# Patient Record
Sex: MALE | Race: WHITE | NOT HISPANIC OR LATINO | URBAN - METROPOLITAN AREA
[De-identification: names, ages, dates, MRNs, and addresses within clinical notes are randomized per-mention and may not be internally consistent; named-entity substitution may affect disease eponyms.]

---

## 2017-08-10 ENCOUNTER — INPATIENT (INPATIENT)
Facility: HOSPITAL | Age: 52
LOS: 5 days | Discharge: HOME | End: 2017-08-16
Attending: HOSPITALIST | Admitting: HOSPITALIST
Payer: MEDICARE

## 2017-08-10 VITALS
DIASTOLIC BLOOD PRESSURE: 84 MMHG | RESPIRATION RATE: 15 BRPM | SYSTOLIC BLOOD PRESSURE: 182 MMHG | HEART RATE: 85 BPM | OXYGEN SATURATION: 100 % | TEMPERATURE: 98 F

## 2017-08-10 DIAGNOSIS — N18.9 CHRONIC KIDNEY DISEASE, UNSPECIFIED: ICD-10-CM

## 2017-08-10 DIAGNOSIS — N18.4 CHRONIC KIDNEY DISEASE, STAGE 4 (SEVERE): ICD-10-CM

## 2017-08-10 DIAGNOSIS — Z94.0 KIDNEY TRANSPLANT STATUS: ICD-10-CM

## 2017-08-10 DIAGNOSIS — Z90.89 ACQUIRED ABSENCE OF OTHER ORGANS: Chronic | ICD-10-CM

## 2017-08-10 DIAGNOSIS — Z94.0 KIDNEY TRANSPLANT STATUS: Chronic | ICD-10-CM

## 2017-08-10 DIAGNOSIS — G93.40 ENCEPHALOPATHY, UNSPECIFIED: ICD-10-CM

## 2017-08-10 DIAGNOSIS — I10 ESSENTIAL (PRIMARY) HYPERTENSION: ICD-10-CM

## 2017-08-10 DIAGNOSIS — A41.9 SEPSIS, UNSPECIFIED ORGANISM: ICD-10-CM

## 2017-08-10 DIAGNOSIS — E10.9 TYPE 1 DIABETES MELLITUS WITHOUT COMPLICATIONS: ICD-10-CM

## 2017-08-10 DIAGNOSIS — R50.9 FEVER, UNSPECIFIED: ICD-10-CM

## 2017-08-10 DIAGNOSIS — N17.9 ACUTE KIDNEY FAILURE, UNSPECIFIED: ICD-10-CM

## 2017-08-10 DIAGNOSIS — E10.649 TYPE 1 DIABETES MELLITUS WITH HYPOGLYCEMIA WITHOUT COMA: ICD-10-CM

## 2017-08-10 DIAGNOSIS — Z29.9 ENCOUNTER FOR PROPHYLACTIC MEASURES, UNSPECIFIED: ICD-10-CM

## 2017-08-10 DIAGNOSIS — I16.0 HYPERTENSIVE URGENCY: ICD-10-CM

## 2017-08-10 LAB
APPEARANCE UR: CLEAR — SIGNIFICANT CHANGE UP
BACTERIA # UR AUTO: SIGNIFICANT CHANGE UP
BASE EXCESS BLDV CALC-SCNC: -5.4 MMOL/L — SIGNIFICANT CHANGE UP
BILIRUB UR-MCNC: NEGATIVE — SIGNIFICANT CHANGE UP
BLOOD GAS VENOUS - CREATININE: 3.52 MG/DL — HIGH (ref 0.5–1.3)
BLOOD UR QL VISUAL: HIGH
BUN SERPL-MCNC: 66 MG/DL — HIGH (ref 7–23)
CALCIUM SERPL-MCNC: 9.2 MG/DL — SIGNIFICANT CHANGE UP (ref 8.4–10.5)
CHLORIDE BLDV-SCNC: 110 MMOL/L — HIGH (ref 96–108)
CHLORIDE SERPL-SCNC: 102 MMOL/L — SIGNIFICANT CHANGE UP (ref 98–107)
CO2 SERPL-SCNC: 17 MMOL/L — LOW (ref 22–31)
COLOR SPEC: SIGNIFICANT CHANGE UP
CREAT ?TM UR-MCNC: 36.63 MG/DL — SIGNIFICANT CHANGE UP
CREAT SERPL-MCNC: 3.51 MG/DL — HIGH (ref 0.5–1.3)
GAS PNL BLDV: 131 MMOL/L — LOW (ref 136–146)
GLUCOSE BLDV-MCNC: 106 — HIGH (ref 70–99)
GLUCOSE SERPL-MCNC: 104 MG/DL — HIGH (ref 70–99)
GLUCOSE UR-MCNC: 50 — SIGNIFICANT CHANGE UP
HCO3 BLDV-SCNC: 20 MMOL/L — SIGNIFICANT CHANGE UP (ref 20–27)
HCT VFR BLD CALC: 31.6 % — LOW (ref 39–50)
HCT VFR BLDV CALC: 33.2 % — LOW (ref 39–51)
HGB BLD-MCNC: 10.6 G/DL — LOW (ref 13–17)
HGB BLDV-MCNC: 10.8 G/DL — LOW (ref 13–17)
KETONES UR-MCNC: NEGATIVE — SIGNIFICANT CHANGE UP
LACTATE BLDV-MCNC: 1.2 MMOL/L — SIGNIFICANT CHANGE UP (ref 0.5–2)
LEUKOCYTE ESTERASE UR-ACNC: NEGATIVE — SIGNIFICANT CHANGE UP
MAGNESIUM SERPL-MCNC: 1.7 MG/DL — SIGNIFICANT CHANGE UP (ref 1.6–2.6)
MCHC RBC-ENTMCNC: 29.3 PG — SIGNIFICANT CHANGE UP (ref 27–34)
MCHC RBC-ENTMCNC: 33.5 % — SIGNIFICANT CHANGE UP (ref 32–36)
MCV RBC AUTO: 87.3 FL — SIGNIFICANT CHANGE UP (ref 80–100)
MUCOUS THREADS # UR AUTO: SIGNIFICANT CHANGE UP
NITRITE UR-MCNC: NEGATIVE — SIGNIFICANT CHANGE UP
NRBC # FLD: 0 — SIGNIFICANT CHANGE UP
PCO2 BLDV: 33 MMHG — LOW (ref 41–51)
PH BLDV: 7.38 PH — SIGNIFICANT CHANGE UP (ref 7.32–7.43)
PH UR: 6 — SIGNIFICANT CHANGE UP (ref 4.6–8)
PHOSPHATE SERPL-MCNC: 3.3 MG/DL — SIGNIFICANT CHANGE UP (ref 2.5–4.5)
PLATELET # BLD AUTO: 230 K/UL — SIGNIFICANT CHANGE UP (ref 150–400)
PMV BLD: 10.1 FL — SIGNIFICANT CHANGE UP (ref 7–13)
PO2 BLDV: 37 MMHG — SIGNIFICANT CHANGE UP (ref 35–40)
POTASSIUM BLDV-SCNC: 3.1 MMOL/L — LOW (ref 3.4–4.5)
POTASSIUM SERPL-MCNC: 3.6 MMOL/L — SIGNIFICANT CHANGE UP (ref 3.5–5.3)
POTASSIUM SERPL-SCNC: 3.6 MMOL/L — SIGNIFICANT CHANGE UP (ref 3.5–5.3)
PROT UR-MCNC: 186.7 MG/DL — SIGNIFICANT CHANGE UP
PROT UR-MCNC: 300 — SIGNIFICANT CHANGE UP
RBC # BLD: 3.62 M/UL — LOW (ref 4.2–5.8)
RBC # FLD: 12.3 % — SIGNIFICANT CHANGE UP (ref 10.3–14.5)
RBC CASTS # UR COMP ASSIST: SIGNIFICANT CHANGE UP (ref 0–?)
SAO2 % BLDV: 69.2 % — SIGNIFICANT CHANGE UP (ref 60–85)
SODIUM SERPL-SCNC: 138 MMOL/L — SIGNIFICANT CHANGE UP (ref 135–145)
SP GR SPEC: 1.01 — SIGNIFICANT CHANGE UP (ref 1–1.03)
SQUAMOUS # UR AUTO: SIGNIFICANT CHANGE UP
UROBILINOGEN FLD QL: NORMAL E.U. — SIGNIFICANT CHANGE UP (ref 0.1–0.2)
WBC # BLD: 15.56 K/UL — HIGH (ref 3.8–10.5)
WBC # FLD AUTO: 15.56 K/UL — HIGH (ref 3.8–10.5)
WBC UR QL: SIGNIFICANT CHANGE UP (ref 0–?)

## 2017-08-10 PROCEDURE — 71010: CPT | Mod: 26

## 2017-08-10 PROCEDURE — 99223 1ST HOSP IP/OBS HIGH 75: CPT | Mod: GC

## 2017-08-10 PROCEDURE — 76770 US EXAM ABDO BACK WALL COMP: CPT | Mod: 26

## 2017-08-10 PROCEDURE — 70450 CT HEAD/BRAIN W/O DYE: CPT | Mod: 26

## 2017-08-10 PROCEDURE — 99223 1ST HOSP IP/OBS HIGH 75: CPT

## 2017-08-10 RX ORDER — ACETAMINOPHEN 500 MG
650 TABLET ORAL ONCE
Qty: 0 | Refills: 0 | Status: COMPLETED | OUTPATIENT
Start: 2017-08-10 | End: 2017-08-10

## 2017-08-10 RX ORDER — VANCOMYCIN HCL 1 G
700 VIAL (EA) INTRAVENOUS ONCE
Qty: 0 | Refills: 0 | Status: DISCONTINUED | OUTPATIENT
Start: 2017-08-10 | End: 2017-08-10

## 2017-08-10 RX ORDER — ATENOLOL 25 MG/1
100 TABLET ORAL ONCE
Qty: 0 | Refills: 0 | Status: COMPLETED | OUTPATIENT
Start: 2017-08-10 | End: 2017-08-10

## 2017-08-10 RX ORDER — SODIUM CHLORIDE 9 MG/ML
1000 INJECTION, SOLUTION INTRAVENOUS
Qty: 0 | Refills: 0 | Status: DISCONTINUED | OUTPATIENT
Start: 2017-08-10 | End: 2017-08-16

## 2017-08-10 RX ORDER — CYCLOSPORINE 100 MG/1
100 CAPSULE ORAL
Qty: 0 | Refills: 0 | Status: DISCONTINUED | OUTPATIENT
Start: 2017-08-10 | End: 2017-08-13

## 2017-08-10 RX ORDER — INSULIN GLARGINE 100 [IU]/ML
22 INJECTION, SOLUTION SUBCUTANEOUS
Qty: 0 | Refills: 0 | Status: DISCONTINUED | OUTPATIENT
Start: 2017-08-11 | End: 2017-08-14

## 2017-08-10 RX ORDER — MYCOPHENOLATE MOFETIL 250 MG/1
1250 CAPSULE ORAL
Qty: 0 | Refills: 0 | Status: DISCONTINUED | OUTPATIENT
Start: 2017-08-10 | End: 2017-08-16

## 2017-08-10 RX ORDER — DEXTROSE 50 % IN WATER 50 %
12.5 SYRINGE (ML) INTRAVENOUS ONCE
Qty: 0 | Refills: 0 | Status: DISCONTINUED | OUTPATIENT
Start: 2017-08-10 | End: 2017-08-16

## 2017-08-10 RX ORDER — SODIUM CHLORIDE 9 MG/ML
1000 INJECTION INTRAMUSCULAR; INTRAVENOUS; SUBCUTANEOUS ONCE
Qty: 0 | Refills: 0 | Status: COMPLETED | OUTPATIENT
Start: 2017-08-10 | End: 2017-08-10

## 2017-08-10 RX ORDER — MYCOPHENOLATE MOFETIL 250 MG/1
1250 CAPSULE ORAL
Qty: 0 | Refills: 0 | Status: DISCONTINUED | OUTPATIENT
Start: 2017-08-10 | End: 2017-08-10

## 2017-08-10 RX ORDER — DEXTROSE 50 % IN WATER 50 %
25 SYRINGE (ML) INTRAVENOUS ONCE
Qty: 0 | Refills: 0 | Status: DISCONTINUED | OUTPATIENT
Start: 2017-08-10 | End: 2017-08-16

## 2017-08-10 RX ORDER — INSULIN LISPRO 100/ML
VIAL (ML) SUBCUTANEOUS
Qty: 0 | Refills: 0 | Status: DISCONTINUED | OUTPATIENT
Start: 2017-08-10 | End: 2017-08-11

## 2017-08-10 RX ORDER — INSULIN GLARGINE 100 [IU]/ML
20 INJECTION, SOLUTION SUBCUTANEOUS ONCE
Qty: 0 | Refills: 0 | Status: DISCONTINUED | OUTPATIENT
Start: 2017-08-10 | End: 2017-08-10

## 2017-08-10 RX ORDER — DEXTROSE 50 % IN WATER 50 %
1 SYRINGE (ML) INTRAVENOUS ONCE
Qty: 0 | Refills: 0 | Status: DISCONTINUED | OUTPATIENT
Start: 2017-08-10 | End: 2017-08-16

## 2017-08-10 RX ORDER — GLUCAGON INJECTION, SOLUTION 0.5 MG/.1ML
1 INJECTION, SOLUTION SUBCUTANEOUS ONCE
Qty: 0 | Refills: 0 | Status: DISCONTINUED | OUTPATIENT
Start: 2017-08-10 | End: 2017-08-16

## 2017-08-10 RX ORDER — ATENOLOL 25 MG/1
100 TABLET ORAL DAILY
Qty: 0 | Refills: 0 | Status: DISCONTINUED | OUTPATIENT
Start: 2017-08-11 | End: 2017-08-14

## 2017-08-10 RX ORDER — PIPERACILLIN AND TAZOBACTAM 4; .5 G/20ML; G/20ML
3.38 INJECTION, POWDER, LYOPHILIZED, FOR SOLUTION INTRAVENOUS EVERY 12 HOURS
Qty: 0 | Refills: 0 | Status: DISCONTINUED | OUTPATIENT
Start: 2017-08-10 | End: 2017-08-13

## 2017-08-10 RX ORDER — NIFEDIPINE 30 MG
30 TABLET, EXTENDED RELEASE 24 HR ORAL ONCE
Qty: 0 | Refills: 0 | Status: COMPLETED | OUTPATIENT
Start: 2017-08-10 | End: 2017-08-10

## 2017-08-10 RX ORDER — INSULIN LISPRO 100/ML
VIAL (ML) SUBCUTANEOUS AT BEDTIME
Qty: 0 | Refills: 0 | Status: DISCONTINUED | OUTPATIENT
Start: 2017-08-10 | End: 2017-08-16

## 2017-08-10 RX ORDER — HEPARIN SODIUM 5000 [USP'U]/ML
5000 INJECTION INTRAVENOUS; SUBCUTANEOUS EVERY 8 HOURS
Qty: 0 | Refills: 0 | Status: DISCONTINUED | OUTPATIENT
Start: 2017-08-10 | End: 2017-08-16

## 2017-08-10 RX ORDER — VANCOMYCIN HCL 1 G
1000 VIAL (EA) INTRAVENOUS ONCE
Qty: 0 | Refills: 0 | Status: COMPLETED | OUTPATIENT
Start: 2017-08-10 | End: 2017-08-10

## 2017-08-10 RX ORDER — CYCLOSPORINE 100 MG/1
100 CAPSULE ORAL ONCE
Qty: 0 | Refills: 0 | Status: COMPLETED | OUTPATIENT
Start: 2017-08-10 | End: 2017-08-10

## 2017-08-10 RX ORDER — INSULIN GLARGINE 100 [IU]/ML
22 INJECTION, SOLUTION SUBCUTANEOUS ONCE
Qty: 0 | Refills: 0 | Status: COMPLETED | OUTPATIENT
Start: 2017-08-10 | End: 2017-08-10

## 2017-08-10 RX ORDER — PIPERACILLIN AND TAZOBACTAM 4; .5 G/20ML; G/20ML
3.38 INJECTION, POWDER, LYOPHILIZED, FOR SOLUTION INTRAVENOUS ONCE
Qty: 0 | Refills: 0 | Status: COMPLETED | OUTPATIENT
Start: 2017-08-10 | End: 2017-08-10

## 2017-08-10 RX ORDER — NIFEDIPINE 30 MG
30 TABLET, EXTENDED RELEASE 24 HR ORAL DAILY
Qty: 0 | Refills: 0 | Status: DISCONTINUED | OUTPATIENT
Start: 2017-08-10 | End: 2017-08-11

## 2017-08-10 RX ADMIN — PIPERACILLIN AND TAZOBACTAM 25 GRAM(S): 4; .5 INJECTION, POWDER, LYOPHILIZED, FOR SOLUTION INTRAVENOUS at 18:18

## 2017-08-10 RX ADMIN — HEPARIN SODIUM 5000 UNIT(S): 5000 INJECTION INTRAVENOUS; SUBCUTANEOUS at 15:35

## 2017-08-10 RX ADMIN — Medication 30 MILLIGRAM(S): at 04:22

## 2017-08-10 RX ADMIN — Medication 650 MILLIGRAM(S): at 04:22

## 2017-08-10 RX ADMIN — Medication 5 MILLIGRAM(S): at 11:09

## 2017-08-10 RX ADMIN — INSULIN GLARGINE 22 UNIT(S): 100 INJECTION, SOLUTION SUBCUTANEOUS at 15:57

## 2017-08-10 RX ADMIN — Medication 250 MILLIGRAM(S): at 05:42

## 2017-08-10 RX ADMIN — SODIUM CHLORIDE 4000 MILLILITER(S): 9 INJECTION INTRAMUSCULAR; INTRAVENOUS; SUBCUTANEOUS at 04:22

## 2017-08-10 RX ADMIN — MYCOPHENOLATE MOFETIL 1250 MILLIGRAM(S): 250 CAPSULE ORAL at 11:10

## 2017-08-10 RX ADMIN — ATENOLOL 100 MILLIGRAM(S): 25 TABLET ORAL at 05:28

## 2017-08-10 RX ADMIN — CYCLOSPORINE 100 MILLIGRAM(S): 100 CAPSULE ORAL at 20:20

## 2017-08-10 RX ADMIN — CYCLOSPORINE 100 MILLIGRAM(S): 100 CAPSULE ORAL at 11:09

## 2017-08-10 RX ADMIN — MYCOPHENOLATE MOFETIL 1250 MILLIGRAM(S): 250 CAPSULE ORAL at 20:20

## 2017-08-10 RX ADMIN — PIPERACILLIN AND TAZOBACTAM 200 GRAM(S): 4; .5 INJECTION, POWDER, LYOPHILIZED, FOR SOLUTION INTRAVENOUS at 06:51

## 2017-08-10 RX ADMIN — Medication 2: at 18:18

## 2017-08-10 RX ADMIN — HEPARIN SODIUM 5000 UNIT(S): 5000 INJECTION INTRAVENOUS; SUBCUTANEOUS at 22:15

## 2017-08-10 NOTE — H&P ADULT - PROBLEM SELECTOR PROBLEM 6
RODRIGO (acute kidney injury) Need for prophylactic measure CKD (chronic kidney disease) Diabetes mellitus type I

## 2017-08-10 NOTE — ED PROVIDER NOTE - PROGRESS NOTE DETAILS
CT head for confusion, fingersticks now controlled, BP elevated will resume home medications, likely admit for endocrine establishment and adjustment febrile, white count. start broad spectrum abx CT head for confusion, fingersticks now controlled, BP elevated will resume home medications, likely admit for endocrine establishment and adjustment. Febrile, will get infectious workup and labwork.. sister by bedside has labwork appears to have creatine of 1.7-2.70 at baseline Klepfish: RODRIGO, leukocytosis. Possible PNA. On immunosuppressant. Vitals stable. Hemodynamically stable. pmd not at [facility].

## 2017-08-10 NOTE — ED PROVIDER NOTE - ATTENDING CONTRIBUTION TO CARE
52M PMH DM, diabetic retinopathy, legally blind, HTN, renal transplant (1998, on prednisone, cellcept, cyclosporine), CKD p/w AMS and hypoglycemia tonigh that mostly resolved w/ sugar. Also fluctuating FSGs over last 2w. Febrile, other vitals wnl. Exam as above.  Hypoglycemia: Possibly 2/2 infection vs. renal dysfunction  Fever: Unclear etiology. Infectious w/u.  CBC, cmp, vbg comp, blood cx. UA, urine cx. tylenol, IVF.  Pt not confused but slightly slower to respond. Will cth.   Pt on immunosuppressant. Likely admission for further care.

## 2017-08-10 NOTE — H&P ADULT - PROBLEM SELECTOR PLAN 5
- BUN 66 / 3.51 Cr - BUN 66 / 3.51 Cr  - Hold nephrotoxins, f/u Renal recs - BUN 66 / 3.51 Cr  - Hold nephrotoxins, f/u Urine protein:cr - Pt on ISS. Will continue to monitor FSG levels.   - Pt started on Diabetic diet. Diabetes educator to see pt.  - F/u HbA1c level - Pt currently taking Cyclosporine 100mg po BID, Prednisone 5mg po QD, and Cellcept 125o mg po BID  - Per Renal: Pt baseline Cr (from outpt records) shows baseline 2.5-3. We recommend a Renal ultrasound to evaluate for hydronephrosis and Urine Protein:Cr. - - Pt will start his home regimen and take the Cyclosporine and Cellcept at 8am and 8pm daily, prednisone once daily.

## 2017-08-10 NOTE — CONSULT NOTE ADULT - ASSESSMENT
53 y/o M PMH DM1, HTN, renal transplant in 1998 (currently on Cellcept, prednisone 5 mg qd, and Cyclosporine), legally blind, CKD who presents with hypoglycemia in setting of DM1

## 2017-08-10 NOTE — H&P ADULT - PROBLEM SELECTOR PLAN 3
- Will continue home Atenolol and Nifedipine. - BP currently   - Pt given one dose of Atenolol 100 mg in the Ed  - Will continue home Atenolol and Nifedipine during this admission for further management of HTN. - BP currently 170/90  - Pt given one dose of Atenolol 100 mg in the ED  - Will continue home Atenolol and Nifedipine during this admission for further management of HTN. - BP currently 144/74  - Pt given one dose of Atenolol 100 mg and Nifedipine 30 mg in the ED  - Will continue home Atenolol and Nifedipine during this admission for further management of HTN.

## 2017-08-10 NOTE — H&P ADULT - NSHPREVIEWOFSYSTEMS_GEN_ALL_CORE
REVIEW OF SYSTEMS:    CONSTITUTIONAL: Denies any fatigue, weakness, fevers or chills  EYES/ENT: No visual changes;  No vertigo or throat pain   NECK: No pain or stiffness  RESPIRATORY: No cough, wheezing, hemoptysis; No shortness of breath  CARDIOVASCULAR: No chest pain or palpitations  GASTROINTESTINAL: No abdominal or epigastric pain. No nausea, vomiting, or hematemesis; No diarrhea or constipation. No melena or hematochezia.  GENITOURINARY: No dysuria, frequency or hematuria  NEUROLOGICAL: No numbness or weakness  SKIN: No itching, burning, rashes, or lesions   All other review of systems is negative unless indicated above.

## 2017-08-10 NOTE — H&P ADULT - ATTENDING COMMENTS
Patient seen and examined with team this AM. Agree with resident note as per above, personally edited by me.

## 2017-08-10 NOTE — H&P ADULT - PROBLEM SELECTOR PLAN 2
- Pt currently taking Cyclosporine 100mg po BID, Prednisone 5mg po QD, and Cellcept 125o mg po BID  - Renal was consulted to evaluate and for further follow up as an outpatient   - - Pt currently taking Cyclosporine 100mg po BID, Prednisone 5mg po QD, and Cellcept 125o mg po BID  - Renal was consulted to evaluate pt and provide recommendations on immunosuppressants as well as further follow up as an outpatient - Pt currently taking Cyclosporine 100mg po BID, Prednisone 5mg po QD, and Cellcept 125o mg po BID  - Per Renal: Pt baseline Cr (from outpt records) shows baseline 2.5-3. We recommend a Renal ultrasound to evaluate for hydronephrosis and blood flow to transplant and Urine Protein:Cr. Pt will start his home regimen and take the Cyclosporine and Cellcept at 8am and 8pm daily, prednisone once daily. AMS improved with glucose however, given pt leukocytosis in the setting of an immunocompromised state, we will do an infectious work up to rule out any underlying infection that could be causing this change in mental status  - Pt given one dose of Vanc and Zosyn upon admission. Will f/u Vanc levels. Blood cultures are pending  - Pt placed on Zosyn for broad coverage. S/p 1L NS bolus in ED.  - CT head- negative for any intracranial process  - CXR- negative for any solid consolidation  - UA- Negative for leukocyte esterase and Nitrites  - Will cont to monitor clinical status throughout the day Sepsis of unknown origin; no localizing symptoms but patient is very immunocompromised therefore will treat for now  - In ED pt T-101.3, WBC-15.56, CT head neg, CXR neg, UA neg for nitrites and leukocyte esterase.   - Given unknown source and immunocompromised state, pt started on broad spectrum Vanc and Zosyn coverage. Blood cultures pending.  - Will continue to monitor clinical status, Temperature, WBC. If bcx are negative and pt continues to improve will consider d/cing abx.

## 2017-08-10 NOTE — CONSULT NOTE ADULT - PROBLEM SELECTOR RECOMMENDATION 3
Cr slightly above baseline ? hemodynamically mediated.  Would check urine FL:Cr ratio, renal US to assess transplant size/echogenicity and assess for hydronephrosis.  Would obtain outpatient transplant notes from Dr. Agarwal (tel [phone]) in [place].

## 2017-08-10 NOTE — H&P ADULT - PROBLEM SELECTOR PLAN 6
HSQ - BUN 66 / 3.51 Cr  - Hold nephrotoxins, f/u Urine protein:cr - Pt on ISS. Will continue to monitor FSG levels.   - Pt started on Diabetic diet. Diabetes educator to see pt.  - F/u HbA1c level

## 2017-08-10 NOTE — ED ADULT NURSE NOTE - OBJECTIVE STATEMENT
Pt received in #27, aaox3 with c/o fluctuating blood glucose. Pt attributes to having dinner late this evening to his blood glucose being low. Pt was given honey prior to EMS arrival with an increase to his blood glucose level. In addition, the pt states that his glucose has been inconsistent since 7/30. Baseline ~140s.

## 2017-08-10 NOTE — CONSULT NOTE ADULT - ASSESSMENT
52M with DM1, HTN and LRRT in 1998 (b/l Cr now 2.5-3), admitted with weakness and hypoglycemia to r/o infectious process

## 2017-08-10 NOTE — ED ADULT TRIAGE NOTE - CHIEF COMPLAINT QUOTE
alert no distress c/o low blood sugar then high after giving honey then high and low after giving insulin   last  by EMS   hx DM htn legally blind right kidney transplant   has been uncontrolled x 2 weeks    in triage

## 2017-08-10 NOTE — CONSULT NOTE ADULT - PROBLEM SELECTOR RECOMMENDATION 9
- patient with hypoglycemia, likely in setting of decreased po intake  - recommend one dose of lantus 22 units now to prevent DKA, as last dose of lantus was yesterday morning  - start lantus 22 units, low correction sliding scale  - based on FS, will start bolus insulin if needed  - consistent carbohydrate diet  - can follow up in office as outpatient - [phone]  - check fasting lipid panel in AM  - will follow

## 2017-08-10 NOTE — H&P ADULT - NSHPSOCIALHISTORY_GEN_ALL_CORE
Worked at [employer] in [place]. Recently moved to live with his sister nearby. Denies any tobacco, recreational drug use, or etoh abuse

## 2017-08-10 NOTE — CONSULT NOTE ADULT - PROBLEM SELECTOR RECOMMENDATION 9
s/p renal transplant in 1998. continue cyclosporine, cellcept and prednisone at home doses. will order cyclosporine level for tomorrow AM.

## 2017-08-10 NOTE — CONSULT NOTE ADULT - ATTENDING COMMENTS
52M DM1 complicated by renal transplant with CKD, legally blind, s/p hypoglycemia and hyperglycemia on admission.  Proceed with Lantus and Humalog low scale as outlined. Will need outpatient endocrine follow up [phone].

## 2017-08-10 NOTE — CONSULT NOTE ADULT - SUBJECTIVE AND OBJECTIVE BOX
[facility] DIVISION OF KIDNEY DISEASES AND HYPERTENSION -- INITIAL CONSULT NOTE  --------------------------------------------------------------------------------  HPI: Pt is a 53 y/o M w/ a pmh significant for DM1, HTN, living related renal transplant in 1998 (from brother), legally, blind, and CKD (b/l Cr approx 2.5-3) who presented to the ED with a chief complaint of weakness and AMS. Pt notes he recently moved from [place] to live with his sister nearby. Called EMS for low blood glucose 40-70s and brought to ED.     Had renal transplant from brother in 1998, had ESRD related to DM1 and was transiently on PD prior to renal transplant.  Transplant was done at [facility], and patient denies any episodes of rejection.  Has been follow in [place] by nephrologist Dr. Agarwal. States in last 2 years, Cr has been rising, and baseline in last year has been 2.5 to 3.  Missed his immunosuppression last night and this morning, otherwise has not missed his transplant meds. Denies any nausea/vomiting/diarrhea.  Denies any fever, dysuria, hematuria.  Has + leg swelling. No pain over graft in RLQ. Does not have a nephrologist in [facility] yet, but just moved to [place], and has seen PCP who is organizing renal followup as outpatient. Has not been to [place] for renal followup in > 2 years.               PAST HISTORY  --------------------------------------------------------------------------------  PAST MEDICAL & SURGICAL HISTORY:  Hypertension  Legally blind  Diabetes mellitus type I  Renal transplant, status post  CKD (chronic kidney disease)  Renal transplant, status post  History of tonsillectomy    FAMILY HISTORY:  No pertinent family history in first degree relatives    PAST SOCIAL HISTORY:    Worked at [employer] in [place]. Recently moved to live with his sister nearby. Denies any tobacco, recreational drug use, or etoh abuse	      ALLERGIES & MEDICATIONS  --------------------------------------------------------------------------------  Allergies    No Known Allergies    Intolerances      Standing Inpatient Medications  piperacillin/tazobactam IVPB. 3.375 Gram(s) IV Intermittent every 12 hours  insulin lispro (HumaLOG) corrective regimen sliding scale   SubCutaneous three times a day before meals  insulin lispro (HumaLOG) corrective regimen sliding scale   SubCutaneous at bedtime  dextrose 5%. 1000 milliLiter(s) IV Continuous <Continuous>  dextrose 50% Injectable 12.5 Gram(s) IV Push once  dextrose 50% Injectable 25 Gram(s) IV Push once  dextrose 50% Injectable 25 Gram(s) IV Push once  NIFEdipine XL 30 milliGRAM(s) Oral daily  heparin  Injectable 5000 Unit(s) SubCutaneous every 8 hours  predniSONE   Tablet 5 milliGRAM(s) Oral daily  mycophenolate mofetil 1250 milliGRAM(s) Oral <User Schedule>  mycophenolate mofetil 1250 milliGRAM(s) Oral two times a day  cycloSPORINE  (SandIMMUNE) 100 milliGRAM(s) Oral <User Schedule>  cycloSPORINE  (SandIMMUNE) 100 milliGRAM(s) Oral once    PRN Inpatient Medications  dextrose Gel 1 Dose(s) Oral once PRN  glucagon  Injectable 1 milliGRAM(s) IntraMuscular once PRN      REVIEW OF SYSTEMS  --------------------------------------------------------------------------------  Gen: No weight changes, fatigue, fevers/chills, weakness  Skin: No rashes  Head/Eyes/Ears/Mouth: No headache; Normal hearing; legally blind; No sinus pain/discomfort, sore throat  Respiratory: No dyspnea, cough, wheezing, hemoptysis  CV: No chest pain, PND, orthopnea  GI: No abdominal pain, diarrhea, constipation, nausea, vomiting, melena, hematochezia  : No increased frequency, dysuria, hematuria, nocturia  MSK: No joint pain/swelling; no back pain; no edema  Neuro: No dizziness/lightheadedness, weakness, seizures, numbness, tingling  Heme: No easy bruising or bleeding  Endo: No heat/cold intolerance  Psych:+ stress recently with move.     All other systems were reviewed and are negative, except as noted.    VITALS/PHYSICAL EXAM  --------------------------------------------------------------------------------  T(C): 36.8 (08-10-17 @ 09:14), Max: 38.5 (08-10-17 @ 04:23)  HR: 74 (08-10-17 @ 11:00) (73 - 88)  BP: 144/74 (08-10-17 @ 11:00) (144/74 - 242/91)  RR: 18 (08-10-17 @ 11:00) (15 - 20)  SpO2: 98% (08-10-17 @ 11:00) (98% - 100%)  Wt(kg): --  Height (cm): 182.88 (08-10-17 @ 07:31)  Weight (kg): 79.9 (08-10-17 @ 07:31)  BMI (kg/m2): 23.9 (08-10-17 @ 07:31)  BSA (m2): 2.02 (08-10-17 @ 07:31)      Physical Exam:  	Gen: NAD, well-appearing  	HEENT: PERRL, supple neck, clear oropharynx  	Pulm: CTA B/L  	CV: RRR, S1S2; no rub  	Back: No spinal or CVA tenderness; no sacral edema  	Abd: +BS, soft, nontender/nondistended, RLQ graft, no pain on palpation, no thrill, no bruit  	  	  	LE: Warm, FROM, no clubbing, intact strength; + edema  	  	Psych: Normal affect and mood  	Skin: Warm, without rashes      LABS/STUDIES  --------------------------------------------------------------------------------              10.6   15.56 >-----------<  230      [08-10-17 @ 04:30]              31.6     138  |  102  |  66  ----------------------------<  104      [08-10-17 @ 04:30]  3.6   |  17  |  3.51        Ca     9.2     [08-10-17 @ 04:30]      Mg     1.7     [08-10-17 @ 04:30]      Phos  3.3     [08-10-17 @ 04:30]            Creatinine Trend:  SCr 3.51 [08-10 @ 04:30]    Urinalysis - [08-10-17 @ 04:50]      Color PLYEL / Appearance CLEAR / SG 1.009 / pH 6.0      Gluc 50 / Ketone NEGATIVE  / Bili NEGATIVE / Urobili NORMAL       Blood TRACE / Protein 300 / Leuk Est NEGATIVE / Nitrite NEGATIVE      RBC 0-2 / WBC 0-2 / Hyaline  / Gran  / Sq Epi OCC / Non Sq Epi  / Bacteria FEW

## 2017-08-10 NOTE — ED PROVIDER NOTE - PMH
CKD (chronic kidney disease)    Diabetes mellitus type I    Hypertension    Legally blind    Renal transplant, status post

## 2017-08-10 NOTE — ED PROVIDER NOTE - PHYSICAL EXAMINATION
GENERAL: Slow memory recall, patient tends to speak slower and less coherant  HEAD:  Atraumatic, Normocephalic  EYES: EOMI, PERRLA, conjunctiva and sclera clear  NECK: Supple, No JVD  CHEST/LUNG: Clear to auscultation bilaterally; No wheeze  HEART: Regular rate and rhythm; No murmurs, rubs, or gallops  ABDOMEN: Soft, Nontender, Nondistended; Bowel sounds present  EXTREMITIES:  2+ Peripheral Pulses, cyanosis, or 2+ pitting edema  PSYCH: AAOx3  NEUROLOGY: non-focal  SKIN: No rashes or lesions b/l Le pitting edema 2+ )per pt, chronic and unchanged)  rectal temp w/ RN chaperone 101.3 ~0400. +brown stool.

## 2017-08-10 NOTE — ED PROVIDER NOTE - CARE PLAN
Principal Discharge DX:	Fever Principal Discharge DX:	Fever  Secondary Diagnosis:	RODRIGO (acute kidney injury)

## 2017-08-10 NOTE — CONSULT NOTE ADULT - SUBJECTIVE AND OBJECTIVE BOX
HPI:  Patient is a 53 y/o M PMH DM1, HTN, renal transplant in 1998 (currently on Cellcept, prednisone 5 mg qd, and Cyclosporine), legally blind, CKD who presented to the ED with a chief complaint of weakness and AMS. Patient states he recently moved from [place] to live with his sister nearby. He says that over the last 2 weeks his am FS have been ranging between 60-70. Has only been eating two meals a day. Reports he has been more physically active. He says his normal AM FSG are usually 130-140. Last night, his sister came home and found patient minimally responsive, diaphoretic and breathing loudly. His sister gave patient honey, and his proceeding FS was recorded as "high" so she gave him 6 units of novolog and called EMS. En route to the ED, pt FS in ambulance was 40, he was given glucose and his FS in the ED were 117-144.     Difficult to obtain full history from patient. Patient was diagnosed with DM1 age age 8, states he went into a coma around that time. Since then, has been on insulin. Reports taking lantus 29 units every morning. Patient carb counts for his meals - ICR 1:15. Also corrects for elevated FS - ISF 1:30; correct FS to blood glucose goal of 120. Reports pre-lunch FS sometimes as high as 200's. For breakfast will have toast, milk. For lunch will have meat, fish with vegetables. For dinner will have meat with rice. Drinks diet soda only, no juice. Patient denies history of neuropathy, has a history of nephropathy s/p transplant. Currently on prednisone 5 mg qd. Patient is legally blind.     PAST MEDICAL & SURGICAL HISTORY:  Hypertension  Legally blind  Diabetes mellitus type I  Renal transplant, status post  CKD (chronic kidney disease)  Renal transplant, status post  History of tonsillectomy      FAMILY HISTORY:  No pertinent family history in first degree relatives      Social History:  No cigarette or alcohol use    Outpatient Medications:  Lantus 29 units qd  Novolog sliding scale  Prednisone 5 mg qd  Atenolol  Cyclosporine    MEDICATIONS  (STANDING):  piperacillin/tazobactam IVPB. 3.375 Gram(s) IV Intermittent every 12 hours  insulin lispro (HumaLOG) corrective regimen sliding scale   SubCutaneous three times a day before meals  insulin lispro (HumaLOG) corrective regimen sliding scale   SubCutaneous at bedtime  dextrose 5%. 1000 milliLiter(s) (50 mL/Hr) IV Continuous <Continuous>  dextrose 50% Injectable 12.5 Gram(s) IV Push once  dextrose 50% Injectable 25 Gram(s) IV Push once  dextrose 50% Injectable 25 Gram(s) IV Push once  NIFEdipine XL 30 milliGRAM(s) Oral daily  heparin  Injectable 5000 Unit(s) SubCutaneous every 8 hours  predniSONE   Tablet 5 milliGRAM(s) Oral daily  mycophenolate mofetil 1250 milliGRAM(s) Oral <User Schedule>  mycophenolate mofetil 1250 milliGRAM(s) Oral two times a day  cycloSPORINE  (SandIMMUNE) 100 milliGRAM(s) Oral <User Schedule>    MEDICATIONS  (PRN):  dextrose Gel 1 Dose(s) Oral once PRN Blood Glucose LESS THAN 70 milliGRAM(s)/deciliter  glucagon  Injectable 1 milliGRAM(s) IntraMuscular once PRN Glucose LESS THAN 70 milligrams/deciliter      Allergies    No Known Allergies    Intolerances      Review of Systems:  Constitutional: No fever  Eyes: + blind  Neuro: No tremors  HEENT: No pain  Cardiovascular: No chest pain, palpitations  Respiratory: No SOB, no cough  GI: No nausea, vomiting, abdominal pain  : No dysuria  Skin: no rash  Psych: no depression  Endocrine: no polyuria, polydipsia  Hem/lymph: no swelling    ALL OTHER SYSTEMS REVIEWED AND NEGATIVE      PHYSICAL EXAM:  VITALS: T(C): 36.8 (08-10-17 @ 14:24)  T(F): 98.2 (08-10-17 @ 14:24), Max: 101.3 (08-10-17 @ 04:23)  HR: 75 (08-10-17 @ 14:24) (73 - 88)  BP: 163/79 (08-10-17 @ 14:24) (144/74 - 242/91)  RR:  (15 - 20)  SpO2:  (98% - 100%)  Wt(kg): --  GENERAL: NAD,  well-developed  EYES: No proptosis, anicteric  HEENT:  Atraumatic, moist mucous membranes  THYROID: Normal size, no palpable nodules  RESPIRATORY: Clear to auscultation bilaterally; No rales, rhonchi, wheezing  CARDIOVASCULAR: Regular rate and rhythm; No murmurs; no peripheral edema  GI: Soft, nontender, non distended, normal bowel sounds  SKIN: Dry, intact, No rashes or lesions  MUSCULOSKELETAL: Full range of motion, normal strength  NEURO: no tremor  PSYCH: Alert and oriented x 3, normal affect, normal mood    CAPILLARY BLOOD GLUCOSE  147 (08-10 @ 12:26)  110 (08-10 @ 10:06)  65 (08-10 @ 09:24)  60 (08-10 @ 08:41)  101 (08-10 @ 06:08)  117 (08-10 @ 02:38)                            10.6   15.56 )-----------( 230      ( 10 Aug 2017 04:30 )             31.6       08-10    138  |  102  |  66<H>  ----------------------------<  104<H>  3.6   |  17<L>  |  3.51<H>    EGFR if : 22  EGFR if non African American: 19    Ca    9.2      08-10  Mg     1.7     08-10  Phos  3.3     08-10

## 2017-08-10 NOTE — H&P ADULT - NSHPLABSRESULTS_GEN_ALL_CORE
10.6   15.56 )-----------( 230      ( 10 Aug 2017 04:30 )             31.6       08-10    138  |  102  |  66<H>  ----------------------------<  104<H>  3.6   |  17<L>  |  3.51<H>    Ca    9.2      10 Aug 2017 04:30  Phos  3.3     08-10  Mg     1.7     08-10          Urinalysis Basic - ( 10 Aug 2017 04:50 )    Color: PLYEL / Appearance: CLEAR / S.009 / pH: 6.0  Gluc: 50 / Ketone: NEGATIVE  / Bili: NEGATIVE / Urobili: NORMAL E.U.   Blood: TRACE / Protein: 300 / Nitrite: NEGATIVE   Leuk Esterase: NEGATIVE / RBC: 0-2 / WBC 0-2   Sq Epi: OCC / Non Sq Epi: x / Bacteria: FEW      Lactate Trend      CAPILLARY BLOOD GLUCOSE  60 (10 Aug 2017 08:41) 10.6   15.56 )-----------( 230      ( 10 Aug 2017 04:30 )             31.6       08-10    138  |  102  |  66<H>  ----------------------------<  104<H>  3.6   |  17<L>  |  3.51<H>    Ca    9.2      10 Aug 2017 04:30  Phos  3.3     08-10  Mg     1.7     08-10          Urinalysis Basic - ( 10 Aug 2017 04:50 )    Color: PLYEL / Appearance: CLEAR / S.009 / pH: 6.0  Gluc: 50 / Ketone: NEGATIVE  / Bili: NEGATIVE / Urobili: NORMAL E.U.   Blood: TRACE / Protein: 300 / Nitrite: NEGATIVE   Leuk Esterase: NEGATIVE / RBC: 0-2 / WBC 0-2   Sq Epi: OCC / Non Sq Epi: x / Bacteria: FEW      Lactate Trend      CAPILLARY BLOOD GLUCOSE  60 (10 Aug 2017 08:41)    IMAGING:  PERSONALLY REVIEWED    CXR: Limited study secondary to apical lordotic projection   showing no definite consolidations.    DISCUSSED CASE WITH: Nephrology- Discussed continuing immunosuppresants

## 2017-08-10 NOTE — ED PROVIDER NOTE - OBJECTIVE STATEMENT
52M with DM1, diabetic retinopathy, legally blind, HTN, renal xplant in 1998, CKD presents with fluctuating fingersticks. Patient recently moved from [place] to move in with sister. Patient for the past 2 weeks after moving has been having low fingersticks in the AM premeal. Usually in the 140s however today has been in the 70s. He saw the number and ate breakfast, then took his AM lantus (29U). Patient then went to see his PMD for the first time (Dr. Shravan García). When he got home, his sister found him gurgling and minimally responsive. She checked his fingerstick which was 64. She gave him honey but he spit a lot of it out. She then went to see him an hour later and was able to sit up but had difficulty speaking. FSG checked was recorded as "HI" so she gave him 6 novolog and called EMS. Fingerstick in ambulance was 40 and was given glucose. In ED FSGs now 117-144.     Patient takes AM 29 units lantus and novolog premeals as a sliding scale. On average takes about 15 Units.       Took his BP meds this AM, did not take his bedtime renal xplant meds. 52M with DM1, diabetic retinopathy, legally blind, HTN, renal xplant in 1998, CKD presents with fluctuating fingersticks. Patient recently moved from [place] to move in with sister. Patient for the past 2 weeks after moving has been having low fingersticks in the AM premeal. Usually in the 140s however today has been in the 70s. He saw the number and ate breakfast, then took his AM lantus (29U). Patient then went to see his PMD for the first time (Dr. Shravan García). When he got home, his sister found him gurgling and minimally responsive. She checked his fingerstick which was 64. She gave him honey but he spit a lot of it out. She then went to see him an hour later and was able to sit up but had difficulty speaking. FSG checked was recorded as "HI" so she gave him 6 novolog and called EMS. Fingerstick in ambulance was 40 and was given glucose. In ED FSGs now 117-144.     Patient takes AM 29 units lantus and novolog premeals as a sliding scale. On average takes about 15 Units.     [place]: 52M PMH DM, diabetic retinopathy, legally blind, HTN, renal transplant (1998, on prednisone, cellcept, cyclosporine), CKD p/w AMS. Per pt, usual FSG ~140s, but over last 2w, since moving from [place], am FSGs were 60-70. Last night, sister came home, checked on pt and found him not responding appropraitely, diaphoretic and breathing loudly, attempted to put honey in mouth, took FSG a few minutes later and was 60. Pt slowly improving and then an hr later took FSG and it read "hi" so given 6 novolog and called EMS. On EMS arrival FSG ~40, given glucose paste and coke and brought to ED. No recent med changes. Denies f/c, SOB/CP, cough/rhinorrhea, NVD, abd pain, urinary complaints, black/bloody stool. Now only c/o minimal L temporal HA, intermittent, gradual, similar to many prior. No neck/back pain. Per sister pt appears like usual self but slightly slower to respond to questions.       Took his BP meds this AM, did not take his bedtime renal xplant meds.

## 2017-08-10 NOTE — CONSULT NOTE ADULT - PROBLEM SELECTOR RECOMMENDATION 2
- BP improved from admission, but still elevated  - recommend titration of BP medications to goal BP of <150/90 - BP improved from admission, but still elevated  - recommend titration of BP medications to goal BP of <140/90

## 2017-08-10 NOTE — CONSULT NOTE ADULT - PROBLEM SELECTOR RECOMMENDATION 2
missed BP meds last night, to be restarted now in hospital. Continue to monitor BP and can titirate up nifedipine if needed.

## 2017-08-10 NOTE — H&P ADULT - NSHPPHYSICALEXAM_GEN_ALL_CORE
PHYSICAL EXAM:  General: Well appearing male, NAD, AAOx3, lying comfortably bed. Slow to answer questions  HEENT: Normocephalic, atraumatic.  PERRL.  EOMI.  No scleral icterus.  Moist MM.  No oropharyngeal exudates.    Neck: Supple.  Full range of motion.  No JVD.  No carotid bruits.  No thyromegaly.  Trachea midline.  No lymphadenopathy.   Heart: RRR.  Normal S1 and S2.  No murmurs, rubs, or gallops.   Lungs: CTAB. No wheezes, crackles, or rhonchi.    Abdomen: BS+, soft, NT/ND.  No organomegaly.  Skin: Warm and dry.  No rashes. No evidence of pressure ulcers, rashes, or lesions  Extremities: 1+ pitting edema, no evidence of cyanosis or clubbing.  2+ peripheral pulses b/l.  Musculoskeletal: No deformities.  No spinal or paraspinal tenderness.  Neuro: A&Ox3.  CN II-XII intact.  5/5 strength in UE and LE b/l.  Tactile sensation intact in UE and LE b/l.

## 2017-08-10 NOTE — H&P ADULT - ASSESSMENT
51 y/o M w/ a pmh significant for DM1, diabetic retinopathy, legally blind, HTN, renal transplant (1998, on prednisone, cellcept, cyclosporine), CKD p/w AMS and hypoglycemia in the setting of leukocytosis with no obvious sign of infection 51 y/o M w/ a pmh significant for DM1, diabetic retinopathy, legally blind, HTN, renal transplant (1998, on prednisone, cellcept, cyclosporine), CKD p/w Sepsis of unknown origin in the setting of AMS, hypoglycemia, and leukocytosis 53 y/o M w/ a pmh significant for DM1, diabetic retinopathy, legally blind, HTN, renal [id number] (on prednisone, cellcept, cyclosporine), CKD p/w Sepsis of unknown origin in the setting of AMS, hypoglycemia, and leukocytosis 53 y/o M w/ a pmh significant for DM1, diabetic retinopathy, legally blind, HTN, renal transplant in 1998 (on prednisone, cellcept, cyclosporine), CKD p/w Sepsis of unknown origin in the setting of AMS, hypoglycemia, and leukocytosis

## 2017-08-10 NOTE — H&P ADULT - PROBLEM SELECTOR PLAN 4
- Pt on ISS. Will continue to monitor FSG levels.   - Pt started on Diabetic diet. Diabetes educator to see pt.  - F/u HbA1c level - Pt currently taking Cyclosporine 100mg po BID, Prednisone 5mg po QD, and Cellcept 125o mg po BID  - Per Renal: Pt baseline Cr (from outpt records) shows baseline 2.5-3. We recommend a Renal ultrasound to evaluate for hydronephrosis and Urine Protein:Cr. - - Pt will start his home regimen and take the Cyclosporine and Cellcept at 8am and 8pm daily, prednisone once daily. - BUN 66 / 3.51 Cr  - Hold nephrotoxins, f/u Urine protein:cr  - Plan per below

## 2017-08-10 NOTE — H&P ADULT - HISTORY OF PRESENT ILLNESS
Pt is a 51 y/o M w/ a pmh significant for DM1, HTN, renal transplant in 1998 (currently on Cellcept, prednisone, and Cyclosporine), legally, blind, and CKD who presented to the ED with a chief complaint of weakness and AMS. Pt notes he recently moved from [place] to live with his sister nearby. He says that over the last 2-3 weeks his am FSG have been ranging between 60-70. He says his normal AM FSG are usually 130-140. Last night, his sister came home and found pt minimally responsive, diaphoretic and breathing loudly. His sister attempted to put honey in his mouth but he spit out most of it. His proceeding FSG was recorded as "high" so she gave him 6 units of novolog and called EMS. En route to the ED, pt FSG in ambulance was 40, he was given glucose and his FSG in the ED were 117-144. Pt denies any sick contacts, recent travel, new medications, night sweats, weight loss, fevers, chills, rashes, n.v, cp, sob, cough, rhinorrhea, abd pain, hematuria, dysuria, hematochezia, or melena. Upon admission, pt underwent routine blood work found to have Hb 10.6, leukocytosis (15.6), UA was negative for Leukocyte esterase and nitrites, CT head was negative for any acute intracranial process, CXR shows no definite consolidation. Pt currently feels better and per sister he is more alert and oriented than he was earlier.

## 2017-08-10 NOTE — H&P ADULT - PROBLEM SELECTOR PLAN 1
AMS improved with glucose tablets however, given pt leukocytosis in the setting of immunocompromised state, we will do an infectious work up to rule out any underlying infection that could be causing this change in mental status  - Pt given one dose of Vanc and Zosyn upon admission. Will f/u Vanc levels. Blood cultures are pending  - Pt placed on Zosyn for broad coverage. S/p 1L NS bolus in ED.  - CT head- negative for any intracranial process  - CXR- negative for any solid consolidation  - UA- Negative for leukocyte esterase and Nitrites  - Will cont to monitor clinical status throughout the day AMS improved with glucose however, given pt leukocytosis in the setting of an immunocompromised state, we will do an infectious work up to rule out any underlying infection that could be causing this change in mental status  - Pt given one dose of Vanc and Zosyn upon admission. Will f/u Vanc levels. Blood cultures are pending  - Pt placed on Zosyn for broad coverage. S/p 1L NS bolus in ED.  - CT head- negative for any intracranial process  - CXR- negative for any solid consolidation  - UA- Negative for leukocyte esterase and Nitrites  - Will cont to monitor clinical status throughout the day Sepsis of unknown origin.  - In ED pt T-101.3, WBC-15.56, CT head neg, CXR neg, UA neg for nitrites and leukocyte esterase.   - Given unknown source and immunocompromised state, pt started on broad spectrum Vanc and Zosyn coverage. Blood cultures pending.  - Will continue to monitor clinical status, Temperature, WBC. If bcx are negative and pt continues to improve will consider d/cing abx. Acute metabolic encephalopathy 2/2 to hypoglycemia   AMS improved with glucose however, given pt leukocytosis in the setting of an immunocompromised state, we will do an infectious work up to rule out any underlying infection that could be causing this change in mental status  - Pt given one dose of Vanc and Zosyn upon admission. Will f/u Vanc levels. Blood cultures are pending  - Pt placed on Zosyn for broad coverage. S/p 1L NS bolus in ED.  - CT head- negative for any intracranial process  - CXR- negative for any solid consolidation  - UA- Negative for leukocyte esterase and Nitrites  - Will cont to monitor clinical status throughout the day

## 2017-08-11 DIAGNOSIS — E87.2 ACIDOSIS: ICD-10-CM

## 2017-08-11 DIAGNOSIS — R80.8 OTHER PROTEINURIA: ICD-10-CM

## 2017-08-11 LAB
BACTERIA UR CULT: SIGNIFICANT CHANGE UP
BUN SERPL-MCNC: 61 MG/DL — HIGH (ref 7–23)
CALCIUM SERPL-MCNC: 9 MG/DL — SIGNIFICANT CHANGE UP (ref 8.4–10.5)
CHLORIDE SERPL-SCNC: 105 MMOL/L — SIGNIFICANT CHANGE UP (ref 98–107)
CHOLEST SERPL-MCNC: 137 MG/DL — SIGNIFICANT CHANGE UP (ref 120–199)
CO2 SERPL-SCNC: 16 MMOL/L — LOW (ref 22–31)
CREAT SERPL-MCNC: 3.49 MG/DL — HIGH (ref 0.5–1.3)
CYCLOSPORINE SER-MCNC: 78 NG/ML — LOW (ref 150–400)
GLUCOSE SERPL-MCNC: 147 MG/DL — HIGH (ref 70–99)
HCT VFR BLD CALC: 32 % — LOW (ref 39–50)
HDLC SERPL-MCNC: 52 MG/DL — SIGNIFICANT CHANGE UP (ref 35–55)
HGB BLD-MCNC: 10.6 G/DL — LOW (ref 13–17)
LIPID PNL WITH DIRECT LDL SERPL: 68 MG/DL — SIGNIFICANT CHANGE UP
MAGNESIUM SERPL-MCNC: 1.9 MG/DL — SIGNIFICANT CHANGE UP (ref 1.6–2.6)
MCHC RBC-ENTMCNC: 29.7 PG — SIGNIFICANT CHANGE UP (ref 27–34)
MCHC RBC-ENTMCNC: 33.1 % — SIGNIFICANT CHANGE UP (ref 32–36)
MCV RBC AUTO: 89.6 FL — SIGNIFICANT CHANGE UP (ref 80–100)
NRBC # FLD: 0 — SIGNIFICANT CHANGE UP
PHOSPHATE SERPL-MCNC: 3.6 MG/DL — SIGNIFICANT CHANGE UP (ref 2.5–4.5)
PLATELET # BLD AUTO: 245 K/UL — SIGNIFICANT CHANGE UP (ref 150–400)
PMV BLD: 10.6 FL — SIGNIFICANT CHANGE UP (ref 7–13)
POTASSIUM SERPL-MCNC: 3.8 MMOL/L — SIGNIFICANT CHANGE UP (ref 3.5–5.3)
POTASSIUM SERPL-SCNC: 3.8 MMOL/L — SIGNIFICANT CHANGE UP (ref 3.5–5.3)
RBC # BLD: 3.57 M/UL — LOW (ref 4.2–5.8)
RBC # FLD: 12.6 % — SIGNIFICANT CHANGE UP (ref 10.3–14.5)
SODIUM SERPL-SCNC: 136 MMOL/L — SIGNIFICANT CHANGE UP (ref 135–145)
SPECIMEN SOURCE: SIGNIFICANT CHANGE UP
TRIGL SERPL-MCNC: 97 MG/DL — SIGNIFICANT CHANGE UP (ref 10–149)
VANCOMYCIN FLD-MCNC: 9.8 UG/ML — SIGNIFICANT CHANGE UP
WBC # BLD: 10.35 K/UL — SIGNIFICANT CHANGE UP (ref 3.8–10.5)
WBC # FLD AUTO: 10.35 K/UL — SIGNIFICANT CHANGE UP (ref 3.8–10.5)

## 2017-08-11 PROCEDURE — 99232 SBSQ HOSP IP/OBS MODERATE 35: CPT | Mod: GC

## 2017-08-11 PROCEDURE — 99233 SBSQ HOSP IP/OBS HIGH 50: CPT | Mod: GC

## 2017-08-11 PROCEDURE — 99232 SBSQ HOSP IP/OBS MODERATE 35: CPT

## 2017-08-11 RX ORDER — INSULIN LISPRO 100/ML
2 VIAL (ML) SUBCUTANEOUS
Qty: 0 | Refills: 0 | Status: DISCONTINUED | OUTPATIENT
Start: 2017-08-11 | End: 2017-08-13

## 2017-08-11 RX ORDER — INSULIN LISPRO 100/ML
VIAL (ML) SUBCUTANEOUS
Qty: 0 | Refills: 0 | Status: DISCONTINUED | OUTPATIENT
Start: 2017-08-11 | End: 2017-08-15

## 2017-08-11 RX ORDER — NIFEDIPINE 30 MG
60 TABLET, EXTENDED RELEASE 24 HR ORAL DAILY
Qty: 0 | Refills: 0 | Status: DISCONTINUED | OUTPATIENT
Start: 2017-08-12 | End: 2017-08-12

## 2017-08-11 RX ORDER — NIFEDIPINE 30 MG
30 TABLET, EXTENDED RELEASE 24 HR ORAL ONCE
Qty: 0 | Refills: 0 | Status: COMPLETED | OUTPATIENT
Start: 2017-08-11 | End: 2017-08-11

## 2017-08-11 RX ADMIN — HEPARIN SODIUM 5000 UNIT(S): 5000 INJECTION INTRAVENOUS; SUBCUTANEOUS at 22:36

## 2017-08-11 RX ADMIN — HEPARIN SODIUM 5000 UNIT(S): 5000 INJECTION INTRAVENOUS; SUBCUTANEOUS at 14:02

## 2017-08-11 RX ADMIN — Medication 8: at 12:22

## 2017-08-11 RX ADMIN — MYCOPHENOLATE MOFETIL 1250 MILLIGRAM(S): 250 CAPSULE ORAL at 06:23

## 2017-08-11 RX ADMIN — CYCLOSPORINE 100 MILLIGRAM(S): 100 CAPSULE ORAL at 20:27

## 2017-08-11 RX ADMIN — Medication 30 MILLIGRAM(S): at 05:55

## 2017-08-11 RX ADMIN — Medication 5 MILLIGRAM(S): at 18:30

## 2017-08-11 RX ADMIN — MYCOPHENOLATE MOFETIL 1250 MILLIGRAM(S): 250 CAPSULE ORAL at 18:26

## 2017-08-11 RX ADMIN — Medication 30 MILLIGRAM(S): at 12:22

## 2017-08-11 RX ADMIN — Medication 5 MILLIGRAM(S): at 05:56

## 2017-08-11 RX ADMIN — INSULIN GLARGINE 22 UNIT(S): 100 INJECTION, SOLUTION SUBCUTANEOUS at 16:39

## 2017-08-11 RX ADMIN — Medication 4: at 18:23

## 2017-08-11 RX ADMIN — PIPERACILLIN AND TAZOBACTAM 25 GRAM(S): 4; .5 INJECTION, POWDER, LYOPHILIZED, FOR SOLUTION INTRAVENOUS at 05:56

## 2017-08-11 RX ADMIN — Medication 2 UNIT(S): at 18:24

## 2017-08-11 RX ADMIN — PIPERACILLIN AND TAZOBACTAM 25 GRAM(S): 4; .5 INJECTION, POWDER, LYOPHILIZED, FOR SOLUTION INTRAVENOUS at 18:25

## 2017-08-11 RX ADMIN — ATENOLOL 100 MILLIGRAM(S): 25 TABLET ORAL at 05:56

## 2017-08-11 RX ADMIN — HEPARIN SODIUM 5000 UNIT(S): 5000 INJECTION INTRAVENOUS; SUBCUTANEOUS at 05:56

## 2017-08-11 RX ADMIN — CYCLOSPORINE 100 MILLIGRAM(S): 100 CAPSULE ORAL at 07:59

## 2017-08-11 NOTE — PROGRESS NOTE ADULT - PROBLEM SELECTOR PLAN 2
Sepsis of unknown origin; no localizing symptoms but patient is very immunocompromised therefore will treat for now  - In ED pt T-101.3, WBC-15.56, CT head neg, CXR neg, UA neg for nitrites and leukocyte esterase.   - Given unknown source and immunocompromised state, pt started on broad spectrum Vanc and Zosyn coverage. Blood cultures negative after 24hours.   - Will continue to monitor clinical status, Temperature, WBC. If bcx are negative and pt continues to improve will consider d/cing abx.

## 2017-08-11 NOTE — PROGRESS NOTE ADULT - PROBLEM SELECTOR PLAN 3
- Will continue home Atenolol and Nifedipine during this admission for further management of HTN. - Will continue home Atenolol and 60 mg PO Nifedipine during this admission for further management of HTN.

## 2017-08-11 NOTE — PROGRESS NOTE ADULT - ATTENDING COMMENTS
Patient seen and examined. Currently, feeling much better this AM. Mentating well. BP still elevated and need DM control. Plan to increase Nifedipine-XL to 60mg qdaily. F/u endo recs regarding insulin titration. If cultures all negative for > 48 hours, will DC antibiotics since he appears clinically well.     Further details of plan per resident note above

## 2017-08-11 NOTE — PROGRESS NOTE ADULT - PROBLEM SELECTOR PLAN 5
- Pt currently taking Cyclosporine 100mg po BID, Prednisone 5mg po QD, and Cellcept 125o mg po BID  - Per Renal: Pt baseline Cr (from outpt records) shows baseline 2.5-3.   - Renal U/S shows mild fullness of renal transplant. Urine Protein:Cr- 5.09.  - Pt will start his home regimen and take the Cyclosporine and Cellcept at 8am and 8pm daily, prednisone once daily.

## 2017-08-11 NOTE — PROGRESS NOTE ADULT - PROBLEM SELECTOR PLAN 4
Has nephrotic range proteinura (approx 5g) is setting of progressive CKD and LRRT 19 years ago.  Will followup with [facility], and more workup pending their results.

## 2017-08-11 NOTE — PROGRESS NOTE ADULT - SUBJECTIVE AND OBJECTIVE BOX
Chief Complaint: Patient is a 52y old  Male who was seen for follow up of encephalopathy (10 Aug 2017 08:08)      SUBJECTIVE / OVERNIGHT EVENTS: Pt seen and examined. No acute events over night. Pt reports feeling well and feels much more alert and oriented than he did on admission. He has no current complaints. He denies any recent fevers, chills, n/v, cp, sob, abd pain, dysuria, hematuria, or melena.    MEDICATIONS  (STANDING):  piperacillin/tazobactam IVPB. 3.375 Gram(s) IV Intermittent every 12 hours  insulin lispro (HumaLOG) corrective regimen sliding scale   SubCutaneous three times a day before meals  insulin lispro (HumaLOG) corrective regimen sliding scale   SubCutaneous at bedtime  dextrose 5%. 1000 milliLiter(s) (50 mL/Hr) IV Continuous <Continuous>  dextrose 50% Injectable 12.5 Gram(s) IV Push once  dextrose 50% Injectable 25 Gram(s) IV Push once  dextrose 50% Injectable 25 Gram(s) IV Push once  ATENolol  Tablet 100 milliGRAM(s) Oral daily  NIFEdipine XL 30 milliGRAM(s) Oral daily  heparin  Injectable 5000 Unit(s) SubCutaneous every 8 hours  predniSONE   Tablet 5 milliGRAM(s) Oral daily  mycophenolate mofetil 1250 milliGRAM(s) Oral two times a day  cycloSPORINE  (SandIMMUNE) 100 milliGRAM(s) Oral <User Schedule>  insulin glargine Injectable (LANTUS) 22 Unit(s) SubCutaneous <User Schedule>    MEDICATIONS  (PRN):  dextrose Gel 1 Dose(s) Oral once PRN Blood Glucose LESS THAN 70 milliGRAM(s)/deciliter  glucagon  Injectable 1 milliGRAM(s) IntraMuscular once PRN Glucose LESS THAN 70 milligrams/deciliter        CAPILLARY BLOOD GLUCOSE  231 (10 Aug 2017 22:07)  180 (10 Aug 2017 17:24)  147 (10 Aug 2017 12:26)  110 (10 Aug 2017 10:06)  65 (10 Aug 2017 09:24)  60 (10 Aug 2017 08:41)    Vital Signs Last 24 Hrs  T(C): 36.8 (11 Aug 2017 05:48), Max: 37.2 (10 Aug 2017 21:22)  T(F): 98.2 (11 Aug 2017 05:48), Max: 99 (10 Aug 2017 21:22)  HR: 74 (11 Aug 2017 05:48) (73 - 78)  BP: 177/100 (11 Aug 2017 05:48) (144/74 - 189/90)  BP(mean): --  RR: 18 (11 Aug 2017 05:48) (18 - 20)  SpO2: 100% (11 Aug 2017 05:48) (98% - 100%)        PHYSICAL EXAM:  GENERAL: NAD, well-developed  HEAD:  Atraumatic, Normocephalic  EYES: EOMI, PERRLA, conjunctiva and sclera clear  NECK: Supple, No JVD  CHEST/LUNG: Clear to auscultation bilaterally; No wheeze  HEART: Regular rate and rhythm; No murmurs, rubs, or gallops  ABDOMEN: Soft, Nontender, Nondistended; Bowel sounds present  EXTREMITIES:  2+ Peripheral Pulses, No clubbing, cyanosis, or edema  PSYCH: AAOx3  NEUROLOGY: non-focal  SKIN: No rashes or lesions    LABS:                        10.6   15.56 )-----------( 230      ( 10 Aug 2017 04:30 )             31.6     08-10    138  |  102  |  66<H>  ----------------------------<  104<H>  3.6   |  17<L>  |  3.51<H>    Ca    9.2      10 Aug 2017 04:30  Phos  3.3     08-10  Mg     1.7     08-10            Urinalysis Basic - ( 10 Aug 2017 04:50 )    Color: PLYEL / Appearance: CLEAR / S.009 / pH: 6.0  Gluc: 50 / Ketone: NEGATIVE  / Bili: NEGATIVE / Urobili: NORMAL E.U.   Blood: TRACE / Protein: 300 / Nitrite: NEGATIVE   Leuk Esterase: NEGATIVE / RBC: 0-2 / WBC 0-2   Sq Epi: OCC / Non Sq Epi: x / Bacteria: FEW        RADIOLOGY & ADDITIONAL TESTS:    Imaging Personally Reviewed:    Consultant(s) Notes Reviewed:      Care Discussed with Consultants/Other Providers:

## 2017-08-11 NOTE — PROGRESS NOTE ADULT - PROBLEM SELECTOR PLAN 3
Progressive CKD in setting of LRRT done 19 years ago.  Was seen at [facility] approx 1.5 years ago for workup.  Contacted [facility] transplant center ([phone]) and left message to receive more information.  Renal US shows mild dilation of transplant collection system, otherwise benign.    NOTE: patient has no nephrologist in [facility] (just moved from [place]) and wishes to follow up in [place] as arranged by PCP.  I discussed with patient that he may follow up with our renal clinic if he wishes,  call  to book appointment.

## 2017-08-11 NOTE — PROGRESS NOTE ADULT - PROBLEM SELECTOR PLAN 4
- Hold nephrotoxins, urine protein:cr - 5.09  - Plan per below Due to diabetic nephropathy with evidence of nephrotic range proteinturia  - Hold nephrotoxins, urine protein:cr - 5.09  - Plan per below

## 2017-08-11 NOTE — PROGRESS NOTE ADULT - PROBLEM SELECTOR PLAN 1
Cr remains slightly above prior baseline of 2.5-3.0. RODRIGO may be related to uncontrolled hypertension.  continue immunosuppression, follow up CyA levels draw today.

## 2017-08-11 NOTE — PROGRESS NOTE ADULT - ASSESSMENT
53 y/o M w/ a pmh significant for DM1, diabetic retinopathy, legally blind, HTN, renal transplant in 1998 (on prednisone, cellcept, cyclosporine), CKD p/w Sepsis of unknown origin in the setting of AMS, hypoglycemia, and leukocytosis

## 2017-08-11 NOTE — PROGRESS NOTE ADULT - PROBLEM SELECTOR PLAN 7
HSQ DVT-HSQ - Pt on ISS. Will continue to monitor FSG levels.   - Pt started on Diabetic diet. Diabetes educator to see pt.  - F/u HbA1c level

## 2017-08-11 NOTE — PROGRESS NOTE ADULT - PROBLEM SELECTOR PLAN 2
Is uncontrolled, patient is asymptomatic.   would change atenolol to labetalol (for alpha + beta blockage_\  would titrate up nifedipine XL

## 2017-08-11 NOTE — PROGRESS NOTE ADULT - SUBJECTIVE AND OBJECTIVE BOX
Chief Complaint: DM 1    History: Pt was a/w hypoglycemia. Now hyperglycemia but no further hypoglycemia. Tolerating PO intake and denies other complaints at this time.     MEDICATIONS  (STANDING):  piperacillin/tazobactam IVPB. 3.375 Gram(s) IV Intermittent every 12 hours  insulin lispro (HumaLOG) corrective regimen sliding scale   SubCutaneous three times a day before meals  insulin lispro (HumaLOG) corrective regimen sliding scale   SubCutaneous at bedtime  dextrose 5%. 1000 milliLiter(s) (50 mL/Hr) IV Continuous <Continuous>  dextrose 50% Injectable 12.5 Gram(s) IV Push once  dextrose 50% Injectable 25 Gram(s) IV Push once  dextrose 50% Injectable 25 Gram(s) IV Push once  ATENolol  Tablet 100 milliGRAM(s) Oral daily  heparin  Injectable 5000 Unit(s) SubCutaneous every 8 hours  predniSONE   Tablet 5 milliGRAM(s) Oral daily  mycophenolate mofetil 1250 milliGRAM(s) Oral two times a day  cycloSPORINE  (SandIMMUNE) 100 milliGRAM(s) Oral <User Schedule>  insulin glargine Injectable (LANTUS) 22 Unit(s) SubCutaneous <User Schedule>    MEDICATIONS  (PRN):  dextrose Gel 1 Dose(s) Oral once PRN Blood Glucose LESS THAN 70 milliGRAM(s)/deciliter  glucagon  Injectable 1 milliGRAM(s) IntraMuscular once PRN Glucose LESS THAN 70 milligrams/deciliter      No Known Allergies    Review of Systems:  Constitutional: No fever  HEENT: No pain  Cardiovascular: No chest pain, palpitations  Respiratory: No SOB, no cough  GI: No nausea, vomiting, abdominal pain  Psych: no depression  Endocrine: no polyuria, polydipsia        PHYSICAL EXAM:  VITALS: T(C): 36.8 (08-11-17 @ 05:48)  T(F): 98.2 (08-11-17 @ 05:48), Max: 99 (08-10-17 @ 21:22)  HR: 80 (08-11-17 @ 13:18) (66 - 80)  BP: 190/90 (08-11-17 @ 13:18) (163/79 - 190/90)  RR:  (18 - 18)  SpO2:  (98% - 100%)  Wt(kg): --  GENERAL: NAD, well-groomed, well-developed  EYES: No proptosis, no lid lag, anicteric  HEENT:  Atraumatic, Normocephalic, moist mucous membranes  THYROID: Normal size, no palpable nodules  RESPIRATORY: Clear to auscultation bilaterally; No rales, rhonchi, wheezing, or rubs  CARDIOVASCULAR: Regular rate and rhythm; No murmurs; no peripheral edema  GI: Soft, nontender, non distended, normal bowel sounds  SKIN: Dry, intact, No rashes or lesions  MUSCULOSKELETAL: Full range of motion, normal strength  NEURO: sensation intact, extraocular movements intact, no tremor, normal reflexes  PSYCH: Alert and oriented x 3, normal affect, normal mood  CUSHING'S SIGNS: no striae    CAPILLARY BLOOD GLUCOSE  321 (08-11 @ 12:12)  134 (08-11 @ 08:53)  231 (08-10 @ 22:07)  180 (08-10 @ 17:24)  147 (08-10 @ 12:26)  110 (08-10 @ 10:06)  65 (08-10 @ 09:24)  60 (08-10 @ 08:41)  101 (08-10 @ 06:08)  117 (08-10 @ 02:38)      08-11    136  |  105  |  61<H>  ----------------------------<  147<H>  3.8   |  16<L>  |  3.49<H>    EGFR if : 22  EGFR if non African American: 19    Ca    9.0      08-11  Mg     1.9     08-11  Phos  3.6     08-11 Chief Complaint: DM 1    History: Pt was a/w hypoglycemia. Now hyperglycemia but no further hypoglycemia. Tolerating PO intake and denies other complaints at this time.     MEDICATIONS  (STANDING):  piperacillin/tazobactam IVPB. 3.375 Gram(s) IV Intermittent every 12 hours  insulin lispro (HumaLOG) corrective regimen sliding scale   SubCutaneous three times a day before meals  insulin lispro (HumaLOG) corrective regimen sliding scale   SubCutaneous at bedtime  dextrose 5%. 1000 milliLiter(s) (50 mL/Hr) IV Continuous <Continuous>  dextrose 50% Injectable 12.5 Gram(s) IV Push once  dextrose 50% Injectable 25 Gram(s) IV Push once  dextrose 50% Injectable 25 Gram(s) IV Push once  ATENolol  Tablet 100 milliGRAM(s) Oral daily  heparin  Injectable 5000 Unit(s) SubCutaneous every 8 hours  predniSONE   Tablet 5 milliGRAM(s) Oral daily  mycophenolate mofetil 1250 milliGRAM(s) Oral two times a day  cycloSPORINE  (SandIMMUNE) 100 milliGRAM(s) Oral <User Schedule>  insulin glargine Injectable (LANTUS) 22 Unit(s) SubCutaneous <User Schedule>    MEDICATIONS  (PRN):  dextrose Gel 1 Dose(s) Oral once PRN Blood Glucose LESS THAN 70 milliGRAM(s)/deciliter  glucagon  Injectable 1 milliGRAM(s) IntraMuscular once PRN Glucose LESS THAN 70 milligrams/deciliter      No Known Allergies    Review of Systems:  Constitutional: No fever  HEENT: No pain  Cardiovascular: No chest pain, palpitations  Respiratory: No SOB, no cough  GI: No nausea, vomiting, abdominal pain  Psych: no depression  Endocrine: no polyuria, polydipsia        PHYSICAL EXAM:  VITALS: T(C): 36.8 (08-11-17 @ 05:48)  T(F): 98.2 (08-11-17 @ 05:48), Max: 99 (08-10-17 @ 21:22)  HR: 80 (08-11-17 @ 13:18) (66 - 80)  BP: 190/90 (08-11-17 @ 13:18) (163/79 - 190/90)  RR:  (18 - 18)  SpO2:  (98% - 100%)  Wt(kg): --  GENERAL: NAD, well-groomed  RESPIRATORY: Non labored  GI: Soft, nontender, non distended  SKIN: Dry, intact, No rashes or lesions  PSYCH: Alert and oriented x 3, normal affect, normal mood      CAPILLARY BLOOD GLUCOSE  321 (08-11 @ 12:12)  134 (08-11 @ 08:53)  231 (08-10 @ 22:07)  180 (08-10 @ 17:24)  147 (08-10 @ 12:26)  110 (08-10 @ 10:06)  65 (08-10 @ 09:24)  60 (08-10 @ 08:41)  101 (08-10 @ 06:08)  117 (08-10 @ 02:38)      08-11    136  |  105  |  61<H>  ----------------------------<  147<H>  3.8   |  16<L>  |  3.49<H>    EGFR if : 22  EGFR if non African American: 19    Ca    9.0      08-11  Mg     1.9     08-11  Phos  3.6     08-11

## 2017-08-11 NOTE — PROGRESS NOTE ADULT - PROBLEM SELECTOR PLAN 6
- Pt on ISS. Will continue to monitor FSG levels.   - Pt started on Diabetic diet. Diabetes educator to see pt.  - F/u HbA1c level Due to uremic acidosis with normal pH  - Hold off on Bicarb therapy per renal recs

## 2017-08-11 NOTE — PROGRESS NOTE ADULT - SUBJECTIVE AND OBJECTIVE BOX
[facility] DIVISION OF KIDNEY DISEASES AND HYPERTENSION -- FOLLOW UP NOTE  --------------------------------------------------------------------------------  Chief Complaint:    24 hour events/subjective: feels well, no complaints BP has been high and BP meds are being titrated up.         PAST HISTORY  --------------------------------------------------------------------------------  No significant changes to PMH, PSH, FHx, SHx, unless otherwise noted    ALLERGIES & MEDICATIONS  --------------------------------------------------------------------------------  Allergies    No Known Allergies    Intolerances      Standing Inpatient Medications  piperacillin/tazobactam IVPB. 3.375 Gram(s) IV Intermittent every 12 hours  insulin lispro (HumaLOG) corrective regimen sliding scale   SubCutaneous at bedtime  dextrose 5%. 1000 milliLiter(s) IV Continuous <Continuous>  dextrose 50% Injectable 12.5 Gram(s) IV Push once  dextrose 50% Injectable 25 Gram(s) IV Push once  dextrose 50% Injectable 25 Gram(s) IV Push once  ATENolol  Tablet 100 milliGRAM(s) Oral daily  heparin  Injectable 5000 Unit(s) SubCutaneous every 8 hours  predniSONE   Tablet 5 milliGRAM(s) Oral daily  mycophenolate mofetil 1250 milliGRAM(s) Oral two times a day  cycloSPORINE  (SandIMMUNE) 100 milliGRAM(s) Oral <User Schedule>  insulin glargine Injectable (LANTUS) 22 Unit(s) SubCutaneous <User Schedule>  insulin lispro Injectable (HumaLOG) 2 Unit(s) SubCutaneous three times a day before meals  insulin lispro (HumaLOG) corrective regimen sliding scale   SubCutaneous three times a day before meals    PRN Inpatient Medications  dextrose Gel 1 Dose(s) Oral once PRN  glucagon  Injectable 1 milliGRAM(s) IntraMuscular once PRN      REVIEW OF SYSTEMS  --------------------------------------------------------------------------------  -no rashes  -no chest pain  -no SOB  +edema  -no abdo pain, no nausea/vomiting/diarrhea    All other systems were reviewed and are negative, except as noted.    VITALS/PHYSICAL EXAM  --------------------------------------------------------------------------------  T(C): 36.4 (08-11-17 @ 14:00), Max: 37.2 (08-10-17 @ 21:22)  HR: 74 (08-11-17 @ 14:00) (66 - 80)  BP: 183/98 (08-11-17 @ 14:00) (169/99 - 190/90)  RR: 18 (08-11-17 @ 14:00) (18 - 18)  SpO2: 100% (08-11-17 @ 14:00) (98% - 100%)  Wt(kg): --  Height (cm): 182.88 (08-10-17 @ 07:31)  Weight (kg): 79.9 (08-10-17 @ 07:31)  BMI (kg/m2): 23.9 (08-10-17 @ 07:31)  BSA (m2): 2.02 (08-10-17 @ 07:31)      08-10-17 @ 07:01  -  08-11-17 @ 07:00  --------------------------------------------------------  IN: 100 mL / OUT: 0 mL / NET: 100 mL      Physical Exam:  	Gen: NAD, well-appearing  	Pulm: CTA B/L  	CV: RRR, S1S2; no rub  	Abd: +BS, soft, nontender/nondistended               LE: warm; + edema  	Skin: Warm, without rashes    LABS/STUDIES  --------------------------------------------------------------------------------              10.6   10.35 >-----------<  245      [08-11-17 @ 07:20]              32.0     136  |  105  |  61  ----------------------------<  147      [08-11-17 @ 07:20]  3.8   |  16  |  3.49        Ca     9.0     [08-11-17 @ 07:20]      Mg     1.9     [08-11-17 @ 07:20]      Phos  3.6     [08-11-17 @ 07:20]            Creatinine Trend:  SCr 3.49 [08-11 @ 07:20]  SCr 3.51 [08-10 @ 04:30]    Urinalysis - [08-10-17 @ 04:50]      Color PLYEL / Appearance CLEAR / SG 1.009 / pH 6.0      Gluc 50 / Ketone NEGATIVE  / Bili NEGATIVE / Urobili NORMAL       Blood TRACE / Protein 300 / Leuk Est NEGATIVE / Nitrite NEGATIVE      RBC 0-2 / WBC 0-2 / Hyaline  / Gran  / Sq Epi OCC / Non Sq Epi  / Bacteria FEW    Urine Creatinine 36.63      [08-10-17 @ 10:11]  Urine Protein 186.7      [08-10-17 @ 10:11]    Lipid: chol 137, TG 97, HDL 52, LDL 68      [08-11-17 @ 07:20]

## 2017-08-11 NOTE — PROGRESS NOTE ADULT - PROBLEM SELECTOR PLAN 1
Acute metabolic encephalopathy 2/2 to hypoglycemia   AMS improved with glucose however, given pt leukocytosis in the setting of an immunocompromised state, we will do an infectious work up to rule out any underlying infection that could be causing this change in mental status. If pt clinical status, vital signs, WBC count continue to improve without any true source of infection, will consider d/cing abx. Will f/u after rounds  - Pt given one dose of Vanc and Zosyn upon admission. Will f/u Vanc levels. Blood cultures are negative after 24 hours.  - Pt placed on Zosyn for broad coverage.   - CT head- negative for any intracranial process  - CXR- negative for any solid consolidation  - UA- Negative for leukocyte esterase and Nitrites  - Will cont to monitor clinical status throughout the day Acute metabolic encephalopathy 2/2 to hypoglycemia   AMS improved with glucose however, given pt leukocytosis in the setting of an immunocompromised state, we will do an infectious work up to rule out any underlying infection that could be causing this change in mental status. If pt clinical status, vital signs, WBC count continue to improve without any true source of infection, will consider d/cing abx.   - Pt given one dose of Vanc and Zosyn upon admission. Blood cultures are negative after 24 hours.  - Pt placed on Zosyn for broad coverage.   - CT head- negative for any intracranial process  - CXR- negative for any solid consolidation  - UA- Negative for leukocyte esterase and Nitrites  - Will cont to monitor clinical status throughout the day

## 2017-08-12 LAB
BASOPHILS # BLD AUTO: 0.07 K/UL — SIGNIFICANT CHANGE UP (ref 0–0.2)
BASOPHILS NFR BLD AUTO: 0.8 % — SIGNIFICANT CHANGE UP (ref 0–2)
BUN SERPL-MCNC: 64 MG/DL — HIGH (ref 7–23)
CALCIUM SERPL-MCNC: 8.8 MG/DL — SIGNIFICANT CHANGE UP (ref 8.4–10.5)
CHLORIDE SERPL-SCNC: 106 MMOL/L — SIGNIFICANT CHANGE UP (ref 98–107)
CO2 SERPL-SCNC: 18 MMOL/L — LOW (ref 22–31)
CREAT SERPL-MCNC: 3.88 MG/DL — HIGH (ref 0.5–1.3)
EOSINOPHIL # BLD AUTO: 0.21 K/UL — SIGNIFICANT CHANGE UP (ref 0–0.5)
EOSINOPHIL NFR BLD AUTO: 2.3 % — SIGNIFICANT CHANGE UP (ref 0–6)
GLUCOSE SERPL-MCNC: 121 MG/DL — HIGH (ref 70–99)
HCT VFR BLD CALC: 30.6 % — LOW (ref 39–50)
HGB BLD-MCNC: 10 G/DL — LOW (ref 13–17)
IMM GRANULOCYTES # BLD AUTO: 0.03 # — SIGNIFICANT CHANGE UP
IMM GRANULOCYTES NFR BLD AUTO: 0.3 % — SIGNIFICANT CHANGE UP (ref 0–1.5)
LYMPHOCYTES # BLD AUTO: 1.12 K/UL — SIGNIFICANT CHANGE UP (ref 1–3.3)
LYMPHOCYTES # BLD AUTO: 12 % — LOW (ref 13–44)
MAGNESIUM SERPL-MCNC: 2 MG/DL — SIGNIFICANT CHANGE UP (ref 1.6–2.6)
MCHC RBC-ENTMCNC: 28.9 PG — SIGNIFICANT CHANGE UP (ref 27–34)
MCHC RBC-ENTMCNC: 32.7 % — SIGNIFICANT CHANGE UP (ref 32–36)
MCV RBC AUTO: 88.4 FL — SIGNIFICANT CHANGE UP (ref 80–100)
MONOCYTES # BLD AUTO: 1 K/UL — HIGH (ref 0–0.9)
MONOCYTES NFR BLD AUTO: 10.7 % — SIGNIFICANT CHANGE UP (ref 2–14)
NEUTROPHILS # BLD AUTO: 6.89 K/UL — SIGNIFICANT CHANGE UP (ref 1.8–7.4)
NEUTROPHILS NFR BLD AUTO: 73.9 % — SIGNIFICANT CHANGE UP (ref 43–77)
NRBC # FLD: 0 — SIGNIFICANT CHANGE UP
PHOSPHATE SERPL-MCNC: 3.8 MG/DL — SIGNIFICANT CHANGE UP (ref 2.5–4.5)
PLATELET # BLD AUTO: 256 K/UL — SIGNIFICANT CHANGE UP (ref 150–400)
PMV BLD: 10.8 FL — SIGNIFICANT CHANGE UP (ref 7–13)
POTASSIUM SERPL-MCNC: 3.6 MMOL/L — SIGNIFICANT CHANGE UP (ref 3.5–5.3)
POTASSIUM SERPL-SCNC: 3.6 MMOL/L — SIGNIFICANT CHANGE UP (ref 3.5–5.3)
RBC # BLD: 3.46 M/UL — LOW (ref 4.2–5.8)
RBC # FLD: 12.6 % — SIGNIFICANT CHANGE UP (ref 10.3–14.5)
SODIUM SERPL-SCNC: 138 MMOL/L — SIGNIFICANT CHANGE UP (ref 135–145)
WBC # BLD: 9.32 K/UL — SIGNIFICANT CHANGE UP (ref 3.8–10.5)
WBC # FLD AUTO: 9.32 K/UL — SIGNIFICANT CHANGE UP (ref 3.8–10.5)

## 2017-08-12 PROCEDURE — 99233 SBSQ HOSP IP/OBS HIGH 50: CPT | Mod: GC

## 2017-08-12 PROCEDURE — 99232 SBSQ HOSP IP/OBS MODERATE 35: CPT | Mod: GC

## 2017-08-12 RX ORDER — NIFEDIPINE 30 MG
30 TABLET, EXTENDED RELEASE 24 HR ORAL ONCE
Qty: 0 | Refills: 0 | Status: COMPLETED | OUTPATIENT
Start: 2017-08-12 | End: 2017-08-12

## 2017-08-12 RX ORDER — NIFEDIPINE 30 MG
90 TABLET, EXTENDED RELEASE 24 HR ORAL DAILY
Qty: 0 | Refills: 0 | Status: DISCONTINUED | OUTPATIENT
Start: 2017-08-13 | End: 2017-08-16

## 2017-08-12 RX ADMIN — INSULIN GLARGINE 22 UNIT(S): 100 INJECTION, SOLUTION SUBCUTANEOUS at 16:48

## 2017-08-12 RX ADMIN — PIPERACILLIN AND TAZOBACTAM 25 GRAM(S): 4; .5 INJECTION, POWDER, LYOPHILIZED, FOR SOLUTION INTRAVENOUS at 05:47

## 2017-08-12 RX ADMIN — Medication 2 UNIT(S): at 09:33

## 2017-08-12 RX ADMIN — CYCLOSPORINE 100 MILLIGRAM(S): 100 CAPSULE ORAL at 19:48

## 2017-08-12 RX ADMIN — Medication 5: at 18:32

## 2017-08-12 RX ADMIN — Medication: at 22:56

## 2017-08-12 RX ADMIN — Medication 2 UNIT(S): at 18:33

## 2017-08-12 RX ADMIN — Medication 60 MILLIGRAM(S): at 05:50

## 2017-08-12 RX ADMIN — HEPARIN SODIUM 5000 UNIT(S): 5000 INJECTION INTRAVENOUS; SUBCUTANEOUS at 22:56

## 2017-08-12 RX ADMIN — HEPARIN SODIUM 5000 UNIT(S): 5000 INJECTION INTRAVENOUS; SUBCUTANEOUS at 13:50

## 2017-08-12 RX ADMIN — PIPERACILLIN AND TAZOBACTAM 25 GRAM(S): 4; .5 INJECTION, POWDER, LYOPHILIZED, FOR SOLUTION INTRAVENOUS at 18:33

## 2017-08-12 RX ADMIN — Medication 2 UNIT(S): at 13:50

## 2017-08-12 RX ADMIN — Medication 5 MILLIGRAM(S): at 05:48

## 2017-08-12 RX ADMIN — HEPARIN SODIUM 5000 UNIT(S): 5000 INJECTION INTRAVENOUS; SUBCUTANEOUS at 06:56

## 2017-08-12 RX ADMIN — ATENOLOL 100 MILLIGRAM(S): 25 TABLET ORAL at 05:47

## 2017-08-12 RX ADMIN — Medication 5 MILLIGRAM(S): at 06:55

## 2017-08-12 RX ADMIN — CYCLOSPORINE 100 MILLIGRAM(S): 100 CAPSULE ORAL at 09:35

## 2017-08-12 RX ADMIN — Medication 1: at 13:49

## 2017-08-12 RX ADMIN — MYCOPHENOLATE MOFETIL 1250 MILLIGRAM(S): 250 CAPSULE ORAL at 05:48

## 2017-08-12 RX ADMIN — MYCOPHENOLATE MOFETIL 1250 MILLIGRAM(S): 250 CAPSULE ORAL at 18:34

## 2017-08-12 RX ADMIN — Medication 30 MILLIGRAM(S): at 16:48

## 2017-08-12 NOTE — PROGRESS NOTE ADULT - PROBLEM SELECTOR PLAN 2
Sepsis of unknown origin; no localizing symptoms but patient is very immunocompromised therefore will treat for now  - In ED pt T-101.3, WBC-15.56, CT head neg, CXR neg, UA neg for nitrites and leukocyte esterase.   - Given unknown source and immunocompromised state, pt started on broad spectrum Vanc and Zosyn coverage. Blood cultures negative after 24hours.   - Will continue to monitor clinical status, Temperature, WBC. If bcx are negative and pt continues to improve will consider d/cing abx. Infectious workup negative. Blood cultures negative for 48h. Continue zosyn until negative after 72h.

## 2017-08-12 NOTE — PROGRESS NOTE ADULT - PROBLEM SELECTOR PLAN 5
- Pt currently taking Cyclosporine 100mg po BID, Prednisone 5mg po QD, and Cellcept 125o mg po BID  - Per Renal: Pt baseline Cr (from outpt records) shows baseline 2.5-3.   - Renal U/S shows mild fullness of renal transplant. Urine Protein:Cr- 5.09.  - Pt will start his home regimen and take the Cyclosporine and Cellcept at 8am and 8pm daily, prednisone once daily. Cr at baseline, continue cyclosporine, cellcept, prednsione

## 2017-08-12 NOTE — PROGRESS NOTE ADULT - PROBLEM SELECTOR PLAN 3
- Will continue home Atenolol and 60 mg PO Nifedipine during this admission for further management of HTN. Atenolol, Nifedipine

## 2017-08-12 NOTE — PROGRESS NOTE ADULT - PROBLEM SELECTOR PLAN 7
- Pt on ISS. Will continue to monitor FSG levels.   - Pt started on Diabetic diet. Diabetes educator to see pt.  - F/u HbA1c level Lantus, humalog premeals, ISS per endocrine recs

## 2017-08-12 NOTE — PROGRESS NOTE ADULT - SUBJECTIVE AND OBJECTIVE BOX
[facility] DIVISION OF KIDNEY DISEASES AND HYPERTENSION -- [phone]   FOLLOW UP NOTE  --------------------------------------------------------------------------------  HPI:  52M with DM1, HTN and LRRT in 1998 at [place], admitted with weakness and hypoglycemia. Pt seen and examined at bedside.      PAST HISTORY  --------------------------------------------------------------------------------  No significant changes to PMH, PSH, FHx, SHx, unless otherwise noted    ALLERGIES & MEDICATIONS  --------------------------------------------------------------------------------  Allergies    No Known Allergies    Intolerances      Standing Inpatient Medications  piperacillin/tazobactam IVPB. 3.375 Gram(s) IV Intermittent every 12 hours  insulin lispro (HumaLOG) corrective regimen sliding scale   SubCutaneous at bedtime  dextrose 5%. 1000 milliLiter(s) IV Continuous <Continuous>  dextrose 50% Injectable 12.5 Gram(s) IV Push once  dextrose 50% Injectable 25 Gram(s) IV Push once  dextrose 50% Injectable 25 Gram(s) IV Push once  ATENolol  Tablet 100 milliGRAM(s) Oral daily  heparin  Injectable 5000 Unit(s) SubCutaneous every 8 hours  predniSONE   Tablet 5 milliGRAM(s) Oral daily  mycophenolate mofetil 1250 milliGRAM(s) Oral two times a day  cycloSPORINE  (SandIMMUNE) 100 milliGRAM(s) Oral <User Schedule>  insulin glargine Injectable (LANTUS) 22 Unit(s) SubCutaneous <User Schedule>  NIFEdipine XL 60 milliGRAM(s) Oral daily  insulin lispro Injectable (HumaLOG) 2 Unit(s) SubCutaneous three times a day before meals  insulin lispro (HumaLOG) corrective regimen sliding scale   SubCutaneous three times a day before meals  torsemide 5 milliGRAM(s) Oral daily    PRN Inpatient Medications  dextrose Gel 1 Dose(s) Oral once PRN  glucagon  Injectable 1 milliGRAM(s) IntraMuscular once PRN      REVIEW OF SYSTEMS  --------------------------------------------------------------------------------  General: no fever  CVS: no chest pain  RESP: no sob, no cough  ABD: no abdominal pain, no diarrhea  : no dysuria,  SkIn, no rash     VITALS/PHYSICAL EXAM  --------------------------------------------------------------------------------  T(C): 36.8 (08-12-17 @ 05:39), Max: 36.8 (08-12-17 @ 05:39)  HR: 72 (08-12-17 @ 05:39) (71 - 80)  BP: 159/83 (08-12-17 @ 05:39) (159/83 - 190/90)  RR: 18 (08-12-17 @ 05:39) (18 - 18)  SpO2: 99% (08-12-17 @ 05:39) (98% - 100%)  Wt(kg): --        08-11-17 @ 07:01  -  08-12-17 @ 07:00  --------------------------------------------------------  IN: 100 mL / OUT: 0 mL / NET: 100 mL      Physical Exam:  	Gen: NAD  	HEENT: MMM, Legally Blind   	Pulm: CTA B/L  	CV: S1S2  	Abd: Soft, +BS  	Ext: No LE edema B/L                      Neuro: Awake   	Skin: Warm and Dry   	    LABS/STUDIES  --------------------------------------------------------------------------------              10.0   9.32  >-----------<  256      [08-12-17 @ 05:25]              30.6     138  |  106  |  64  ----------------------------<  121      [08-12-17 @ 05:25]  3.6   |  18  |  3.88        Ca     8.8     [08-12-17 @ 05:25]      Mg     2.0     [08-12-17 @ 05:25]      Phos  3.8     [08-12-17 @ 05:25]            Creatinine Trend:  SCr 3.88 [08-12 @ 05:25]  SCr 3.49 [08-11 @ 07:20]  SCr 3.51 [08-10 @ 04:30]    Urinalysis - [08-10-17 @ 04:50]      Color PLYEL / Appearance CLEAR / SG 1.009 / pH 6.0      Gluc 50 / Ketone NEGATIVE  / Bili NEGATIVE / Urobili NORMAL       Blood TRACE / Protein 300 / Leuk Est NEGATIVE / Nitrite NEGATIVE      RBC 0-2 / WBC 0-2 / Hyaline  / Gran  / Sq Epi OCC / Non Sq Epi  / Bacteria FEW    Urine Creatinine 36.63      [08-10-17 @ 10:11]  Urine Protein 186.7      [08-10-17 @ 10:11]    Lipid: chol 137, TG 97, HDL 52, LDL 68      [08-11-17 @ 07:20] [facility] DIVISION OF KIDNEY DISEASES AND HYPERTENSION -- [phone]   FOLLOW UP NOTE  --------------------------------------------------------------------------------  HPI: 52-year-old male with history of DM1, HTN, kidney transplantation (LRRT in 1998 at [place]), CKD, admitted with weakness and hypoglycemia. Pt seen and examined at bedside. No complaints offered.       PAST HISTORY  --------------------------------------------------------------------------------  No significant changes to PMH, PSH, FHx, SHx, unless otherwise noted    ALLERGIES & MEDICATIONS  --------------------------------------------------------------------------------  Allergies    No Known Allergies    Intolerances      Standing Inpatient Medications  piperacillin/tazobactam IVPB. 3.375 Gram(s) IV Intermittent every 12 hours  insulin lispro (HumaLOG) corrective regimen sliding scale   SubCutaneous at bedtime  dextrose 5%. 1000 milliLiter(s) IV Continuous <Continuous>  dextrose 50% Injectable 12.5 Gram(s) IV Push once  dextrose 50% Injectable 25 Gram(s) IV Push once  dextrose 50% Injectable 25 Gram(s) IV Push once  ATENolol  Tablet 100 milliGRAM(s) Oral daily  heparin  Injectable 5000 Unit(s) SubCutaneous every 8 hours  predniSONE   Tablet 5 milliGRAM(s) Oral daily  mycophenolate mofetil 1250 milliGRAM(s) Oral two times a day  cycloSPORINE  (SandIMMUNE) 100 milliGRAM(s) Oral <User Schedule>  insulin glargine Injectable (LANTUS) 22 Unit(s) SubCutaneous <User Schedule>  NIFEdipine XL 60 milliGRAM(s) Oral daily  insulin lispro Injectable (HumaLOG) 2 Unit(s) SubCutaneous three times a day before meals  insulin lispro (HumaLOG) corrective regimen sliding scale   SubCutaneous three times a day before meals  torsemide 5 milliGRAM(s) Oral daily    PRN Inpatient Medications  dextrose Gel 1 Dose(s) Oral once PRN  glucagon  Injectable 1 milliGRAM(s) IntraMuscular once PRN      REVIEW OF SYSTEMS  --------------------------------------------------------------------------------  General: no fever  CVS: no chest pain  RESP: no SOB, no cough  ABD: no abdominal pain, no diarrhea  : no dysuria  SkIn, no rash     VITALS/PHYSICAL EXAM  --------------------------------------------------------------------------------  T(C): 36.8 (08-12-17 @ 05:39), Max: 36.8 (08-12-17 @ 05:39)  HR: 72 (08-12-17 @ 05:39) (71 - 80)  BP: 159/83 (08-12-17 @ 05:39) (159/83 - 190/90)  RR: 18 (08-12-17 @ 05:39) (18 - 18)  SpO2: 99% (08-12-17 @ 05:39) (98% - 100%)  Wt(kg): --        08-11-17 @ 07:01  -  08-12-17 @ 07:00  --------------------------------------------------------  IN: 100 mL / OUT: 0 mL / NET: 100 mL      Physical Exam:  	Gen: NAD  	HEENT: MMM, Legally Blind   	Pulm: CTA B/L  	CV: S1S2  	Abd: Soft, +BS  	Ext: No LE edema B/L              Neuro: Awake   	Skin: Warm and Dry   	    LABS/STUDIES  --------------------------------------------------------------------------------              10.0   9.32  >-----------<  256      [08-12-17 @ 05:25]              30.6     138  |  106  |  64  ----------------------------<  121      [08-12-17 @ 05:25]  3.6   |  18  |  3.88        Ca     8.8     [08-12-17 @ 05:25]      Mg     2.0     [08-12-17 @ 05:25]      Phos  3.8     [08-12-17 @ 05:25]      Creatinine Trend:  SCr 3.88 [08-12 @ 05:25]  SCr 3.49 [08-11 @ 07:20]  SCr 3.51 [08-10 @ 04:30]    Urinalysis - [08-10-17 @ 04:50]      Color PLYEL / Appearance CLEAR / SG 1.009 / pH 6.0      Gluc 50 / Ketone NEGATIVE  / Bili NEGATIVE / Urobili NORMAL       Blood TRACE / Protein 300 / Leuk Est NEGATIVE / Nitrite NEGATIVE      RBC 0-2 / WBC 0-2 / Hyaline  / Gran  / Sq Epi OCC / Non Sq Epi  / Bacteria FEW    Urine Creatinine 36.63      [08-10-17 @ 10:11]  Urine Protein 186.7      [08-10-17 @ 10:11]    Lipid: chol 137, TG 97, HDL 52, LDL 68      [08-11-17 @ 07:20]

## 2017-08-12 NOTE — PROGRESS NOTE ADULT - PROBLEM SELECTOR PLAN 1
Pt s/p LRRT (1998). Pt with baseline Scr 2.5-3. Pt now with Scr slightly above baseline. Pt with Scr 3.88 today from 3.4 yesterday. Monitor BMP, strict I/O, avoid nephrotoxics, NSAIDS, RCA. Continue current immunosuppression. Check cyclosporin level. Pt. with RODRIGO on CKD. Scr increased to 3.88 today. Previous Scr ranged from 2.5 to 3 as per pt. Monitor labs and urine output. Avoid nephrotoxins, NSAIDs and RCA.

## 2017-08-12 NOTE — PROGRESS NOTE ADULT - PROBLEM SELECTOR PLAN 2
BP better controlled BP better controlled. Monitor BP closely. Continue current anti-hypertensives. Low salt diet Pt. with kidney transplantation (LRRT in 1998). Last cyclosporine level below target. Recheck cyclosporine (12 hour tough) level. Will increase cyclosporine dose if level remains low. Continue with current immunosuppression for kidney transplantation

## 2017-08-12 NOTE — PROGRESS NOTE ADULT - ASSESSMENT
53 y/o M w/ a pmh significant for DM1, diabetic retinopathy, legally blind, HTN, renal transplant in 1998 (on prednisone, cellcept, cyclosporine), CKD p/w Sepsis of unknown origin in the setting of AMS, hypoglycemia, and leukocytosis 52M with DM1, diabetic retinopathy, legally blind, HTN, CKD s/p renal transplant in 1998 (on prednisone, cellcept, cyclosporine) presents with encephalopathy in setting of hypoglycemia, sepsis with uncertain source.

## 2017-08-12 NOTE — PROGRESS NOTE ADULT - PROBLEM SELECTOR PLAN 4
Due to diabetic nephropathy with evidence of nephrotic range proteinturia  - Hold nephrotoxins, urine protein:cr - 5.09  - Plan per below Cr at baseline, follow up renal, no obstruction on renal US

## 2017-08-12 NOTE — PROGRESS NOTE ADULT - PROBLEM SELECTOR PLAN 1
Acute metabolic encephalopathy 2/2 to hypoglycemia   AMS improved with glucose however, given pt leukocytosis in the setting of an immunocompromised state, we will do an infectious work up to rule out any underlying infection that could be causing this change in mental status. If pt clinical status, vital signs, WBC count continue to improve without any true source of infection, will consider d/cing abx.   - Pt given one dose of Vanc and Zosyn upon admission. Blood cultures are negative after 24 hours.  - Pt placed on Zosyn for broad coverage.   - CT head- negative for any intracranial process  - CXR- negative for any solid consolidation  - UA- Negative for leukocyte esterase and Nitrites  - Will cont to monitor clinical status throughout the day Acute metabolic encephalopathy likely due to hypoglycemia, sepsis  - CT head negative  - Insulin regimen adjusted by endocrine  - Infectious workup negative   - Isolated fever on admission without recurrence, leukocytosis resolved

## 2017-08-12 NOTE — PROGRESS NOTE ADULT - ASSESSMENT
52M with DM1, HTN and LRRT in 1998 at [place], admitted with weakness and hypoglycemia. 52-year-old male with history of DM1, HTN, kidney transplantation (LRRT in 1998 at [place]), CKD, admitted with weakness and hypoglycemia. Pt. now with RODRIGO on CKD.

## 2017-08-12 NOTE — PROGRESS NOTE ADULT - SUBJECTIVE AND OBJECTIVE BOX
Chief Complaint: Patient is a 52y old  Male who was seen for follow up of encephalopathy (10 Aug 2017 08:08)      SUBJECTIVE / OVERNIGHT EVENTS: Pt seen and examined. No acute events over night. Pt reports feeling well and feels much more alert and oriented than he did on admission. He has no current complaints. He denies any recent fevers, chills, n/v, cp, sob, abd pain, dysuria, hematuria, or melena.    MEDICATIONS  (STANDING):  piperacillin/tazobactam IVPB. 3.375 Gram(s) IV Intermittent every 12 hours  insulin lispro (HumaLOG) corrective regimen sliding scale   SubCutaneous three times a day before meals  insulin lispro (HumaLOG) corrective regimen sliding scale   SubCutaneous at bedtime  dextrose 5%. 1000 milliLiter(s) (50 mL/Hr) IV Continuous <Continuous>  dextrose 50% Injectable 12.5 Gram(s) IV Push once  dextrose 50% Injectable 25 Gram(s) IV Push once  dextrose 50% Injectable 25 Gram(s) IV Push once  ATENolol  Tablet 100 milliGRAM(s) Oral daily  NIFEdipine XL 30 milliGRAM(s) Oral daily  heparin  Injectable 5000 Unit(s) SubCutaneous every 8 hours  predniSONE   Tablet 5 milliGRAM(s) Oral daily  mycophenolate mofetil 1250 milliGRAM(s) Oral two times a day  cycloSPORINE  (SandIMMUNE) 100 milliGRAM(s) Oral <User Schedule>  insulin glargine Injectable (LANTUS) 22 Unit(s) SubCutaneous <User Schedule>    MEDICATIONS  (PRN):  dextrose Gel 1 Dose(s) Oral once PRN Blood Glucose LESS THAN 70 milliGRAM(s)/deciliter  glucagon  Injectable 1 milliGRAM(s) IntraMuscular once PRN Glucose LESS THAN 70 milligrams/deciliter        CAPILLARY BLOOD GLUCOSE  231 (10 Aug 2017 22:07)  180 (10 Aug 2017 17:24)  147 (10 Aug 2017 12:26)  110 (10 Aug 2017 10:06)  65 (10 Aug 2017 09:24)  60 (10 Aug 2017 08:41)    Vital Signs Last 24 Hrs  T(C): 36.8 (11 Aug 2017 05:48), Max: 37.2 (10 Aug 2017 21:22)  T(F): 98.2 (11 Aug 2017 05:48), Max: 99 (10 Aug 2017 21:22)  HR: 74 (11 Aug 2017 05:48) (73 - 78)  BP: 177/100 (11 Aug 2017 05:48) (144/74 - 189/90)  BP(mean): --  RR: 18 (11 Aug 2017 05:48) (18 - 20)  SpO2: 100% (11 Aug 2017 05:48) (98% - 100%)        PHYSICAL EXAM:  GENERAL: NAD, well-developed  HEAD:  Atraumatic, Normocephalic  EYES: EOMI, PERRLA, conjunctiva and sclera clear  NECK: Supple, No JVD  CHEST/LUNG: Clear to auscultation bilaterally; No wheeze  HEART: Regular rate and rhythm; No murmurs, rubs, or gallops  ABDOMEN: Soft, Nontender, Nondistended; Bowel sounds present  EXTREMITIES:  2+ Peripheral Pulses, No clubbing, cyanosis, or edema  PSYCH: AAOx3  NEUROLOGY: non-focal  SKIN: No rashes or lesions    LABS:                        10.6   15.56 )-----------( 230      ( 10 Aug 2017 04:30 )             31.6     08-10    138  |  102  |  66<H>  ----------------------------<  104<H>  3.6   |  17<L>  |  3.51<H>    Ca    9.2      10 Aug 2017 04:30  Phos  3.3     08-10  Mg     1.7     08-10            Urinalysis Basic - ( 10 Aug 2017 04:50 )    Color: PLYEL / Appearance: CLEAR / S.009 / pH: 6.0  Gluc: 50 / Ketone: NEGATIVE  / Bili: NEGATIVE / Urobili: NORMAL E.U.   Blood: TRACE / Protein: 300 / Nitrite: NEGATIVE   Leuk Esterase: NEGATIVE / RBC: 0-2 / WBC 0-2   Sq Epi: OCC / Non Sq Epi: x / Bacteria: FEW        RADIOLOGY & ADDITIONAL TESTS:    Imaging Personally Reviewed:    Consultant(s) Notes Reviewed:      Care Discussed with Consultants/Other Providers: Chief Complaint: Encephalopathy      SUBJECTIVE / OVERNIGHT EVENTS: Pt seen and examined. No acute events over night. Pt reports feeling well and feels much more alert and oriented than he did on admission. He has no current complaints. He denies any recent fevers, chills, n/v, cp, sob, abd pain, dysuria, hematuria, or melena.    MEDICATIONS  (STANDING):  piperacillin/tazobactam IVPB. 3.375 Gram(s) IV Intermittent every 12 hours  insulin lispro (HumaLOG) corrective regimen sliding scale   SubCutaneous three times a day before meals  insulin lispro (HumaLOG) corrective regimen sliding scale   SubCutaneous at bedtime  dextrose 5%. 1000 milliLiter(s) (50 mL/Hr) IV Continuous <Continuous>  dextrose 50% Injectable 12.5 Gram(s) IV Push once  dextrose 50% Injectable 25 Gram(s) IV Push once  dextrose 50% Injectable 25 Gram(s) IV Push once  ATENolol  Tablet 100 milliGRAM(s) Oral daily  NIFEdipine XL 30 milliGRAM(s) Oral daily  heparin  Injectable 5000 Unit(s) SubCutaneous every 8 hours  predniSONE   Tablet 5 milliGRAM(s) Oral daily  mycophenolate mofetil 1250 milliGRAM(s) Oral two times a day  cycloSPORINE  (SandIMMUNE) 100 milliGRAM(s) Oral <User Schedule>  insulin glargine Injectable (LANTUS) 22 Unit(s) SubCutaneous <User Schedule>    MEDICATIONS  (PRN):  dextrose Gel 1 Dose(s) Oral once PRN Blood Glucose LESS THAN 70 milliGRAM(s)/deciliter  glucagon  Injectable 1 milliGRAM(s) IntraMuscular once PRN Glucose LESS THAN 70 milligrams/deciliter        CAPILLARY BLOOD GLUCOSE  231 (10 Aug 2017 22:07)  180 (10 Aug 2017 17:24)  147 (10 Aug 2017 12:26)  110 (10 Aug 2017 10:06)  65 (10 Aug 2017 09:24)  60 (10 Aug 2017 08:41)    Vital Signs Last 24 Hrs  T(C): 36.8 (11 Aug 2017 05:48), Max: 37.2 (10 Aug 2017 21:22)  T(F): 98.2 (11 Aug 2017 05:48), Max: 99 (10 Aug 2017 21:22)  HR: 74 (11 Aug 2017 05:48) (73 - 78)  BP: 177/100 (11 Aug 2017 05:48) (144/74 - 189/90)  BP(mean): --  RR: 18 (11 Aug 2017 05:48) (18 - 20)  SpO2: 100% (11 Aug 2017 05:48) (98% - 100%)        PHYSICAL EXAM:  GENERAL: NAD, well-developed  HEAD:  Atraumatic, Normocephalic  EYES: EOMI, PERRLA, conjunctiva and sclera clear  NECK: Supple, No JVD  CHEST/LUNG: Clear to auscultation bilaterally; No wheeze  HEART: Regular rate and rhythm; No murmurs, rubs, or gallops  ABDOMEN: Soft, Nontender, Nondistended; Bowel sounds present  EXTREMITIES:  2+ Peripheral Pulses, No clubbing, cyanosis, or edema  PSYCH: AAOx3  NEUROLOGY: non-focal  SKIN: No rashes or lesions    LABS:                        10.   15.56 )-----------( 230      ( 10 Aug 2017 04:30 )             31.6     08-10    138  |  102  |  66<H>  ----------------------------<  104<H>  3.6   |  17<L>  |  3.51<H>    Ca    9.2      10 Aug 2017 04:30  Phos  3.3     08-10  Mg     1.7     08-10            Urinalysis Basic - ( 10 Aug 2017 04:50 )    Color: PLYEL / Appearance: CLEAR / S.009 / pH: 6.0  Gluc: 50 / Ketone: NEGATIVE  / Bili: NEGATIVE / Urobili: NORMAL E.U.   Blood: TRACE / Protein: 300 / Nitrite: NEGATIVE   Leuk Esterase: NEGATIVE / RBC: 0-2 / WBC 0-2   Sq Epi: OCC / Non Sq Epi: x / Bacteria: FEW        RADIOLOGY & ADDITIONAL TESTS:    Imaging Personally Reviewed:    Consultant(s) Notes Reviewed:      Care Discussed with Consultants/Other Providers: Chief Complaint: Encephalopathy      SUBJECTIVE / OVERNIGHT EVENTS:  No acute events overnight. Much more alert. No fever, cough, dyspnea, chest pain, abdominal pain, dysuria, diarrhea.     MEDICATIONS  (STANDING):  piperacillin/tazobactam IVPB. 3.375 Gram(s) IV Intermittent every 12 hours  insulin lispro (HumaLOG) corrective regimen sliding scale   SubCutaneous three times a day before meals  insulin lispro (HumaLOG) corrective regimen sliding scale   SubCutaneous at bedtime  dextrose 5%. 1000 milliLiter(s) (50 mL/Hr) IV Continuous <Continuous>  dextrose 50% Injectable 12.5 Gram(s) IV Push once  dextrose 50% Injectable 25 Gram(s) IV Push once  dextrose 50% Injectable 25 Gram(s) IV Push once  ATENolol  Tablet 100 milliGRAM(s) Oral daily  NIFEdipine XL 30 milliGRAM(s) Oral daily  heparin  Injectable 5000 Unit(s) SubCutaneous every 8 hours  predniSONE   Tablet 5 milliGRAM(s) Oral daily  mycophenolate mofetil 1250 milliGRAM(s) Oral two times a day  cycloSPORINE  (SandIMMUNE) 100 milliGRAM(s) Oral <User Schedule>  insulin glargine Injectable (LANTUS) 22 Unit(s) SubCutaneous <User Schedule>    MEDICATIONS  (PRN):  dextrose Gel 1 Dose(s) Oral once PRN Blood Glucose LESS THAN 70 milliGRAM(s)/deciliter  glucagon  Injectable 1 milliGRAM(s) IntraMuscular once PRN Glucose LESS THAN 70 milligrams/deciliter        CAPILLARY BLOOD GLUCOSE  231 (10 Aug 2017 22:07)  180 (10 Aug 2017 17:24)  147 (10 Aug 2017 12:26)  110 (10 Aug 2017 10:06)  65 (10 Aug 2017 09:24)  60 (10 Aug 2017 08:41)    Vital Signs Last 24 Hrs  T(C): 36.8 (11 Aug 2017 05:48), Max: 37.2 (10 Aug 2017 21:22)  T(F): 98.2 (11 Aug 2017 05:48), Max: 99 (10 Aug 2017 21:22)  HR: 74 (11 Aug 2017 05:48) (73 - 78)  BP: 177/100 (11 Aug 2017 05:48) (144/74 - 189/90)  BP(mean): --  RR: 18 (11 Aug 2017 05:48) (18 - 20)  SpO2: 100% (11 Aug 2017 05:48) (98% - 100%)        PHYSICAL EXAM:  GENERAL: NAD, well-developed  HEAD:  Atraumatic, Normocephalic  EYES: EOMI, PERRLA, conjunctiva and sclera clear  NECK: Supple, No JVD  CHEST/LUNG: Clear to auscultation bilaterally; No wheeze  HEART: Regular rate and rhythm; No murmurs, rubs, or gallops  ABDOMEN: Soft, Nontender, Nondistended; Bowel sounds present  EXTREMITIES:  2+ Peripheral Pulses, No clubbing, cyanosis, or edema  PSYCH: AAOx3  NEUROLOGY: non-focal  SKIN: No rashes or lesions    LABS:                        10.   15.56 )-----------( 230      ( 10 Aug 2017 04:30 )             31.6     08-10    138  |  102  |  66<H>  ----------------------------<  104<H>  3.6   |  17<L>  |  3.51<H>    Ca    9.2      10 Aug 2017 04:30  Phos  3.3     08-10  Mg     1.7     08-10            Urinalysis Basic - ( 10 Aug 2017 04:50 )    Color: PLYEL / Appearance: CLEAR / S.009 / pH: 6.0  Gluc: 50 / Ketone: NEGATIVE  / Bili: NEGATIVE / Urobili: NORMAL E.U.   Blood: TRACE / Protein: 300 / Nitrite: NEGATIVE   Leuk Esterase: NEGATIVE / RBC: 0-2 / WBC 0-2   Sq Epi: OCC / Non Sq Epi: x / Bacteria: FEW        RADIOLOGY & ADDITIONAL TESTS:    Imaging Personally Reviewed:    Consultant(s) Notes Reviewed:      Care Discussed with Consultants/Other Providers:

## 2017-08-13 LAB
ALBUMIN SERPL ELPH-MCNC: 3 G/DL — LOW (ref 3.3–5)
ALP SERPL-CCNC: 68 U/L — SIGNIFICANT CHANGE UP (ref 40–120)
ALT FLD-CCNC: 8 U/L — SIGNIFICANT CHANGE UP (ref 4–41)
AST SERPL-CCNC: 12 U/L — SIGNIFICANT CHANGE UP (ref 4–40)
BILIRUB SERPL-MCNC: 0.2 MG/DL — SIGNIFICANT CHANGE UP (ref 0.2–1.2)
BUN SERPL-MCNC: 65 MG/DL — HIGH (ref 7–23)
CALCIUM SERPL-MCNC: 8.7 MG/DL — SIGNIFICANT CHANGE UP (ref 8.4–10.5)
CHLORIDE SERPL-SCNC: 106 MMOL/L — SIGNIFICANT CHANGE UP (ref 98–107)
CO2 SERPL-SCNC: 17 MMOL/L — LOW (ref 22–31)
CREAT SERPL-MCNC: 4.32 MG/DL — HIGH (ref 0.5–1.3)
CYCLOSPORINE SER-MCNC: 85 NG/ML — LOW (ref 150–400)
GLUCOSE SERPL-MCNC: 134 MG/DL — HIGH (ref 70–99)
HCT VFR BLD CALC: 30.2 % — LOW (ref 39–50)
HGB BLD-MCNC: 10 G/DL — LOW (ref 13–17)
MAGNESIUM SERPL-MCNC: 2 MG/DL — SIGNIFICANT CHANGE UP (ref 1.6–2.6)
MCHC RBC-ENTMCNC: 29.3 PG — SIGNIFICANT CHANGE UP (ref 27–34)
MCHC RBC-ENTMCNC: 33.1 % — SIGNIFICANT CHANGE UP (ref 32–36)
MCV RBC AUTO: 88.6 FL — SIGNIFICANT CHANGE UP (ref 80–100)
NRBC # FLD: 0 — SIGNIFICANT CHANGE UP
PHOSPHATE SERPL-MCNC: 4.6 MG/DL — HIGH (ref 2.5–4.5)
PLATELET # BLD AUTO: 251 K/UL — SIGNIFICANT CHANGE UP (ref 150–400)
PMV BLD: 10.6 FL — SIGNIFICANT CHANGE UP (ref 7–13)
POTASSIUM SERPL-MCNC: 3.7 MMOL/L — SIGNIFICANT CHANGE UP (ref 3.5–5.3)
POTASSIUM SERPL-SCNC: 3.7 MMOL/L — SIGNIFICANT CHANGE UP (ref 3.5–5.3)
PROCALCITONIN SERPL-MCNC: 0.7 NG/ML — HIGH (ref 0–0.04)
PROT SERPL-MCNC: 5.8 G/DL — LOW (ref 6–8.3)
RBC # BLD: 3.41 M/UL — LOW (ref 4.2–5.8)
RBC # FLD: 12.5 % — SIGNIFICANT CHANGE UP (ref 10.3–14.5)
SODIUM SERPL-SCNC: 138 MMOL/L — SIGNIFICANT CHANGE UP (ref 135–145)
WBC # BLD: 8.68 K/UL — SIGNIFICANT CHANGE UP (ref 3.8–10.5)
WBC # FLD AUTO: 8.68 K/UL — SIGNIFICANT CHANGE UP (ref 3.8–10.5)

## 2017-08-13 PROCEDURE — 99233 SBSQ HOSP IP/OBS HIGH 50: CPT | Mod: GC

## 2017-08-13 RX ORDER — INSULIN LISPRO 100/ML
2 VIAL (ML) SUBCUTANEOUS
Qty: 0 | Refills: 0 | Status: DISCONTINUED | OUTPATIENT
Start: 2017-08-13 | End: 2017-08-14

## 2017-08-13 RX ORDER — CYCLOSPORINE 100 MG/1
125 CAPSULE ORAL
Qty: 0 | Refills: 0 | Status: DISCONTINUED | OUTPATIENT
Start: 2017-08-13 | End: 2017-08-16

## 2017-08-13 RX ORDER — INSULIN LISPRO 100/ML
3 VIAL (ML) SUBCUTANEOUS
Qty: 0 | Refills: 0 | Status: DISCONTINUED | OUTPATIENT
Start: 2017-08-13 | End: 2017-08-14

## 2017-08-13 RX ADMIN — MYCOPHENOLATE MOFETIL 1250 MILLIGRAM(S): 250 CAPSULE ORAL at 08:30

## 2017-08-13 RX ADMIN — HEPARIN SODIUM 5000 UNIT(S): 5000 INJECTION INTRAVENOUS; SUBCUTANEOUS at 22:04

## 2017-08-13 RX ADMIN — Medication 5 MILLIGRAM(S): at 08:31

## 2017-08-13 RX ADMIN — Medication 3 UNIT(S): at 13:05

## 2017-08-13 RX ADMIN — Medication 0.1 MILLIGRAM(S): at 13:04

## 2017-08-13 RX ADMIN — Medication 5: at 18:15

## 2017-08-13 RX ADMIN — INSULIN GLARGINE 22 UNIT(S): 100 INJECTION, SOLUTION SUBCUTANEOUS at 16:45

## 2017-08-13 RX ADMIN — HEPARIN SODIUM 5000 UNIT(S): 5000 INJECTION INTRAVENOUS; SUBCUTANEOUS at 06:30

## 2017-08-13 RX ADMIN — HEPARIN SODIUM 5000 UNIT(S): 5000 INJECTION INTRAVENOUS; SUBCUTANEOUS at 13:05

## 2017-08-13 RX ADMIN — PIPERACILLIN AND TAZOBACTAM 25 GRAM(S): 4; .5 INJECTION, POWDER, LYOPHILIZED, FOR SOLUTION INTRAVENOUS at 06:50

## 2017-08-13 RX ADMIN — CYCLOSPORINE 125 MILLIGRAM(S): 100 CAPSULE ORAL at 23:59

## 2017-08-13 RX ADMIN — Medication 1: at 13:06

## 2017-08-13 RX ADMIN — MYCOPHENOLATE MOFETIL 1250 MILLIGRAM(S): 250 CAPSULE ORAL at 18:12

## 2017-08-13 RX ADMIN — Medication 4: at 22:05

## 2017-08-13 RX ADMIN — ATENOLOL 100 MILLIGRAM(S): 25 TABLET ORAL at 08:30

## 2017-08-13 RX ADMIN — Medication 2 UNIT(S): at 09:10

## 2017-08-13 RX ADMIN — Medication 0.1 MILLIGRAM(S): at 23:11

## 2017-08-13 RX ADMIN — Medication 90 MILLIGRAM(S): at 08:30

## 2017-08-13 RX ADMIN — Medication 3 UNIT(S): at 18:15

## 2017-08-13 RX ADMIN — CYCLOSPORINE 100 MILLIGRAM(S): 100 CAPSULE ORAL at 09:09

## 2017-08-13 NOTE — PROGRESS NOTE ADULT - PROBLEM SELECTOR PLAN 2
Pt. with kidney transplantation (LRRT in 1998). Last cyclosporine level below target. Increase cyclosporine to 125mg BID. Recheck cyclosporine (12 hour tough) level.. Continue with current immunosuppression for kidney transplantation Pt. with kidney transplantation (LRRT in 1998). Last cyclosporine level below target. Increase cyclosporine to 125mg PO BID. Recheck cyclosporine (12 hour tough) level. Continue with current immunosuppression for kidney transplantation

## 2017-08-13 NOTE — PROGRESS NOTE ADULT - PROBLEM SELECTOR PLAN 1
Pt. with RACHID on CKD. Rachid likely hemodynamically mediated in setting of diuretic use vs possible infection.  Scr increased to 4.32  today from 3.88 yesterday. Previous Scr ranged from 2.5 to 3 as per pt. Advise to discontinue torsemide. Advise to dose zosyn as per GFR (2.25mg Q 8hrs). Monitor labs and urine output. Avoid nephrotoxins, NSAIDs and RCA Pt. with RODRIGO on CKD. RODRIGO in setting of diuretic use, poorly controlled HTN and ? infection. Scr increased to 4.32  today from 3.88 yesterday. Previous Scr ranged from 2.5 to 3 as per pt. Advise to discontinue torsemide. Advise to dose Zosyn as per GFR (2.25mg Q 8hrs). Monitor labs and urine output. Avoid nephrotoxins, NSAIDs and RCA

## 2017-08-13 NOTE — PROGRESS NOTE ADULT - PROBLEM SELECTOR PLAN 1
Acute metabolic encephalopathy likely due to hypoglycemia, sepsis  - CT head negative  - Insulin regimen adjusted by endocrine  - Infectious workup negative   - Isolated fever on admission without recurrence, leukocytosis resolved

## 2017-08-13 NOTE — PROGRESS NOTE ADULT - ASSESSMENT
52-year-old male with history of DM1, HTN, kidney transplantation (LRRT in 1998 at [place]), CKD, admitted with weakness and hypoglycemia. Pt. now with RODRIGO on CKD.

## 2017-08-13 NOTE — PROGRESS NOTE ADULT - PROBLEM SELECTOR PLAN 3
BP elevated.  Agree with increasing nifedipine to 90mg QD. Continue other  anti-hypertensives. Low salt diet. Monitor BP BP poorly controlled at present. Dose of nifedipine increased to 90 mg QD (as per primary team). Low salt diet. Monitor BP. BP meds may need to be adjusted further, if BP remains elevated

## 2017-08-13 NOTE — PROGRESS NOTE ADULT - PROBLEM SELECTOR PLAN 2
Infectious workup negative. Blood cultures negative for 48h. Continue zosyn until negative after 72h. Infectious workup negative. Blood cultures negative for 48h. Given that pt is much improved clinically, afebrile, no wbc count, with negative bcx in the setting of CKD Zosyn was d/c'd today

## 2017-08-13 NOTE — PROGRESS NOTE ADULT - SUBJECTIVE AND OBJECTIVE BOX
[facility] DIVISION OF KIDNEY DISEASES AND HYPERTENSION -- [phone]   FOLLOW UP NOTE  --------------------------------------------------------------------------------  HPI: 52-year-old male with history of DM1, HTN, kidney transplantation (LRRT in 1998 at [place]), CKD, admitted with weakness and hypoglycemia. Pt seen and examined at bedside. No complaints offered.       PAST HISTORY  --------------------------------------------------------------------------------  No significant changes to PMH, PSH, FHx, SHx, unless otherwise noted    ALLERGIES & MEDICATIONS  --------------------------------------------------------------------------------  Allergies    No Known Allergies    Intolerances      Standing Inpatient Medications  piperacillin/tazobactam IVPB. 3.375 Gram(s) IV Intermittent every 12 hours  insulin lispro (HumaLOG) corrective regimen sliding scale   SubCutaneous at bedtime  dextrose 5%. 1000 milliLiter(s) IV Continuous <Continuous>  dextrose 50% Injectable 12.5 Gram(s) IV Push once  dextrose 50% Injectable 25 Gram(s) IV Push once  dextrose 50% Injectable 25 Gram(s) IV Push once  ATENolol  Tablet 100 milliGRAM(s) Oral daily  heparin  Injectable 5000 Unit(s) SubCutaneous every 8 hours  predniSONE   Tablet 5 milliGRAM(s) Oral daily  mycophenolate mofetil 1250 milliGRAM(s) Oral two times a day  cycloSPORINE  (SandIMMUNE) 100 milliGRAM(s) Oral <User Schedule>  insulin glargine Injectable (LANTUS) 22 Unit(s) SubCutaneous <User Schedule>  insulin lispro Injectable (HumaLOG) 2 Unit(s) SubCutaneous three times a day before meals  insulin lispro (HumaLOG) corrective regimen sliding scale   SubCutaneous three times a day before meals  torsemide 5 milliGRAM(s) Oral daily  NIFEdipine XL 90 milliGRAM(s) Oral daily  cloNIDine 0.1 milliGRAM(s) Oral every 8 hours    PRN Inpatient Medications  dextrose Gel 1 Dose(s) Oral once PRN  glucagon  Injectable 1 milliGRAM(s) IntraMuscular once PRN      REVIEW OF SYSTEMS  --------------------------------------------------------------------------------  General: no fever  CVS: no chest pain  RESP: no SOB, no cough  ABD: no abdominal pain, no diarrhea  : no dysuria  SkIn, no rash    VITALS/PHYSICAL EXAM  --------------------------------------------------------------------------------  T(C): 36.9 (08-13-17 @ 06:51), Max: 36.9 (08-12-17 @ 15:18)  HR: 68 (08-13-17 @ 06:51) (68 - 76)  BP: 187/92 (08-13-17 @ 08:34) (163/84 - 187/92)  RR: 18 (08-13-17 @ 06:51) (17 - 18)  SpO2: 99% (08-13-17 @ 06:51) (98% - 99%)  Wt(kg): --        Physical Exam:  	Gen: NAD  	HEENT: MMM, Legally Blind   	Pulm: CTA B/L  	CV: S1S2  	Abd: Soft, +BS  	Ext: No LE edema B/L              Neuro: Awake   	Skin: Warm and Dry    LABS/STUDIES  --------------------------------------------------------------------------------              10.0   8.68  >-----------<  251      [08-13-17 @ 04:37]              30.2     138  |  106  |  65  ----------------------------<  134      [08-13-17 @ 04:37]  3.7   |  17  |  4.32        Ca     8.7     [08-13-17 @ 04:37]      Mg     2.0     [08-13-17 @ 04:37]      Phos  4.6     [08-13-17 @ 04:37]    TPro  5.8  /  Alb  3.0  /  TBili  0.2  /  DBili  x   /  AST  12  /  ALT  8   /  AlkPhos  68  [08-13-17 @ 04:37]          Creatinine Trend:  SCr 4.32 [08-13 @ 04:37]  SCr 3.88 [08-12 @ 05:25]  SCr 3.49 [08-11 @ 07:20]  SCr 3.51 [08-10 @ 04:30]    Urinalysis - [08-10-17 @ 04:50]      Color PLYEL / Appearance CLEAR / SG 1.009 / pH 6.0      Gluc 50 / Ketone NEGATIVE  / Bili NEGATIVE / Urobili NORMAL       Blood TRACE / Protein 300 / Leuk Est NEGATIVE / Nitrite NEGATIVE      RBC 0-2 / WBC 0-2 / Hyaline  / Gran  / Sq Epi OCC / Non Sq Epi  / Bacteria FEW    Urine Creatinine 36.63      [08-10-17 @ 10:11]  Urine Protein 186.7      [08-10-17 @ 10:11]    Lipid: chol 137, TG 97, HDL 52, LDL 68      [08-11-17 @ 07:20] [facility] DIVISION OF KIDNEY DISEASES AND HYPERTENSION -- [phone]   FOLLOW UP NOTE  --------------------------------------------------------------------------------  HPI: 52-year-old male with history of DM1, HTN, kidney transplantation (LRRT in 1998 at [place]), CKD, admitted with weakness and hypoglycemia. Pt. now with RODRIGO on CKD. Pt. seen and examined at bedside. No complaints offered. Pt. says that he feels better and denies CP, SOB or fever.      PAST HISTORY  --------------------------------------------------------------------------------  No significant changes to PMH, PSH, FHx, SHx, unless otherwise noted    ALLERGIES & MEDICATIONS  --------------------------------------------------------------------------------  Allergies    No Known Allergies    Intolerances      Standing Inpatient Medications  piperacillin/tazobactam IVPB. 3.375 Gram(s) IV Intermittent every 12 hours  insulin lispro (HumaLOG) corrective regimen sliding scale   SubCutaneous at bedtime  dextrose 5%. 1000 milliLiter(s) IV Continuous <Continuous>  dextrose 50% Injectable 12.5 Gram(s) IV Push once  dextrose 50% Injectable 25 Gram(s) IV Push once  dextrose 50% Injectable 25 Gram(s) IV Push once  ATENolol  Tablet 100 milliGRAM(s) Oral daily  heparin  Injectable 5000 Unit(s) SubCutaneous every 8 hours  predniSONE   Tablet 5 milliGRAM(s) Oral daily  mycophenolate mofetil 1250 milliGRAM(s) Oral two times a day  cycloSPORINE  (SandIMMUNE) 100 milliGRAM(s) Oral <User Schedule>  insulin glargine Injectable (LANTUS) 22 Unit(s) SubCutaneous <User Schedule>  insulin lispro Injectable (HumaLOG) 2 Unit(s) SubCutaneous three times a day before meals  insulin lispro (HumaLOG) corrective regimen sliding scale   SubCutaneous three times a day before meals  torsemide 5 milliGRAM(s) Oral daily  NIFEdipine XL 90 milliGRAM(s) Oral daily  cloNIDine 0.1 milliGRAM(s) Oral every 8 hours    PRN Inpatient Medications  dextrose Gel 1 Dose(s) Oral once PRN  glucagon  Injectable 1 milliGRAM(s) IntraMuscular once PRN      REVIEW OF SYSTEMS  --------------------------------------------------------------------------------  General: no fever  CVS: no chest pain  RESP: no SOB, no cough  ABD: no abdominal pain, no diarrhea  : no dysuria  SkIn, no rash    VITALS/PHYSICAL EXAM  --------------------------------------------------------------------------------  T(C): 36.9 (08-13-17 @ 06:51), Max: 36.9 (08-12-17 @ 15:18)  HR: 68 (08-13-17 @ 06:51) (68 - 76)  BP: 187/92 (08-13-17 @ 08:34) (163/84 - 187/92)  RR: 18 (08-13-17 @ 06:51) (17 - 18)  SpO2: 99% (08-13-17 @ 06:51) (98% - 99%)  Wt(kg): --        Physical Exam:  	Gen: NAD  	HEENT: Legally blind   	Pulm: CTA B/L  	CV: S1S2  	Abd: Soft, +BS  	Ext: No LE edema B/L              Neuro: Awake   	Skin: Warm and Dry    LABS/STUDIES  --------------------------------------------------------------------------------              10.0   8.68  >-----------<  251      [08-13-17 @ 04:37]              30.2     138  |  106  |  65  ----------------------------<  134      [08-13-17 @ 04:37]  3.7   |  17  |  4.32        Ca     8.7     [08-13-17 @ 04:37]      Mg     2.0     [08-13-17 @ 04:37]      Phos  4.6     [08-13-17 @ 04:37]    TPro  5.8  /  Alb  3.0  /  TBili  0.2  /  DBili  x   /  AST  12  /  ALT  8   /  AlkPhos  68  [08-13-17 @ 04:37]    Creatinine Trend:  SCr 4.32 [08-13 @ 04:37]  SCr 3.88 [08-12 @ 05:25]  SCr 3.49 [08-11 @ 07:20]  SCr 3.51 [08-10 @ 04:30]    Urinalysis - [08-10-17 @ 04:50]      Color PLYEL / Appearance CLEAR / SG 1.009 / pH 6.0      Gluc 50 / Ketone NEGATIVE  / Bili NEGATIVE / Urobili NORMAL       Blood TRACE / Protein 300 / Leuk Est NEGATIVE / Nitrite NEGATIVE      RBC 0-2 / WBC 0-2 / Hyaline  / Gran  / Sq Epi OCC / Non Sq Epi  / Bacteria FEW    Urine Creatinine 36.63      [08-10-17 @ 10:11]  Urine Protein 186.7      [08-10-17 @ 10:11]    Lipid: chol 137, TG 97, HDL 52, LDL 68      [08-11-17 @ 07:20]

## 2017-08-13 NOTE — PROGRESS NOTE ADULT - ASSESSMENT
52M with DM1, diabetic retinopathy, legally blind, HTN, CKD s/p renal transplant in 1998 (on prednisone, cellcept, cyclosporine) presents with encephalopathy in setting of hypoglycemia, sepsis with uncertain source.

## 2017-08-13 NOTE — PROGRESS NOTE ADULT - SUBJECTIVE AND OBJECTIVE BOX
Chief Complaint: F/u T1DM    History: 51 y/o male with T1DM. Appetite is good. C/o diarrhea that is persistent today. Dinner is his biggest meal. Noted to have daytime hyperglycemia with pre-dinner and HS readings. States normally dinner is his biggest meal.    MEDICATIONS  (STANDING):  insulin lispro (HumaLOG) corrective regimen sliding scale   SubCutaneous at bedtime  dextrose 5%. 1000 milliLiter(s) (50 mL/Hr) IV Continuous <Continuous>  dextrose 50% Injectable 12.5 Gram(s) IV Push once  dextrose 50% Injectable 25 Gram(s) IV Push once  dextrose 50% Injectable 25 Gram(s) IV Push once  ATENolol  Tablet 100 milliGRAM(s) Oral daily  heparin  Injectable 5000 Unit(s) SubCutaneous every 8 hours  predniSONE   Tablet 5 milliGRAM(s) Oral daily  mycophenolate mofetil 1250 milliGRAM(s) Oral two times a day  insulin glargine Injectable (LANTUS) 22 Unit(s) SubCutaneous <User Schedule>  insulin lispro Injectable (HumaLOG) 2 Unit(s) SubCutaneous three times a day before meals  insulin lispro (HumaLOG) corrective regimen sliding scale   SubCutaneous three times a day before meals  torsemide 5 milliGRAM(s) Oral daily  NIFEdipine XL 90 milliGRAM(s) Oral daily  cloNIDine 0.1 milliGRAM(s) Oral every 8 hours  cycloSPORINE  (SandIMMUNE) 125 milliGRAM(s) Oral <User Schedule>    MEDICATIONS  (PRN):  dextrose Gel 1 Dose(s) Oral once PRN Blood Glucose LESS THAN 70 milliGRAM(s)/deciliter  glucagon  Injectable 1 milliGRAM(s) IntraMuscular once PRN Glucose LESS THAN 70 milligrams/deciliter      Allergies    No Known Allergies    Intolerances      Review of Systems:  Constitutional: No fever  Eyes: No blurry vision  Neuro: No tremors  HEENT: No pain  Cardiovascular: No chest pain, palpitations  Respiratory: No SOB, no cough  GI: No nausea, vomiting, abdominal pain  : No dysuria  Skin: no rash  Psych: no depression  Endocrine: no polyuria, polydipsia  Hem/lymph: no swelling  Osteoporosis: no fractures    ALL OTHER SYSTEMS REVIEWED AND NEGATIVE      PHYSICAL EXAM:  VITALS: T(C): 36.9 (08-13-17 @ 06:51)  T(F): 98.4 (08-13-17 @ 06:51), Max: 98.5 (08-12-17 @ 21:59)  HR: 68 (08-13-17 @ 06:51) (68 - 76)  BP: 187/92 (08-13-17 @ 08:34) (163/84 - 187/92)  RR:  (17 - 18)  SpO2:  (98% - 99%)  GENERAL: NAD, well-groomed, well-developed  EYES: No proptosis, no lid lag, anicteric  HEENT:  Atraumatic, Normocephalic, moist mucous membranes  THYROID: Normal size, no palpable nodules  RESPIRATORY: Clear to auscultation bilaterally; No rales, rhonchi, wheezing, or rubs  CARDIOVASCULAR: Regular rate and rhythm; No murmurs; no peripheral edema  GI: Soft, nontender, non distended, normal bowel sounds  SKIN: Dry, intact, No rashes or lesions  MUSCULOSKELETAL: Full range of motion, normal strength  NEURO: sensation intact, extraocular movements intact, no tremor, normal reflexes  PSYCH: Alert and oriented x 3, normal affect, normal mood  CUSHING'S SIGNS: no striae    CAPILLARY BLOOD GLUCOSE  95 (08-13 @ 08:22)  291 (08-12 @ 21:59)  366 (08-12 @ 16:59)  166 (08-12 @ 12:13)  120 (08-12 @ 08:53)  214 (08-11 @ 22:18)  313 (08-11 @ 17:23)  321 (08-11 @ 12:12)  134 (08-11 @ 08:53)  231 (08-10 @ 22:07)  180 (08-10 @ 17:24)  147 (08-10 @ 12:26)      08-13    138  |  106  |  65<H>  ----------------------------<  134<H>  3.7   |  17<L>  |  4.32<H>    EGFR if : 17  EGFR if non : 15    Ca    8.7      08-13  Mg     2.0     08-13  Phos  4.6     08-13    TPro  5.8<L>  /  Alb  3.0<L>  /  TBili  0.2  /  DBili  x   /  AST  12  /  ALT  8   /  AlkPhos  68  08-13

## 2017-08-13 NOTE — PROGRESS NOTE ADULT - SUBJECTIVE AND OBJECTIVE BOX
Chief Complaint: Patient is a 51 y/o Male seen for follow up for Encephalopathy      SUBJECTIVE / OVERNIGHT EVENTS: Pt says he had 2 bouts of diarrhea last night. Denies any blood or mucuous in stool. Pt is much more alert and oriented. Reports feeling well. No fever, cough, dyspnea, chest pain, abdominal pain, or dysuria.     MEDICATIONS  (STANDING):  piperacillin/tazobactam IVPB. 3.375 Gram(s) IV Intermittent every 12 hours  insulin lispro (HumaLOG) corrective regimen sliding scale   SubCutaneous three times a day before meals  insulin lispro (HumaLOG) corrective regimen sliding scale   SubCutaneous at bedtime  dextrose 5%. 1000 milliLiter(s) (50 mL/Hr) IV Continuous <Continuous>  dextrose 50% Injectable 12.5 Gram(s) IV Push once  dextrose 50% Injectable 25 Gram(s) IV Push once  dextrose 50% Injectable 25 Gram(s) IV Push once  ATENolol  Tablet 100 milliGRAM(s) Oral daily  NIFEdipine XL 30 milliGRAM(s) Oral daily  heparin  Injectable 5000 Unit(s) SubCutaneous every 8 hours  predniSONE   Tablet 5 milliGRAM(s) Oral daily  mycophenolate mofetil 1250 milliGRAM(s) Oral two times a day  cycloSPORINE  (SandIMMUNE) 100 milliGRAM(s) Oral <User Schedule>  insulin glargine Injectable (LANTUS) 22 Unit(s) SubCutaneous <User Schedule>    MEDICATIONS  (PRN):  dextrose Gel 1 Dose(s) Oral once PRN Blood Glucose LESS THAN 70 milliGRAM(s)/deciliter  glucagon  Injectable 1 milliGRAM(s) IntraMuscular once PRN Glucose LESS THAN 70 milligrams/deciliter        CAPILLARY BLOOD GLUCOSE  231 (10 Aug 2017 22:07)  180 (10 Aug 2017 17:24)  147 (10 Aug 2017 12:26)  110 (10 Aug 2017 10:06)  65 (10 Aug 2017 09:24)  60 (10 Aug 2017 08:41)    Vital Signs Last 24 Hrs  T(C): 36.8 (11 Aug 2017 05:48), Max: 37.2 (10 Aug 2017 21:22)  T(F): 98.2 (11 Aug 2017 05:48), Max: 99 (10 Aug 2017 21:22)  HR: 74 (11 Aug 2017 05:48) (73 - 78)  BP: 177/100 (11 Aug 2017 05:48) (144/74 - 189/90)  BP(mean): --  RR: 18 (11 Aug 2017 05:48) (18 - 20)  SpO2: 100% (11 Aug 2017 05:48) (98% - 100%)        PHYSICAL EXAM:  GENERAL: NAD, well-developed  HEAD:  Atraumatic, Normocephalic  EYES: EOMI, PERRLA, conjunctiva and sclera clear  NECK: Supple, No JVD  CHEST/LUNG: Clear to auscultation bilaterally; No wheeze  HEART: Regular rate and rhythm; No murmurs, rubs, or gallops  ABDOMEN: Soft, Nontender, Nondistended; Bowel sounds present  EXTREMITIES:  2+ Peripheral Pulses, No clubbing, cyanosis, or edema  PSYCH: AAOx3  NEUROLOGY: non-focal  SKIN: No rashes or lesions    LABS:                        10.   15.56 )-----------( 230      ( 10 Aug 2017 04:30 )             31.6     08-10    138  |  102  |  66<H>  ----------------------------<  104<H>  3.6   |  17<L>  |  3.51<H>    Ca    9.2      10 Aug 2017 04:30  Phos  3.3     08-10  Mg     1.7     08-10            Urinalysis Basic - ( 10 Aug 2017 04:50 )    Color: PLYEL / Appearance: CLEAR / S.009 / pH: 6.0  Gluc: 50 / Ketone: NEGATIVE  / Bili: NEGATIVE / Urobili: NORMAL E.U.   Blood: TRACE / Protein: 300 / Nitrite: NEGATIVE   Leuk Esterase: NEGATIVE / RBC: 0-2 / WBC 0-2   Sq Epi: OCC / Non Sq Epi: x / Bacteria: FEW        RADIOLOGY & ADDITIONAL TESTS:    Imaging Personally Reviewed:    Consultant(s) Notes Reviewed:      Care Discussed with Consultants/Other Providers:

## 2017-08-13 NOTE — PROGRESS NOTE ADULT - PROBLEM SELECTOR PLAN 3
Atenolol, Nifedipine Atenolol 100mg po qd  Nifedipine 90 mg po qd  Torsemide 5mg po  Clonidine 0.1mg TID

## 2017-08-13 NOTE — PROGRESS NOTE ADULT - ASSESSMENT
53 y/o male with T1DM (well controlled) with CKD s/p renal transplant and blindness. Noted to have daytime hyperglycemia with high insulin requirements and acute diarrhea today

## 2017-08-13 NOTE — PROGRESS NOTE ADULT - ATTENDING COMMENTS
Patient seen and examined. Currently, no new complaints. BP remains poorly controlled and FS's elevated. BCx negative for > 72 hours. Can DC all antibiotics as patient clinically stable. Start Clonidine 0.1mg TID. Titrate insulin per endocrine recs. Likely DC tomorrow if BP and FS's better controlled. DC torsemide. Monitor SCr (slightly bump today) and f/u nephro recs.     Further details of plan per resident note above

## 2017-08-13 NOTE — PROGRESS NOTE ADULT - PROBLEM SELECTOR PLAN 1
- Continue Lantus 22u QD at 4pm  - Continue Humalog 2u with breakfast  - Increase Humalog to 3u with lunch and dinner based on 2day trend  - Will likely need more humalog with lunch and dinner but minimal increase today due to diarrhea  - check HbA1c in the morning. Will not  but will allow assessing BG control.      Silas Rojo,   Endocrine Fellow   Pager: [phone].

## 2017-08-14 LAB
ALBUMIN SERPL ELPH-MCNC: 3.1 G/DL — LOW (ref 3.3–5)
ALP SERPL-CCNC: 71 U/L — SIGNIFICANT CHANGE UP (ref 40–120)
ALT FLD-CCNC: 11 U/L — SIGNIFICANT CHANGE UP (ref 4–41)
AST SERPL-CCNC: 15 U/L — SIGNIFICANT CHANGE UP (ref 4–40)
BASOPHILS # BLD AUTO: 0.05 K/UL — SIGNIFICANT CHANGE UP (ref 0–0.2)
BASOPHILS NFR BLD AUTO: 0.7 % — SIGNIFICANT CHANGE UP (ref 0–2)
BILIRUB SERPL-MCNC: 0.3 MG/DL — SIGNIFICANT CHANGE UP (ref 0.2–1.2)
BUN SERPL-MCNC: 63 MG/DL — HIGH (ref 7–23)
CALCIUM SERPL-MCNC: 8.7 MG/DL — SIGNIFICANT CHANGE UP (ref 8.4–10.5)
CHLORIDE SERPL-SCNC: 106 MMOL/L — SIGNIFICANT CHANGE UP (ref 98–107)
CO2 SERPL-SCNC: 19 MMOL/L — LOW (ref 22–31)
CREAT SERPL-MCNC: 4.01 MG/DL — HIGH (ref 0.5–1.3)
CYCLOSPORINE SER-MCNC: 62 NG/ML — LOW (ref 150–400)
EOSINOPHIL # BLD AUTO: 0.11 K/UL — SIGNIFICANT CHANGE UP (ref 0–0.5)
EOSINOPHIL NFR BLD AUTO: 1.6 % — SIGNIFICANT CHANGE UP (ref 0–6)
GLUCOSE SERPL-MCNC: 221 MG/DL — HIGH (ref 70–99)
HCT VFR BLD CALC: 30 % — LOW (ref 39–50)
HGB BLD-MCNC: 9.9 G/DL — LOW (ref 13–17)
IMM GRANULOCYTES # BLD AUTO: 0.03 # — SIGNIFICANT CHANGE UP
IMM GRANULOCYTES NFR BLD AUTO: 0.4 % — SIGNIFICANT CHANGE UP (ref 0–1.5)
LYMPHOCYTES # BLD AUTO: 1.07 K/UL — SIGNIFICANT CHANGE UP (ref 1–3.3)
LYMPHOCYTES # BLD AUTO: 15.3 % — SIGNIFICANT CHANGE UP (ref 13–44)
MAGNESIUM SERPL-MCNC: 2 MG/DL — SIGNIFICANT CHANGE UP (ref 1.6–2.6)
MCHC RBC-ENTMCNC: 29.2 PG — SIGNIFICANT CHANGE UP (ref 27–34)
MCHC RBC-ENTMCNC: 33 % — SIGNIFICANT CHANGE UP (ref 32–36)
MCV RBC AUTO: 88.5 FL — SIGNIFICANT CHANGE UP (ref 80–100)
MONOCYTES # BLD AUTO: 0.64 K/UL — SIGNIFICANT CHANGE UP (ref 0–0.9)
MONOCYTES NFR BLD AUTO: 9.2 % — SIGNIFICANT CHANGE UP (ref 2–14)
NEUTROPHILS # BLD AUTO: 5.09 K/UL — SIGNIFICANT CHANGE UP (ref 1.8–7.4)
NEUTROPHILS NFR BLD AUTO: 72.8 % — SIGNIFICANT CHANGE UP (ref 43–77)
NRBC # FLD: 0 — SIGNIFICANT CHANGE UP
PHOSPHATE SERPL-MCNC: 4.6 MG/DL — HIGH (ref 2.5–4.5)
PLATELET # BLD AUTO: 232 K/UL — SIGNIFICANT CHANGE UP (ref 150–400)
PMV BLD: 9.8 FL — SIGNIFICANT CHANGE UP (ref 7–13)
POTASSIUM SERPL-MCNC: 4.3 MMOL/L — SIGNIFICANT CHANGE UP (ref 3.5–5.3)
POTASSIUM SERPL-SCNC: 4.3 MMOL/L — SIGNIFICANT CHANGE UP (ref 3.5–5.3)
PROT SERPL-MCNC: 5.5 G/DL — LOW (ref 6–8.3)
RBC # BLD: 3.39 M/UL — LOW (ref 4.2–5.8)
RBC # FLD: 12.2 % — SIGNIFICANT CHANGE UP (ref 10.3–14.5)
SODIUM SERPL-SCNC: 137 MMOL/L — SIGNIFICANT CHANGE UP (ref 135–145)
WBC # BLD: 6.99 K/UL — SIGNIFICANT CHANGE UP (ref 3.8–10.5)
WBC # FLD AUTO: 6.99 K/UL — SIGNIFICANT CHANGE UP (ref 3.8–10.5)

## 2017-08-14 PROCEDURE — 99233 SBSQ HOSP IP/OBS HIGH 50: CPT

## 2017-08-14 PROCEDURE — 99232 SBSQ HOSP IP/OBS MODERATE 35: CPT | Mod: GC

## 2017-08-14 RX ORDER — INSULIN GLARGINE 100 [IU]/ML
22 INJECTION, SOLUTION SUBCUTANEOUS
Qty: 0 | Refills: 0 | Status: DISCONTINUED | OUTPATIENT
Start: 2017-08-15 | End: 2017-08-15

## 2017-08-14 RX ORDER — CARVEDILOL PHOSPHATE 80 MG/1
6.25 CAPSULE, EXTENDED RELEASE ORAL EVERY 12 HOURS
Qty: 0 | Refills: 0 | Status: DISCONTINUED | OUTPATIENT
Start: 2017-08-14 | End: 2017-08-14

## 2017-08-14 RX ORDER — HUMAN INSULIN 100 [IU]/ML
9 INJECTION, SUSPENSION SUBCUTANEOUS ONCE
Qty: 0 | Refills: 0 | Status: COMPLETED | OUTPATIENT
Start: 2017-08-14 | End: 2017-08-14

## 2017-08-14 RX ORDER — INSULIN LISPRO 100/ML
4 VIAL (ML) SUBCUTANEOUS
Qty: 0 | Refills: 0 | Status: DISCONTINUED | OUTPATIENT
Start: 2017-08-15 | End: 2017-08-16

## 2017-08-14 RX ORDER — INSULIN LISPRO 100/ML
4 VIAL (ML) SUBCUTANEOUS
Qty: 0 | Refills: 0 | Status: DISCONTINUED | OUTPATIENT
Start: 2017-08-14 | End: 2017-08-16

## 2017-08-14 RX ORDER — LABETALOL HCL 100 MG
10 TABLET ORAL ONCE
Qty: 0 | Refills: 0 | Status: COMPLETED | OUTPATIENT
Start: 2017-08-14 | End: 2017-08-14

## 2017-08-14 RX ORDER — CARVEDILOL PHOSPHATE 80 MG/1
12.5 CAPSULE, EXTENDED RELEASE ORAL EVERY 12 HOURS
Qty: 0 | Refills: 0 | Status: DISCONTINUED | OUTPATIENT
Start: 2017-08-14 | End: 2017-08-14

## 2017-08-14 RX ORDER — INSULIN GLARGINE 100 [IU]/ML
8 INJECTION, SOLUTION SUBCUTANEOUS ONCE
Qty: 0 | Refills: 0 | Status: DISCONTINUED | OUTPATIENT
Start: 2017-08-14 | End: 2017-08-14

## 2017-08-14 RX ORDER — INSULIN LISPRO 100/ML
3 VIAL (ML) SUBCUTANEOUS
Qty: 0 | Refills: 0 | Status: DISCONTINUED | OUTPATIENT
Start: 2017-08-15 | End: 2017-08-15

## 2017-08-14 RX ADMIN — MYCOPHENOLATE MOFETIL 1250 MILLIGRAM(S): 250 CAPSULE ORAL at 07:26

## 2017-08-14 RX ADMIN — HUMAN INSULIN 9 UNIT(S): 100 INJECTION, SUSPENSION SUBCUTANEOUS at 18:20

## 2017-08-14 RX ADMIN — Medication 0.1 MILLIGRAM(S): at 22:07

## 2017-08-14 RX ADMIN — Medication 4: at 13:32

## 2017-08-14 RX ADMIN — Medication 4: at 18:20

## 2017-08-14 RX ADMIN — Medication 10 MILLIGRAM(S): at 10:29

## 2017-08-14 RX ADMIN — Medication 5 MILLIGRAM(S): at 06:45

## 2017-08-14 RX ADMIN — CYCLOSPORINE 125 MILLIGRAM(S): 100 CAPSULE ORAL at 08:16

## 2017-08-14 RX ADMIN — HEPARIN SODIUM 5000 UNIT(S): 5000 INJECTION INTRAVENOUS; SUBCUTANEOUS at 22:08

## 2017-08-14 RX ADMIN — HEPARIN SODIUM 5000 UNIT(S): 5000 INJECTION INTRAVENOUS; SUBCUTANEOUS at 06:45

## 2017-08-14 RX ADMIN — MYCOPHENOLATE MOFETIL 1250 MILLIGRAM(S): 250 CAPSULE ORAL at 18:26

## 2017-08-14 RX ADMIN — Medication 2: at 09:31

## 2017-08-14 RX ADMIN — Medication 0.1 MILLIGRAM(S): at 06:45

## 2017-08-14 RX ADMIN — HEPARIN SODIUM 5000 UNIT(S): 5000 INJECTION INTRAVENOUS; SUBCUTANEOUS at 14:04

## 2017-08-14 RX ADMIN — Medication 0.1 MILLIGRAM(S): at 14:04

## 2017-08-14 RX ADMIN — Medication 2 UNIT(S): at 09:31

## 2017-08-14 RX ADMIN — CYCLOSPORINE 125 MILLIGRAM(S): 100 CAPSULE ORAL at 19:34

## 2017-08-14 RX ADMIN — Medication 3 UNIT(S): at 13:32

## 2017-08-14 RX ADMIN — Medication: at 22:13

## 2017-08-14 RX ADMIN — ATENOLOL 100 MILLIGRAM(S): 25 TABLET ORAL at 06:45

## 2017-08-14 RX ADMIN — Medication 4 UNIT(S): at 18:19

## 2017-08-14 RX ADMIN — Medication 10 MILLIGRAM(S): at 08:17

## 2017-08-14 RX ADMIN — Medication 90 MILLIGRAM(S): at 06:45

## 2017-08-14 NOTE — PROVIDER CONTACT NOTE (OTHER) - BACKGROUND
Dx Fever
PMH: CKD, HTN, DM1
Pt with hx renal transplant. Admitted with sepsis.
Pt with hx renal transplant. Pt with admit dx of fever.

## 2017-08-14 NOTE — PROGRESS NOTE ADULT - PROBLEM SELECTOR PLAN 3
Atenolol 100mg po qd  Nifedipine 90 mg po qd  Clonidine 0.1mg TID  Will consider switching to labetalol today as BP continues to be elevated. Lantus, Humalog premeals, ISS per endocrine recs  22u AM lantus. 3/4/4 humalog. ISS.

## 2017-08-14 NOTE — PROVIDER CONTACT NOTE (OTHER) - ASSESSMENT
Asymptomatic. No s/s of acute distress.
Pt asymptomatic. No s/s of acute distress.
No acute distress, pt denies c/o headache, nausea, vomiting
Pt asymptomatic.
Pt asymptomatic.

## 2017-08-14 NOTE — PROGRESS NOTE ADULT - PROBLEM SELECTOR PLAN 7
Lantus, Humalog premeals, ISS per endocrine recs  Pt FSG- 300-400 over night. Pt given morning lantus at 4 am, repeat FSG at 7am was 200. Will adjust insulin regimen today/ appreciate endo recs. Resolved

## 2017-08-14 NOTE — PROGRESS NOTE ADULT - ATTENDING COMMENTS
52-year-old male with history of DM1, HTN, kidney transplantation (LRRT in 1998 at [place]), CKD, admitted with weakness and hypoglycemia. Pt. now with RODRIGO on CKD.     re: RODRIGO on CKD - likely related to poorly controlled HTN. Torsemide was d/c over weekend, Cr. slightly improved.  Continue to monitor.    re: BP - titrate meds as above, restarted clonidine, switch to carvedilol from atenolol and continue nifedipine.    re: renal transplant - will f/u cyclosporine level draw yesterday.  continue current doses immunosuppression. 52-year-old male with history of DM1, HTN, kidney transplantation (LRRT in 1998 at [place]), CKD, admitted with weakness and hypoglycemia. Pt. now with RODRIGO on CKD.     re: RODRIGO on CKD - likely related to poorly controlled HTN. Torsemide was d/c over weekend, Cr. slightly improved.  Continue to monitor.    re: BP - titrate meds as above, restarted clonidine, switch to carvedilol from atenolol and continue nifedipine.    re: renal transplant - CyA level drawn incorrectly yesterday. Suggest to recheck.  Continue current doses immunosuppression.

## 2017-08-14 NOTE — PROGRESS NOTE ADULT - PROBLEM SELECTOR PLAN 1
Pt. with RODRIGO on CKD. RODRIGO in setting of diuretic use, poorly controlled HTN and ? infection. Scr improved to 4.01 from 4.32 yesterday. Previous Scr ranged from 2.5 to 3 as per pt.  Advise to dose Zosyn as per GFR (2.25mg Q 8hrs). Monitor labs and urine output. Avoid nephrotoxins, NSAIDs and RCA

## 2017-08-14 NOTE — PROGRESS NOTE ADULT - PROBLEM SELECTOR PLAN 2
Pt. with kidney transplantation (LRRT in 1998).  F/u repeat cyclosporine (12 hour tough) level. Continue with current immunosuppression for kidney transplantation Pt. with kidney transplantation (LRRT in 1998).  F/u repeat cyclosporine level (12 hour tough) level. Continue with current immunosuppression for kidney transplantation Pt. with kidney transplantation (LRRT in 1998).  CyA level was drawn incorrectly (was 14 hours after last dose). Continue with current immunosuppression for kidney transplantation

## 2017-08-14 NOTE — PROGRESS NOTE ADULT - PROBLEM SELECTOR PLAN 5
Cr at baseline, continue cyclosporine, cellcept, prednisone Cr at baseline, follow up renal, no obstruction on renal US follow up renal, no obstruction on renal US

## 2017-08-14 NOTE — PROVIDER CONTACT NOTE (OTHER) - NAME OF MD/NP/PA/DO NOTIFIED:
Dr. Hancock
Dr. Ochoa
ROB[place] Preciado [id number]
ROBEL Preciado [id number]
ROBEL Preciado [id number]
ROB[place] Preciado [id number]

## 2017-08-14 NOTE — PROGRESS NOTE ADULT - SUBJECTIVE AND OBJECTIVE BOX
Chief Complaint: DM1    History: S/p hyperglycemia since yesterday evening.  Patient is eating well. No further hypoglycemia.    MEDICATIONS  (STANDING):  insulin lispro (HumaLOG) corrective regimen sliding scale   SubCutaneous at bedtime  dextrose 5%. 1000 milliLiter(s) (50 mL/Hr) IV Continuous <Continuous>  dextrose 50% Injectable 12.5 Gram(s) IV Push once  dextrose 50% Injectable 25 Gram(s) IV Push once  dextrose 50% Injectable 25 Gram(s) IV Push once  heparin  Injectable 5000 Unit(s) SubCutaneous every 8 hours  predniSONE   Tablet 5 milliGRAM(s) Oral daily  mycophenolate mofetil 1250 milliGRAM(s) Oral two times a day  insulin glargine Injectable (LANTUS) 22 Unit(s) SubCutaneous <User Schedule>  insulin lispro (HumaLOG) corrective regimen sliding scale   SubCutaneous three times a day before meals  NIFEdipine XL 90 milliGRAM(s) Oral daily  cloNIDine 0.1 milliGRAM(s) Oral every 8 hours  cycloSPORINE  (SandIMMUNE) 125 milliGRAM(s) Oral <User Schedule>  insulin lispro Injectable (HumaLOG) 2 Unit(s) SubCutaneous before breakfast  insulin lispro Injectable (HumaLOG) 3 Unit(s) SubCutaneous before lunch  insulin lispro Injectable (HumaLOG) 3 Unit(s) SubCutaneous before dinner    MEDICATIONS  (PRN):  dextrose Gel 1 Dose(s) Oral once PRN Blood Glucose LESS THAN 70 milliGRAM(s)/deciliter  glucagon  Injectable 1 milliGRAM(s) IntraMuscular once PRN Glucose LESS THAN 70 milligrams/deciliter      Allergies    No Known Allergies    Intolerances      Review of Systems:    ALL OTHER SYSTEMS REVIEWED AND NEGATIVE        PHYSICAL EXAM:  VITALS: T(C): 37.1 (08-14-17 @ 14:01)  T(F): 98.7 (08-14-17 @ 14:01), Max: 98.7 (08-14-17 @ 14:01)  HR: 75 (08-14-17 @ 14:01) (61 - 75)  BP: 138/80 (08-14-17 @ 14:01) (138/80 - 207/84)  RR:  (16 - 18)  SpO2:  (99% - 100%)  Wt(kg): --  GENERAL: NAD, well-groomed, well-developed  EYES: blind  HEENT:  Atraumatic, Normocephalic, moist mucous membranes  RESPIRATORY: nonlabored  PSYCH: Alert and oriented x 3, normal affect, normal mood    CAPILLARY BLOOD GLUCOSE  332 (08-14 @ 12:38)  207 (08-14 @ 08:56)  200 (08-14 @ 07:00)  383 (08-13 @ 23:00)  423 (08-13 @ 21:52)  373 (08-13 @ 16:41)  167 (08-13 @ 11:57)  95 (08-13 @ 08:22)  291 (08-12 @ 21:59)  366 (08-12 @ 16:59)  166 (08-12 @ 12:13)  120 (08-12 @ 08:53)  214 (08-11 @ 22:18)  313 (08-11 @ 17:23)      08-14    137  |  106  |  63<H>  ----------------------------<  221<H>  4.3   |  19<L>  |  4.01<H>    EGFR if : 19  EGFR if non : 16    Ca    8.7      08-14  Mg     2.0     08-14  Phos  4.6     08-14    TPro  5.5<L>  /  Alb  3.1<L>  /  TBili  0.3  /  DBili  x   /  AST  15  /  ALT  11  /  AlkPhos  71  08-14          Thyroid Function Tests:

## 2017-08-14 NOTE — PROGRESS NOTE ADULT - PROBLEM SELECTOR PLAN 2
Infectious workup negative. Blood cultures negative for 48h. Given that pt is much improved clinically, afebrile, no wbc count, with negative bcx in the setting of CKD Zosyn was d/c'd yesterday. Carvedilol 6.25mg BID  Nifedipine 90 mg po qd  Clonidine 0.1mg TID  Will consider switching to labetalol today as BP continues to be elevated. Spoke with nephrology team Dr. Brooke Min with recommendations to dc atenolol to carvedilol.  Will start Carvedilol 6.25mg BID  c/w Nifedipine 90 mg po qdm  c/w Clonidine 0.1mg TID  monitor BP

## 2017-08-14 NOTE — PROGRESS NOTE ADULT - PROBLEM SELECTOR PLAN 3
BP poorly controlled at present. Recommended to restart clonidine; confirm with his pharmacy as to what dosage he was taking. Discontinue atenolol and switch to carvedilol 6.25 mg BID.   Low salt diet. Monitor BP. BP meds may need to be adjusted further, if BP remains elevated BP poorly controlled at present. Recommended to restart clonidine; confirm with his pharmacy as to what dosage he was taking. Uncontrolled BP likely related to being off clonidine with rebound HTN. Discontinue atenolol and switch to carvedilol 6.25 mg BID (for alpha and beta blocker effect).   Low salt diet. Monitor BP. BP meds may need to be adjusted further, if BP remains elevated

## 2017-08-14 NOTE — PROGRESS NOTE ADULT - PROBLEM SELECTOR PLAN 4
Cr at baseline, follow up renal, no obstruction on renal US Infectious workup negative. Blood cultures negative for 48h. Given that pt is much improved clinically, afebrile, no wbc count, with negative bcx in the setting of CKD Zosyn was d/c'd yesterday. Infectious workup negative. Blood cultures negative for 48h. Given that pt is much improved clinically, afebrile, no wbc count, with negative bcx in the setting of CKD Zosyn was d/c'd yesterday.  -monitor off abx for now    Of note pt with episode of possible diarrhea over night. See HPI. Continue to monitor for now.

## 2017-08-14 NOTE — PROGRESS NOTE ADULT - SUBJECTIVE AND OBJECTIVE BOX
[facility] Division of Kidney Diseases & Hypertension  FOLLOW UP NOTE  [phone]--------------------------------------------------------------------------------  Chief Complaint:Fever      24 hour events/subjective:    Patient seen and evaluated today. Denies complains of SOB, CP, headache, dizziness, nausea, vomiting, abdominal pain.     PAST HISTORY  --------------------------------------------------------------------------------  No significant changes to PMH, PSH, FHx, SHx, unless otherwise noted    ALLERGIES & MEDICATIONS  --------------------------------------------------------------------------------  Allergies    No Known Allergies    Intolerances      Standing Inpatient Medications  insulin lispro (HumaLOG) corrective regimen sliding scale   SubCutaneous at bedtime  dextrose 5%. 1000 milliLiter(s) IV Continuous <Continuous>  dextrose 50% Injectable 12.5 Gram(s) IV Push once  dextrose 50% Injectable 25 Gram(s) IV Push once  dextrose 50% Injectable 25 Gram(s) IV Push once  heparin  Injectable 5000 Unit(s) SubCutaneous every 8 hours  predniSONE   Tablet 5 milliGRAM(s) Oral daily  mycophenolate mofetil 1250 milliGRAM(s) Oral two times a day  insulin glargine Injectable (LANTUS) 22 Unit(s) SubCutaneous <User Schedule>  insulin lispro (HumaLOG) corrective regimen sliding scale   SubCutaneous three times a day before meals  NIFEdipine XL 90 milliGRAM(s) Oral daily  cloNIDine 0.1 milliGRAM(s) Oral every 8 hours  cycloSPORINE  (SandIMMUNE) 125 milliGRAM(s) Oral <User Schedule>  insulin lispro Injectable (HumaLOG) 2 Unit(s) SubCutaneous before breakfast  insulin lispro Injectable (HumaLOG) 3 Unit(s) SubCutaneous before lunch  insulin lispro Injectable (HumaLOG) 3 Unit(s) SubCutaneous before dinner    PRN Inpatient Medications  dextrose Gel 1 Dose(s) Oral once PRN  glucagon  Injectable 1 milliGRAM(s) IntraMuscular once PRN      REVIEW OF SYSTEMS  --------------------------------------------------------------------------------  Gen: No  fevers/chills  Skin: No rashes  Head/Eyes/Ears/Mouth: No headache; Normal hearing; Normal vision w/o blurriness  Respiratory: No dyspnea, cough, wheezing, hemoptysis  CV: No chest pain, PND, orthopnea  GI: No abdominal pain, diarrhea, constipation, nausea, vomiting  : No increased frequency, dysuria, hematuria, nocturia  MSK: No joint pain/swelling; no back pain; no edema  Neuro: No dizziness/lightheadedness, weakness, seizures, numbness, tingling      All other systems were reviewed and are negative, except as noted.    VITALS/PHYSICAL EXAM  --------------------------------------------------------------------------------  T(C): 37 (08-14-17 @ 06:42), Max: 37 (08-14-17 @ 06:42)  HR: 70 (08-14-17 @ 10:27) (61 - 73)  BP: 180/90 (08-14-17 @ 10:27) (149/78 - 207/84)  RR: 17 (08-14-17 @ 10:27) (16 - 18)  SpO2: 99% (08-14-17 @ 06:42) (98% - 100%)  Wt(kg): --        Physical Exam:  	Gen: NAD  	Pulm: CTA B/L  	CV: RRR, S1S2;  	Abd: +BS, soft, nontender/nondistended               Extremities:  bilateral LE edema noted.         LABS/STUDIES  --------------------------------------------------------------------------------              9.9    6.99  >-----------<  232      [08-14-17 @ 05:20]              30.0     137  |  106  |  63  ----------------------------<  221      [08-14-17 @ 05:20]  4.3   |  19  |  4.01        Ca     8.7     [08-14-17 @ 05:20]      Mg     2.0     [08-14-17 @ 05:20]      Phos  4.6     [08-14-17 @ 05:20]    TPro  5.5  /  Alb  3.1  /  TBili  0.3  /  DBili  x   /  AST  15  /  ALT  11  /  AlkPhos  71  [08-14-17 @ 05:20]          Creatinine Trend:  SCr 4.01 [08-14 @ 05:20]  SCr 4.32 [08-13 @ 04:37]  SCr 3.88 [08-12 @ 05:25]  SCr 3.49 [08-11 @ 07:20]  SCr 3.51 [08-10 @ 04:30]    Urinalysis - [08-10-17 @ 04:50]      Color PLYEL / Appearance CLEAR / SG 1.009 / pH 6.0      Gluc 50 / Ketone NEGATIVE  / Bili NEGATIVE / Urobili NORMAL       Blood TRACE / Protein 300 / Leuk Est NEGATIVE / Nitrite NEGATIVE      RBC 0-2 / WBC 0-2 / Hyaline  / Gran  / Sq Epi OCC / Non Sq Epi  / Bacteria FEW    Urine Creatinine 36.63      [08-10-17 @ 10:11]  Urine Protein 186.7      [08-10-17 @ 10:11]    Lipid: chol 137, TG 97, HDL 52, LDL 68      [08-11-17 @ 07:20] [facility] Division of Kidney Diseases & Hypertension  FOLLOW UP NOTE  [phone]--------------------------------------------------------------------------------  Chief Complaint:Fever      24 hour events/subjective:    Patient seen and evaluated today. Denies complains of SOB, CP, headache, dizziness, nausea, vomiting, abdominal pain. BP still uncontrolled.    PAST HISTORY  --------------------------------------------------------------------------------  No significant changes to PMH, PSH, FHx, SHx, unless otherwise noted    ALLERGIES & MEDICATIONS  --------------------------------------------------------------------------------  Allergies    No Known Allergies    Intolerances      Standing Inpatient Medications  insulin lispro (HumaLOG) corrective regimen sliding scale   SubCutaneous at bedtime  dextrose 5%. 1000 milliLiter(s) IV Continuous <Continuous>  dextrose 50% Injectable 12.5 Gram(s) IV Push once  dextrose 50% Injectable 25 Gram(s) IV Push once  dextrose 50% Injectable 25 Gram(s) IV Push once  heparin  Injectable 5000 Unit(s) SubCutaneous every 8 hours  predniSONE   Tablet 5 milliGRAM(s) Oral daily  mycophenolate mofetil 1250 milliGRAM(s) Oral two times a day  insulin glargine Injectable (LANTUS) 22 Unit(s) SubCutaneous <User Schedule>  insulin lispro (HumaLOG) corrective regimen sliding scale   SubCutaneous three times a day before meals  NIFEdipine XL 90 milliGRAM(s) Oral daily  cloNIDine 0.1 milliGRAM(s) Oral every 8 hours  cycloSPORINE  (SandIMMUNE) 125 milliGRAM(s) Oral <User Schedule>  insulin lispro Injectable (HumaLOG) 2 Unit(s) SubCutaneous before breakfast  insulin lispro Injectable (HumaLOG) 3 Unit(s) SubCutaneous before lunch  insulin lispro Injectable (HumaLOG) 3 Unit(s) SubCutaneous before dinner    PRN Inpatient Medications  dextrose Gel 1 Dose(s) Oral once PRN  glucagon  Injectable 1 milliGRAM(s) IntraMuscular once PRN      REVIEW OF SYSTEMS  --------------------------------------------------------------------------------  Gen: No  fevers/chills  Skin: No rashes  Head/Eyes/Ears/Mouth: No headache; Normal hearing; Normal vision w/o blurriness  Respiratory: No dyspnea, cough, wheezing, hemoptysis  CV: No chest pain, PND, orthopnea  GI: No abdominal pain, diarrhea, constipation, nausea, vomiting  : No increased frequency, dysuria, hematuria, nocturia  MSK: No joint pain/swelling; no back pain; + edema  Neuro: No dizziness/lightheadedness, weakness, seizures, numbness, tingling      All other systems were reviewed and are negative, except as noted.    VITALS/PHYSICAL EXAM  --------------------------------------------------------------------------------  T(C): 37 (08-14-17 @ 06:42), Max: 37 (08-14-17 @ 06:42)  HR: 70 (08-14-17 @ 10:27) (61 - 73)  BP: 180/90 (08-14-17 @ 10:27) (149/78 - 207/84)  RR: 17 (08-14-17 @ 10:27) (16 - 18)  SpO2: 99% (08-14-17 @ 06:42) (98% - 100%)  Wt(kg): --        Physical Exam:  	Gen: NAD  	Pulm: CTA B/L  	CV: RRR, S1S2;  	Abd: +BS, soft, nontender/nondistended               Extremities:  bilateral LE edema noted.         LABS/STUDIES  --------------------------------------------------------------------------------              9.9    6.99  >-----------<  232      [08-14-17 @ 05:20]              30.0     137  |  106  |  63  ----------------------------<  221      [08-14-17 @ 05:20]  4.3   |  19  |  4.01        Ca     8.7     [08-14-17 @ 05:20]      Mg     2.0     [08-14-17 @ 05:20]      Phos  4.6     [08-14-17 @ 05:20]    TPro  5.5  /  Alb  3.1  /  TBili  0.3  /  DBili  x   /  AST  15  /  ALT  11  /  AlkPhos  71  [08-14-17 @ 05:20]          Creatinine Trend:  SCr 4.01 [08-14 @ 05:20]  SCr 4.32 [08-13 @ 04:37]  SCr 3.88 [08-12 @ 05:25]  SCr 3.49 [08-11 @ 07:20]  SCr 3.51 [08-10 @ 04:30]    Urinalysis - [08-10-17 @ 04:50]      Color PLYEL / Appearance CLEAR / SG 1.009 / pH 6.0      Gluc 50 / Ketone NEGATIVE  / Bili NEGATIVE / Urobili NORMAL       Blood TRACE / Protein 300 / Leuk Est NEGATIVE / Nitrite NEGATIVE      RBC 0-2 / WBC 0-2 / Hyaline  / Gran  / Sq Epi OCC / Non Sq Epi  / Bacteria FEW    Urine Creatinine 36.63      [08-10-17 @ 10:11]  Urine Protein 186.7      [08-10-17 @ 10:11]    Lipid: chol 137, TG 97, HDL 52, LDL 68      [08-11-17 @ 07:20]

## 2017-08-14 NOTE — PROGRESS NOTE ADULT - PROBLEM SELECTOR PLAN 1
BG running high since yesterday.  Patient is requesting Lantus to be moved to the morning.  Will give NPH 9 units today at 5pm  Tomorrow at 6AM will resume Lantus 22u daily.  Increase Humalog to 3/4/4  Encourage carb consistent diet, patient needs help with menu selection due to blindness.  On chronic prednisone 5mg.

## 2017-08-14 NOTE — PROVIDER CONTACT NOTE (OTHER) - RECOMMENDATIONS
Notify MD
Give antihypertensive medications as per order. Reassess.
Give insulin as per order. Reassess.
Nifedipine XL 30 mg pox1, continue to monitor
continue to monitor

## 2017-08-14 NOTE — PROVIDER CONTACT NOTE (OTHER) - ACTION/TREATMENT ORDERED:
MD made aware. Recheck vitals and proceed from there.
MD made aware. Give BP meds. Recheck in about an hour.
Give antihypertensive medications as per order. Reassess.
Give insulin as per order. Reassess.
Nifedipine XL 30 mg pox1, continue to monitor
continue to monitor

## 2017-08-14 NOTE — PROGRESS NOTE ADULT - ASSESSMENT
51 y/o male with T1DM (well controlled) with CKD s/p renal transplant and blindness. A/w hypoglycemia and hyperglycemia.

## 2017-08-14 NOTE — PROGRESS NOTE ADULT - ASSESSMENT
52M with DM1, diabetic retinopathy, legally blind, HTN, CKD s/p renal transplant in 1998 (on prednisone, cellcept, cyclosporine) presents with encephalopathy in setting of hypoglycemia, sepsis with uncertain source. 52M with DM1, diabetic retinopathy, legally blind, HTN, CKD s/p renal transplant in 1998 (on prednisone, cellcept, cyclosporine) presents with encephalopathy in setting of hypoglycemia, hypertension, and episode of fever of uncertain source.

## 2017-08-14 NOTE — PROVIDER CONTACT NOTE (OTHER) - DATE AND TIME:
13-Aug-2017 06:51
13-Aug-2017 08:34
10-Aug-2017 09:20
11-Aug-2017 12:18
11-Aug-2017 13:18
11-Aug-2017 16:15
14-Aug-2017 06:43
13-Aug-2017 21:59

## 2017-08-14 NOTE — PROGRESS NOTE ADULT - ASSESSMENT
52-year-old male with history of DM1, HTN, kidney transplantation (LRRT in 1998 at [place]), CKD, admitted with weakness and hypoglycemia. Pt. now with RODRIGO on CKD.

## 2017-08-14 NOTE — PROGRESS NOTE ADULT - SUBJECTIVE AND OBJECTIVE BOX
Chief Complaint: Patient is a 53 y/o Male seen for follow up for Encephalopathy      SUBJECTIVE / OVERNIGHT EVENTS: Pt seen and examined. Reports one bout of loose stool last night. Reports feeling well. No fever, cough, dyspnea, chest pain, abdominal pain, or dysuria.     MEDICATIONS  (STANDING):  piperacillin/tazobactam IVPB. 3.375 Gram(s) IV Intermittent every 12 hours  insulin lispro (HumaLOG) corrective regimen sliding scale   SubCutaneous three times a day before meals  insulin lispro (HumaLOG) corrective regimen sliding scale   SubCutaneous at bedtime  dextrose 5%. 1000 milliLiter(s) (50 mL/Hr) IV Continuous <Continuous>  dextrose 50% Injectable 12.5 Gram(s) IV Push once  dextrose 50% Injectable 25 Gram(s) IV Push once  dextrose 50% Injectable 25 Gram(s) IV Push once  ATENolol  Tablet 100 milliGRAM(s) Oral daily  NIFEdipine XL 30 milliGRAM(s) Oral daily  heparin  Injectable 5000 Unit(s) SubCutaneous every 8 hours  predniSONE   Tablet 5 milliGRAM(s) Oral daily  mycophenolate mofetil 1250 milliGRAM(s) Oral two times a day  cycloSPORINE  (SandIMMUNE) 100 milliGRAM(s) Oral <User Schedule>  insulin glargine Injectable (LANTUS) 22 Unit(s) SubCutaneous <User Schedule>    MEDICATIONS  (PRN):  dextrose Gel 1 Dose(s) Oral once PRN Blood Glucose LESS THAN 70 milliGRAM(s)/deciliter  glucagon  Injectable 1 milliGRAM(s) IntraMuscular once PRN Glucose LESS THAN 70 milligrams/deciliter        CAPILLARY BLOOD GLUCOSE  231 (10 Aug 2017 22:07)  180 (10 Aug 2017 17:24)  147 (10 Aug 2017 12:26)  110 (10 Aug 2017 10:06)  65 (10 Aug 2017 09:24)  60 (10 Aug 2017 08:41)    Vital Signs Last 24 Hrs  T(C): 36.8 (11 Aug 2017 05:48), Max: 37.2 (10 Aug 2017 21:22)  T(F): 98.2 (11 Aug 2017 05:48), Max: 99 (10 Aug 2017 21:22)  HR: 74 (11 Aug 2017 05:48) (73 - 78)  BP: 177/100 (11 Aug 2017 05:48) (144/74 - 189/90)  BP(mean): --  RR: 18 (11 Aug 2017 05:48) (18 - 20)  SpO2: 100% (11 Aug 2017 05:48) (98% - 100%)        PHYSICAL EXAM:  GENERAL: NAD, well-developed  HEAD:  Atraumatic, Normocephalic  EYES: EOMI, PERRLA, conjunctiva and sclera clear  NECK: Supple, No JVD  CHEST/LUNG: Clear to auscultation bilaterally; No wheeze  HEART: Regular rate and rhythm; No murmurs, rubs, or gallops  ABDOMEN: Soft, Nontender, Nondistended; Bowel sounds present  EXTREMITIES:  2+ Peripheral Pulses, No clubbing, cyanosis, or edema  PSYCH: AAOx3  NEUROLOGY: non-focal  SKIN: No rashes or lesions    LABS:                        10.   15.56 )-----------( 230      ( 10 Aug 2017 04:30 )             31.6     08-10    138  |  102  |  66<H>  ----------------------------<  104<H>  3.6   |  17<L>  |  3.51<H>    Ca    9.2      10 Aug 2017 04:30  Phos  3.3     08-10  Mg     1.7     08-10            Urinalysis Basic - ( 10 Aug 2017 04:50 )    Color: PLYEL / Appearance: CLEAR / S.009 / pH: 6.0  Gluc: 50 / Ketone: NEGATIVE  / Bili: NEGATIVE / Urobili: NORMAL E.U.   Blood: TRACE / Protein: 300 / Nitrite: NEGATIVE   Leuk Esterase: NEGATIVE / RBC: 0-2 / WBC 0-2   Sq Epi: OCC / Non Sq Epi: x / Bacteria: FEW        RADIOLOGY & ADDITIONAL TESTS:    Imaging Personally Reviewed:    Consultant(s) Notes Reviewed:      Care Discussed with Consultants/Other Providers: Chief Complaint: Patient is a 51 y/o Male seen for follow up for Elevated blood pressure and hyperglycemia      SUBJECTIVE / OVERNIGHT EVENTS: Pt seen and examined. Reports one bout of loose Diarrhea stool last night. He denies associated abdominal pain, fevers, chills, dysuria, hematuria, n/v.  Den Reports feeling well. No fever, cough, dyspnea, chest pain, abdominal pain, or dysuria.     MEDICATIONS  (STANDING):  piperacillin/tazobactam IVPB. 3.375 Gram(s) IV Intermittent every 12 hours  insulin lispro (HumaLOG) corrective regimen sliding scale   SubCutaneous three times a day before meals  insulin lispro (HumaLOG) corrective regimen sliding scale   SubCutaneous at bedtime  dextrose 5%. 1000 milliLiter(s) (50 mL/Hr) IV Continuous <Continuous>  dextrose 50% Injectable 12.5 Gram(s) IV Push once  dextrose 50% Injectable 25 Gram(s) IV Push once  dextrose 50% Injectable 25 Gram(s) IV Push once  ATENolol  Tablet 100 milliGRAM(s) Oral daily  NIFEdipine XL 30 milliGRAM(s) Oral daily  heparin  Injectable 5000 Unit(s) SubCutaneous every 8 hours  predniSONE   Tablet 5 milliGRAM(s) Oral daily  mycophenolate mofetil 1250 milliGRAM(s) Oral two times a day  cycloSPORINE  (SandIMMUNE) 100 milliGRAM(s) Oral <User Schedule>  insulin glargine Injectable (LANTUS) 22 Unit(s) SubCutaneous <User Schedule>    MEDICATIONS  (PRN):  dextrose Gel 1 Dose(s) Oral once PRN Blood Glucose LESS THAN 70 milliGRAM(s)/deciliter  glucagon  Injectable 1 milliGRAM(s) IntraMuscular once PRN Glucose LESS THAN 70 milligrams/deciliter        CAPILLARY BLOOD GLUCOSE  231 (10 Aug 2017 22:07)  180 (10 Aug 2017 17:24)  147 (10 Aug 2017 12:26)  110 (10 Aug 2017 10:06)  65 (10 Aug 2017 09:24)  60 (10 Aug 2017 08:41)    Vital Signs Last 24 Hrs  T(C): 36.8 (11 Aug 2017 05:48), Max: 37.2 (10 Aug 2017 21:22)  T(F): 98.2 (11 Aug 2017 05:48), Max: 99 (10 Aug 2017 21:22)  HR: 74 (11 Aug 2017 05:48) (73 - 78)  BP: 177/100 (11 Aug 2017 05:48) (144/74 - 189/90)  BP(mean): --  RR: 18 (11 Aug 2017 05:48) (18 - 20)  SpO2: 100% (11 Aug 2017 05:48) (98% - 100%)        PHYSICAL EXAM:  GENERAL: NAD, well-developed  HEAD:  Atraumatic, Normocephalic  EYES: EOMI, PERRLA, conjunctiva and sclera clear  NECK: Supple, No JVD  CHEST/LUNG: Clear to auscultation bilaterally; No wheeze  HEART: Regular rate and rhythm; No murmurs, rubs, or gallops  ABDOMEN: Soft, Nontender, Nondistended; Bowel sounds present  EXTREMITIES:  2+ Peripheral Pulses, No clubbing, cyanosis, or edema  PSYCH: AAOx3  NEUROLOGY: non-focal  SKIN: No rashes or lesions    LABS:                        10.   15.56 )-----------( 230      ( 10 Aug 2017 04:30 )             31.6     08-10    138  |  102  |  66<H>  ----------------------------<  104<H>  3.6   |  17<L>  |  3.51<H>    Ca    9.2      10 Aug 2017 04:30  Phos  3.3     08-10  Mg     1.7     08-10            Urinalysis Basic - ( 10 Aug 2017 04:50 )    Color: PLYEL / Appearance: CLEAR / S.009 / pH: 6.0  Gluc: 50 / Ketone: NEGATIVE  / Bili: NEGATIVE / Urobili: NORMAL E.U.   Blood: TRACE / Protein: 300 / Nitrite: NEGATIVE   Leuk Esterase: NEGATIVE / RBC: 0-2 / WBC 0-2   Sq Epi: OCC / Non Sq Epi: x / Bacteria: FEW        RADIOLOGY & ADDITIONAL TESTS:    Imaging Personally Reviewed:    Consultant(s) Notes Reviewed:      Care Discussed with Consultants/Other Providers: Nephrology : Dr. Brooke Min

## 2017-08-15 ENCOUNTER — TRANSCRIPTION ENCOUNTER (OUTPATIENT)
Age: 52
End: 2017-08-15

## 2017-08-15 LAB
ALBUMIN SERPL ELPH-MCNC: 3.1 G/DL — LOW (ref 3.3–5)
ALP SERPL-CCNC: 72 U/L — SIGNIFICANT CHANGE UP (ref 40–120)
ALT FLD-CCNC: 17 U/L — SIGNIFICANT CHANGE UP (ref 4–41)
AST SERPL-CCNC: 21 U/L — SIGNIFICANT CHANGE UP (ref 4–40)
BACTERIA BLD CULT: SIGNIFICANT CHANGE UP
BACTERIA BLD CULT: SIGNIFICANT CHANGE UP
BILIRUB SERPL-MCNC: 0.2 MG/DL — SIGNIFICANT CHANGE UP (ref 0.2–1.2)
BUN SERPL-MCNC: 72 MG/DL — HIGH (ref 7–23)
CALCIUM SERPL-MCNC: 8.6 MG/DL — SIGNIFICANT CHANGE UP (ref 8.4–10.5)
CHLORIDE SERPL-SCNC: 102 MMOL/L — SIGNIFICANT CHANGE UP (ref 98–107)
CO2 SERPL-SCNC: 15 MMOL/L — LOW (ref 22–31)
CREAT SERPL-MCNC: 4.02 MG/DL — HIGH (ref 0.5–1.3)
CYCLOSPORINE SER-MCNC: 87 NG/ML — LOW (ref 150–400)
GLUCOSE SERPL-MCNC: 309 MG/DL — HIGH (ref 70–99)
HCT VFR BLD CALC: 29.4 % — LOW (ref 39–50)
HGB BLD-MCNC: 9.7 G/DL — LOW (ref 13–17)
MAGNESIUM SERPL-MCNC: 2 MG/DL — SIGNIFICANT CHANGE UP (ref 1.6–2.6)
MCHC RBC-ENTMCNC: 28.6 PG — SIGNIFICANT CHANGE UP (ref 27–34)
MCHC RBC-ENTMCNC: 33 % — SIGNIFICANT CHANGE UP (ref 32–36)
MCV RBC AUTO: 86.7 FL — SIGNIFICANT CHANGE UP (ref 80–100)
NRBC # FLD: 0 — SIGNIFICANT CHANGE UP
PHOSPHATE SERPL-MCNC: 5.3 MG/DL — HIGH (ref 2.5–4.5)
PLATELET # BLD AUTO: 248 K/UL — SIGNIFICANT CHANGE UP (ref 150–400)
PMV BLD: 9.9 FL — SIGNIFICANT CHANGE UP (ref 7–13)
POTASSIUM SERPL-MCNC: 4.3 MMOL/L — SIGNIFICANT CHANGE UP (ref 3.5–5.3)
POTASSIUM SERPL-SCNC: 4.3 MMOL/L — SIGNIFICANT CHANGE UP (ref 3.5–5.3)
PROT SERPL-MCNC: 5.6 G/DL — LOW (ref 6–8.3)
RBC # BLD: 3.39 M/UL — LOW (ref 4.2–5.8)
RBC # FLD: 12.2 % — SIGNIFICANT CHANGE UP (ref 10.3–14.5)
SODIUM SERPL-SCNC: 135 MMOL/L — SIGNIFICANT CHANGE UP (ref 135–145)
WBC # BLD: 6.48 K/UL — SIGNIFICANT CHANGE UP (ref 3.8–10.5)
WBC # FLD AUTO: 6.48 K/UL — SIGNIFICANT CHANGE UP (ref 3.8–10.5)

## 2017-08-15 PROCEDURE — 99233 SBSQ HOSP IP/OBS HIGH 50: CPT

## 2017-08-15 PROCEDURE — 99232 SBSQ HOSP IP/OBS MODERATE 35: CPT | Mod: GC

## 2017-08-15 RX ORDER — CARVEDILOL PHOSPHATE 80 MG/1
6.25 CAPSULE, EXTENDED RELEASE ORAL EVERY 12 HOURS
Qty: 0 | Refills: 0 | Status: DISCONTINUED | OUTPATIENT
Start: 2017-08-15 | End: 2017-08-16

## 2017-08-15 RX ORDER — ATENOLOL 25 MG/1
1 TABLET ORAL
Qty: 0 | Refills: 0 | COMMUNITY

## 2017-08-15 RX ORDER — INSULIN LISPRO 100/ML
VIAL (ML) SUBCUTANEOUS
Qty: 0 | Refills: 0 | Status: DISCONTINUED | OUTPATIENT
Start: 2017-08-15 | End: 2017-08-16

## 2017-08-15 RX ORDER — INSULIN GLARGINE 100 [IU]/ML
22 INJECTION, SOLUTION SUBCUTANEOUS
Qty: 0 | Refills: 0 | Status: DISCONTINUED | OUTPATIENT
Start: 2017-08-16 | End: 2017-08-16

## 2017-08-15 RX ORDER — INSULIN LISPRO 100/ML
2 VIAL (ML) SUBCUTANEOUS
Qty: 0 | Refills: 0 | Status: DISCONTINUED | OUTPATIENT
Start: 2017-08-16 | End: 2017-08-16

## 2017-08-15 RX ADMIN — Medication 4 UNIT(S): at 17:50

## 2017-08-15 RX ADMIN — MYCOPHENOLATE MOFETIL 1250 MILLIGRAM(S): 250 CAPSULE ORAL at 17:50

## 2017-08-15 RX ADMIN — CYCLOSPORINE 125 MILLIGRAM(S): 100 CAPSULE ORAL at 08:04

## 2017-08-15 RX ADMIN — Medication 90 MILLIGRAM(S): at 05:44

## 2017-08-15 RX ADMIN — Medication 0.1 MILLIGRAM(S): at 22:25

## 2017-08-15 RX ADMIN — CYCLOSPORINE 125 MILLIGRAM(S): 100 CAPSULE ORAL at 19:52

## 2017-08-15 RX ADMIN — INSULIN GLARGINE 22 UNIT(S): 100 INJECTION, SOLUTION SUBCUTANEOUS at 06:13

## 2017-08-15 RX ADMIN — Medication 1: at 17:51

## 2017-08-15 RX ADMIN — HEPARIN SODIUM 5000 UNIT(S): 5000 INJECTION INTRAVENOUS; SUBCUTANEOUS at 05:44

## 2017-08-15 RX ADMIN — Medication 0.1 MILLIGRAM(S): at 14:19

## 2017-08-15 RX ADMIN — Medication 4 UNIT(S): at 14:18

## 2017-08-15 RX ADMIN — MYCOPHENOLATE MOFETIL 1250 MILLIGRAM(S): 250 CAPSULE ORAL at 05:44

## 2017-08-15 RX ADMIN — Medication 5 MILLIGRAM(S): at 05:44

## 2017-08-15 RX ADMIN — Medication 3 UNIT(S): at 09:19

## 2017-08-15 RX ADMIN — HEPARIN SODIUM 5000 UNIT(S): 5000 INJECTION INTRAVENOUS; SUBCUTANEOUS at 22:25

## 2017-08-15 RX ADMIN — CARVEDILOL PHOSPHATE 6.25 MILLIGRAM(S): 80 CAPSULE, EXTENDED RELEASE ORAL at 12:25

## 2017-08-15 RX ADMIN — CARVEDILOL PHOSPHATE 6.25 MILLIGRAM(S): 80 CAPSULE, EXTENDED RELEASE ORAL at 17:59

## 2017-08-15 RX ADMIN — Medication 3: at 09:20

## 2017-08-15 RX ADMIN — Medication 0.1 MILLIGRAM(S): at 05:44

## 2017-08-15 NOTE — PROGRESS NOTE ADULT - SUBJECTIVE AND OBJECTIVE BOX
Chief Complaint: Patient is a 51 y/o Male seen for follow up for Elevated blood pressure and hyperglycemia      SUBJECTIVE / OVERNIGHT EVENTS: Pt seen and examined. Reports feeling well. Requesting No fever, cough, dyspnea, chest pain, abdominal pain, or dysuria.     MEDICATIONS  (STANDING):  piperacillin/tazobactam IVPB. 3.375 Gram(s) IV Intermittent every 12 hours  insulin lispro (HumaLOG) corrective regimen sliding scale   SubCutaneous three times a day before meals  insulin lispro (HumaLOG) corrective regimen sliding scale   SubCutaneous at bedtime  dextrose 5%. 1000 milliLiter(s) (50 mL/Hr) IV Continuous <Continuous>  dextrose 50% Injectable 12.5 Gram(s) IV Push once  dextrose 50% Injectable 25 Gram(s) IV Push once  dextrose 50% Injectable 25 Gram(s) IV Push once  ATENolol  Tablet 100 milliGRAM(s) Oral daily  NIFEdipine XL 30 milliGRAM(s) Oral daily  heparin  Injectable 5000 Unit(s) SubCutaneous every 8 hours  predniSONE   Tablet 5 milliGRAM(s) Oral daily  mycophenolate mofetil 1250 milliGRAM(s) Oral two times a day  cycloSPORINE  (SandIMMUNE) 100 milliGRAM(s) Oral <User Schedule>  insulin glargine Injectable (LANTUS) 22 Unit(s) SubCutaneous <User Schedule>    MEDICATIONS  (PRN):  dextrose Gel 1 Dose(s) Oral once PRN Blood Glucose LESS THAN 70 milliGRAM(s)/deciliter  glucagon  Injectable 1 milliGRAM(s) IntraMuscular once PRN Glucose LESS THAN 70 milligrams/deciliter        CAPILLARY BLOOD GLUCOSE  231 (10 Aug 2017 22:07)  180 (10 Aug 2017 17:24)  147 (10 Aug 2017 12:26)  110 (10 Aug 2017 10:06)  65 (10 Aug 2017 09:24)  60 (10 Aug 2017 08:41)    Vital Signs Last 24 Hrs  T(C): 36.8 (11 Aug 2017 05:48), Max: 37.2 (10 Aug 2017 21:22)  T(F): 98.2 (11 Aug 2017 05:48), Max: 99 (10 Aug 2017 21:22)  HR: 74 (11 Aug 2017 05:48) (73 - 78)  BP: 177/100 (11 Aug 2017 05:48) (144/74 - 189/90)  BP(mean): --  RR: 18 (11 Aug 2017 05:48) (18 - 20)  SpO2: 100% (11 Aug 2017 05:48) (98% - 100%)        PHYSICAL EXAM:  GENERAL: NAD, well-developed  HEAD:  Atraumatic, Normocephalic  EYES: EOMI, PERRLA, conjunctiva and sclera clear  NECK: Supple, No JVD  CHEST/LUNG: Clear to auscultation bilaterally; No wheeze  HEART: Regular rate and rhythm; No murmurs, rubs, or gallops  ABDOMEN: Soft, Nontender, Nondistended; Bowel sounds present  EXTREMITIES:  2+ Peripheral Pulses, No clubbing, cyanosis, or edema  PSYCH: AAOx3  NEUROLOGY: non-focal  SKIN: No rashes or lesions    LABS:                        10.   15.56 )-----------( 230      ( 10 Aug 2017 04:30 )             31.6     08-10    138  |  102  |  66<H>  ----------------------------<  104<H>  3.6   |  17<L>  |  3.51<H>    Ca    9.2      10 Aug 2017 04:30  Phos  3.3     08-10  Mg     1.7     08-10            Urinalysis Basic - ( 10 Aug 2017 04:50 )    Color: PLYEL / Appearance: CLEAR / S.009 / pH: 6.0  Gluc: 50 / Ketone: NEGATIVE  / Bili: NEGATIVE / Urobili: NORMAL E.U.   Blood: TRACE / Protein: 300 / Nitrite: NEGATIVE   Leuk Esterase: NEGATIVE / RBC: 0-2 / WBC 0-2   Sq Epi: OCC / Non Sq Epi: x / Bacteria: FEW        RADIOLOGY & ADDITIONAL TESTS:    Imaging Personally Reviewed:    Consultant(s) Notes Reviewed:      Care Discussed with Consultants/Other Providers: Nephrology : Dr. Brooke Min Chief Complaint: Patient is a 53 y/o Male seen for follow up for Elevated blood pressure and hyperglycemia      SUBJECTIVE / OVERNIGHT EVENTS: Pt seen and examined. Reports feeling well. Requesting discharge today. No fever, cough, dyspnea, chest pain, abdominal pain, or dysuria.     MEDICATIONS  (STANDING):  piperacillin/tazobactam IVPB. 3.375 Gram(s) IV Intermittent every 12 hours  insulin lispro (HumaLOG) corrective regimen sliding scale   SubCutaneous three times a day before meals  insulin lispro (HumaLOG) corrective regimen sliding scale   SubCutaneous at bedtime  dextrose 5%. 1000 milliLiter(s) (50 mL/Hr) IV Continuous <Continuous>  dextrose 50% Injectable 12.5 Gram(s) IV Push once  dextrose 50% Injectable 25 Gram(s) IV Push once  dextrose 50% Injectable 25 Gram(s) IV Push once  ATENolol  Tablet 100 milliGRAM(s) Oral daily  NIFEdipine XL 30 milliGRAM(s) Oral daily  heparin  Injectable 5000 Unit(s) SubCutaneous every 8 hours  predniSONE   Tablet 5 milliGRAM(s) Oral daily  mycophenolate mofetil 1250 milliGRAM(s) Oral two times a day  cycloSPORINE  (SandIMMUNE) 100 milliGRAM(s) Oral <User Schedule>  insulin glargine Injectable (LANTUS) 22 Unit(s) SubCutaneous <User Schedule>    MEDICATIONS  (PRN):  dextrose Gel 1 Dose(s) Oral once PRN Blood Glucose LESS THAN 70 milliGRAM(s)/deciliter  glucagon  Injectable 1 milliGRAM(s) IntraMuscular once PRN Glucose LESS THAN 70 milligrams/deciliter        CAPILLARY BLOOD GLUCOSE  231 (10 Aug 2017 22:07)  180 (10 Aug 2017 17:24)  147 (10 Aug 2017 12:26)  110 (10 Aug 2017 10:06)  65 (10 Aug 2017 09:24)  60 (10 Aug 2017 08:41)    Vital Signs Last 24 Hrs  T(C): 36.8 (11 Aug 2017 05:48), Max: 37.2 (10 Aug 2017 21:22)  T(F): 98.2 (11 Aug 2017 05:48), Max: 99 (10 Aug 2017 21:22)  HR: 74 (11 Aug 2017 05:48) (73 - 78)  BP: 177/100 (11 Aug 2017 05:48) (144/74 - 189/90)  BP(mean): --  RR: 18 (11 Aug 2017 05:48) (18 - 20)  SpO2: 100% (11 Aug 2017 05:48) (98% - 100%)        PHYSICAL EXAM:  GENERAL: NAD, well-developed  HEAD:  Atraumatic, Normocephalic  EYES: EOMI, PERRLA, conjunctiva and sclera clear  NECK: Supple, No JVD  CHEST/LUNG: Clear to auscultation bilaterally; No wheeze  HEART: Regular rate and rhythm; No murmurs, rubs, or gallops  ABDOMEN: Soft, Nontender, Nondistended; Bowel sounds present  EXTREMITIES:  2+ Peripheral Pulses, No clubbing, cyanosis, or edema  PSYCH: AAOx3  NEUROLOGY: non-focal  SKIN: No rashes or lesions    LABS:                        10.   15.56 )-----------( 230      ( 10 Aug 2017 04:30 )             31.6     08-10    138  |  102  |  66<H>  ----------------------------<  104<H>  3.6   |  17<L>  |  3.51<H>    Ca    9.2      10 Aug 2017 04:30  Phos  3.3     08-10  Mg     1.7     08-10            Urinalysis Basic - ( 10 Aug 2017 04:50 )    Color: PLYEL / Appearance: CLEAR / S.009 / pH: 6.0  Gluc: 50 / Ketone: NEGATIVE  / Bili: NEGATIVE / Urobili: NORMAL E.U.   Blood: TRACE / Protein: 300 / Nitrite: NEGATIVE   Leuk Esterase: NEGATIVE / RBC: 0-2 / WBC 0-2   Sq Epi: OCC / Non Sq Epi: x / Bacteria: FEW        RADIOLOGY & ADDITIONAL TESTS:    Imaging Personally Reviewed:    Consultant(s) Notes Reviewed:      Care Discussed with Consultants/Other Providers: Nephrology : Dr. Brooke Min Chief Complaint: Patient is a 51 y/o Male seen for follow up for Elevated blood pressure and hyperglycemia      SUBJECTIVE / OVERNIGHT EVENTS: Pt seen and examined. Reports feeling well. Requesting discharge today. No fever, cough, dyspnea, chest pain, abdominal pain, or dysuria.     MEDICATIONS  (STANDING):  piperacillin/tazobactam IVPB. 3.375 Gram(s) IV Intermittent every 12 hours  insulin lispro (HumaLOG) corrective regimen sliding scale   SubCutaneous three times a day before meals  insulin lispro (HumaLOG) corrective regimen sliding scale   SubCutaneous at bedtime  dextrose 5%. 1000 milliLiter(s) (50 mL/Hr) IV Continuous <Continuous>  dextrose 50% Injectable 12.5 Gram(s) IV Push once  dextrose 50% Injectable 25 Gram(s) IV Push once  dextrose 50% Injectable 25 Gram(s) IV Push once  ATENolol  Tablet 100 milliGRAM(s) Oral daily  NIFEdipine XL 30 milliGRAM(s) Oral daily  heparin  Injectable 5000 Unit(s) SubCutaneous every 8 hours  predniSONE   Tablet 5 milliGRAM(s) Oral daily  mycophenolate mofetil 1250 milliGRAM(s) Oral two times a day  cycloSPORINE  (SandIMMUNE) 100 milliGRAM(s) Oral <User Schedule>  insulin glargine Injectable (LANTUS) 22 Unit(s) SubCutaneous <User Schedule>    MEDICATIONS  (PRN):  dextrose Gel 1 Dose(s) Oral once PRN Blood Glucose LESS THAN 70 milliGRAM(s)/deciliter  glucagon  Injectable 1 milliGRAM(s) IntraMuscular once PRN Glucose LESS THAN 70 milligrams/deciliter        CAPILLARY BLOOD GLUCOSE  231 (10 Aug 2017 22:07)  180 (10 Aug 2017 17:24)  147 (10 Aug 2017 12:26)  110 (10 Aug 2017 10:06)  65 (10 Aug 2017 09:24)  60 (10 Aug 2017 08:41)    Vital Signs Last 24 Hrs  Vital Signs Last 24 Hrs  T(C): 36.7 (15 Aug 2017 05:33), Max: 36.9 (14 Aug 2017 21:27)  T(F): 98 (15 Aug 2017 05:33), Max: 98.5 (14 Aug 2017 21:27)  HR: 67 (15 Aug 2017 12:26) (67 - 71)  BP: 139/71 (15 Aug 2017 12:26) (139/71 - 165/91)  BP(mean): --  RR: 18 (15 Aug 2017 05:33) (18 - 18)  SpO2: 99% (15 Aug 2017 05:33) (97% - 99%)      PHYSICAL EXAM:  GENERAL: NAD, well-developed  HEAD:  Atraumatic, Normocephalic  EYES: EOMI, PERRLA, conjunctiva and sclera clear  NECK: Supple, No JVD  CHEST/LUNG: Clear to auscultation bilaterally; No wheeze  HEART: Regular rate and rhythm; No murmurs, rubs, or gallops  ABDOMEN: Soft, Nontender, Nondistended; Bowel sounds present  EXTREMITIES:  2+ Peripheral Pulses, No clubbing, cyanosis, or edema  PSYCH: AAOx3  NEUROLOGY: non-focal  SKIN: No rashes or lesions    LABS:     CBC Full  -  ( 15 Aug 2017 06:00 )  WBC Count : 6.48 K/uL  Hemoglobin : 9.7 g/dL  Hematocrit : 29.4 %  Platelet Count - Automated : 248 K/uL  Mean Cell Volume : 86.7 fL  Mean Cell Hemoglobin : 28.6 pg  Mean Cell Hemoglobin Concentration : 33.0 %  Auto Neutrophil # : x  Auto Lymphocyte # : x  Auto Monocyte # : x  Auto Eosinophil # : x  Auto Basophil # : x  Auto Neutrophil % : x  Auto Lymphocyte % : x  Auto Monocyte % : x  Auto Eosinophil % : x  Auto Basophil % : x    08-15    135  |  102  |  72<H>  ----------------------------<  309<H>  4.3   |  15<L>  |  4.02<H>    Ca    8.6      15 Aug 2017 06:00  Phos  5.3     08-15  Mg     2.0     08-15    TPro  5.6<L>  /  Alb  3.1<L>  /  TBili  0.2  /  DBili  x   /  AST  21  /  ALT  17  /  AlkPhos  72  08-15      Urinalysis Basic - ( 10 Aug 2017 04:50 )    Color: PLYEL / Appearance: CLEAR / S.009 / pH: 6.0  Gluc: 50 / Ketone: NEGATIVE  / Bili: NEGATIVE / Urobili: NORMAL E.U.   Blood: TRACE / Protein: 300 / Nitrite: NEGATIVE   Leuk Esterase: NEGATIVE / RBC: 0-2 / WBC 0-2   Sq Epi: OCC / Non Sq Epi: x / Bacteria: FEW      RADIOLOGY & ADDITIONAL TESTS:    Imaging Personally Reviewed:    Consultant(s) Notes Reviewed:

## 2017-08-15 NOTE — DISCHARGE NOTE ADULT - PATIENT PORTAL LINK FT
“You can access the Follow[employer] Patient Portal, offered by [facility], by registering with the following website: [email or web address]”

## 2017-08-15 NOTE — DISCHARGE NOTE ADULT - MEDICATION SUMMARY - MEDICATIONS TO TAKE
I will START or STAY ON the medications listed below when I get home from the hospital:    Novolog sliding scale  --     -- Indication: For Diabetes mellitus type I    predniSONE 5 mg oral tablet  -- 1 tab(s) by mouth once a day  -- Indication: For Renal transplant, status post    acetaminophen-oxyCODONE 325 mg-5 mg oral tablet  -- 1 tab(s) by mouth every 6 hours, As Needed MDD:3  -- Caution federal law prohibits the transfer of this drug to any person other  than the person for whom it was prescribed.  May cause drowsiness.  Alcohol may intensify this effect.  Use care when operating dangerous machinery.  This prescription cannot be refilled.  This product contains acetaminophen.  Do not use  with any other product containing acetaminophen to prevent possible liver damage.  Using more of this medication than prescribed may cause serious breathing problems.    -- Indication: For Fever    cloNIDine 0.1 mg oral tablet  -- 1 tab(s) by mouth every 8 hours  -- Indication: For Hypertension    tamsulosin 0.4 mg oral capsule  -- 1 cap(s) by mouth once a day (at bedtime)  -- Indication: For Other proteinuria    Lantus Solostar Pen 100 units/mL subcutaneous solution  -- 22 unit(s) subcutaneous once a day. Please take at 9 am.  -- Do not drink alcoholic beverages when taking this medication.  It is very important that you take or use this exactly as directed.  Do not skip doses or discontinue unless directed by your doctor.  Keep in refrigerator.  Do not freeze.    -- Indication: For Diabetes mellitus type I    insulin lispro 100 units/mL subcutaneous solution  -- 4 unit(s) subcutaneous once a day (before a meal)  -- Indication: For Diabetes mellitus type I    insulin lispro 100 units/mL subcutaneous solution  -- 4 unit(s) subcutaneous once a day (before a meal)  -- Indication: For Diabetes mellitus type I    insulin lispro 100 units/mL subcutaneous solution  -- 2 unit(s) subcutaneous once a day (before a meal)  -- Indication: For Diabetes mellitus type I    pravastatin 10 mg oral tablet  -- 1 tab(s) by mouth once a day (at bedtime)  -- Indication: For Essential hypertension    carvedilol 6.25 mg oral tablet  -- 1 tab(s) by mouth every 12 hours  -- Indication: For Hypertension    NIFEdipine 90 mg oral tablet, extended release  -- 1 tab(s) by mouth once a day  -- Avoid grapefruit and grapefruit juice while taking this medication.  It is very important that you take or use this exactly as directed.  Do not skip doses or discontinue unless directed by your doctor.  Some non-prescription drugs may aggravate your condition.  Read all labels carefully.  If a warning appears, check with your doctor before taking.  Swallow whole.  Do not crush.    -- Indication: For Hypertension    CellCept  -- 1250 milligram(s) by mouth 2 times a day  -- Indication: For Renal transplant recipient    cycloSPORINE 100 mg oral capsule  -- 2 cap(s) by mouth 2 times a day  -- Avoid grapefruit and grapefruit juice while taking this medication.  It is very important that you take or use this exactly as directed.  Do not skip doses or discontinue unless directed by your doctor.    -- Indication: For Renal transplant recipient    cycloSPORINE 25 mg oral capsule  -- 2 cap(s) by mouth 2 times a day  -- Avoid grapefruit and grapefruit juice while taking this medication.  It is very important that you take or use this exactly as directed.  Do not skip doses or discontinue unless directed by your doctor.    -- Indication: For Renal transplant recipient    Nasacort 55 mcg/inh nasal aerosol  --  into nose prn  -- Indication: For Allergies

## 2017-08-15 NOTE — PROGRESS NOTE ADULT - PROBLEM SELECTOR PLAN 3
Lantus, Humalog premeals, ISS per endocrine recs  22u AM lantus. 3/4/4 humalog. ISS. Lantus, Humalog premeals, ISS per endocrine recs  Per endo recs- 22u AM lantus at 9am. 2/4/4 humalog. ISS. Now resolved. Acute metabolic encephalopathy likely due to hypoglycemia, sepsis  - CT head negative  - Insulin regimen adjusted by endocrine  - Infectious workup negative   - Isolated fever on admission without recurrence, leukocytosis resolved

## 2017-08-15 NOTE — PROGRESS NOTE ADULT - PROBLEM SELECTOR PLAN 4
Infectious workup negative. Blood cultures negative for 48h. Given that pt is much improved clinically, afebrile, no wbc count, with negative bcx in the setting of CKD Zosyn was d/c'd yesterday.  -monitor off abx for now    Of note pt with episode of possible diarrhea over night. See HPI. Continue to monitor for now. Infectious workup negative. Blood cultures negative for 48h. Given that pt is much improved clinically, afebrile, no wbc count, with negative bcx in the setting of CKD Zosyn was d/c'd     -monitor off abx for now    Of note pt with episode of possible diarrhea over night. See HPI. Continue to monitor for now. follow up renal, no obstruction on renal US

## 2017-08-15 NOTE — PROGRESS NOTE ADULT - PROBLEM SELECTOR PLAN 1
Pt. with RODRIGO on CKD. RODRIGO in setting of diuretic use, poorly controlled HTN. Scr remains stable today, and baseline Cr ranged from 2.5 to 3 as per pt.  Monitor labs and urine output. Would keep off diuretics for now, can be restarted as outpatient.  Avoid nephrotoxins, NSAIDs and RCA. Patient does not want to make appointment with nephrology at present, but call  to book once dc home. He prefers to find a nephrologist through his PCP, closer to his home in [place].

## 2017-08-15 NOTE — DISCHARGE NOTE ADULT - CARE PROVIDER_API CALL
Shravan García), Medicine  [address] [place] [address]  Phone: [phone]  Fax: [phone] Shravan García), Medicine  [address] [place] [address]  Phone: [phone]  Fax: [phone]    ., .  The number for the nephrologist office at [facility] is [phone]  The number for the endocrinologist office at [facility] is [phone]  Phone: (   )    -  Fax: (   )    -

## 2017-08-15 NOTE — PROGRESS NOTE ADULT - ASSESSMENT
52M with DM1, diabetic retinopathy, legally blind, HTN, CKD s/p renal transplant in 1998 (on prednisone, cellcept, cyclosporine) presents with encephalopathy in setting of hypoglycemia, hypertension, and episode of fever of uncertain source.

## 2017-08-15 NOTE — PROGRESS NOTE ADULT - SUBJECTIVE AND OBJECTIVE BOX
Chief Complaint: DM1    History: S/p hypoglycemia today before lunch.  Patient is eating full meal tray.    MEDICATIONS  (STANDING):  insulin lispro (HumaLOG) corrective regimen sliding scale   SubCutaneous at bedtime  dextrose 5%. 1000 milliLiter(s) (50 mL/Hr) IV Continuous <Continuous>  dextrose 50% Injectable 12.5 Gram(s) IV Push once  dextrose 50% Injectable 25 Gram(s) IV Push once  dextrose 50% Injectable 25 Gram(s) IV Push once  heparin  Injectable 5000 Unit(s) SubCutaneous every 8 hours  predniSONE   Tablet 5 milliGRAM(s) Oral daily  mycophenolate mofetil 1250 milliGRAM(s) Oral two times a day  insulin lispro (HumaLOG) corrective regimen sliding scale   SubCutaneous three times a day before meals  NIFEdipine XL 90 milliGRAM(s) Oral daily  cloNIDine 0.1 milliGRAM(s) Oral every 8 hours  cycloSPORINE  (SandIMMUNE) 125 milliGRAM(s) Oral <User Schedule>  insulin lispro Injectable (HumaLOG) 3 Unit(s) SubCutaneous before breakfast  insulin lispro Injectable (HumaLOG) 4 Unit(s) SubCutaneous before lunch  insulin lispro Injectable (HumaLOG) 4 Unit(s) SubCutaneous before dinner  insulin glargine Injectable (LANTUS) 22 Unit(s) SubCutaneous <User Schedule>  carvedilol 6.25 milliGRAM(s) Oral every 12 hours    MEDICATIONS  (PRN):  dextrose Gel 1 Dose(s) Oral once PRN Blood Glucose LESS THAN 70 milliGRAM(s)/deciliter  glucagon  Injectable 1 milliGRAM(s) IntraMuscular once PRN Glucose LESS THAN 70 milligrams/deciliter      Allergies    No Known Allergies    Intolerances      Review of Systems:  Constitutional: No fever  Eyes: blind  Neuro: No tremors  HEENT: No pain  Cardiovascular: No chest pain, palpitations  Respiratory: No SOB, no cough  GI: No nausea, vomiting, abdominal pain  : No dysuria  Skin: no rash  Psych: no depression  Endocrine: no polyuria, polydipsia  Hem/lymph: no swelling  Osteoporosis: no fractures    ALL OTHER SYSTEMS REVIEWED AND NEGATIVE    PHYSICAL EXAM:  VITALS: T(C): 36.7 (08-15-17 @ 05:33)  T(F): 98 (08-15-17 @ 05:33), Max: 98.5 (08-14-17 @ 21:27)  HR: 67 (08-15-17 @ 12:26) (67 - 71)  BP: 139/71 (08-15-17 @ 12:26) (139/71 - 165/91)  RR:  (18 - 18)  SpO2:  (97% - 99%)  Wt(kg): --  GENERAL: NAD, well-groomed, well-developed  EYES: blind  HEENT:  Atraumatic, Normocephalic, moist mucous membranes  RESPIRATORY: nonlabored  PSYCH: Alert and oriented x 3, normal affect, normal mood    CAPILLARY BLOOD GLUCOSE  133 (08-15 @ 13:50)  71 (08-15 @ 13:07)  82 (08-15 @ 12:45)  49 (08-15 @ 12:26)  51 (08-15 @ 12:13)  253 (08-15 @ 08:26)  283 (08-15 @ 05:33)  349 (08-14 @ 22:11)  334 (08-14 @ 17:52)  332 (08-14 @ 12:38)  207 (08-14 @ 08:56)  200 (08-14 @ 07:00)  383 (08-13 @ 23:00)  423 (08-13 @ 21:52)  373 (08-13 @ 16:41)  167 (08-13 @ 11:57)  95 (08-13 @ 08:22)  291 (08-12 @ 21:59)  366 (08-12 @ 16:59)      08-15    135  |  102  |  72<H>  ----------------------------<  309<H>  4.3   |  15<L>  |  4.02<H>    EGFR if : 19  EGFR if non : 16    Ca    8.6      08-15  Mg     2.0     08-15  Phos  5.3     08-15    TPro  5.6<L>  /  Alb  3.1<L>  /  TBili  0.2  /  DBili  x   /  AST  21  /  ALT  17  /  AlkPhos  72  08-15          Thyroid Function Tests:

## 2017-08-15 NOTE — PROGRESS NOTE ADULT - NSHPATTENDINGPLANDISCUSS_GEN_ALL_CORE
primary team attending, Dr. Palomino
Dr. Palomino, primary team
Dr. Palomino, primary team
Dr. Ochoa
Dr. Santos
Dr. Ochoa
patient and medical team
patient and medical team
Don

## 2017-08-15 NOTE — DISCHARGE NOTE ADULT - PLAN OF CARE
Treatment of underlying condition When you came to the hospital your blood sugars were extremely low which is why you were obtunded in the emergency department. When your blood sugars were corrected you became more alert and oriented. Please follow up with your primary care physician for further management of your blood sugars. Management and treatment of hypertension Your blood pressure during this hospitalization was not well controlled. We discontinued your atenolol and started a new medication called carvedilol to better manage your high blood pressure. Please follow up with your primary care physician for further management. Management of hypo and hyperglycemia Your insulin regimen was changed multiple times during this hospitalization to better control your blood sugars. At a certain point your sugars were extremely high which can be extremely dangerous for a type 1 diabetic.

## 2017-08-15 NOTE — DISCHARGE NOTE ADULT - MEDICATION SUMMARY - MEDICATIONS TO STOP TAKING
I will STOP taking the medications listed below when I get home from the hospital:    atenolol 100 mg oral tablet  -- 1 tab(s) by mouth once a day    torsemide 5 mg oral tablet  -- 5 milligram(s) by mouth every other day

## 2017-08-15 NOTE — PROGRESS NOTE ADULT - ASSESSMENT
53 y/o male with T1DM (well controlled) with CKD s/p renal transplant and blindness. A/w hypoglycemia and hyperglycemia.

## 2017-08-15 NOTE — DISCHARGE NOTE ADULT - CARE PLAN
Principal Discharge DX:	Encephalopathy  Goal:	Treatment of underlying condition  Instructions for follow-up, activity and diet:	When you came to the hospital your blood sugars were extremely low which is why you were obtunded in the emergency department. When your blood sugars were corrected you became more alert and oriented. Please follow up with your primary care physician for further management of your blood sugars.  Secondary Diagnosis:	Essential hypertension  Goal:	Management and treatment of hypertension  Instructions for follow-up, activity and diet:	Your blood pressure during this hospitalization was not well controlled. We discontinued your atenolol and started a new medication called carvedilol to better manage your high blood pressure. Please follow up with your primary care physician for further management.  Secondary Diagnosis:	Diabetes mellitus type I  Goal:	Management of hypo and hyperglycemia  Instructions for follow-up, activity and diet:	Your insulin regimen was changed multiple times during this hospitalization to better control your blood sugars. At a certain point your sugars were extremely high which can be extremely dangerous for a type 1 diabetic.

## 2017-08-15 NOTE — DISCHARGE NOTE ADULT - HOSPITAL COURSE
Pt is a 51 y/o M w/ a pmh significant for DM1, HTN, renal transplant in 1998 (currently on Cellcept, prednisone, and Cyclosporine), legally, blind, and CKD who presented to the ED with a chief complaint of weakness and AMS. Pt notes he recently moved from [place] to live with his sister nearby. He says that over the last 2-3 weeks his am FSG have been ranging between 60-70. He says his normal AM FSG are usually 130-140. Last night, his sister came home and found pt minimally responsive, diaphoretic and breathing loudly. His sister attempted to put honey in his mouth but he spit out most of it. His proceeding FSG was recorded as "high" so she gave him 6 units of novolog and called EMS. En route to the ED, pt FSG in ambulance was 40, he was given glucose and his FSG in the ED were 117-144. Pt denies any sick contacts, recent travel, new medications, night sweats, weight loss, fevers, chills, rashes, n.v, cp, sob, cough, rhinorrhea, abd pain, hematuria, dysuria, hematochezia, or melena. Upon admission, pt underwent routine blood work found to have Hb 10.6, leukocytosis (15.6), UA was negative for Leukocyte esterase and nitrites, CT head was negative for any acute intracranial process, CXR shows no definite consolidation. Pt currently feels better and per sister he is more alert and oriented than he was earlier. Bcx were negative after 48h and pt clinical status progressively improved with treatment of hypoglycemia. WBC downtrended and pt remained afebrile for 2 days so vanc and zosyn were d'c'd. Pt mental status improved drastically since admission and clinical status progressively improved each day. Pt blood pressure was poorly controlled reaching to SBP of 200's occasionally during his hospitalization. Pt was asymptomatic during these episodes. Nephrology was consulted to help manage bp in setting of renal transplant. Pt atenolol was d/c'd and Nifedipine was increased from 30 to 90mg po qd.  Clonidine 0.1 mg po qd and Carvedilol 6.25mg po bid were added to help control bp. Pt was hyperglycemic to 400's and hypoglycemic to 50's throughout admission at times. Endocrinology was consulted and recommended 9am Lantus 22units, 2/4/4 humalog premeal, and ISS. Pt was advised to follow up with PMD within one week of discharge as well as appointments with nephrology and endocrinology clinics at [facility] if his insurance would allow it. Pt is a 51 y/o M w/ a pmh significant for DM1, HTN, renal transplant in 1998 (currently on Cellcept, prednisone, and Cyclosporine), legally, blind, and CKD who presented to the ED with a chief complaint of weakness and AMS. Pt notes he recently moved from [place] to live with his sister nearby. He says that over the last 2-3 weeks his am FSG have been ranging between 60-70. He says his normal AM FSG are usually 130-140. Last night, his sister came home and found pt minimally responsive, diaphoretic and breathing loudly. His sister attempted to put honey in his mouth but he spit out most of it. His proceeding FSG was recorded as "high" so she gave him 6 units of novolog and called EMS. En route to the ED, pt FSG in ambulance was 40, he was given glucose and his FSG in the ED were 117-144. Pt denies any sick contacts, recent travel, new medications, night sweats, weight loss, fevers, chills, rashes, n.v, cp, sob, cough, rhinorrhea, abd pain, hematuria, dysuria, hematochezia, or melena. Upon admission, pt underwent routine blood work found to have Hb 10.6, leukocytosis (15.6), UA was negative for Leukocyte esterase and nitrites, CT head was negative for any acute intracranial process, CXR shows no definite consolidation. Pt currently feels better and per sister he is more alert and oriented than he was earlier. Bcx were negative after 48h and pt clinical status progressively improved with treatment of hypoglycemia. WBC downtrended and pt remained afebrile for 2 days so vanc and zosyn were d'c'd. Pt mental status improved drastically since admission and clinical status progressively improved each day. Pt blood pressure was poorly controlled reaching to SBP of 200's occasionally during his hospitalization. Pt was asymptomatic during these episodes. Nephrology was consulted to help manage bp in setting of renal transplant. Pt atenolol was d/c'd and Nifedipine was increased from 30 to 90mg po qd.  Clonidine 0.1 mg po qd and Carvedilol 6.25mg po bid were added to help control bp. Pt was hyperglycemic to 400's and hypoglycemic to 50's throughout admission at times. Endocrinology was consulted and recommended 9am Lantus 22units, 2/4/4 humalog premeal, and ISS. Cyclosporine level was increased to 125mg po BID. Pt was advised to follow up with PMD within one week of discharge as well as appointments with nephrology and endocrinology clinics at [facility] if his insurance would allow it.

## 2017-08-15 NOTE — DISCHARGE NOTE ADULT - ADDITIONAL INSTRUCTIONS
The wound was explored to base in bloodless field. Please follow up with your primary care physician within one week following discharge for further management of your high blood pressure and diabetes.   Please make an appointment with Dr. García [phone]. You will need to get a referral to see a nephrologist and endocrinologist for further management of your health conditions.    The number for the nephrologist office at [facility] is [phone]  The number for the endocrinologist office at [facility] is [phone]

## 2017-08-15 NOTE — PROGRESS NOTE ADULT - PROBLEM SELECTOR PLAN 1
Acute metabolic encephalopathy likely due to hypoglycemia, sepsis  - CT head negative  - Insulin regimen adjusted by endocrine  - Infectious workup negative   - Isolated fever on admission without recurrence, leukocytosis resolved -c/w Carvedilol 6.25mg BID  -c/w Nifedipine 90 mg po qdm  -c/w Clonidine 0.1mg TID  -monitor BP

## 2017-08-15 NOTE — PROGRESS NOTE ADULT - PROBLEM SELECTOR PLAN 3
BP still suboptimal, but better controlled today.   continue current BP meds (carvedilol, nifedipine, clonidine).   Low salt diet. Monitor BP.

## 2017-08-15 NOTE — PROGRESS NOTE ADULT - ATTENDING COMMENTS
Pt admitted with AMS 2/2 hypoglycemia and hypertensive urgency/emergency w/ course c/b hyperglycemia. Today patient with episode of hypoglycemia.     DM1  -Endo recs appreciated Lantus 22 U will be at 9am starting tomorrow. Breakfast Humalog decreased to 2 units and will continue w/ 4 units before lunch and dinner for now.  -On discharge patient can proceed with Lantus 22u q 9AM and resume his prior Novolog home scale which is based on carb intake and correction for hypoglycemia.     Hypertension - BP starting to improve today with medication adjustments.   -pt was on atenolol but was dcd yesterday to start  Carvedilol 6.25mg BID  -c/w Nifedipine 90 mg po qdm  -c/w Clonidine 0.1mg TID    D/c planning likely for tomorrow .

## 2017-08-15 NOTE — DISCHARGE NOTE ADULT - PROVIDER TOKENS
ARNAUD:'[id number]' TOKEN:'[id number]',FREE:[LAST:[.],FIRST:[.],PHONE:[(   )    -],FAX:[(   )    -],ADDRESS:[The number for the nephrologist office at [facility] is [phone]  The number for the endocrinologist office at [facility] is [phone]]]

## 2017-08-15 NOTE — PROGRESS NOTE ADULT - SUBJECTIVE AND OBJECTIVE BOX
[facility] DIVISION OF KIDNEY DISEASES AND HYPERTENSION -- FOLLOW UP NOTE  --------------------------------------------------------------------------------  Chief Complaint:    24 hour events/subjective: Feels well today.  Denies headache, chest pain, SOB. BP better controlled last 24 hours.        PAST HISTORY  --------------------------------------------------------------------------------  No significant changes to PMH, PSH, FHx, SHx, unless otherwise noted    ALLERGIES & MEDICATIONS  --------------------------------------------------------------------------------  Allergies    No Known Allergies    Intolerances      Standing Inpatient Medications  insulin lispro (HumaLOG) corrective regimen sliding scale   SubCutaneous at bedtime  dextrose 5%. 1000 milliLiter(s) IV Continuous <Continuous>  dextrose 50% Injectable 12.5 Gram(s) IV Push once  dextrose 50% Injectable 25 Gram(s) IV Push once  dextrose 50% Injectable 25 Gram(s) IV Push once  heparin  Injectable 5000 Unit(s) SubCutaneous every 8 hours  predniSONE   Tablet 5 milliGRAM(s) Oral daily  mycophenolate mofetil 1250 milliGRAM(s) Oral two times a day  insulin lispro (HumaLOG) corrective regimen sliding scale   SubCutaneous three times a day before meals  NIFEdipine XL 90 milliGRAM(s) Oral daily  cloNIDine 0.1 milliGRAM(s) Oral every 8 hours  cycloSPORINE  (SandIMMUNE) 125 milliGRAM(s) Oral <User Schedule>  insulin lispro Injectable (HumaLOG) 4 Unit(s) SubCutaneous before lunch  insulin lispro Injectable (HumaLOG) 4 Unit(s) SubCutaneous before dinner  carvedilol 6.25 milliGRAM(s) Oral every 12 hours    PRN Inpatient Medications  dextrose Gel 1 Dose(s) Oral once PRN  glucagon  Injectable 1 milliGRAM(s) IntraMuscular once PRN      REVIEW OF SYSTEMS  --------------------------------------------------------------------------------  -no rashes  -no chest pain  -no SOB  +edema  -no abdo pain, no nausea/vomiting/diarrhea    All other systems were reviewed and are negative, except as noted.    VITALS/PHYSICAL EXAM  --------------------------------------------------------------------------------  T(C): 36.7 (08-15-17 @ 05:33), Max: 36.9 (08-14-17 @ 21:27)  HR: 67 (08-15-17 @ 12:26) (67 - 71)  BP: 139/71 (08-15-17 @ 12:26) (139/71 - 165/91)  RR: 18 (08-15-17 @ 05:33) (18 - 18)  SpO2: 99% (08-15-17 @ 05:33) (97% - 99%)  Wt(kg): --        Physical Exam:  	Gen: NAD, well-appearing  	Pulm: CTA B/L  	CV: RRR, S1S2; no rub  	Abd: +BS, soft, nontender/nondistended, RLQ graft, no pain on palpation                      LE: warm; + edema  	Skin: Warm, without rashes  	    LABS/STUDIES  --------------------------------------------------------------------------------              9.7    6.48  >-----------<  248      [08-15-17 @ 06:00]              29.4     135  |  102  |  72  ----------------------------<  309      [08-15-17 @ 06:00]  4.3   |  15  |  4.02        Ca     8.6     [08-15-17 @ 06:00]      Mg     2.0     [08-15-17 @ 06:00]      Phos  5.3     [08-15-17 @ 06:00]    TPro  5.6  /  Alb  3.1  /  TBili  0.2  /  DBili  x   /  AST  21  /  ALT  17  /  AlkPhos  72  [08-15-17 @ 06:00]          Creatinine Trend:  SCr 4.02 [08-15 @ 06:00]  SCr 4.01 [08-14 @ 05:20]  SCr 4.32 [08-13 @ 04:37]  SCr 3.88 [08-12 @ 05:25]  SCr 3.49 [08-11 @ 07:20]    Urinalysis - [08-10-17 @ 04:50]      Color PLYEL / Appearance CLEAR / SG 1.009 / pH 6.0      Gluc 50 / Ketone NEGATIVE  / Bili NEGATIVE / Urobili NORMAL       Blood TRACE / Protein 300 / Leuk Est NEGATIVE / Nitrite NEGATIVE      RBC 0-2 / WBC 0-2 / Hyaline  / Gran  / Sq Epi OCC / Non Sq Epi  / Bacteria FEW    Urine Creatinine 36.63      [08-10-17 @ 10:11]  Urine Protein 186.7      [08-10-17 @ 10:11]    Lipid: chol 137, TG 97, HDL 52, LDL 68      [08-11-17 @ 07:20]

## 2017-08-15 NOTE — DISCHARGE NOTE ADULT - MEDICATION SUMMARY - MEDICATIONS TO CHANGE
I will SWITCH the dose or number of times a day I take the medications listed below when I get home from the hospital:    Lantus  -- 29 unit(s) subcutaneous once a day    cycloSPORINE 100 mg oral capsule  -- 1 cap(s) by mouth every 12 hours    NIFEdipine  -- 30 milligram(s) by mouth once a day

## 2017-08-15 NOTE — PROGRESS NOTE ADULT - PROBLEM SELECTOR PLAN 1
BG running high yesterday and then hypoglycemia noted today prelunch.  Would move Lantus 22u daily to be given at 9AM daily starting tomorrow.  Patient feels the hypoglycemia is due to receiving Lantus earlier in the morning.  Would lower breakfast Humalog to 2 units and continue 4 units before lunch and dinner.  Encourage carb consistent diet, patient needs help with menu selection due to blindness.  On chronic prednisone 5mg.    If discharged patient can proceed with Lantus 22u q 9AM starting tomorrow and resume his prior Novolog home scale which is based on carb intake and correction for hypoglycemia. He plans to establish care with an outside endocrinologist.

## 2017-08-15 NOTE — PROGRESS NOTE ADULT - PROBLEM SELECTOR PLAN 2
Spoke with nephrology team Dr. Brooke Min with recommendations to dc atenolol to carvedilol.  Will start Carvedilol 6.25mg BID  c/w Nifedipine 90 mg po qdm  c/w Clonidine 0.1mg TID  monitor BP Lantus, Humalog premeals, ISS per endocrine recs  Per endo recs- 22u AM lantus at 9am. 2/4/4 humalog. ISS.

## 2017-08-15 NOTE — PROGRESS NOTE ADULT - ASSESSMENT
52-year-old male with history of DM1, HTN, kidney transplantation (LRRT in 1998 at [facility]), CKD, admitted with weakness and hypoglycemia. Pt. now with RODRIGO on CKD.

## 2017-08-15 NOTE — PROGRESS NOTE ADULT - PROBLEM SELECTOR PLAN 5
follow up renal, no obstruction on renal US Cr at baseline, continue cyclosporine, cellcept, prednisone

## 2017-08-15 NOTE — PROGRESS NOTE ADULT - PROBLEM SELECTOR PLAN 2
Pt. with kidney transplantation (LRRT in 1998).  CyA level is 87, slightly below target but would keep on same dose and monitor for now.   Continue with current immunosuppression regimen for kidney transplantation

## 2017-08-16 VITALS
RESPIRATION RATE: 18 BRPM | TEMPERATURE: 98 F | DIASTOLIC BLOOD PRESSURE: 69 MMHG | HEART RATE: 70 BPM | OXYGEN SATURATION: 99 % | SYSTOLIC BLOOD PRESSURE: 157 MMHG

## 2017-08-16 LAB
ALBUMIN SERPL ELPH-MCNC: 3.1 G/DL — LOW (ref 3.3–5)
ALP SERPL-CCNC: 72 U/L — SIGNIFICANT CHANGE UP (ref 40–120)
ALT FLD-CCNC: 29 U/L — SIGNIFICANT CHANGE UP (ref 4–41)
AST SERPL-CCNC: 34 U/L — SIGNIFICANT CHANGE UP (ref 4–40)
BILIRUB SERPL-MCNC: 0.2 MG/DL — SIGNIFICANT CHANGE UP (ref 0.2–1.2)
BUN SERPL-MCNC: 82 MG/DL — HIGH (ref 7–23)
CALCIUM SERPL-MCNC: 8.7 MG/DL — SIGNIFICANT CHANGE UP (ref 8.4–10.5)
CHLORIDE SERPL-SCNC: 104 MMOL/L — SIGNIFICANT CHANGE UP (ref 98–107)
CO2 SERPL-SCNC: 15 MMOL/L — LOW (ref 22–31)
CREAT SERPL-MCNC: 4.17 MG/DL — HIGH (ref 0.5–1.3)
GLUCOSE SERPL-MCNC: 127 MG/DL — HIGH (ref 70–99)
HBA1C BLD-MCNC: 8 % — HIGH (ref 4–5.6)
HCT VFR BLD CALC: 30.1 % — LOW (ref 39–50)
HGB BLD-MCNC: 9.9 G/DL — LOW (ref 13–17)
MAGNESIUM SERPL-MCNC: 2.2 MG/DL — SIGNIFICANT CHANGE UP (ref 1.6–2.6)
MCHC RBC-ENTMCNC: 28.8 PG — SIGNIFICANT CHANGE UP (ref 27–34)
MCHC RBC-ENTMCNC: 32.9 % — SIGNIFICANT CHANGE UP (ref 32–36)
MCV RBC AUTO: 87.5 FL — SIGNIFICANT CHANGE UP (ref 80–100)
NRBC # FLD: 0 — SIGNIFICANT CHANGE UP
PHOSPHATE SERPL-MCNC: 5.4 MG/DL — HIGH (ref 2.5–4.5)
PLATELET # BLD AUTO: 245 K/UL — SIGNIFICANT CHANGE UP (ref 150–400)
PMV BLD: 10.1 FL — SIGNIFICANT CHANGE UP (ref 7–13)
POTASSIUM SERPL-MCNC: 4.1 MMOL/L — SIGNIFICANT CHANGE UP (ref 3.5–5.3)
POTASSIUM SERPL-SCNC: 4.1 MMOL/L — SIGNIFICANT CHANGE UP (ref 3.5–5.3)
PROT SERPL-MCNC: 5.8 G/DL — LOW (ref 6–8.3)
RBC # BLD: 3.44 M/UL — LOW (ref 4.2–5.8)
RBC # FLD: 12.3 % — SIGNIFICANT CHANGE UP (ref 10.3–14.5)
SODIUM SERPL-SCNC: 136 MMOL/L — SIGNIFICANT CHANGE UP (ref 135–145)
WBC # BLD: 6.74 K/UL — SIGNIFICANT CHANGE UP (ref 3.8–10.5)
WBC # FLD AUTO: 6.74 K/UL — SIGNIFICANT CHANGE UP (ref 3.8–10.5)

## 2017-08-16 RX ORDER — NIFEDIPINE 30 MG
30 TABLET, EXTENDED RELEASE 24 HR ORAL
Qty: 0 | Refills: 0 | COMMUNITY

## 2017-08-16 RX ORDER — INSULIN LISPRO 100/ML
2 VIAL (ML) SUBCUTANEOUS
Qty: 0 | Refills: 0 | COMMUNITY
Start: 2017-08-16

## 2017-08-16 RX ORDER — INSULIN GLARGINE 100 [IU]/ML
29 INJECTION, SOLUTION SUBCUTANEOUS
Qty: 0 | Refills: 0 | COMMUNITY

## 2017-08-16 RX ORDER — TAMSULOSIN HYDROCHLORIDE 0.4 MG/1
1 CAPSULE ORAL
Qty: 30 | Refills: 0 | OUTPATIENT
Start: 2017-08-16 | End: 2017-09-15

## 2017-08-16 RX ORDER — INSULIN GLARGINE 100 [IU]/ML
22 INJECTION, SOLUTION SUBCUTANEOUS
Qty: 0 | Refills: 0 | COMMUNITY

## 2017-08-16 RX ORDER — CARVEDILOL PHOSPHATE 80 MG/1
1 CAPSULE, EXTENDED RELEASE ORAL
Qty: 60 | Refills: 0 | OUTPATIENT
Start: 2017-08-16 | End: 2017-09-15

## 2017-08-16 RX ORDER — CYCLOSPORINE 100 MG/1
2 CAPSULE ORAL
Qty: 120 | Refills: 0 | OUTPATIENT
Start: 2017-08-16 | End: 2017-09-15

## 2017-08-16 RX ORDER — NIFEDIPINE 30 MG
1 TABLET, EXTENDED RELEASE 24 HR ORAL
Qty: 30 | Refills: 0 | OUTPATIENT
Start: 2017-08-16 | End: 2017-09-15

## 2017-08-16 RX ORDER — CYCLOSPORINE 100 MG/1
1 CAPSULE ORAL
Qty: 0 | Refills: 0 | COMMUNITY

## 2017-08-16 RX ORDER — INSULIN LISPRO 100/ML
4 VIAL (ML) SUBCUTANEOUS
Qty: 0 | Refills: 0 | COMMUNITY
Start: 2017-08-16

## 2017-08-16 RX ORDER — ENOXAPARIN SODIUM 100 MG/ML
22 INJECTION SUBCUTANEOUS
Qty: 5 | Refills: 0 | OUTPATIENT
Start: 2017-08-16 | End: 2017-09-15

## 2017-08-16 RX ORDER — TAMSULOSIN HYDROCHLORIDE 0.4 MG/1
1 CAPSULE ORAL
Qty: 0 | Refills: 0 | COMMUNITY

## 2017-08-16 RX ORDER — CYCLOSPORINE 100 MG/1
1 CAPSULE ORAL
Qty: 60 | Refills: 0 | OUTPATIENT
Start: 2017-08-16 | End: 2017-09-15

## 2017-08-16 RX ADMIN — Medication 90 MILLIGRAM(S): at 06:19

## 2017-08-16 RX ADMIN — Medication 0.1 MILLIGRAM(S): at 06:19

## 2017-08-16 RX ADMIN — Medication 5 MILLIGRAM(S): at 06:19

## 2017-08-16 RX ADMIN — CARVEDILOL PHOSPHATE 6.25 MILLIGRAM(S): 80 CAPSULE, EXTENDED RELEASE ORAL at 06:22

## 2017-08-16 RX ADMIN — INSULIN GLARGINE 22 UNIT(S): 100 INJECTION, SOLUTION SUBCUTANEOUS at 09:51

## 2017-08-16 RX ADMIN — MYCOPHENOLATE MOFETIL 1250 MILLIGRAM(S): 250 CAPSULE ORAL at 06:19

## 2017-08-16 RX ADMIN — Medication 2 UNIT(S): at 09:51

## 2017-08-16 RX ADMIN — CYCLOSPORINE 125 MILLIGRAM(S): 100 CAPSULE ORAL at 09:53

## 2017-08-16 RX ADMIN — HEPARIN SODIUM 5000 UNIT(S): 5000 INJECTION INTRAVENOUS; SUBCUTANEOUS at 06:20

## 2017-08-21 PROBLEM — E10.9 TYPE 1 DIABETES MELLITUS WITHOUT COMPLICATIONS: Chronic | Status: ACTIVE | Noted: 2017-08-10

## 2017-08-21 PROBLEM — H54.8 LEGAL BLINDNESS, AS DEFINED IN USA: Chronic | Status: ACTIVE | Noted: 2017-08-10

## 2017-08-21 PROBLEM — Z94.0 KIDNEY TRANSPLANT STATUS: Chronic | Status: ACTIVE | Noted: 2017-08-10

## 2017-08-21 PROBLEM — Z00.00 ENCOUNTER FOR PREVENTIVE HEALTH EXAMINATION: Status: ACTIVE | Noted: 2017-08-21

## 2017-08-21 PROBLEM — I10 ESSENTIAL (PRIMARY) HYPERTENSION: Chronic | Status: ACTIVE | Noted: 2017-08-10

## 2017-08-21 PROBLEM — N18.9 CHRONIC KIDNEY DISEASE, UNSPECIFIED: Chronic | Status: ACTIVE | Noted: 2017-08-10

## 2017-08-23 RX ORDER — CYCLOSPORINE 100 MG/1
8 CAPSULE ORAL
Qty: 240 | Refills: 0 | OUTPATIENT
Start: 2017-08-23 | End: 2017-09-22

## 2017-08-31 ENCOUNTER — APPOINTMENT (OUTPATIENT)
Dept: NEPHROLOGY | Facility: CLINIC | Age: 52
End: 2017-08-31
Payer: MEDICARE

## 2017-08-31 VITALS
WEIGHT: 196.21 LBS | DIASTOLIC BLOOD PRESSURE: 73 MMHG | OXYGEN SATURATION: 92 % | HEIGHT: 72 IN | HEART RATE: 80 BPM | BODY MASS INDEX: 26.58 KG/M2 | SYSTOLIC BLOOD PRESSURE: 140 MMHG

## 2017-08-31 DIAGNOSIS — F17.210 NICOTINE DEPENDENCE, CIGARETTES, UNCOMPLICATED: ICD-10-CM

## 2017-08-31 DIAGNOSIS — Z83.3 FAMILY HISTORY OF DIABETES MELLITUS: ICD-10-CM

## 2017-08-31 DIAGNOSIS — Z86.39 PERSONAL HISTORY OF OTHER ENDOCRINE, NUTRITIONAL AND METABOLIC DISEASE: ICD-10-CM

## 2017-08-31 DIAGNOSIS — I10 ESSENTIAL (PRIMARY) HYPERTENSION: ICD-10-CM

## 2017-08-31 DIAGNOSIS — E11.9 TYPE 2 DIABETES MELLITUS W/OUT COMPLICATIONS: ICD-10-CM

## 2017-08-31 DIAGNOSIS — Z78.9 OTHER SPECIFIED HEALTH STATUS: ICD-10-CM

## 2017-08-31 DIAGNOSIS — T86.11 KIDNEY TRANSPLANT REJECTION: ICD-10-CM

## 2017-08-31 DIAGNOSIS — Z94.0 KIDNEY TRANSPLANT STATUS: ICD-10-CM

## 2017-08-31 DIAGNOSIS — E11.319 TYPE 2 DIABETES MELLITUS WITH UNSPECIFIED DIABETIC RETINOPATHY W/OUT MACULAR EDEMA: ICD-10-CM

## 2017-08-31 DIAGNOSIS — N18.4 CHRONIC KIDNEY DISEASE, STAGE 4 (SEVERE): ICD-10-CM

## 2017-08-31 DIAGNOSIS — R60.0 LOCALIZED EDEMA: ICD-10-CM

## 2017-08-31 PROCEDURE — 99214 OFFICE O/P EST MOD 30 MIN: CPT

## 2017-08-31 RX ORDER — INSULIN GLARGINE 100 [IU]/ML
100 INJECTION, SOLUTION SUBCUTANEOUS
Refills: 0 | Status: ACTIVE | COMMUNITY

## 2017-09-01 ENCOUNTER — LABORATORY RESULT (OUTPATIENT)
Age: 52
End: 2017-09-01

## 2017-09-01 ENCOUNTER — TRANSCRIPTION ENCOUNTER (OUTPATIENT)
Age: 52
End: 2017-09-01

## 2017-09-01 RX ORDER — CALCIUM ACETATE 667 MG/1
667 CAPSULE ORAL 3 TIMES DAILY
Qty: 180 | Refills: 5 | Status: ACTIVE | COMMUNITY
Start: 2017-09-01 | End: 1900-01-01

## 2017-09-03 ENCOUNTER — INPATIENT (INPATIENT)
Facility: HOSPITAL | Age: 52
LOS: 36 days | Discharge: HOME | End: 2017-10-10
Attending: HOSPITALIST | Admitting: HOSPITALIST
Payer: MEDICARE

## 2017-09-03 VITALS
OXYGEN SATURATION: 98 % | SYSTOLIC BLOOD PRESSURE: 128 MMHG | HEART RATE: 48 BPM | DIASTOLIC BLOOD PRESSURE: 74 MMHG | RESPIRATION RATE: 16 BRPM

## 2017-09-03 DIAGNOSIS — Z94.0 KIDNEY TRANSPLANT STATUS: Chronic | ICD-10-CM

## 2017-09-03 DIAGNOSIS — I10 ESSENTIAL (PRIMARY) HYPERTENSION: ICD-10-CM

## 2017-09-03 DIAGNOSIS — N18.6 END STAGE RENAL DISEASE: ICD-10-CM

## 2017-09-03 DIAGNOSIS — Z90.89 ACQUIRED ABSENCE OF OTHER ORGANS: Chronic | ICD-10-CM

## 2017-09-03 DIAGNOSIS — E83.39 OTHER DISORDERS OF PHOSPHORUS METABOLISM: ICD-10-CM

## 2017-09-03 DIAGNOSIS — N18.9 CHRONIC KIDNEY DISEASE, UNSPECIFIED: ICD-10-CM

## 2017-09-03 DIAGNOSIS — R41.82 ALTERED MENTAL STATUS, UNSPECIFIED: ICD-10-CM

## 2017-09-03 LAB
ALBUMIN SERPL ELPH-MCNC: 3 G/DL — LOW (ref 3.3–5)
ALP SERPL-CCNC: 86 U/L — SIGNIFICANT CHANGE UP (ref 40–120)
ALT FLD-CCNC: 16 U/L — SIGNIFICANT CHANGE UP (ref 4–41)
AMMONIA BLD-MCNC: 11 UMOL/L — SIGNIFICANT CHANGE UP (ref 11–55)
APAP SERPL-MCNC: < 15 UG/ML — LOW (ref 15–25)
AST SERPL-CCNC: 21 U/L — SIGNIFICANT CHANGE UP (ref 4–40)
BARBITURATES MEASUREMENT: NEGATIVE — SIGNIFICANT CHANGE UP
BASE EXCESS BLDV CALC-SCNC: -18.2 MMOL/L — SIGNIFICANT CHANGE UP
BASOPHILS # BLD AUTO: 0.01 K/UL — SIGNIFICANT CHANGE UP (ref 0–0.2)
BASOPHILS NFR BLD AUTO: 0.1 % — SIGNIFICANT CHANGE UP (ref 0–2)
BENZODIAZ SERPL-MCNC: NEGATIVE — SIGNIFICANT CHANGE UP
BILIRUB SERPL-MCNC: 0.8 MG/DL — SIGNIFICANT CHANGE UP (ref 0.2–1.2)
BLOOD GAS VENOUS - CREATININE: 12.1 MG/DL — HIGH (ref 0.5–1.3)
BUN SERPL-MCNC: 154 MG/DL — HIGH (ref 7–23)
CALCIUM SERPL-MCNC: 9.1 MG/DL — SIGNIFICANT CHANGE UP (ref 8.4–10.5)
CHLORIDE BLDV-SCNC: 99 MMOL/L — SIGNIFICANT CHANGE UP (ref 96–108)
CHLORIDE SERPL-SCNC: 88 MMOL/L — LOW (ref 98–107)
CO2 SERPL-SCNC: 7 MMOL/L — CRITICAL LOW (ref 22–31)
CORTIS SERPL-MCNC: 6.6 UG/DL — SIGNIFICANT CHANGE UP (ref 2.7–18.4)
CREAT SERPL-MCNC: 10.9 MG/DL — HIGH (ref 0.5–1.3)
EOSINOPHIL # BLD AUTO: 0 K/UL — SIGNIFICANT CHANGE UP (ref 0–0.5)
EOSINOPHIL NFR BLD AUTO: 0 % — SIGNIFICANT CHANGE UP (ref 0–6)
ETHANOL BLD-MCNC: < 10 MG/DL — SIGNIFICANT CHANGE UP
GAS PNL BLDV: 125 MMOL/L — LOW (ref 136–146)
GLUCOSE BLDV-MCNC: 173 — HIGH (ref 70–99)
GLUCOSE SERPL-MCNC: 159 MG/DL — HIGH (ref 70–99)
HBV CORE AB SER-ACNC: NONREACTIVE — SIGNIFICANT CHANGE UP
HBV SURFACE AB SER-ACNC: <3 MLU/ML — LOW
HBV SURFACE AG SER-ACNC: NEGATIVE — SIGNIFICANT CHANGE UP
HCO3 BLDV-SCNC: 10 MMOL/L — LOW (ref 20–27)
HCT VFR BLD CALC: 21.2 % — LOW (ref 39–50)
HCT VFR BLDV CALC: 22.5 % — LOW (ref 39–51)
HCV AB S/CO SERPL IA: 14.48 S/CO — SIGNIFICANT CHANGE UP
HCV AB SERPL-IMP: REACTIVE — SIGNIFICANT CHANGE UP
HGB BLD-MCNC: 7 G/DL — CRITICAL LOW (ref 13–17)
HGB BLDV-MCNC: 7.2 G/DL — LOW (ref 13–17)
IMM GRANULOCYTES # BLD AUTO: 0.07 # — SIGNIFICANT CHANGE UP
IMM GRANULOCYTES NFR BLD AUTO: 1 % — SIGNIFICANT CHANGE UP (ref 0–1.5)
LACTATE BLDV-MCNC: 0.7 MMOL/L — SIGNIFICANT CHANGE UP (ref 0.5–2)
LYMPHOCYTES # BLD AUTO: 0.21 K/UL — LOW (ref 1–3.3)
LYMPHOCYTES # BLD AUTO: 3.1 % — LOW (ref 13–44)
MAGNESIUM SERPL-MCNC: 2.9 MG/DL — HIGH (ref 1.6–2.6)
MCHC RBC-ENTMCNC: 28.2 PG — SIGNIFICANT CHANGE UP (ref 27–34)
MCHC RBC-ENTMCNC: 33 % — SIGNIFICANT CHANGE UP (ref 32–36)
MCV RBC AUTO: 85.5 FL — SIGNIFICANT CHANGE UP (ref 80–100)
MONOCYTES # BLD AUTO: 0.37 K/UL — SIGNIFICANT CHANGE UP (ref 0–0.9)
MONOCYTES NFR BLD AUTO: 5.5 % — SIGNIFICANT CHANGE UP (ref 2–14)
NEUTROPHILS # BLD AUTO: 6.08 K/UL — SIGNIFICANT CHANGE UP (ref 1.8–7.4)
NEUTROPHILS NFR BLD AUTO: 90.3 % — HIGH (ref 43–77)
NRBC # FLD: 0 — SIGNIFICANT CHANGE UP
PCO2 BLDV: 25 MMHG — LOW (ref 41–51)
PH BLDV: 7.17 PH — CRITICAL LOW (ref 7.32–7.43)
PHOSPHATE SERPL-MCNC: 11.2 MG/DL — HIGH (ref 2.5–4.5)
PLATELET # BLD AUTO: 196 K/UL — SIGNIFICANT CHANGE UP (ref 150–400)
PMV BLD: 10.6 FL — SIGNIFICANT CHANGE UP (ref 7–13)
PO2 BLDV: 61 MMHG — HIGH (ref 35–40)
POTASSIUM BLDV-SCNC: 5.6 MMOL/L — HIGH (ref 3.4–4.5)
POTASSIUM SERPL-MCNC: 6.1 MMOL/L — HIGH (ref 3.5–5.3)
POTASSIUM SERPL-SCNC: 6.1 MMOL/L — HIGH (ref 3.5–5.3)
PROT SERPL-MCNC: 6 G/DL — SIGNIFICANT CHANGE UP (ref 6–8.3)
RBC # BLD: 2.48 M/UL — LOW (ref 4.2–5.8)
RBC # FLD: 13.7 % — SIGNIFICANT CHANGE UP (ref 10.3–14.5)
SALICYLATES SERPL-MCNC: < 5 MG/DL — LOW (ref 15–30)
SAO2 % BLDV: 84.9 % — SIGNIFICANT CHANGE UP (ref 60–85)
SODIUM SERPL-SCNC: 130 MMOL/L — LOW (ref 135–145)
T3 SERPL-MCNC: 71.7 NG/DL — LOW (ref 80–200)
T4 FREE SERPL-MCNC: 0.7 NG/DL — LOW (ref 0.9–1.8)
TSH SERPL-MCNC: 2.88 UIU/ML — SIGNIFICANT CHANGE UP (ref 0.27–4.2)
WBC # BLD: 6.74 K/UL — SIGNIFICANT CHANGE UP (ref 3.8–10.5)
WBC # FLD AUTO: 6.74 K/UL — SIGNIFICANT CHANGE UP (ref 3.8–10.5)

## 2017-09-03 PROCEDURE — 36800 INSERTION OF CANNULA: CPT

## 2017-09-03 PROCEDURE — 71010: CPT | Mod: 26,77

## 2017-09-03 PROCEDURE — 99291 CRITICAL CARE FIRST HOUR: CPT | Mod: 25

## 2017-09-03 PROCEDURE — 71010: CPT | Mod: 26

## 2017-09-03 PROCEDURE — 99233 SBSQ HOSP IP/OBS HIGH 50: CPT | Mod: GC

## 2017-09-03 PROCEDURE — 31500 INSERT EMERGENCY AIRWAY: CPT

## 2017-09-03 RX ORDER — HEPARIN SODIUM 5000 [USP'U]/ML
5000 INJECTION INTRAVENOUS; SUBCUTANEOUS EVERY 8 HOURS
Qty: 0 | Refills: 0 | Status: DISCONTINUED | OUTPATIENT
Start: 2017-09-03 | End: 2017-09-07

## 2017-09-03 RX ORDER — DEXTROSE 50 % IN WATER 50 %
25 SYRINGE (ML) INTRAVENOUS ONCE
Qty: 0 | Refills: 0 | Status: DISCONTINUED | OUTPATIENT
Start: 2017-09-03 | End: 2017-09-04

## 2017-09-03 RX ORDER — ROCURONIUM BROMIDE 10 MG/ML
100 VIAL (ML) INTRAVENOUS ONCE
Qty: 0 | Refills: 0 | Status: COMPLETED | OUTPATIENT
Start: 2017-09-03 | End: 2017-09-03

## 2017-09-03 RX ORDER — INSULIN LISPRO 100/ML
VIAL (ML) SUBCUTANEOUS EVERY 6 HOURS
Qty: 0 | Refills: 0 | Status: DISCONTINUED | OUTPATIENT
Start: 2017-09-03 | End: 2017-09-04

## 2017-09-03 RX ORDER — PROPOFOL 10 MG/ML
50 INJECTION, EMULSION INTRAVENOUS
Qty: 1000 | Refills: 0 | Status: DISCONTINUED | OUTPATIENT
Start: 2017-09-03 | End: 2017-09-04

## 2017-09-03 RX ORDER — SODIUM BICARBONATE 1 MEQ/ML
50 SYRINGE (ML) INTRAVENOUS ONCE
Qty: 0 | Refills: 0 | Status: COMPLETED | OUTPATIENT
Start: 2017-09-03 | End: 2017-09-03

## 2017-09-03 RX ORDER — DEXTROSE 50 % IN WATER 50 %
1 SYRINGE (ML) INTRAVENOUS ONCE
Qty: 0 | Refills: 0 | Status: DISCONTINUED | OUTPATIENT
Start: 2017-09-03 | End: 2017-09-04

## 2017-09-03 RX ORDER — DEXTROSE 50 % IN WATER 50 %
12.5 SYRINGE (ML) INTRAVENOUS ONCE
Qty: 0 | Refills: 0 | Status: DISCONTINUED | OUTPATIENT
Start: 2017-09-03 | End: 2017-09-04

## 2017-09-03 RX ORDER — SODIUM BICARBONATE 1 MEQ/ML
0.38 SYRINGE (ML) INTRAVENOUS
Qty: 150 | Refills: 0 | Status: DISCONTINUED | OUTPATIENT
Start: 2017-09-03 | End: 2017-09-04

## 2017-09-03 RX ORDER — EPINEPHRINE 0.3 MG/.3ML
1 INJECTION INTRAMUSCULAR; SUBCUTANEOUS ONCE
Qty: 0 | Refills: 0 | Status: COMPLETED | OUTPATIENT
Start: 2017-09-03 | End: 2017-09-03

## 2017-09-03 RX ORDER — ALBUTEROL 90 UG/1
10 AEROSOL, METERED ORAL ONCE
Qty: 0 | Refills: 0 | Status: COMPLETED | OUTPATIENT
Start: 2017-09-03 | End: 2017-09-03

## 2017-09-03 RX ORDER — FENTANYL CITRATE 50 UG/ML
100 INJECTION INTRAVENOUS ONCE
Qty: 0 | Refills: 0 | Status: DISCONTINUED | OUTPATIENT
Start: 2017-09-03 | End: 2017-09-03

## 2017-09-03 RX ORDER — PIPERACILLIN AND TAZOBACTAM 4; .5 G/20ML; G/20ML
3.38 INJECTION, POWDER, LYOPHILIZED, FOR SOLUTION INTRAVENOUS EVERY 12 HOURS
Qty: 0 | Refills: 0 | Status: DISCONTINUED | OUTPATIENT
Start: 2017-09-04 | End: 2017-09-05

## 2017-09-03 RX ORDER — ETOMIDATE 2 MG/ML
30 INJECTION INTRAVENOUS ONCE
Qty: 0 | Refills: 0 | Status: COMPLETED | OUTPATIENT
Start: 2017-09-03 | End: 2017-09-03

## 2017-09-03 RX ORDER — INSULIN HUMAN 100 [IU]/ML
6 INJECTION, SOLUTION SUBCUTANEOUS ONCE
Qty: 0 | Refills: 0 | Status: COMPLETED | OUTPATIENT
Start: 2017-09-03 | End: 2017-09-03

## 2017-09-03 RX ORDER — NOREPINEPHRINE BITARTRATE/D5W 8 MG/250ML
0.01 PLASTIC BAG, INJECTION (ML) INTRAVENOUS
Qty: 8 | Refills: 0 | Status: DISCONTINUED | OUTPATIENT
Start: 2017-09-03 | End: 2017-09-04

## 2017-09-03 RX ORDER — CHLORHEXIDINE GLUCONATE 213 G/1000ML
1 SOLUTION TOPICAL DAILY
Qty: 0 | Refills: 0 | Status: DISCONTINUED | OUTPATIENT
Start: 2017-09-03 | End: 2017-09-05

## 2017-09-03 RX ORDER — GLUCAGON INJECTION, SOLUTION 0.5 MG/.1ML
1 INJECTION, SOLUTION SUBCUTANEOUS ONCE
Qty: 0 | Refills: 0 | Status: DISCONTINUED | OUTPATIENT
Start: 2017-09-03 | End: 2017-09-04

## 2017-09-03 RX ORDER — PIPERACILLIN AND TAZOBACTAM 4; .5 G/20ML; G/20ML
3.38 INJECTION, POWDER, LYOPHILIZED, FOR SOLUTION INTRAVENOUS ONCE
Qty: 0 | Refills: 0 | Status: COMPLETED | OUTPATIENT
Start: 2017-09-03 | End: 2017-09-03

## 2017-09-03 RX ORDER — FENTANYL CITRATE 50 UG/ML
1 INJECTION INTRAVENOUS
Qty: 2500 | Refills: 0 | Status: DISCONTINUED | OUTPATIENT
Start: 2017-09-03 | End: 2017-09-04

## 2017-09-03 RX ORDER — VANCOMYCIN HCL 1 G
1000 VIAL (EA) INTRAVENOUS ONCE
Qty: 0 | Refills: 0 | Status: COMPLETED | OUTPATIENT
Start: 2017-09-03 | End: 2017-09-03

## 2017-09-03 RX ORDER — NIFEDIPINE 30 MG
1 TABLET, EXTENDED RELEASE 24 HR ORAL
Qty: 0 | Refills: 0 | COMMUNITY

## 2017-09-03 RX ORDER — DEXTROSE 50 % IN WATER 50 %
50 SYRINGE (ML) INTRAVENOUS ONCE
Qty: 0 | Refills: 0 | Status: COMPLETED | OUTPATIENT
Start: 2017-09-03 | End: 2017-09-03

## 2017-09-03 RX ORDER — CALCIUM CHLORIDE
50 POWDER (GRAM) MISCELLANEOUS ONCE
Qty: 0 | Refills: 0 | Status: DISCONTINUED | OUTPATIENT
Start: 2017-09-03 | End: 2017-09-04

## 2017-09-03 RX ADMIN — Medication 250 MILLIGRAM(S): at 20:22

## 2017-09-03 RX ADMIN — FENTANYL CITRATE 8.8 MICROGRAM(S)/KG/HR: 50 INJECTION INTRAVENOUS at 20:22

## 2017-09-03 RX ADMIN — FENTANYL CITRATE 100 MICROGRAM(S): 50 INJECTION INTRAVENOUS at 20:22

## 2017-09-03 RX ADMIN — Medication 200 MEQ/KG/HR: at 16:40

## 2017-09-03 RX ADMIN — Medication 50 MILLILITER(S): at 15:05

## 2017-09-03 RX ADMIN — Medication 100 MILLIGRAM(S): at 16:00

## 2017-09-03 RX ADMIN — PIPERACILLIN AND TAZOBACTAM 25 GRAM(S): 4; .5 INJECTION, POWDER, LYOPHILIZED, FOR SOLUTION INTRAVENOUS at 23:09

## 2017-09-03 RX ADMIN — HEPARIN SODIUM 5000 UNIT(S): 5000 INJECTION INTRAVENOUS; SUBCUTANEOUS at 22:57

## 2017-09-03 RX ADMIN — PROPOFOL 26.4 MICROGRAM(S)/KG/MIN: 10 INJECTION, EMULSION INTRAVENOUS at 20:22

## 2017-09-03 RX ADMIN — Medication 50 MILLIEQUIVALENT(S): at 16:00

## 2017-09-03 RX ADMIN — PIPERACILLIN AND TAZOBACTAM 200 GRAM(S): 4; .5 INJECTION, POWDER, LYOPHILIZED, FOR SOLUTION INTRAVENOUS at 15:24

## 2017-09-03 RX ADMIN — Medication 200 MEQ/KG/HR: at 20:22

## 2017-09-03 RX ADMIN — ALBUTEROL 10 MILLIGRAM(S): 90 AEROSOL, METERED ORAL at 15:03

## 2017-09-03 RX ADMIN — ETOMIDATE 30 MILLIGRAM(S): 2 INJECTION INTRAVENOUS at 16:00

## 2017-09-03 RX ADMIN — INSULIN HUMAN 6 UNIT(S): 100 INJECTION, SOLUTION SUBCUTANEOUS at 15:05

## 2017-09-03 NOTE — ED PROVIDER NOTE - ATTENDING CONTRIBUTION TO CARE
Pertinent PMH/PSH/FHx/SHx and Review of Systems contained within:  53yo Male pmh inclusive of htn, IDDM, legally blind, recently diagnosed ESRD s/p renal transplant in 1998, scheduled for dialysis to begin next week, bibems for AMS. Pt's sister reports that pt has been intermittently confused X 1month. Unsure if former etoh/drug abuse but states she doesn't think he's used either after transplant in 1998. Pt has been living with his sister for past couple of months. Sister states she saw pt at home at approx 11am this morning before going to [employer] and then rec'd voicemail msgs from pt sounding confused and stating that he was outside trying to find his doctor. Sister had a friend check on pt and pt was found confused at home approx 2hrs after sister last saw pt at home. Unable to obtain reliable ROS given pt's AMS.  Gen: Alert, NAD  Head: NC, AT, PERRL, EOMI, normal lids/conjunctiva  ENT:  normal hearing, patent oropharynx without erythema/exudate, uvula midline  Neck: +supple, no tenderness/meningismus/JVD, +Trachea midline  Chest: no chest wall tenderness, equal chest rise  Pulm: Bilateral BS, normal resp effort, no wheeze/stridor/retractions  CV: RRR, no M/R/G, +dist pulses  Abd: soft, NT/ND, +BS, no hepatosplenomegaly  Rectal: deferred  Mskel: no edema/erythema/cyanosis  Skin: cold, dry. no rash  Neuro: AAOx person, place, and year but confused and occasionally mumbling, answers some questions appropriately, intermittently following commands but is somnolent. CN2-12 intact.  MDM:  53yo M pmh as above, bibems for AMS, found to be hypothermic here w/ temp of 91 degree F. Diff dx includes metabolic acidosis 2/2 to renal failure vs infectious etiology/sepsis vs hypothyroidism vs intoxication. Labs, tox, renal consult for consideration for emergent dialysis, admission.

## 2017-09-03 NOTE — PROCEDURE NOTE - NSTRACHPOSTINTU_RESP_A_CORE
Breath sounds bilateral/Chest excursion noted/Chest X-Ray/Breath sounds equal/Positive end tidal Co2 noted

## 2017-09-03 NOTE — CONSULT NOTE ADULT - ASSESSMENT
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

## 2017-09-03 NOTE — ED ADULT NURSE NOTE - CHIEF COMPLAINT QUOTE
Brought in by sister for AMS. Pt called sister staying that he was lost. However, he is at home. Sister reports multiple admission for AMS in this month. PMH ESRD. Plan is HD. Sister reports intermittent nose bleeding x 4 days. Pt is lethargic. EKG changes noted by EMS. Unable to get temp.

## 2017-09-03 NOTE — H&P ADULT - HISTORY OF PRESENT ILLNESS
51 y/o M with h/o CKD s/p renal transplant in 1998, HTN, DM, legally blind BIBEMS for AMS. According to patient's siter, patient called because did not know where he was and thought he was on the street when he was at home. Patient has been experiencing worsening kidney function for the last 1.5 years and that her confusion has been ongoing for the last few months. He moved to [facility] 1 month ago, and sister has noticed intermittent confusion. Confusion is worse today. Is followed by Dr. King (nephro) - on route to start dialysis soon. Sister denies any recent infection, cough, vomiting, diarrhea.    In the ED, patient became nba to 30s, arrested, coded. was given 1x epi and achieved ROSC. Creatinine was 10, K of 6.1 and was hypotehrmic inthe ER. Was started on broad spectrum abx - cultures are pending. held immunosuppressants, BP meds. 53 y/o M with h/o CKD s/p renal transplant in 1998, HTN, DM, legally blind BIBEMS for AMS. According to patient's sister, patient called because did not know where he was and thought he was on the street when he was at home. Patient has been experiencing worsening kidney function for the last 1.5 years and that her confusion has been ongoing for the last few months. He moved to [facility] 1 month ago, and sister has noticed intermittent confusion. Confusion is worse today. Is followed by Dr. King (nephro) - on route to start dialysis soon. Sister denies any recent infection, cough, vomiting, diarrhea.    In the ED, patient became nba to 30s, arrested, coded. was given 1x epi and achieved ROSC. intubated. Creatinine was 10, K of 6.1 and was hypotehrmic inthe ER. Was started on broad spectrum abx - cultures are pending. held immunosuppressants, BP meds.

## 2017-09-03 NOTE — ED ADULT NURSE NOTE - OBJECTIVE STATEMENT
Pt presents to main ED room 5 BIBA for AMS. Pt accompanied by sister Nhi who states patient called her cell phone and left her a message that he was lost confused. Per Nhi, pt recently admitted for AMS r/t hypoglycemia. . Pt received lethargic, arouseable to verbal stimuli, oriented to self, date of birth, location of hospital, month, year, and sister at bedside. Pt very por historian, unable to provide subjective information or events prior to arriving to hospital. Pt states 'I woke up and my blood sugar was low so I ate a brownie and drank some milk until I could wake up for lunch and eat something healthier'. Pt also states 'my consciousness is far from reality'. Pt falling asleep between conversations. Respirations appear intermittently labored, 2L oxygen via nasal cannula applied, end tidal CO2 applied with reading ranging around 15, MD Sanabria made aware. Abdomen distended. Pt presents to main ED room 5 BIBA for AMS. Pt accompanied by sister Nhi who states patient called her cell phone and left her a message that he was lost and confused. Per Nhi, pt recently admitted for AMS r/t hypoglycemia. . Pt received lethargic, arouseable to verbal stimuli, oriented to self, date of birth, location of hospital, month, year, and sister at bedside. Pt very poor historian, unable to provide subjective information or events prior to arriving to hospital. Pt states 'I woke up and my blood sugar was low so I ate a brownie and drank some milk until I could wake up for lunch and eat something healthier'. Pt also states 'my consciousness is far from reality'. Pt falling asleep mid conversation. Respirations appear intermittently labored, 2L oxygen via nasal cannula applied, end tidal CO2 applied with reading ranging around 15, MD Sanabria made aware. MD Sanabria made aware of critical value of pH 7.17 from VBG. Abdomen distended, pt denies abdominal pain. Skin cool, dry, pale for race. Multiple bruises found on back with healing abrasion observed on lower back. BLLE with +3 pitting edema observed. VS documented per flow, MD Sanabria made aware of documented VS and rectal temperature, warming blanket placed on patient per orders. Pt arrived with 18g PIV in place in L hand from EMS, additional 18g PIV placed in R AC, and also 20g PIV placed in R forearm, labs drawn and sent per orders. Sister Nhi remains at bedside, safety maintained, will continue to monitor.

## 2017-09-03 NOTE — ED PROVIDER NOTE - CRANIAL NERVE AND PUPILLARY EXAM
unable to assess CNs fully secondary to patient be legally blind and not following commands appropriately. Moving all extermities

## 2017-09-03 NOTE — PROCEDURE NOTE - NSPROCDETAILS_GEN_ALL_CORE
connected to ventilator/patient pre-oxygenated, tube inserted, placement confirmed
sterile technique, catheter placed/ultrasound guidance/guidewire recovered/dual lumens flushed with heparin/lumen(s) aspirated and flushed/sterile dressing applied

## 2017-09-03 NOTE — H&P ADULT - ASSESSMENT
72yo F with colon ca, bladder ca, ureteral stents, CKD presenting with RUE pain, weakness to the ED, lost consciousness in the ED with foaming in the mouth. now intubated,   #neuro: no hx of seizures, one episode of LOC with foaming in the mouth. now on propofol  #CV: levophed drip to cover for hypotension  #Resp: Now intubated. No SOB. will plan to wean off sedation and optimize her resp status off vent  #GI: NPO since recent intubation / sedation  #Endo: hx DM. will adjust accordingly after stabilization  #ID: hypotensive episode, LOC, hypothermia - covering with broad spectrum abx with meropenem, polymyxin B since hx of carbapenem resistant enterococci (CRE)  #DVTppx: will put on HSQ    Briseida Mustafa PGY1 53yo M with hx of CKD s/p renal transplant presented to the ER with confusion worsening, bradycardia in the ER to 30s, coded - now intubated - concern for septic shock, tx with abx.  #neuro: on propofol, sedation.  #CV: levophed drip to cover for hypotension  #Resp: Now intubated. No SOB. will plan to wean off sedation and optimize her resp status off vent  #GI: NPO since recent intubation / sedation  #Endo: hx DM. will adjust accordingly after stabilization  #Renal: Shiley placed - HD given high Cr, high K.  #ID: hypotensive episode, LOC, hypothermia - covering with broad spectrum abx with vanc and zosyn  #DVTppx: will put on HSQ    Briseida Mustafa PGY1 51yo M with hx of CKD s/p renal transplant presented to the ER with confusion worsening, bradycardia in the ER to 30s, coded - now intubated - concern for septic shock, tx with abx. HD needed given high Cr/K  #neuro: on propofol, sedation.  #CV: levophed drip to cover for hypotension  #Resp: Now intubated. No SOB. will plan to wean off sedation and optimize her resp status off vent  #GI: NPO since recent intubation / sedation  #Endo: hx DM. will adjust accordingly after stabilization  #Renal: Shiley placed - HD given high Cr, high K.  #ID: hypotensive episode, LOC, hypothermia - covering with broad spectrum abx with vanc and zosyn  #DVTppx: will put on HSQ    Briseida Mustafa PGY1

## 2017-09-03 NOTE — CONSULT NOTE ADULT - PROBLEM SELECTOR RECOMMENDATION 9
ESRD with h/o renal transplant: Latest Scr noted to be 10.90. Potassium noted to be 6.1 and ABG shows severe metabolic acidosis. In view of hyperkalemia and change in mental status, will do urgent HD today with no fluid removal and 2 K+ bath. Discussed with ER resident, will arrange for vascular access. Can discontinue tacrolimus and cyclosporine. Monitor renal function.

## 2017-09-03 NOTE — ED ADULT TRIAGE NOTE - CHIEF COMPLAINT QUOTE
Brought in by sister for AMS. Pt called sister staying that he was lost. However, he is at home. Sister reports multiple admission for AMS in this month. PMH ESRD. Plan is HD. Sister reports intermittent nose bleeding x 4 days. Pt is lethargic. EKG changes noted by EMS. Brought in by sister for AMS. Pt called sister staying that he was lost. However, he is at home. Sister reports multiple admission for AMS in this month. PMH ESRD. Plan is HD. Sister reports intermittent nose bleeding x 4 days. Pt is lethargic. EKG changes noted by EMS. Unable to get temp.

## 2017-09-03 NOTE — ED PROVIDER NOTE - PROGRESS NOTE DETAILS
called into room by nurse as pt's HR down to high 30s. Entered room w/ resident to reassess pt, HR in 30s and pt reporting dyspnea. Ordered atropine and as nurse went to draw same, pt became began having labored breathing and lost pulse immediately after. Compressions started, code called, placed on zole, supplemental O2 given via nasal cannula, ICU staff arrived at this time for consult called earlier and assisted with code, NRB placed, 1mg epi given and pt had ROSC from prior PEA, decision made for intubation and emergent dialysis. Resident attempted intubation (1 attempt at direct was unsuccessful, 2nd attempt with glidescope yielded good visualization of cords, but rigid stylette not available in glidscope basket as it should be therefore also unsuccessful attempt by resident). At this time, ICU doc (Rene) attempted and successfully intubated pt. I auscultated good b/l breath sounds and witness yellow change in CO2 detector. Pt immediately placed on vent, and transferred to MICU for emergent dialysis.

## 2017-09-03 NOTE — PATIENT PROFILE ADULT. - VISION (WITH CORRECTIVE LENSES IF THE PATIENT USUALLY WEARS THEM):
legally blind according to sister Severely impaired: cannot locate objects without hearing or touching them or patient nonresponsive./legally blind according to sister

## 2017-09-03 NOTE — H&P ADULT - NSHPPHYSICALEXAM_GEN_ALL_CORE
Gen: intubated, lying in bed with dialysis  Head: NCAT  HEENT: anicteric, central line place  Lung: CTAB, no adventitious sounds  CV: RRR, no murmurs, rubs or gallops  Abd: soft, NTND, no rebound or guarding, no CVAT  MSK: No edema, no visible deformities  Neuro: unable to assess due to mental status.

## 2017-09-03 NOTE — ED PROVIDER NOTE - MEDICAL DECISION MAKING DETAILS
53 y/o M with CKD s/p renal transplant, HTN, DM, legally blind BIBEMS for AMS. hypothermic and bradycardic. Concern for sepsis vs hypothyroid vs metabolic. Will initiate on IV antibiotics and start patient on randa hugger

## 2017-09-03 NOTE — ED PROVIDER NOTE - OBJECTIVE STATEMENT
[age] y/o M with h/o CKD s/p renal transplant in 1998, HTN, DM, legally blind BIBEMS for AMS. Patient's sister at baseline providing history. Reports that today patient called her because he did not know where he was and thought he was in the street. Patient was in his home. Sister reports that he moved to [facility] one month ago and has been having intermittent episodes of confusion since his arrival. AMS worse today. Was notified by Dr. King nephrologist that patient will need to be started on dialysis soon. Sister denies knowledge of any cough, vomiting, diarrhea.

## 2017-09-03 NOTE — H&P ADULT - NSHPLABSRESULTS_GEN_ALL_CORE
7.0    6.74  )-----------( 196      ( 03 Sep 2017 13:45 )             21.2     Hgb Trend: 7.0<--  09-03    130<L>  |  88<L>  |  154<H>  ----------------------------<  159<H>  6.1<H>   |  7<LL>  |  10.90<H>    Ca    9.1      03 Sep 2017 13:45  Phos  11.2     09-03  Mg     2.9     09-03    TPro  6.0  /  Alb  3.0<L>  /  TBili  0.8  /  DBili  x   /  AST  21  /  ALT  16  /  AlkPhos  86  09-03    Creatinine Trend: 10.90<--, 4.17<--, 4.02<--, 4.01<--, 4.32<--, 3.88<--        Venous Blood Gas:  09-03 @ 13:45  7.17/25/61/10/84.9  VBG Lactate: 0.7      MICROBIOLOGY: BCx pending    RADIOLOGY:  [ ] Reviewed and interpreted by me    EKG: 7.0    6.74  )-----------( 196      ( 03 Sep 2017 13:45 )             21.2     Hgb Trend: 7.0<--  09-03    130<L>  |  88<L>  |  154<H>  ----------------------------<  159<H>  6.1<H>   |  7<LL>  |  10.90<H>    Ca    9.1      03 Sep 2017 13:45  Phos  11.2     09-03  Mg     2.9     09-03    TPro  6.0  /  Alb  3.0<L>  /  TBili  0.8  /  DBili  x   /  AST  21  /  ALT  16  /  AlkPhos  86  09-03    Creatinine Trend: 10.90<--, 4.17<--, 4.02<--, 4.01<--, 4.32<--, 3.88<--        Venous Blood Gas:  09-03 @ 13:45  7.17/25/61/10/84.9  VBG Lactate: 0.7      MICROBIOLOGY: BCx pending    RADIOLOGY:  Small right pleural effusion obscuring right lung base, concomitant   underlying airspace consolidation including pneumonia in the proper   clinical context cannot be excluded. No left pleural effusion.  Prominent and indistinct remaining visualized lung markings and hazy   hilar shadows suggesting component of congestion with edema. No   pneumothorax.  Prominent appearing cardiac and mediastinal silhouettes however   inaccurately assessed on this projection.  Unremarkable osseous structures.

## 2017-09-03 NOTE — CONSULT NOTE ADULT - SUBJECTIVE AND OBJECTIVE BOX
[facility] Division of Kidney Diseases & Hypertension  INITIAL CONSULT NOTE  [phone]--------------------------------------------------------------------------------  HPI:  53 y/o M with h/o CKD s/p renal transplant in 1998, HTN, DM, legally blind came with altered mental status. History obtained mainly from patient's sister.Today patient called her because he did not know where he was and thought he was in the street; however patient was in his home. Sister reports that he moved to [facility] one month ago and has been having intermittent episodes of confusion since his arrival. He was first diagnosed with kidney disease in 1994 secondary to DM and was started on PD. He then had renal transplant from living related donor(brother ) at [facility] in 1998. He Has been following up in [place] by nephrologist Dr. Agarwal. Over last 2 years, Cr has been rising, and baseline in last year has been 2.5 to 3. He was recently admitted to [facility] on 8/2017 for hypoglycemia and creatinine on discharge was 4.17. Since discharge, he had episode of pharyngitis and was on antibiotics as well. His mental status continued to decline and seen in Dr. Baez's office on 8/31/17 and plan was to start him on dialysis within this week.      PAST HISTORY  --------------------------------------------------------------------------------  PAST MEDICAL & SURGICAL HISTORY:  Hypertension  Legally blind  Diabetes mellitus type I  Renal transplant, status post  CKD (chronic kidney disease)  Renal transplant, status post  History of tonsillectomy    FAMILY HISTORY:  No pertinent family history in first degree relatives    PAST SOCIAL HISTORY:    ALLERGIES & MEDICATIONS  --------------------------------------------------------------------------------  Allergies    No Known Allergies    Intolerances      Standing Inpatient Medications  vancomycin  IVPB 1000 milliGRAM(s) IV Intermittent once  piperacillin/tazobactam IVPB. 3.375 Gram(s) IV Intermittent once    PRN Inpatient Medications      REVIEW OF SYSTEMS  --------------------------------------------------------------------------------  Unable to assess due to patient's condition     All other systems were reviewed and are negative, except as noted.    VITALS/PHYSICAL EXAM  --------------------------------------------------------------------------------  T(C): 32.9 (09-03-17 @ 13:15), Max: 32.9 (09-03-17 @ 13:15)  HR: 55 (09-03-17 @ 13:15) (48 - 55)  BP: 118/58 (09-03-17 @ 13:15) (118/58 - 128/74)  RR: 24 (09-03-17 @ 13:15) (16 - 24)  SpO2: 96% (09-03-17 @ 13:15) (96% - 98%)  Wt(kg): --        Physical Exam:  	Gen: NAD  	HEENT: , supple neck  	Pulm: CTA B/L  	CV:  S1S2  	Abd: +BS, soft   	Ext: B/L Lower ext edema present; asterixis present.   	Skin: Warm and dry        LABS/STUDIES  --------------------------------------------------------------------------------              7.0    6.74  >-----------<  196      [09-03-17 @ 13:45]              21.2     130  |  88  |  154  ----------------------------<  159      [09-03-17 @ 13:45]  6.1   |  x   |  10.90        Ca     9.1     [09-03-17 @ 13:45]      Mg     2.9     [09-03-17 @ 13:45]      Phos  11.2     [09-03-17 @ 13:45]    TPro  6.0  /  Alb  3.0  /  TBili  0.8  /  DBili  x   /  AST  21  /  ALT  16  /  AlkPhos  86  [09-03-17 @ 13:45]          Creatinine Trend:  SCr 10.90 [09-03 @ 13:45]  SCr 4.17 [08-16 @ 06:30]  SCr 4.02 [08-15 @ 06:00]  SCr 4.01 [08-14 @ 05:20]  SCr 4.32 [08-13 @ 04:37]        TSH 2.88      [09-03-17 @ 13:45]

## 2017-09-03 NOTE — ED PROVIDER NOTE - CARE PLAN
Principal Discharge DX:	Altered mental status  Secondary Diagnosis:	Hypothermia  Secondary Diagnosis:	Sepsis

## 2017-09-03 NOTE — CONSULT NOTE ADULT - PROBLEM SELECTOR RECOMMENDATION 4
Latest serum phosphorus noted to be 11. Will start on renvala 1600 mg TID with meals. Check PTH level. Monitor serum calcium and phosphorus levels.

## 2017-09-03 NOTE — CONSULT NOTE ADULT - PROBLEM SELECTOR RECOMMENDATION 3
Latest Hb noted to be 7.0. Monitor Hb; may need to start LINDSAY therapy. Check ferritin and Tsat levels.

## 2017-09-04 LAB
ALBUMIN SERPL ELPH-MCNC: 2.4 G/DL — LOW (ref 3.3–5)
ALP SERPL-CCNC: 67 U/L — SIGNIFICANT CHANGE UP (ref 40–120)
ALT FLD-CCNC: 13 U/L — SIGNIFICANT CHANGE UP (ref 4–41)
AST SERPL-CCNC: 15 U/L — SIGNIFICANT CHANGE UP (ref 4–40)
BASE EXCESS BLDA CALC-SCNC: -7.9 MMOL/L — SIGNIFICANT CHANGE UP
BILIRUB SERPL-MCNC: 0.5 MG/DL — SIGNIFICANT CHANGE UP (ref 0.2–1.2)
BLD GP AB SCN SERPL QL: NEGATIVE — SIGNIFICANT CHANGE UP
BUN SERPL-MCNC: 105 MG/DL — HIGH (ref 7–23)
BUN SERPL-MCNC: 109 MG/DL — HIGH (ref 7–23)
CALCIUM SERPL-MCNC: 8.2 MG/DL — LOW (ref 8.4–10.5)
CALCIUM SERPL-MCNC: 8.4 MG/DL — SIGNIFICANT CHANGE UP (ref 8.4–10.5)
CHLORIDE SERPL-SCNC: 86 MMOL/L — LOW (ref 98–107)
CHLORIDE SERPL-SCNC: 87 MMOL/L — LOW (ref 98–107)
CO2 SERPL-SCNC: 14 MMOL/L — LOW (ref 22–31)
CO2 SERPL-SCNC: 19 MMOL/L — LOW (ref 22–31)
CREAT SERPL-MCNC: 8.03 MG/DL — HIGH (ref 0.5–1.3)
CREAT SERPL-MCNC: 8.26 MG/DL — HIGH (ref 0.5–1.3)
GLUCOSE BLDA-MCNC: 299 MG/DL — HIGH (ref 70–99)
GLUCOSE SERPL-MCNC: 299 MG/DL — HIGH (ref 70–99)
GLUCOSE SERPL-MCNC: 303 MG/DL — HIGH (ref 70–99)
HCO3 BLDA-SCNC: 18 MMOL/L — LOW (ref 22–26)
HCT VFR BLD CALC: 15.7 % — CRITICAL LOW (ref 39–50)
HCT VFR BLD CALC: 16.3 % — CRITICAL LOW (ref 39–50)
HCT VFR BLD CALC: 20.4 % — CRITICAL LOW (ref 39–50)
HCT VFR BLDA CALC: 18.4 % — CRITICAL LOW (ref 39–51)
HGB BLD-MCNC: 5.5 G/DL — CRITICAL LOW (ref 13–17)
HGB BLD-MCNC: 5.7 G/DL — CRITICAL LOW (ref 13–17)
HGB BLD-MCNC: 7.2 G/DL — LOW (ref 13–17)
HGB BLDA-MCNC: 5.8 G/DL — CRITICAL LOW (ref 13–17)
MAGNESIUM SERPL-MCNC: 2.3 MG/DL — SIGNIFICANT CHANGE UP (ref 1.6–2.6)
MAGNESIUM SERPL-MCNC: 3.2 MG/DL — HIGH (ref 1.6–2.6)
MCHC RBC-ENTMCNC: 28.6 PG — SIGNIFICANT CHANGE UP (ref 27–34)
MCHC RBC-ENTMCNC: 29.1 PG — SIGNIFICANT CHANGE UP (ref 27–34)
MCHC RBC-ENTMCNC: 29.1 PG — SIGNIFICANT CHANGE UP (ref 27–34)
MCHC RBC-ENTMCNC: 35 % — SIGNIFICANT CHANGE UP (ref 32–36)
MCHC RBC-ENTMCNC: 35 % — SIGNIFICANT CHANGE UP (ref 32–36)
MCHC RBC-ENTMCNC: 35.3 % — SIGNIFICANT CHANGE UP (ref 32–36)
MCV RBC AUTO: 81.8 FL — SIGNIFICANT CHANGE UP (ref 80–100)
MCV RBC AUTO: 82.6 FL — SIGNIFICANT CHANGE UP (ref 80–100)
MCV RBC AUTO: 83.2 FL — SIGNIFICANT CHANGE UP (ref 80–100)
NRBC # FLD: 0.02 — SIGNIFICANT CHANGE UP
PCO2 BLDA: 27 MMHG — LOW (ref 35–48)
PH BLDA: 7.39 PH — SIGNIFICANT CHANGE UP (ref 7.35–7.45)
PHOSPHATE SERPL-MCNC: 7.6 MG/DL — HIGH (ref 2.5–4.5)
PHOSPHATE SERPL-MCNC: 8.2 MG/DL — HIGH (ref 2.5–4.5)
PLATELET # BLD AUTO: 161 K/UL — SIGNIFICANT CHANGE UP (ref 150–400)
PLATELET # BLD AUTO: 163 K/UL — SIGNIFICANT CHANGE UP (ref 150–400)
PLATELET # BLD AUTO: 183 K/UL — SIGNIFICANT CHANGE UP (ref 150–400)
PMV BLD: 10.3 FL — SIGNIFICANT CHANGE UP (ref 7–13)
PMV BLD: 10.3 FL — SIGNIFICANT CHANGE UP (ref 7–13)
PMV BLD: 9.9 FL — SIGNIFICANT CHANGE UP (ref 7–13)
PO2 BLDA: 189 MMHG — HIGH (ref 83–108)
POTASSIUM BLDA-SCNC: 3.9 MMOL/L — SIGNIFICANT CHANGE UP (ref 3.4–4.5)
POTASSIUM SERPL-MCNC: 4 MMOL/L — SIGNIFICANT CHANGE UP (ref 3.5–5.3)
POTASSIUM SERPL-MCNC: 4.1 MMOL/L — SIGNIFICANT CHANGE UP (ref 3.5–5.3)
POTASSIUM SERPL-SCNC: 4 MMOL/L — SIGNIFICANT CHANGE UP (ref 3.5–5.3)
POTASSIUM SERPL-SCNC: 4.1 MMOL/L — SIGNIFICANT CHANGE UP (ref 3.5–5.3)
PREALB SERPL-MCNC: 12 MG/DL — LOW (ref 20–40)
PROT SERPL-MCNC: 4.7 G/DL — LOW (ref 6–8.3)
RBC # BLD: 1.92 M/UL — LOW (ref 4.2–5.8)
RBC # BLD: 1.96 M/UL — LOW (ref 4.2–5.8)
RBC # BLD: 2.47 M/UL — LOW (ref 4.2–5.8)
RBC # FLD: 13.1 % — SIGNIFICANT CHANGE UP (ref 10.3–14.5)
RBC # FLD: 13.2 % — SIGNIFICANT CHANGE UP (ref 10.3–14.5)
RBC # FLD: 13.3 % — SIGNIFICANT CHANGE UP (ref 10.3–14.5)
RETICS #: 0 10X6/UL — LOW (ref 0.02–0.07)
RETICS/RBC NFR: 2.5 % — SIGNIFICANT CHANGE UP (ref 0.5–2.5)
RH IG SCN BLD-IMP: POSITIVE — SIGNIFICANT CHANGE UP
RH IG SCN BLD-IMP: POSITIVE — SIGNIFICANT CHANGE UP
SAO2 % BLDA: 99.6 % — HIGH (ref 95–99)
SODIUM BLDA-SCNC: 127 MMOL/L — LOW (ref 136–146)
SODIUM SERPL-SCNC: 130 MMOL/L — LOW (ref 135–145)
SODIUM SERPL-SCNC: 132 MMOL/L — LOW (ref 135–145)
SPECIMEN SOURCE: SIGNIFICANT CHANGE UP
SPECIMEN SOURCE: SIGNIFICANT CHANGE UP
VANCOMYCIN FLD-MCNC: 16.1 UG/ML — SIGNIFICANT CHANGE UP
WBC # BLD: 4.29 K/UL — SIGNIFICANT CHANGE UP (ref 3.8–10.5)
WBC # BLD: 4.5 K/UL — SIGNIFICANT CHANGE UP (ref 3.8–10.5)
WBC # BLD: 6.18 K/UL — SIGNIFICANT CHANGE UP (ref 3.8–10.5)
WBC # FLD AUTO: 4.29 K/UL — SIGNIFICANT CHANGE UP (ref 3.8–10.5)
WBC # FLD AUTO: 4.5 K/UL — SIGNIFICANT CHANGE UP (ref 3.8–10.5)
WBC # FLD AUTO: 6.18 K/UL — SIGNIFICANT CHANGE UP (ref 3.8–10.5)

## 2017-09-04 PROCEDURE — 99232 SBSQ HOSP IP/OBS MODERATE 35: CPT | Mod: GC

## 2017-09-04 PROCEDURE — 76604 US EXAM CHEST: CPT | Mod: 26

## 2017-09-04 PROCEDURE — 99291 CRITICAL CARE FIRST HOUR: CPT | Mod: 25

## 2017-09-04 PROCEDURE — 93308 TTE F-UP OR LMTD: CPT | Mod: 26

## 2017-09-04 RX ORDER — ERYTHROPOIETIN 10000 [IU]/ML
4000 INJECTION, SOLUTION INTRAVENOUS; SUBCUTANEOUS
Qty: 0 | Refills: 0 | Status: DISCONTINUED | OUTPATIENT
Start: 2017-09-04 | End: 2017-09-08

## 2017-09-04 RX ORDER — ERYTHROPOIETIN 10000 [IU]/ML
4000 INJECTION, SOLUTION INTRAVENOUS; SUBCUTANEOUS
Qty: 0 | Refills: 0 | Status: DISCONTINUED | OUTPATIENT
Start: 2017-09-04 | End: 2017-09-04

## 2017-09-04 RX ORDER — INSULIN LISPRO 100/ML
VIAL (ML) SUBCUTANEOUS AT BEDTIME
Qty: 0 | Refills: 0 | Status: DISCONTINUED | OUTPATIENT
Start: 2017-09-04 | End: 2017-09-08

## 2017-09-04 RX ORDER — INSULIN LISPRO 100/ML
VIAL (ML) SUBCUTANEOUS
Qty: 0 | Refills: 0 | Status: DISCONTINUED | OUTPATIENT
Start: 2017-09-04 | End: 2017-09-08

## 2017-09-04 RX ORDER — INSULIN GLARGINE 100 [IU]/ML
10 INJECTION, SOLUTION SUBCUTANEOUS AT BEDTIME
Qty: 0 | Refills: 0 | Status: DISCONTINUED | OUTPATIENT
Start: 2017-09-04 | End: 2017-09-05

## 2017-09-04 RX ORDER — CALCIUM ACETATE 667 MG
667 TABLET ORAL
Qty: 0 | Refills: 0 | Status: DISCONTINUED | OUTPATIENT
Start: 2017-09-04 | End: 2017-09-24

## 2017-09-04 RX ORDER — VANCOMYCIN HCL 1 G
500 VIAL (EA) INTRAVENOUS ONCE
Qty: 0 | Refills: 0 | Status: COMPLETED | OUTPATIENT
Start: 2017-09-04 | End: 2017-09-04

## 2017-09-04 RX ORDER — INSULIN GLARGINE 100 [IU]/ML
22 INJECTION, SOLUTION SUBCUTANEOUS AT BEDTIME
Qty: 0 | Refills: 0 | Status: DISCONTINUED | OUTPATIENT
Start: 2017-09-04 | End: 2017-09-04

## 2017-09-04 RX ADMIN — Medication 1 APPLICATION(S): at 05:32

## 2017-09-04 RX ADMIN — Medication 100 MILLIGRAM(S): at 07:45

## 2017-09-04 RX ADMIN — PIPERACILLIN AND TAZOBACTAM 25 GRAM(S): 4; .5 INJECTION, POWDER, LYOPHILIZED, FOR SOLUTION INTRAVENOUS at 12:25

## 2017-09-04 RX ADMIN — Medication 667 MILLIGRAM(S): at 19:02

## 2017-09-04 RX ADMIN — HEPARIN SODIUM 5000 UNIT(S): 5000 INJECTION INTRAVENOUS; SUBCUTANEOUS at 15:11

## 2017-09-04 RX ADMIN — HEPARIN SODIUM 5000 UNIT(S): 5000 INJECTION INTRAVENOUS; SUBCUTANEOUS at 05:31

## 2017-09-04 RX ADMIN — Medication 3: at 05:31

## 2017-09-04 RX ADMIN — CHLORHEXIDINE GLUCONATE 1 APPLICATION(S): 213 SOLUTION TOPICAL at 12:25

## 2017-09-04 RX ADMIN — Medication 667 MILLIGRAM(S): at 12:25

## 2017-09-04 RX ADMIN — HEPARIN SODIUM 5000 UNIT(S): 5000 INJECTION INTRAVENOUS; SUBCUTANEOUS at 22:01

## 2017-09-04 RX ADMIN — PROPOFOL 26.4 MICROGRAM(S)/KG/MIN: 10 INJECTION, EMULSION INTRAVENOUS at 07:15

## 2017-09-04 RX ADMIN — Medication: at 00:47

## 2017-09-04 NOTE — PROGRESS NOTE ADULT - SUBJECTIVE AND OBJECTIVE BOX
[facility] Division of Kidney Diseases & Hypertension  FOLLOW UP NOTE  [phone]--------------------------------------------------------------------------------  Chief Complaint:Altered mental status      24 hour events/subjective:    51 y/o M with h/o CKD s/p renal transplant in 1998, HTN, DM, legally blind was seen today for follow up of ESRD and hyperkalemia. Had first session of HD yesterday with no complications. Currently has no complaints. Mental status appears to be improving.      PAST HISTORY  --------------------------------------------------------------------------------  No significant changes to PMH, PSH, FHx, SHx, unless otherwise noted    ALLERGIES & MEDICATIONS  --------------------------------------------------------------------------------  Allergies    No Known Allergies    Intolerances      Standing Inpatient Medications  heparin  Injectable 5000 Unit(s) SubCutaneous every 8 hours  piperacillin/tazobactam IVPB. 3.375 Gram(s) IV Intermittent every 12 hours  propofol Infusion 50 MICROgram(s)/kG/Min IV Continuous <Continuous>  fentaNYL   Infusion 1 MICROgram(s)/kG/Hr IV Continuous <Continuous>  norepinephrine Infusion 0.01 MICROgram(s)/kG/Min IV Continuous <Continuous>  chlorhexidine 4% Liquid 1 Application(s) Topical daily  petrolatum Ophthalmic Ointment 1 Application(s) Both EYES two times a day  calcium acetate 667 milliGRAM(s) Oral three times a day with meals  insulin glargine Injectable (LANTUS) 22 Unit(s) SubCutaneous at bedtime  insulin lispro (HumaLOG) corrective regimen sliding scale   SubCutaneous three times a day before meals  insulin lispro (HumaLOG) corrective regimen sliding scale   SubCutaneous at bedtime    PRN Inpatient Medications      REVIEW OF SYSTEMS  --------------------------------------------------------------------------------    All other systems were reviewed and are negative, except as noted.    VITALS/PHYSICAL EXAM  --------------------------------------------------------------------------------  T(C): 36.2 (09-04-17 @ 15:40), Max: 37.3 (09-04-17 @ 04:00)  HR: 68 (09-04-17 @ 15:40) (50 - 94)  BP: 143/70 (09-04-17 @ 15:40) (100/62 - 143/70)  RR: 15 (09-04-17 @ 15:40) (15 - 30)  SpO2: 93% (09-04-17 @ 13:00) (91% - 100%)  Wt(kg): --  Height (cm): 182.88 (09-03-17 @ 16:30)  Weight (kg): 88 (09-03-17 @ 16:30)  BMI (kg/m2): 26.3 (09-03-17 @ 16:30)  BSA (m2): 2.1 (09-03-17 @ 16:30)      09-03-17 @ 07:01  -  09-04-17 @ 07:00  --------------------------------------------------------  IN: 400 mL / OUT: 415 mL / NET: -15 mL    09-04-17 @ 07:01  -  09-04-17 @ 16:17  --------------------------------------------------------  IN: 521.2 mL / OUT: 945 mL / NET: -423.8 mL      Physical Exam:  	Gen: NAD  	HEENT: , supple neck  	Pulm: CTA B/L  	CV:  S1S2  	Abd: +BS, soft   	Ext: B/L Lower ext edema present  	Skin: Warm and dry               Vascular access:  IJ HD catheter present.        LABS/STUDIES  --------------------------------------------------------------------------------              7.2    6.18  >-----------<  183      [09-04-17 @ 13:00]              20.4     132  |  87  |  109  ----------------------------<  303      [09-04-17 @ 04:22]  4.0   |  19  |  8.26        Ca     8.2     [09-04-17 @ 04:22]      Mg     3.2     [09-04-17 @ 04:22]      Phos  7.6     [09-04-17 @ 04:22]    TPro  4.7  /  Alb  2.4  /  TBili  0.5  /  DBili  x   /  AST  15  /  ALT  13  /  AlkPhos  67  [09-04-17 @ 04:22]          Creatinine Trend:  SCr 8.26 [09-04 @ 04:22]  SCr 8.03 [09-04 @ 00:51]  SCr 10.90 [09-03 @ 13:45]  SCr 4.17 [08-16 @ 06:30]  SCr 4.02 [08-15 @ 06:00]        TSH 2.88      [09-03-17 @ 13:45]    HBsAb <3.0      [09-03-17 @ 18:00]  HBsAg NEGATIVE      [09-03-17 @ 18:00]  HCV 14.48, Reactive      [09-03-17 @ 18:00]

## 2017-09-04 NOTE — PROGRESS NOTE ADULT - PROBLEM SELECTOR PLAN 1
Latest BUN and Scr improved compared to previous labs. Mental status has improved as well. Will do another session of HD today with goal of 1 kg fluid removal. Monitor renal function.

## 2017-09-04 NOTE — PROGRESS NOTE ADULT - ATTENDING COMMENTS
A case of advanced CKD with hyperkalemia. Patient was given short dialysis yesterday. Patient has transient cardiac arrest yesterday and resuscitated. Advised HD today.

## 2017-09-04 NOTE — PROGRESS NOTE ADULT - ASSESSMENT
53 y/o M with h/o CKD s/p renal transplant in 1998, HTN, DM, legally blind admitted for ESRD and altered mental status secondary to uremic encephalopathy

## 2017-09-04 NOTE — PROGRESS NOTE ADULT - SUBJECTIVE AND OBJECTIVE BOX
CHIEF COMPLAINT:    Interval Events:    REVIEW OF SYSTEMS:  Constitutional: [ ] negative [ ] fevers [ ] chills [ ] weight loss [ ] weight gain  HEENT: [ ] negative [ ] dry eyes [ ] eye irritation [ ] postnasal drip [ ] nasal congestion  CV: [ ] negative  [ ] chest pain [ ] orthopnea [ ] palpitations [ ] murmur  Resp: [ ] negative [ ] cough [ ] shortness of breath [ ] dyspnea [ ] wheezing [ ] sputum [ ] hemoptysis  GI: [ ] negative [ ] nausea [ ] vomiting [ ] diarrhea [ ] constipation [ ] abd pain [ ] dysphagia   : [ ] negative [ ] dysuria [ ] nocturia [ ] hematuria [ ] increased urinary frequency  Musculoskeletal: [ ] negative [ ] back pain [ ] myalgias [ ] arthralgias [ ] fracture  Skin: [ ] negative [ ] rash [ ] itch  Neurological: [ ] negative [ ] headache [ ] dizziness [ ] syncope [ ] weakness [ ] numbness  Psychiatric: [ ] negative [ ] anxiety [ ] depression  Endocrine: [ ] negative [ ] diabetes [ ] thyroid problem  Hematologic/Lymphatic: [ ] negative [ ] anemia [ ] bleeding problem  Allergic/Immunologic: [ ] negative [ ] itchy eyes [ ] nasal discharge [ ] hives [ ] angioedema  [ ] All other systems negative  [ ] Unable to assess ROS because ________    OBJECTIVE:  ICU Vital Signs Last 24 Hrs  T(C): 37.3 (04 Sep 2017 08:00), Max: 37.3 (04 Sep 2017 04:00)  T(F): 99.1 (04 Sep 2017 08:00), Max: 99.1 (04 Sep 2017 04:00)  HR: 71 (04 Sep 2017 09:11) (42 - 94)  BP: 116/57 (04 Sep 2017 09:00) (100/62 - 132/56)  BP(mean): 68 (04 Sep 2017 09:00) (62 - 74)  ABP: --  ABP(mean): --  RR: 21 (04 Sep 2017 09:00) (16 - 26)  SpO2: 93% (04 Sep 2017 09:11) (92% - 100%)    Mode: CPAP with PS, FiO2: 50, PEEP: 5, PS: 5, MAP: 7    09-03 @ 07:01  -  09-04 @ 07:00  --------------------------------------------------------  IN: 400 mL / OUT: 415 mL / NET: -15 mL    09-04 @ 07:01  - 09-04 @ 09:27  --------------------------------------------------------  IN: 21.2 mL / OUT: 15 mL / NET: 6.2 mL      CAPILLARY BLOOD GLUCOSE  281 (04 Sep 2017 06:00)          PHYSICAL EXAM:  General:   HEENT:   Lymph Nodes:  Neck:   Respiratory:   Cardiovascular:   Abdomen:   Extremities:   Skin:   Neurological:  Psychiatry:    LINES:    HOSPITAL MEDICATIONS:  Standing Meds:  heparin  Injectable 5000 Unit(s) SubCutaneous every 8 hours  piperacillin/tazobactam IVPB. 3.375 Gram(s) IV Intermittent every 12 hours  propofol Infusion 50 MICROgram(s)/kG/Min IV Continuous <Continuous>  fentaNYL   Infusion 1 MICROgram(s)/kG/Hr IV Continuous <Continuous>  norepinephrine Infusion 0.01 MICROgram(s)/kG/Min IV Continuous <Continuous>  chlorhexidine 4% Liquid 1 Application(s) Topical daily  petrolatum Ophthalmic Ointment 1 Application(s) Both EYES two times a day  insulin lispro (HumaLOG) corrective regimen sliding scale   SubCutaneous every 6 hours  dextrose 50% Injectable 12.5 Gram(s) IV Push once  dextrose 50% Injectable 25 Gram(s) IV Push once  dextrose 50% Injectable 25 Gram(s) IV Push once      PRN Meds:  dextrose Gel 1 Dose(s) Oral once PRN  glucagon  Injectable 1 milliGRAM(s) IntraMuscular once PRN      LABS:                        5.5    4.29  )-----------( 163      ( 04 Sep 2017 03:00 )             15.7     Hgb Trend: 5.5<--, 5.7<--, 7.0<--  09-04    132<L>  |  87<L>  |  109<H>  ----------------------------<  303<H>  4.0   |  19<L>  |  8.26<H>    Ca    8.2<L>      04 Sep 2017 04:22  Phos  7.6     09-04  Mg     3.2     09-04    TPro  4.7<L>  /  Alb  2.4<L>  /  TBili  0.5  /  DBili  x   /  AST  15  /  ALT  13  /  AlkPhos  67  09-04    Creatinine Trend: 8.26<--, 8.03<--, 10.90<--, 4.17<--, 4.02<--, 4.01<--      Arterial Blood Gas:  09-04 @ 00:51  7.39/27/189/18/99.6/-7.9  ABG lactate: --    Venous Blood Gas:  09-03 @ 13:45  7.17/25/61/10/84.9  VBG Lactate: 0.7      MICROBIOLOGY:     RADIOLOGY:  [ ] Reviewed and interpreted by me    EKG: CHIEF COMPLAINT: AMS, confusion    Interval Events: No events overnight.  Patient receiving one unit prbcs this morning.  Patient is appropriately responding to commands.  Will attempt extubation today.      REVIEW OF SYSTEMS:  Constitutional: [ ] negative [ ] fevers [ ] chills [ ] weight loss [ ] weight gain  HEENT: [ ] negative [ ] dry eyes [ ] eye irritation [ ] postnasal drip [ ] nasal congestion  CV: [ ] negative  [ ] chest pain [ ] orthopnea [ ] palpitations [ ] murmur  Resp: [ ] negative [ ] cough [ ] shortness of breath [ ] dyspnea [ ] wheezing [ ] sputum [ ] hemoptysis  GI: [ ] negative [ ] nausea [ ] vomiting [ ] diarrhea [ ] constipation [ ] abd pain [ ] dysphagia   : [ ] negative [ ] dysuria [ ] nocturia [ ] hematuria [ ] increased urinary frequency  Musculoskeletal: [ ] negative [ ] back pain [ ] myalgias [ ] arthralgias [ ] fracture  Skin: [ ] negative [ ] rash [ ] itch  Neurological: [ ] negative [ ] headache [ ] dizziness [ ] syncope [ ] weakness [ ] numbness  Psychiatric: [ ] negative [ ] anxiety [ ] depression  Endocrine: [ ] negative [ ] diabetes [ ] thyroid problem  Hematologic/Lymphatic: [ ] negative [ ] anemia [ ] bleeding problem  Allergic/Immunologic: [ ] negative [ ] itchy eyes [ ] nasal discharge [ ] hives [ ] angioedema  [ ] All other systems negative  [x ] Unable to assess ROS because intubation    OBJECTIVE:  ICU Vital Signs Last 24 Hrs  T(C): 37.3 (04 Sep 2017 08:00), Max: 37.3 (04 Sep 2017 04:00)  T(F): 99.1 (04 Sep 2017 08:00), Max: 99.1 (04 Sep 2017 04:00)  HR: 71 (04 Sep 2017 09:11) (42 - 94)  BP: 116/57 (04 Sep 2017 09:00) (100/62 - 132/56)  BP(mean): 68 (04 Sep 2017 09:00) (62 - 74)  ABP: --  ABP(mean): --  RR: 21 (04 Sep 2017 09:00) (16 - 26)  SpO2: 93% (04 Sep 2017 09:11) (92% - 100%)    Mode: CPAP with PS, FiO2: 50, PEEP: 5, PS: 5, MAP: 7    09-03 @ 07:01 - 09-04 @ 07:00  --------------------------------------------------------  IN: 400 mL / OUT: 415 mL / NET: -15 mL    09-04 @ 07:01  - 09-04 @ 09:27  --------------------------------------------------------  IN: 21.2 mL / OUT: 15 mL / NET: 6.2 mL      CAPILLARY BLOOD GLUCOSE  281 (04 Sep 2017 06:00)          PHYSICAL EXAM:  General: intubated, lying in bed NAD  HEENT: anicteric, central line in place  Lymph Nodes: non palpable  Respiratory: CTAB  Cardiovascular: RRR no mrg  Abdomen: soft, NT,ND, BS+  Extremities: trace edema  Neurological: follows commands, GARCIA spontaneously  Psychiatry: unable to assess      HOSPITAL MEDICATIONS:  Standing Meds:  heparin  Injectable 5000 Unit(s) SubCutaneous every 8 hours  piperacillin/tazobactam IVPB. 3.375 Gram(s) IV Intermittent every 12 hours  propofol Infusion 50 MICROgram(s)/kG/Min IV Continuous <Continuous>  fentaNYL   Infusion 1 MICROgram(s)/kG/Hr IV Continuous <Continuous>  norepinephrine Infusion 0.01 MICROgram(s)/kG/Min IV Continuous <Continuous>  chlorhexidine 4% Liquid 1 Application(s) Topical daily  petrolatum Ophthalmic Ointment 1 Application(s) Both EYES two times a day  insulin lispro (HumaLOG) corrective regimen sliding scale   SubCutaneous every 6 hours  dextrose 50% Injectable 12.5 Gram(s) IV Push once  dextrose 50% Injectable 25 Gram(s) IV Push once  dextrose 50% Injectable 25 Gram(s) IV Push once      PRN Meds:  dextrose Gel 1 Dose(s) Oral once PRN  glucagon  Injectable 1 milliGRAM(s) IntraMuscular once PRN      LABS:                        5.5    4.29  )-----------( 163      ( 04 Sep 2017 03:00 )             15.7     Hgb Trend: 5.5<--, 5.7<--, 7.0<--  09-04    132<L>  |  87<L>  |  109<H>  ----------------------------<  303<H>  4.0   |  19<L>  |  8.26<H>    Ca    8.2<L>      04 Sep 2017 04:22  Phos  7.6     09-04  Mg     3.2     09-04    TPro  4.7<L>  /  Alb  2.4<L>  /  TBili  0.5  /  DBili  x   /  AST  15  /  ALT  13  /  AlkPhos  67  09-04    Creatinine Trend: 8.26<--, 8.03<--, 10.90<--, 4.17<--, 4.02<--, 4.01<--      Arterial Blood Gas:  09-04 @ 00:51  7.39/27/189/18/99.6/-7.9  ABG lactate: --    Venous Blood Gas:  09-03 @ 13:45  7.17/25/61/10/84.9  VBG Lactate: 0.7      MICROBIOLOGY: blood and urine cx NGD    RADIOLOGY:  [x ] Reviewed and interpreted by me    EKG:

## 2017-09-04 NOTE — CHART NOTE - NSCHARTNOTEFT_GEN_A_CORE
: Alida Silvia    INDICATION: respiratory failure, cardiac arrest    PROCEDURE:  [x] LIMITED ECHO  [x] LIMITED CHEST  [ ] LIMITED RETROPERITONEAL  [ ] LIMITED ABDOMINAL  [ ] LIMITED DVT  [ ] NEEDLE GUIDANCE VASCULAR  [ ] NEEDLE GUIDANCE THORACENTESIS  [ ] NEEDLE GUIDANCE PARACENTESIS  [ ] NEEDLE GUIDANCE PERICARDIOCENTESIS  [ ] OTHER    FINDINGS:  Predominantly A lines b/l; b/l pleural effusions R>L. alveolar consolidation in right. small amount of ascites on right.  Normal LV/RV; limited mitral valve motion    INTERPRETATION:  Normal cardiac function with mitral valve dysfunction  b/l pleural effusions with possible atelectasis vs. pna

## 2017-09-04 NOTE — PROGRESS NOTE ADULT - PROBLEM SELECTOR PLAN 3
Hb noted to be 5.5; Had PRBC transfusion which improved to 7.7. Will start on epogen 4,000 TIW during dialysis. monitor Hb.

## 2017-09-04 NOTE — PROGRESS NOTE ADULT - ASSESSMENT
51yo M with hx of CKD s/p renal transplant presented to the ER with confusion worsening, bradycardia in the ER to 30s, coded and was intubated - concern for septic shock, tx with abx. HD needed given high Cr/K    #neuro: on propofol, sedation.  #CV: levophed drip to cover for hypotension  #Resp: will extubate today  #GI: NPO since recent intubation / sedation  #Endo: lantus 22U bedtime and ISS  #Heme: s/p 1 unit prbcs, will monitor CBC  #Renal: Bhanu placed - HD given high Cr, high K.  Patient undergoing dialysis today, d/w renal will consider epogen at some point.  Timothy dced, condom cath ordered  #ID: hypotensive episode, LOC, hypothermia - covering with broad spectrum abx with vanc and zosyn, lowered vanc from 1g to 500mg 9/4/17 given trough  #DVTppx: will put on HSQ    Tong Nelson PGY1

## 2017-09-05 DIAGNOSIS — T86.91 UNSPECIFIED TRANSPLANTED ORGAN AND TISSUE REJECTION: ICD-10-CM

## 2017-09-05 DIAGNOSIS — I10 ESSENTIAL (PRIMARY) HYPERTENSION: ICD-10-CM

## 2017-09-05 DIAGNOSIS — E11.319 TYPE 2 DIABETES MELLITUS WITH UNSPECIFIED DIABETIC RETINOPATHY WITHOUT MACULAR EDEMA: ICD-10-CM

## 2017-09-05 DIAGNOSIS — Z29.9 ENCOUNTER FOR PROPHYLACTIC MEASURES, UNSPECIFIED: ICD-10-CM

## 2017-09-05 DIAGNOSIS — N18.9 CHRONIC KIDNEY DISEASE, UNSPECIFIED: ICD-10-CM

## 2017-09-05 DIAGNOSIS — R41.82 ALTERED MENTAL STATUS, UNSPECIFIED: ICD-10-CM

## 2017-09-05 DIAGNOSIS — I50.9 HEART FAILURE, UNSPECIFIED: ICD-10-CM

## 2017-09-05 DIAGNOSIS — R06.2 WHEEZING: ICD-10-CM

## 2017-09-05 DIAGNOSIS — G93.41 METABOLIC ENCEPHALOPATHY: ICD-10-CM

## 2017-09-05 DIAGNOSIS — E10.9 TYPE 1 DIABETES MELLITUS WITHOUT COMPLICATIONS: ICD-10-CM

## 2017-09-05 DIAGNOSIS — B19.20 UNSPECIFIED VIRAL HEPATITIS C WITHOUT HEPATIC COMA: ICD-10-CM

## 2017-09-05 LAB
ALBUMIN SERPL ELPH-MCNC: 2.6 G/DL — LOW (ref 3.3–5)
ALP SERPL-CCNC: 72 U/L — SIGNIFICANT CHANGE UP (ref 40–120)
ALT FLD-CCNC: 15 U/L — SIGNIFICANT CHANGE UP (ref 4–41)
AST SERPL-CCNC: 18 U/L — SIGNIFICANT CHANGE UP (ref 4–40)
BASOPHILS # BLD AUTO: 0.02 K/UL — SIGNIFICANT CHANGE UP (ref 0–0.2)
BASOPHILS NFR BLD AUTO: 0.3 % — SIGNIFICANT CHANGE UP (ref 0–2)
BILIRUB SERPL-MCNC: 0.6 MG/DL — SIGNIFICANT CHANGE UP (ref 0.2–1.2)
BUN SERPL-MCNC: 81 MG/DL — HIGH (ref 7–23)
CALCIUM SERPL-MCNC: 8.3 MG/DL — LOW (ref 8.4–10.5)
CHLORIDE SERPL-SCNC: 93 MMOL/L — LOW (ref 98–107)
CO2 SERPL-SCNC: 24 MMOL/L — SIGNIFICANT CHANGE UP (ref 22–31)
CREAT SERPL-MCNC: 6.57 MG/DL — HIGH (ref 0.5–1.3)
EOSINOPHIL # BLD AUTO: 0.23 K/UL — SIGNIFICANT CHANGE UP (ref 0–0.5)
EOSINOPHIL NFR BLD AUTO: 3.6 % — SIGNIFICANT CHANGE UP (ref 0–6)
GLUCOSE SERPL-MCNC: 33 MG/DL — CRITICAL LOW (ref 70–99)
HCT VFR BLD CALC: 22.5 % — LOW (ref 39–50)
HGB BLD-MCNC: 7.8 G/DL — LOW (ref 13–17)
IMM GRANULOCYTES # BLD AUTO: 0.04 # — SIGNIFICANT CHANGE UP
IMM GRANULOCYTES NFR BLD AUTO: 0.6 % — SIGNIFICANT CHANGE UP (ref 0–1.5)
LYMPHOCYTES # BLD AUTO: 0.53 K/UL — LOW (ref 1–3.3)
LYMPHOCYTES # BLD AUTO: 8.4 % — LOW (ref 13–44)
MAGNESIUM SERPL-MCNC: 2.2 MG/DL — SIGNIFICANT CHANGE UP (ref 1.6–2.6)
MCHC RBC-ENTMCNC: 28.6 PG — SIGNIFICANT CHANGE UP (ref 27–34)
MCHC RBC-ENTMCNC: 34.7 % — SIGNIFICANT CHANGE UP (ref 32–36)
MCV RBC AUTO: 82.4 FL — SIGNIFICANT CHANGE UP (ref 80–100)
MONOCYTES # BLD AUTO: 0.92 K/UL — HIGH (ref 0–0.9)
MONOCYTES NFR BLD AUTO: 14.5 % — HIGH (ref 2–14)
NEUTROPHILS # BLD AUTO: 4.59 K/UL — SIGNIFICANT CHANGE UP (ref 1.8–7.4)
NEUTROPHILS NFR BLD AUTO: 72.6 % — SIGNIFICANT CHANGE UP (ref 43–77)
NRBC # FLD: 0 — SIGNIFICANT CHANGE UP
PHOSPHATE SERPL-MCNC: 6.5 MG/DL — HIGH (ref 2.5–4.5)
PLATELET # BLD AUTO: 210 K/UL — SIGNIFICANT CHANGE UP (ref 150–400)
PMV BLD: 9.8 FL — SIGNIFICANT CHANGE UP (ref 7–13)
POTASSIUM SERPL-MCNC: 3.8 MMOL/L — SIGNIFICANT CHANGE UP (ref 3.5–5.3)
POTASSIUM SERPL-SCNC: 3.8 MMOL/L — SIGNIFICANT CHANGE UP (ref 3.5–5.3)
PROT SERPL-MCNC: 5.3 G/DL — LOW (ref 6–8.3)
RBC # BLD: 2.73 M/UL — LOW (ref 4.2–5.8)
RBC # FLD: 13.7 % — SIGNIFICANT CHANGE UP (ref 10.3–14.5)
SODIUM SERPL-SCNC: 137 MMOL/L — SIGNIFICANT CHANGE UP (ref 135–145)
VANCOMYCIN TROUGH SERPL-MCNC: 14.4 UG/ML — SIGNIFICANT CHANGE UP (ref 10–20)
WBC # BLD: 6.33 K/UL — SIGNIFICANT CHANGE UP (ref 3.8–10.5)
WBC # FLD AUTO: 6.33 K/UL — SIGNIFICANT CHANGE UP (ref 3.8–10.5)

## 2017-09-05 PROCEDURE — 99233 SBSQ HOSP IP/OBS HIGH 50: CPT

## 2017-09-05 PROCEDURE — 99233 SBSQ HOSP IP/OBS HIGH 50: CPT | Mod: GC

## 2017-09-05 RX ORDER — HYDRALAZINE HCL 50 MG
50 TABLET ORAL EVERY 8 HOURS
Qty: 0 | Refills: 0 | Status: DISCONTINUED | OUTPATIENT
Start: 2017-09-05 | End: 2017-09-07

## 2017-09-05 RX ORDER — TAMSULOSIN HYDROCHLORIDE 0.4 MG/1
0.4 CAPSULE ORAL AT BEDTIME
Qty: 0 | Refills: 0 | Status: DISCONTINUED | OUTPATIENT
Start: 2017-09-05 | End: 2017-10-02

## 2017-09-05 RX ORDER — IPRATROPIUM/ALBUTEROL SULFATE 18-103MCG
3 AEROSOL WITH ADAPTER (GRAM) INHALATION EVERY 6 HOURS
Qty: 0 | Refills: 0 | Status: DISCONTINUED | OUTPATIENT
Start: 2017-09-05 | End: 2017-09-05

## 2017-09-05 RX ORDER — CYCLOSPORINE 100 MG/1
100 CAPSULE ORAL
Qty: 0 | Refills: 0 | Status: DISCONTINUED | OUTPATIENT
Start: 2017-09-05 | End: 2017-09-07

## 2017-09-05 RX ORDER — VANCOMYCIN HCL 1 G
750 VIAL (EA) INTRAVENOUS ONCE
Qty: 0 | Refills: 0 | Status: COMPLETED | OUTPATIENT
Start: 2017-09-05 | End: 2017-09-05

## 2017-09-05 RX ORDER — DEXTROSE 50 % IN WATER 50 %
50 SYRINGE (ML) INTRAVENOUS ONCE
Qty: 0 | Refills: 0 | Status: COMPLETED | OUTPATIENT
Start: 2017-09-05 | End: 2017-09-05

## 2017-09-05 RX ORDER — INSULIN GLARGINE 100 [IU]/ML
2.5 INJECTION, SOLUTION SUBCUTANEOUS AT BEDTIME
Qty: 0 | Refills: 0 | Status: DISCONTINUED | OUTPATIENT
Start: 2017-09-05 | End: 2017-09-05

## 2017-09-05 RX ORDER — ATORVASTATIN CALCIUM 80 MG/1
10 TABLET, FILM COATED ORAL AT BEDTIME
Qty: 0 | Refills: 0 | Status: DISCONTINUED | OUTPATIENT
Start: 2017-09-05 | End: 2017-09-08

## 2017-09-05 RX ORDER — IPRATROPIUM/ALBUTEROL SULFATE 18-103MCG
3 AEROSOL WITH ADAPTER (GRAM) INHALATION EVERY 6 HOURS
Qty: 0 | Refills: 0 | Status: DISCONTINUED | OUTPATIENT
Start: 2017-09-05 | End: 2017-09-07

## 2017-09-05 RX ORDER — NIFEDIPINE 30 MG
90 TABLET, EXTENDED RELEASE 24 HR ORAL DAILY
Qty: 0 | Refills: 0 | Status: DISCONTINUED | OUTPATIENT
Start: 2017-09-05 | End: 2017-09-07

## 2017-09-05 RX ADMIN — Medication 667 MILLIGRAM(S): at 10:19

## 2017-09-05 RX ADMIN — Medication 667 MILLIGRAM(S): at 13:07

## 2017-09-05 RX ADMIN — HEPARIN SODIUM 5000 UNIT(S): 5000 INJECTION INTRAVENOUS; SUBCUTANEOUS at 05:02

## 2017-09-05 RX ADMIN — HEPARIN SODIUM 5000 UNIT(S): 5000 INJECTION INTRAVENOUS; SUBCUTANEOUS at 21:13

## 2017-09-05 RX ADMIN — TAMSULOSIN HYDROCHLORIDE 0.4 MILLIGRAM(S): 0.4 CAPSULE ORAL at 22:10

## 2017-09-05 RX ADMIN — Medication 5 MILLIGRAM(S): at 13:08

## 2017-09-05 RX ADMIN — Medication 90 MILLIGRAM(S): at 18:13

## 2017-09-05 RX ADMIN — Medication 250 MILLIGRAM(S): at 05:02

## 2017-09-05 RX ADMIN — Medication 3 MILLILITER(S): at 05:45

## 2017-09-05 RX ADMIN — HEPARIN SODIUM 5000 UNIT(S): 5000 INJECTION INTRAVENOUS; SUBCUTANEOUS at 13:07

## 2017-09-05 RX ADMIN — ERYTHROPOIETIN 4000 UNIT(S): 10000 INJECTION, SOLUTION INTRAVENOUS; SUBCUTANEOUS at 20:00

## 2017-09-05 RX ADMIN — INSULIN GLARGINE 10 UNIT(S): 100 INJECTION, SOLUTION SUBCUTANEOUS at 00:15

## 2017-09-05 RX ADMIN — PIPERACILLIN AND TAZOBACTAM 25 GRAM(S): 4; .5 INJECTION, POWDER, LYOPHILIZED, FOR SOLUTION INTRAVENOUS at 00:15

## 2017-09-05 RX ADMIN — Medication 3 MILLILITER(S): at 09:49

## 2017-09-05 RX ADMIN — Medication 50 MILLILITER(S): at 04:29

## 2017-09-05 RX ADMIN — CYCLOSPORINE 100 MILLIGRAM(S): 100 CAPSULE ORAL at 22:10

## 2017-09-05 RX ADMIN — ATORVASTATIN CALCIUM 10 MILLIGRAM(S): 80 TABLET, FILM COATED ORAL at 21:13

## 2017-09-05 RX ADMIN — Medication 50 MILLILITER(S): at 04:34

## 2017-09-05 RX ADMIN — Medication 3 MILLILITER(S): at 21:53

## 2017-09-05 RX ADMIN — Medication 50 MILLIGRAM(S): at 21:13

## 2017-09-05 NOTE — PROGRESS NOTE ADULT - PROBLEM SELECTOR PLAN 2
Home regimen: clonidine 0.1mg po q8h Hx of CKD, requiring dialysis for the first time this admission. Home regimen: torsemide 10mg daily  -HD through [facility]  -HD today Hx of CKD, requiring dialysis for the first time this admission. Home regimen: torsemide 10mg daily  -HD today through [facility] Hx of CKD, requiring dialysis for the first time this admission. Pt started HD, hold home torsemide 10mg daily  -HD today through [facility]

## 2017-09-05 NOTE — PROGRESS NOTE ADULT - SUBJECTIVE AND OBJECTIVE BOX
Patient is a 52y old  Male who presents with a chief complaint of Altered mental status (03 Sep 2017 17:46)      SUBJECTIVE / OVERNIGHT EVENTS:  -transferred from MICU to [facility]  -HD today with ultra filtrate    MEDICATIONS  (STANDING):  heparin  Injectable 5000 Unit(s) SubCutaneous every 8 hours  calcium acetate 667 milliGRAM(s) Oral three times a day with meals  insulin lispro (HumaLOG) corrective regimen sliding scale   SubCutaneous three times a day before meals  insulin lispro (HumaLOG) corrective regimen sliding scale   SubCutaneous at bedtime  epoetin valente Injectable 4000 Unit(s) IV Push <User Schedule>  ALBUTerol/ipratropium for Nebulization 3 milliLiter(s) Nebulizer every 6 hours  cycloSPORINE  (SandIMMUNE) 100 milliGRAM(s) Oral two times a day  predniSONE   Tablet 5 milliGRAM(s) Oral daily    MEDICATIONS  (PRN):    Vital Signs Last 24 Hrs  T(C): 36.7 (05 Sep 2017 11:41), Max: 36.7 (05 Sep 2017 11:41)  T(F): 98 (05 Sep 2017 11:41), Max: 98 (05 Sep 2017 11:41)  HR: 80 (05 Sep 2017 11:41) (68 - 82)  BP: 176/91 (05 Sep 2017 11:41) (140/91 - 179/94)  BP(mean): 93 (05 Sep 2017 10:00) (84 - 109)  RR: 20 (05 Sep 2017 11:41) (13 - 24)  SpO2: 98% (05 Sep 2017 11:41) (95% - 100%)    CAPILLARY BLOOD GLUCOSE  91 (05 Sep 2017 13:05)  61 (05 Sep 2017 09:44)  176 (05 Sep 2017 05:00)  86 (04 Sep 2017 22:00)  101 (04 Sep 2017 20:00)  55 (04 Sep 2017 18:46)  51 (04 Sep 2017 18:45)        I&O's Summary    04 Sep 2017 07:01  -  05 Sep 2017 07:00  --------------------------------------------------------  IN: 521.2 mL / OUT: 945 mL / NET: -423.8 mL        PHYSICAL EXAM  GENERAL: NAD, well-developed  HEAD:  Atraumatic, Normocephalic  EYES: EOMI, PERRLA, conjunctiva and sclera clear  NECK: Supple, No JVD  CHEST/LUNG: Clear to auscultation bilaterally; No wheeze  HEART: Regular rate and rhythm; No murmurs, rubs, or gallops  ABDOMEN: Soft, Nontender, Nondistended; Bowel sounds present  EXTREMITIES:  2+ Peripheral Pulses, No clubbing, cyanosis, or edema  PSYCH: AAOx3  SKIN: No rashes or lesions      LABS:                        7.8    6.33  )-----------( 210      ( 05 Sep 2017 02:30 )             22.5     09-05    137  |  93<L>  |  81<H>  ----------------------------<  33<LL>  3.8   |  24  |  6.57<H>    Ca    8.3<L>      05 Sep 2017 02:30  Phos  6.5     09-05  Mg     2.2     09-05    TPro  5.3<L>  /  Alb  2.6<L>  /  TBili  0.6  /  DBili  x   /  AST  18  /  ALT  15  /  AlkPhos  72  09-05    Vancomycin 14.4    RADIOLOGY & ADDITIONAL TESTS:     MICROBIOLOGY:   BCx 9/3 x 2 ng    Hep C ab positive  Hep B core ab, surface ab neg    CONSULTS: nephrology    Latosha Maya, PGY1  Internal Medicine, Team 3  Pager [phone] Patient is a 52y old  Male who presents with a chief complaint of Altered mental status (03 Sep 2017 17:46)      SUBJECTIVE / OVERNIGHT EVENTS:  -transferred from MICU to F  -No complaints. Denies abd pain, cp, sob, n/v/d  -HD today with ultra filtrate through [facility]. Hypertensive  during dialysis, asymptomatic    MEDICATIONS  (STANDING):  heparin  Injectable 5000 Unit(s) SubCutaneous every 8 hours  calcium acetate 667 milliGRAM(s) Oral three times a day with meals  insulin lispro (HumaLOG) corrective regimen sliding scale   SubCutaneous three times a day before meals  insulin lispro (HumaLOG) corrective regimen sliding scale   SubCutaneous at bedtime  epoetin valente Injectable 4000 Unit(s) IV Push <User Schedule>  ALBUTerol/ipratropium for Nebulization 3 milliLiter(s) Nebulizer every 6 hours  cycloSPORINE  (SandIMMUNE) 100 milliGRAM(s) Oral two times a day  predniSONE   Tablet 5 milliGRAM(s) Oral daily    MEDICATIONS  (PRN):    Vital Signs Last 24 Hrs  T(C): 36.7 (05 Sep 2017 11:41), Max: 36.7 (05 Sep 2017 11:41)  T(F): 98 (05 Sep 2017 11:41), Max: 98 (05 Sep 2017 11:41)  HR: 80 (05 Sep 2017 11:41) (68 - 82)  BP: 176/91 (05 Sep 2017 11:41) (140/91 - 179/94)  BP(mean): 93 (05 Sep 2017 10:00) (84 - 109)  RR: 20 (05 Sep 2017 11:41) (13 - 24)  SpO2: 98% (05 Sep 2017 11:41) (95% - 100%)    CAPILLARY BLOOD GLUCOSE  91 (05 Sep 2017 13:05)  61 (05 Sep 2017 09:44)  176 (05 Sep 2017 05:00)  86 (04 Sep 2017 22:00)  101 (04 Sep 2017 20:00)  55 (04 Sep 2017 18:46)  51 (04 Sep 2017 18:45)        I&O's Summary    04 Sep 2017 07:01  -  05 Sep 2017 07:00  --------------------------------------------------------  IN: 521.2 mL / OUT: 945 mL / NET: -423.8 mL        PHYSICAL EXAM  GENERAL: NAD, well-developed  HEAD:  Atraumatic, Normocephalic  EYES: EOMI, PERRLA, conjunctiva and sclera clear  NECK: Supple, No JVD  CHEST/LUNG: Clear to auscultation bilaterally; No wheeze  HEART: Regular rate and rhythm; No murmurs, rubs, or gallops  ABDOMEN: Soft, Nontender, Nondistended; Bowel sounds present  EXTREMITIES:  2+ Peripheral Pulses, No clubbing, cyanosis, or edema  PSYCH: AAOx3  SKIN: No rashes or lesions      LABS:                        7.8    6.33  )-----------( 210      ( 05 Sep 2017 02:30 )             22.5     09-05    137  |  93<L>  |  81<H>  ----------------------------<  33<LL>  3.8   |  24  |  6.57<H>    Ca    8.3<L>      05 Sep 2017 02:30  Phos  6.5     09-05  Mg     2.2     09-05    TPro  5.3<L>  /  Alb  2.6<L>  /  TBili  0.6  /  DBili  x   /  AST  18  /  ALT  15  /  AlkPhos  72  09-05    Vancomycin 14.4    RADIOLOGY & ADDITIONAL TESTS:     MICROBIOLOGY:   BCx 9/3 x 2 ng    Hep C ab positive  Hep B core ab, surface ab neg    CONSULTS: nephrology    Latosha Maya, PGY1  Internal Medicine, Team 3  Pager [phone] Patient is a 52y old  Male who presents with a chief complaint of Altered mental status (03 Sep 2017 17:46)      SUBJECTIVE / OVERNIGHT EVENTS:  -transferred from MICU to F  -No complaints. Denies abd pain, cp, sob, n/v/d  -HD today with ultra filtrate through [facility]. Hypertensive  during dialysis, asymptomatic    MEDICATIONS  (STANDING):  heparin  Injectable 5000 Unit(s) SubCutaneous every 8 hours  calcium acetate 667 milliGRAM(s) Oral three times a day with meals  insulin lispro (HumaLOG) corrective regimen sliding scale   SubCutaneous three times a day before meals  insulin lispro (HumaLOG) corrective regimen sliding scale   SubCutaneous at bedtime  epoetin valente Injectable 4000 Unit(s) IV Push <User Schedule>  ALBUTerol/ipratropium for Nebulization 3 milliLiter(s) Nebulizer every 6 hours  cycloSPORINE  (SandIMMUNE) 100 milliGRAM(s) Oral two times a day  predniSONE   Tablet 5 milliGRAM(s) Oral daily    MEDICATIONS  (PRN):    Vital Signs Last 24 Hrs  T(C): 36.7 (05 Sep 2017 11:41), Max: 36.7 (05 Sep 2017 11:41)  T(F): 98 (05 Sep 2017 11:41), Max: 98 (05 Sep 2017 11:41)  HR: 80 (05 Sep 2017 11:41) (68 - 82)  BP: 176/91 (05 Sep 2017 11:41) (140/91 - 179/94)  BP(mean): 93 (05 Sep 2017 10:00) (84 - 109)  RR: 20 (05 Sep 2017 11:41) (13 - 24)  SpO2: 98% (05 Sep 2017 11:41) (95% - 100%)    CAPILLARY BLOOD GLUCOSE  91 (05 Sep 2017 13:05)  61 (05 Sep 2017 09:44)  176 (05 Sep 2017 05:00)  86 (04 Sep 2017 22:00)  101 (04 Sep 2017 20:00)  55 (04 Sep 2017 18:46)  51 (04 Sep 2017 18:45)        I&O's Summary    04 Sep 2017 07:01  -  05 Sep 2017 07:00  --------------------------------------------------------  IN: 521.2 mL / OUT: 945 mL / NET: -423.8 mL        PHYSICAL EXAM  GENERAL: NAD, well-developed  HEAD:  Atraumatic, Normocephalic  EYES: EOMI, PERRLA, conjunctiva and sclera clear. Legally blind but can see changes in light  NECK: Supple, No JVD  CHEST/LUNG: Coarse breath sounds bilaterally; No wheeze  HEART: Regular rate and rhythm; No murmurs, rubs, or gallops  ABDOMEN: Soft, Nontender, Nondistended; Bowel sounds present  EXTREMITIES: Feet warm, No clubbing, cyanosis. 3+ pitting edema to below knees  PSYCH: AAOx3  SKIN: No rashes or lesions      LABS:                        7.8    6.33  )-----------( 210      ( 05 Sep 2017 02:30 )             22.5     09-05    137  |  93<L>  |  81<H>  ----------------------------<  33<LL>  3.8   |  24  |  6.57<H>    Ca    8.3<L>      05 Sep 2017 02:30  Phos  6.5     09-05  Mg     2.2     09-05    TPro  5.3<L>  /  Alb  2.6<L>  /  TBili  0.6  /  DBili  x   /  AST  18  /  ALT  15  /  AlkPhos  72  09-05    Vancomycin 14.4    RADIOLOGY & ADDITIONAL TESTS:     MICROBIOLOGY:   BCx 9/3 x 2 ng    Hep C ab positive  Hep B core ab, surface ab neg    CONSULTS: nephrology    Laotsha Maya, PGY1  Internal Medicine, Team 3  Pager [phone]

## 2017-09-05 NOTE — PROGRESS NOTE ADULT - ATTENDING COMMENTS
Patient seen and examined, chart/lab reviewed.  53 y/o male with h/o HTN, DM-1 with diabetic retinopathy and ESRD,  s/p living related renal transplant (donor brother) in 1998 at [facility], then relocated to [place], recently back to  as he's legally blind and can't take care of himself anymore, a/w  uremic encephalopathy, hyperkalemia, worsening fluid overload, course c/b bradycardic and cardiac arrest in the ED,  ROSC achieved after 1 round of epi and pt transferred to MICU for urgent hemodialysis via right IJ shiley. Pt was also transfused 1 unit pRBC for Hgb 5 and epogen was started. He is now stabilized and transferred to the floor.     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

## 2017-09-05 NOTE — PROGRESS NOTE ADULT - PROBLEM SELECTOR PLAN 8
DVT ppx: HSQ  Diet: consistent carb no snack -duoneb prn -Home regimen: CellCept 1250mg BID, cyclosporine 25mg q12h  Cyclosporin 100mg BID and prednisone 5mg po daily for transplanted kidney f/u HCV genotype, VL

## 2017-09-05 NOTE — PROGRESS NOTE ADULT - ATTENDING COMMENTS
51 y/o M with h/o CKD s/p renal transplant in 1998, HTN, DM, legally blind admitted for  uremic encephalopathy initiated on dialysis for failed renal transplant.     -plan HD #3 today, will need perm cath placement and AVF placement. discussed dialysis options with patient and he declines PD given that he is blind  -re: failed transplant -->slow taper of immunosuppressants, as above.

## 2017-09-05 NOTE — PROGRESS NOTE ADULT - ASSESSMENT
51 y/o M with h/o CKD s/p renal transplant in 1998, HTN, DM, legally blind BIBEMS for AMS. Pt became bradycardic and had cardiac arrest in the ED. ROSC achieved after 1 round of epi and pt was transferred to MICU. Immunosuppressants for renal transplant were held. Pt had emergent HD for hyperkalemia. Abx given for possible pna. 1x PRBC given for Hgb 5 and epogen was started. Pressors were weaned off. Pt was extubated the following day. Post extubation pt was noted to be wheezy which improved with Duoneb. Diet and insulin regimen started. Cyclosporin and prednisone also resumed. Pt remains stable and transferred to floor for further management. 53 y/o M with h/o CKD s/p renal transplant (1998) on cyclosporine and CellCept, HTN, DM, legally blind BIBEMS for uremic encephalopathy. Pt became bradycardic and had PEA arrest in the ED, intubated for airway protection. ROSC achieved s/p 1 round of epi and chest compressions and pt was transferred to MICU. Immunosuppressants for renal transplant were held. Pt had emergent HD for hyperkalemia. Covered empirically with broad spectrum antibiotics (vanc and zosyn), Hgb 5 s/p 1u pRBC given and epogen was started. Pressors were weaned off and pt extubated 9/4. Cyclosporin and prednisone also resumed. Pt remains stable and transferred to floor for further management 9/5

## 2017-09-05 NOTE — PROGRESS NOTE ADULT - PROBLEM SELECTOR PLAN 6
-Home regimen: CellCept 1250mg BID, cyclosporine 25mg q12h  Cyclosporin 100mg BID and prednisone 5mg po daily for transplanted kidney f/u HCV genotype, VL -cont phoslo TID, Epogen s/p 1u pRBC 9/4  -cont phoslo TID, Epogen Legally blind, lives with sister

## 2017-09-05 NOTE — PROGRESS NOTE ADULT - PROBLEM SELECTOR PLAN 3
BP elevated this AM. Advise to start Ace in or CCB to optimize BP. UF with HD. Low salt diet. BP elevated this AM. Advise to start CCB to optimize BP. UF with HD. Low salt diet.

## 2017-09-05 NOTE — PROGRESS NOTE ADULT - PROBLEM SELECTOR PROBLEM 5
Hepatitis C infection Anemia due to chronic kidney disease Diabetic retinopathy Diabetes mellitus type I

## 2017-09-05 NOTE — PROGRESS NOTE ADULT - PROBLEM SELECTOR PLAN 4
-cont phoslo TID, Epogen Home regimen lantus 22u daily  -ISS Home regimen lantus 22u daily, home pravastatin 10mg daily  -start atorvastatin 10mg daily  -ISS Home regimen lantus 22u daily, home pravastatin 10mg daily  -start atorvastatin 10mg daily  -ISS  -monitor FS over 24h, will determine AM lantus dose. Lantus 10u dose caused  --> 61 hold home clonidine 0.1mg po q8h  -cont home nifedipine XR 90 mg daily

## 2017-09-05 NOTE — PROGRESS NOTE ADULT - PROBLEM SELECTOR PLAN 7
-duoneb prn -Home regimen: CellCept 1250mg BID, cyclosporine 25mg q12h  Cyclosporin 100mg BID and prednisone 5mg po daily for transplanted kidney f/u HCV genotype, VL Presented with Hg 5 s/p 1u pRBC on 9/4  -cont Epogen

## 2017-09-05 NOTE — CHART NOTE - NSCHARTNOTEFT_GEN_A_CORE
MICU Transfer Note    Transfer from: MICU    Transfer to: ( x ) Medicine    (  ) Telemetry     (   ) RCU        (    ) Palliative         (   ) Stroke Unit          (   ) __________________    Accepting Physician:  Signout given to:     History of Present Illness:  Chief Complaint/Reason for Admission: Altered mental status  History of Present Illness:   53 y/o M with h/o CKD s/p renal transplant in 1998, HTN, DM, legally blind BIBEMS for AMS. According to patient's sister, patient called because did not know where he was and thought he was on the street when he was at home. Patient has been experiencing worsening kidney function for the last 1.5 years and that her confusion has been ongoing for the last few months. He moved to [facility] 1 month ago, and sister has noticed intermittent confusion. Confusion is worse today. Is followed by Dr. King (nephro) - on route to start dialysis soon. Sister denies any recent infection, cough, vomiting, diarrhea.    In the ED, patient became nba to 30s, arrested, coded. was given 1x epi and achieved ROSC. intubated. Creatinine was 10, K of 6.1 and was hypotehrmic in the ER. Was started on broad spectrum abx - cultures are pending. held immunosuppressants, BP meds.      MICU COURSE: Patient had a shiley placed and was given emergent HD, was kept intubated and sedated initially, was kept on broad spectrum abx for empiric coverage 2/2 LOC and hypotension, concern for pneumonia, possibly aspiration related.    Blood cx still NGD.  He was given 1U prbcs for a Hgb of 5.  Renal was consulted and recommended starting Epo.  A Levophed drip was administered for his hypotension.  He was extubated without complications on hospital day 2 and restarted on a diet and insulin regimen.  Overnight his sugars dropped to 33, 2 amps of D50 were administered.  He was also noted to be very wheezy so he was given duonebs.  Given clinical improvement the patient was transferred to the floor.            ASSESSMENT & PLAN:   53yo M with hx of CKD s/p renal transplant presented to the ER with confusion worsening, bradycardia in the ER to 30s, coded and was intubated - concern for septic shock, tx with abx. HD needed given high Cr/K    -continue vanc/zosyn  -duonebs for wheezing  -ISS  -lantus 10U bedtime  -phoslo  -Epogen            FOR FOLLOW UP: MICU Transfer Note    Transfer from: MICU    Transfer to: ( x ) Medicine    (  ) Telemetry     (   ) RCU        (    ) Palliative         (   ) Stroke Unit          (   ) __________________    Accepting Physician:  Signout given to:     History of Present Illness:  Chief Complaint/Reason for Admission: Altered mental status  History of Present Illness:   51 y/o M with h/o CKD s/p renal transplant in 1998, HTN, DM, legally blind BIBEMS for AMS. According to patient's sister, patient called because did not know where he was and thought he was on the street when he was at home. Patient has been experiencing worsening kidney function for the last 1.5 years and that her confusion has been ongoing for the last few months. He moved to [facility] 1 month ago, and sister has noticed intermittent confusion. Confusion is worse today. Is followed by Dr. King (nephro) - on route to start dialysis soon. Sister denies any recent infection, cough, vomiting, diarrhea.    In the ED, patient became nba to 30s, arrested, coded. was given 1x epi and achieved ROSC. intubated. Creatinine was 10, K of 6.1 and was hypotehrmic in the ER. Was started on broad spectrum abx - cultures are pending. held immunosuppressants, BP meds.      MICU COURSE: Patient had a shiley placed and was given emergent HD, was kept intubated and sedated initially, was kept on broad spectrum abx for empiric coverage 2/2 LOC and hypotension, concern for pneumonia, possibly aspiration related.    Blood cx still NGD.  He was given 1U prbcs for a Hgb of 5.  Renal was consulted and recommended starting Epo.  A Levophed drip was administered for his hypotension.  He was extubated without complications on hospital day 2 and restarted on a diet and insulin regimen.  Overnight his sugars dropped to 33, 2 amps of D50 were administered.  He was also noted to be very wheezy so he was given duonebs.  Given clinical improvement the patient was transferred to the floor.            ASSESSMENT & PLAN:   53yo M with hx of CKD s/p renal transplant presented to the ER with confusion worsening, bradycardia in the ER to 30s, coded and was intubated - concern for septic shock, tx with abx. HD needed given high Cr/K    -dced vanc zosyn  -duonebs for wheezing  -ISS  -holding basal lantus for hypoglycemia, reassess  -phoslo  -Epogen            FOR FOLLOW UP:

## 2017-09-05 NOTE — PROGRESS NOTE ADULT - ATTENDING COMMENTS
Patient seen and examined, chart/lab reviewed.  53 y/o male with h/o HTN, DM-1 with diabetic retinopathy and ESRD,  s/p living related renal transplant (donor brother) in 1998 at [facility], then relocated to [place], recently back to  as he's legally blind and can't take care of himself anymore, a/w  uremic encephalopathy, hyperkalemia, worsening fluid overload, course c/b bradycardic and cardiac arrest in the ED,  ROSC achieved after 1 round of epi and pt transferred to MICU for urgent hemodialysis via right IJ shiley. Pt was also transfused 1 unit pRBC for Hgb 5 and epogen was started. He is now stabilized and transferred to the floor.     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 Patient seen and examined, chart/lab reviewed.  53 y/o male with h/o HTN, DM-1 with diabetic retinopathy and ESRD,  s/p living related renal transplant (donor brother) in 1998 at [facility], then relocated to [place], recently back to  as he's legally blind and can't take care of himself anymore, a/w  uremic encephalopathy, hyperkalemia, worsening fluid overload, course c/b bradycardic and cardiac arrest in the ED,  ROSC achieved after 1 round of epi and pt transferred to MICU for urgent hemodialysis via right IJ shiley. Pt was also transfused 1 unit pRBC for Hgb 5 and epogen was started. He is now stabilized and transferred to the floor.     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  -on phos binder phoslo  -still on immunosuppression: prednisone 5 mg, cyclospine 100 mg bid, off ?MMF, taper immunosuppression?, f/u renal  -never had renal biopsy confirming chronic rejection, f/u renal   # DM-1, volatile glycemic control: c/w humalog ISS, A1c 8%, monitor FS  # Diabetic retinopathy, legally blind: now living with sister, relocated from [place] to   # Anemia of CKD: s/p 1 u pRBC, epogen as per renal, check iron panel  # Severe protein calorie malnutrition: albumin 2.6, encourage po intake  # Acute on chronic CHF, fluid overload: check echo, fluid removal with HD  # DVT prophylaxis: heparin sc.

## 2017-09-05 NOTE — PROGRESS NOTE ADULT - PROBLEM SELECTOR PLAN 5
f/u HCV genotype, VL -cont phoslo TID, Epogen Legally blind, lives with sister Home regimen lantus 22u daily, home pravastatin 10mg daily  -start atorvastatin 10mg daily  -ISS  -monitor FS over 24h, will determine AM lantus dose. Lantus 10u dose caused  --> 61

## 2017-09-05 NOTE — PROGRESS NOTE ADULT - ATTENDING COMMENTS
remarkable recovery for a christos who /PEA arrest from hyperkalemia/uremia/looks good all things considered

## 2017-09-05 NOTE — PROGRESS NOTE ADULT - ASSESSMENT
51 y/o M with h/o CKD s/p renal transplant in 1998, HTN, DM, legally blind admitted for  uremic encephalopathy initiated on dialysis.

## 2017-09-05 NOTE — PROGRESS NOTE ADULT - PROBLEM SELECTOR PLAN 1
PT last dialyzed via Right IJ shiley catheter on 9/4/2017 with 400cc fluid removal  tolerated well without complications. Plan for 3rd session of dialysis today with 1-2kg fluid removal as tolerated.

## 2017-09-05 NOTE — PROGRESS NOTE ADULT - PROBLEM SELECTOR PLAN 3
Home regimen lantus 22u daily  -ISS Home regimen: clonidine 0.1mg po q8h martina home nifedipine XR 90 mg daily, clonidine 0.1mg po q8h hold home clonidine 0.1mg po q8h  -cont home nifedipine XR 90 mg daily Presented to the ED hypotensive, LOC, hypothermia, bradycardic in setting of hyperkalemia and hyperphosphatemia. Had been covering with broad spectrum vancomycin by level and zosyn in the MICU, but infectious etiology neg to date. Presented altered, hypotensive, hypothermic, bradycardic. Deranged vital signs more likely attributable to resulting hyperK resulting in PEA arrest. s/p Vanc and zosyn  -monitor off antibiotics

## 2017-09-05 NOTE — PROGRESS NOTE ADULT - PROBLEM SELECTOR PROBLEM 6
Transplantation rejection Hepatitis C infection Anemia due to chronic kidney disease Diabetic retinopathy

## 2017-09-05 NOTE — PROGRESS NOTE ADULT - PROBLEM SELECTOR PLAN 4
in setting of renal failure. Continue phoslo 667mg TID with meals. Monitor serum PO4. Low phosphate diet. in setting of renal failure. Continue phoslo 667mg TID with meals. Monitor serum PO4. Low phosphate diet. Check PTH

## 2017-09-05 NOTE — PROGRESS NOTE ADULT - PROBLEM SELECTOR PLAN 10
DVT ppx: HSQ  Diet: consistent carb no snack DVT ppx: HSQ  Diet: consistent carb no snack    BPH: cont home tamsulosin DVT ppx: HSQ  Diet: consistent carb no snack/ renal diet    BPH: cont home tamsulosin

## 2017-09-05 NOTE — PROGRESS NOTE ADULT - PROBLEM SELECTOR PLAN 5
Pt with failed renal transplant. Advise to taper immunosuppressants. Cyclosporine 100mg QD and prednisone 5mg QD. Pt with failed renal transplant. Advise to taper immunosuppressants (currently all on hold). Suggest restart cyclosporine 100mg bid and prednisone 5mg QD to begin taper.

## 2017-09-05 NOTE — PROGRESS NOTE ADULT - SUBJECTIVE AND OBJECTIVE BOX
CHIEF COMPLAINT:    Interval Events:    REVIEW OF SYSTEMS:  Constitutional: [ ] negative [ ] fevers [ ] chills [ ] weight loss [ ] weight gain  HEENT: [ ] negative [ ] dry eyes [ ] eye irritation [ ] postnasal drip [ ] nasal congestion  CV: [ ] negative  [ ] chest pain [ ] orthopnea [ ] palpitations [ ] murmur  Resp: [ ] negative [ ] cough [ ] shortness of breath [ ] dyspnea [ ] wheezing [ ] sputum [ ] hemoptysis  GI: [ ] negative [ ] nausea [ ] vomiting [ ] diarrhea [ ] constipation [ ] abd pain [ ] dysphagia   : [ ] negative [ ] dysuria [ ] nocturia [ ] hematuria [ ] increased urinary frequency  Musculoskeletal: [ ] negative [ ] back pain [ ] myalgias [ ] arthralgias [ ] fracture  Skin: [ ] negative [ ] rash [ ] itch  Neurological: [ ] negative [ ] headache [ ] dizziness [ ] syncope [ ] weakness [ ] numbness  Psychiatric: [ ] negative [ ] anxiety [ ] depression  Endocrine: [ ] negative [ ] diabetes [ ] thyroid problem  Hematologic/Lymphatic: [ ] negative [ ] anemia [ ] bleeding problem  Allergic/Immunologic: [ ] negative [ ] itchy eyes [ ] nasal discharge [ ] hives [ ] angioedema  [ ] All other systems negative  [ ] Unable to assess ROS because ________    OBJECTIVE:  ICU Vital Signs Last 24 Hrs  T(C): 36.1 (05 Sep 2017 04:00), Max: 36.6 (04 Sep 2017 12:50)  T(F): 97 (05 Sep 2017 04:00), Max: 97.8 (04 Sep 2017 12:50)  HR: 69 (05 Sep 2017 06:00) (67 - 77)  BP: 179/94 (05 Sep 2017 06:00) (116/57 - 179/94)  BP(mean): 109 (05 Sep 2017 06:00) (68 - 109)  ABP: --  ABP(mean): --  RR: 18 (05 Sep 2017 06:00) (13 - 30)  SpO2: 97% (05 Sep 2017 06:00) (91% - 98%)    Mode: CPAP with PS, FiO2: 50, PEEP: 5, PS: 5, MAP: 7    09-04 @ 07:01  -  09-05 @ 07:00  --------------------------------------------------------  IN: 521.2 mL / OUT: 945 mL / NET: -423.8 mL      CAPILLARY BLOOD GLUCOSE  176 (05 Sep 2017 05:00)          PHYSICAL EXAM:  General:   HEENT:   Lymph Nodes:  Neck:   Respiratory:   Cardiovascular:   Abdomen:   Extremities:   Skin:   Neurological:  Psychiatry:    LINES:    HOSPITAL MEDICATIONS:  Standing Meds:  heparin  Injectable 5000 Unit(s) SubCutaneous every 8 hours  piperacillin/tazobactam IVPB. 3.375 Gram(s) IV Intermittent every 12 hours  chlorhexidine 4% Liquid 1 Application(s) Topical daily  calcium acetate 667 milliGRAM(s) Oral three times a day with meals  insulin lispro (HumaLOG) corrective regimen sliding scale   SubCutaneous three times a day before meals  insulin lispro (HumaLOG) corrective regimen sliding scale   SubCutaneous at bedtime  epoetin valente Injectable 4000 Unit(s) IV Push <User Schedule>  insulin glargine Injectable (LANTUS) 10 Unit(s) SubCutaneous at bedtime  ALBUTerol/ipratropium for Nebulization 3 milliLiter(s) Nebulizer every 6 hours      PRN Meds:      LABS:                        7.8    6.33  )-----------( 210      ( 05 Sep 2017 02:30 )             22.5     Hgb Trend: 7.8<--, 7.2<--, 5.5<--, 5.7<--, 7.0<--  09-05    137  |  93<L>  |  81<H>  ----------------------------<  33<LL>  3.8   |  24  |  6.57<H>    Ca    8.3<L>      05 Sep 2017 02:30  Phos  6.5     09-05  Mg     2.2     09-05    TPro  5.3<L>  /  Alb  2.6<L>  /  TBili  0.6  /  DBili  x   /  AST  18  /  ALT  15  /  AlkPhos  72  09-05    Creatinine Trend: 6.57<--, 8.26<--, 8.03<--, 10.90<--, 4.17<--, 4.02<--      Arterial Blood Gas:  09-04 @ 00:51  7.39/27/189/18/99.6/-7.9  ABG lactate: --    Venous Blood Gas:  09-03 @ 13:45  7.17/25/61/10/84.9  VBG Lactate: 0.7      MICROBIOLOGY:     RADIOLOGY:  [ ] Reviewed and interpreted by me    EKG: CHIEF COMPLAINT: AMS    Interval Events: No ON events, patient extubated yesterday with no complications.  Has no complaints, ROS negative other than wheezing.      REVIEW OF SYSTEMS:  Constitutional: [ x] negative [ ] fevers [ ] chills [ ] weight loss [ ] weight gain  HEENT: [ x] negative [ ] dry eyes [ ] eye irritation [ ] postnasal drip [ ] nasal congestion  CV: [x] negative  [ ] chest pain [ ] orthopnea [ ] palpitations [ ] murmur  Resp: [ ] negative [ ] cough [ ] shortness of breath [ ] dyspnea [x ] wheezing [ ] sputum [ ] hemoptysis  GI: [ x] negative [ ] nausea [ ] vomiting [ ] diarrhea [ ] constipation [ ] abd pain [ ] dysphagia   : [ x] negative [ ] dysuria [ ] nocturia [ ] hematuria [ ] increased urinary frequency  Musculoskeletal: [x ] negative [ ] back pain [ ] myalgias [ ] arthralgias [ ] fracture  Skin: [x ] negative [ ] rash [ ] itch  Neurological: [x ] negative [ ] headache [ ] dizziness [ ] syncope [ ] weakness [ ] numbness  Psychiatric: [ x] negative [ ] anxiety [ ] depression  Endocrine: [ ] negative [x ] diabetes [ ] thyroid problem  Hematologic/Lymphatic: [ x] negative [ ] anemia [ ] bleeding problem  Allergic/Immunologic: [ x] negative [ ] itchy eyes [ ] nasal discharge [ ] hives [ ] angioedema  [x ] All other systems negative  [ ] Unable to assess ROS because ________    OBJECTIVE:  ICU Vital Signs Last 24 Hrs  T(C): 36.1 (05 Sep 2017 04:00), Max: 36.6 (04 Sep 2017 12:50)  T(F): 97 (05 Sep 2017 04:00), Max: 97.8 (04 Sep 2017 12:50)  HR: 69 (05 Sep 2017 06:00) (67 - 77)  BP: 179/94 (05 Sep 2017 06:00) (116/57 - 179/94)  BP(mean): 109 (05 Sep 2017 06:00) (68 - 109)  ABP: --  ABP(mean): --  RR: 18 (05 Sep 2017 06:00) (13 - 30)  SpO2: 97% (05 Sep 2017 06:00) (91% - 98%)    Mode: CPAP with PS, FiO2: 50, PEEP: 5, PS: 5, MAP: 7    09-04 @ 07:01  -  09-05 @ 07:00  --------------------------------------------------------  IN: 521.2 mL / OUT: 945 mL / NET: -423.8 mL      CAPILLARY BLOOD GLUCOSE  176 (05 Sep 2017 05:00)          PHYSICAL EXAM:  General: NAD  HEENT: legally blind  Lymph Nodes: no lymphadenopathy  Neck: supple, no JVD  Respiratory: CTAB  Cardiovascular: RRR, ns1s2 no MRG  Abdomen: soft, NT, ND  Extremities: WWP  Skin: no rashes  Neurological: nonfocal  Psychiatry:alert and oriented x3        HOSPITAL MEDICATIONS:  Standing Meds:  heparin  Injectable 5000 Unit(s) SubCutaneous every 8 hours  piperacillin/tazobactam IVPB. 3.375 Gram(s) IV Intermittent every 12 hours  chlorhexidine 4% Liquid 1 Application(s) Topical daily  calcium acetate 667 milliGRAM(s) Oral three times a day with meals  insulin lispro (HumaLOG) corrective regimen sliding scale   SubCutaneous three times a day before meals  insulin lispro (HumaLOG) corrective regimen sliding scale   SubCutaneous at bedtime  epoetin valente Injectable 4000 Unit(s) IV Push <User Schedule>  insulin glargine Injectable (LANTUS) 10 Unit(s) SubCutaneous at bedtime  ALBUTerol/ipratropium for Nebulization 3 milliLiter(s) Nebulizer every 6 hours      PRN Meds:      LABS:                        7.8    6.33  )-----------( 210      ( 05 Sep 2017 02:30 )             22.5     Hgb Trend: 7.8<--, 7.2<--, 5.5<--, 5.7<--, 7.0<--  09-05    137  |  93<L>  |  81<H>  ----------------------------<  33<LL>  3.8   |  24  |  6.57<H>    Ca    8.3<L>      05 Sep 2017 02:30  Phos  6.5     09-05  Mg     2.2     09-05    TPro  5.3<L>  /  Alb  2.6<L>  /  TBili  0.6  /  DBili  x   /  AST  18  /  ALT  15  /  AlkPhos  72  09-05    Creatinine Trend: 6.57<--, 8.26<--, 8.03<--, 10.90<--, 4.17<--, 4.02<--      Arterial Blood Gas:  09-04 @ 00:51  7.39/27/189/18/99.6/-7.9  ABG lactate: --    Venous Blood Gas:  09-03 @ 13:45  7.17/25/61/10/84.9  VBG Lactate: 0.7      MICROBIOLOGY:   blood and urine cx NGD    RADIOLOGY:  [ x] Reviewed and interpreted by me

## 2017-09-05 NOTE — PROGRESS NOTE ADULT - PROBLEM SELECTOR PLAN 1
Hx of CKD, requiring dialysis for the first time this admission. Home regimen: torsemide 10mg daily  -HD through RIJ  -HD today hypotensive episode, LOC, hypothermia  -covering with broad spectrum vancomycin by level and zosyn? Presented to the ED hypotensive, LOC, hypothermia, bradycardic in setting of hyperkalemia and hyperphosphatemia. Had been covering with broad spectrum vancomycin by level and zosyn in the MICU, but infectious etiology neg to date. Deranged vital signs more likely attributable to resulting hyperK/hyperphos resulting in PEA arrest.  -monitor off antibiotics Per nephro, RODRIGO or transplant rejection pushed him into ESRD requiring dialysis. Home regimen: CellCept 1250mg BID, cyclosporine 25mg q12h.  -Per nephrology, hold pt's CellCept, will start prednisone with pt's home cyclosporine to maintain him until he is able to get another kidney transplant, then cyclosporine will be tapered leaving prednisone.  -cont Cyclosporin 100mg BID and prednisone 5mg po daily

## 2017-09-05 NOTE — CHART NOTE - NSCHARTNOTEFT_GEN_A_CORE
53 y/o M with h/o CKD s/p renal transplant in 1998, HTN, DM, legally blind BIBEMS for AMS. Pt became bradycardic and had cardiac arrest in the ED. ROSC achieved after 1 round of epi and pt was transferred to MICU. Immunosuppressants for renal transplant were held. Pt had emergent HD for hyperkalemia. Abx given for possible pna. 1x PRBC given for Hgb 5 and epogen was started. Pressors were weaned off. Pt was extubated the following day. Post extubation pt was noted to be wheezy which improved with Duoneb. Diet and insulin regimen started. Cyclosporin and prednisone also resumed. Pt remains stable and transferred to floor for further management.    Plan of care  -c/w Duoneb for wheezing  -ISS  -c/w Cyclosporin and prednisone for transplanted kidney  -c/w phoslo, Epogen  -HD today    Roney Koch ([id number])

## 2017-09-06 ENCOUNTER — APPOINTMENT (OUTPATIENT)
Age: 52
End: 2017-09-06

## 2017-09-06 LAB
FERRITIN SERPL-MCNC: 554.1 NG/ML — HIGH (ref 30–400)
HCT VFR BLD CALC: 24.7 % — LOW (ref 39–50)
HCV RNA FLD QL NAA+PROBE: SIGNIFICANT CHANGE UP
HCV RNA SERPL NAA DL=5-ACNC: HIGH IU/ML
HCV RNA SPEC NAA+PROBE-LOG IU: 6.82 LOGIU/ML — HIGH
HCV RNA SPEC QL PROBE+SIG AMP: DETECTED — SIGNIFICANT CHANGE UP
HGB BLD-MCNC: 8.5 G/DL — LOW (ref 13–17)
IRON SATN MFR SERPL: 187 UG/DL — SIGNIFICANT CHANGE UP (ref 155–535)
IRON SATN MFR SERPL: 28 UG/DL — LOW (ref 45–165)
MCHC RBC-ENTMCNC: 29 PG — SIGNIFICANT CHANGE UP (ref 27–34)
MCHC RBC-ENTMCNC: 34.4 % — SIGNIFICANT CHANGE UP (ref 32–36)
MCV RBC AUTO: 84.3 FL — SIGNIFICANT CHANGE UP (ref 80–100)
NRBC # FLD: 0.03 — SIGNIFICANT CHANGE UP
PLATELET # BLD AUTO: 233 K/UL — SIGNIFICANT CHANGE UP (ref 150–400)
PMV BLD: 9.7 FL — SIGNIFICANT CHANGE UP (ref 7–13)
RBC # BLD: 2.93 M/UL — LOW (ref 4.2–5.8)
RBC # FLD: 13.7 % — SIGNIFICANT CHANGE UP (ref 10.3–14.5)
UIBC SERPL-MCNC: 159 UG/DL — SIGNIFICANT CHANGE UP (ref 110–370)
WBC # BLD: 11.68 K/UL — HIGH (ref 3.8–10.5)
WBC # FLD AUTO: 11.68 K/UL — HIGH (ref 3.8–10.5)

## 2017-09-06 PROCEDURE — 99233 SBSQ HOSP IP/OBS HIGH 50: CPT | Mod: GC

## 2017-09-06 RX ORDER — FUROSEMIDE 40 MG
60 TABLET ORAL
Qty: 0 | Refills: 0 | Status: DISCONTINUED | OUTPATIENT
Start: 2017-09-06 | End: 2017-09-06

## 2017-09-06 RX ORDER — FUROSEMIDE 40 MG
40 TABLET ORAL ONCE
Qty: 0 | Refills: 0 | Status: DISCONTINUED | OUTPATIENT
Start: 2017-09-06 | End: 2017-09-06

## 2017-09-06 RX ORDER — INSULIN GLARGINE 100 [IU]/ML
5 INJECTION, SOLUTION SUBCUTANEOUS AT BEDTIME
Qty: 0 | Refills: 0 | Status: DISCONTINUED | OUTPATIENT
Start: 2017-09-06 | End: 2017-09-07

## 2017-09-06 RX ORDER — FUROSEMIDE 40 MG
40 TABLET ORAL ONCE
Qty: 0 | Refills: 0 | Status: COMPLETED | OUTPATIENT
Start: 2017-09-06 | End: 2017-09-06

## 2017-09-06 RX ORDER — FUROSEMIDE 40 MG
60 TABLET ORAL
Qty: 0 | Refills: 0 | Status: DISCONTINUED | OUTPATIENT
Start: 2017-09-06 | End: 2017-09-07

## 2017-09-06 RX ADMIN — Medication 3 MILLILITER(S): at 22:57

## 2017-09-06 RX ADMIN — Medication 3 MILLILITER(S): at 11:03

## 2017-09-06 RX ADMIN — Medication 3: at 17:08

## 2017-09-06 RX ADMIN — Medication 50 MILLIGRAM(S): at 14:47

## 2017-09-06 RX ADMIN — Medication 3: at 12:09

## 2017-09-06 RX ADMIN — HEPARIN SODIUM 5000 UNIT(S): 5000 INJECTION INTRAVENOUS; SUBCUTANEOUS at 14:46

## 2017-09-06 RX ADMIN — Medication 50 MILLIGRAM(S): at 05:21

## 2017-09-06 RX ADMIN — HEPARIN SODIUM 5000 UNIT(S): 5000 INJECTION INTRAVENOUS; SUBCUTANEOUS at 05:21

## 2017-09-06 RX ADMIN — Medication 667 MILLIGRAM(S): at 12:10

## 2017-09-06 RX ADMIN — CYCLOSPORINE 100 MILLIGRAM(S): 100 CAPSULE ORAL at 05:22

## 2017-09-06 RX ADMIN — Medication 40 MILLIGRAM(S): at 08:45

## 2017-09-06 RX ADMIN — Medication 3: at 08:23

## 2017-09-06 RX ADMIN — Medication 667 MILLIGRAM(S): at 17:08

## 2017-09-06 RX ADMIN — ATORVASTATIN CALCIUM 10 MILLIGRAM(S): 80 TABLET, FILM COATED ORAL at 21:09

## 2017-09-06 RX ADMIN — Medication 5 MILLIGRAM(S): at 05:22

## 2017-09-06 RX ADMIN — Medication 1: at 22:28

## 2017-09-06 RX ADMIN — Medication 90 MILLIGRAM(S): at 05:22

## 2017-09-06 RX ADMIN — Medication 50 MILLIGRAM(S): at 21:07

## 2017-09-06 RX ADMIN — CYCLOSPORINE 100 MILLIGRAM(S): 100 CAPSULE ORAL at 17:08

## 2017-09-06 RX ADMIN — Medication 667 MILLIGRAM(S): at 08:24

## 2017-09-06 RX ADMIN — HEPARIN SODIUM 5000 UNIT(S): 5000 INJECTION INTRAVENOUS; SUBCUTANEOUS at 21:07

## 2017-09-06 RX ADMIN — TAMSULOSIN HYDROCHLORIDE 0.4 MILLIGRAM(S): 0.4 CAPSULE ORAL at 21:07

## 2017-09-06 RX ADMIN — Medication 3 MILLILITER(S): at 16:09

## 2017-09-06 RX ADMIN — INSULIN GLARGINE 5 UNIT(S): 100 INJECTION, SOLUTION SUBCUTANEOUS at 22:28

## 2017-09-06 NOTE — PROGRESS NOTE ADULT - PROBLEM SELECTOR PLAN 4
in setting of renal failure. Continue phoslo 667mg TID with meals. Monitor serum PO4. Low phosphate diet. Check PTH

## 2017-09-06 NOTE — PROGRESS NOTE ADULT - SUBJECTIVE AND OBJECTIVE BOX
Patient is a 52y old  Male who presents with a chief complaint of Altered mental status (03 Sep 2017 17:46)      SUBJECTIVE / OVERNIGHT EVENTS:  -HD yesterday  - --> BP 170s/70s after started BP meds nifedipine 90, hydralazine 50 TID  -inc WOB, crackles, high 90s sat on RA. Lasix 40 IV x 1    MEDICATIONS  (STANDING):  heparin  Injectable 5000 Unit(s) SubCutaneous every 8 hours  calcium acetate 667 milliGRAM(s) Oral three times a day with meals  insulin lispro (HumaLOG) corrective regimen sliding scale   SubCutaneous three times a day before meals  insulin lispro (HumaLOG) corrective regimen sliding scale   SubCutaneous at bedtime  epoetin valente Injectable 4000 Unit(s) IV Push <User Schedule>  ALBUTerol/ipratropium for Nebulization 3 milliLiter(s) Nebulizer every 6 hours  cycloSPORINE  (SandIMMUNE) 100 milliGRAM(s) Oral two times a day  predniSONE   Tablet 5 milliGRAM(s) Oral daily  NIFEdipine XL 90 milliGRAM(s) Oral daily  atorvastatin 10 milliGRAM(s) Oral at bedtime  tamsulosin 0.4 milliGRAM(s) Oral at bedtime  hydrALAZINE 50 milliGRAM(s) Oral every 8 hours    MEDICATIONS  (PRN):      Vital Signs Last 24 Hrs  T(C): 36.6 (06 Sep 2017 05:16), Max: 37.2 (05 Sep 2017 20:30)  T(F): 97.8 (06 Sep 2017 05:16), Max: 98.9 (05 Sep 2017 20:30)  HR: 76 (06 Sep 2017 05:16) (76 - 89)  BP: 139/76 (06 Sep 2017 05:16) (139/76 - 197/93)  BP(mean): 93 (05 Sep 2017 10:00) (93 - 93)  RR: 18 (06 Sep 2017 05:16) (16 - 21)  SpO2: 98% (06 Sep 2017 05:16) (96% - 100%)    CAPILLARY BLOOD GLUCOSE  271 (06 Sep 2017 08:22)  183 (05 Sep 2017 22:31)  120 (05 Sep 2017 18:00)  103 (05 Sep 2017 16:18)  91 (05 Sep 2017 13:05)  61 (05 Sep 2017 09:44)          I&O's Summary    05 Sep 2017 07:01  -  06 Sep 2017 07:00  --------------------------------------------------------  IN: 400 mL / OUT: 2400 mL / NET: -2000 mL        PHYSICAL EXAM  GENERAL: NAD, well-developed  HEAD:  Atraumatic, Normocephalic  EYES: EOMI, PERRLA, conjunctiva and sclera clear. Legally blind but can see changes in light  NECK: Supple, No JVD  CHEST/LUNG: RR 28, using accessory muscles, high 90s SpO2 on RA, crackles at base in posterior b/l. No wheeze  HEART: Regular rate and rhythm; No murmurs, rubs, or gallops  ABDOMEN: Soft, Nontender, Nondistended; Bowel sounds present  EXTREMITIES: Feet warm, No clubbing, cyanosis. 3+ pitting edema to below knees  PSYCH: AAOx3  SKIN: No rashes or lesions    LABS: pending      RADIOLOGY & ADDITIONAL TESTS:     MICROBIOLOGY:   BCx 9/3 x 2 ng    Hep C ab positive  Hep B core ab, surface ab neg    CONSULTS: nephrology    Latosha Maya, PGY1  Internal Medicine, Team 3  Pager [phone]

## 2017-09-06 NOTE — PROGRESS NOTE ADULT - PROBLEM SELECTOR PLAN 1
Per nephro, RODRIGO or transplant rejection pushed him into ESRD requiring dialysis. Home regimen: CellCept 1250mg BID, cyclosporine 25mg q12h.  -Per nephrology, hold pt's CellCept, will start prednisone with pt's home cyclosporine to maintain him until he is able to get another kidney transplant, then cyclosporine will be tapered leaving prednisone.  -cont Cyclosporin 100mg BID and prednisone 5mg po daily

## 2017-09-06 NOTE — PROGRESS NOTE ADULT - PROBLEM SELECTOR PLAN 5
Home regimen lantus 22u daily, home pravastatin 10mg daily  -start atorvastatin 10mg daily  -ISS  -monitor FS over 24h, will determine AM lantus dose. Lantus 10u dose caused  --> 61

## 2017-09-06 NOTE — PROGRESS NOTE ADULT - SUBJECTIVE AND OBJECTIVE BOX
[facility] DIVISION OF KIDNEY DISEASES AND HYPERTENSION -- FOLLOW UP NOTE  --------------------------------------------------------------------------------  Chief Complaint:    24 hour events/subjective: feels well, no issues overnight.          PAST HISTORY  --------------------------------------------------------------------------------  No significant changes to PMH, PSH, FHx, SHx, unless otherwise noted    ALLERGIES & MEDICATIONS  --------------------------------------------------------------------------------  Allergies    No Known Allergies    Intolerances      Standing Inpatient Medications  heparin  Injectable 5000 Unit(s) SubCutaneous every 8 hours  calcium acetate 667 milliGRAM(s) Oral three times a day with meals  insulin lispro (HumaLOG) corrective regimen sliding scale   SubCutaneous three times a day before meals  insulin lispro (HumaLOG) corrective regimen sliding scale   SubCutaneous at bedtime  epoetin valente Injectable 4000 Unit(s) IV Push <User Schedule>  ALBUTerol/ipratropium for Nebulization 3 milliLiter(s) Nebulizer every 6 hours  cycloSPORINE  (SandIMMUNE) 100 milliGRAM(s) Oral two times a day  predniSONE   Tablet 5 milliGRAM(s) Oral daily  NIFEdipine XL 90 milliGRAM(s) Oral daily  atorvastatin 10 milliGRAM(s) Oral at bedtime  tamsulosin 0.4 milliGRAM(s) Oral at bedtime  hydrALAZINE 50 milliGRAM(s) Oral every 8 hours  insulin glargine Injectable (LANTUS) 5 Unit(s) SubCutaneous at bedtime  furosemide   Injectable 60 milliGRAM(s) IV Push two times a day    PRN Inpatient Medications      REVIEW OF SYSTEMS  --------------------------------------------------------------------------------  -no rashes  -no chest pain  -no SOB  +edema  -no abdo pain, no nausea/vomiting/diarrhea    All other systems were reviewed and are negative, except as noted.    VITALS/PHYSICAL EXAM  --------------------------------------------------------------------------------  T(C): 36.8 (09-06-17 @ 20:44), Max: 37.2 (09-06-17 @ 14:37)  HR: 100 (09-06-17 @ 20:44) (76 - 100)  BP: 139/65 (09-06-17 @ 20:44) (137/62 - 154/88)  RR: 18 (09-06-17 @ 20:44) (17 - 18)  SpO2: 95% (09-06-17 @ 20:44) (95% - 98%)  Wt(kg): --        09-05-17 @ 07:01  -  09-06-17 @ 07:00  --------------------------------------------------------  IN: 400 mL / OUT: 2400 mL / NET: -2000 mL      Physical Exam:  	Gen: NAD, well-appearing  	Pulm: CTA B/L  	CV: RRR, S1S2; no rub  	Abd: +BS, soft, nontender/nondistended                      LE: warm; + edema  	Skin: Warm, without rashes  	Vascular access: R. IJ shiley catheter, exit site clean  LABS/STUDIES  --------------------------------------------------------------------------------              8.5    11.68 >-----------<  233      [09-06-17 @ 10:20]              24.7     137  |  93  |  81  ----------------------------<  33      [09-05-17 @ 02:30]  3.8   |  24  |  6.57        Ca     8.3     [09-05-17 @ 02:30]      Mg     2.2     [09-05-17 @ 02:30]      Phos  6.5     [09-05-17 @ 02:30]    TPro  5.3  /  Alb  2.6  /  TBili  0.6  /  DBili  x   /  AST  18  /  ALT  15  /  AlkPhos  72  [09-05-17 @ 02:30]          Creatinine Trend:  SCr 6.57 [09-05 @ 02:30]  SCr 8.26 [09-04 @ 04:22]  SCr 8.03 [09-04 @ 00:51]  SCr 10.90 [09-03 @ 13:45]  SCr 4.17 [08-16 @ 06:30]    Urinalysis - [08-10-17 @ 04:50]      Color PLYEL / Appearance CLEAR / SG 1.009 / pH 6.0      Gluc 50 / Ketone NEGATIVE  / Bili NEGATIVE / Urobili NORMAL       Blood TRACE / Protein 300 / Leuk Est NEGATIVE / Nitrite NEGATIVE      RBC 0-2 / WBC 0-2 / Hyaline  / Gran  / Sq Epi OCC / Non Sq Epi  / Bacteria FEW      Iron 28, TIBC 187, %sat --      [09-06-17 @ 05:55]  Ferritin 554.1      [09-06-17 @ 05:55]  HbA1c 8.0      [08-16-17 @ 06:30]  TSH 2.88      [09-03-17 @ 13:45]  Lipid: chol 137, TG 97, HDL 52, LDL 68      [08-11-17 @ 07:20]    HBsAb <3.0      [09-03-17 @ 18:00]  HBsAg NEGATIVE      [09-03-17 @ 18:00]  HCV 14.48, Reactive      [09-03-17 @ 18:00]

## 2017-09-06 NOTE — PROGRESS NOTE ADULT - PROBLEM SELECTOR PLAN 1
Will need chronic maintenance dialysis, suggest perm cath placement and consult vascular surgery for AVF placement.  Patient declines PD, as he is blind and feels will be too difficult at home.  Next HD scheduled for 9/7, will plan for TTS schedule.

## 2017-09-06 NOTE — PROGRESS NOTE ADULT - ASSESSMENT
53 y/o M with h/o CKD s/p renal transplant (1998) on cyclosporine and CellCept, HTN, DM, legally blind BIBEMS for uremic encephalopathy. Pt became bradycardic and had PEA arrest in the ED, intubated for airway protection. ROSC achieved s/p 1 round of epi and chest compressions and pt was transferred to MICU. Immunosuppressants for renal transplant were held. Pt had emergent HD for hyperkalemia. Covered empirically with broad spectrum antibiotics (vanc and zosyn), Hgb 5 s/p 1u pRBC given and epogen was started. Pressors were weaned off and pt extubated 9/4. Cyclosporin and prednisone also resumed. Pt remains stable and transferred to floor for further management 9/5

## 2017-09-06 NOTE — PROGRESS NOTE ADULT - PROBLEM SELECTOR PLAN 2
Hx of CKD, requiring dialysis for the first time this admission. Pt started HD, hold home torsemide 10mg daily  -HD today through [facility]

## 2017-09-06 NOTE — DIETITIAN INITIAL EVALUATION ADULT. - NS AS NUTRI INTERV COLLABORAT
1)Continue Consistent Carbohydrate, Renal diet                    2)Add Nephrovite for micronutrient coverage                3) Obtain pre/post HD weights                      4) RDN remains available & will f/u PRN.    Gale Drummond RDN, CD/N  pager [phone]

## 2017-09-06 NOTE — PROGRESS NOTE ADULT - PROBLEM SELECTOR PLAN 3
Presented to the ED hypotensive, LOC, hypothermia, bradycardic in setting of hyperkalemia and hyperphosphatemia. Had been covering with broad spectrum vancomycin by level and zosyn in the MICU, but infectious etiology neg to date. Presented altered, hypotensive, hypothermic, bradycardic. Deranged vital signs more likely attributable to resulting hyperK resulting in PEA arrest. s/p Vanc and zosyn  -monitor off antibiotics

## 2017-09-06 NOTE — PROGRESS NOTE ADULT - ATTENDING COMMENTS
Patient seen and examined, chart/lab reviewed.  53 y/o male with h/o HTN, DM-1 with diabetic retinopathy (legally blind) and ESRD,  s/p living related renal transplant (donor brother) in 1998 at [facility], a/w  uremic encephalopathy, hyperkalemia, fluid overload, c/b bradycardic cardiac arrest, stabilized and dialyzed and transferred to floor.    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  -on phos binder phoslo  -Plan for permacath and vascular surgery consult for AVF  -still on immunosuppression: prednisone 5 mg, cyclospine 100 mg bid, off ?MMF, taper immunosuppression as per renal  -never had renal biopsy confirming chronic rejection, f/u renal   # DM-1, volatile glycemic control: start lantus 5 u hs, c/w humalog ISS, A1c 8%, monitor FS  # Diabetic retinopathy, legally blind: now living with sister, relocated from [place] to   # Anemia of CKD: s/p 1 u pRBC, epogen as per renal, tsat 15%, ferritin 554  # Severe protein calorie malnutrition: albumin 2.6, encourage po intake  # Acute on chronic CHF, fluid overload: pt still making urine, start iv lasix 60 mg bid, pending echo, fluid removal with HD  # Uncontrolled HTN: now better controlled on procardia 90 mg and hydralazine 50 mg tid, monitor BP  # DVT prophylaxis: heparin sc.

## 2017-09-06 NOTE — DIETITIAN INITIAL EVALUATION ADULT. - OTHER INFO
Pt. very somnolent, thus minimally participative during nutritional assessment.  Noted w adequate PO intake.  Pt. denies food allergies, nausea/vomiting/diarrhea/constipation, or issues with chewing/swallowing.  Attributes weight changes to fluid... gained weight PTA (did not specify amount), then with weight loss since initiation of HD.  Attempted to discuss therapeutic diet as it relates to new HD, although not currently feasible as Pt. falling asleep.  Provided printed education material (although Pt. is visually impaired)... RDN to attempt to f/u as able.

## 2017-09-06 NOTE — PROGRESS NOTE ADULT - ASSESSMENT
53 y/o M with h/o CKD s/p renal transplant in 1998, HTN, DM, legally blind admitted for  uremic encephalopathy initiated on dialysis.

## 2017-09-07 ENCOUNTER — APPOINTMENT (OUTPATIENT)
Dept: NEPHROLOGY | Facility: CLINIC | Age: 52
End: 2017-09-07

## 2017-09-07 DIAGNOSIS — I48.91 UNSPECIFIED ATRIAL FIBRILLATION: ICD-10-CM

## 2017-09-07 LAB
ADJUSTED MITOGEN: 0.08 IU/ML
ADJUSTED TB AG: 0.04 IU/ML
ALBUMIN SERPL ELPH-MCNC: 2.5 G/DL — LOW (ref 3.3–5)
ALBUMIN SERPL ELPH-MCNC: 3.6 G/DL
ALP SERPL-CCNC: 82 U/L — SIGNIFICANT CHANGE UP (ref 40–120)
ALT FLD-CCNC: 11 U/L — SIGNIFICANT CHANGE UP (ref 4–41)
ANION GAP SERPL CALC-SCNC: NORMAL
APPEARANCE: ABNORMAL
APTT BLD: 31.7 SEC — SIGNIFICANT CHANGE UP (ref 27.5–37.4)
APTT BLD: 31.7 SEC — SIGNIFICANT CHANGE UP (ref 27.5–37.4)
APTT BLD: 34.2 SEC — SIGNIFICANT CHANGE UP (ref 27.5–37.4)
AST SERPL-CCNC: 13 U/L — SIGNIFICANT CHANGE UP (ref 4–40)
BACTERIA: NEGATIVE
BASOPHILS # BLD AUTO: 0.01 K/UL
BASOPHILS # BLD AUTO: 0.04 K/UL — SIGNIFICANT CHANGE UP (ref 0–0.2)
BASOPHILS NFR BLD AUTO: 0.1 %
BASOPHILS NFR BLD AUTO: 0.3 % — SIGNIFICANT CHANGE UP (ref 0–2)
BILIRUB SERPL-MCNC: 0.6 MG/DL — SIGNIFICANT CHANGE UP (ref 0.2–1.2)
BILIRUBIN URINE: NEGATIVE
BLD GP AB SCN SERPL QL: NEGATIVE — SIGNIFICANT CHANGE UP
BLD GP AB SCN SERPL QL: NEGATIVE — SIGNIFICANT CHANGE UP
BLOOD URINE: ABNORMAL
BUN SERPL-MCNC: 127 MG/DL
BUN SERPL-MCNC: 63 MG/DL — HIGH (ref 7–23)
CALCIUM SERPL-MCNC: 8.7 MG/DL
CALCIUM SERPL-MCNC: 8.8 MG/DL — SIGNIFICANT CHANGE UP (ref 8.4–10.5)
CHLORIDE SERPL-SCNC: 88 MMOL/L — LOW (ref 98–107)
CHLORIDE SERPL-SCNC: 95 MMOL/L
CK MB BLD-MCNC: 3.89 NG/ML — SIGNIFICANT CHANGE UP (ref 1–6.6)
CK SERPL-CCNC: 82 U/L — SIGNIFICANT CHANGE UP (ref 30–200)
CO2 SERPL-SCNC: 13 MMOL/L — LOW (ref 22–31)
CO2 SERPL-SCNC: <10 MMOL/L
COLOR: ABNORMAL
CREAT SERPL-MCNC: 5.35 MG/DL — HIGH (ref 0.5–1.3)
CREAT SERPL-MCNC: 9.08 MG/DL
CREAT SPEC-SCNC: 147 MG/DL
CREAT/PROT UR: 1.3 RATIO
CYCLOSPORINE SER-MCNC: 180 NG/ML
EOSINOPHIL # BLD AUTO: 0.05 K/UL
EOSINOPHIL # BLD AUTO: 0.12 K/UL — SIGNIFICANT CHANGE UP (ref 0–0.5)
EOSINOPHIL NFR BLD AUTO: 0.4 %
EOSINOPHIL NFR BLD AUTO: 0.8 % — SIGNIFICANT CHANGE UP (ref 0–6)
GLUCOSE QUALITATIVE U: NORMAL MG/DL
GLUCOSE SERPL-MCNC: 121 MG/DL
GLUCOSE SERPL-MCNC: 324 MG/DL — HIGH (ref 70–99)
HBV CORE IGG+IGM SER QL: NONREACTIVE
HBV CORE IGM SER QL: NONREACTIVE
HBV SURFACE AB SER QL: NONREACTIVE
HBV SURFACE AB SERPL IA-ACNC: <3 MIU/ML
HBV SURFACE AG SER QL: NONREACTIVE
HCT VFR BLD CALC: 22.4 % — LOW (ref 39–50)
HCT VFR BLD CALC: 23.9 % — LOW (ref 39–50)
HCT VFR BLD CALC: 25.1 %
HCV AB SER QL: REACTIVE
HCV S/CO RATIO: 14.13 S/CO
HGB BLD-MCNC: 7.4 G/DL — LOW (ref 13–17)
HGB BLD-MCNC: 7.7 G/DL — LOW (ref 13–17)
HGB BLD-MCNC: 8.1 G/DL
HYALINE CASTS: 3 /LPF
IMM GRANULOCYTES # BLD AUTO: 0.26 # — SIGNIFICANT CHANGE UP
IMM GRANULOCYTES NFR BLD AUTO: 0.3 %
IMM GRANULOCYTES NFR BLD AUTO: 1.8 % — HIGH (ref 0–1.5)
INR BLD: 1.02 — SIGNIFICANT CHANGE UP (ref 0.88–1.17)
KETONES URINE: NEGATIVE
LEUKOCYTE ESTERASE URINE: NEGATIVE
LYMPHOCYTES # BLD AUTO: 0.45 K/UL — LOW (ref 1–3.3)
LYMPHOCYTES # BLD AUTO: 0.71 K/UL
LYMPHOCYTES # BLD AUTO: 3.1 % — LOW (ref 13–44)
LYMPHOCYTES NFR BLD AUTO: 6.2 %
M TB IFN-G BLD-IMP: ABNORMAL
MAGNESIUM SERPL-MCNC: 2 MG/DL — SIGNIFICANT CHANGE UP (ref 1.6–2.6)
MAN DIFF?: NORMAL
MCHC RBC-ENTMCNC: 27.8 PG
MCHC RBC-ENTMCNC: 28.5 PG — SIGNIFICANT CHANGE UP (ref 27–34)
MCHC RBC-ENTMCNC: 29.4 PG — SIGNIFICANT CHANGE UP (ref 27–34)
MCHC RBC-ENTMCNC: 32.2 % — SIGNIFICANT CHANGE UP (ref 32–36)
MCHC RBC-ENTMCNC: 32.3 GM/DL
MCHC RBC-ENTMCNC: 33 % — SIGNIFICANT CHANGE UP (ref 32–36)
MCV RBC AUTO: 86.3 FL
MCV RBC AUTO: 88.5 FL — SIGNIFICANT CHANGE UP (ref 80–100)
MCV RBC AUTO: 88.9 FL — SIGNIFICANT CHANGE UP (ref 80–100)
MICROSCOPIC-UA: NORMAL
MONOCYTES # BLD AUTO: 0.69 K/UL
MONOCYTES # BLD AUTO: 1.39 K/UL — HIGH (ref 0–0.9)
MONOCYTES NFR BLD AUTO: 6 %
MONOCYTES NFR BLD AUTO: 9.5 % — SIGNIFICANT CHANGE UP (ref 2–14)
NEUTROPHILS # BLD AUTO: 10.01 K/UL
NEUTROPHILS # BLD AUTO: 12.37 K/UL — HIGH (ref 1.8–7.4)
NEUTROPHILS NFR BLD AUTO: 84.5 % — HIGH (ref 43–77)
NEUTROPHILS NFR BLD AUTO: 87 %
NITRITE URINE: NEGATIVE
NRBC # FLD: 0 — SIGNIFICANT CHANGE UP
NRBC # FLD: 0.02 — SIGNIFICANT CHANGE UP
PH URINE: 5
PHOSPHATE SERPL-MCNC: 4.7 MG/DL — HIGH (ref 2.5–4.5)
PHOSPHATE SERPL-MCNC: 8.6 MG/DL
PLATELET # BLD AUTO: 207 K/UL — SIGNIFICANT CHANGE UP (ref 150–400)
PLATELET # BLD AUTO: 232 K/UL — SIGNIFICANT CHANGE UP (ref 150–400)
PLATELET # BLD AUTO: 239 K/UL
PMV BLD: 9.4 FL — SIGNIFICANT CHANGE UP (ref 7–13)
PMV BLD: 9.5 FL — SIGNIFICANT CHANGE UP (ref 7–13)
POTASSIUM SERPL-MCNC: 3.8 MMOL/L — SIGNIFICANT CHANGE UP (ref 3.5–5.3)
POTASSIUM SERPL-SCNC: 3.8 MMOL/L — SIGNIFICANT CHANGE UP (ref 3.5–5.3)
POTASSIUM SERPL-SCNC: 5.3 MMOL/L
PROT SERPL-MCNC: 5.4 G/DL — LOW (ref 6–8.3)
PROT UR-MCNC: 198 MG/DL
PROTEIN URINE: 300 MG/DL
PROTHROM AB SERPL-ACNC: 11.4 SEC — SIGNIFICANT CHANGE UP (ref 9.8–13.1)
QUANTIFERON GOLD NIL: 0.02 IU/ML
RBC # BLD: 2.52 M/UL — LOW (ref 4.2–5.8)
RBC # BLD: 2.7 M/UL — LOW (ref 4.2–5.8)
RBC # BLD: 2.91 M/UL
RBC # FLD: 13.6 %
RBC # FLD: 14 % — SIGNIFICANT CHANGE UP (ref 10.3–14.5)
RBC # FLD: 14.1 % — SIGNIFICANT CHANGE UP (ref 10.3–14.5)
RED BLOOD CELLS URINE: 1 /HPF
RH IG SCN BLD-IMP: POSITIVE — SIGNIFICANT CHANGE UP
RH IG SCN BLD-IMP: POSITIVE — SIGNIFICANT CHANGE UP
SODIUM SERPL-SCNC: 132 MMOL/L
SODIUM SERPL-SCNC: 135 MMOL/L — SIGNIFICANT CHANGE UP (ref 135–145)
SPECIFIC GRAVITY URINE: 1.02
SQUAMOUS EPITHELIAL CELLS: 0 /HPF
TROPONIN T SERPL-MCNC: 0.13 NG/ML — HIGH (ref 0–0.06)
UROBILINOGEN URINE: 1 MG/DL
WBC # BLD: 14.63 K/UL — HIGH (ref 3.8–10.5)
WBC # BLD: 15.52 K/UL — HIGH (ref 3.8–10.5)
WBC # FLD AUTO: 11.5 K/UL
WBC # FLD AUTO: 14.63 K/UL — HIGH (ref 3.8–10.5)
WBC # FLD AUTO: 15.52 K/UL — HIGH (ref 3.8–10.5)
WHITE BLOOD CELLS URINE: 21 /HPF

## 2017-09-07 PROCEDURE — 99233 SBSQ HOSP IP/OBS HIGH 50: CPT | Mod: GC

## 2017-09-07 PROCEDURE — 93010 ELECTROCARDIOGRAM REPORT: CPT

## 2017-09-07 PROCEDURE — 99222 1ST HOSP IP/OBS MODERATE 55: CPT | Mod: GC

## 2017-09-07 RX ORDER — CYCLOSPORINE 100 MG/1
100 CAPSULE ORAL DAILY
Qty: 0 | Refills: 0 | Status: DISCONTINUED | OUTPATIENT
Start: 2017-09-07 | End: 2017-10-02

## 2017-09-07 RX ORDER — HYDRALAZINE HCL 50 MG
25 TABLET ORAL THREE TIMES A DAY
Qty: 0 | Refills: 0 | Status: DISCONTINUED | OUTPATIENT
Start: 2017-09-07 | End: 2017-09-07

## 2017-09-07 RX ORDER — METOPROLOL TARTRATE 50 MG
5 TABLET ORAL ONCE
Qty: 0 | Refills: 0 | Status: COMPLETED | OUTPATIENT
Start: 2017-09-07 | End: 2017-09-07

## 2017-09-07 RX ORDER — DILTIAZEM HCL 120 MG
15 CAPSULE, EXT RELEASE 24 HR ORAL
Qty: 125 | Refills: 0 | Status: DISCONTINUED | OUTPATIENT
Start: 2017-09-07 | End: 2017-09-08

## 2017-09-07 RX ORDER — HEPARIN SODIUM 5000 [USP'U]/ML
3500 INJECTION INTRAVENOUS; SUBCUTANEOUS EVERY 6 HOURS
Qty: 0 | Refills: 0 | Status: DISCONTINUED | OUTPATIENT
Start: 2017-09-07 | End: 2017-09-08

## 2017-09-07 RX ORDER — HEPARIN SODIUM 5000 [USP'U]/ML
INJECTION INTRAVENOUS; SUBCUTANEOUS
Qty: 25000 | Refills: 0 | Status: DISCONTINUED | OUTPATIENT
Start: 2017-09-07 | End: 2017-09-08

## 2017-09-07 RX ORDER — HEPARIN SODIUM 5000 [USP'U]/ML
7000 INJECTION INTRAVENOUS; SUBCUTANEOUS ONCE
Qty: 0 | Refills: 0 | Status: COMPLETED | OUTPATIENT
Start: 2017-09-07 | End: 2017-09-07

## 2017-09-07 RX ORDER — HEPARIN SODIUM 5000 [USP'U]/ML
7000 INJECTION INTRAVENOUS; SUBCUTANEOUS EVERY 6 HOURS
Qty: 0 | Refills: 0 | Status: DISCONTINUED | OUTPATIENT
Start: 2017-09-07 | End: 2017-09-08

## 2017-09-07 RX ORDER — INSULIN GLARGINE 100 [IU]/ML
8 INJECTION, SOLUTION SUBCUTANEOUS AT BEDTIME
Qty: 0 | Refills: 0 | Status: DISCONTINUED | OUTPATIENT
Start: 2017-09-07 | End: 2017-09-08

## 2017-09-07 RX ORDER — INSULIN LISPRO 100/ML
4 VIAL (ML) SUBCUTANEOUS
Qty: 0 | Refills: 0 | Status: DISCONTINUED | OUTPATIENT
Start: 2017-09-07 | End: 2017-09-08

## 2017-09-07 RX ORDER — METOPROLOL TARTRATE 50 MG
5 TABLET ORAL ONCE
Qty: 0 | Refills: 0 | Status: DISCONTINUED | OUTPATIENT
Start: 2017-09-07 | End: 2017-09-07

## 2017-09-07 RX ORDER — HEPARIN SODIUM 5000 [USP'U]/ML
5000 INJECTION INTRAVENOUS; SUBCUTANEOUS EVERY 8 HOURS
Qty: 0 | Refills: 0 | Status: DISCONTINUED | OUTPATIENT
Start: 2017-09-07 | End: 2017-09-07

## 2017-09-07 RX ADMIN — HEPARIN SODIUM 3500 UNIT(S): 5000 INJECTION INTRAVENOUS; SUBCUTANEOUS at 15:10

## 2017-09-07 RX ADMIN — Medication 5 MILLIGRAM(S): at 14:52

## 2017-09-07 RX ADMIN — Medication 5 MILLIGRAM(S): at 05:17

## 2017-09-07 RX ADMIN — Medication 5 MILLIGRAM(S): at 14:34

## 2017-09-07 RX ADMIN — Medication 3: at 23:17

## 2017-09-07 RX ADMIN — CYCLOSPORINE 100 MILLIGRAM(S): 100 CAPSULE ORAL at 05:17

## 2017-09-07 RX ADMIN — Medication 60 MILLIGRAM(S): at 05:16

## 2017-09-07 RX ADMIN — ATORVASTATIN CALCIUM 10 MILLIGRAM(S): 80 TABLET, FILM COATED ORAL at 22:11

## 2017-09-07 RX ADMIN — Medication 50 MILLIGRAM(S): at 05:17

## 2017-09-07 RX ADMIN — Medication 90 MILLIGRAM(S): at 05:18

## 2017-09-07 RX ADMIN — Medication 4 UNIT(S): at 17:43

## 2017-09-07 RX ADMIN — TAMSULOSIN HYDROCHLORIDE 0.4 MILLIGRAM(S): 0.4 CAPSULE ORAL at 22:11

## 2017-09-07 RX ADMIN — Medication 5: at 13:15

## 2017-09-07 RX ADMIN — Medication 3 MILLILITER(S): at 04:31

## 2017-09-07 RX ADMIN — HEPARIN SODIUM 2000 UNIT(S)/HR: 5000 INJECTION INTRAVENOUS; SUBCUTANEOUS at 23:18

## 2017-09-07 RX ADMIN — INSULIN GLARGINE 8 UNIT(S): 100 INJECTION, SOLUTION SUBCUTANEOUS at 23:17

## 2017-09-07 RX ADMIN — Medication 4: at 17:10

## 2017-09-07 RX ADMIN — Medication 10 MG/HR: at 19:06

## 2017-09-07 RX ADMIN — Medication 4: at 08:50

## 2017-09-07 RX ADMIN — HEPARIN SODIUM 1600 UNIT(S)/HR: 5000 INJECTION INTRAVENOUS; SUBCUTANEOUS at 15:09

## 2017-09-07 RX ADMIN — HEPARIN SODIUM 5000 UNIT(S): 5000 INJECTION INTRAVENOUS; SUBCUTANEOUS at 05:16

## 2017-09-07 RX ADMIN — Medication 5 MILLIGRAM(S): at 15:25

## 2017-09-07 RX ADMIN — Medication 667 MILLIGRAM(S): at 17:43

## 2017-09-07 RX ADMIN — HEPARIN SODIUM 7000 UNIT(S): 5000 INJECTION INTRAVENOUS; SUBCUTANEOUS at 15:10

## 2017-09-07 RX ADMIN — CYCLOSPORINE 100 MILLIGRAM(S): 100 CAPSULE ORAL at 17:44

## 2017-09-07 NOTE — CONSULT NOTE ADULT - ASSESSMENT
Impression:  1) Diabetic nephropathy with renal transplant on immunosuppression c/b rejection and now ESRD, newly dialysis dependent  2) s/p PEA arrest  3) new onset Afib with RVR  4) Chronic HCV infection, untreated.  No signs of liver dysfunction, normal synthetic function and transaminases  5) HTN  6) DM    Plan:  - followup genotype   - patient is HBV non-immune, will need HBV vaccination when stable  - please check HAV IgM/IgG and HIV  - will need assessment of fibrosis with Fibroscan as outpatient (FIB-4 Score 0.88 - extremely low likelihood of advanced fibrosis)  - HCV therapy not urgent and can be initiated as outpatient Impression:  1) Diabetic nephropathy with renal transplant on immunosuppression c/b rejection and now ESRD, newly dialysis dependent  2) s/p PEA arrest  3) new onset Afib with RVR  4) Chronic HCV infection, untreated.  No signs of liver dysfunction, normal synthetic function and transaminases  5) HTN  6) DM    Plan:  - followup genotype   - patient is HBV non-immune, will need HBV vaccination when stable  - please check HAV IgM/IgG and HIV as well  - will need assessment of fibrosis with Fibroscan as outpatient (FIB-4 Score 0.88 - extremely low likelihood of advanced fibrosis)  - HCV therapy not urgent and can be initiated as outpatient Impression:  1) Diabetic nephropathy with renal transplant on immunosuppression c/b rejection and now ESRD, newly dialysis dependent  2) s/p PEA arrest  3) new onset Afib with RVR  4) Chronic HCV infection, untreated.  No signs of liver dysfunction, normal synthetic function and transaminases  5) HTN  6) DM    Plan:  - followup genotype   - patient is HBV non-immune, will need HBV vaccination when stable  - please check HAV IgM/IgG and HIV as well  - will need assessment of fibrosis with Fibroscan as outpatient (FIB-4 Score 0.88 - extremely low likelihood of advanced fibrosis)  - HCV therapy not urgent and can be initiated as outpatient    - note; here is a single-center [facility] study in which outcomes of renal transplant patients with HCV+ RNA were compared to those who were HCV negative; there was no difference in graft survival, mortality Impression:  1) Diabetic nephropathy with renal transplant on immunosuppression c/b rejection and now ESRD, newly dialysis dependent  2) s/p PEA arrest  3) new onset Afib with RVR  4) Chronic HCV infection, untreated.  No signs of liver dysfunction, normal synthetic function and transaminases  5) HTN  6) DM    Plan:  There is limited published data on renal transplant patient with HCV complications.    Here is a single-center [facility] study in which outcomes of renal transplant patients with HCV+ RNA were compared to those who were HCV negative; there was no difference in graft survival, mortality      - followup genotype   - patient is HBV non-immune, will need HBV vaccination when stable  - please check HAV IgM/IgG and HIV as well  - will need assessment of fibrosis with Fibroscan as outpatient (FIB-4 Score 0.88 - extremely low likelihood of advanced fibrosis)  - HCV therapy not urgent and can be initiated as outpatient Impression:  1) Diabetic nephropathy with renal transplant on immunosuppression c/b rejection and now ESRD, newly dialysis dependent  2) s/p PEA arrest  3) new onset Afib with RVR  4) Chronic HCV infection, untreated.  No signs of liver dysfunction, normal synthetic function and transaminases  5) HTN  6) DM    Plan:    There is limited published data on renal transplant patient with HCV complications.  There are only very few case reports of HCV reactivation or HCV related morbidity in renal transplant patients on immunosuppressive agents.  HCV is a chronic disease and the major risk is a longterm risk of cirrhosis.     Here is a small, single-center [facility] study in which outcomes of renal transplant patients with HCV+ RNA were compared to those who were HCV negative; there was no difference in graft survival or mortality between groups. A single patient  from liver failure.  :Toro ISLAS, Elsi MARES, Leonarda R, et al. HCV-infected Renal Transplant Recipients: Our Experience before the Availability of New Antiviral Drugs. International Journal of Organ Transplantation Medicine. 2017;8(2):104-109.  This patient does not have any evidence of liver injury (normal synthetic function and transaminases) and the risk of HCV related complication is essentially negligible.  Thus:  - followup genotype   - patient is HBV non-immune, will need HBV vaccination when stable  - please check HAV IgM/IgG and HIV as well  - will need assessment of fibrosis with Fibroscan as outpatient (FIB-4 Score 0.88 - extremely low likelihood of advanced fibrosis)  - HCV therapy not urgent and can be initiated as outpatient

## 2017-09-07 NOTE — PROGRESS NOTE ADULT - PROBLEM SELECTOR PLAN 1
Per nephro, RODRIGO or transplant rejection pushed him into ESRD requiring dialysis. Home regimen: CellCept 1250mg BID, cyclosporine 25mg q12h.  -Per nephrology, hold pt's CellCept, will start prednisone with pt's home cyclosporine to maintain him until he is able to get another kidney transplant, then cyclosporine will be tapered leaving prednisone.  -cont Cyclosporin 100mg BID and prednisone 5mg po daily Hx of CKD, requiring dialysis for the first time this admission. Pt started HD, hold home torsemide 10mg daily  -HD today through [facility] Stood up to go to the bathroom, weak and lightheaded, mild SOB. Denied CP, N/V. VS: SpO2 98% on RA, H 150, /72, AO x 3. EKG showed new onset AFib, new TWI lateral leads from prior 3/2017  -Received cardizem 10mg IV x 1  -Started cardizem drip  -Started heparin drip  -f/u transfer to tele, TTE r/o thrombus, CE (1st), 2nd 9pm, 3rd AM labs

## 2017-09-07 NOTE — PROGRESS NOTE ADULT - PROBLEM SELECTOR PLAN 6
Legally blind, lives with sister  -keep room bright, can see shapes if bright Home regimen lantus 22u daily, home pravastatin 10mg daily. Lantus 10u dose caused  --> 61  -atorvastatin 10mg daily  -ISS  -inc to lantus 8u qhs, humalog 4u premeal Home regimen lantus 22u daily, home pravastatin 10mg daily  -atorvastatin 10mg daily  -ISS  -inc to lantus 8u qhs, humalog 4u premeal

## 2017-09-07 NOTE — PROGRESS NOTE ADULT - PROBLEM SELECTOR PLAN 5
Pt with failed renal transplant. Advise slow taper off of immunosuppressants over 3-6 months,  Will decrease cyclosporine to 100mg once daily, continue prednisone at current dose.

## 2017-09-07 NOTE — PROGRESS NOTE ADULT - PROBLEM SELECTOR PLAN 4
hold home clonidine 0.1mg po q8h  -cont home nifedipine XR 90 mg daily, hydralazine 50 TID Presented to the ED hypotensive, LOC, hypothermia, bradycardic in setting of hyperkalemia and hyperphosphatemia. Had been covering with broad spectrum vancomycin by level and zosyn in the MICU, but infectious etiology neg to date. Presented altered, hypotensive, hypothermic, bradycardic. Deranged vital signs more likely attributable to resulting hyperK resulting in PEA arrest. s/p Vanc and zosyn  -monitor off antibiotics Presented to the ED hypotensive, LOC, hypothermia, bradycardic in setting of hyperkalemia and hyperphosphatemia. Had been covering with broad spectrum vancomycin by level and zosyn in the MICU, but infectious etiology neg to date. Presented altered, hypotensive, hypothermic, bradycardic. Deranged vital signs more likely attributable to resulting hyperK resulting in PEA arrest. s/p Vanc and zosyn  -monitor off antibiotics, new leukocytosis from prednisone

## 2017-09-07 NOTE — PROGRESS NOTE ADULT - SUBJECTIVE AND OBJECTIVE BOX
[facility] DIVISION OF KIDNEY DISEASES AND HYPERTENSION -- FOLLOW UP NOTE  --------------------------------------------------------------------------------  Chief Complaint:    24 hour events/subjective: no acute events overnight.  plan for HD today. Awaiting perm cath placement and vascular assessment for AVF.         PAST HISTORY  --------------------------------------------------------------------------------  No significant changes to PMH, PSH, FHx, SHx, unless otherwise noted    ALLERGIES & MEDICATIONS  --------------------------------------------------------------------------------  Allergies    No Known Allergies    Intolerances      Standing Inpatient Medications  calcium acetate 667 milliGRAM(s) Oral three times a day with meals  insulin lispro (HumaLOG) corrective regimen sliding scale   SubCutaneous three times a day before meals  insulin lispro (HumaLOG) corrective regimen sliding scale   SubCutaneous at bedtime  epoetin valente Injectable 4000 Unit(s) IV Push <User Schedule>  ALBUTerol/ipratropium for Nebulization 3 milliLiter(s) Nebulizer every 6 hours  cycloSPORINE  (SandIMMUNE) 100 milliGRAM(s) Oral two times a day  predniSONE   Tablet 5 milliGRAM(s) Oral daily  NIFEdipine XL 90 milliGRAM(s) Oral daily  atorvastatin 10 milliGRAM(s) Oral at bedtime  tamsulosin 0.4 milliGRAM(s) Oral at bedtime  hydrALAZINE 50 milliGRAM(s) Oral every 8 hours  furosemide   Injectable 60 milliGRAM(s) IV Push two times a day  insulin glargine Injectable (LANTUS) 8 Unit(s) SubCutaneous at bedtime  insulin lispro Injectable (HumaLOG) 4 Unit(s) SubCutaneous three times a day before meals  diltiazem Injectable 10 milliGRAM(s) IV Push once  diltiazem Infusion 10 mG/Hr IV Continuous <Continuous>  heparin  Infusion.  Unit(s)/Hr IV Continuous <Continuous>  heparin  Injectable 7000 Unit(s) IV Push once    PRN Inpatient Medications  heparin  Injectable 7000 Unit(s) IV Push every 6 hours PRN  heparin  Injectable 3500 Unit(s) IV Push every 6 hours PRN      REVIEW OF SYSTEMS  --------------------------------------------------------------------------------  -no rashes  -no chest pain  -no SOB  +edema  -no abdo pain, no nausea/vomiting/diarrhea    All other systems were reviewed and are negative, except as noted.    VITALS/PHYSICAL EXAM  --------------------------------------------------------------------------------  T(C): 36.4 (09-07-17 @ 12:15), Max: 37.2 (09-06-17 @ 14:37)  HR: 140 (09-07-17 @ 12:34) (74 - 140)  BP: 127/72 (09-07-17 @ 12:34) (127/72 - 149/64)  RR: 18 (09-07-17 @ 12:34) (17 - 18)  SpO2: 98% (09-07-17 @ 12:34) (95% - 99%)  Wt(kg): --        Physical Exam:  	Gen: NAD, well-appearing  	Pulm: CTA B/L  	CV: RRR, S1S2; no rub  	Abd: +BS, soft, nontender/nondistended              LE: warm; + edema  	Skin: Warm, without rashes  	Vascular access: R IJ shiley catheter, exit site clean    LABS/STUDIES  --------------------------------------------------------------------------------              7.7    14.63 >-----------<  232      [09-07-17 @ 06:00]              23.9     135  |  88  |  63  ----------------------------<  324      [09-07-17 @ 06:00]  3.8   |  13  |  5.35        Ca     8.8     [09-07-17 @ 06:00]      Mg     2.0     [09-07-17 @ 06:00]      Phos  4.7     [09-07-17 @ 06:00]    TPro  5.4  /  Alb  2.5  /  TBili  0.6  /  DBili  x   /  AST  13  /  ALT  11  /  AlkPhos  82  [09-07-17 @ 06:00]    PT/INR: PT 11.4 , INR 1.02       [09-07-17 @ 06:50]  PTT: 31.7       [09-07-17 @ 06:50]      Creatinine Trend:  SCr 5.35 [09-07 @ 06:00]  SCr 6.57 [09-05 @ 02:30]  SCr 8.26 [09-04 @ 04:22]  SCr 8.03 [09-04 @ 00:51]  SCr 10.90 [09-03 @ 13:45]    Urinalysis - [08-10-17 @ 04:50]      Color PLYEL / Appearance CLEAR / SG 1.009 / pH 6.0      Gluc 50 / Ketone NEGATIVE  / Bili NEGATIVE / Urobili NORMAL       Blood TRACE / Protein 300 / Leuk Est NEGATIVE / Nitrite NEGATIVE      RBC 0-2 / WBC 0-2 / Hyaline  / Gran  / Sq Epi OCC / Non Sq Epi  / Bacteria FEW      Iron 28, TIBC 187, %sat --      [09-06-17 @ 05:55]  Ferritin 554.1      [09-06-17 @ 05:55]  HbA1c 8.0      [08-16-17 @ 06:30]  TSH 2.88      [09-03-17 @ 13:45]  Lipid: chol 137, TG 97, HDL 52, LDL 68      [08-11-17 @ 07:20]    HBsAb <3.0      [09-03-17 @ 18:00]  HBsAg NEGATIVE      [09-03-17 @ 18:00]  HCV 14.48, Reactive      [09-03-17 @ 18:00]

## 2017-09-07 NOTE — PROGRESS NOTE ADULT - PROBLEM SELECTOR PLAN 3
Presented to the ED hypotensive, LOC, hypothermia, bradycardic in setting of hyperkalemia and hyperphosphatemia. Had been covering with broad spectrum vancomycin by level and zosyn in the MICU, but infectious etiology neg to date. Presented altered, hypotensive, hypothermic, bradycardic. Deranged vital signs more likely attributable to resulting hyperK resulting in PEA arrest. s/p Vanc and zosyn  -monitor off antibiotics Per nephro, RODRIGO or transplant rejection pushed him into ESRD requiring dialysis. Home regimen: CellCept 1250mg BID, cyclosporine 25mg q12h.  -Per nephrology, hold pt's CellCept, will start prednisone with pt's home cyclosporine to maintain him until he is able to get another kidney transplant, then cyclosporine will be tapered leaving prednisone.  -cont Cyclosporin 100mg BID and prednisone 5mg po daily Kidney transplant from living brother in 1998 2/2 diabetic nephropathy. Per nephro, RODRIGO or transplant rejection pushed him into ESRD requiring dialysis. Home regimen: CellCept 1250mg BID, cyclosporine 25mg q12h.  -Per nephrology, hold pt's CellCept, will start prednisone with pt's home cyclosporine to maintain him until he is able to get another kidney transplant, then cyclosporine will be tapered leaving prednisone.  -cont Cyclosporin 100mg BID and prednisone 5mg po daily

## 2017-09-07 NOTE — PROVIDER CONTACT NOTE (MEDICATION) - SITUATION
Patient got up to go to the bathroom and was lightheaded and weak. Vitals done and heart rate was 140

## 2017-09-07 NOTE — PROGRESS NOTE ADULT - PROBLEM SELECTOR PLAN 10
DVT ppx: HSQ  Diet: consistent carb no snack/ renal diet    BPH: cont home tamsulosin DVT ppx: HSQ  Diet: consistent carb no snack/ renal diet    Diabetic retinopathy: Legally blind, lives with sister  -keep room bright, can see shapes if bright    BPH: cont home tamsulosin DVT ppx: heparin gtt for new onset AF  Diet: consistent carb no snack/ renal diet    Diabetic retinopathy: Legally blind, lives with sister  -keep room bright, can see shapes if bright    BPH: cont home tamsulosin

## 2017-09-07 NOTE — PROGRESS NOTE ADULT - PROBLEM SELECTOR PLAN 2
Hx of CKD, requiring dialysis for the first time this admission. Pt started HD, hold home torsemide 10mg daily  -HD today through [facility] Hx of CKD, requiring dialysis for the first time this admission. Pt started HD, hold home torsemide 10mg daily  -HD today 4pm through [facility] tmrw. Hold heparin gtt at midnight  -f/u BUE vein mapping Hx of CKD, requiring dialysis for the first time this admission. Pt started HD, hold home torsemide 10mg daily  -postpone HD until pt stabilizes  -permacath tmrw, then HD. Hold heparin gtt at midnight  -f/u BUE vein mapping

## 2017-09-07 NOTE — PROGRESS NOTE ADULT - PROBLEM SELECTOR PLAN 7
Presented with Hg 5 s/p 1u pRBC on 9/4  -cont Epogen weekly Presented with Hg 5 s/p 1u pRBC on 9/4  -cont Epogen with TTS HD sessions Presented with Hg 5 s/p 1u pRBC on 9/4  -H/H stable  -cont Epogen with TTS HD sessions

## 2017-09-07 NOTE — PROGRESS NOTE ADULT - ASSESSMENT
53 y/o M with h/o CKD s/p renal transplant (1998) on cyclosporine and CellCept, HTN, DM, legally blind BIBEMS for uremic encephalopathy. Pt became bradycardic and had PEA arrest in the ED, intubated for airway protection. ROSC achieved s/p 1 round of epi and chest compressions and pt was transferred to MICU. Immunosuppressants for renal transplant were held. Pt had emergent HD for hyperkalemia. Covered empirically with broad spectrum antibiotics (vanc and zosyn), Hgb 5 s/p 1u pRBC given and epogen was started. Pressors were weaned off and pt extubated 9/4. Cyclosporin and prednisone also resumed. Pt remains stable and transferred to floor for further management 9/5 51 y/o M with h/o CKD s/p renal transplant (1998) on cyclosporine and CellCept, HTN, DM, legally blind BIBEMS for uremic encephalopathy. Pt became bradycardic and had PEA arrest in the ED, intubated for airway protection. ROSC achieved s/p 1 round of epi and chest compressions and pt was transferred to MICU. Immunosuppressants for renal transplant were held. Pt had emergent HD for hyperkalemia. Covered empirically with broad spectrum antibiotics (vanc and zosyn), Hgb 5 s/p 1u pRBC given and epogen was started. Pressors were weaned off and pt extubated 9/4. Cyclosporine restarted and prednisone started. Pt remains stable and transferred to floor for further management 9/5, now found to have new-onset AF.

## 2017-09-07 NOTE — CONSULT NOTE ADULT - SUBJECTIVE AND OBJECTIVE BOX
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  In the ED he was found to be bradycardic and hypothermic with a K of 6.1 and Cr of 10 and had PEA arrest.  HE was resuscitated, sent to ICU, and underwent emergent HD.  Since then, he had been weaned off pressors and transferred to the floor for initiation of maintenance dialysis. This morning he was found to have new onset afib with RVR.      Hepatology was consulted due to positive HCV with VL of 5.6.      PAST MEDICAL & SURGICAL HISTORY:  Hypertension  Legally blind  Diabetes mellitus type I  Renal transplant, status post  CKD (chronic kidney disease)  Renal transplant, status post  History of tonsillectomy      Review of Systems: Negative except as per HPI.    MEDICATIONS  (STANDING):  calcium acetate 667 milliGRAM(s) Oral three times a day with meals  insulin lispro (HumaLOG) corrective regimen sliding scale   SubCutaneous three times a day before meals  insulin lispro (HumaLOG) corrective regimen sliding scale   SubCutaneous at bedtime  epoetin valente Injectable 4000 Unit(s) IV Push <User Schedule>  predniSONE   Tablet 5 milliGRAM(s) Oral daily  NIFEdipine XL 90 milliGRAM(s) Oral daily  atorvastatin 10 milliGRAM(s) Oral at bedtime  tamsulosin 0.4 milliGRAM(s) Oral at bedtime  insulin glargine Injectable (LANTUS) 8 Unit(s) SubCutaneous at bedtime  insulin lispro Injectable (HumaLOG) 4 Unit(s) SubCutaneous three times a day before meals  diltiazem Infusion 10 mG/Hr (10 mL/Hr) IV Continuous <Continuous>  heparin  Infusion.  Unit(s)/Hr (16 mL/Hr) IV Continuous <Continuous>  heparin  Injectable 7000 Unit(s) IV Push once  cycloSPORINE  (SandIMMUNE) 100 milliGRAM(s) Oral daily  hydrALAZINE 25 milliGRAM(s) Oral three times a day    MEDICATIONS  (PRN):  heparin  Injectable 7000 Unit(s) IV Push every 6 hours PRN For aPTT less than 40  heparin  Injectable 3500 Unit(s) IV Push every 6 hours PRN For aPTT between 40 - 57      ALLERGIES: NKDA      SOCIAL HISTORY:  - Alcohol:  - Recreational drug use: former IVDU    FAMILY HISTORY:  No pertinent family history in first degree relatives      Vital Signs Last 24 Hrs  T(C): 36.4 (07 Sep 2017 12:15), Max: 36.9 (07 Sep 2017 05:15)  T(F): 97.5 (07 Sep 2017 12:15), Max: 98.4 (07 Sep 2017 05:15)  HR: 130 (07 Sep 2017 14:48) (74 - 142)  BP: 110/78 (07 Sep 2017 14:48) (110/78 - 149/64)  BP(mean): --  RR: 18 (07 Sep 2017 14:48) (17 - 18)  SpO2: 97% (07 Sep 2017 14:48) (95% - 99%)    PHYSICAL EXAM:  Constitutional: no acute distress  Eyes: no icterus  Neck: no masses, no LAD  Respiratory: normal inspiratory effort; no wheezing or crackles  Cardiovascular: RRR, normal S1/S2, no murmurs/rubs/gallops  Gastrointestinal: soft, nondistended, nontender, +BS  Rectal:   Extremities: no LE edema  Neurological: AAOx3, no asterixis  Skin: no rashes, bruises, petechiae    LABS:                        7.7    14.63 )-----------( 232      ( 07 Sep 2017 06:00 )             23.9     09-07    135  |  88<L>  |  63<H>  ----------------------------<  324<H>  3.8   |  13<L>  |  5.35<H>    Ca    8.8      07 Sep 2017 06:00  Phos  4.7     09-07  Mg     2.0     09-07    TPro  5.4<L>  /  Alb  2.5<L>  /  TBili  0.6  /  DBili  x   /  AST  13  /  ALT  11  /  AlkPhos  82  09-07    PT/INR - ( 07 Sep 2017 06:50 )   PT: 11.4 SEC;   INR: 1.02          PTT - ( 07 Sep 2017 13:34 )  PTT:31.7 SEC  LIVER FUNCTIONS - ( 07 Sep 2017 06:00 )  Alb: 2.5 g/dL / Pro: 5.4 g/dL / ALK PHOS: 82 u/L / ALT: 11 u/L / AST: 13 u/L / GGT: x             RADIOLOGY & ADDITIONAL STUDIES:  radiology reviewed HEPATOLOGY INITIAL CONSULT:    HPI:  53 y/o M with h/o 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  In the ED he was found to be bradycardic and hypothermic with a K of 6.1 and Cr of 10 and had PEA arrest.  HE was resuscitated, sent to ICU, and underwent emergent HD.  Since then, he had been weaned off pressors and transferred to the floor for initiation of maintenance dialysis. This morning he was found to have new onset afib with RVR.      Hepatology was consulted due to positive HCV with VL of 5.6.      PAST MEDICAL & SURGICAL HISTORY:  Hypertension  Legally blind  Diabetes mellitus type I  Renal transplant, status post  CKD (chronic kidney disease)  Renal transplant, status post  History of tonsillectomy      Review of Systems: Negative except as per HPI.    MEDICATIONS  (STANDING):  calcium acetate 667 milliGRAM(s) Oral three times a day with meals  insulin lispro (HumaLOG) corrective regimen sliding scale   SubCutaneous three times a day before meals  insulin lispro (HumaLOG) corrective regimen sliding scale   SubCutaneous at bedtime  epoetin valente Injectable 4000 Unit(s) IV Push <User Schedule>  predniSONE   Tablet 5 milliGRAM(s) Oral daily  NIFEdipine XL 90 milliGRAM(s) Oral daily  atorvastatin 10 milliGRAM(s) Oral at bedtime  tamsulosin 0.4 milliGRAM(s) Oral at bedtime  insulin glargine Injectable (LANTUS) 8 Unit(s) SubCutaneous at bedtime  insulin lispro Injectable (HumaLOG) 4 Unit(s) SubCutaneous three times a day before meals  diltiazem Infusion 10 mG/Hr (10 mL/Hr) IV Continuous <Continuous>  heparin  Infusion.  Unit(s)/Hr (16 mL/Hr) IV Continuous <Continuous>  heparin  Injectable 7000 Unit(s) IV Push once  cycloSPORINE  (SandIMMUNE) 100 milliGRAM(s) Oral daily  hydrALAZINE 25 milliGRAM(s) Oral three times a day    MEDICATIONS  (PRN):  heparin  Injectable 7000 Unit(s) IV Push every 6 hours PRN For aPTT less than 40  heparin  Injectable 3500 Unit(s) IV Push every 6 hours PRN For aPTT between 40 - 57      ALLERGIES: NKDA      SOCIAL HISTORY:  - Alcohol: current, formerly was "heavy"  - Recreational drug use: former IVDU    FAMILY HISTORY:  No pertinent family history in first degree relatives      Vital Signs Last 24 Hrs  T(C): 36.4 (07 Sep 2017 12:15), Max: 36.9 (07 Sep 2017 05:15)  T(F): 97.5 (07 Sep 2017 12:15), Max: 98.4 (07 Sep 2017 05:15)  HR: 130 (07 Sep 2017 14:48) (74 - 142)  BP: 110/78 (07 Sep 2017 14:48) (110/78 - 149/64)  BP(mean): --  RR: 18 (07 Sep 2017 14:48) (17 - 18)  SpO2: 97% (07 Sep 2017 14:48) (95% - 99%)    PHYSICAL EXAM:  Constitutional: moderate respiratory distress, slightly sweaty  Eyes: no icterus, blind  Neck: no masses, no LAD  Respiratory: tachypneic in the 30s, no wheezes   Cardiovascular: irregularly irregular  Gastrointestinal: soft, distended, nontender, hypoactive BS  Extremities: 3+ b/l LE edema   Neurological: AAOx3  Skin: healing lacerations on abdomen with some ecchymoses    LABS:                        7.7    14.63 )-----------( 232      ( 07 Sep 2017 06:00 )             23.9     09-07    135  |  88<L>  |  63<H>  ----------------------------<  324<H>  3.8   |  13<L>  |  5.35<H>    Ca    8.8      07 Sep 2017 06:00  Phos  4.7     09-07  Mg     2.0     09-07    TPro  5.4<L>  /  Alb  2.5<L>  /  TBili  0.6  /  DBili  x   /  AST  13  /  ALT  11  /  AlkPhos  82  09-07    PT/INR - ( 07 Sep 2017 06:50 )   PT: 11.4 SEC;   INR: 1.02          PTT - ( 07 Sep 2017 13:34 )  PTT:31.7 SEC  LIVER FUNCTIONS - ( 07 Sep 2017 06:00 )  Alb: 2.5 g/dL / Pro: 5.4 g/dL / ALK PHOS: 82 u/L / ALT: 11 u/L / AST: 13 u/L / GGT: x             RADIOLOGY & ADDITIONAL STUDIES:  radiology reviewed

## 2017-09-07 NOTE — CONSULT NOTE ADULT - SUBJECTIVE AND OBJECTIVE BOX
52M w/ESRD 2/2 diabetic nephropathy s/p living donor renal transplant (1998), now with worsening CKD over last 1.5 years presented to ED on 9/3/17 with altered mental status, found to be uremic and hyperkalemic. Sustained PEA arrest, received epinephrine x1 with ROSC and was admitted to MICU. A RIJ Shiley was placed, and he has been receiving hemodialysis (last 9/5/17). He was transferred out of the MICU on 9/5/17.    The patient formerly lived in [place] and followed with nephrologist Dr. Agarwal there. He moved to [place] 1-2 months ago to live with his sister given declining renal function and worsening confusion. He is since followed by Dr. Baez, who referred him to Dr. Pichardo for hemodialysis access and plan to begin dialysis this week; he missed his scheduled new patient visit on 9/6 due to his hospitalization. He has not had HD prior to this admission but was on peritoneal dialysis for ~6 months in 1998 prior to his kidney transplant.    He endorses SOB and extremity swelling x 4 but denies any recent infection, cough, vomiting, diarrhea, or dysuria. 10-point ROS is otherwise negative.      PMH  Hypertension  Legally blind  Diabetes mellitus type I  ESRD  Hepatitis C    PSH  s/p living donor renal transplant 1998  s/p peritoneal dialysis catheter 1998  s/p tonsillectomy  s/p penile implant    Home Medications  Lantus  Flomax  Clonidine  Nifedipine  CellCept  Pravastatin  Nascort  Demadex  Sodium bicarbonate  Cyclosporine    Allergies  No Known Allergies    Social  Living in [place] and working for [employer] prior to moving to live with his sister here 1-2 months ago. Former smoker (quit when he moved, 30 pack year hx). Social ETOH use, ~2 beers/week. Hx of marijauna, cocaine, crack, and heroine use; quit drug use prior to 1998 renal transplant, denies use since.      Physical Exam  T(C): 36.9 (09-07-17 @ 05:15), Max: 37.2 (09-06-17 @ 14:37)  HR: 89 (09-07-17 @ 05:15) (74 - 100)  BP: 142/76 (09-07-17 @ 05:15) (137/62 - 142/76)  RR: 18 (09-07-17 @ 05:15) (17 - 18)  SpO2: 98% (09-07-17 @ 05:15) (95% - 99%)  Tmax: T(C): , Max: 37.2 (09-06-17 @ 14:37)    Gen: NAD  HEENT: normocephalic, atraumatic, no scleral icterus; RIJ hemodialysis catheter in place  CV: RR  Pulm: +rhonchi, +increased WOB  Abd: soft, ND, NT; well-healed midline and RLQ incisions  Ext: warm, 1-2+ pitting edema BLE and 1+ pitting edema BUE; palpable DP, brachial, radial pulses bilaterally    Labs:                        7.7    14.63 )-----------( 232      ( 07 Sep 2017 06:00 )             23.9     09-07    135  |  88<L>  |  63<H>  ----------------------------<  324<H>  3.8   |  13<L>  |  5.35<H>    Ca    8.8      07 Sep 2017 06:00  Phos  4.7     09-07  Mg     2.0     09-07    TPro  5.4<L>  /  Alb  2.5<L>  /  TBili  0.6  /  DBili  x   /  AST  13  /  ALT  11  /  AlkPhos  82  09-07    PT/INR - ( 07 Sep 2017 06:50 )   PT: 11.4 SEC;   INR: 1.02       PTT - ( 07 Sep 2017 06:50 )  PTT:31.7 SEC    Imaging    CXR IMPRESSION: There is an ET tube with the tip above the kim. There is a   right IJ line with the tip in the superior vena cava. There are bilateral   pleural effusions likely with associated pulmonary vascular congestion   with overall interval progression since the prior study. There is no   pneumothorax. 52M w/ESRD 2/2 diabetic nephropathy s/p living donor renal transplant (1998), now with worsening CKD over last 1.5 years presented to ED on 9/3/17 with altered mental status, found to be uremic and hyperkalemic. Sustained PEA arrest, received epinephrine x1 with ROSC and was admitted to MICU. A RIJ Shiley was placed, and he has been receiving hemodialysis (last 9/5/17). He was transferred out of the MICU on 9/5/17.    The patient formerly lived in [place] and followed with nephrologist Dr. Agarwal there. He moved to [place] 1-2 months ago to live with his sister given declining renal function and worsening confusion. He is since followed by Dr. Baez, who referred him to Dr. Pichardo for hemodialysis access and plan to begin dialysis this week; he missed his scheduled new patient visit on 9/6 due to his hospitalization. He has not had HD prior to this admission but was on peritoneal dialysis for ~6 months in 1998 prior to his kidney transplant. He continues to make urine.    He endorses SOB and extremity swelling x 4 but denies any recent infection, cough, vomiting, diarrhea, or dysuria. 10-point ROS is otherwise negative.    PMH  Hypertension  Legally blind  Diabetes mellitus type I  ESRD  Hepatitis C    PSH  s/p living donor renal transplant 1998  s/p peritoneal dialysis catheter 1998  s/p tonsillectomy  s/p penile implant    Home Medications  Lantus  Flomax  Clonidine  Nifedipine  CellCept  Pravastatin  Nascort  Demadex  Sodium bicarbonate  Cyclosporine    Allergies  No Known Allergies    Social  Living in [place] and working for [employer] prior to moving to live with his sister here 1-2 months ago. Former smoker (quit when he moved, 30 pack year hx). Social ETOH use, ~2 beers/week. Hx of marijauna, cocaine, crack, and heroine use; quit drug use prior to 1998 renal transplant, denies use since.    Physical Exam  T(C): 36.9 (09-07-17 @ 05:15), Max: 37.2 (09-06-17 @ 14:37)  HR: 89 (09-07-17 @ 05:15) (74 - 100)  BP: 142/76 (09-07-17 @ 05:15) (137/62 - 142/76)  RR: 18 (09-07-17 @ 05:15) (17 - 18)  SpO2: 98% (09-07-17 @ 05:15) (95% - 99%)  Tmax: T(C): , Max: 37.2 (09-06-17 @ 14:37)    Gen: NAD  HEENT: normocephalic, atraumatic, no scleral icterus; RIJ hemodialysis catheter in place  CV: RR  Pulm: +rhonchi, +increased WOB  Abd: soft, ND, NT; well-healed midline and RLQ incisions  Ext: warm, 1-2+ pitting edema BLE and 1+ pitting edema BUE; palpable DP, brachial, radial pulses bilaterally    Labs:                        7.7    14.63 )-----------( 232      ( 07 Sep 2017 06:00 )             23.9     09-07    135  |  88<L>  |  63<H>  ----------------------------<  324<H>  3.8   |  13<L>  |  5.35<H>    Ca    8.8      07 Sep 2017 06:00  Phos  4.7     09-07  Mg     2.0     09-07    TPro  5.4<L>  /  Alb  2.5<L>  /  TBili  0.6  /  DBili  x   /  AST  13  /  ALT  11  /  AlkPhos  82  09-07    PT/INR - ( 07 Sep 2017 06:50 )   PT: 11.4 SEC;   INR: 1.02       PTT - ( 07 Sep 2017 06:50 )  PTT:31.7 SEC    Imaging    CXR IMPRESSION: There is an ET tube with the tip above the kim. There is a   right IJ line with the tip in the superior vena cava. There are bilateral   pleural effusions likely with associated pulmonary vascular congestion   with overall interval progression since the prior study. There is no   pneumothorax.

## 2017-09-07 NOTE — PROGRESS NOTE ADULT - PROBLEM SELECTOR PLAN 1
Will need chronic maintenance dialysis, suggest perm cath placement and consult vascular surgery for AVF placement.  Patient declines PD, as he is blind and feels will be too difficult at home.  Next HD scheduled for today, will plan for TTS schedule.

## 2017-09-07 NOTE — PROGRESS NOTE ADULT - PROBLEM SELECTOR PLAN 5
Home regimen lantus 22u daily, home pravastatin 10mg daily  -start atorvastatin 10mg daily  -ISS  -monitor FS over 24h, will determine AM lantus dose. Lantus 10u dose caused  --> 61 hold home clonidine 0.1mg po q8h  -cont home nifedipine XR 90 mg daily, hydralazine 50 TID Home regimen clonidine 0.1mg po q8h, nifedipine XR 90 mg daily  -nifedipine XR 90 mg daily, hydralazine 50 TID  -lasix 60mg IV BID

## 2017-09-07 NOTE — PROVIDER CONTACT NOTE (MEDICATION) - ACTION/TREATMENT ORDERED:
Md on floor assessing patient and awaits tele bed . patient was given cardizem 10mg x2 by Md and was also given lopressor x2 . EKG was gone. Will continue to monitor and assess

## 2017-09-07 NOTE — PROGRESS NOTE ADULT - ATTENDING COMMENTS
Patient seen and examined, chart/lab reviewed.  53 y/o male with h/o HTN, DM-1 with diabetic retinopathy (legally blind) and ESRD,  s/p living related renal transplant (donor brother) in 1998 at [facility], a/w  uremic encephalopathy, hyperkalemia, fluid overload, c/b bradycardic cardiac arrest, stabilized and dialyzed and transferred to floor.    Events: pt went into rapid Afib with -140's this morning. c/o palpitation    PE: right IJ shiley, lung decreased breath sound lung bases; heart regular, abdomen soft, extrem: 3+ pitting edema b/l; neuro: a/ox3, nonfocal    Assessment/plan:  # New onset rapid Afib: transfer to telemetry, cardizem 10 mg ivp x2, iv lopressor 5 mg prn,  then cardizem drip at 10mg/h once he's telemetry floor, r/o ACS with cardiac enzymes, start heparin drip for anticoagulation, check echo.  I called cardiology consult to Dr. Whittington, discussed case with him.   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  -on phos binder phoslo  -Plan for permacath and vascular surgery consult for AVF  -still on immunosuppression: prednisone 5 mg, cyclospine tapered to 100 mg qd, off MMF as per renal, taper immunosuppression as per renal  -never had renal biopsy confirming chronic rejection, f/u renal   # DM-1, volatile glycemic control: titrate insulin, increase lantus to 8 u hs and humalog 4 u ac, c/w humalog ISS, A1c 8%, monitor FS  # Diabetic retinopathy, legally blind: now living with sister, relocated from [place] to   # Anemia of CKD: s/p 1 u pRBC, epogen as per renal, tsat 15%, ferritin 554  # Severe protein calorie malnutrition: albumin 2.6, encourage po intake  # Acute on chronic CHF, fluid overload: hold off on iv lasix for now in the setting of rapid Afib,  pending echo, fluid removal with HD  # Uncontrolled HTN: now better controlled on procardia 90 mg and reduce hydralazine 25 mg tid, monitor BP  # DVT prophylaxis: heparin drip now Patient seen and examined, chart/lab reviewed.  53 y/o male with h/o HTN, DM-1 with diabetic retinopathy (legally blind) and ESRD,  s/p living related renal transplant (donor brother) in 1998 at [facility], a/w  uremic encephalopathy, hyperkalemia, fluid overload, c/b bradycardic cardiac arrest, stabilized and dialyzed and transferred to floor.    Events: pt went into rapid Afib with -140's this morning. c/o palpitation    PE: right IJ shiley, lung decreased breath sound lung bases; heart tachycardic, irregularly irregular, abdomen soft, extrem: 3+ pitting edema b/l; neuro: a/ox3, nonfocal    Assessment/plan:  # New onset rapid Afib: transfer to telemetry, cardizem 10 mg ivp x2, iv lopressor 5 mg prn,  then cardizem drip at 10mg/h once he's telemetry floor, r/o ACS with cardiac enzymes, start heparin drip for anticoagulation, check echo.  I called cardiology consult to Dr. Whittington, discussed case with him.   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  -on phos binder phoslo  -Plan for permacath and vascular surgery consult for AVF  -still on immunosuppression: prednisone 5 mg, cyclospine tapered to 100 mg qd, off MMF as per renal, taper immunosuppression as per renal  -never had renal biopsy confirming chronic rejection, f/u renal   # DM-1, volatile glycemic control: titrate insulin, increase lantus to 8 u hs and humalog 4 u ac, c/w humalog ISS, A1c 8%, monitor FS  # Diabetic retinopathy, legally blind: now living with sister, relocated from [place] to   # Anemia of CKD: s/p 1 u pRBC, epogen as per renal, tsat 15%, ferritin 554  # Severe protein calorie malnutrition: albumin 2.6, encourage po intake  # Acute on chronic CHF, fluid overload: hold off on iv lasix for now in the setting of rapid Afib,  pending echo, fluid removal with HD  # Uncontrolled HTN: now better controlled on procardia 90 mg and reduce hydralazine 25 mg tid, monitor BP  # DVT prophylaxis: heparin drip now Patient seen and examined, chart/lab reviewed.  53 y/o male with h/o HTN, DM-1 with diabetic retinopathy (legally blind) and ESRD,  s/p living related renal transplant (donor brother) in 1998 at [facility], a/w  uremic encephalopathy, hyperkalemia, fluid overload, c/b bradycardic cardiac arrest, stabilized and dialyzed and transferred to floor.    Events: pt went into rapid Afib with -140's this morning. c/o palpitation    PE: right IJ shiley, lung decreased breath sound lung bases; heart tachycardic, irregularly irregular, abdomen soft, extrem: 3+ pitting edema b/l; neuro: a/ox3, nonfocal    Assessment/plan:  # New onset rapid Afib: transfer to telemetry, cardizem 10 mg ivp x2, iv lopressor 5 mg prn,  then cardizem drip at 10mg/h once he's telemetry floor, r/o ACS with cardiac enzymes, start heparin drip for anticoagulation, check echo.  I called cardiology consult to Dr. Whittington, discussed case with him.   chadvasc score = 4, annual stroke risk ~4%;   d/c duoneb; check TTE (echo), TSH 2.88 (wnl)  # Hepatitis C: serology + for HCV, after speaking to pt he knows he has positive HCV and has h/o IVDA, VL of 5.6. Hepatology consult appreciated. HCV therapy can be initiated as outpt.   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  -on phos binder phoslo  -Plan for permacath and vascular surgery consult for AVF  -still on immunosuppression: prednisone 5 mg, cyclospine tapered to 100 mg qd, off MMF as per renal, taper immunosuppression as per renal  -never had renal biopsy confirming chronic rejection, f/u renal   # DM-1, volatile glycemic control: titrate insulin, increase lantus to 8 u hs and humalog 4 u ac, c/w humalog ISS, A1c 8%, monitor FS  # Diabetic retinopathy, legally blind: now living with sister, relocated from [place] to   # Anemia of CKD: s/p 1 u pRBC, epogen as per renal, tsat 15%, ferritin 554  # Severe protein calorie malnutrition: albumin 2.6, encourage po intake  # Acute on chronic CHF, fluid overload: hold off on iv lasix for now in the setting of rapid Afib,  pending echo, fluid removal with HD  # Uncontrolled HTN: now better controlled on procardia 90 mg and reduce hydralazine 25 mg tid, monitor BP  # DVT prophylaxis: heparin drip now Patient seen and examined, chart/lab reviewed.  51 y/o male with h/o HTN, DM-1 with diabetic retinopathy (legally blind) and ESRD,  s/p living related renal transplant (donor brother) in 1998 at [facility], a/w  uremic encephalopathy, hyperkalemia, fluid overload, c/b bradycardic cardiac arrest, stabilized and dialyzed and transferred to floor.    Events: pt went into rapid Afib with -140's this morning. c/o palpitation    PE: right IJ shiley, lung decreased breath sound lung bases; heart tachycardic, irregularly irregular, abdomen soft, extrem: 3+ pitting edema b/l; neuro: a/ox3, nonfocal    Assessment/plan:  # New onset rapid Afib: transfer to telemetry, cardizem 10 mg ivp x2, iv lopressor 5 mg prn,  then cardizem drip at 10mg/h once he's telemetry floor, r/o ACS with cardiac enzymes, start heparin drip for anticoagulation, check echo.  I called cardiology consult to Dr. Whittington, discussed case with him.   chadvasc score = 4, annual stroke risk ~4%;   d/c duoneb; check TTE (echo), TSH 2.88 (wnl)  # Hepatitis C: serology + for HCV, after speaking to pt he knows he has positive HCV and has h/o IVDA, VL of 5.6. Hepatology consult appreciated. HCV therapy can be initiated as outpt.   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  -on phos binder phoslo  -Plan for permacath and vascular surgery consult for AVF  -still on immunosuppression: prednisone 5 mg, cyclospine tapered to 100 mg qd, off MMF as per renal, taper immunosuppression as per renal  -never had renal biopsy confirming chronic rejection, f/u renal   # DM-1, volatile glycemic control: titrate insulin, increase lantus to 8 u hs and humalog 4 u ac, c/w humalog ISS, A1c 8%, monitor FS  # Diabetic retinopathy, legally blind: now living with sister, relocated from [place] to   # Anemia of CKD: s/p 1 u pRBC, epogen as per renal, tsat 15%, ferritin 554  # Severe protein calorie malnutrition: albumin 2.6, encourage po intake  # Acute on chronic CHF, fluid overload: hold off on iv lasix for now in the setting of rapid Afib,  pending echo, fluid removal with HD  # Uncontrolled HTN: now BP well controlled, d/c hydralazine, and reduce procardia to 30 mg qd with holding parameters, monitor BP closely on cardizem drip  # DVT prophylaxis: heparin drip now

## 2017-09-07 NOTE — CONSULT NOTE ADULT - ASSESSMENT
52M w/worsening CKD requiring hemodialysis.    -HD today via [facility] per nephrology  -IR permacath scheduled for today   -would continue infectious workup given leukocytosis; currently being monitored off abx (BCx negative on admission)  -please obtain BUE vein mapping (ordered)  -please observe LUE precautions (no BP, blood draws, IVs in left arm to preserve for AV fistula creation)  -will schedule for AVF possible AV graft placement pending vein mapping results  -will d/w Dr. Pichardo  -please page vascular surgery ([phone]) with any questions 52M w/worsening CKD requiring hemodialysis.    -HD today via [facility] per nephrology  -IR permacath scheduled for today   -would continue infectious workup given leukocytosis; currently being monitored off abx (BCx negative on admission)  -please obtain BUE vein mapping (ordered)  -please observe LUE precautions (no BP, blood draws, IVs in left arm to preserve for AV fistula creation)  -will schedule for AVF possible AV graft placement pending vein mapping results; inpatient vs. outpatient TBD  -Dr. Pichardo will see patient  -please page vascular surgery ([phone]) with any questions

## 2017-09-07 NOTE — PROGRESS NOTE ADULT - SUBJECTIVE AND OBJECTIVE BOX
Patient is a 52y old  Male who presents with a chief complaint of Altered mental status (03 Sep 2017 17:46)      SUBJECTIVE / OVERNIGHT EVENTS:  -    MEDICATIONS  (STANDING):  calcium acetate 667 milliGRAM(s) Oral three times a day with meals  insulin lispro (HumaLOG) corrective regimen sliding scale   SubCutaneous three times a day before meals  insulin lispro (HumaLOG) corrective regimen sliding scale   SubCutaneous at bedtime  epoetin valente Injectable 4000 Unit(s) IV Push <User Schedule>  ALBUTerol/ipratropium for Nebulization 3 milliLiter(s) Nebulizer every 6 hours  cycloSPORINE  (SandIMMUNE) 100 milliGRAM(s) Oral two times a day  predniSONE   Tablet 5 milliGRAM(s) Oral daily  NIFEdipine XL 90 milliGRAM(s) Oral daily  atorvastatin 10 milliGRAM(s) Oral at bedtime  tamsulosin 0.4 milliGRAM(s) Oral at bedtime  hydrALAZINE 50 milliGRAM(s) Oral every 8 hours  furosemide   Injectable 60 milliGRAM(s) IV Push two times a day  insulin glargine Injectable (LANTUS) 8 Unit(s) SubCutaneous at bedtime  insulin lispro Injectable (HumaLOG) 4 Unit(s) SubCutaneous three times a day before meals    MEDICATIONS  (PRN):      Vital Signs Last 24 Hrs  T(C): 36.9 (07 Sep 2017 05:15), Max: 37.2 (06 Sep 2017 14:37)  T(F): 98.4 (07 Sep 2017 05:15), Max: 98.9 (06 Sep 2017 14:37)  HR: 89 (07 Sep 2017 05:15) (74 - 100)  BP: 142/76 (07 Sep 2017 05:15) (137/62 - 142/76)  BP(mean): --  RR: 18 (07 Sep 2017 05:15) (17 - 18)  SpO2: 98% (07 Sep 2017 05:15) (95% - 99%)    CAPILLARY BLOOD GLUCOSE  278 (06 Sep 2017 23:00)  264 (06 Sep 2017 16:24)  267 (06 Sep 2017 11:54)  271 (06 Sep 2017 08:22)          I&O's Summary      PHYSICAL EXAM  GENERAL: NAD, well-developed  HEAD:  Atraumatic, Normocephalic  EYES: EOMI, PERRLA, conjunctiva and sclera clear. Legally blind but can see changes in light  NECK: Supple, No JVD  CHEST/LUNG: RR 28, using accessory muscles, high 90s SpO2 on RA, crackles at base in posterior b/l. No wheeze  HEART: Regular rate and rhythm; No murmurs, rubs, or gallops  ABDOMEN: Soft, Nontender, Nondistended; Bowel sounds present  EXTREMITIES: Feet warm, No clubbing, cyanosis. 3+ pitting edema to below knees  PSYCH: AAOx3  SKIN: No rashes or lesions    LABS:                        7.7    14.63 )-----------( 232      ( 07 Sep 2017 06:00 )             23.9     07 Sep 2017 06:00    135    |  88     |  63     ----------------------------<  324    3.8     |  13     |  5.35     Ca    8.8        07 Sep 2017 06:00  Phos  4.7       07 Sep 2017 06:00  Mg     2.0       07 Sep 2017 06:00    TPro  5.4    /  Alb  2.5    /  TBili  0.6    /  DBili  x      /  AST  13     /  ALT  11     /  AlkPhos  82     07 Sep 2017 06:00        RADIOLOGY & ADDITIONAL TESTS:     MICROBIOLOGY:   BCx 9/3 x 2 ng    Hep C ab positive  Hep B core ab, surface ab neg    CONSULTS: nephrology    Latosha Maya, PGY1  Internal Medicine, Team 3  Pager [phone] Patient is a 52y old  Male who presents with a chief complaint of Altered mental status (03 Sep 2017 17:46)      SUBJECTIVE / OVERNIGHT EVENTS:  -No complaints this AM. Denies CP, SOB, abd pain, N/V.  -HD today at 4pm  -Order placed for permacath, to be done tmrw AM  -Vasc consulted for left arm AV fistula  -Pt stood up to go to the bathroom, felt weak, mildly SOB. Denied CP, N/V. VS: SpO2 98% on RA, H 150, /72. EKG showed new onset atrial fibrillation, rate 146, new TWI lateral leads from prior 3/2017. CE sent. Cardizem 10mg IV x 1.    MEDICATIONS  (STANDING):  calcium acetate 667 milliGRAM(s) Oral three times a day with meals  insulin lispro (HumaLOG) corrective regimen sliding scale   SubCutaneous three times a day before meals  insulin lispro (HumaLOG) corrective regimen sliding scale   SubCutaneous at bedtime  epoetin valente Injectable 4000 Unit(s) IV Push <User Schedule>  ALBUTerol/ipratropium for Nebulization 3 milliLiter(s) Nebulizer every 6 hours  cycloSPORINE  (SandIMMUNE) 100 milliGRAM(s) Oral two times a day  predniSONE   Tablet 5 milliGRAM(s) Oral daily  NIFEdipine XL 90 milliGRAM(s) Oral daily  atorvastatin 10 milliGRAM(s) Oral at bedtime  tamsulosin 0.4 milliGRAM(s) Oral at bedtime  hydrALAZINE 50 milliGRAM(s) Oral every 8 hours  furosemide   Injectable 60 milliGRAM(s) IV Push two times a day  insulin glargine Injectable (LANTUS) 8 Unit(s) SubCutaneous at bedtime  insulin lispro Injectable (HumaLOG) 4 Unit(s) SubCutaneous three times a day before meals    MEDICATIONS  (PRN):      Vital Signs Last 24 Hrs  T(C): 36.9 (07 Sep 2017 05:15), Max: 37.2 (06 Sep 2017 14:37)  T(F): 98.4 (07 Sep 2017 05:15), Max: 98.9 (06 Sep 2017 14:37)  HR: 89 (07 Sep 2017 05:15) (74 - 100)  BP: 142/76 (07 Sep 2017 05:15) (137/62 - 142/76)  BP(mean): --  RR: 18 (07 Sep 2017 05:15) (17 - 18)  SpO2: 98% (07 Sep 2017 05:15) (95% - 99%)    CAPILLARY BLOOD GLUCOSE  278 (06 Sep 2017 23:00)  264 (06 Sep 2017 16:24)  267 (06 Sep 2017 11:54)  271 (06 Sep 2017 08:22)        PHYSICAL EXAM  GENERAL: NAD, well-developed  HEAD:  Atraumatic, Normocephalic  EYES: EOMI, PERRLA, conjunctiva and sclera clear. Legally blind but can see changes in light  NECK: Supple, No JVD  CHEST/LUNG: RR 28, using accessory muscles, high 90s SpO2 on RA, crackles at base in posterior b/l. No wheeze  HEART: Regular rate and rhythm; No murmurs, rubs, or gallops  ABDOMEN: Soft, Nontender, Nondistended; Bowel sounds present  EXTREMITIES: Feet warm, No clubbing, cyanosis. 3+ pitting edema to below knees  PSYCH: AAOx3  SKIN: No rashes or lesions    LABS:                        7.7    14.63 )-----------( 232      ( 07 Sep 2017 06:00 )             23.9     07 Sep 2017 06:00    135    |  88     |  63     ----------------------------<  324    3.8     |  13     |  5.35     Ca    8.8        07 Sep 2017 06:00  Phos  4.7       07 Sep 2017 06:00  Mg     2.0       07 Sep 2017 06:00    TPro  5.4    /  Alb  2.5    /  TBili  0.6    /  DBili  x      /  AST  13     /  ALT  11     /  AlkPhos  82     07 Sep 2017 06:00        RADIOLOGY & ADDITIONAL TESTS:     MICROBIOLOGY:   BCx 9/3 x 2 ng    HCV VL 6.5 million  HCV genotype pending  Hep B core ab, surface ab neg    CONSULTS: nephrology, vascular sx, IR    Latosha aMya, PGY1  Internal Medicine, Team 3  Pager [phone] Patient is a 52y old  Male who presents with a chief complaint of Altered mental status (03 Sep 2017 17:46)      SUBJECTIVE / OVERNIGHT EVENTS:  -No complaints this AM. Denies CP, SOB, abd pain, N/V.  -HD today at 4pm  -Order placed for permacath, to be done tmrw AM  -Vasc consulted for left arm AV fistula  -Pt stood up to go to the bathroom, felt weak and lightheaded, mildly SOB. Denied CP, N/V. VS: SpO2 98% on RA, H 150, /72, AO x 3. Placed on 2L NC for inc WOB. EKG showed new onset atrial fibrillation, rate 146, new TWI lateral leads from prior 3/2017. CE sent. Cardizem 10mg IV x 1. Heparin drip started. Once on tele will start cardizem drip    MEDICATIONS  (STANDING):  calcium acetate 667 milliGRAM(s) Oral three times a day with meals  insulin lispro (HumaLOG) corrective regimen sliding scale   SubCutaneous three times a day before meals  insulin lispro (HumaLOG) corrective regimen sliding scale   SubCutaneous at bedtime  epoetin valente Injectable 4000 Unit(s) IV Push <User Schedule>  ALBUTerol/ipratropium for Nebulization 3 milliLiter(s) Nebulizer every 6 hours  cycloSPORINE  (SandIMMUNE) 100 milliGRAM(s) Oral two times a day  predniSONE   Tablet 5 milliGRAM(s) Oral daily  NIFEdipine XL 90 milliGRAM(s) Oral daily  atorvastatin 10 milliGRAM(s) Oral at bedtime  tamsulosin 0.4 milliGRAM(s) Oral at bedtime  hydrALAZINE 50 milliGRAM(s) Oral every 8 hours  furosemide   Injectable 60 milliGRAM(s) IV Push two times a day  insulin glargine Injectable (LANTUS) 8 Unit(s) SubCutaneous at bedtime  insulin lispro Injectable (HumaLOG) 4 Unit(s) SubCutaneous three times a day before meals    MEDICATIONS  (PRN):      Vital Signs Last 24 Hrs  T(C): 36.9 (07 Sep 2017 05:15), Max: 37.2 (06 Sep 2017 14:37)  T(F): 98.4 (07 Sep 2017 05:15), Max: 98.9 (06 Sep 2017 14:37)  HR: 89 (07 Sep 2017 05:15) (74 - 100)  BP: 142/76 (07 Sep 2017 05:15) (137/62 - 142/76)  BP(mean): --  RR: 18 (07 Sep 2017 05:15) (17 - 18)  SpO2: 98% (07 Sep 2017 05:15) (95% - 99%)    CAPILLARY BLOOD GLUCOSE  278 (06 Sep 2017 23:00)  264 (06 Sep 2017 16:24)  267 (06 Sep 2017 11:54)  271 (06 Sep 2017 08:22)        PHYSICAL EXAM  GENERAL: NAD, well-developed  HEAD:  Atraumatic, Normocephalic  EYES: EOMI, PERRLA, conjunctiva and sclera clear. Legally blind but can see changes in light  NECK: Supple, No JVD  CHEST/LUNG: RR 28, using accessory muscles, high 90s SpO2 on RA, crackles at base in posterior b/l. No wheeze  HEART: Regular rate and rhythm; No murmurs, rubs, or gallops  ABDOMEN: Soft, Nontender, Nondistended; Bowel sounds present  EXTREMITIES: Feet warm, No clubbing, cyanosis. 3+ pitting edema to below knees  PSYCH: AAOx3  SKIN: No rashes or lesions    LABS:                        7.7    14.63 )-----------( 232      ( 07 Sep 2017 06:00 )             23.9     07 Sep 2017 06:00    135    |  88     |  63     ----------------------------<  324    3.8     |  13     |  5.35     Ca    8.8        07 Sep 2017 06:00  Phos  4.7       07 Sep 2017 06:00  Mg     2.0       07 Sep 2017 06:00    TPro  5.4    /  Alb  2.5    /  TBili  0.6    /  DBili  x      /  AST  13     /  ALT  11     /  AlkPhos  82     07 Sep 2017 06:00        RADIOLOGY & ADDITIONAL TESTS:     MICROBIOLOGY:   BCx 9/3 x 2 ng    HCV VL 6.5 million  HCV genotype pending  Hep B core ab, surface ab neg    CONSULTS: nephrology, vascular sx, IR    Latosha Maya, PGY1  Internal Medicine, Team 3  Pager [phone] Patient is a 52y old  Male who presents with a chief complaint of Altered mental status (03 Sep 2017 17:46)      SUBJECTIVE / OVERNIGHT EVENTS:  -No complaints this AM. Denies CP, SOB, abd pain, N/V.  -Order placed for permacath, to be done tmrw AM  -Vasc consulted for left arm AV fistula  -Pt stood up to go to the bathroom, felt weak and lightheaded, mildly SOB. Denied CP, N/V. VS: SpO2 98% on RA, H 150, /72, AO x 3. Placed on 2L NC for inc WOB. EKG showed new onset atrial fibrillation, rate 146, new TWI lateral leads from prior 3/2017. CE sent. Cardizem 10mg IV x 1. Heparin drip started. Once on tele will start cardizem drip  -HD postponed to tmrw once stable    MEDICATIONS  (STANDING):  calcium acetate 667 milliGRAM(s) Oral three times a day with meals  insulin lispro (HumaLOG) corrective regimen sliding scale   SubCutaneous three times a day before meals  insulin lispro (HumaLOG) corrective regimen sliding scale   SubCutaneous at bedtime  epoetin valente Injectable 4000 Unit(s) IV Push <User Schedule>  ALBUTerol/ipratropium for Nebulization 3 milliLiter(s) Nebulizer every 6 hours  cycloSPORINE  (SandIMMUNE) 100 milliGRAM(s) Oral two times a day  predniSONE   Tablet 5 milliGRAM(s) Oral daily  NIFEdipine XL 90 milliGRAM(s) Oral daily  atorvastatin 10 milliGRAM(s) Oral at bedtime  tamsulosin 0.4 milliGRAM(s) Oral at bedtime  hydrALAZINE 50 milliGRAM(s) Oral every 8 hours  furosemide   Injectable 60 milliGRAM(s) IV Push two times a day  insulin glargine Injectable (LANTUS) 8 Unit(s) SubCutaneous at bedtime  insulin lispro Injectable (HumaLOG) 4 Unit(s) SubCutaneous three times a day before meals    MEDICATIONS  (PRN):      Vital Signs Last 24 Hrs  T(C): 36.9 (07 Sep 2017 05:15), Max: 37.2 (06 Sep 2017 14:37)  T(F): 98.4 (07 Sep 2017 05:15), Max: 98.9 (06 Sep 2017 14:37)  HR: 89 (07 Sep 2017 05:15) (74 - 100)  BP: 142/76 (07 Sep 2017 05:15) (137/62 - 142/76)  BP(mean): --  RR: 18 (07 Sep 2017 05:15) (17 - 18)  SpO2: 98% (07 Sep 2017 05:15) (95% - 99%)    CAPILLARY BLOOD GLUCOSE  278 (06 Sep 2017 23:00)  264 (06 Sep 2017 16:24)  267 (06 Sep 2017 11:54)  271 (06 Sep 2017 08:22)        PHYSICAL EXAM  GENERAL: NAD, well-developed  HEAD:  Atraumatic, Normocephalic  EYES: EOMI, PERRLA, conjunctiva and sclera clear. Legally blind but can see changes in light  NECK: Supple, No JVD  CHEST/LUNG: RR 28, using accessory muscles, high 90s SpO2 on RA, crackles at base in posterior b/l. No wheeze  HEART: Regular rate and rhythm; No murmurs, rubs, or gallops  ABDOMEN: Soft, Nontender, Nondistended; Bowel sounds present  EXTREMITIES: Feet warm, No clubbing, cyanosis. 3+ pitting edema to below knees  PSYCH: AAOx3  SKIN: No rashes or lesions    LABS:                        7.7    14.63 )-----------( 232      ( 07 Sep 2017 06:00 )             23.9     07 Sep 2017 06:00    135    |  88     |  63     ----------------------------<  324    3.8     |  13     |  5.35     Ca    8.8        07 Sep 2017 06:00  Phos  4.7       07 Sep 2017 06:00  Mg     2.0       07 Sep 2017 06:00    TPro  5.4    /  Alb  2.5    /  TBili  0.6    /  DBili  x      /  AST  13     /  ALT  11     /  AlkPhos  82     07 Sep 2017 06:00        RADIOLOGY & ADDITIONAL TESTS:     MICROBIOLOGY:   BCx 9/3 x 2 ng    HCV VL 6.5 million  HCV genotype pending  Hep B core ab, surface ab neg    CONSULTS: nephrology, vascular sx, IR    Latosha Maya, PGY1  Internal Medicine, Team 3  Pager [phone] Patient is a 52y old  Male who presents with a chief complaint of Altered mental status (03 Sep 2017 17:46)      SUBJECTIVE / OVERNIGHT EVENTS:  -No complaints this AM. Denies CP, SOB, abd pain, N/V.  -Vasc consulted for left arm AV fistula  -Pt stood up to go to the bathroom, felt weak and lightheaded, mildly SOB. Denied CP, N/V. VS: SpO2 98% on RA, H 150, /72, AO x 3. Placed on 2L NC for inc WOB. EKG showed new onset atrial fibrillation, rate 146, new TWI lateral leads from prior 3/2017. CE sent. Cardizem 10mg IV x 2, lopressor 5mg IV x 3, brought HR to 90s-110s, BP stayed ~120/70s, AO x 3, denied CP. Troponin 1st set 0.13. Heparin drip started. Once on tele will start cardizem drip  -d/c hydralazine, lasix  -Permacath tmrw AM, then HD postponed to tmrw once stable    MEDICATIONS  (STANDING):  calcium acetate 667 milliGRAM(s) Oral three times a day with meals  insulin lispro (HumaLOG) corrective regimen sliding scale   SubCutaneous three times a day before meals  insulin lispro (HumaLOG) corrective regimen sliding scale   SubCutaneous at bedtime  epoetin valente Injectable 4000 Unit(s) IV Push <User Schedule>  ALBUTerol/ipratropium for Nebulization 3 milliLiter(s) Nebulizer every 6 hours  cycloSPORINE  (SandIMMUNE) 100 milliGRAM(s) Oral two times a day  predniSONE   Tablet 5 milliGRAM(s) Oral daily  NIFEdipine XL 90 milliGRAM(s) Oral daily  atorvastatin 10 milliGRAM(s) Oral at bedtime  tamsulosin 0.4 milliGRAM(s) Oral at bedtime  hydrALAZINE 50 milliGRAM(s) Oral every 8 hours  furosemide   Injectable 60 milliGRAM(s) IV Push two times a day  insulin glargine Injectable (LANTUS) 8 Unit(s) SubCutaneous at bedtime  insulin lispro Injectable (HumaLOG) 4 Unit(s) SubCutaneous three times a day before meals    MEDICATIONS  (PRN):      Vital Signs Last 24 Hrs  T(C): 36.9 (07 Sep 2017 05:15), Max: 37.2 (06 Sep 2017 14:37)  T(F): 98.4 (07 Sep 2017 05:15), Max: 98.9 (06 Sep 2017 14:37)  HR: 89 (07 Sep 2017 05:15) (74 - 100)  BP: 142/76 (07 Sep 2017 05:15) (137/62 - 142/76)  BP(mean): --  RR: 18 (07 Sep 2017 05:15) (17 - 18)  SpO2: 98% (07 Sep 2017 05:15) (95% - 99%)    CAPILLARY BLOOD GLUCOSE  278 (06 Sep 2017 23:00)  264 (06 Sep 2017 16:24)  267 (06 Sep 2017 11:54)  271 (06 Sep 2017 08:22)        PHYSICAL EXAM  GENERAL: NAD, well-developed  HEAD:  Atraumatic, Normocephalic  EYES: EOMI, PERRLA, conjunctiva and sclera clear. Legally blind but can see changes in light  NECK: Supple, No JVD  CHEST/LUNG: RR 28, using accessory muscles, high 90s SpO2 on RA, crackles at base in posterior b/l. No wheeze  HEART: Regular rate and rhythm; No murmurs, rubs, or gallops  ABDOMEN: Soft, Nontender, Nondistended; Bowel sounds present  EXTREMITIES: Feet warm, No clubbing, cyanosis. 3+ pitting edema to below knees  PSYCH: AAOx3  SKIN: No rashes or lesions    LABS:                        7.7    14.63 )-----------( 232      ( 07 Sep 2017 06:00 )             23.9     07 Sep 2017 06:00    135    |  88     |  63     ----------------------------<  324    3.8     |  13     |  5.35     Ca    8.8        07 Sep 2017 06:00  Phos  4.7       07 Sep 2017 06:00  Mg     2.0       07 Sep 2017 06:00    TPro  5.4    /  Alb  2.5    /  TBili  0.6    /  DBili  x      /  AST  13     /  ALT  11     /  AlkPhos  82     07 Sep 2017 06:00        RADIOLOGY & ADDITIONAL TESTS:     MICROBIOLOGY:   BCx 9/3 x 2 ng    HCV VL 6.5 million  HCV genotype pending  Hep B core ab, surface ab neg    CONSULTS: nephrology, vascular sx, IR    Latosha Maya, PGY1  Internal Medicine, Team 3  Pager [phone] Patient is a 52y old  Male who presents with a chief complaint of Altered mental status (03 Sep 2017 17:46)      SUBJECTIVE / OVERNIGHT EVENTS:  -No complaints this AM. Denies CP, SOB, abd pain, N/V.  -Vasc consulted for left arm AV fistula  -Pt stood up to go to the bathroom, felt weak and lightheaded, mildly SOB. Denied CP, N/V. VS: SpO2 98% on RA, H 150, /72, AO x 3. Placed on 2L NC for inc WOB. EKG showed new onset atrial fibrillation, rate 146, new TWI lateral leads from prior 3/2017. CE sent. Cardizem 10mg IV x 2, lopressor 5mg IV x 3, brought HR to 90s-110s, BP stayed ~120/70s, AO x 3, denied CP. Troponin 1st set 0.13. Heparin drip started. Once on tele will start cardizem drip. Cardiology consulted.  -d/c hydralazine, lasix  -Permacath tmrw AM, then HD postponed to tmrw once stable    MEDICATIONS  (STANDING):  calcium acetate 667 milliGRAM(s) Oral three times a day with meals  insulin lispro (HumaLOG) corrective regimen sliding scale   SubCutaneous three times a day before meals  insulin lispro (HumaLOG) corrective regimen sliding scale   SubCutaneous at bedtime  epoetin valente Injectable 4000 Unit(s) IV Push <User Schedule>  ALBUTerol/ipratropium for Nebulization 3 milliLiter(s) Nebulizer every 6 hours  cycloSPORINE  (SandIMMUNE) 100 milliGRAM(s) Oral two times a day  predniSONE   Tablet 5 milliGRAM(s) Oral daily  NIFEdipine XL 90 milliGRAM(s) Oral daily  atorvastatin 10 milliGRAM(s) Oral at bedtime  tamsulosin 0.4 milliGRAM(s) Oral at bedtime  hydrALAZINE 50 milliGRAM(s) Oral every 8 hours  furosemide   Injectable 60 milliGRAM(s) IV Push two times a day  insulin glargine Injectable (LANTUS) 8 Unit(s) SubCutaneous at bedtime  insulin lispro Injectable (HumaLOG) 4 Unit(s) SubCutaneous three times a day before meals    MEDICATIONS  (PRN):      Vital Signs Last 24 Hrs  T(C): 36.9 (07 Sep 2017 05:15), Max: 37.2 (06 Sep 2017 14:37)  T(F): 98.4 (07 Sep 2017 05:15), Max: 98.9 (06 Sep 2017 14:37)  HR: 89 (07 Sep 2017 05:15) (74 - 100)  BP: 142/76 (07 Sep 2017 05:15) (137/62 - 142/76)  BP(mean): --  RR: 18 (07 Sep 2017 05:15) (17 - 18)  SpO2: 98% (07 Sep 2017 05:15) (95% - 99%)    CAPILLARY BLOOD GLUCOSE  278 (06 Sep 2017 23:00)  264 (06 Sep 2017 16:24)  267 (06 Sep 2017 11:54)  271 (06 Sep 2017 08:22)        PHYSICAL EXAM  GENERAL: NAD, well-developed  HEAD:  Atraumatic, Normocephalic  EYES: EOMI, PERRLA, conjunctiva and sclera clear. Legally blind but can see changes in light  NECK: Supple, No JVD  CHEST/LUNG: RR 28, using accessory muscles, high 90s SpO2 on RA, crackles at base in posterior b/l. No wheeze  HEART: Regular rate and rhythm; No murmurs, rubs, or gallops  ABDOMEN: Soft, Nontender, Nondistended; Bowel sounds present  EXTREMITIES: Feet warm, No clubbing, cyanosis. 3+ pitting edema to below knees  PSYCH: AAOx3  SKIN: No rashes or lesions    LABS:                        7.7    14.63 )-----------( 232      ( 07 Sep 2017 06:00 )             23.9     07 Sep 2017 06:00    135    |  88     |  63     ----------------------------<  324    3.8     |  13     |  5.35     Ca    8.8        07 Sep 2017 06:00  Phos  4.7       07 Sep 2017 06:00  Mg     2.0       07 Sep 2017 06:00    TPro  5.4    /  Alb  2.5    /  TBili  0.6    /  DBili  x      /  AST  13     /  ALT  11     /  AlkPhos  82     07 Sep 2017 06:00        RADIOLOGY & ADDITIONAL TESTS:     MICROBIOLOGY:   BCx 9/3 x 2 ng    HCV VL 6.5 million  HCV genotype pending  Hep B core ab, surface ab neg    CONSULTS: nephrology, vascular sx, IR    Latosha Maya, PGY1  Internal Medicine, Team 3  Pager [phone] Patient is a 52y old  Male who presents with a chief complaint of Altered mental status (03 Sep 2017 17:46)      SUBJECTIVE / OVERNIGHT EVENTS:  -No complaints this AM. Denies CP, SOB, abd pain, N/V.  -Vasc consulted for left arm AV fistula  -Pt stood up to go to the bathroom, felt weak and lightheaded, mildly SOB. Denied CP, N/V. VS: SpO2 98% on RA, H 150, /72, AO x 3. Placed on 2L NC for inc WOB. EKG showed new onset atrial fibrillation, rate 146, new TWI lateral leads from prior 3/2017. CE sent. Cardizem 10mg IV x 2, lopressor 5mg IV x 3, brought HR to 90s-110s, BP stayed ~120/70s, AO x 3, denied CP. Troponin 1st set 0.13. Heparin drip started. Once on tele will start cardizem drip. Cardiology consulted.  -/70s, will start cardizem drip. d/c hydralazine, lasix, nifedipine  -Permacath tmrw AM, then HD postponed to tmrw once stable    MEDICATIONS  (STANDING):  calcium acetate 667 milliGRAM(s) Oral three times a day with meals  insulin lispro (HumaLOG) corrective regimen sliding scale   SubCutaneous three times a day before meals  insulin lispro (HumaLOG) corrective regimen sliding scale   SubCutaneous at bedtime  epoetin valente Injectable 4000 Unit(s) IV Push <User Schedule>  ALBUTerol/ipratropium for Nebulization 3 milliLiter(s) Nebulizer every 6 hours  cycloSPORINE  (SandIMMUNE) 100 milliGRAM(s) Oral two times a day  predniSONE   Tablet 5 milliGRAM(s) Oral daily  NIFEdipine XL 90 milliGRAM(s) Oral daily  atorvastatin 10 milliGRAM(s) Oral at bedtime  tamsulosin 0.4 milliGRAM(s) Oral at bedtime  hydrALAZINE 50 milliGRAM(s) Oral every 8 hours  furosemide   Injectable 60 milliGRAM(s) IV Push two times a day  insulin glargine Injectable (LANTUS) 8 Unit(s) SubCutaneous at bedtime  insulin lispro Injectable (HumaLOG) 4 Unit(s) SubCutaneous three times a day before meals    MEDICATIONS  (PRN):      Vital Signs Last 24 Hrs  T(C): 36.9 (07 Sep 2017 05:15), Max: 37.2 (06 Sep 2017 14:37)  T(F): 98.4 (07 Sep 2017 05:15), Max: 98.9 (06 Sep 2017 14:37)  HR: 89 (07 Sep 2017 05:15) (74 - 100)  BP: 142/76 (07 Sep 2017 05:15) (137/62 - 142/76)  BP(mean): --  RR: 18 (07 Sep 2017 05:15) (17 - 18)  SpO2: 98% (07 Sep 2017 05:15) (95% - 99%)    CAPILLARY BLOOD GLUCOSE  278 (06 Sep 2017 23:00)  264 (06 Sep 2017 16:24)  267 (06 Sep 2017 11:54)  271 (06 Sep 2017 08:22)        PHYSICAL EXAM  GENERAL: NAD, well-developed  HEAD:  Atraumatic, Normocephalic  EYES: EOMI, PERRLA, conjunctiva and sclera clear. Legally blind but can see changes in light  NECK: Supple, No JVD  CHEST/LUNG: RR 28, using accessory muscles, high 90s SpO2 on RA, crackles at base in posterior b/l. No wheeze  HEART: Regular rate and rhythm; No murmurs, rubs, or gallops  ABDOMEN: Soft, Nontender, Nondistended; Bowel sounds present  EXTREMITIES: Feet warm, No clubbing, cyanosis. 3+ pitting edema to below knees  PSYCH: AAOx3  SKIN: No rashes or lesions    LABS:                        7.7    14.63 )-----------( 232      ( 07 Sep 2017 06:00 )             23.9     07 Sep 2017 06:00    135    |  88     |  63     ----------------------------<  324    3.8     |  13     |  5.35     Ca    8.8        07 Sep 2017 06:00  Phos  4.7       07 Sep 2017 06:00  Mg     2.0       07 Sep 2017 06:00    TPro  5.4    /  Alb  2.5    /  TBili  0.6    /  DBili  x      /  AST  13     /  ALT  11     /  AlkPhos  82     07 Sep 2017 06:00        RADIOLOGY & ADDITIONAL TESTS:     MICROBIOLOGY:   BCx 9/3 x 2 ng    HCV VL 6.5 million  HCV genotype pending  Hep B core ab, surface ab neg    CONSULTS: nephrology, vascular sx, IR    Latosha Maya, PGY1  Internal Medicine, Team 3  Pager [phone]

## 2017-09-07 NOTE — PROGRESS NOTE ADULT - PROBLEM SELECTOR PLAN 8
f/u HCV genotype, VL Kidney transplant from living brother in 1998 2/2 diabetic nephropathy. When started on HD, incidentally found to be +HCV  -HCV VL 6.5 million  -f/u HCV genotype, VL Kidney transplant from living brother in 1998 2/2 diabetic nephropathy. When started on HD, incidentally found to be +HCV  -HCV VL 6.5 million  -hepatology consulted  -f/u HCV genotype, VL Discovered had HCV infection in 1998 when had kidney transplantation, never treated. 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 LFTs wnl  -HCV VL 6.5 million  -hepatology consulted  -f/u HCV genotype, VL

## 2017-09-08 DIAGNOSIS — E10.65 TYPE 1 DIABETES MELLITUS WITH HYPERGLYCEMIA: ICD-10-CM

## 2017-09-08 LAB
APTT BLD: 38.9 SEC — HIGH (ref 27.5–37.4)
APTT BLD: 96.1 SEC — HIGH (ref 27.5–37.4)
BASE EXCESS BLDV CALC-SCNC: -3.9 MMOL/L — SIGNIFICANT CHANGE UP
BASOPHILS # BLD AUTO: 0.02 K/UL — SIGNIFICANT CHANGE UP (ref 0–0.2)
BASOPHILS NFR BLD AUTO: 0.1 % — SIGNIFICANT CHANGE UP (ref 0–2)
BASOPHILS NFR SPEC: 0 % — SIGNIFICANT CHANGE UP (ref 0–2)
BUN SERPL-MCNC: 49 MG/DL — HIGH (ref 7–23)
BUN SERPL-MCNC: 77 MG/DL — HIGH (ref 7–23)
CALCIUM SERPL-MCNC: 8.5 MG/DL — SIGNIFICANT CHANGE UP (ref 8.4–10.5)
CALCIUM SERPL-MCNC: 8.8 MG/DL — SIGNIFICANT CHANGE UP (ref 8.4–10.5)
CHLORIDE SERPL-SCNC: 89 MMOL/L — LOW (ref 98–107)
CHLORIDE SERPL-SCNC: 97 MMOL/L — LOW (ref 98–107)
CK MB BLD-MCNC: 16.67 NG/ML — HIGH (ref 1–6.6)
CK MB BLD-MCNC: 4.1 — HIGH (ref 0–2.5)
CK MB BLD-MCNC: 6.34 NG/ML — SIGNIFICANT CHANGE UP (ref 1–6.6)
CK SERPL-CCNC: 153 U/L — SIGNIFICANT CHANGE UP (ref 30–200)
CK SERPL-CCNC: 191 U/L — SIGNIFICANT CHANGE UP (ref 30–200)
CK SERPL-CCNC: 216 U/L — HIGH (ref 30–200)
CO2 SERPL-SCNC: 18 MMOL/L — LOW (ref 22–31)
CO2 SERPL-SCNC: 22 MMOL/L — SIGNIFICANT CHANGE UP (ref 22–31)
CREAT SERPL-MCNC: 4.58 MG/DL — HIGH (ref 0.5–1.3)
CREAT SERPL-MCNC: 6.39 MG/DL — HIGH (ref 0.5–1.3)
EOSINOPHIL # BLD AUTO: 0.16 K/UL — SIGNIFICANT CHANGE UP (ref 0–0.5)
EOSINOPHIL NFR BLD AUTO: 1 % — SIGNIFICANT CHANGE UP (ref 0–6)
EOSINOPHIL NFR FLD: 0 % — SIGNIFICANT CHANGE UP (ref 0–6)
GAS PNL BLDV: 131 MMOL/L — LOW (ref 136–146)
GLUCOSE BLDV-MCNC: 299 — HIGH (ref 70–99)
GLUCOSE SERPL-MCNC: 230 MG/DL — HIGH (ref 70–99)
GLUCOSE SERPL-MCNC: 308 MG/DL — HIGH (ref 70–99)
HCO3 BLDV-SCNC: 21 MMOL/L — SIGNIFICANT CHANGE UP (ref 20–27)
HCT VFR BLD CALC: 21.3 % — LOW (ref 39–50)
HCT VFR BLD CALC: 22 % — LOW (ref 39–50)
HCT VFR BLDV CALC: 22.4 % — LOW (ref 39–51)
HGB BLD-MCNC: 7.1 G/DL — LOW (ref 13–17)
HGB BLD-MCNC: 7.2 G/DL — LOW (ref 13–17)
HGB BLDV-MCNC: 7.2 G/DL — LOW (ref 13–17)
IMM GRANULOCYTES # BLD AUTO: 0.38 # — SIGNIFICANT CHANGE UP
IMM GRANULOCYTES NFR BLD AUTO: 2.4 % — HIGH (ref 0–1.5)
INR BLD: 1.11 — SIGNIFICANT CHANGE UP (ref 0.88–1.17)
LYMPHOCYTES # BLD AUTO: 0.74 K/UL — LOW (ref 1–3.3)
LYMPHOCYTES # BLD AUTO: 4.6 % — LOW (ref 13–44)
LYMPHOCYTES NFR SPEC AUTO: 4 % — LOW (ref 13–44)
MAGNESIUM SERPL-MCNC: 2.1 MG/DL — SIGNIFICANT CHANGE UP (ref 1.6–2.6)
MAGNESIUM SERPL-MCNC: 2.4 MG/DL — SIGNIFICANT CHANGE UP (ref 1.6–2.6)
MCHC RBC-ENTMCNC: 28.3 PG — SIGNIFICANT CHANGE UP (ref 27–34)
MCHC RBC-ENTMCNC: 28.6 PG — SIGNIFICANT CHANGE UP (ref 27–34)
MCHC RBC-ENTMCNC: 32.7 % — SIGNIFICANT CHANGE UP (ref 32–36)
MCHC RBC-ENTMCNC: 33.3 % — SIGNIFICANT CHANGE UP (ref 32–36)
MCV RBC AUTO: 85.9 FL — SIGNIFICANT CHANGE UP (ref 80–100)
MCV RBC AUTO: 86.6 FL — SIGNIFICANT CHANGE UP (ref 80–100)
METAMYELOCYTES # FLD: 2 % — HIGH (ref 0–1)
MONOCYTES # BLD AUTO: 1.76 K/UL — HIGH (ref 0–0.9)
MONOCYTES NFR BLD AUTO: 11 % — SIGNIFICANT CHANGE UP (ref 2–14)
MONOCYTES NFR BLD: 9 % — SIGNIFICANT CHANGE UP (ref 2–9)
NEUTROPHIL AB SER-ACNC: 84 % — HIGH (ref 43–77)
NEUTROPHILS # BLD AUTO: 12.91 K/UL — HIGH (ref 1.8–7.4)
NEUTROPHILS NFR BLD AUTO: 80.9 % — HIGH (ref 43–77)
NEUTS BAND # BLD: 1 % — SIGNIFICANT CHANGE UP (ref 0–6)
NRBC # FLD: 0 — SIGNIFICANT CHANGE UP
NRBC # FLD: 0 — SIGNIFICANT CHANGE UP
PCO2 BLDV: 30 MMHG — LOW (ref 41–51)
PH BLDV: 7.44 PH — HIGH (ref 7.32–7.43)
PHOSPHATE SERPL-MCNC: 3.6 MG/DL — SIGNIFICANT CHANGE UP (ref 2.5–4.5)
PHOSPHATE SERPL-MCNC: 5 MG/DL — HIGH (ref 2.5–4.5)
PLATELET # BLD AUTO: 219 K/UL — SIGNIFICANT CHANGE UP (ref 150–400)
PLATELET # BLD AUTO: 229 K/UL — SIGNIFICANT CHANGE UP (ref 150–400)
PLATELET COUNT - ESTIMATE: NORMAL — SIGNIFICANT CHANGE UP
PMV BLD: 9.2 FL — SIGNIFICANT CHANGE UP (ref 7–13)
PMV BLD: 9.8 FL — SIGNIFICANT CHANGE UP (ref 7–13)
PO2 BLDV: 56 MMHG — HIGH (ref 35–40)
POTASSIUM BLDV-SCNC: 3.3 MMOL/L — LOW (ref 3.4–4.5)
POTASSIUM SERPL-MCNC: 3.5 MMOL/L — SIGNIFICANT CHANGE UP (ref 3.5–5.3)
POTASSIUM SERPL-MCNC: 3.6 MMOL/L — SIGNIFICANT CHANGE UP (ref 3.5–5.3)
POTASSIUM SERPL-SCNC: 3.5 MMOL/L — SIGNIFICANT CHANGE UP (ref 3.5–5.3)
POTASSIUM SERPL-SCNC: 3.6 MMOL/L — SIGNIFICANT CHANGE UP (ref 3.5–5.3)
PROTHROM AB SERPL-ACNC: 12.5 SEC — SIGNIFICANT CHANGE UP (ref 9.8–13.1)
RBC # BLD: 2.48 M/UL — LOW (ref 4.2–5.8)
RBC # BLD: 2.54 M/UL — LOW (ref 4.2–5.8)
RBC # FLD: 13.7 % — SIGNIFICANT CHANGE UP (ref 10.3–14.5)
RBC # FLD: 13.9 % — SIGNIFICANT CHANGE UP (ref 10.3–14.5)
REVIEW TO FOLLOW: YES — SIGNIFICANT CHANGE UP
SAO2 % BLDV: 89.1 % — HIGH (ref 60–85)
SODIUM SERPL-SCNC: 135 MMOL/L — SIGNIFICANT CHANGE UP (ref 135–145)
SODIUM SERPL-SCNC: 142 MMOL/L — SIGNIFICANT CHANGE UP (ref 135–145)
TROPONIN T SERPL-MCNC: 0.63 NG/ML — HIGH (ref 0–0.06)
TROPONIN T SERPL-MCNC: 0.83 NG/ML — HIGH (ref 0–0.06)
TROPONIN T SERPL-MCNC: 0.94 NG/ML — HIGH (ref 0–0.06)
WBC # BLD: 14.84 K/UL — HIGH (ref 3.8–10.5)
WBC # BLD: 15.97 K/UL — HIGH (ref 3.8–10.5)
WBC # FLD AUTO: 14.84 K/UL — HIGH (ref 3.8–10.5)
WBC # FLD AUTO: 15.97 K/UL — HIGH (ref 3.8–10.5)

## 2017-09-08 PROCEDURE — 93306 TTE W/DOPPLER COMPLETE: CPT | Mod: 26

## 2017-09-08 PROCEDURE — 99222 1ST HOSP IP/OBS MODERATE 55: CPT

## 2017-09-08 PROCEDURE — 99223 1ST HOSP IP/OBS HIGH 75: CPT | Mod: GC

## 2017-09-08 PROCEDURE — 99223 1ST HOSP IP/OBS HIGH 75: CPT

## 2017-09-08 PROCEDURE — 99233 SBSQ HOSP IP/OBS HIGH 50: CPT | Mod: GC

## 2017-09-08 PROCEDURE — 71010: CPT | Mod: 26,76

## 2017-09-08 RX ORDER — ASPIRIN/CALCIUM CARB/MAGNESIUM 324 MG
81 TABLET ORAL DAILY
Qty: 0 | Refills: 0 | Status: DISCONTINUED | OUTPATIENT
Start: 2017-09-09 | End: 2017-09-20

## 2017-09-08 RX ORDER — INSULIN GLARGINE 100 [IU]/ML
16 INJECTION, SOLUTION SUBCUTANEOUS AT BEDTIME
Qty: 0 | Refills: 0 | Status: DISCONTINUED | OUTPATIENT
Start: 2017-09-08 | End: 2017-09-08

## 2017-09-08 RX ORDER — ERYTHROPOIETIN 10000 [IU]/ML
6000 INJECTION, SOLUTION INTRAVENOUS; SUBCUTANEOUS
Qty: 0 | Refills: 0 | Status: DISCONTINUED | OUTPATIENT
Start: 2017-09-08 | End: 2017-09-10

## 2017-09-08 RX ORDER — HEPARIN SODIUM 5000 [USP'U]/ML
INJECTION INTRAVENOUS; SUBCUTANEOUS
Qty: 25000 | Refills: 0 | Status: DISCONTINUED | OUTPATIENT
Start: 2017-09-08 | End: 2017-09-09

## 2017-09-08 RX ORDER — DIGOXIN 250 MCG
0.25 TABLET ORAL EVERY 6 HOURS
Qty: 0 | Refills: 0 | Status: COMPLETED | OUTPATIENT
Start: 2017-09-08 | End: 2017-09-09

## 2017-09-08 RX ORDER — INSULIN LISPRO 100/ML
VIAL (ML) SUBCUTANEOUS
Qty: 0 | Refills: 0 | Status: DISCONTINUED | OUTPATIENT
Start: 2017-09-08 | End: 2017-09-16

## 2017-09-08 RX ORDER — HEPARIN SODIUM 5000 [USP'U]/ML
3500 INJECTION INTRAVENOUS; SUBCUTANEOUS EVERY 6 HOURS
Qty: 0 | Refills: 0 | Status: DISCONTINUED | OUTPATIENT
Start: 2017-09-08 | End: 2017-09-09

## 2017-09-08 RX ORDER — INSULIN GLARGINE 100 [IU]/ML
12 INJECTION, SOLUTION SUBCUTANEOUS AT BEDTIME
Qty: 0 | Refills: 0 | Status: DISCONTINUED | OUTPATIENT
Start: 2017-09-08 | End: 2017-09-09

## 2017-09-08 RX ORDER — DILTIAZEM HCL 120 MG
15 CAPSULE, EXT RELEASE 24 HR ORAL
Qty: 125 | Refills: 0 | Status: DISCONTINUED | OUTPATIENT
Start: 2017-09-08 | End: 2017-09-09

## 2017-09-08 RX ORDER — ATORVASTATIN CALCIUM 80 MG/1
80 TABLET, FILM COATED ORAL AT BEDTIME
Qty: 0 | Refills: 0 | Status: DISCONTINUED | OUTPATIENT
Start: 2017-09-08 | End: 2017-10-02

## 2017-09-08 RX ORDER — ASPIRIN/CALCIUM CARB/MAGNESIUM 324 MG
325 TABLET ORAL ONCE
Qty: 0 | Refills: 0 | Status: COMPLETED | OUTPATIENT
Start: 2017-09-08 | End: 2017-09-08

## 2017-09-08 RX ORDER — INSULIN LISPRO 100/ML
VIAL (ML) SUBCUTANEOUS AT BEDTIME
Qty: 0 | Refills: 0 | Status: DISCONTINUED | OUTPATIENT
Start: 2017-09-08 | End: 2017-09-17

## 2017-09-08 RX ORDER — HEPARIN SODIUM 5000 [USP'U]/ML
7000 INJECTION INTRAVENOUS; SUBCUTANEOUS EVERY 6 HOURS
Qty: 0 | Refills: 0 | Status: DISCONTINUED | OUTPATIENT
Start: 2017-09-08 | End: 2017-09-09

## 2017-09-08 RX ORDER — INSULIN LISPRO 100/ML
4 VIAL (ML) SUBCUTANEOUS
Qty: 0 | Refills: 0 | Status: DISCONTINUED | OUTPATIENT
Start: 2017-09-08 | End: 2017-09-08

## 2017-09-08 RX ORDER — INSULIN LISPRO 100/ML
3 VIAL (ML) SUBCUTANEOUS
Qty: 0 | Refills: 0 | Status: DISCONTINUED | OUTPATIENT
Start: 2017-09-08 | End: 2017-09-09

## 2017-09-08 RX ORDER — INSULIN LISPRO 100/ML
8 VIAL (ML) SUBCUTANEOUS
Qty: 0 | Refills: 0 | Status: DISCONTINUED | OUTPATIENT
Start: 2017-09-08 | End: 2017-09-08

## 2017-09-08 RX ADMIN — Medication 667 MILLIGRAM(S): at 17:38

## 2017-09-08 RX ADMIN — HEPARIN SODIUM 1600 UNIT(S)/HR: 5000 INJECTION INTRAVENOUS; SUBCUTANEOUS at 17:39

## 2017-09-08 RX ADMIN — Medication 325 MILLIGRAM(S): at 02:16

## 2017-09-08 RX ADMIN — Medication 15 MG/HR: at 11:27

## 2017-09-08 RX ADMIN — Medication 1: at 09:45

## 2017-09-08 RX ADMIN — Medication 5 MILLIGRAM(S): at 06:01

## 2017-09-08 RX ADMIN — Medication 0.25 MILLIGRAM(S): at 17:39

## 2017-09-08 RX ADMIN — ATORVASTATIN CALCIUM 80 MILLIGRAM(S): 80 TABLET, FILM COATED ORAL at 21:53

## 2017-09-08 RX ADMIN — TAMSULOSIN HYDROCHLORIDE 0.4 MILLIGRAM(S): 0.4 CAPSULE ORAL at 21:53

## 2017-09-08 RX ADMIN — HEPARIN SODIUM 1600 UNIT(S)/HR: 5000 INJECTION INTRAVENOUS; SUBCUTANEOUS at 10:34

## 2017-09-08 RX ADMIN — Medication: at 22:55

## 2017-09-08 RX ADMIN — INSULIN GLARGINE 12 UNIT(S): 100 INJECTION, SOLUTION SUBCUTANEOUS at 21:53

## 2017-09-08 RX ADMIN — Medication 3: at 17:38

## 2017-09-08 RX ADMIN — Medication 0.25 MILLIGRAM(S): at 12:35

## 2017-09-08 RX ADMIN — Medication 15 MG/HR: at 02:17

## 2017-09-08 RX ADMIN — Medication 15 MG/HR: at 12:35

## 2017-09-08 RX ADMIN — Medication 4 UNIT(S): at 17:38

## 2017-09-08 RX ADMIN — HEPARIN SODIUM 7000 UNIT(S): 5000 INJECTION INTRAVENOUS; SUBCUTANEOUS at 11:18

## 2017-09-08 RX ADMIN — ERYTHROPOIETIN 4000 UNIT(S): 10000 INJECTION, SOLUTION INTRAVENOUS; SUBCUTANEOUS at 09:29

## 2017-09-08 RX ADMIN — Medication 2: at 12:49

## 2017-09-08 RX ADMIN — CYCLOSPORINE 100 MILLIGRAM(S): 100 CAPSULE ORAL at 12:35

## 2017-09-08 NOTE — PROGRESS NOTE ADULT - ASSESSMENT
53 y/o M with h/o CKD s/p renal transplant (1998) on cyclosporine and CellCept, HTN, DM, legally blind BIBEMS for uremic encephalopathy. Pt became bradycardic and had PEA arrest in the ED, intubated for airway protection. ROSC achieved s/p 1 round of epi and chest compressions and pt was transferred to MICU. Immunosuppressants for renal transplant were held. Pt had emergent HD for hyperkalemia. Covered empirically with broad spectrum antibiotics (vanc and zosyn), Hgb 5 s/p 1u pRBC given and epogen was started. Pressors were weaned off and pt extubated 9/4. Cyclosporine restarted and prednisone started. Pt remains stable and transferred to floor for further management 9/5, now found to have new-onset AF.

## 2017-09-08 NOTE — CHART NOTE - NSCHARTNOTEFT_GEN_A_CORE
Patient and his family at bedside inquired about the recent cardiac enzyme values as the patient's respiratory rate is increasing and patient appears more uncomfortable.  The family and patient were informed that their private cardiologist said that he will evaluate the patient during the day time.  The patient and family stated that the primary team told them that their private cardiologist was contacted yesterday during the day and he said he would evaluate the patient yesterday. However, per the family and patient, no one came to see the patient.  The family and patient do not want the private cardiologist involved in the patient's care during this admission. They have made it clear that they want a cardiologist who is able to comprehensively evaluate the patient immediately.  They asked to speak with the in-house cardiology team as soon as possible and they want the in-house cardiology team to care for the patient's cardiology needs during the remainder of this admission. Patient and his family at bedside inquired about the recent cardiac enzyme values as the patient's respiratory rate is increasing and patient appears more uncomfortable.  The family and patient were informed that their private cardiologist was contacted overnight. He said that he will evaluate the patient during the day time.  The patient and family told me that the primary team contacted the private cardiologist yesterday, and the cardiologist said that he would evaluate the patient during the day yesterday. However, per the family and patient, no one came to see the patient.  The family and patient do not want the private cardiologist involved in the patient's care during this admission. They have made it clear that they want a cardiologist who is able to comprehensively evaluate the patient immediately.  They asked to speak with the in-house cardiology team as soon as possible and they want the in-house cardiology team to care for the patient's cardiology needs during the remainder of this admission. Patient and his family at bedside inquired about the recent cardiac enzyme values as the patient's respiratory rate is increasing and patient appears more uncomfortable.  The family and patient were informed that their private cardiologist, Dr. Cortez, was contacted overnight. He said that he will evaluate the patient during the day time.  The patient and family told me that the primary team contacted the private cardiologist, Dr. Douglas Ball's group yesterday, and the cardiologist said that he would evaluate the patient during the day yesterday. However, per the family and patient, no one came to see the patient.  The family and patient do not want the private cardiologist involved in the patient's care during this admission. They have made it clear that they want a cardiologist who is able to comprehensively evaluate the patient immediately.  They asked to speak with the in-house cardiology team as soon as possible and they want the in-house cardiology team to care for the patient's cardiology needs during the remainder of this admission. Patient and his family at bedside inquired about the recent cardiac enzyme values as the patient's respiratory rate is increasing and patient appears more uncomfortable.  The family and patient were informed that their private cardiologist, Dr. Sylvester, was contacted overnight. He said that he will evaluate the patient during the day time.  The patient and family told me that the primary team contacted the private cardiologist, Dr. Douglas Ball's group yesterday, and the cardiologist said that he would evaluate the patient during the day yesterday. However, per the family and patient, no one came to see the patient.  The family and patient do not want the private cardiologist involved in the patient's care during this admission. They have made it clear that they want a cardiologist who is able to comprehensively evaluate the patient immediately.  They asked to speak with the in-house cardiology team as soon as possible and they want the in-house cardiology team to care for the patient's cardiology needs during the remainder of this admission.

## 2017-09-08 NOTE — PROGRESS NOTE ADULT - PROBLEM SELECTOR PLAN 6
Home regimen lantus 22u daily, home pravastatin 10mg daily  -atorvastatin 10mg daily  -ISS  -inc to lantus 8u qhs, humalog 4u premeal Home regimen lantus 22u daily, home pravastatin 10mg daily  -atorvastatin 10mg daily  -JULIA, lantus 12u qhs, humalog 4u premeal

## 2017-09-08 NOTE — PROGRESS NOTE ADULT - PROBLEM SELECTOR PLAN 8
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 LFTs wnl  -HCV VL 6.5 million  -hepatology consulted  -f/u HCV genotype, VL Discovered had HCV infection in 1998 when had kidney transplantation, never treated. 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 LFTs wnl  -HCV VL 6.5 million  -hepatology consulted, will need f/u hepatology clinic for treatment  -f/u HIV, HAV

## 2017-09-08 NOTE — PROGRESS NOTE ADULT - SUBJECTIVE AND OBJECTIVE BOX
[facility] DIVISION OF KIDNEY DISEASES AND HYPERTENSION -- HEMODIALYSIS NOTE  --------------------------------------------------------------------------------  Chief Complaint: ESRD/Ongoing hemodialysis requirement    24 hour events/subjective: seen on dialysis. feels well, no concerns. had rapid afib episode yesterday, was put on diltiazem drip.  currently rate controlled, remains in afib.         PAST HISTORY  --------------------------------------------------------------------------------  No significant changes to PMH, PSH, FHx, SHx, unless otherwise noted    ALLERGIES & MEDICATIONS  --------------------------------------------------------------------------------  Allergies    No Known Allergies    Intolerances      Standing Inpatient Medications  calcium acetate 667 milliGRAM(s) Oral three times a day with meals  insulin lispro (HumaLOG) corrective regimen sliding scale   SubCutaneous three times a day before meals  insulin lispro (HumaLOG) corrective regimen sliding scale   SubCutaneous at bedtime  epoetin valente Injectable 4000 Unit(s) IV Push <User Schedule>  predniSONE   Tablet 5 milliGRAM(s) Oral daily  tamsulosin 0.4 milliGRAM(s) Oral at bedtime  cycloSPORINE  (SandIMMUNE) 100 milliGRAM(s) Oral daily  atorvastatin 80 milliGRAM(s) Oral at bedtime  insulin glargine Injectable (LANTUS) 16 Unit(s) SubCutaneous at bedtime  insulin lispro Injectable (HumaLOG) 8 Unit(s) SubCutaneous three times a day before meals  heparin  Infusion.  Unit(s)/Hr IV Continuous <Continuous>  diltiazem Infusion 15 mG/Hr IV Continuous <Continuous>    PRN Inpatient Medications  heparin  Injectable 7000 Unit(s) IV Push every 6 hours PRN  heparin  Injectable 3500 Unit(s) IV Push every 6 hours PRN      REVIEW OF SYSTEMS  --------------------------------------------------------------------------------  Gen: No weight changes, fatigue, fevers/chills, weakness  Skin: No rashes  Respiratory: No dyspnea, cough, wheezing, hemoptysis  CV: No chest pain, PND, orthopnea  GI: No abdominal pain, diarrhea, constipation, nausea, vomiting, melena, hematochezia  MSK: No joint pain/swelling; no back pain; ++ edema  Neuro: No dizziness/lightheadedness, weakness, seizures, numbness, tingling  Psych: No significant nervousness, anxiety, stress, depression    All other systems were reviewed and are negative, except as noted.    VITALS/PHYSICAL EXAM  --------------------------------------------------------------------------------  T(C): 37.1 (09-08-17 @ 11:11), Max: 37.1 (09-08-17 @ 06:00)  HR: 124 (09-08-17 @ 11:11) (81 - 142)  BP: 126/77 (09-08-17 @ 11:11) (107/59 - 149/64)  RR: 18 (09-08-17 @ 06:40) (17 - 40)  SpO2: 100% (09-08-17 @ 06:00) (92% - 100%)  Wt(kg): --        Physical Exam:  	Gen: NAD, well-appearing  	HEENT: PERRL, supple neck, clear oropharynx  	Pulm: CTA B/L  	CV: RRR, S1S2; no rub  	Abd: +BS, soft, nontender/nondistended  	: No suprapubic tenderness  	LE: Warm, FROM, no clubbing, intact strength; ++ edema  	Psych: Normal affect and mood  	Skin: Warm, without rashes  	Vascular access: [facility] - good flows with HD    LABS/STUDIES  --------------------------------------------------------------------------------              7.2    14.84 >-----------<  219      [09-08-17 @ 05:30]              22.0     135  |  89  |  77  ----------------------------<  308      [09-08-17 @ 05:30]  3.6   |  18  |  6.39        Ca     8.5     [09-08-17 @ 05:30]      Mg     2.4     [09-08-17 @ 05:30]      Phos  5.0     [09-08-17 @ 05:30]    TPro  5.4  /  Alb  2.5  /  TBili  0.6  /  DBili  x   /  AST  13  /  ALT  11  /  AlkPhos  82  [09-07-17 @ 06:00]    PT/INR: PT 11.4 , INR 1.02       [09-07-17 @ 06:50]  PTT: 38.9       [09-08-17 @ 05:30]    Troponin 0.83      [09-08-17 @ 05:30]        [09-08-17 @ 05:30]    Iron 28, TIBC 187, %sat --      [09-06-17 @ 05:55]  Ferritin 554.1      [09-06-17 @ 05:55]  HbA1c 8.0      [08-16-17 @ 06:30]  TSH 2.88      [09-03-17 @ 13:45]  Lipid: chol 137, TG 97, HDL 52, LDL 68      [08-11-17 @ 07:20]    HBsAb <3.0      [09-03-17 @ 18:00]  HBsAg NEGATIVE      [09-03-17 @ 18:00]  HCV 14.48, Reactive      [09-03-17 @ 18:00]

## 2017-09-08 NOTE — CONSULT NOTE ADULT - SUBJECTIVE AND OBJECTIVE BOX
CHIEF COMPLAINT: Asked by cardiology team to evaluate patient     HISTORY OF PRESENT ILLNESS:    PAST MEDICAL & SURGICAL HISTORY:  Hypertension  Legally blind  Diabetes mellitus type I  Renal transplant, status post  CKD (chronic kidney disease)  Renal transplant, status post  History of tonsillectomy      [ ] Diabetes   [ ] Hypertension  [ ] Hyperlipidemia  [ ] CAD  [ ] PCI  [ ] CABG    PREVIOUS DIAGNOSTIC TESTING:    [ ] Echocardiogram:  [ ]  Catheterization:  [ ] Stress Test:  	    MEDICATIONS:  tamsulosin 0.4 milliGRAM(s) Oral at bedtime  heparin  Infusion.  Unit(s)/Hr IV Continuous <Continuous>  heparin  Injectable 7000 Unit(s) IV Push every 6 hours PRN  heparin  Injectable 3500 Unit(s) IV Push every 6 hours PRN  diltiazem Infusion 15 mG/Hr IV Continuous <Continuous>  aspirin enteric coated 81 milliGRAM(s) Oral daily  digoxin  Injectable 0.25 milliGRAM(s) IV Push every 6 hours            insulin lispro (HumaLOG) corrective regimen sliding scale   SubCutaneous three times a day before meals  insulin lispro (HumaLOG) corrective regimen sliding scale   SubCutaneous at bedtime  predniSONE   Tablet 5 milliGRAM(s) Oral daily  atorvastatin 80 milliGRAM(s) Oral at bedtime  insulin glargine Injectable (LANTUS) 16 Unit(s) SubCutaneous at bedtime  insulin lispro Injectable (HumaLOG) 8 Unit(s) SubCutaneous three times a day before meals    calcium acetate 667 milliGRAM(s) Oral three times a day with meals  epoetin valente Injectable 4000 Unit(s) IV Push <User Schedule>  cycloSPORINE  (SandIMMUNE) 100 milliGRAM(s) Oral daily      FAMILY HISTORY:  No pertinent family history in first degree relatives      SOCIAL HISTORY:    [ ] Non-smoker  [ ] Smoker  [ ] Alcohol    Allergies    No Known Allergies    Intolerances    	    REVIEW OF SYSTEMS:  CONSTITUTIONAL: No fever, weight loss, or fatigue  EYES: No eye pain, visual disturbances, or discharge  ENMT:  No difficulty hearing, tinnitus, vertigo; No sinus or throat pain  NECK: No pain or stiffness  RESPIRATORY: No cough, wheezing, chills or hemoptysis; No Shortness of Breath  CARDIOVASCULAR: No chest pain, palpitations, passing out, dizziness, or leg swelling  GASTROINTESTINAL: No abdominal or epigastric pain. No nausea, vomiting, or hematemesis; No diarrhea or constipation. No melena or hematochezia.  GENITOURINARY: No dysuria, frequency, hematuria, or incontinence  NEUROLOGICAL: No headaches, memory loss, loss of strength, numbness, or tremors  SKIN: No itching, burning, rashes, or lesions   LYMPH Nodes: No enlarged glands  ENDOCRINE: No heat or cold intolerance; No hair loss  MUSCULOSKELETAL: No joint pain or swelling; No muscle, back, or extremity pain  PSYCHIATRIC: No depression, anxiety, mood swings, or difficulty sleeping  HEME/LYMPH: No easy bruising, or bleeding gums  ALLERY AND IMMUNOLOGIC: No hives or eczema	    [ ] All others negative	  [ ] Unable to obtain    PHYSICAL EXAM:  T(C): 37.1 (09-08-17 @ 11:11), Max: 37.1 (09-08-17 @ 06:00)  HR: 134 (09-08-17 @ 12:27) (81 - 134)  BP: 133/52 (09-08-17 @ 12:27) (107/59 - 140/65)  RR: 20 (09-08-17 @ 11:11) (16 - 40)  SpO2: 95% (09-08-17 @ 11:11) (92% - 100%)  Wt(kg): --  I&O's Summary    08 Sep 2017 07:01  -  08 Sep 2017 15:13  --------------------------------------------------------  IN: 400 mL / OUT: 2400 mL / NET: -2000 mL        Appearance: Normal	  HEENT:   Normal oral mucosa, PERRL, EOMI	  Lymphatic: No lymphadenopathy  Cardiovascular: Normal S1 S2, No JVD, No murmurs, No edema  Respiratory: Lungs clear to auscultation	  Psychiatry: A & O x 3, Mood & affect appropriate  Gastrointestinal:  Soft, Non-tender, + BS	  Skin: No rashes, No ecchymoses, No cyanosis	  Neurologic: Non-focal  Extremities: Normal range of motion, No clubbing, cyanosis or edema  Vascular: Peripheral pulses palpable 2+ bilaterally    TELEMETRY: 	    ECG:  	  RADIOLOGY:  OTHER: 	  	  LABS:	 	    CARDIAC MARKERS:      CKMB: 16.67 ng/mL (09-07 @ 23:45)                              7.1    15.97 )-----------( 229      ( 08 Sep 2017 14:40 )             21.3     09-08    135  |  89<L>  |  77<H>  ----------------------------<  308<H>  3.6   |  18<L>  |  6.39<H>    Ca    8.5      08 Sep 2017 05:30  Phos  5.0     09-08  Mg     2.4     09-08    TPro  5.4<L>  /  Alb  2.5<L>  /  TBili  0.6  /  DBili  x   /  AST  13  /  ALT  11  /  AlkPhos  82  09-07    proBNP:   Lipid Profile:   HgA1c:   TSH:     ASSESSMENT/PLAN: CHIEF COMPLAINT: Asked by cardiology team to evaluate patient     HISTORY OF PRESENT ILLNESS: Mr. Greenberg is a pleasant 52 year old M presented with AMS, PEA arrest secondary to hyperkalemia requiring emergent HD.   He carries a PMH of CKD s/p renal transplant () on cyclosporine and CellCept, HTN, DM, legally blind and h/o uremic encephalopathy.   In the ED, he was found to be bradycardic, PEA arrest, intubated for airway protection. ROSC achieved s/p 1 round of epi and chest compressions and pt was transferred to MICU. Immunosuppressants for renal transplant were held.    On 17, he was found to have new-onset AF on  was placed on AV lizet blockers and self converted to SR today ( 17 ) at 2:13 pm.   Denies any prior history of palpitations, dizziness, LH, near syncope or syncope.     Called to evaluate.     PAST MEDICAL & SURGICAL HISTORY:  Hypertension  Legally blind  Diabetes mellitus type I  Renal transplant, status post  CKD (chronic kidney disease)  Renal transplant, status post  History of tonsillectomy      PREVIOUS DIAGNOSTIC TESTIN/8/17  Echocardiogram: Mitral annular calcification, otherwise normal mitral  valve. Minimal mitral regurgitation. Mild concentric left ventricular hypertrophy. Endocardium not well visualized; grossly normal left  ventricular systolic function. Normal right ventricular size and function. Small pericardial effusion posterior to the left  ventricle. Right pleural effusion.    MEDICATIONS:  tamsulosin 0.4 milliGRAM(s) Oral at bedtime  heparin  Infusion.  Unit(s)/Hr IV Continuous <Continuous>  heparin  Injectable 7000 Unit(s) IV Push every 6 hours PRN  heparin  Injectable 3500 Unit(s) IV Push every 6 hours PRN  diltiazem Infusion 15 mG/Hr IV Continuous <Continuous>  aspirin enteric coated 81 milliGRAM(s) Oral daily  digoxin  Injectable 0.25 milliGRAM(s) IV Push every 6 hours  insulin lispro (HumaLOG) corrective regimen sliding scale   SubCutaneous three times a day before meals  insulin lispro (HumaLOG) corrective regimen sliding scale   SubCutaneous at bedtime  predniSONE   Tablet 5 milliGRAM(s) Oral daily  atorvastatin 80 milliGRAM(s) Oral at bedtime  insulin glargine Injectable (LANTUS) 16 Unit(s) SubCutaneous at bedtime  insulin lispro Injectable (HumaLOG) 8 Unit(s) SubCutaneous three times a day before meals  calcium acetate 667 milliGRAM(s) Oral three times a day with meals  epoetin valente Injectable 4000 Unit(s) IV Push <User Schedule>  cycloSPORINE  (SandIMMUNE) 100 milliGRAM(s) Oral daily      FAMILY HISTORY:  No pertinent family history in first degree relatives    SOCIAL HISTORY:    [ ] Non-smoker  [x ] Smoker  [ ] Alcohol  X Drug abuse in the past     Allergies : No Known Allergies     Intolerances ;   REVIEW OF SYSTEMS:  CONSTITUTIONAL: No fever, weight loss, or fatigue  EYES: No eye pain, visual disturbances, or discharge  ENMT:  No difficulty hearing, tinnitus, vertigo; No sinus or throat pain  NECK: No pain or stiffness  RESPIRATORY: No cough, wheezing, chills or hemoptysis; No Shortness of Breath  CARDIOVASCULAR: No chest pain, palpitations, passing out, dizziness, or leg swelling  GASTROINTESTINAL: No abdominal or epigastric pain. No nausea, vomiting, or hematemesis; No diarrhea or constipation. No melena or hematochezia.  GENITOURINARY: No dysuria, frequency, hematuria, or incontinence  NEUROLOGICAL: No headaches, memory loss, loss of strength, numbness, or tremors  SKIN: No itching, burning, rashes, or lesions   LYMPH Nodes: No enlarged glands  ENDOCRINE: No heat or cold intolerance; No hair loss  MUSCULOSKELETAL: No joint pain or swelling; No muscle, back, or extremity pain  PSYCHIATRIC: No depression, anxiety, mood swings, or difficulty sleeping  HEME/LYMPH: No easy bruising, or bleeding gums  ALLERY AND IMMUNOLOGIC: No hives or eczema	    [x ] All others negative	  [ ] Unable to obtain    PHYSICAL EXAM:  T(C): 37.1 (17 @ 11:11), Max: 37.1 (17 @ 06:00)  HR: 134 (17 @ 12:27) (81 - 134)  BP: 133/52 (17 @ 12:27) (107/59 - 140/65)  RR: 20 (17 @ 11:11) (16 - 40)  SpO2: 95% (17 @ 11:11) (92% - 100%)  Wt(kg): --  I&O's Summary    08 Sep 2017 07:01  -  08 Sep 2017 15:13  --------------------------------------------------------  IN: 400 mL / OUT: 2400 mL / NET: -2000 mL    Appearance: Normal	  HEENT:   Normal oral mucosa, PERRL, EOMI	  Lymphatic: No lymphadenopathy  Cardiovascular: Normal S1 S2, No JVD, No murmurs, No edema  Respiratory: Lungs clear to auscultation	  Psychiatry: A & O x 3, Mood & affect appropriate  Gastrointestinal:  Soft, Non-tender, + BS	  Skin: No rashes, No ecchymoses, No cyanosis	  Neurologic: Non-focal  Extremities: Normal range of motion, No clubbing, cyanosis or edema  Vascular: Peripheral pulses palpable 2+ bilaterally    TELEMETRY: 	SInus rhythm 70's       CKMB: 16.67 ng/mL ( @ 23:45)                       7.1    15.97 )-----------( 229      ( 08 Sep 2017 14:40 )             21.3         135  |  89<L>  |  77<H>  ----------------------------<  308<H>  3.6   |  18<L>  |  6.39<H>    Ca    8.5      08 Sep 2017 05:30  Phos  5.0       Mg     2.4         TPro  5.4<L>  /  Alb  2.5<L>  /  TBili  0.6  /  DBili  x   /  AST  13  /  ALT  11  /  AlkPhos  82        ASSESSMENT/PLAN: 	    Mr. 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 CHADS score is 2 ( HTN & DM ). CHADS VASC score (Stroke risk was 2.2% per year in >90,000 patients (the [place] Atrial Fibrillation Cohort Study) and 2.9% risk of stroke/TIA/systemic embolism.   Score 2 or greater is “moderate-high” risk and should otherwise be an anticoagulation candidate.   Has Bled score is 2, however, given his risk benefit ration recommend AC with warfarin and maintian INR 2-3 .     1) Continue AV lizet blockers   d/c IV cardizem   PO Cardizem 120 mg QD   2) Anticoagualte with warfarin and maintain INR 2-3. CHIEF COMPLAINT: Asked by cardiology team to evaluate patient     HISTORY OF PRESENT ILLNESS: Mr. Greenberg is a pleasant 52 year old M presented with AMS, PEA arrest secondary to hyperkalemia requiring emergent HD.   He carries a PMH of CKD s/p renal transplant () on cyclosporine and CellCept, HTN, DM, legally blind and h/o uremic encephalopathy.   In the ED, he was found to be bradycardic, PEA arrest, intubated for airway protection. ROSC achieved s/p 1 round of epi and chest compressions and pt was transferred to MICU. Immunosuppressants for renal transplant were held.    On 17, he was found to have new-onset AF on  was placed on AV lizet blockers and self converted to SR today ( 17 ) at 2:13 pm.   Denies any prior history of palpitations, dizziness, LH, near syncope or syncope.   Current smoker and past h/o IVDA    Called to evaluate.     PAST MEDICAL & SURGICAL HISTORY:  Hypertension  Legally blind  Diabetes mellitus type I  Renal transplant, status post  CKD (chronic kidney disease)  Renal transplant, status post  History of tonsillectomy      PREVIOUS DIAGNOSTIC TESTIN/8/17  Echocardiogram: Mitral annular calcification, otherwise normal mitral  valve. Minimal mitral regurgitation. Mild concentric left ventricular hypertrophy. Endocardium not well visualized; grossly normal left  ventricular systolic function. Normal right ventricular size and function. Small pericardial effusion posterior to the left  ventricle. Right pleural effusion.    MEDICATIONS:  tamsulosin 0.4 milliGRAM(s) Oral at bedtime  heparin  Infusion.  Unit(s)/Hr IV Continuous <Continuous>  heparin  Injectable 7000 Unit(s) IV Push every 6 hours PRN  heparin  Injectable 3500 Unit(s) IV Push every 6 hours PRN  diltiazem Infusion 15 mG/Hr IV Continuous <Continuous>  aspirin enteric coated 81 milliGRAM(s) Oral daily  digoxin  Injectable 0.25 milliGRAM(s) IV Push every 6 hours  insulin lispro (HumaLOG) corrective regimen sliding scale   SubCutaneous three times a day before meals  insulin lispro (HumaLOG) corrective regimen sliding scale   SubCutaneous at bedtime  predniSONE   Tablet 5 milliGRAM(s) Oral daily  atorvastatin 80 milliGRAM(s) Oral at bedtime  insulin glargine Injectable (LANTUS) 16 Unit(s) SubCutaneous at bedtime  insulin lispro Injectable (HumaLOG) 8 Unit(s) SubCutaneous three times a day before meals  calcium acetate 667 milliGRAM(s) Oral three times a day with meals  epoetin valente Injectable 4000 Unit(s) IV Push <User Schedule>  cycloSPORINE  (SandIMMUNE) 100 milliGRAM(s) Oral daily      FAMILY HISTORY:  No pertinent family history in first degree relatives    SOCIAL HISTORY:    [ ] Non-smoker  [x ] Smoker  [ ] Alcohol  X Drug abuse in the past     Allergies : No Known Allergies     Intolerances ;   REVIEW OF SYSTEMS:  CONSTITUTIONAL: No fever, weight loss, or fatigue  EYES: No eye pain, visual disturbances, or discharge  ENMT:  No difficulty hearing, tinnitus, vertigo; No sinus or throat pain  NECK: No pain or stiffness  RESPIRATORY: No cough, wheezing, chills or hemoptysis; No Shortness of Breath  CARDIOVASCULAR: No chest pain, palpitations, passing out, dizziness, or leg swelling  GASTROINTESTINAL: No abdominal or epigastric pain. No nausea, vomiting, or hematemesis; No diarrhea or constipation. No melena or hematochezia.  GENITOURINARY: No dysuria, frequency, hematuria, or incontinence  NEUROLOGICAL: No headaches, memory loss, loss of strength, numbness, or tremors  SKIN: No itching, burning, rashes, or lesions   LYMPH Nodes: No enlarged glands  ENDOCRINE: No heat or cold intolerance; No hair loss  MUSCULOSKELETAL: No joint pain or swelling; No muscle, back, or extremity pain  PSYCHIATRIC: No depression, anxiety, mood swings, or difficulty sleeping  HEME/LYMPH: No easy bruising, or bleeding gums  ALLERY AND IMMUNOLOGIC: No hives or eczema	    [x ] All others negative	  [ ] Unable to obtain    PHYSICAL EXAM:  T(C): 37.1 (17 @ 11:11), Max: 37.1 (17 @ 06:00)  HR: 134 (17 @ 12:27) (81 - 134)  BP: 133/52 (17 @ 12:27) (107/59 - 140/65)  RR: 20 (17 @ 11:11) (16 - 40)  SpO2: 95% (17 @ 11:11) (92% - 100%)  Wt(kg): --  I&O's Summary    08 Sep 2017 07:01  -  08 Sep 2017 15:13  --------------------------------------------------------  IN: 400 mL / OUT: 2400 mL / NET: -2000 mL    Appearance: Normal	  HEENT:   Normal oral mucosa, PERRL, EOMI	  Lymphatic: No lymphadenopathy  Cardiovascular: Normal S1 S2, No JVD, No murmurs, No edema  Respiratory: Lungs clear to auscultation	  Psychiatry: A & O x 3, Mood & affect appropriate  Gastrointestinal:  Soft, Non-tender, + BS	  Skin: No rashes, No ecchymoses, No cyanosis	  Neurologic: Non-focal  Extremities: Normal range of motion, No clubbing, cyanosis or edema  Vascular: Peripheral pulses palpable 2+ bilaterally    TELEMETRY: 	SInus rhythm 70's       CKMB: 16.67 ng/mL ( @ 23:45)                       7.1    15.97 )-----------( 229      ( 08 Sep 2017 14:40 )             21.3         135  |  89<L>  |  77<H>  ----------------------------<  308<H>  3.6   |  18<L>  |  6.39<H>    Ca    8.5      08 Sep 2017 05:30  Phos  5.0       Mg     2.4         TPro  5.4<L>  /  Alb  2.5<L>  /  TBili  0.6  /  DBili  x   /  AST  13  /  ALT  11  /  AlkPhos  82        ASSESSMENT/PLAN: 	    Mr. 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 CHADS score is 2 ( HTN & DM ). CHADS VASC score (Stroke risk was 2.2% per year in >90,000 patients (the [place] Atrial Fibrillation Cohort Study) and 2.9% risk of stroke/TIA/systemic embolism.   Score 2 or greater is “moderate-high” risk and should otherwise be an anticoagulation candidate.   Has Bled score is 2, however, given his risk benefit ration recommend AC with warfarin and maintian INR 2-3 .     1) Continue AV lizet blockers   d/c IV cardizem   PO Cardizem 120 mg QD   2) Anticoagualte with warfarin and maintain INR 2-3. CHIEF COMPLAINT: Asked by cardiology team to evaluate patient     HISTORY OF PRESENT ILLNESS: Mr. Greenberg is a pleasant 52 year old M presented with AMS, PEA arrest secondary to hyperkalemia requiring emergent HD.   He carries a PMH of CKD s/p renal transplant () on cyclosporine and CellCept, HTN, DM, legally blind and h/o uremic encephalopathy.   In the ED, he was found to be bradycardic, PEA arrest, intubated for airway protection. ROSC achieved s/p 1 round of epi and chest compressions and pt was transferred to MICU. Immunosuppressants for renal transplant were held.    On 17, he was found to have new-onset AF on  was placed on AV lizet blockers and self converted to SR today ( 17 ) at 2:13 pm.   Denies any prior history of palpitations, dizziness, LH, near syncope or syncope.   Current smoker and past h/o IVDA    Called to evaluate.     PAST MEDICAL & SURGICAL HISTORY:  Hypertension  Legally blind  Diabetes mellitus type I  Renal transplant, status post  CKD (chronic kidney disease)  Renal transplant, status post  History of tonsillectomy      PREVIOUS DIAGNOSTIC TESTIN/8/17  Echocardiogram: Mitral annular calcification, otherwise normal mitral  valve. Minimal mitral regurgitation. Mild concentric left ventricular hypertrophy. Endocardium not well visualized; grossly normal left  ventricular systolic function. Normal right ventricular size and function. Small pericardial effusion posterior to the left  ventricle. Right pleural effusion.    MEDICATIONS:  tamsulosin 0.4 milliGRAM(s) Oral at bedtime  heparin  Infusion.  Unit(s)/Hr IV Continuous <Continuous>  heparin  Injectable 7000 Unit(s) IV Push every 6 hours PRN  heparin  Injectable 3500 Unit(s) IV Push every 6 hours PRN  diltiazem Infusion 15 mG/Hr IV Continuous <Continuous>  aspirin enteric coated 81 milliGRAM(s) Oral daily  digoxin  Injectable 0.25 milliGRAM(s) IV Push every 6 hours  insulin lispro (HumaLOG) corrective regimen sliding scale   SubCutaneous three times a day before meals  insulin lispro (HumaLOG) corrective regimen sliding scale   SubCutaneous at bedtime  predniSONE   Tablet 5 milliGRAM(s) Oral daily  atorvastatin 80 milliGRAM(s) Oral at bedtime  insulin glargine Injectable (LANTUS) 16 Unit(s) SubCutaneous at bedtime  insulin lispro Injectable (HumaLOG) 8 Unit(s) SubCutaneous three times a day before meals  calcium acetate 667 milliGRAM(s) Oral three times a day with meals  epoetin valente Injectable 4000 Unit(s) IV Push <User Schedule>  cycloSPORINE  (SandIMMUNE) 100 milliGRAM(s) Oral daily      FAMILY HISTORY:  No pertinent family history in first degree relatives    SOCIAL HISTORY:    [ ] Non-smoker  [x ] Smoker  [ ] Alcohol  X Drug abuse in the past     Allergies : No Known Allergies     Intolerances ;   REVIEW OF SYSTEMS:  CONSTITUTIONAL: No fever, weight loss, or fatigue  EYES: No eye pain, visual disturbances, or discharge  ENMT:  No difficulty hearing, tinnitus, vertigo; No sinus or throat pain  NECK: No pain or stiffness  RESPIRATORY: No cough, wheezing, chills or hemoptysis; No Shortness of Breath  CARDIOVASCULAR: No chest pain, palpitations, passing out, dizziness, or leg swelling  GASTROINTESTINAL: No abdominal or epigastric pain. No nausea, vomiting, or hematemesis; No diarrhea or constipation. No melena or hematochezia.  GENITOURINARY: No dysuria, frequency, hematuria, or incontinence  NEUROLOGICAL: No headaches, memory loss, loss of strength, numbness, or tremors  SKIN: No itching, burning, rashes, or lesions   LYMPH Nodes: No enlarged glands  ENDOCRINE: No heat or cold intolerance; No hair loss  MUSCULOSKELETAL: No joint pain or swelling; No muscle, back, or extremity pain  PSYCHIATRIC: No depression, anxiety, mood swings, or difficulty sleeping  HEME/LYMPH: No easy bruising, or bleeding gums  ALLERY AND IMMUNOLOGIC: No hives or eczema	    [x ] All others negative	  [ ] Unable to obtain    PHYSICAL EXAM:  T(C): 37.1 (17 @ 11:11), Max: 37.1 (17 @ 06:00)  HR: 134 (17 @ 12:27) (81 - 134)  BP: 133/52 (17 @ 12:27) (107/59 - 140/65)  RR: 20 (17 @ 11:11) (16 - 40)  SpO2: 95% (17 @ 11:11) (92% - 100%)  Wt(kg): --  I&O's Summary    08 Sep 2017 07:01  -  08 Sep 2017 15:13  --------------------------------------------------------  IN: 400 mL / OUT: 2400 mL / NET: -2000 mL    Appearance: Normal	  HEENT:   Normal oral mucosa, PERRL, EOMI	  Lymphatic: No lymphadenopathy  Cardiovascular: Normal S1 S2, No JVD, No murmurs, No edema  Respiratory: Lungs clear to auscultation	  Psychiatry: A & O x 3, Mood & affect appropriate  Gastrointestinal:  Soft, Non-tender, + BS	  Skin: No rashes, No ecchymoses, No cyanosis	  Neurologic: Non-focal  Extremities: Normal range of motion, No clubbing, cyanosis or edema  Vascular: Peripheral pulses palpable 2+ bilaterally    TELEMETRY: 	SInus rhythm 70's       CKMB: 16.67 ng/mL ( @ 23:45)                       7.1    15.97 )-----------( 229      ( 08 Sep 2017 14:40 )             21.3         135  |  89<L>  |  77<H>  ----------------------------<  308<H>  3.6   |  18<L>  |  6.39<H>    Ca    8.5      08 Sep 2017 05:30  Phos  5.0       Mg     2.4         TPro  5.4<L>  /  Alb  2.5<L>  /  TBili  0.6  /  DBili  x   /  AST  13  /  ALT  11  /  AlkPhos  82        ASSESSMENT/PLAN: 	    Mr. 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 CHADS score is 2 ( HTN & DM ). CHADS VASC score (Stroke risk was 2.2% per year in >90,000 patients (the [place] Atrial Fibrillation Cohort Study) and 2.9% risk of stroke/TIA/systemic embolism.   Score 2 or greater is “moderate-high” risk and should otherwise be an anticoagulation candidate.   Has Bled score is 2, however, given his risk benefit ratio recommend AC with warfarin and maintian INR 2-3 .     1) Continue AV lizet blockers   d/c IV cardizem   PO Cardizem 120 mg QD   2) Anticoagualte with warfarin and maintain INR 2-3.

## 2017-09-08 NOTE — CONSULT NOTE ADULT - SUBJECTIVE AND OBJECTIVE BOX
HPI:  51 y/o M with Type 1 DM since age of 8 admitted for AMS and worsening kidney function s/p cardiac arrest now on HD. Patient has extensive retinopathy with multiple laser surgeries to the point where he is now legally blind. He also has neuropathy and nephropathy. NO CVA, CAD or PVD. Patient was seeing endocrinologist in [facility] but has now shifted here. He was previously on Lantus 29u AM and Novolog 2-2-2 which after recent hospitalization in August was changed to Lantus 29U and novolog 4-4-4 plus ISS. His glucose levels are variable 130-150 in the AM and 200-300 prelunch and predinner. Has occasional hypoglycemic episodes. Has a moderate intake or carbs in diet. Avoids soda and fruit juice.       PAST MEDICAL & SURGICAL HISTORY:  Hypertension  Legally blind  Diabetes mellitus type I  Renal transplant, status post  CKD (chronic kidney disease)  Renal transplant, status post  History of tonsillectomy      FAMILY HISTORY:  Type 2 DM-mother and father      Social History:social ETOH use    Outpatient Medications:lantus, prednisone, cellcept, novolog    MEDICATIONS  (STANDING):  calcium acetate 667 milliGRAM(s) Oral three times a day with meals  insulin lispro (HumaLOG) corrective regimen sliding scale   SubCutaneous three times a day before meals  insulin lispro (HumaLOG) corrective regimen sliding scale   SubCutaneous at bedtime  epoetin valente Injectable 4000 Unit(s) IV Push <User Schedule>  predniSONE   Tablet 5 milliGRAM(s) Oral daily  tamsulosin 0.4 milliGRAM(s) Oral at bedtime  cycloSPORINE  (SandIMMUNE) 100 milliGRAM(s) Oral daily  atorvastatin 80 milliGRAM(s) Oral at bedtime  insulin glargine Injectable (LANTUS) 16 Unit(s) SubCutaneous at bedtime  insulin lispro Injectable (HumaLOG) 8 Unit(s) SubCutaneous three times a day before meals  heparin  Infusion.  Unit(s)/Hr (16 mL/Hr) IV Continuous <Continuous>  diltiazem Infusion 15 mG/Hr (15 mL/Hr) IV Continuous <Continuous>  aspirin enteric coated 81 milliGRAM(s) Oral daily  digoxin  Injectable 0.25 milliGRAM(s) IV Push every 6 hours    MEDICATIONS  (PRN):  heparin  Injectable 7000 Unit(s) IV Push every 6 hours PRN For aPTT less than 40  heparin  Injectable 3500 Unit(s) IV Push every 6 hours PRN For aPTT between 40 - 57      Allergies    No Known Allergies    Intolerances      Review of Systems:    UNABLE TO OBTAIN    PHYSICAL EXAM:  VITALS: T(C): 37.1 (09-08-17 @ 11:11)  T(F): 98.7 (09-08-17 @ 11:11), Max: 98.8 (09-08-17 @ 06:00)  HR: 134 (09-08-17 @ 12:27) (81 - 134)  BP: 133/52 (09-08-17 @ 12:27) (107/59 - 140/65)  RR:  (16 - 40)  SpO2:  (92% - 100%)  Wt(kg): --  GENERAL: NAD  EYES: No proptosis, no lid lag, anicteric  HEENT:  Atraumatic, Normocephalic, moist mucous membranes  RESPIRATORY: Clear to auscultation bilaterally; No rales, rhonchi, wheezing, or rubs  CARDIOVASCULAR: Regular rate and rhythm; No murmurs; no peripheral edema  GI: Soft, nontender, non distended, normal bowel sounds  SKIN: Dry, intact, No rashes or lesions  NEURO: sensation intact        CAPILLARY BLOOD GLUCOSE  227 (09-08 @ 12:49)  196 (09-08 @ 09:40)  310 (09-08 @ 05:54)  355 (09-07 @ 22:18)  322 (09-07 @ 16:37)  357 (09-07 @ 11:38)  314 (09-07 @ 08:26)  278 (09-06 @ 23:00)  264 (09-06 @ 16:24)  267 (09-06 @ 11:54)  271 (09-06 @ 08:22)  183 (09-05 @ 22:31)  120 (09-05 @ 18:00)  103 (09-05 @ 16:18)                            7.1    15.97 )-----------( 229      ( 08 Sep 2017 14:40 )             21.3       09-08    142  |  97<L>  |  49<H>  ----------------------------<  230<H>  3.5   |  22  |  4.58<H>    EGFR if : 16  EGFR if non : 14    Ca    8.8      09-08  Mg     2.1     09-08  Phos  3.6     09-08    TPro  5.4<L>  /  Alb  2.5<L>  /  TBili  0.6  /  DBili  x   /  AST  13  /  ALT  11  /  AlkPhos  82  09-07      Thyroid Function Tests:  09-03 @ 13:45 TSH 2.88 FreeT4 0.70 T3 71.7 Anti TPO -- Anti Thyroglobulin Ab -- TSI --      Hemoglobin A1C, Whole Blood: 8.0 % <H> [4.0 - 5.6] (08-16-17 @ 06:30)      08-11 Chol 137 LDL 68 HDL 52 Trig 97    Radiology:

## 2017-09-08 NOTE — CHART NOTE - NSCHARTNOTEFT_GEN_A_CORE
Patient's repeat cardiac enzymes are elevated. PC=341 (up from 82 yesterday afternoon). CKMB=16.67 (up from 3.89 yesterday afternoon). Troponin 0.63 (up from 0.13 yesterday afternoon). EKG shows Afib with RVR. YM=003. Patient has been on a cardizem drip today at 10cc/hr. Per nursing staff, patient has had altered mental status that waxes and wanes and intermittent agitation during his stay on the floor. He was sleeping when I went to see him after his EKG was performed. On attempt to wake him up he was lethargic and would not answer questions.  I called the answering service for his private cardiologist, Dr. Douglas Ball.  After I explained the patient's story and lab values, the covering cardiologist stated "Why are you calling me at 1:30 AM?"  He stated that he would see the patient later during the day time.  No other suggestions for patient management were made.      Cardizem drip has been increased to 15cc/hr (up from 10 cc/hr). Patient has been on a heparin drip. Patient's repeat cardiac enzymes are elevated. PG=589 (up from 82 yesterday afternoon). CKMB=16.67 (up from 3.89 yesterday afternoon). Troponin 0.63 (up from 0.13 yesterday afternoon). EKG shows Afib with RVR. QK=766. Patient has been on a cardizem drip today at 10cc/hr. Per nursing staff, patient has had altered mental status that waxes and wanes and intermittent agitation during his stay on the floor. He was sleeping when I went to see him after his EKG was performed. On attempt to wake him up he was lethargic and would not answer questions.  I called the answering service for his private cardiologist, Dr. Douglas Ball.  After I explained the patient's story and lab values, the covering cardiologist stated "Why are you calling me at 1:30 AM?"  He stated that he would see the patient later during the day time.  No other suggestions for patient management were made.      Cardizem drip has been increased to 15cc/hr (up from 10 cc/hr). Patient has been on a heparin drip since Sept 7, 2017. Patient's repeat cardiac enzymes are elevated. QZ=727 (up from 82 yesterday afternoon). CKMB=16.67 (up from 3.89 yesterday afternoon). Troponin 0.63 (up from 0.13 yesterday afternoon). EKG shows Afib with RVR. YH=307. Patient has been on a cardizem drip today at 10cc/hr. Per nursing staff, patient has had altered mental status that waxes and wanes and intermittent agitation during his stay on the floor. He was sleeping when I went to see him after his EKG was performed. On attempt to wake him up he was lethargic and would not answer questions.  I called the answering service for his private cardiologist, Dr. Douglas Ball.  After I explained the patient's story and lab values, the covering cardiologist stated "Why are you calling me at 1:30 AM?"  He stated that he would see the patient later during the day time.  No other suggestions for patient management were made.      Cardizem drip has been increased to 15cc/hr (up from 10 cc/hr). Patient has been on a heparin drip since Sept 7, 2017. Repeat cardiac enzymes have been order for 6am today. Patient's repeat cardiac enzymes are elevated. AE=900 (up from 82 yesterday afternoon). CKMB=16.67 (up from 3.89 yesterday afternoon). Troponin 0.63 (up from 0.13 yesterday afternoon). EKG shows Afib with RVR. XB=905. Patient has been on a cardizem drip today at 10cc/hr. Per nursing staff, patient has had altered mental status that waxes and wanes and intermittent agitation during his stay on the floor. He was sleeping when I went to see him after his EKG was performed. On attempt to wake him up he was lethargic and would not answer questions.  I called the answering service for his private cardiologist, Dr. Douglas Ball.  After I explained the patient's story and lab values, the covering cardiologist stated "Why are you calling me at 1:30 AM?"  He stated that he would see the patient later during the day time. In the mean time, to get a STAT ECHO in the morning. No other suggestions for patient management were made.      Cardizem drip has been increased to 15cc/hr (up from 10 cc/hr). Patient has been on a heparin drip since Sept 7, 2017. Repeat cardiac enzymes have been order for 6am today. Patient's repeat cardiac enzymes are elevated. RY=408 (up from 82 yesterday afternoon). CKMB=16.67 (up from 3.89 yesterday afternoon). Troponin 0.63 (up from 0.13 yesterday afternoon). EKG shows Afib with RVR. KM=643. Patient has been on a cardizem drip today at 10cc/hr. Per nursing staff, patient has had altered mental status that waxes and wanes and intermittent agitation during his stay on the floor. He was sleeping when I went to see him after his EKG was performed. On attempt to wake him up he was lethargic and would not answer questions.  I called the answering service for his private cardiologist, Dr. Douglas Ball.  After I explained the patient's story and lab values, the covering cardiologist stated "Why are you calling me at 1:30 AM?"  He stated that he would see the patient later during the day time. In the mean time, he said to order an ECHO. No other suggestions for patient management were made.      Cardizem drip has been increased to 15cc/hr (up from 10 cc/hr). Patient has been on a heparin drip since Sept 7, 2017. Repeat cardiac enzymes have been ordered for 6am today. ECHO was ordered yesterday. Patient's repeat cardiac enzymes are elevated. BL=920 (up from 82 yesterday afternoon). CKMB=16.67 (up from 3.89 yesterday afternoon). Troponin 0.63 (up from 0.13 yesterday afternoon). EKG shows Afib with RVR. EZ=619. Patient has been on a cardizem drip today at 10cc/hr. Per nursing staff, patient has had altered mental status that waxes and wanes and intermittent agitation during his stay on the floor. He was sleeping when I went to see him after his EKG was performed. On attempt to wake him up he was lethargic and would not answer questions.  I called the answering service for his private cardiologist, Dr. Douglas Ball.  After I explained the patient's story and lab values, the covering cardiologist whose last name starts with "LOE" stated, "Why are you calling me at 1:30 AM?"  He stated that he would see the patient later during the day time. In the mean time, he said to order an ECHO. No other suggestions for patient management were made.      Cardizem drip has been increased to 15cc/hr (up from 10 cc/hr). Patient has been on a heparin drip since Sept 7, 2017. Repeat cardiac enzymes have been ordered for 6am today. ECHO was ordered yesterday. Patient's repeat cardiac enzymes are elevated. OU=469 (up from 82 yesterday afternoon). CKMB=16.67 (up from 3.89 yesterday afternoon). Troponin 0.63 (up from 0.13 yesterday afternoon). EKG shows Afib with RVR. VH=906. Patient has been on a cardizem drip today at 10cc/hr. Per nursing staff, patient has had altered mental status that waxes and wanes and intermittent agitation during his stay on the floor. He was sleeping when I went to see him after his EKG was performed. On attempt to wake him up he was lethargic and would not answer questions.  I called the answering service for his private cardiologist, Dr. Douglas Ball.  After I explained the patient's story and lab values, the covering cardiologist, Dr. Cortez, stated, "Why are you calling me at 1:30 AM?!"  He stated that he would see the patient later during the day time. In the mean time, he said to order an ECHO. No other suggestions for patient management were made.      Cardizem drip has been increased to 15cc/hr (up from 10 cc/hr). Patient has been on a heparin drip since Sept 7, 2017. Repeat cardiac enzymes have been ordered for 6am today. ECHO was ordered yesterday. Patient's repeat cardiac enzymes are elevated. QD=281 (up from 82 yesterday afternoon). CKMB=16.67 (up from 3.89 yesterday afternoon). Troponin 0.63 (up from 0.13 yesterday afternoon). EKG shows Afib with RVR. WB=296. Patient has been on a cardizem drip today at 10cc/hr. Per nursing staff, patient has had altered mental status that waxes and wanes and intermittent agitation during his stay on the floor. He was sleeping when I went to see him after his EKG was performed. On attempt to wake him up he was lethargic and would not answer questions.  I called the answering service for his private cardiologist, Dr. Douglas Ball.  After I explained the patient's story and lab values, the covering cardiologist, Dr. Sylvester, stated, "Why are you calling me at 1:30 AM?!"  He stated that he would see the patient later during the day time. In the mean time, he said to order an ECHO. No other suggestions for patient management were made.      Cardizem drip has been increased to 15cc/hr (up from 10 cc/hr). Patient has been on a heparin drip since Sept 7, 2017. Repeat cardiac enzymes have been ordered for 6am today. ECHO was ordered yesterday.

## 2017-09-08 NOTE — PROGRESS NOTE ADULT - SUBJECTIVE AND OBJECTIVE BOX
Patient is a 52y old  Male who presents with a chief complaint of Altered mental status (03 Sep 2017 17:46)      SUBJECTIVE / OVERNIGHT EVENTS:  -New onset AFib yesterday, transferred to tele. Diltiazem drip started rate 10ml/hr inc to 15ml/hr. HR 130s improved to 80s.  -Tele: afib all night, few PVCs, few doublets  -HD this AM    MEDICATIONS  (STANDING):  calcium acetate 667 milliGRAM(s) Oral three times a day with meals  insulin lispro (HumaLOG) corrective regimen sliding scale   SubCutaneous three times a day before meals  insulin lispro (HumaLOG) corrective regimen sliding scale   SubCutaneous at bedtime  epoetin valente Injectable 4000 Unit(s) IV Push <User Schedule>  predniSONE   Tablet 5 milliGRAM(s) Oral daily  tamsulosin 0.4 milliGRAM(s) Oral at bedtime  cycloSPORINE  (SandIMMUNE) 100 milliGRAM(s) Oral daily  atorvastatin 80 milliGRAM(s) Oral at bedtime  insulin glargine Injectable (LANTUS) 16 Unit(s) SubCutaneous at bedtime  insulin lispro Injectable (HumaLOG) 8 Unit(s) SubCutaneous three times a day before meals    MEDICATIONS  (PRN):  heparin  Injectable 7000 Unit(s) IV Push every 6 hours PRN For aPTT less than 40  heparin  Injectable 3500 Unit(s) IV Push every 6 hours PRN For aPTT between 40 - 57      Vital Signs Last 24 Hrs  T(C): 0.7 (08 Sep 2017 06:40), Max: 37.1 (08 Sep 2017 06:00)  T(F): 33.3 (08 Sep 2017 06:40), Max: 98.8 (08 Sep 2017 06:00)  HR: 96 (08 Sep 2017 06:40) (81 - 142)  BP: 117/56 (08 Sep 2017 06:40) (107/59 - 149/64)  BP(mean): --  RR: 18 (08 Sep 2017 06:40) (17 - 40)  SpO2: 100% (08 Sep 2017 06:00) (92% - 100%)    CAPILLARY BLOOD GLUCOSE  310 (08 Sep 2017 05:54)  355 (07 Sep 2017 22:18)  322 (07 Sep 2017 16:37)  357 (07 Sep 2017 11:38)          I&O's Summary    PHYSICAL EXAM  GENERAL: NAD, well-developed  HEAD:  Atraumatic, Normocephalic  EYES: EOMI, PERRLA, conjunctiva and sclera clear. Legally blind but can see changes in light  NECK: Supple, No JVD  CHEST/LUNG: RR 28, using accessory muscles, high 90s SpO2 on RA, crackles at base in posterior b/l. No wheeze  HEART: Regular rate and rhythm; No murmurs, rubs, or gallops  ABDOMEN: Soft, Nontender, Nondistended; Bowel sounds present  EXTREMITIES: Feet warm, No clubbing, cyanosis. 3+ pitting edema to below knees  PSYCH: AAOx3  SKIN: No rashes or lesions    LABS:                        7.2    14.84 )-----------( 219      ( 08 Sep 2017 05:30 )             22.0     08 Sep 2017 05:30    135    |  89     |  77     ----------------------------<  308    3.6     |  18     |  6.39     Ca    8.5        08 Sep 2017 05:30  Phos  5.0       08 Sep 2017 05:30  Mg     2.4       08 Sep 2017 05:30      PT/INR - ( 07 Sep 2017 06:50 )   PT: 11.4 SEC;   INR: 1.02          PTT - ( 08 Sep 2017 05:30 )  PTT:38.9 SEC      RADIOLOGY & ADDITIONAL TESTS:     MICROBIOLOGY:   BCx 9/3 x 2 ng    HCV VL 6.5 million  HCV genotype pending  Hep B core ab, surface ab neg    CONSULTS: nephrology, vascular sx, IR    Latosha Maya, PGY1  Internal Medicine, Team 3  Pager [phone] Patient is a 52y old  Male who presents with a chief complaint of Altered mental status (03 Sep 2017 17:46)      SUBJECTIVE / OVERNIGHT EVENTS:  -New onset AFib yesterday, transferred to tele. Diltiazem drip started rate 10ml/hr inc to 15ml/hr. HR 130s improved to 80s.  -Tele: afib all night, few PVCs, few doublets  -HD this AM  -No complaints. Pt off BIPAP after HD, on 2.5L NC. Pt denies SOB, but using some accessory muscles, high sat. Denies f/c, n/v/d, abd pain, cp.    MEDICATIONS  (STANDING):  calcium acetate 667 milliGRAM(s) Oral three times a day with meals  insulin lispro (HumaLOG) corrective regimen sliding scale   SubCutaneous three times a day before meals  insulin lispro (HumaLOG) corrective regimen sliding scale   SubCutaneous at bedtime  epoetin valente Injectable 4000 Unit(s) IV Push <User Schedule>  predniSONE   Tablet 5 milliGRAM(s) Oral daily  tamsulosin 0.4 milliGRAM(s) Oral at bedtime  cycloSPORINE  (SandIMMUNE) 100 milliGRAM(s) Oral daily  atorvastatin 80 milliGRAM(s) Oral at bedtime  insulin glargine Injectable (LANTUS) 16 Unit(s) SubCutaneous at bedtime  insulin lispro Injectable (HumaLOG) 8 Unit(s) SubCutaneous three times a day before meals    MEDICATIONS  (PRN):  heparin  Injectable 7000 Unit(s) IV Push every 6 hours PRN For aPTT less than 40  heparin  Injectable 3500 Unit(s) IV Push every 6 hours PRN For aPTT between 40 - 57      Vital Signs Last 24 Hrs  T(C): 0.7 (08 Sep 2017 06:40), Max: 37.1 (08 Sep 2017 06:00)  T(F): 33.3 (08 Sep 2017 06:40), Max: 98.8 (08 Sep 2017 06:00)  HR: 96 (08 Sep 2017 06:40) (81 - 142)  BP: 117/56 (08 Sep 2017 06:40) (107/59 - 149/64)  BP(mean): --  RR: 18 (08 Sep 2017 06:40) (17 - 40)  SpO2: 100% (08 Sep 2017 06:00) (92% - 100%)    CAPILLARY BLOOD GLUCOSE  310 (08 Sep 2017 05:54)  355 (07 Sep 2017 22:18)  322 (07 Sep 2017 16:37)  357 (07 Sep 2017 11:38)          I&O's Summary    PHYSICAL EXAM  GENERAL: NAD, well-developed  HEAD:  Atraumatic, Normocephalic  EYES: EOMI, PERRLA, conjunctiva and sclera clear. Legally blind but can see changes in light  NECK: Supple, No JVD  CHEST/LUNG: RR 28, using accessory muscles, high 90s SpO2 on RA, crackles at base in posterior b/l. No wheeze  HEART: Regular rate and rhythm; No murmurs, rubs, or gallops  ABDOMEN: Soft, Nontender, Nondistended; Bowel sounds present  EXTREMITIES: Feet warm, No clubbing, cyanosis. 3+ pitting edema to below knees  PSYCH: AAOx3  SKIN: No rashes or lesions    LABS:                        7.2    14.84 )-----------( 219      ( 08 Sep 2017 05:30 )             22.0     08 Sep 2017 05:30    135    |  89     |  77     ----------------------------<  308    3.6     |  18     |  6.39     Ca    8.5        08 Sep 2017 05:30  Phos  5.0       08 Sep 2017 05:30  Mg     2.4       08 Sep 2017 05:30      PT/INR - ( 07 Sep 2017 06:50 )   PT: 11.4 SEC;   INR: 1.02          PTT - ( 08 Sep 2017 05:30 )  PTT:38.9 SEC      RADIOLOGY & ADDITIONAL TESTS:     MICROBIOLOGY:   BCx 9/3 x 2 ng    HCV VL 6.5 million  HCV genotype pending  Hep B core ab, surface ab neg    CONSULTS: nephrology, vascular sx, IR    Latosha Maya, PGY1  Internal Medicine, Team 3  Pager [phone] Patient is a 52y old  Male who presents with a chief complaint of Altered mental status (03 Sep 2017 17:46)      SUBJECTIVE / OVERNIGHT EVENTS:  -New onset AFib yesterday, transferred to tele. Diltiazem drip started rate 10ml/hr inc to 15ml/hr. HR 130s improved to 80s.  -Tele: afib all night, few PVCs, few doublets  -HD this AM  -No complaints. Pt off BIPAP after HD, on 2.5L NC. Pt denies SOB, but using some accessory muscles, high sat. Denies f/c, n/v/d, abd pain, cp.    MEDICATIONS  (STANDING):  calcium acetate 667 milliGRAM(s) Oral three times a day with meals  insulin lispro (HumaLOG) corrective regimen sliding scale   SubCutaneous three times a day before meals  insulin lispro (HumaLOG) corrective regimen sliding scale   SubCutaneous at bedtime  epoetin valente Injectable 4000 Unit(s) IV Push <User Schedule>  predniSONE   Tablet 5 milliGRAM(s) Oral daily  tamsulosin 0.4 milliGRAM(s) Oral at bedtime  cycloSPORINE  (SandIMMUNE) 100 milliGRAM(s) Oral daily  atorvastatin 80 milliGRAM(s) Oral at bedtime  insulin glargine Injectable (LANTUS) 16 Unit(s) SubCutaneous at bedtime  insulin lispro Injectable (HumaLOG) 8 Unit(s) SubCutaneous three times a day before meals    MEDICATIONS  (PRN):  heparin  Injectable 7000 Unit(s) IV Push every 6 hours PRN For aPTT less than 40  heparin  Injectable 3500 Unit(s) IV Push every 6 hours PRN For aPTT between 40 - 57      Vital Signs Last 24 Hrs  T(C): 0.7 (08 Sep 2017 06:40), Max: 37.1 (08 Sep 2017 06:00)  T(F): 33.3 (08 Sep 2017 06:40), Max: 98.8 (08 Sep 2017 06:00)  HR: 96 (08 Sep 2017 06:40) (81 - 142)  BP: 117/56 (08 Sep 2017 06:40) (107/59 - 149/64)  BP(mean): --  RR: 18 (08 Sep 2017 06:40) (17 - 40)  SpO2: 100% (08 Sep 2017 06:00) (92% - 100%)    CAPILLARY BLOOD GLUCOSE  310 (08 Sep 2017 05:54)  355 (07 Sep 2017 22:18)  322 (07 Sep 2017 16:37)  357 (07 Sep 2017 11:38)          I&O's Summary    PHYSICAL EXAM  GENERAL: NAD, well-developed  HEAD:  Atraumatic, Normocephalic  EYES: EOMI, PERRLA, conjunctiva and sclera clear. Legally blind but can see changes in light  NECK: Supple, No JVD  CHEST/LUNG: coarse breath sounds, accessory musc use. No wheeze  HEART: Regular rate and rhythm; No murmurs, rubs, or gallops  ABDOMEN: Soft, Nontender, Nondistended; Bowel sounds present  EXTREMITIES: Feet warm, No clubbing, cyanosis. 3+ pitting edema to below knees  PSYCH: AAOx3  SKIN: No rashes or lesions    LABS:                        7.2    14.84 )-----------( 219      ( 08 Sep 2017 05:30 )             22.0     08 Sep 2017 05:30    135    |  89     |  77     ----------------------------<  308    3.6     |  18     |  6.39     Ca    8.5        08 Sep 2017 05:30  Phos  5.0       08 Sep 2017 05:30  Mg     2.4       08 Sep 2017 05:30      PT/INR - ( 07 Sep 2017 06:50 )   PT: 11.4 SEC;   INR: 1.02          PTT - ( 08 Sep 2017 05:30 )  PTT:38.9 SEC      RADIOLOGY & ADDITIONAL TESTS:     MICROBIOLOGY:   BCx 9/3 x 2 ng    HCV VL 6.5 million  HCV genotype pending  Hep B core ab, surface ab neg    CONSULTS: nephrology, vascular sx, IR, endo    Latosha Maya, PGY1  Internal Medicine, Team 3  Pager [phone]

## 2017-09-08 NOTE — PROGRESS NOTE ADULT - PROBLEM SELECTOR PLAN 1
Stood up to go to the bathroom, weak and lightheaded, mild SOB. Denied CP, N/V. VS: SpO2 98% on RA, H 150, /72, AO x 3. EKG showed new onset AFib, new TWI lateral leads from prior 3/2017  -Received cardizem 10mg IV x 1  -Started cardizem drip  -Started heparin drip  -f/u transfer to tele, TTE r/o thrombus, CE (1st), 2nd 9pm, 3rd AM labs Stood up to go to the bathroom, weak and lightheaded, mild SOB. Denied CP, N/V. VS: SpO2 98% on RA, H 150, /72, AO x 3. EKG showed new onset AFib, new TWI lateral leads from prior 3/2017  -Oncardizem drip at 15 ml/hr now, start digoxin 0.25mg IV q6 x 4doses  -On heparin drip  -f/u TTE Stood up to go to the bathroom, weak and lightheaded, mild SOB. Denied CP, N/V. VS: SpO2 98% on RA, H 150, /72, AO x 3. EKG showed new onset AFib, new TWI lateral leads from prior 3/2017  -On cardizem drip at 15 ml/hr now, start digoxin 0.25mg IV q6 x 4doses  -On heparin drip  -f/u TTE

## 2017-09-08 NOTE — CONSULT NOTE ADULT - SUBJECTIVE AND OBJECTIVE BOX
CHIEF COMPLAINT:    HISTORY OF PRESENT ILLNESS:  53 y/o M with h/o CKD s/p renal transplant (1998) on cyclosporine and CellCept, HTN, DM, legally blind BIBEMS for uremic encephalopathy. Pt became bradycardic and had PEA arrest in the ED, intubated for airway protection. ROSC achieved s/p 1 round of epi and chest compressions and pt was transferred to MICU. Immunosuppressants for renal transplant were held. Pt had emergent HD for hyperkalemia. Covered empirically with broad spectrum antibiotics (vanc and zosyn), Hgb 5 s/p 1u pRBC given and epogen was started. Pressors were weaned off and pt extubated 9/4. Cyclosporine restarted and prednisone started. Pt remains stable and transferred to floor for further management 9/5,  found to have new-onset AF on 9/7,recieved metoprolol 5ng IVPx3,cardizem 10mg IVPx2 and started on drip ,transferred to tele.Cardiology consulted for +CE. Patient c/o sob and ongoing reproducible chest pain on movement Patient denies N/V, dizziness, denies any h/o CAD.      Allergies: No Known Allergies    	    MEDICATIONS:  tamsulosin 0.4 milliGRAM(s) Oral at bedtime  diltiazem Infusion 15 mG/Hr IV Continuous <Continuous>  heparin  Infusion.  Unit(s)/Hr IV Continuous <Continuous>  heparin  Injectable 7000 Unit(s) IV Push every 6 hours PRN  heparin  Injectable 3500 Unit(s) IV Push every 6 hours PRN            insulin lispro (HumaLOG) corrective regimen sliding scale   SubCutaneous three times a day before meals  insulin lispro (HumaLOG) corrective regimen sliding scale   SubCutaneous at bedtime  predniSONE   Tablet 5 milliGRAM(s) Oral daily  atorvastatin 10 milliGRAM(s) Oral at bedtime  insulin glargine Injectable (LANTUS) 8 Unit(s) SubCutaneous at bedtime  insulin lispro Injectable (HumaLOG) 4 Unit(s) SubCutaneous three times a day before meals    calcium acetate 667 milliGRAM(s) Oral three times a day with meals  epoetin valente Injectable 4000 Unit(s) IV Push <User Schedule>  cycloSPORINE  (SandIMMUNE) 100 milliGRAM(s) Oral daily      PAST MEDICAL & SURGICAL HISTORY:  Hypertension  Legally blind  Diabetes mellitus type I  Renal transplant, status post  CKD (chronic kidney disease)  Renal transplant, status post  History of tonsillectomy      FAMILY HISTORY:  No pertinent family history in first degree relatives      SOCIAL HISTORY:    [ ] Non-smoker  [ ] Smoker  [ ] Alcohol      REVIEW OF SYSTEMS:  General: + fatigue/malaise, weight loss/gain.  Skin: no rashes.  Ophthalmologic: no blurred vision, no loss of vision. 	  ENT: no sore throat, rhinorrhea, sinus congestion.  CVs:+ pleuritic chest pan  Respiratory: + SOB, +cough + wheeze.  Gastrointestinal:  no N/V/D, no melena/hematemesis/hematochezia,+ bloated  Genitourinary: no dysuria/hesitancy or hematuria.  Musculoskeletal: no myalgias or arthralgias.  Neurological: no changes in vision or hearing, no lightheadedness/dizziness, no syncope/near syncope	  Ext:swelling    PHYSICAL EXAM:  T(C): 36.9 (09-08-17 @ 02:38), Max: 36.9 (09-08-17 @ 02:38)  HR: 81 (09-08-17 @ 04:53) (81 - 142)  BP: 121/60 (09-08-17 @ 02:38) (107/59 - 149/64)  RR: 40 (09-08-17 @ 02:38) (17 - 40)  SpO2: 98% (09-08-17 @ 04:53) (92% - 98%)  Wt(kg): --  I&O's Summary      Appearance: Normal	  HEENT:   Normal oral mucosa, PERRL, EOMI	  Lymphatic: No lymphadenopathy  Cardiovascular: Normal S1 S2, + JVD, No murmurs, b/l 2+ edema,Rt neck Shiley catheter  Respiratory: b/l crackles and exp wheezing	  Psychiatry: A & O x 3, Mood & affect appropriate  Gastrointestinal:  distended , Non-tender, + BS	  Skin: No rashes, No ecchymoses, No cyanosis	  Neurologic: forgetful at times  Vascular: Peripheral pulses palpable 2+ bilaterally        LABS:	 	    CBC Full  -  ( 07 Sep 2017 22:26 )  WBC Count : 15.52 K/uL  Hemoglobin : 7.4 g/dL  Hematocrit : 22.4 %  Platelet Count - Automated : 207 K/uL  Mean Cell Volume : 88.9 fL  Mean Cell Hemoglobin : 29.4 pg  Mean Cell Hemoglobin Concentration : 33.0 %  Auto Neutrophil # : x  Auto Lymphocyte # : x  Auto Monocyte # : x  Auto Eosinophil # : x  Auto Basophil # : x  Auto Neutrophil % : x  Auto Lymphocyte % : x  Auto Monocyte % : x  Auto Eosinophil % : x  Auto Basophil % : x    09-07    135  |  88<L>  |  63<H>  ----------------------------<  324<H>  3.8   |  13<L>  |  5.35<H>    Ca    8.8      07 Sep 2017 06:00  Phos  4.7     09-07  Mg     2.0     09-07    TPro  5.4<L>  /  Alb  2.5<L>  /  TBili  0.6  /  DBili  x   /  AST  13  /  ALT  11  /  AlkPhos  82  09-07      proBNP:   Lipid Profile:   HgA1c:   TSH:       CARDIAC MARKERS:      CKMB: 16.67 ng/mL (09-07 @ 23:45)  CKMB: 3.89 ng/mL (09-07 @ 13:20)        TELEMETRY: 	Afib at     ECG:  	  RADIOLOGY:  OTHER: 	    PREVIOUS DIAGNOSTIC TESTING:    [ ] Echocardiogram:  [ ]  Catheterization:  [ ] Stress Test:

## 2017-09-08 NOTE — PROGRESS NOTE ADULT - PROBLEM SELECTOR PLAN 3
Kidney transplant from living brother in 1998 2/2 diabetic nephropathy. Per nephro, RODRIGO or transplant rejection pushed him into ESRD requiring dialysis. Home regimen: CellCept 1250mg BID, cyclosporine 25mg q12h.  -Per nephrology, hold pt's CellCept, will start prednisone with pt's home cyclosporine to maintain him until he is able to get another kidney transplant, then cyclosporine will be tapered leaving prednisone.  -cont Cyclosporin 100mg BID and prednisone 5mg po daily Kidney transplant from living brother in 1998 2/2 diabetic nephropathy. Per nephro, RODRIGO or transplant rejection pushed him into ESRD requiring dialysis. Home regimen: CellCept 1250mg BID, cyclosporine 25mg q12h.  -Per nephrology, hold pt's CellCept, will start prednisone with pt's home cyclosporine to maintain him until he is able to get another kidney transplant, then cyclosporine will be tapered leaving prednisone. Started immunosupp 3 weeks PTA  -dec Cyclosporin 100mg daily, prednisone 5mg po daily. Slow taper over 3-6 mo

## 2017-09-08 NOTE — CONSULT NOTE ADULT - PROBLEM SELECTOR RECOMMENDATION 9
Would start Lantus 12U HS and humalog 4-4-4 with meals plus JULIA premeal and at bedtime.  Goal glucose 100-180. Patient's sister will be caretaker. She is requesting diabetes education and nutrition consult. Would start Lantus 12U HS and humalog 3-3-3 with meals plus JULIA premeal and at bedtime.  Goal glucose 100-180. Patient's sister will be caretaker. She is requesting diabetes education and nutrition consult.

## 2017-09-08 NOTE — PROGRESS NOTE ADULT - PROBLEM SELECTOR PLAN 5
Pt with failed renal transplant. Advise slow taper off of immunosuppressants over 3-6 months,  Will decrease cyclosporine to 100mg once daily, continue prednisone at current dose 5mg daily

## 2017-09-08 NOTE — PROGRESS NOTE ADULT - PROBLEM SELECTOR PLAN 2
Hx of CKD, requiring dialysis for the first time this admission. Pt started HD, hold home torsemide 10mg daily  -postpone HD until pt stabilizes  -permacath tmrw, then HD. Hold heparin gtt at midnight  -f/u BUE vein mapping Hx of CKD. 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 Hold home torsemide 10mg daily  -Off BIPAP, had been fluid overloaded after postponed HD because was unstable with AF RVR  -HD today, HD tmrw  -permacath postponed for Monday. On Sun: NPO after MN, hold heparin drip AM  -f/u BUE vein mapping

## 2017-09-08 NOTE — PROGRESS NOTE ADULT - ATTENDING COMMENTS
Patient seen and examined, chart/lab reviewed.  53 y/o male with h/o HTN, DM-1 with diabetic retinopathy (legally blind) and ESRD,  s/p living related renal transplant (donor brother) in 1998 at [facility], a/w  uremic encephalopathy, hyperkalemia, fluid overload, c/b bradycardic cardiac arrest, stabilized and dialyzed and transferred to floor.    Events: pt had dialysis this morning, currently back in rapid Afib when seen, was off cardizem drip as dialysis unit don't have cardiac monitoring.     PE: right IJ shiley, lung decreased breath sound lung bases; heart tachycardic, irregularly irregular, abdomen soft, extrem: 3+ pitting edema b/l; neuro: a/ox3, nonfocal    Assessment/plan:  # New onset rapid Afib: digoxin load, cardizem drip at 15 mg/h, titrate as needed  I discussed the case with cardiology Dr. Knapp, cont cardizem drip, transition to po cardizem when rate controlled  EP consult to eval whether pt is a candidate for DCCV  cont heparin drip for  anticoagulation, check echo.  chadvasc score = 4, annual stroke risk ~4%;   TSH 2.88 (wnl)    # Hepatitis C: serology + for HCV, after speaking to pt he knows he has positive HCV and has h/o IVDA, VL of 5.6. Hepatology consult appreciated. HCV therapy can be initiated as outpt.   # ESRD likely due to chronic rejection/HTN/diabetic nephropathy, pt had living related renal transplant in 1998,  fluid overload: dialyzed today with fluid removal, consider HD tomorrow to remove more fluids, plan for permacath today, and eventual AVF creation, f/u vascular surgery  -on phos binder phoslo  -titrate down immunosuppression: prednisone 5 mg, cyclospine tapered to 100 mg qd, off MMF as per renal, taper immunosuppression as per renal  -never had renal biopsy confirming chronic rejection, f/u renal   # DM-1, volatile glycemic control: titrate insulin, lantus increased to 16 u hs and humalog 8 u ac, endocrine consult  c/w humalog ISS, A1c 8%, monitor FS  # Troponin leak vs. NSTEMI: likely demand ischemia in the setting of rapid Afib/ESRD, heparin drip, ASA/lipitor 80mg, check echo, case d/w cardiology Dr. Knapp  # Diabetic retinopathy, legally blind: now living with sister, relocated from [place] to   # Anemia of CKD: s/p 1 u pRBC, epogen as per renal, tsat 15%, ferritin 554  # Severe protein calorie malnutrition: albumin 2.6, encourage po intake  # Acute on chronic CHF, fluid overload: fluid removal with HD, iv lasix prn, fluid removal with HD  # Uncontrolled HTN: d/c nifedipine, monitor BP closely on cardizem drip  # DVT prophylaxis: heparin drip now  I discussed the case with pt's daughter at length.

## 2017-09-08 NOTE — PROGRESS NOTE ADULT - PROBLEM SELECTOR PLAN 1
Will need chronic maintenance dialysis, had vascular consult for AVF, and pending perm cath placement.  Patient declines PD, as he is blind and feels will be too difficult at home.  tolerating HD well today, with gentle UF target 2L. Will plan PUF for 9/9 for additional volume removal.

## 2017-09-08 NOTE — CONSULT NOTE ADULT - ASSESSMENT
ASSESSMENT/PLAN: 	53 y/o M with h/o CKD s/p renal transplant (1998) on cyclosporine and CellCept, HTN, DM, legally blind BIBEMS for uremic encephalopathy 2/2 failed renal transplant c/b PEA arrest requiring intubation and MICU Received multiple  HD and 1 units PRBC now c/b new Afib RVR ,+CE    -Fluid overload 2/2 renal failure: Patient currently clinically  fluid overloaded( b/l crackles,+JVD,+SOB,+abdomen distension, B/L 2+ pedal edema and oliguric)    -Elevated CE: +CE is likely due to demand 2/2 fluid overload and rapid rhythm and low suspicious for ACS, given no chest pain  -Afib: current c/w Cardizem drip, can transition to PO Cardizem once LV function on echo     Plan : c/w trend CE, increase Lipitor to 80mg if LFT normal  -Echo  -Need urgent HD  to prevent further cardiac load  - continue care as per primary team ASSESSMENT/PLAN: 	53 y/o M with h/o CKD s/p renal transplant (1998) on cyclosporine and CellCept, HTN, DM, legally blind BIBEMS for uremic encephalopathy 2/2 failed renal transplant c/b PEA arrest requiring intubation and MICU Received multiple  HD and 1 units PRBC now c/b new Afib RVR ,+CE    -Fluid overload 2/2 renal failure: Patient currently clinically  fluid overloaded( b/l crackles,+JVD,+SOB,+abdomen distension, B/L 2+ pedal edema and oliguric)    -Elevated CE: +CE is likely due to demand 2/2 fluid overload and rapid rhythm and low suspicious for ACS, given no chest pain  -Afib: current c/w Cardizem drip, can transition to PO Cardizem if LV function normal on echo.     Plan : c/w trend CE, increase Lipitor to 80mg if LFT normal  -Echo  -Need urgent HD  to prevent further cardiac load  - continue care as per primary team

## 2017-09-08 NOTE — PROGRESS NOTE ADULT - PROBLEM SELECTOR PLAN 7
Presented with Hg 5 s/p 1u pRBC on 9/4  -H/H stable  -cont Epogen with TTS HD sessions Presented with Hg 5 s/p 1u pRBC on 9/4  -H/H slowly downtrending  -cont Epogen 4,000u with TTS HD sessions

## 2017-09-08 NOTE — PROGRESS NOTE ADULT - PROBLEM SELECTOR PLAN 4
Presented to the ED hypotensive, LOC, hypothermia, bradycardic in setting of hyperkalemia and hyperphosphatemia. Had been covering with broad spectrum vancomycin by level and zosyn in the MICU, but infectious etiology neg to date. Presented altered, hypotensive, hypothermic, bradycardic. Deranged vital signs more likely attributable to resulting hyperK resulting in PEA arrest. s/p Vanc and zosyn  -monitor off antibiotics, new leukocytosis from prednisone

## 2017-09-08 NOTE — CONSULT NOTE ADULT - ASSESSMENT
52 yr M with Type 1 DM HgA1C 8 admitted for AMS s/p cardiac arrest now on HD presents with hyperglycemia in 300s

## 2017-09-09 LAB
APTT BLD: 107.4 SEC — HIGH (ref 27.5–37.4)
APTT BLD: 32.8 SEC — SIGNIFICANT CHANGE UP (ref 27.5–37.4)
APTT BLD: 61.1 SEC — HIGH (ref 27.5–37.4)
BASOPHILS # BLD AUTO: 0.06 K/UL — SIGNIFICANT CHANGE UP (ref 0–0.2)
BASOPHILS NFR BLD AUTO: 0.4 % — SIGNIFICANT CHANGE UP (ref 0–2)
BLD GP AB SCN SERPL QL: NEGATIVE — SIGNIFICANT CHANGE UP
BUN SERPL-MCNC: 59 MG/DL — HIGH (ref 7–23)
CALCIUM SERPL-MCNC: 8.4 MG/DL — SIGNIFICANT CHANGE UP (ref 8.4–10.5)
CHLORIDE SERPL-SCNC: 90 MMOL/L — LOW (ref 98–107)
CK MB BLD-MCNC: 2.57 NG/ML — SIGNIFICANT CHANGE UP (ref 1–6.6)
CK SERPL-CCNC: 116 U/L — SIGNIFICANT CHANGE UP (ref 30–200)
CO2 SERPL-SCNC: 21 MMOL/L — LOW (ref 22–31)
CREAT SERPL-MCNC: 5.83 MG/DL — HIGH (ref 0.5–1.3)
DIGOXIN SERPL-MCNC: 2.5 NG/ML — HIGH (ref 0.8–2)
EOSINOPHIL # BLD AUTO: 0.31 K/UL — SIGNIFICANT CHANGE UP (ref 0–0.5)
EOSINOPHIL NFR BLD AUTO: 2 % — SIGNIFICANT CHANGE UP (ref 0–6)
FERRITIN SERPL-MCNC: 587.5 NG/ML — HIGH (ref 30–400)
GLUCOSE SERPL-MCNC: 341 MG/DL — HIGH (ref 70–99)
HCT VFR BLD CALC: 21.2 % — LOW (ref 39–50)
HCT VFR BLD CALC: 24 % — LOW (ref 39–50)
HGB BLD-MCNC: 6.8 G/DL — CRITICAL LOW (ref 13–17)
HGB BLD-MCNC: 8.1 G/DL — LOW (ref 13–17)
HIV1 AG SER QL: SIGNIFICANT CHANGE UP
HIV1+2 AB SPEC QL: SIGNIFICANT CHANGE UP
IMM GRANULOCYTES # BLD AUTO: 0.44 # — SIGNIFICANT CHANGE UP
IMM GRANULOCYTES NFR BLD AUTO: 2.8 % — HIGH (ref 0–1.5)
INR BLD: 1.05 — SIGNIFICANT CHANGE UP (ref 0.88–1.17)
IRON SATN MFR SERPL: 187 UG/DL — SIGNIFICANT CHANGE UP (ref 155–535)
IRON SATN MFR SERPL: 21 UG/DL — LOW (ref 45–165)
LYMPHOCYTES # BLD AUTO: 0.67 K/UL — LOW (ref 1–3.3)
LYMPHOCYTES # BLD AUTO: 4.3 % — LOW (ref 13–44)
MAGNESIUM SERPL-MCNC: 2.2 MG/DL — SIGNIFICANT CHANGE UP (ref 1.6–2.6)
MANUAL SMEAR VERIFICATION: SIGNIFICANT CHANGE UP
MCHC RBC-ENTMCNC: 28.8 PG — SIGNIFICANT CHANGE UP (ref 27–34)
MCHC RBC-ENTMCNC: 29.9 PG — SIGNIFICANT CHANGE UP (ref 27–34)
MCHC RBC-ENTMCNC: 32.1 % — SIGNIFICANT CHANGE UP (ref 32–36)
MCHC RBC-ENTMCNC: 33.8 % — SIGNIFICANT CHANGE UP (ref 32–36)
MCV RBC AUTO: 88.6 FL — SIGNIFICANT CHANGE UP (ref 80–100)
MCV RBC AUTO: 89.8 FL — SIGNIFICANT CHANGE UP (ref 80–100)
MONOCYTES # BLD AUTO: 1.66 K/UL — HIGH (ref 0–0.9)
MONOCYTES NFR BLD AUTO: 10.6 % — SIGNIFICANT CHANGE UP (ref 2–14)
NEUTROPHILS # BLD AUTO: 12.45 K/UL — HIGH (ref 1.8–7.4)
NEUTROPHILS NFR BLD AUTO: 79.9 % — HIGH (ref 43–77)
NRBC # FLD: 0.02 — SIGNIFICANT CHANGE UP
NRBC # FLD: 0.05 — SIGNIFICANT CHANGE UP
PHOSPHATE SERPL-MCNC: 4.4 MG/DL — SIGNIFICANT CHANGE UP (ref 2.5–4.5)
PLATELET # BLD AUTO: 189 K/UL — SIGNIFICANT CHANGE UP (ref 150–400)
PLATELET # BLD AUTO: 218 K/UL — SIGNIFICANT CHANGE UP (ref 150–400)
PMV BLD: 10 FL — SIGNIFICANT CHANGE UP (ref 7–13)
PMV BLD: 10.1 FL — SIGNIFICANT CHANGE UP (ref 7–13)
POTASSIUM SERPL-MCNC: 3.6 MMOL/L — SIGNIFICANT CHANGE UP (ref 3.5–5.3)
POTASSIUM SERPL-SCNC: 3.6 MMOL/L — SIGNIFICANT CHANGE UP (ref 3.5–5.3)
PROTHROM AB SERPL-ACNC: 11.8 SEC — SIGNIFICANT CHANGE UP (ref 9.8–13.1)
RBC # BLD: 2.36 M/UL — LOW (ref 4.2–5.8)
RBC # BLD: 2.71 M/UL — LOW (ref 4.2–5.8)
RBC # FLD: 13.5 % — SIGNIFICANT CHANGE UP (ref 10.3–14.5)
RBC # FLD: 14.1 % — SIGNIFICANT CHANGE UP (ref 10.3–14.5)
RH IG SCN BLD-IMP: POSITIVE — SIGNIFICANT CHANGE UP
SODIUM SERPL-SCNC: 135 MMOL/L — SIGNIFICANT CHANGE UP (ref 135–145)
TROPONIN T SERPL-MCNC: 0.92 NG/ML — HIGH (ref 0–0.06)
UIBC SERPL-MCNC: 166 UG/DL — SIGNIFICANT CHANGE UP (ref 110–370)
WBC # BLD: 15.59 K/UL — HIGH (ref 3.8–10.5)
WBC # BLD: 15.75 K/UL — HIGH (ref 3.8–10.5)
WBC # FLD AUTO: 15.59 K/UL — HIGH (ref 3.8–10.5)
WBC # FLD AUTO: 15.75 K/UL — HIGH (ref 3.8–10.5)

## 2017-09-09 PROCEDURE — 99233 SBSQ HOSP IP/OBS HIGH 50: CPT | Mod: GC

## 2017-09-09 PROCEDURE — 90935 HEMODIALYSIS ONE EVALUATION: CPT | Mod: GC

## 2017-09-09 RX ORDER — HEPARIN SODIUM 5000 [USP'U]/ML
7000 INJECTION INTRAVENOUS; SUBCUTANEOUS EVERY 6 HOURS
Qty: 0 | Refills: 0 | Status: DISCONTINUED | OUTPATIENT
Start: 2017-09-09 | End: 2017-09-11

## 2017-09-09 RX ORDER — DILTIAZEM HCL 120 MG
240 CAPSULE, EXT RELEASE 24 HR ORAL DAILY
Qty: 0 | Refills: 0 | Status: DISCONTINUED | OUTPATIENT
Start: 2017-09-09 | End: 2017-09-12

## 2017-09-09 RX ORDER — INSULIN LISPRO 100/ML
5 VIAL (ML) SUBCUTANEOUS
Qty: 0 | Refills: 0 | Status: DISCONTINUED | OUTPATIENT
Start: 2017-09-09 | End: 2017-09-11

## 2017-09-09 RX ORDER — WARFARIN SODIUM 2.5 MG/1
5 TABLET ORAL DAILY
Qty: 0 | Refills: 0 | Status: COMPLETED | OUTPATIENT
Start: 2017-09-09 | End: 2017-09-09

## 2017-09-09 RX ORDER — HEPARIN SODIUM 5000 [USP'U]/ML
INJECTION INTRAVENOUS; SUBCUTANEOUS
Qty: 25000 | Refills: 0 | Status: DISCONTINUED | OUTPATIENT
Start: 2017-09-09 | End: 2017-09-11

## 2017-09-09 RX ORDER — HEPARIN SODIUM 5000 [USP'U]/ML
7500 INJECTION INTRAVENOUS; SUBCUTANEOUS ONCE
Qty: 0 | Refills: 0 | Status: COMPLETED | OUTPATIENT
Start: 2017-09-09 | End: 2017-09-09

## 2017-09-09 RX ORDER — HEPARIN SODIUM 5000 [USP'U]/ML
7000 INJECTION INTRAVENOUS; SUBCUTANEOUS ONCE
Qty: 0 | Refills: 0 | Status: COMPLETED | OUTPATIENT
Start: 2017-09-09 | End: 2017-09-09

## 2017-09-09 RX ORDER — HEPARIN SODIUM 5000 [USP'U]/ML
3500 INJECTION INTRAVENOUS; SUBCUTANEOUS EVERY 6 HOURS
Qty: 0 | Refills: 0 | Status: DISCONTINUED | OUTPATIENT
Start: 2017-09-09 | End: 2017-09-11

## 2017-09-09 RX ORDER — INSULIN GLARGINE 100 [IU]/ML
15 INJECTION, SOLUTION SUBCUTANEOUS AT BEDTIME
Qty: 0 | Refills: 0 | Status: DISCONTINUED | OUTPATIENT
Start: 2017-09-09 | End: 2017-09-11

## 2017-09-09 RX ADMIN — Medication 0.25 MILLIGRAM(S): at 00:56

## 2017-09-09 RX ADMIN — Medication 3 UNIT(S): at 17:34

## 2017-09-09 RX ADMIN — WARFARIN SODIUM 5 MILLIGRAM(S): 2.5 TABLET ORAL at 17:33

## 2017-09-09 RX ADMIN — HEPARIN SODIUM 7000 UNIT(S): 5000 INJECTION INTRAVENOUS; SUBCUTANEOUS at 02:00

## 2017-09-09 RX ADMIN — Medication 3 UNIT(S): at 07:00

## 2017-09-09 RX ADMIN — Medication 1: at 23:02

## 2017-09-09 RX ADMIN — Medication: at 07:00

## 2017-09-09 RX ADMIN — Medication 81 MILLIGRAM(S): at 17:34

## 2017-09-09 RX ADMIN — Medication 2: at 17:34

## 2017-09-09 RX ADMIN — CYCLOSPORINE 100 MILLIGRAM(S): 100 CAPSULE ORAL at 17:33

## 2017-09-09 RX ADMIN — ERYTHROPOIETIN 6000 UNIT(S): 10000 INJECTION, SOLUTION INTRAVENOUS; SUBCUTANEOUS at 12:48

## 2017-09-09 RX ADMIN — Medication 667 MILLIGRAM(S): at 11:57

## 2017-09-09 RX ADMIN — Medication 3 UNIT(S): at 13:20

## 2017-09-09 RX ADMIN — INSULIN GLARGINE 15 UNIT(S): 100 INJECTION, SOLUTION SUBCUTANEOUS at 23:03

## 2017-09-09 RX ADMIN — Medication 240 MILLIGRAM(S): at 17:33

## 2017-09-09 RX ADMIN — HEPARIN SODIUM 2000 UNIT(S)/HR: 5000 INJECTION INTRAVENOUS; SUBCUTANEOUS at 16:38

## 2017-09-09 RX ADMIN — HEPARIN SODIUM 1600 UNIT(S)/HR: 5000 INJECTION INTRAVENOUS; SUBCUTANEOUS at 12:46

## 2017-09-09 RX ADMIN — TAMSULOSIN HYDROCHLORIDE 0.4 MILLIGRAM(S): 0.4 CAPSULE ORAL at 22:28

## 2017-09-09 RX ADMIN — Medication 0.25 MILLIGRAM(S): at 06:24

## 2017-09-09 RX ADMIN — Medication 5 MILLIGRAM(S): at 06:25

## 2017-09-09 RX ADMIN — ATORVASTATIN CALCIUM 80 MILLIGRAM(S): 80 TABLET, FILM COATED ORAL at 22:28

## 2017-09-09 RX ADMIN — Medication 2: at 13:20

## 2017-09-09 RX ADMIN — HEPARIN SODIUM 1600 UNIT(S)/HR: 5000 INJECTION INTRAVENOUS; SUBCUTANEOUS at 19:37

## 2017-09-09 RX ADMIN — Medication 667 MILLIGRAM(S): at 17:33

## 2017-09-09 RX ADMIN — HEPARIN SODIUM 5000 UNIT(S): 5000 INJECTION INTRAVENOUS; SUBCUTANEOUS at 12:54

## 2017-09-09 NOTE — PROGRESS NOTE ADULT - PROBLEM SELECTOR PLAN 7
Presented with Hg 5 s/p 1u pRBC on 9/4  -H/H slowly downtrending  -cont Epogen 4,000u with TTS HD sessions Presented with Hg 5 s/p 1u pRBC (9/4, 9/9)  -H/H slowly downtrending  -inc to Epogen 6,000u with TTS HD sessions  -1u pRBC today with HD  -f/u CBC 6pm

## 2017-09-09 NOTE — PROGRESS NOTE ADULT - ATTENDING COMMENTS
Pt without complaints, HR well controlled.  - will transition to PO cardizem, has been receiving 360mg daily IV, will transition to 240mg PO  - pt on heparin gtt, start coumadin for afib  - s/p 1u PRBC in HD, fu post tx cbc  - fu renal re further HD  - plan for permacath on monday  - d/w vascular re fistula as IP?  - fu endo re DM management

## 2017-09-09 NOTE — PROGRESS NOTE ADULT - PROBLEM SELECTOR PLAN 8
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 LFTs wnl  -HCV VL 6.5 million  -hepatology consulted, will need f/u hepatology clinic for treatment  -f/u HIV, HAV

## 2017-09-09 NOTE — PROGRESS NOTE ADULT - PROBLEM SELECTOR PLAN 3
Kidney transplant from living brother in 1998 2/2 diabetic nephropathy. Per nephro, RODRIGO or transplant rejection pushed him into ESRD requiring dialysis. Home regimen: CellCept 1250mg BID, cyclosporine 25mg q12h.  -Per nephrology, hold pt's CellCept, will start prednisone with pt's home cyclosporine to maintain him until he is able to get another kidney transplant, then cyclosporine will be tapered leaving prednisone. Started immunosupp 3 weeks PTA  -dec Cyclosporin 100mg daily, prednisone 5mg po daily. Slow taper over 3-6 mo Kidney transplant from living brother in 1998 2/2 diabetic nephropathy. Per nephro, RODRIGO or transplant rejection pushed him into ESRD requiring dialysis. Home regimen: CellCept 1250mg BID, cyclosporine 25mg q12h.  -Per nephrology, hold pt's CellCept, will start prednisone with pt's home cyclosporine to maintain him until he is able to get another kidney transplant, then cyclosporine will be tapered leaving prednisone. Started immunosupp 3 weeks PTA  -c/w Cyclosporin 100mg daily, prednisone 5mg po daily. Slow taper over 3-6 mo

## 2017-09-09 NOTE — PROGRESS NOTE ADULT - SUBJECTIVE AND OBJECTIVE BOX
Patient is a 52y old  Male who presents with a chief complaint of Altered mental status (03 Sep 2017 17:46)      SUBJECTIVE / OVERNIGHT EVENTS:  -Tele: NSR 80s-90s, with some PACs  -Per nurse, when he’s positioned in bed gets dyspneic though SpO2 98% on 2L NC  -No complaints. Went to bed pan yesterday and urinated, hasn’t had BM couple days. Denies SOB, CP, f/c, n/v.  -HD this AM with 1u pRBC      MEDICATIONS  (STANDING):  calcium acetate 667 milliGRAM(s) Oral three times a day with meals  insulin lispro (HumaLOG) corrective regimen sliding scale   SubCutaneous three times a day before meals  insulin lispro (HumaLOG) corrective regimen sliding scale   SubCutaneous at bedtime  epoetin valente Injectable 4000 Unit(s) IV Push <User Schedule>  predniSONE   Tablet 5 milliGRAM(s) Oral daily  tamsulosin 0.4 milliGRAM(s) Oral at bedtime  cycloSPORINE  (SandIMMUNE) 100 milliGRAM(s) Oral daily  atorvastatin 80 milliGRAM(s) Oral at bedtime  insulin glargine Injectable (LANTUS) 16 Unit(s) SubCutaneous at bedtime  insulin lispro Injectable (HumaLOG) 8 Unit(s) SubCutaneous three times a day before meals    MEDICATIONS  (PRN):  heparin  Injectable 7000 Unit(s) IV Push every 6 hours PRN For aPTT less than 40  heparin  Injectable 3500 Unit(s) IV Push every 6 hours PRN For aPTT between 40 - 57      Vital Signs Last 24 Hrs  T(C): 0.7 (08 Sep 2017 06:40), Max: 37.1 (08 Sep 2017 06:00)  T(F): 33.3 (08 Sep 2017 06:40), Max: 98.8 (08 Sep 2017 06:00)  HR: 96 (08 Sep 2017 06:40) (81 - 142)  BP: 117/56 (08 Sep 2017 06:40) (107/59 - 149/64)  BP(mean): --  RR: 18 (08 Sep 2017 06:40) (17 - 40)  SpO2: 100% (08 Sep 2017 06:00) (92% - 100%)    CAPILLARY BLOOD GLUCOSE  310 (08 Sep 2017 05:54)  355 (07 Sep 2017 22:18)  322 (07 Sep 2017 16:37)  357 (07 Sep 2017 11:38)          I&O's Summary    PHYSICAL EXAM  GENERAL: NAD, well-developed  HEAD:  Atraumatic, Normocephalic  EYES: EOMI, PERRLA, conjunctiva and sclera clear. Legally blind but can see changes in light  NECK: Supple, No JVD  CHEST/LUNG: coarse breath sounds, accessory musc use. No wheeze  HEART: Regular rate and rhythm; No murmurs, rubs, or gallops  ABDOMEN: Soft, Nontender, Nondistended; Bowel sounds present  EXTREMITIES: Feet warm, No clubbing, cyanosis. 3+ pitting edema to below knees  PSYCH: AAOx3  SKIN: No rashes or lesions    LABS:                        7.2    14.84 )-----------( 219      ( 08 Sep 2017 05:30 )             22.0     08 Sep 2017 05:30    135    |  89     |  77     ----------------------------<  308    3.6     |  18     |  6.39     Ca    8.5        08 Sep 2017 05:30  Phos  5.0       08 Sep 2017 05:30  Mg     2.4       08 Sep 2017 05:30      PT/INR - ( 07 Sep 2017 06:50 )   PT: 11.4 SEC;   INR: 1.02          PTT - ( 08 Sep 2017 05:30 )  PTT:38.9 SEC      RADIOLOGY & ADDITIONAL TESTS:     MICROBIOLOGY:   BCx 9/3 x 2 ng    HCV VL 6.5 million  HCV genotype pending  Hep B core ab, surface ab neg    CONSULTS: nephrology, vascular sx, IR, endo    Latosha Maya, PGY1  Internal Medicine, Team 3  Pager [phone] Patient is a 52y old  Male who presents with a chief complaint of Altered mental status (03 Sep 2017 17:46)      SUBJECTIVE / OVERNIGHT EVENTS:  -Tele: NSR 80s-90s, with some PACs  -Per nurse, when he’s positioned in bed gets dyspneic though SpO2 98% on 2L NC  -No complaints. Went to bed pan yesterday and urinated, hasn’t had BM couple days. Denies SOB, CP, f/c, n/v.  -HD this AM with 1u pRBC    MEDICATIONS  (STANDING):  calcium acetate 667 milliGRAM(s) Oral three times a day with meals  predniSONE   Tablet 5 milliGRAM(s) Oral daily  tamsulosin 0.4 milliGRAM(s) Oral at bedtime  cycloSPORINE  (SandIMMUNE) 100 milliGRAM(s) Oral daily  atorvastatin 80 milliGRAM(s) Oral at bedtime  aspirin enteric coated 81 milliGRAM(s) Oral daily  digoxin  Injectable 0.25 milliGRAM(s) IV Push every 6 hours  insulin glargine Injectable (LANTUS) 12 Unit(s) SubCutaneous at bedtime  epoetin valente Injectable 6000 Unit(s) IV Push <User Schedule>  insulin lispro (HumaLOG) corrective regimen sliding scale   SubCutaneous three times a day before meals  insulin lispro (HumaLOG) corrective regimen sliding scale   SubCutaneous at bedtime  insulin lispro Injectable (HumaLOG) 3 Unit(s) SubCutaneous three times a day before meals  diltiazem    milliGRAM(s) Oral daily  warfarin 5 milliGRAM(s) Oral daily  heparin  Infusion.  Unit(s)/Hr (16 mL/Hr) IV Continuous <Continuous>    MEDICATIONS  (PRN):  heparin  Injectable 7000 Unit(s) IV Push every 6 hours PRN For aPTT less than 40  heparin  Injectable 3500 Unit(s) IV Push every 6 hours PRN For aPTT between 40 - 57      Vital Signs Last 24 Hrs  T(C): 36.9 (09 Sep 2017 15:00), Max: 36.9 (09 Sep 2017 15:00)  T(F): 98.4 (09 Sep 2017 15:00), Max: 98.4 (09 Sep 2017 15:00)  HR: 70 (09 Sep 2017 15:00) (70 - 98)  BP: 166/100 (09 Sep 2017 15:00) (139/52 - 166/100)  BP(mean): --  RR: 17 (09 Sep 2017 15:00) (17 - 18)  SpO2: 98% (08 Sep 2017 19:41) (98% - 98%)    CAPILLARY BLOOD GLUCOSE  240 (09 Sep 2017 13:03)  260 (08 Sep 2017 17:15)          I&O's Summary    08 Sep 2017 07:01  -  09 Sep 2017 07:00  --------------------------------------------------------  IN: 400 mL / OUT: 2400 mL / NET: -2000 mL    09 Sep 2017 07:01  -  09 Sep 2017 16:33  --------------------------------------------------------  IN: 1000 mL / OUT: 3000 mL / NET: -2000 mL        PHYSICAL EXAM  GENERAL: NAD, well-developed  HEAD:  Atraumatic, Normocephalic  EYES: EOMI, PERRLA, conjunctiva and sclera clear. Legally blind but can see changes in light  NECK: Supple, No JVD  CHEST/LUNG: coarse breath sounds, accessory musc use. No wheeze  HEART: Regular rate and rhythm; No murmurs, rubs, or gallops  ABDOMEN: Soft, Nontender, Nondistended; Bowel sounds present  EXTREMITIES: Feet warm, No clubbing, cyanosis. 3+ pitting edema to below knees  PSYCH: AAOx3  SKIN: No rashes or lesions    LABS:                        6.8    15.59 )-----------( 218      ( 09 Sep 2017 06:55 )             21.2     09 Sep 2017 06:55    135    |  90     |  59     ----------------------------<  341    3.6     |  21     |  5.83     Ca    8.4        09 Sep 2017 06:55  Phos  4.4       09 Sep 2017 06:55  Mg     2.2       09 Sep 2017 06:55      PT/INR - ( 09 Sep 2017 01:00 )   PT: 11.8 SEC;   INR: 1.05     PTT - ( 09 Sep 2017 11:45 )  PTT:107.4 SEC      RADIOLOGY & ADDITIONAL TESTS:     MICROBIOLOGY:   BCx 9/3 x 2 ng    HCV VL 6.5 million  HCV genotype pending  Hep B core ab, surface ab neg    CONSULTS: nephrology, vascular sx, IR, endo    Latosha Maya, PGY1  Internal Medicine, Team 3  Pager [phone] Patient is a 52y old  Male who presents with a chief complaint of Altered mental status (03 Sep 2017 17:46)      SUBJECTIVE / OVERNIGHT EVENTS:  -Tele: NSR 80s-90s, with some PACs  -Per nurse, when he’s positioned in bed gets dyspneic though SpO2 98% on 2L NC  -No complaints. Went to bed pan yesterday and urinated, hasn’t had BM couple days. Denies SOB, CP, f/c, n/v.  -HD this AM with 1u pRBC  -FS 300s on new insulin regimen    MEDICATIONS  (STANDING):  calcium acetate 667 milliGRAM(s) Oral three times a day with meals  predniSONE   Tablet 5 milliGRAM(s) Oral daily  tamsulosin 0.4 milliGRAM(s) Oral at bedtime  cycloSPORINE  (SandIMMUNE) 100 milliGRAM(s) Oral daily  atorvastatin 80 milliGRAM(s) Oral at bedtime  aspirin enteric coated 81 milliGRAM(s) Oral daily  digoxin  Injectable 0.25 milliGRAM(s) IV Push every 6 hours  insulin glargine Injectable (LANTUS) 12 Unit(s) SubCutaneous at bedtime  epoetin valente Injectable 6000 Unit(s) IV Push <User Schedule>  insulin lispro (HumaLOG) corrective regimen sliding scale   SubCutaneous three times a day before meals  insulin lispro (HumaLOG) corrective regimen sliding scale   SubCutaneous at bedtime  insulin lispro Injectable (HumaLOG) 3 Unit(s) SubCutaneous three times a day before meals  diltiazem    milliGRAM(s) Oral daily  warfarin 5 milliGRAM(s) Oral daily  heparin  Infusion.  Unit(s)/Hr (16 mL/Hr) IV Continuous <Continuous>    MEDICATIONS  (PRN):  heparin  Injectable 7000 Unit(s) IV Push every 6 hours PRN For aPTT less than 40  heparin  Injectable 3500 Unit(s) IV Push every 6 hours PRN For aPTT between 40 - 57      Vital Signs Last 24 Hrs  T(C): 36.9 (09 Sep 2017 15:00), Max: 36.9 (09 Sep 2017 15:00)  T(F): 98.4 (09 Sep 2017 15:00), Max: 98.4 (09 Sep 2017 15:00)  HR: 70 (09 Sep 2017 15:00) (70 - 98)  BP: 166/100 (09 Sep 2017 15:00) (139/52 - 166/100)  BP(mean): --  RR: 17 (09 Sep 2017 15:00) (17 - 18)  SpO2: 98% (08 Sep 2017 19:41) (98% - 98%)    CAPILLARY BLOOD GLUCOSE  240 (09 Sep 2017 13:03)  260 (08 Sep 2017 17:15)          I&O's Summary    08 Sep 2017 07:01  -  09 Sep 2017 07:00  --------------------------------------------------------  IN: 400 mL / OUT: 2400 mL / NET: -2000 mL    09 Sep 2017 07:01  -  09 Sep 2017 16:33  --------------------------------------------------------  IN: 1000 mL / OUT: 3000 mL / NET: -2000 mL        PHYSICAL EXAM  GENERAL: NAD, well-developed  HEAD:  Atraumatic, Normocephalic  EYES: EOMI, PERRLA, conjunctiva and sclera clear. Legally blind but can see changes in light  NECK: Supple, No JVD  CHEST/LUNG: coarse breath sounds, accessory musc use. No wheeze  HEART: Regular rate and rhythm; No murmurs, rubs, or gallops  ABDOMEN: Soft, Nontender, Nondistended; Bowel sounds present  EXTREMITIES: Feet warm, No clubbing, cyanosis. 3+ pitting edema to below knees  PSYCH: AAOx3  SKIN: No rashes or lesions    LABS:                        6.8    15.59 )-----------( 218      ( 09 Sep 2017 06:55 )             21.2     09 Sep 2017 06:55    135    |  90     |  59     ----------------------------<  341    3.6     |  21     |  5.83     Ca    8.4        09 Sep 2017 06:55  Phos  4.4       09 Sep 2017 06:55  Mg     2.2       09 Sep 2017 06:55      PT/INR - ( 09 Sep 2017 01:00 )   PT: 11.8 SEC;   INR: 1.05     PTT - ( 09 Sep 2017 11:45 )  PTT:107.4 SEC      RADIOLOGY & ADDITIONAL TESTS:     MICROBIOLOGY:   BCx 9/3 x 2 ng    HCV VL 6.5 million  HCV genotype pending  Hep B core ab, surface ab neg  HIV neg    CONSULTS: nephrology, vascular sx, IR, endo    Latosha Maya, PGY1  Internal Medicine, Team 3  Pager [phone] Patient is a 52y old  Male who presents with a chief complaint of Altered mental status (03 Sep 2017 17:46)      SUBJECTIVE / OVERNIGHT EVENTS:  -Tele: NSR 80s-90s, with some PACs  -Per nurse, when he’s positioned in bed gets dyspneic though SpO2 98% on 2L NC  -No complaints. Went to bed pan yesterday and urinated, hasn’t had BM couple days. Denies SOB, CP, f/c, n/v.  -HD this AM with 1u pRBC  -FS 300s on new insulin regimen  -Converted back to AF rate 100 around 6pm, asympt    MEDICATIONS  (STANDING):  calcium acetate 667 milliGRAM(s) Oral three times a day with meals  predniSONE   Tablet 5 milliGRAM(s) Oral daily  tamsulosin 0.4 milliGRAM(s) Oral at bedtime  cycloSPORINE  (SandIMMUNE) 100 milliGRAM(s) Oral daily  atorvastatin 80 milliGRAM(s) Oral at bedtime  aspirin enteric coated 81 milliGRAM(s) Oral daily  digoxin  Injectable 0.25 milliGRAM(s) IV Push every 6 hours  insulin glargine Injectable (LANTUS) 12 Unit(s) SubCutaneous at bedtime  epoetin valente Injectable 6000 Unit(s) IV Push <User Schedule>  insulin lispro (HumaLOG) corrective regimen sliding scale   SubCutaneous three times a day before meals  insulin lispro (HumaLOG) corrective regimen sliding scale   SubCutaneous at bedtime  insulin lispro Injectable (HumaLOG) 3 Unit(s) SubCutaneous three times a day before meals  diltiazem    milliGRAM(s) Oral daily  warfarin 5 milliGRAM(s) Oral daily  heparin  Infusion.  Unit(s)/Hr (16 mL/Hr) IV Continuous <Continuous>    MEDICATIONS  (PRN):  heparin  Injectable 7000 Unit(s) IV Push every 6 hours PRN For aPTT less than 40  heparin  Injectable 3500 Unit(s) IV Push every 6 hours PRN For aPTT between 40 - 57      Vital Signs Last 24 Hrs  T(C): 36.9 (09 Sep 2017 15:00), Max: 36.9 (09 Sep 2017 15:00)  T(F): 98.4 (09 Sep 2017 15:00), Max: 98.4 (09 Sep 2017 15:00)  HR: 70 (09 Sep 2017 15:00) (70 - 98)  BP: 166/100 (09 Sep 2017 15:00) (139/52 - 166/100)  BP(mean): --  RR: 17 (09 Sep 2017 15:00) (17 - 18)  SpO2: 98% (08 Sep 2017 19:41) (98% - 98%)    CAPILLARY BLOOD GLUCOSE  240 (09 Sep 2017 13:03)  260 (08 Sep 2017 17:15)          I&O's Summary    08 Sep 2017 07:01  -  09 Sep 2017 07:00  --------------------------------------------------------  IN: 400 mL / OUT: 2400 mL / NET: -2000 mL    09 Sep 2017 07:01  -  09 Sep 2017 16:33  --------------------------------------------------------  IN: 1000 mL / OUT: 3000 mL / NET: -2000 mL        PHYSICAL EXAM  GENERAL: NAD, well-developed  HEAD:  Atraumatic, Normocephalic  EYES: EOMI, PERRLA, conjunctiva and sclera clear. Legally blind but can see changes in light  NECK: Supple, No JVD  CHEST/LUNG: coarse breath sounds, accessory musc use. No wheeze  HEART: Regular rate and rhythm; No murmurs, rubs, or gallops  ABDOMEN: Soft, Nontender, Nondistended; Bowel sounds present  EXTREMITIES: Feet warm, No clubbing, cyanosis. 3+ pitting edema to below knees  PSYCH: AAOx3  SKIN: No rashes or lesions    LABS:                        6.8    15.59 )-----------( 218      ( 09 Sep 2017 06:55 )             21.2     09 Sep 2017 06:55    135    |  90     |  59     ----------------------------<  341    3.6     |  21     |  5.83     Ca    8.4        09 Sep 2017 06:55  Phos  4.4       09 Sep 2017 06:55  Mg     2.2       09 Sep 2017 06:55      PT/INR - ( 09 Sep 2017 01:00 )   PT: 11.8 SEC;   INR: 1.05     PTT - ( 09 Sep 2017 11:45 )  PTT:107.4 SEC      RADIOLOGY & ADDITIONAL TESTS:     MICROBIOLOGY:   BCx 9/3 x 2 ng    HCV VL 6.5 million  HCV genotype pending  Hep B core ab, surface ab neg  HIV neg    CONSULTS: nephrology, vascular sx, IR, endo    Latosha Maya, PGY1  Internal Medicine, Team 3  Pager [phone]

## 2017-09-09 NOTE — PROGRESS NOTE ADULT - PROBLEM SELECTOR PLAN 10
DVT ppx: heparin gtt for new onset AF  Diet: consistent carb no snack/renal diet    Diabetic retinopathy: Legally blind, lives with sister  -keep room bright, can see shapes if bright    BPH: cont home tamsulosin DVT ppx: heparin bridge and warfarin for new onset AF  Diet: consistent carb no snack/renal diet    Diabetic retinopathy: Legally blind, lives with sister  -keep room bright, can see shapes if bright light    BPH: cont home tamsulosin

## 2017-09-09 NOTE — PROGRESS NOTE ADULT - PROBLEM SELECTOR PLAN 6
Home regimen lantus 22u daily, home pravastatin 10mg daily  -atorvastatin 10mg daily  -JULIA, lantus 12u qhs, humalog 4u premeal Home regimen lantus 22u daily, home pravastatin 10mg daily  -atorvastatin 10mg daily  -JULIA, lantus 12u qhs, humalog 4u premeal  -FS 300s  -f/u endocrine recs

## 2017-09-09 NOTE — PROGRESS NOTE ADULT - PROBLEM SELECTOR PLAN 5
Home regimen clonidine 0.1mg po q8h, nifedipine XR 90 mg qD, torsemide 10mg qD. Makes urine still  -on cardizem drip, bc normotensive  -stopped nifedipine, hydralazine, lasix Home regimen clonidine 0.1mg po q8h, nifedipine XR 90 mg qD, torsemide 10mg qD. Makes urine still  -on cardizem oral  -stopped nifedipine, hydralazine, lasix when AF RVR and soft BP  -Monitor whether need to add diuretic

## 2017-09-09 NOTE — DOWNTIME INTERRUPTION NOTE - WHICH MANUAL FORMS INITIATED?
All the notes and flow sheet related to respiratory care service provided to this patient during this SCM downtime are filed in patient’s paper chart

## 2017-09-09 NOTE — PROGRESS NOTE ADULT - ASSESSMENT
51 y/o M with h/o CKD s/p renal transplant (1998) on cyclosporine and CellCept, HTN, DM, legally blind BIBEMS for uremic encephalopathy. Pt became bradycardic and had PEA arrest in the ED, intubated for airway protection. ROSC achieved s/p 1 round of epi and chest compressions and pt was transferred to MICU. Immunosuppressants for renal transplant were held. Pt had emergent HD for hyperkalemia. Covered empirically with broad spectrum antibiotics (vanc and zosyn), Hgb 5 s/p 1u pRBC given and epogen was started. Pressors were weaned off and pt extubated 9/4. Cyclosporine restarted and prednisone started. Pt remains stable and transferred to floor for further management 9/5, now found to have new-onset AF.

## 2017-09-09 NOTE — PROGRESS NOTE ADULT - SUBJECTIVE AND OBJECTIVE BOX
[facility] DIVISION OF KIDNEY DISEASES AND HYPERTENSION -- [phone]   FOLLOW UP NOTE  --------------------------------------------------------------------------------  Chief Complaint: Altered mental status    HPI:  53 y/o M with h/o CKD s/p renal transplant in 1998, HTN, DM, legally blind admitted for uremic encephalopathy initiated on dialysis. Pt last dialyzed on 9/8 with 2 L fluid removal. Plan for HD today for more  fluid removal.     PAST HISTORY  --------------------------------------------------------------------------------  No significant changes to PMH, PSH, FHx, SHx, unless otherwise noted    ALLERGIES & MEDICATIONS  --------------------------------------------------------------------------------  Allergies    No Known Allergies    Intolerances      Standing Inpatient Medications  calcium acetate 667 milliGRAM(s) Oral three times a day with meals  predniSONE   Tablet 5 milliGRAM(s) Oral daily  tamsulosin 0.4 milliGRAM(s) Oral at bedtime  cycloSPORINE  (SandIMMUNE) 100 milliGRAM(s) Oral daily  atorvastatin 80 milliGRAM(s) Oral at bedtime  aspirin enteric coated 81 milliGRAM(s) Oral daily  digoxin  Injectable 0.25 milliGRAM(s) IV Push every 6 hours  insulin glargine Injectable (LANTUS) 12 Unit(s) SubCutaneous at bedtime  epoetin valente Injectable 6000 Unit(s) IV Push <User Schedule>  insulin lispro (HumaLOG) corrective regimen sliding scale   SubCutaneous three times a day before meals  insulin lispro (HumaLOG) corrective regimen sliding scale   SubCutaneous at bedtime  insulin lispro Injectable (HumaLOG) 3 Unit(s) SubCutaneous three times a day before meals  diltiazem    milliGRAM(s) Oral daily  warfarin 5 milliGRAM(s) Oral daily  heparin  Infusion.  Unit(s)/Hr IV Continuous <Continuous>  heparin  Injectable 7000 Unit(s) IV Push once    PRN Inpatient Medications  heparin  Injectable 7000 Unit(s) IV Push every 6 hours PRN  heparin  Injectable 3500 Unit(s) IV Push every 6 hours PRN      REVIEW OF SYSTEMS  --------------------------------------------------------------------------------  General: no fever  CVS: no chest pain  RESP: no sob, no cough  ABD: no abdominal pain, no diarrhea  : no dysuria,  NEURO: no numbness    VITALS/PHYSICAL EXAM  --------------------------------------------------------------------------------  T(C): 36.8 (09-09-17 @ 11:40), Max: 36.8 (09-09-17 @ 11:40)  HR: 98 (09-09-17 @ 11:40) (88 - 98)  BP: 142/66 (09-09-17 @ 11:40) (139/52 - 142/66)  RR: 18 (09-09-17 @ 11:40) (18 - 18)  SpO2: 98% (09-08-17 @ 19:41) (98% - 98%)  Wt(kg): --        09-08-17 @ 07:01  -  09-09-17 @ 07:00  --------------------------------------------------------  IN: 400 mL / OUT: 2400 mL / NET: -2000 mL      Physical Exam:  	Gen: NAD, well-appearing  	HEENT: MMM, legally blind   	Pulm: CTA B/L  	CV: RRR, S1S2; no rub  	Abd: +BS, soft, nontender/nondistended  	: No suprapubic tenderness  	LE: Warm, ++ edema  	Skin: Warm, without rashes  	Vascular access: [facility] swanson     LABS/STUDIES  --------------------------------------------------------------------------------              6.8    15.59 >-----------<  218      [09-09-17 @ 06:55]              21.2     135  |  90  |  59  ----------------------------<  341      [09-09-17 @ 06:55]  3.6   |  21  |  5.83        Ca     8.4     [09-09-17 @ 06:55]      Mg     2.2     [09-09-17 @ 06:55]      Phos  4.4     [09-09-17 @ 06:55]      PT/INR: PT 11.8 , INR 1.05       [09-09-17 @ 01:00]  PTT: 107.4      [09-09-17 @ 11:45]    Troponin 0.92      [09-09-17 @ 06:55]        [09-09-17 @ 06:55]    Creatinine Trend:  SCr 5.83 [09-09 @ 06:55]  SCr 4.58 [09-08 @ 14:40]  SCr 6.39 [09-08 @ 05:30]  SCr 5.35 [09-07 @ 06:00]  SCr 6.57 [09-05 @ 02:30]        Iron 21, TIBC 187, %sat --      [09-09-17 @ 06:55]  Ferritin 587.5      [09-09-17 @ 06:55]  HbA1c 8.0      [08-16-17 @ 06:30]  TSH 2.88      [09-03-17 @ 13:45]  Lipid: chol 137, TG 97, HDL 52, LDL 68      [08-11-17 @ 07:20]    HBsAb <3.0      [09-03-17 @ 18:00]  HBsAg NEGATIVE      [09-03-17 @ 18:00]  HCV 14.48, Reactive      [09-03-17 @ 18:00]

## 2017-09-09 NOTE — PROGRESS NOTE ADULT - PROBLEM SELECTOR PLAN 1
Stood up to go to the bathroom, weak and lightheaded, mild SOB. Denied CP, N/V. VS: SpO2 98% on RA, H 150, /72, AO x 3. EKG showed new onset AFib, new TWI lateral leads from prior 3/2017  -On cardizem drip at 15 ml/hr now, start digoxin 0.25mg IV q6 x 4doses  -On heparin drip  -f/u TTE Stood up to go to the bathroom, weak and lightheaded, mild SOB. Denied CP, N/V. VS: SpO2 98% on RA, H 150, /72, AO x 3. EKG showed new onset AFib, new TWI lateral leads from prior 3/2017. Self converted NSR 9/8  -s/p start digoxin 0.25mg IV q6 x 4  -d/c cardizem drip (360 mg/day). Start cardizem 240mg oral daily  -On heparin bridge, start warfarin 5mg daily  -INR 2-3, spot dose warfarin. d/c heparin when therapeutic and 48h on heparin drip

## 2017-09-09 NOTE — PROGRESS NOTE ADULT - PROBLEM SELECTOR PLAN 4
Presented to the ED hypotensive, LOC, hypothermia, bradycardic in setting of hyperkalemia and hyperphosphatemia. Had been covering with broad spectrum vancomycin by level and zosyn in the MICU, but infectious etiology neg to date. Presented altered, hypotensive, hypothermic, bradycardic. Deranged vital signs more likely attributable to resulting hyperK resulting in PEA arrest. s/p Vanc and zosyn  -monitor off antibiotics, new leukocytosis from prednisone Presented to the ED hypotensive, LOC, hypothermia, bradycardic in setting of hyperkalemia and hyperphosphatemia. Had been covering with broad spectrum vancomycin by level and zosyn in the MICU, but infectious etiology neg to date. Presented altered, hypotensive, hypothermic, bradycardic. Deranged vital signs more likely attributable to resulting hyperK resulting in PEA arrest. s/p Vanc and zosyn. No signs of infection. New leukocytosis from prednisone. CXR congested

## 2017-09-09 NOTE — PROGRESS NOTE ADULT - ATTENDING COMMENTS
Patient with history of renal transplant, hypertension, DM, admitted with presumed uremic encephalopathy and initiated onto hemodialysis.  Continue with treatments as ordered, adjustment of epogen, and adjustment of medications as noted above

## 2017-09-09 NOTE — PROGRESS NOTE ADULT - SUBJECTIVE AND OBJECTIVE BOX
Chief Complaint: Evaluating this 52 yr M for uncontrolled Type 1 DM    History: Patient is eating well.     MEDICATIONS  (STANDING):  calcium acetate 667 milliGRAM(s) Oral three times a day with meals  predniSONE   Tablet 5 milliGRAM(s) Oral daily  tamsulosin 0.4 milliGRAM(s) Oral at bedtime  cycloSPORINE  (SandIMMUNE) 100 milliGRAM(s) Oral daily  atorvastatin 80 milliGRAM(s) Oral at bedtime  aspirin enteric coated 81 milliGRAM(s) Oral daily  digoxin  Injectable 0.25 milliGRAM(s) IV Push every 6 hours  epoetin valente Injectable 6000 Unit(s) IV Push <User Schedule>  insulin lispro (HumaLOG) corrective regimen sliding scale   SubCutaneous three times a day before meals  insulin lispro (HumaLOG) corrective regimen sliding scale   SubCutaneous at bedtime  diltiazem    milliGRAM(s) Oral daily  heparin  Infusion.  Unit(s)/Hr (16 mL/Hr) IV Continuous <Continuous>  heparin  Injectable 7500 Unit(s) IV Push once  insulin lispro Injectable (HumaLOG) 5 Unit(s) SubCutaneous three times a day before meals  insulin glargine Injectable (LANTUS) 15 Unit(s) SubCutaneous at bedtime    MEDICATIONS  (PRN):  heparin  Injectable 7000 Unit(s) IV Push every 6 hours PRN For aPTT less than 40  heparin  Injectable 3500 Unit(s) IV Push every 6 hours PRN For aPTT between 40 - 57      Allergies    No Known Allergies    Intolerances      Review of Systems:  Constitutional: No fever  Eyes: No blurry vision  Neuro: No tremors  HEENT: No pain  Cardiovascular: No chest pain, palpitations  Respiratory: No SOB, no cough  GI: No nausea, vomiting, abdominal pain  : No dysuria  Skin: no rash  Psych: no depression  Endocrine: no polyuria, polydipsia      ALL OTHER SYSTEMS REVIEWED AND NEGATIVE        PHYSICAL EXAM:  VITALS: T(C): 36.9 (09-09-17 @ 15:00)  T(F): 98.4 (09-09-17 @ 15:00), Max: 98.4 (09-09-17 @ 15:00)  HR: 91 (09-09-17 @ 17:18) (70 - 98)  BP: 129/60 (09-09-17 @ 17:18) (129/60 - 166/100)  RR:  (17 - 18)  SpO2:  (98% - 98%)  Wt(kg): --  GENERAL: NAD, well-groomed, well-developed  EYES: No proptosis, no lid lag, anicteric  HEENT:  Atraumatic, Normocephalic, moist mucous membranes  THYROID: Normal size, no palpable nodules  RESPIRATORY: Clear to auscultation bilaterally; No rales, rhonchi, wheezing, or rubs  CARDIOVASCULAR: Regular rate and rhythm; No murmurs; no peripheral edema  GI: Soft, nontender, non distended, normal bowel sounds  SKIN: Dry, intact, No rashes or lesions  MUSCULOSKELETAL: Full range of motion, normal strength  NEURO: sensation intact, extraocular movements intact, no tremor, normal reflexes  PSYCH: Alert and oriented x 3, normal affect, normal mood  CUSHING'S SIGNS: no striae    CAPILLARY BLOOD GLUCOSE  209 (09-09 @ 17:11)  240 (09-09 @ 13:03)  260 (09-08 @ 17:15)  227 (09-08 @ 12:49)  196 (09-08 @ 09:40)  310 (09-08 @ 05:54)  355 (09-07 @ 22:18)  322 (09-07 @ 16:37)  357 (09-07 @ 11:38)  314 (09-07 @ 08:26)  278 (09-06 @ 23:00)      09-09    135  |  90<L>  |  59<H>  ----------------------------<  341<H>  3.6   |  21<L>  |  5.83<H>    EGFR if : 12  EGFR if non : 10    Ca    8.4      09-09  Mg     2.2     09-09  Phos  4.4     09-09    TPro  5.4<L>  /  Alb  2.5<L>  /  TBili  0.6  /  DBili  x   /  AST  13  /  ALT  11  /  AlkPhos  82  09-07          Thyroid Function Tests:  09-03 @ 13:45 TSH 2.88 FreeT4 0.70 T3 71.7 Anti TPO -- Anti Thyroglobulin Ab -- TSI --      Hemoglobin A1C, Whole Blood: 8.0 % <H> [4.0 - 5.6] (08-16-17 @ 06:30)

## 2017-09-09 NOTE — PROGRESS NOTE ADULT - PROBLEM SELECTOR PLAN 2
Hx of CKD. 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 Hold home torsemide 10mg daily  -Off BIPAP, had been fluid overloaded after postponed HD because was unstable with AF RVR  -HD today, HD tmrw  -permacath postponed for Monday. On Sun: NPO after MN, hold heparin drip AM  -f/u BUE vein mapping Hx of CKD. 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 Hold home torsemide 10mg daily  -Off BIPAP, had been fluid overloaded after postponed HD because was unstable with AF RVR. Breathing 2.5L NC, good sat  -HD today  -permacath postponed for Monday. On Sun: NPO after MN, hold heparin drip AM  -f/u BUE vein mapping, whether fistula done inpt

## 2017-09-09 NOTE — PROGRESS NOTE ADULT - PROBLEM SELECTOR PLAN 1
Will need chronic maintenance dialysis, had vascular consult for AVF, and pending perm cath placement.  Patient declines PD, as he is blind and feels will be too difficult at home. Plan for HD today, with gentle UF target 2L.

## 2017-09-10 ENCOUNTER — TRANSCRIPTION ENCOUNTER (OUTPATIENT)
Age: 52
End: 2017-09-10

## 2017-09-10 DIAGNOSIS — N25.81 SECONDARY HYPERPARATHYROIDISM OF RENAL ORIGIN: ICD-10-CM

## 2017-09-10 LAB
ALBUMIN SERPL ELPH-MCNC: 2.5 G/DL — LOW (ref 3.3–5)
ALP SERPL-CCNC: 78 U/L — SIGNIFICANT CHANGE UP (ref 40–120)
ALT FLD-CCNC: 38 U/L — SIGNIFICANT CHANGE UP (ref 4–41)
APTT BLD: 78.4 SEC — HIGH (ref 27.5–37.4)
AST SERPL-CCNC: 100 U/L — HIGH (ref 4–40)
BASOPHILS # BLD AUTO: 0.04 K/UL — SIGNIFICANT CHANGE UP (ref 0–0.2)
BASOPHILS NFR BLD AUTO: 0.3 % — SIGNIFICANT CHANGE UP (ref 0–2)
BILIRUB SERPL-MCNC: 0.5 MG/DL — SIGNIFICANT CHANGE UP (ref 0.2–1.2)
BUN SERPL-MCNC: 60 MG/DL — HIGH (ref 7–23)
CALCIUM SERPL-MCNC: 8 MG/DL — LOW (ref 8.4–10.5)
CHLORIDE SERPL-SCNC: 94 MMOL/L — LOW (ref 98–107)
CK SERPL-CCNC: 112 U/L — SIGNIFICANT CHANGE UP (ref 30–200)
CK SERPL-CCNC: 117 U/L — SIGNIFICANT CHANGE UP (ref 30–200)
CO2 SERPL-SCNC: 26 MMOL/L — SIGNIFICANT CHANGE UP (ref 22–31)
CREAT SERPL-MCNC: 5.1 MG/DL — HIGH (ref 0.5–1.3)
EOSINOPHIL # BLD AUTO: 0.17 K/UL — SIGNIFICANT CHANGE UP (ref 0–0.5)
EOSINOPHIL NFR BLD AUTO: 1.3 % — SIGNIFICANT CHANGE UP (ref 0–6)
GLUCOSE SERPL-MCNC: 277 MG/DL — HIGH (ref 70–99)
HAV IGG+IGM SER QL: NONREACTIVE — SIGNIFICANT CHANGE UP
HCT VFR BLD CALC: 22.5 % — LOW (ref 39–50)
HCT VFR BLD CALC: 22.5 % — LOW (ref 39–50)
HGB BLD-MCNC: 7.4 G/DL — LOW (ref 13–17)
HGB BLD-MCNC: 7.4 G/DL — LOW (ref 13–17)
IMM GRANULOCYTES # BLD AUTO: 0.42 # — SIGNIFICANT CHANGE UP
IMM GRANULOCYTES NFR BLD AUTO: 3.3 % — HIGH (ref 0–1.5)
INR BLD: 1.17 — SIGNIFICANT CHANGE UP (ref 0.88–1.17)
LYMPHOCYTES # BLD AUTO: 0.86 K/UL — LOW (ref 1–3.3)
LYMPHOCYTES # BLD AUTO: 6.7 % — LOW (ref 13–44)
MAGNESIUM SERPL-MCNC: 2.1 MG/DL — SIGNIFICANT CHANGE UP (ref 1.6–2.6)
MCHC RBC-ENTMCNC: 29.6 PG — SIGNIFICANT CHANGE UP (ref 27–34)
MCHC RBC-ENTMCNC: 29.6 PG — SIGNIFICANT CHANGE UP (ref 27–34)
MCHC RBC-ENTMCNC: 32.9 % — SIGNIFICANT CHANGE UP (ref 32–36)
MCHC RBC-ENTMCNC: 32.9 % — SIGNIFICANT CHANGE UP (ref 32–36)
MCV RBC AUTO: 90 FL — SIGNIFICANT CHANGE UP (ref 80–100)
MCV RBC AUTO: 90 FL — SIGNIFICANT CHANGE UP (ref 80–100)
MONOCYTES # BLD AUTO: 1.14 K/UL — HIGH (ref 0–0.9)
MONOCYTES NFR BLD AUTO: 8.9 % — SIGNIFICANT CHANGE UP (ref 2–14)
NEUTROPHILS # BLD AUTO: 10.16 K/UL — HIGH (ref 1.8–7.4)
NEUTROPHILS NFR BLD AUTO: 79.5 % — HIGH (ref 43–77)
NRBC # FLD: 0.11 — SIGNIFICANT CHANGE UP
NRBC # FLD: 0.11 — SIGNIFICANT CHANGE UP
PHOSPHATE SERPL-MCNC: 3.9 MG/DL — SIGNIFICANT CHANGE UP (ref 2.5–4.5)
PLATELET # BLD AUTO: 184 K/UL — SIGNIFICANT CHANGE UP (ref 150–400)
PLATELET # BLD AUTO: 184 K/UL — SIGNIFICANT CHANGE UP (ref 150–400)
PMV BLD: 10.8 FL — SIGNIFICANT CHANGE UP (ref 7–13)
PMV BLD: 10.8 FL — SIGNIFICANT CHANGE UP (ref 7–13)
POTASSIUM SERPL-MCNC: 3.6 MMOL/L — SIGNIFICANT CHANGE UP (ref 3.5–5.3)
POTASSIUM SERPL-SCNC: 3.6 MMOL/L — SIGNIFICANT CHANGE UP (ref 3.5–5.3)
PROLACTIN SERPL-MCNC: 17.3 NG/ML — SIGNIFICANT CHANGE UP (ref 4.1–18.4)
PROT SERPL-MCNC: 5.1 G/DL — LOW (ref 6–8.3)
PROTHROM AB SERPL-ACNC: 13.1 SEC — SIGNIFICANT CHANGE UP (ref 9.8–13.1)
PTH-INTACT SERPL-MCNC: 186.9 PG/ML — HIGH (ref 15–65)
RBC # BLD: 2.5 M/UL — LOW (ref 4.2–5.8)
RBC # BLD: 2.5 M/UL — LOW (ref 4.2–5.8)
RBC # FLD: 13.5 % — SIGNIFICANT CHANGE UP (ref 10.3–14.5)
RBC # FLD: 13.5 % — SIGNIFICANT CHANGE UP (ref 10.3–14.5)
SODIUM SERPL-SCNC: 138 MMOL/L — SIGNIFICANT CHANGE UP (ref 135–145)
WBC # BLD: 12.79 K/UL — HIGH (ref 3.8–10.5)
WBC # BLD: 12.79 K/UL — HIGH (ref 3.8–10.5)
WBC # FLD AUTO: 12.79 K/UL — HIGH (ref 3.8–10.5)
WBC # FLD AUTO: 12.79 K/UL — HIGH (ref 3.8–10.5)

## 2017-09-10 PROCEDURE — G0365: CPT | Mod: 26

## 2017-09-10 PROCEDURE — 99233 SBSQ HOSP IP/OBS HIGH 50: CPT | Mod: GC

## 2017-09-10 PROCEDURE — 70450 CT HEAD/BRAIN W/O DYE: CPT | Mod: 26

## 2017-09-10 RX ORDER — FUROSEMIDE 40 MG
80 TABLET ORAL
Qty: 0 | Refills: 0 | Status: DISCONTINUED | OUTPATIENT
Start: 2017-09-10 | End: 2017-09-20

## 2017-09-10 RX ORDER — DOCUSATE SODIUM 100 MG
100 CAPSULE ORAL THREE TIMES A DAY
Qty: 0 | Refills: 0 | Status: DISCONTINUED | OUTPATIENT
Start: 2017-09-10 | End: 2017-10-02

## 2017-09-10 RX ORDER — CALCITRIOL 0.5 UG/1
0.5 CAPSULE ORAL DAILY
Qty: 0 | Refills: 0 | Status: DISCONTINUED | OUTPATIENT
Start: 2017-09-10 | End: 2017-09-10

## 2017-09-10 RX ORDER — SENNA PLUS 8.6 MG/1
2 TABLET ORAL AT BEDTIME
Qty: 0 | Refills: 0 | Status: DISCONTINUED | OUTPATIENT
Start: 2017-09-10 | End: 2017-10-02

## 2017-09-10 RX ORDER — ERYTHROPOIETIN 10000 [IU]/ML
6000 INJECTION, SOLUTION INTRAVENOUS; SUBCUTANEOUS
Qty: 0 | Refills: 0 | Status: DISCONTINUED | OUTPATIENT
Start: 2017-09-10 | End: 2017-09-11

## 2017-09-10 RX ORDER — WARFARIN SODIUM 2.5 MG/1
5 TABLET ORAL ONCE
Qty: 0 | Refills: 0 | Status: DISCONTINUED | OUTPATIENT
Start: 2017-09-10 | End: 2017-09-10

## 2017-09-10 RX ADMIN — TAMSULOSIN HYDROCHLORIDE 0.4 MILLIGRAM(S): 0.4 CAPSULE ORAL at 22:21

## 2017-09-10 RX ADMIN — Medication 5 UNIT(S): at 09:32

## 2017-09-10 RX ADMIN — Medication 240 MILLIGRAM(S): at 06:05

## 2017-09-10 RX ADMIN — Medication 100 MILLIGRAM(S): at 12:16

## 2017-09-10 RX ADMIN — Medication 5 UNIT(S): at 14:01

## 2017-09-10 RX ADMIN — Medication: at 14:01

## 2017-09-10 RX ADMIN — CYCLOSPORINE 100 MILLIGRAM(S): 100 CAPSULE ORAL at 12:16

## 2017-09-10 RX ADMIN — Medication 80 MILLIGRAM(S): at 17:49

## 2017-09-10 RX ADMIN — Medication 80 MILLIGRAM(S): at 12:16

## 2017-09-10 RX ADMIN — Medication: at 17:49

## 2017-09-10 RX ADMIN — Medication 5 UNIT(S): at 17:49

## 2017-09-10 RX ADMIN — INSULIN GLARGINE 15 UNIT(S): 100 INJECTION, SOLUTION SUBCUTANEOUS at 22:38

## 2017-09-10 RX ADMIN — HEPARIN SODIUM 1600 UNIT(S)/HR: 5000 INJECTION INTRAVENOUS; SUBCUTANEOUS at 02:00

## 2017-09-10 RX ADMIN — Medication 5 MILLIGRAM(S): at 06:05

## 2017-09-10 RX ADMIN — Medication 667 MILLIGRAM(S): at 12:16

## 2017-09-10 RX ADMIN — ATORVASTATIN CALCIUM 80 MILLIGRAM(S): 80 TABLET, FILM COATED ORAL at 22:21

## 2017-09-10 RX ADMIN — Medication 81 MILLIGRAM(S): at 12:16

## 2017-09-10 RX ADMIN — Medication: at 09:32

## 2017-09-10 RX ADMIN — Medication 667 MILLIGRAM(S): at 17:42

## 2017-09-10 NOTE — PROGRESS NOTE ADULT - PROBLEM SELECTOR PLAN 5
Home regimen clonidine 0.1mg po q8h, nifedipine XR 90 mg qD, torsemide 10mg qD. Makes urine still  -on cardizem oral  -stopped nifedipine, hydralazine, lasix when AF RVR and soft BP  -Monitor whether need to add diuretic Home regimen clonidine 0.1mg po q8h, nifedipine XR 90 mg qD, torsemide 10mg qD. Makes urine still  -on cardizem oral  -per nephro, start lasix 80 IV BID

## 2017-09-10 NOTE — PROGRESS NOTE ADULT - SUBJECTIVE AND OBJECTIVE BOX
Patient is a 52y old  Male who presents with a chief complaint of Altered mental status (03 Sep 2017 17:46)      SUBJECTIVE / OVERNIGHT EVENTS:  -HD yesterday, 1u pRBC  Patient is a 52y old  Male who presents with a chief complaint of Altered mental status (03 Sep 2017 17:46)      SUBJECTIVE / OVERNIGHT EVENTS:    MEDICATIONS  (STANDING):  calcium acetate 667 milliGRAM(s) Oral three times a day with meals  predniSONE   Tablet 5 milliGRAM(s) Oral daily  tamsulosin 0.4 milliGRAM(s) Oral at bedtime  cycloSPORINE  (SandIMMUNE) 100 milliGRAM(s) Oral daily  atorvastatin 80 milliGRAM(s) Oral at bedtime  aspirin enteric coated 81 milliGRAM(s) Oral daily  epoetin valente Injectable 6000 Unit(s) IV Push <User Schedule>  insulin lispro (HumaLOG) corrective regimen sliding scale   SubCutaneous three times a day before meals  insulin lispro (HumaLOG) corrective regimen sliding scale   SubCutaneous at bedtime  diltiazem    milliGRAM(s) Oral daily  heparin  Infusion.  Unit(s)/Hr (16 mL/Hr) IV Continuous <Continuous>  insulin lispro Injectable (HumaLOG) 5 Unit(s) SubCutaneous three times a day before meals  insulin glargine Injectable (LANTUS) 15 Unit(s) SubCutaneous at bedtime    MEDICATIONS  (PRN):  heparin  Injectable 7000 Unit(s) IV Push every 6 hours PRN For aPTT less than 40  heparin  Injectable 3500 Unit(s) IV Push every 6 hours PRN For aPTT between 40 - 57        CAPILLARY BLOOD GLUCOSE  292 (09 Sep 2017 22:56)  209 (09 Sep 2017 17:11)  240 (09 Sep 2017 13:03)        I&O's Summary    08 Sep 2017 07:01  -  09 Sep 2017 07:00  --------------------------------------------------------  IN: 400 mL / OUT: 2400 mL / NET: -2000 mL    09 Sep 2017 07:01  -  10 Sep 2017 05:51  --------------------------------------------------------  IN: 1220 mL / OUT: 3000 mL / NET: -1780 mL      PHYSICAL EXAM  GENERAL: NAD, well-developed  HEAD:  Atraumatic, Normocephalic  EYES: EOMI, PERRLA, conjunctiva and sclera clear. Legally blind but can see changes in light  NECK: Supple, No JVD  CHEST/LUNG: coarse breath sounds, accessory musc use. No wheeze  HEART: Regular rate and rhythm; No murmurs, rubs, or gallops  ABDOMEN: Soft, Nontender, Nondistended; Bowel sounds present  EXTREMITIES: Feet warm, No clubbing, cyanosis. 3+ pitting edema to below knees  PSYCH: AAOx3  SKIN: No rashes or lesions      LABS:      RADIOLOGY & ADDITIONAL TESTS:     MICROBIOLOGY:   BCx 9/3 x 2 ng    HCV VL 6.5 million  HCV genotype pending  Hep B core ab, surface ab neg  HIV neg    CONSULTS: nephrology, vascular sx, IR, endo    Latosha Maya, PGY1  Internal Medicine, Team 3  Pager [phone] Patient is a 52y old  Male who presents with a chief complaint of Altered mental status (03 Sep 2017 17:46)      SUBJECTIVE / OVERNIGHT EVENTS:  -HD yesterday, 1u pRBC. Hg 8.1 post transfusion --> 7.4 today  -Tele: AF rate 80s-90s, PVCs, few doublets  -Pt sleeps much better on BIPAP at night. BIPAP 10/5, FiO2 40%. No complaints. Denies f/c, cp, abd pain, n/v/d, lightheaded    MEDICATIONS  (STANDING):  calcium acetate 667 milliGRAM(s) Oral three times a day with meals  predniSONE   Tablet 5 milliGRAM(s) Oral daily  tamsulosin 0.4 milliGRAM(s) Oral at bedtime  cycloSPORINE  (SandIMMUNE) 100 milliGRAM(s) Oral daily  atorvastatin 80 milliGRAM(s) Oral at bedtime  aspirin enteric coated 81 milliGRAM(s) Oral daily  epoetin valente Injectable 6000 Unit(s) IV Push <User Schedule>  insulin lispro (HumaLOG) corrective regimen sliding scale   SubCutaneous three times a day before meals  insulin lispro (HumaLOG) corrective regimen sliding scale   SubCutaneous at bedtime  diltiazem    milliGRAM(s) Oral daily  heparin  Infusion.  Unit(s)/Hr (16 mL/Hr) IV Continuous <Continuous>  insulin lispro Injectable (HumaLOG) 5 Unit(s) SubCutaneous three times a day before meals  insulin glargine Injectable (LANTUS) 15 Unit(s) SubCutaneous at bedtime    MEDICATIONS  (PRN):  heparin  Injectable 7000 Unit(s) IV Push every 6 hours PRN For aPTT less than 40  heparin  Injectable 3500 Unit(s) IV Push every 6 hours PRN For aPTT between 40 - 57      CAPILLARY BLOOD GLUCOSE  292 (09 Sep 2017 22:56)  209 (09 Sep 2017 17:11)  240 (09 Sep 2017 13:03)      I&O's Summary    08 Sep 2017 07:01  -  09 Sep 2017 07:00  --------------------------------------------------------  IN: 400 mL / OUT: 2400 mL / NET: -2000 mL    09 Sep 2017 07:01  -  10 Sep 2017 05:51  --------------------------------------------------------  IN: 1220 mL / OUT: 3000 mL / NET: -1780 mL      PHYSICAL EXAM  GENERAL: NAD, well-developed  HEAD:  Atraumatic, Normocephalic  EYES: EOMI, PERRLA, conjunctiva and sclera clear. Legally blind but can see changes in light  NECK: Supple, No JVD  CHEST/LUNG: coarse breath sounds, accessory musc use. No wheeze  HEART: Regular rate and rhythm; No murmurs, rubs, or gallops  ABDOMEN: Soft, Nontender, Nondistended; Bowel sounds present  EXTREMITIES: Feet warm, No clubbing, cyanosis. 3+ pitting edema to below knees  PSYCH: AAOx3  SKIN: No rashes or lesions      LABS:                        7.4    12.79 )-----------( 184      ( 10 Sep 2017 05:10 )             22.5     10 Sep 2017 05:10    138    |  94     |  60     ----------------------------<  277    3.6     |  26     |  5.10     Ca    8.0        10 Sep 2017 05:10  Phos  3.9       10 Sep 2017 05:10  Mg     2.1       10 Sep 2017 05:10    TPro  5.1    /  Alb  2.5    /  TBili  0.5    /  DBili  x      /  AST  100    /  ALT  38     /  AlkPhos  78     10 Sep 2017 05:10    PT/INR - ( 10 Sep 2017 05:10 )   PT: 13.1 SEC;   INR: 1.17     PTT - ( 10 Sep 2017 01:20 )  PTT:78.4 SEC      RADIOLOGY & ADDITIONAL TESTS:     MICROBIOLOGY:   BCx 9/3 x 2 ng    HCV VL 6.5 million  HCV genotype pending  HAV ab neg  Hep B core ab, surface ab neg  HIV neg    CONSULTS: nephrology, vascular sx, IR, endo    Latosha Maya, PGY1  Internal Medicine, Team 3  Pager [phone]

## 2017-09-10 NOTE — DISCHARGE NOTE ADULT - PLAN OF CARE
Treated Your heart wasn't beating properly which caused decreased blood to your brain and other organs, a life-threatening condition. This was caused by high potassium in your blood because your kidney wasn't working properly. Your circulation was normalized after CPR and epinephrine, then you were transferred to the MICU. See your primary doctor and nephrologist within 1 week of discharge for further management and treatment. Resolved Since our kidney wasn't working properly, the toxin uremia built up in your blood which causes your brain to not function properly. This resolved when you were started on hemodialysis which clears the uremia out of your blood for your kidney. You will need to stay on HD. See your primary doctor and nephrologist within 1 week of discharge for further management and treatment. Monitor Your kidney transplant lasted for almost 20 years, but it finally started to be rejected by your body and you needed hemodialysis to make up for its lack of function. You were given a permacath, a catheter to have dialysis through and you will eventually have a fistula in your arm created for HD. See your nephrologist within 1 week of discharge for further management and treatment. Treat In the hospital, you developed a new heart rhythm called atrial fibrillation. This is when the top chambers of the heart do not beat in a synchronized way. This is dangerous because blood clots can form that can lead to stroke. You were started on warfarin in the hospital to prevent blood clots. See your primary care doctor and cardiologist within 1 week of discharge for further management and treatment. Manage Your blood pressure difficult to keep low likely because your body had too much fluid due to the kidney not working properly. Continue your new hypertension medications upon discharge. See your primary care doctor within 1 week of discharge for further management and treatment. You saw an endocrinologist in the hospital to help manage your diabetes. Continue taking your insulin as directed. See your primary care doctor and endocrinologist within 1 week of discharge for further management and treatment. Because of gradual transplant rejection by your immune system, you were put on immunosuppressives. The rejection of the kidney is what caused you to need dialysis emergently in the hospital. Continue taking your immunosuppressives as directed. See your nephrologist within 1 week of discharge for further management and treatment. Your heart wasn't beating properly which caused decreased blood to your brain and other organs, a life-threatening condition. This was caused by high potassium in your blood because your kidney wasn't working properly. Your circulation was normalized after CPR and epinephrine and your heart rate and rhythm were monitored closely on telemetry throughout your admission. See your primary doctor and nephrologist within 1 week of discharge for further management and treatment. Since your kidney wasn't working properly, the toxin uremia built up in your blood which causes your brain to not function properly. This resolved when you were started on hemodialysis which clears the uremia out of your blood for your kidney. You will need to stay on HD. See your primary doctor and nephrologist within 1 week of discharge for further management and treatment. Your kidney transplant lasted for almost 20 years, but it finally started to be rejected by your body and you needed hemodialysis to make up for its lack of function. You were given a permacath followed by fistula placement in order to receive outpatient dialysis. See your nephrologist within 1 week of discharge for further management and treatment. In the hospital, you developed a new heart rhythm called atrial fibrillation. This is when the top chambers of the heart do not beat in a synchronized way. This is dangerous because blood clots can form that can lead to stroke. You were started on warfarin (coumadin) in the hospital to prevent blood clots. See your primary care doctor and cardiologist within 1 week of discharge for further management and treatment. - Use lantus 28U at night and novolog 10U before meals. Please check your fingersticks in the morning and before meals. Follow up with Dr. Dee from endocrinology on 10/17/17 at 2PM.  - Hypoglycemia Management : Please check your fingersticks every morning or if you are not feeling well.  If your fingerstick is >300 mg/dl x 3 or more readings, please contact (provider) Dr. Dee__. If your fingerstick low, <70 mg/dl and/or you have symptoms of very low blood sugar, FIRST drink 1/2 cup of apple juice, (or take 4 glucose tabs/tube of glucose gel) and recheck fingerstick in 15 minutes.  Repeat these steps until blood sugar is above 100 mg/dl, IF NECESSARY.  Then call provider listed above to discuss low blood sugar at (phone number)__[phone]    -What to expect at follow appointment:  Please bring a log of your fingersticks and/or your glucometer to your appointment.  Your blood sugar tracking will help your diabetes team determine the best plan. Your kidney transplant lasted for almost 20 years, but it finally started to be rejected by your body and you needed hemodialysis to make up for its lack of function. You were given a permacath followed by fistula placement in order to receive outpatient dialysis. See your nephrologist within 1 week of discharge for further management and treatment.  See Dr. Staples within 2 weeks of discharge for follow up of your AV Fistula. If you have any pain, numbness or tingling of your left hand call your vascular surgeon.

## 2017-09-10 NOTE — PROGRESS NOTE ADULT - PROBLEM SELECTOR PLAN 6
Home regimen lantus 22u daily, home pravastatin 10mg daily  -atorvastatin 10mg daily  -JULIA, lantus 12u qhs, humalog 4u premeal  -FS 300s  -f/u endocrine recs Presented to the ED hypotensive, LOC, hypothermia, bradycardic in setting of hyperkalemia and hyperphosphatemia. Had been covering with broad spectrum vancomycin by level and zosyn in the MICU, but infectious etiology neg to date. Presented altered, hypotensive, hypothermic, bradycardic. Deranged vital signs more likely attributable to resulting hyperK resulting in PEA arrest. s/p Vanc and zosyn. No signs of infection. New leukocytosis from prednisone. CXR congested Resolved. Presented to the ED hypotensive, LOC, hypothermia, bradycardic in setting of hyperkalemia and hyperphosphatemia. Had been covering with broad spectrum vancomycin by level and zosyn in the MICU, but infectious etiology neg to date. Presented altered, hypotensive, hypothermic, bradycardic. Deranged vital signs more likely attributable to resulting hyperK resulting in PEA arrest. s/p Vanc and zosyn. No signs of infection. New leukocytosis from prednisone. CXR congested

## 2017-09-10 NOTE — PROGRESS NOTE ADULT - PROBLEM SELECTOR PLAN 7
Presented with Hg 5 s/p 1u pRBC (9/4, 9/9)  -H/H slowly downtrending  -inc to Epogen 6,000u with TTS HD sessions  -1u pRBC today with HD  -f/u CBC 6pm Home regimen lantus 22u daily, home pravastatin 10mg daily. A1C 8 this admission  -atorvastatin 10mg daily  -Lantus 15u qhs, humalog 5u premeal, JULIA  -f/u endocrine recs Home regimen lantus 22u daily, home pravastatin 10mg daily. A1C 8 this admission  -atorvastatin 10mg daily  -Lantus 15u qhs, humalog 5u premeal, JULIA  -Endocrine consulted  -f/u endocrine recs

## 2017-09-10 NOTE — PROGRESS NOTE ADULT - PROBLEM SELECTOR PLAN 3
Kidney transplant from living brother in 1998 2/2 diabetic nephropathy. Per nephro, RODRIGO or transplant rejection pushed him into ESRD requiring dialysis. Home regimen: CellCept 1250mg BID, cyclosporine 25mg q12h.  -Per nephrology, hold pt's CellCept, will start prednisone with pt's home cyclosporine to maintain him until he is able to get another kidney transplant, then cyclosporine will be tapered leaving prednisone. Started immunosupp 3 weeks PTA  -c/w Cyclosporin 100mg daily, prednisone 5mg po daily. Slow taper over 3-6 mo Ca 8, alb 2.5. Corrected Ca 9.2. PTH elevated 186. Goal PTH in dialysis pts 150 - 300 pg/ml. Corrected Ca at goal 8.4 - 9.5  -start calcitriol Ca 8, alb 2.5. Corrected Ca 9.2. PTH elevated 186. Corrected Ca at goal 8.4 - 9.5. PTH at goal <600

## 2017-09-10 NOTE — DISCHARGE NOTE ADULT - VISION (WITH CORRECTIVE LENSES IF THE PATIENT USUALLY WEARS THEM):
Severely impaired: cannot locate objects without hearing or touching them or patient nonresponsive./legally blind according to sister

## 2017-09-10 NOTE — DISCHARGE NOTE ADULT - ADDITIONAL INSTRUCTIONS
Please follow up with your primary care doctor and nephrologist within 1 week of discharge. Please follow up with your primary care doctor and nephrologist within 1 week of discharge. Follow up with Dr. Dee from endocrinology on 10/17/17 at 2PM. Please follow up with your primary care doctor and nephrologist within 1 week of discharge. Follow up with Dr. Dee from endocrinology on 10/17/17 at 2PM.  Follow up with Vascular Surgery within 2 weeks of discharge. Please follow up with your primary care doctor (appt on 10/13 at 11:30am to check INR) and nephrologist within 1 week of discharge. Follow up with Dr. Dee from endocrinology on 10/17/17 at 2PM.  Follow up with Vascular Surgery within 2 weeks of discharge.

## 2017-09-10 NOTE — DISCHARGE NOTE ADULT - SECONDARY DIAGNOSIS.
Uremic encephalopathy ESRD needing dialysis Atrial fibrillation, new onset Hypertension Type 1 diabetes mellitus with hyperglycemia Transplantation rejection

## 2017-09-10 NOTE — DISCHARGE NOTE ADULT - MEDICATION SUMMARY - MEDICATIONS TO CHANGE
I will SWITCH the dose or number of times a day I take the medications listed below when I get home from the hospital:    Lantus Solostar Pen 100 units/mL subcutaneous solution  -- 22 unit(s) subcutaneous once a day. Please take at 9 am.  -- Do not drink alcoholic beverages when taking this medication.  It is very important that you take or use this exactly as directed.  Do not skip doses or discontinue unless directed by your doctor.  Keep in refrigerator.  Do not freeze.

## 2017-09-10 NOTE — DISCHARGE NOTE ADULT - CARE PLAN
Principal Discharge DX:	PEA (Pulseless electrical activity)  Secondary Diagnosis:	Uremic encephalopathy  Secondary Diagnosis:	ESRD needing dialysis  Secondary Diagnosis:	Atrial fibrillation, new onset  Secondary Diagnosis:	Hypertension  Secondary Diagnosis:	Type 1 diabetes mellitus with hyperglycemia  Secondary Diagnosis:	Transplantation rejection Principal Discharge DX:	PEA (Pulseless electrical activity)  Goal:	Treated  Instructions for follow-up, activity and diet:	Your heart wasn't beating properly which caused decreased blood to your brain and other organs, a life-threatening condition. This was caused by high potassium in your blood because your kidney wasn't working properly. Your circulation was normalized after CPR and epinephrine, then you were transferred to the MICU. See your primary doctor and nephrologist within 1 week of discharge for further management and treatment.  Secondary Diagnosis:	Uremic encephalopathy  Goal:	Resolved  Instructions for follow-up, activity and diet:	Since our kidney wasn't working properly, the toxin uremia built up in your blood which causes your brain to not function properly. This resolved when you were started on hemodialysis which clears the uremia out of your blood for your kidney. You will need to stay on HD. See your primary doctor and nephrologist within 1 week of discharge for further management and treatment.  Secondary Diagnosis:	ESRD needing dialysis  Goal:	Monitor  Instructions for follow-up, activity and diet:	Your kidney transplant lasted for almost 20 years, but it finally started to be rejected by your body and you needed hemodialysis to make up for its lack of function. You were given a permacath, a catheter to have dialysis through and you will eventually have a fistula in your arm created for HD. See your nephrologist within 1 week of discharge for further management and treatment.  Secondary Diagnosis:	Atrial fibrillation, new onset  Goal:	Treat  Instructions for follow-up, activity and diet:	In the hospital, you developed a new heart rhythm called atrial fibrillation. This is when the top chambers of the heart do not beat in a synchronized way. This is dangerous because blood clots can form that can lead to stroke. You were started on warfarin in the hospital to prevent blood clots. See your primary care doctor and cardiologist within 1 week of discharge for further management and treatment.  Secondary Diagnosis:	Hypertension  Goal:	Manage  Instructions for follow-up, activity and diet:	Your blood pressure difficult to keep low likely because your body had too much fluid due to the kidney not working properly. Continue your new hypertension medications upon discharge. See your primary care doctor within 1 week of discharge for further management and treatment.  Secondary Diagnosis:	Type 1 diabetes mellitus with hyperglycemia  Goal:	Manage  Instructions for follow-up, activity and diet:	You saw an endocrinologist in the hospital to help manage your diabetes. Continue taking your insulin as directed. See your primary care doctor and endocrinologist within 1 week of discharge for further management and treatment.  Secondary Diagnosis:	Transplantation rejection  Goal:	Manage  Instructions for follow-up, activity and diet:	Because of gradual transplant rejection by your immune system, you were put on immunosuppressives. The rejection of the kidney is what caused you to need dialysis emergently in the hospital. Continue taking your immunosuppressives as directed. See your nephrologist within 1 week of discharge for further management and treatment. Principal Discharge DX:	PEA (Pulseless electrical activity)  Goal:	Treated  Instructions for follow-up, activity and diet:	Your heart wasn't beating properly which caused decreased blood to your brain and other organs, a life-threatening condition. This was caused by high potassium in your blood because your kidney wasn't working properly. Your circulation was normalized after CPR and epinephrine and your heart rate and rhythm were monitored closely on telemetry throughout your admission. See your primary doctor and nephrologist within 1 week of discharge for further management and treatment.  Secondary Diagnosis:	Uremic encephalopathy  Goal:	Resolved  Instructions for follow-up, activity and diet:	Since your kidney wasn't working properly, the toxin uremia built up in your blood which causes your brain to not function properly. This resolved when you were started on hemodialysis which clears the uremia out of your blood for your kidney. You will need to stay on HD. See your primary doctor and nephrologist within 1 week of discharge for further management and treatment.  Secondary Diagnosis:	ESRD needing dialysis  Goal:	Monitor  Instructions for follow-up, activity and diet:	Your kidney transplant lasted for almost 20 years, but it finally started to be rejected by your body and you needed hemodialysis to make up for its lack of function. You were given a permacath followed by fistula placement in order to receive outpatient dialysis. See your nephrologist within 1 week of discharge for further management and treatment.  Secondary Diagnosis:	Atrial fibrillation, new onset  Goal:	Treat  Instructions for follow-up, activity and diet:	In the hospital, you developed a new heart rhythm called atrial fibrillation. This is when the top chambers of the heart do not beat in a synchronized way. This is dangerous because blood clots can form that can lead to stroke. You were started on warfarin in the hospital to prevent blood clots. See your primary care doctor and cardiologist within 1 week of discharge for further management and treatment.  Secondary Diagnosis:	Hypertension  Goal:	Manage  Instructions for follow-up, activity and diet:	Your blood pressure difficult to keep low likely because your body had too much fluid due to the kidney not working properly. Continue your new hypertension medications upon discharge. See your primary care doctor within 1 week of discharge for further management and treatment.  Secondary Diagnosis:	Type 1 diabetes mellitus with hyperglycemia  Goal:	Manage  Instructions for follow-up, activity and diet:	You saw an endocrinologist in the hospital to help manage your diabetes. Continue taking your insulin as directed. See your primary care doctor and endocrinologist within 1 week of discharge for further management and treatment.  Secondary Diagnosis:	Transplantation rejection  Goal:	Manage  Instructions for follow-up, activity and diet:	Because of gradual transplant rejection by your immune system, you were put on immunosuppressives. The rejection of the kidney is what caused you to need dialysis emergently in the hospital. Continue taking your immunosuppressives as directed. See your nephrologist within 1 week of discharge for further management and treatment. Principal Discharge DX:	PEA (Pulseless electrical activity)  Goal:	Treated  Instructions for follow-up, activity and diet:	Your heart wasn't beating properly which caused decreased blood to your brain and other organs, a life-threatening condition. This was caused by high potassium in your blood because your kidney wasn't working properly. Your circulation was normalized after CPR and epinephrine and your heart rate and rhythm were monitored closely on telemetry throughout your admission. See your primary doctor and nephrologist within 1 week of discharge for further management and treatment.  Secondary Diagnosis:	Uremic encephalopathy  Goal:	Resolved  Instructions for follow-up, activity and diet:	Since your kidney wasn't working properly, the toxin uremia built up in your blood which causes your brain to not function properly. This resolved when you were started on hemodialysis which clears the uremia out of your blood for your kidney. You will need to stay on HD. See your primary doctor and nephrologist within 1 week of discharge for further management and treatment.  Secondary Diagnosis:	ESRD needing dialysis  Goal:	Monitor  Instructions for follow-up, activity and diet:	Your kidney transplant lasted for almost 20 years, but it finally started to be rejected by your body and you needed hemodialysis to make up for its lack of function. You were given a permacath followed by fistula placement in order to receive outpatient dialysis. See your nephrologist within 1 week of discharge for further management and treatment.  Secondary Diagnosis:	Atrial fibrillation, new onset  Goal:	Treat  Instructions for follow-up, activity and diet:	In the hospital, you developed a new heart rhythm called atrial fibrillation. This is when the top chambers of the heart do not beat in a synchronized way. This is dangerous because blood clots can form that can lead to stroke. You were started on warfarin (coumadin) in the hospital to prevent blood clots. See your primary care doctor and cardiologist within 1 week of discharge for further management and treatment.  Secondary Diagnosis:	Hypertension  Goal:	Manage  Instructions for follow-up, activity and diet:	Your blood pressure difficult to keep low likely because your body had too much fluid due to the kidney not working properly. Continue your new hypertension medications upon discharge. See your primary care doctor within 1 week of discharge for further management and treatment.  Secondary Diagnosis:	Type 1 diabetes mellitus with hyperglycemia  Goal:	Manage  Instructions for follow-up, activity and diet:	You saw an endocrinologist in the hospital to help manage your diabetes. Continue taking your insulin as directed. See your primary care doctor and endocrinologist within 1 week of discharge for further management and treatment.  Secondary Diagnosis:	Transplantation rejection  Goal:	Manage  Instructions for follow-up, activity and diet:	Because of gradual transplant rejection by your immune system, you were put on immunosuppressives. The rejection of the kidney is what caused you to need dialysis emergently in the hospital. Continue taking your immunosuppressives as directed. See your nephrologist within 1 week of discharge for further management and treatment. Principal Discharge DX:	PEA (Pulseless electrical activity)  Goal:	Treated  Instructions for follow-up, activity and diet:	Your heart wasn't beating properly which caused decreased blood to your brain and other organs, a life-threatening condition. This was caused by high potassium in your blood because your kidney wasn't working properly. Your circulation was normalized after CPR and epinephrine and your heart rate and rhythm were monitored closely on telemetry throughout your admission. See your primary doctor and nephrologist within 1 week of discharge for further management and treatment.  Secondary Diagnosis:	Uremic encephalopathy  Goal:	Resolved  Instructions for follow-up, activity and diet:	Since your kidney wasn't working properly, the toxin uremia built up in your blood which causes your brain to not function properly. This resolved when you were started on hemodialysis which clears the uremia out of your blood for your kidney. You will need to stay on HD. See your primary doctor and nephrologist within 1 week of discharge for further management and treatment.  Secondary Diagnosis:	ESRD needing dialysis  Goal:	Monitor  Instructions for follow-up, activity and diet:	Your kidney transplant lasted for almost 20 years, but it finally started to be rejected by your body and you needed hemodialysis to make up for its lack of function. You were given a permacath followed by fistula placement in order to receive outpatient dialysis. See your nephrologist within 1 week of discharge for further management and treatment.  See Dr. Staples within 2 weeks of discharge for follow up of your AV Fistula. If you have any pain, numbness or tingling of your left hand call your vascular surgeon.  Secondary Diagnosis:	Atrial fibrillation, new onset  Goal:	Treat  Instructions for follow-up, activity and diet:	In the hospital, you developed a new heart rhythm called atrial fibrillation. This is when the top chambers of the heart do not beat in a synchronized way. This is dangerous because blood clots can form that can lead to stroke. You were started on warfarin (coumadin) in the hospital to prevent blood clots. See your primary care doctor and cardiologist within 1 week of discharge for further management and treatment.  Secondary Diagnosis:	Hypertension  Goal:	Manage  Instructions for follow-up, activity and diet:	Your blood pressure difficult to keep low likely because your body had too much fluid due to the kidney not working properly. Continue your new hypertension medications upon discharge. See your primary care doctor within 1 week of discharge for further management and treatment.  Secondary Diagnosis:	Type 1 diabetes mellitus with hyperglycemia  Goal:	Manage  Instructions for follow-up, activity and diet:	- Use lantus 28U at night and novolog 10U before meals. Please check your fingersticks in the morning and before meals. Follow up with Dr. Dee from endocrinology on 10/17/17 at 2PM.  - Hypoglycemia Management : Please check your fingersticks every morning or if you are not feeling well.  If your fingerstick is >300 mg/dl x 3 or more readings, please contact (provider) Dr. Dee__. If your fingerstick low, <70 mg/dl and/or you have symptoms of very low blood sugar, FIRST drink 1/2 cup of apple juice, (or take 4 glucose tabs/tube of glucose gel) and recheck fingerstick in 15 minutes.  Repeat these steps until blood sugar is above 100 mg/dl, IF NECESSARY.  Then call provider listed above to discuss low blood sugar at (phone number)__[phone]    -What to expect at follow appointment:  Please bring a log of your fingersticks and/or your glucometer to your appointment.  Your blood sugar tracking will help your diabetes team determine the best plan.  Secondary Diagnosis:	Transplantation rejection  Goal:	Manage  Instructions for follow-up, activity and diet:	Because of gradual transplant rejection by your immune system, you were put on immunosuppressives. The rejection of the kidney is what caused you to need dialysis emergently in the hospital. Continue taking your immunosuppressives as directed. See your nephrologist within 1 week of discharge for further management and treatment.

## 2017-09-10 NOTE — DISCHARGE NOTE ADULT - HOSPITAL COURSE
HPI: 51 y/o M with h/o CKD s/p renal transplant in 1998, HTN, DM, legally blind BIBEMS for AMS. According to patient's sister, patient called because did not know where he was and thought he was on the street when he was at home. Patient has been experiencing worsening kidney function for the last 1.5 years and that her confusion has been ongoing for the last few months. He moved to [facility] 1 month ago, and sister has noticed intermittent confusion. Confusion is worse today. Is followed by Dr. King (nephro) - on route to start dialysis soon. Sister denies any recent infection, cough, vomiting, diarrhea.    Hospital course: In the ED, patient became nba to 30s, arrested, coded. was given 1x epi and achieved ROSC. intubated. Creatinine was 10, K of 6.1 and was hypotehrmic inthe ER. Was started on broad spectrum abx - cultures are pending. held immunosuppressants, BP meds.    9/3: admitted to the MICU. Got HD. Post-HD Hb dropped to 5.5, VSS. Getting 1U PRBCs, hemolysis labs, FOBT.. Dosed Vanc 750, trough 16.  9/4: Successful extubation today, dced drips.. EKG J point elevations in anterior leads. AREpeat CBC >7 post transfusion of 1 Unit prbcs.  Dosed Vanc 500mg.  Started on ruddy Lantus and altered ISS  9/4 ON Hypoglycemic- dextrose amps given and lantus dose decreased to 10u  9/5: transferred out of the micu. HD today. , restart nifedipine but hold clonidine, start hydralazine 50 TID.  9/6: BP better on nifedipine and hydralazine around 130s/70s. Lantus 5u qhs started. Inc WOB, RR 28, crackles this AM, lasix 40 IV x 1. Pt examined, he urinated a few times today, dec WOB, less accessory musc use, dec crackles. Add lasix 60 IV BID. Called sister Xu Young, but no answer.  9/7: -Vasc consulted for left arm AV fistula  -Pt stood up to go to the bathroom, felt weak and lightheaded, mildly SOB. Denied CP, N/V. VS: SpO2 98% on RA, H 150, /72, AO x 3. Placed on 2L NC for inc WOB. EKG showed new onset atrial fibrillation, rate 146, new TWI lateral leads from prior 3/2017. CE sent. Cardizem 10mg IV x 2, lopressor 5mg IV x 3, brought HR to 90s-110s, BP stayed ~120/70s, AO x 3, denied CP. Troponin 1st set 0.13. Heparin drip started. Once on tele will start cardizem drip. Cardiology consulted. BP normotensive and will be on cardizem drip: d/c hydralazine, lasix, nifedipine  9/8: Cardizem drip 15ml/hr, still AF around 110s. Inc WOB from fluid overload, put on BIPAP. HD this AM, put on 2.5L NC, less accessory musc use, has had high 90s SpO2 whole time. Private cardiologist didn't see pt. House cards saw pt, thinks trop leak is demand ischemia. Inc to atorvastatin 80mg daily. Trop cont to inc 0.13 --> 0.63 --> 0.83 --> 0.94. No CP still. Digoxin 0.25mg IV x 4. TTE nl LV fctn, EF 65%, small pericardial effusion, mild MR, mild LVH. This afternoon, spon converted AF 120s --> NSF 80s. Endocrine and EP consulted.  9/9: Stable today. In PM pt did convert back into Afib but not in RVR. Earlier in the day the patient we had decreased dose of cardizem by 1/3 (240mg daily). Asking for Bipap at night for comfort. Epo inc to 6,000u w/ HD. Pt using BIPAP when sleeps though good SpO2  9/10: AF overnight, no RVR. endo rec lantus 15, humalog 5-5-5. Pt likes BIPAP at night, asked nephro for additional HD session, start lasix 80 IV BID. Warfarin 5mg held tonight for permacath per IR tmrw. Heparin to be d/c 4am (4h prior to proced). 3pm: Pt was drinking water, coughed, then family saw pt's left arm jerking, head devaited to R side, eyes gaze R size, lasted 10sec. Pt said he remembers needing to cough, but not arm shaking. No post-ictal after, no incontinence. Per attending Dr. Lobo, likely vasovagal after coughing episode. Ordered CTH, prolactin, CK serum. HPI: 51 y/o M with h/o CKD s/p renal transplant in 1998, HTN, DM, legally blind BIBEMS for AMS. According to patient's sister, patient called because did not know where he was and thought he was on the street when he was at home. Patient has been experiencing worsening kidney function for the last 1.5 years and that her confusion has been ongoing for the last few months. He moved to [facility] 1 month ago, and sister has noticed intermittent confusion. Confusion is worse today. Is followed by Dr. King (nephro) - on route to start dialysis soon. Sister denies any recent infection, cough, vomiting, diarrhea.    Hospital course: In the ED, patient became nba to 30s, arrested, coded with PEA arrest. was given 1x epi and achieved ROSC. intubated. Creatinine was 10, K of 6.1 and was hypothermic in the ER. Was started on broad spectrum abx - cultures are pending. held immunosuppressants, BP meds.    On 9/3 he was admitted to the MICU. Emergent HD was initiated through a [facility].  Nephrology was consulted. He was spot dosed vancomycin. He was extubated 9/4 and pressers were discontinued.  Pt’s blood pressure was difficult to control with  so home nifedipine was started with hydralazine. Vascular surgery was consulted to create a L arm AV fistula. On 9/7, pt stood up but felt lightheadedness. He was found to be in new onset AF with RVR 150s. EKG showed new new TWI lateral leads from prior 3/2017.  Heparin drip was started. Cardizem 10mg IV x 2, lopressor 5mg IV x 3 lowered rate mildly, pt was transferred to [facility] and started on a cardizem drip and received Digoxin 0.25mg IV x 4..  Pt self converted to NSR on the core drip, but reconverted to AF without RVR on oral core. Electrophysiology was consulted and oral coreg was started with spot-dosed warfarin for INR goal 2-3. Cardiac enzymes were sent. They trended up to peak at 0.94, but pt denied CP. Cardiology consulted and attributed it to demand ischemia. TTE nl LV fctn, EF 65%, small pericardial effusion, mild MR, mild LVH. Electrophysiology was consulted.   Pt’s FS were difficult to control. Endocrinology was consulted; his lantus dose was changed to lantus 15u qHS, homolog 5u primal. Pt had high 90s% SpO2 but inc work of breathing, was started on BIPAP at night for sleep. On 9/10, pt had an episode of left arm jerking, head deviated to R side, eyes gaze R size, lasted 10sec. Pt said he remembers needing to cough, but not arm shaking. No post-ictal after, no incontinence. This was attributed to a likely vasovagal after coughing episode. Ordered CTH showed ___    prolactin, CK serum.  Permacath? HPI: 51 y/o M with h/o CKD s/p renal transplant in 1998, HTN, DM, legally blind BIBEMS for AMS. According to patient's sister, patient called because did not know where he was and thought he was on the street when he was at home. Patient has been experiencing worsening kidney function for the last 1.5 years and that her confusion has been ongoing for the last few months. He moved to [facility] 1 month ago, and sister has noticed intermittent confusion. Confusion is worse today. Is followed by Dr. King (nephro) - on route to start dialysis soon. Sister denies any recent infection, cough, vomiting, diarrhea.    Hospital course: In the ED, patient became nba to 30s, arrested, coded with PEA arrest. was given 1x epi and achieved ROSC. intubated. Creatinine was 10, K of 6.1 and was hypothermic in the ER. Was started on broad spectrum abx - cultures are pending. held immunosuppressants, BP meds.    On 9/3 he was admitted to the MICU. Emergent HD was initiated through a [facility].  Nephrology was consulted. He was spot dosed vancomycin. He was extubated 9/4 and pressers were discontinued.  Pt’s blood pressure was difficult to control with  so home nifedipine was started with hydralazine. Vascular surgery was consulted to create a L arm AV fistula. On 9/7, pt stood up but felt lightheadedness. He was found to be in new onset AF with RVR 150s. EKG showed new new TWI lateral leads from prior 3/2017.  Heparin drip was started. Cardizem 10mg IV x 2, lopressor 5mg IV x 3 lowered rate mildly, pt was transferred to [facility] and started on a cardizem drip and received Digoxin 0.25mg IV x 4..  Pt self converted to NSR on the core drip, but reconverted to AF without RVR on oral core. Electrophysiology was consulted and oral coreg was started with spot-dosed warfarin for INR goal 2-3. Cardiac enzymes were sent. They trended up to peak at 0.94, but pt denied CP. Cardiology consulted and attributed it to demand ischemia. TTE nl LV fctn, EF 65%, small pericardial effusion, mild MR, mild LVH. Electrophysiology was consulted.   Pt’s FS were difficult to control. Endocrinology was consulted; his lantus dose was changed to lantus 15u qHS, homolog 5u primal. Pt had high 90s% SpO2 but inc work of breathing, was started on BIPAP at night for sleep. On 9/10, pt had an episode of left arm jerking, head deviated to R side, eyes gaze R size, lasted 10sec. Pt said he remembers needing to cough, but not arm shaking. No post-ictal after, no incontinence. This was attributed to a likely vasovagal after coughing episode. Pt received 2u pRBC _______ this admission due to anemia 2/2 CKD, pt on epogen with HD. Ordered CTH showed ___    prolactin, CK serum.  Permacath? HPI: 53 y/o M with h/o CKD s/p renal transplant in 1998, HTN, DM, legally blind BIBEMS for AMS. According to patient's sister, patient called because did not know where he was and thought he was on the street when he was at home. Patient has been experiencing worsening kidney function for the last 1.5 years and that her confusion has been ongoing for the last few months. He moved to [facility] 1 month ago, and sister has noticed intermittent confusion. Confusion is worse today. Is followed by Dr. King (nephro) - on route to start dialysis soon. Sister denies any recent infection, cough, vomiting, diarrhea.    Hospital course: In the ED, patient became nba to 30s, arrested, coded with PEA arrest. was given 1x epi and achieved ROSC. intubated. Creatinine was 10, K of 6.1 and was hypothermic in the ER. Was started on broad spectrum abx - cultures are pending. held immunosuppressants, BP meds.    On 9/3 he was admitted to the MICU. Emergent HD was initiated through a [facility].  Nephrology was consulted. He was spot dosed vancomycin. He was extubated 9/4 and pressors were discontinued.  Pt’s blood pressure was difficult to control with  so home nifedipine was started with hydralazine. Vascular surgery was consulted to create a L arm AV fistula. On 9/7, pt stood up but felt lightheadedness. He was found to be in new onset AF with RVR 150s. EKG showed new new TWI lateral leads from prior 3/2017.  Heparin drip was started. Cardizem 10mg IV x 2, lopressor 5mg IV x 3 lowered rate mildly, pt was transferred to [facility] and started on a cardizem drip and received Digoxin 0.25mg IV x 4..  Pt self converted to NSR on the core drip, but reconverted to AF without RVR on oral core. Electrophysiology was consulted and oral coreg was started with spot-dosed warfarin for INR goal 2-3. Cardiac enzymes were sent. They trended up to peak at 0.94, but pt denied CP. Cardiology consulted and attributed it to demand ischemia. TTE nl LV fctn, EF 65%, small pericardial effusion, mild MR, mild LVH.   Pt’s FS were difficult to control. Endocrinology was consulted and were adjusting his insulin regimen daily due to constant shifts in blood glucose levels. On 9/8 Pt had high 90s% SpO2 but inc work of breathing, was started on BIPAP at night for sleep. On 9/10, pt had an episode of left arm jerking, head deviated to R side, eyes gaze R size, lasted 10sec. Pt said he remembers needing to cough, but not arm shaking. No post-ictal after, no incontinence. CTH showed no signs of intracranial infarct or hemorrhage. This was attributed to a likely vasovagal after coughing episode. Pt received 3u pRBC this admission due to anemia 2/2 CKD, pt on epogen with HD. Permacath was placed and patient remained hemodynamically stable with few episodes of rapid Afib while awaiting AVF placement. HPI: 53 y/o M with h/o CKD s/p renal transplant in 1998, HTN, DM, legally blind BIBEMS for AMS. According to patient's sister, patient called because did not know where he was and thought he was on the street when he was at home. Patient has been experiencing worsening kidney function for the last 1.5 years and that her confusion has been ongoing for the last few months. He moved to [facility] 1 month ago, and sister has noticed intermittent confusion. Confusion is worse today. Is followed by Dr. King (nephro) - on route to start dialysis soon. Sister denies any recent infection, cough, vomiting, diarrhea.    Hospital course: In the ED, patient became nba to 30s, arrested, coded with PEA arrest. was given 1x epi and achieved ROSC. intubated. Creatinine was 10, K of 6.1 and was hypothermic in the ER. Was started on broad spectrum abx - cultures are pending. held immunosuppressants, BP meds.    On 9/3 he was admitted to the MICU. Emergent HD was initiated through a [facility].  Nephrology was consulted. He was spot dosed vancomycin. He was extubated 9/4 and pressors were discontinued.  Pt’s blood pressure was difficult to control with  so home nifedipine was started with hydralazine. Vascular surgery was consulted to create a L arm AV fistula. On 9/7, pt stood up but felt lightheadedness. He was found to be in new onset AF with RVR 150s. EKG showed new new TWI lateral leads from prior 3/2017.  Heparin drip was started. Cardizem 10mg IV x 2, lopressor 5mg IV x 3 lowered rate mildly, pt was transferred to [facility] and started on a cardizem drip and received Digoxin 0.25mg IV x 4..  Pt self converted to NSR on the core drip, but reconverted to AF without RVR on oral core. Electrophysiology was consulted and oral coreg was started with spot-dosed warfarin for INR goal 2-3. Cardiac enzymes were sent. They trended up to peak at 0.94, but pt denied CP. Cardiology consulted and attributed it to demand ischemia. TTE nl LV fctn, EF 65%, small pericardial effusion, mild MR, mild LVH.   Pt’s FS were difficult to control. Endocrinology was consulted and were adjusting his insulin regimen daily due to constant shifts in blood glucose levels due to PO status and patient preference. On 9/8 Pt had high 90s% SpO2 but inc work of breathing, was started on BIPAP at night for sleep. On 9/10, pt had an episode of left arm jerking, head deviated to R side, eyes gaze R size, lasted 10sec. Pt said he remembers needing to cough, but not arm shaking. No post-ictal after, no incontinence. CTH showed no signs of intracranial infarct or hemorrhage. This was attributed to a likely vasovagal after coughing episode. Pt received 3u pRBC this admission due to anemia 2/2 CKD, pt on epogen with HD. Permacath was placed and patient remained hemodynamically stable with few episodes of rapid Afib while awaiting AVF placement. HPI: 51 y/o M with h/o CKD s/p renal transplant in 1998, HTN, DM, legally blind BIBEMS for AMS. According to patient's sister, patient called because did not know where he was and thought he was on the street when he was at home. Patient has been experiencing worsening kidney function for the last 1.5 years and that her confusion has been ongoing for the last few months. He moved to [facility] 1 month ago, and sister has noticed intermittent confusion. Confusion is worse today. Is followed by Dr. King (nephro) - on route to start dialysis soon. Sister denies any recent infection, cough, vomiting, diarrhea.    Hospital course: In the ED, patient became nba to 30s, arrested, coded with PEA arrest. was given 1x epi and achieved ROSC. intubated. Creatinine was 10, K of 6.1 and was hypothermic in the ER. Was started on broad spectrum abx - cultures are pending. held immunosuppressants, BP meds.    On 9/3 he was admitted to the MICU. Emergent HD was initiated through a [facility].  Nephrology was consulted. He was spot dosed vancomycin. He was extubated 9/4 and pressors were discontinued.  Pt’s blood pressure was difficult to control with  so home nifedipine was started with hydralazine. Vascular surgery was consulted to create a L arm AV fistula. On 9/7, pt stood up but felt lightheadedness. He was found to be in new onset AF with RVR 150s. EKG showed new new TWI lateral leads from prior 3/2017.  Heparin drip was started. Cardizem 10mg IV x 2, lopressor 5mg IV x 3 lowered rate mildly, pt was transferred to [facility] and started on a cardizem drip and received Digoxin 0.25mg IV x 4..  Pt self converted to NSR on the core drip, but reconverted to AF without RVR on oral core. Electrophysiology was consulted and oral coreg was started with spot-dosed warfarin for INR goal 2-3. Cardiac enzymes were sent. They trended up to peak at 0.94, but pt denied CP. Cardiology consulted and attributed it to demand ischemia. TTE nl LV fctn, EF 65%, small pericardial effusion, mild MR, mild LVH.   Pt’s FS were difficult to control. Endocrinology was consulted and were adjusting his insulin regimen daily due to constant shifts in blood glucose levels due to PO status and patient preference. On 9/8 Pt had high 90s% SpO2 but inc work of breathing, was started on BIPAP at night for sleep. On 9/10, pt had an episode of left arm jerking, head deviated to R side, eyes gaze R size, lasted 10sec. Pt said he remembers needing to cough, but not arm shaking. No post-ictal after, no incontinence. CTH showed no signs of intracranial infarct or hemorrhage. This was attributed to a likely vasovagal after coughing episode. Pt received 3u pRBC this admission due to anemia 2/2 CKD, pt on epogen with HD. Permacath was placed and patient remained hemodynamically stable with few episodes of rapid Afib while awaiting AVF placement. Patient underwent successful placement of LUE AVF on 10/2 without complication. Patient was then bridged to coumadin with goal INR between 2-3. Patient considered safe for DC home with appropriate follow up with endocrinology, nephrology, vascular and his PCP. HPI: 51 y/o M with h/o CKD s/p renal transplant in 1998, HTN, DM, legally blind BIBEMS for AMS. According to patient's sister, patient called because did not know where he was and thought he was on the street when he was at home. Patient has been experiencing worsening kidney function for the last 1.5 years and that her confusion has been ongoing for the last few months. He moved to [facility] 1 month ago, and sister has noticed intermittent confusion. Confusion is worse today. Is followed by Dr. King (nephro) - on route to start dialysis soon. Sister denies any recent infection, cough, vomiting, diarrhea.    Hospital course: In the ED, patient became nba to 30s, arrested, coded with PEA arrest. was given 1x epi and achieved ROSC. intubated. Creatinine was 10, K of 6.1 and was hypothermic in the ER. Was started on broad spectrum abx - cultures are pending. held immunosuppressants, BP meds.    On 9/3 he was admitted to the MICU. Emergent HD was initiated through a [facility].  Nephrology was consulted. He was spot dosed vancomycin. He was extubated 9/4 and pressors were discontinued.  Pt’s blood pressure was difficult to control with  so home nifedipine was started with hydralazine. Vascular surgery was consulted to create a L arm AV fistula. On 9/7, pt stood up but felt lightheadedness. He was found to be in new onset AF with RVR 150s. EKG showed new new TWI lateral leads from prior 3/2017.  Heparin drip was started. Cardizem 10mg IV x 2, lopressor 5mg IV x 3 lowered rate mildly, pt was transferred to [facility] and started on a cardizem drip and received Digoxin 0.25mg IV x 4..  Pt self converted to NSR on the core drip, but reconverted to AF without RVR on oral core. Electrophysiology was consulted and oral coreg was started with spot-dosed warfarin for INR goal 2-3. Cardiac enzymes were sent. They trended up to peak at 0.94, but pt denied CP. Cardiology consulted and attributed it to demand ischemia. TTE nl LV fctn, EF 65%, small pericardial effusion, mild MR, mild LVH.   Pt’s FS were difficult to control. Endocrinology was consulted and were adjusting his insulin regimen daily due to constant shifts in blood glucose levels due to PO status and patient preference. On 9/8 Pt had high 90s% SpO2 but inc work of breathing, was started on BIPAP at night for sleep. On 9/10, pt had an episode of left arm jerking, head deviated to R side, eyes gaze R size, lasted 10sec. Pt said he remembers needing to cough, but not arm shaking. No post-ictal after, no incontinence. CTH showed no signs of intracranial infarct or hemorrhage. This was attributed to a likely vasovagal after coughing episode. Pt received 3u pRBC this admission due to anemia 2/2 CKD, pt on epogen with HD. Permacath was placed and patient remained hemodynamically stable with few episodes of rapid Afib while awaiting AVF placement. Patient underwent successful placement of LUE AVF on 10/2 without complication. Patient was then bridged to coumadin with goal INR between 2-3. Patient considered safe for DC home with appropriate follow up with endocrinology, nephrology, vascular and his PCP HPI: 51 y/o M with h/o CKD s/p renal transplant in 1998, HTN, DM, legally blind BIBEMS for AMS. According to patient's sister, patient called because did not know where he was and thought he was on the street when he was at home. Patient has been experiencing worsening kidney function for the last 1.5 years and that her confusion has been ongoing for the last few months. He moved to [facility] 1 month ago, and sister has noticed intermittent confusion.Is followed by Dr. King (nephro) - on route to start dialysis soon. Sister denies any recent infection, cough, vomiting, diarrhea.    Hospital course: In the ED, patient became nba to 30s, arrested, coded with PEA arrest. was given 1x epi and achieved ROSC. intubated. Creatinine was 10, K of 6.1 and was hypothermic in the ER. Was started on broad spectrum abx - cultures are pending. held immunosuppressants, BP meds.    On 9/3 he was admitted to the MICU. Emergent HD was initiated through a [facility].  Nephrology was consulted. He was spot dosed vancomycin. He was extubated 9/4 and pressors were discontinued.  Pt’s blood pressure was difficult to control with  so home nifedipine was started with hydralazine. Vascular surgery was consulted to create a L arm AV fistula. On 9/7, pt stood up but felt lightheadedness. He was found to be in new onset AF with RVR 150s. EKG showed new new TWI lateral leads from prior 3/2017.  Heparin drip was started. Cardizem 10mg IV x 2, lopressor 5mg IV x 3 lowered rate mildly, pt was transferred to [facility] and started on a cardizem drip and received Digoxin 0.25mg IV x 4..  Pt self converted to NSR on the core drip, but reconverted to AF without RVR on oral core. Electrophysiology was consulted and oral coreg was started with spot-dosed warfarin for INR goal 2-3. Cardiac enzymes were sent. They trended up to peak at 0.94, but pt denied CP. Cardiology consulted and attributed it to demand ischemia. TTE nl LV fctn, EF 65%, small pericardial effusion, mild MR, mild LVH.   Pt’s FS were difficult to control. Endocrinology was consulted and were adjusting his insulin regimen daily due to constant shifts in blood glucose levels due to PO status and patient preference. On 9/8 Pt had high 90s% SpO2 but inc work of breathing, was started on BIPAP at night for sleep. On 9/10, pt had an episode of left arm jerking, head deviated to R side, eyes gaze R size, lasted 10sec. Pt said he remembers needing to cough, but not arm shaking. No post-ictal after, no incontinence. CTH showed no signs of intracranial infarct or hemorrhage. This was attributed to a likely vasovagal after coughing episode. Pt received 3u pRBC this admission due to anemia 2/2 CKD, pt on epogen with HD. Permacath was placed and patient remained hemodynamically stable with few episodes of rapid Afib while awaiting AVF placement which self converted to NSR. Patient underwent successful placement of LUE AVF on 10/2 without complication but VA duplex was abnormal with stenosis, ?partial thrombosis but cleared for DC by vascular surgery with outpatient followup. Patient was then bridged to coumadin with goal INR between 2-3. Patient considered safe for DC home with appropriate follow up with endocrinology, nephrology, vascular and his PCP

## 2017-09-10 NOTE — PROGRESS NOTE ADULT - PROBLEM SELECTOR PLAN 2
Hx of CKD. 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 Hold home torsemide 10mg daily  -Off BIPAP, had been fluid overloaded after postponed HD because was unstable with AF RVR. Breathing 2.5L NC, good sat  -HD today  -permacath postponed for Monday. On Sun: NPO after MN, hold heparin drip AM  -f/u BUE vein mapping, whether fistula done inpt Hx of CKD. 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 Hold home torsemide 10mg daily  -Off BIPAP, had been fluid overloaded after postponed HD because was unstable with AF RVR. Breathing 2.5L NC, good sat  -permacath postponed for Monday. NPO at MN, stop heparin drip at ?midnight  -f/u BUE vein mapping, whether fistula will be done inpt Hx of CKD. 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 Hold home torsemide 10mg daily  -Off BIPAP, had been fluid overloaded after postponed HD because was unstable with AF RVR. Breathing 2.5L NC, good sat  -permacath postponed for Monday. NPO at MN, stop heparin drip at ?midnight  -f/u BUE vein mapping, whether fistula will be done inpt, needs new IR preproced form Hx of CKD. 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 Hold home torsemide 10mg daily  -pt requesting BIPAP nightly, had been fluid overloaded after postponed HD because was unstable with AF RVR. Breathing 2.5L NC, good sat  -Permacath sched for Monday. NPO at MN, stop heparin drip at least 4h prior to proced. No warfarin dose today, INR needs to be <2  -start lasix 80mg IV BID  -f/u BUE vein mapping, whether fistula will be done inpt, needs new IR preproced form

## 2017-09-10 NOTE — DISCHARGE NOTE ADULT - CARE PROVIDER_API CALL
Dr. Baker,   Endocrinology  Phone: [phone]  Fax: [phone]   )    -    Phil Reddy), Nephrology  [address] [place] [address]  [address]nd [place] [address]  Phone: [phone]  Fax: [phone]    Gustabo Staples), Vascular Surgery  [address] [facility]  [address]  [place] [address]  Phone: [phone]  Fax: [phone]

## 2017-09-10 NOTE — PROGRESS NOTE ADULT - PROBLEM SELECTOR PLAN 4
Presented to the ED hypotensive, LOC, hypothermia, bradycardic in setting of hyperkalemia and hyperphosphatemia. Had been covering with broad spectrum vancomycin by level and zosyn in the MICU, but infectious etiology neg to date. Presented altered, hypotensive, hypothermic, bradycardic. Deranged vital signs more likely attributable to resulting hyperK resulting in PEA arrest. s/p Vanc and zosyn. No signs of infection. New leukocytosis from prednisone. CXR congested Kidney transplant from living brother in 1998 2/2 diabetic nephropathy. Per nephro, RODRIGO or transplant rejection pushed him into ESRD requiring dialysis. Home regimen: CellCept 1250mg BID, cyclosporine 25mg q12h.  -Per nephrology, hold pt's CellCept, will start prednisone with pt's home cyclosporine to maintain him until he is able to get another kidney transplant, then cyclosporine will be tapered leaving prednisone. Started immunosupp 3 weeks PTA  -c/w Cyclosporin 100mg daily, prednisone 5mg po daily. Slow taper over 3-6 mo Kidney transplant from living brother in 1998 2/2 diabetic nephropathy. Per nephro, RODRIGO or transplant rejection pushed him into ESRD requiring dialysis. Home regimen: CellCept 1250mg BID, cyclosporine 25mg q12h.  -Per nephrology, hold pt's CellCept, will start prednisone with pt's home cyclosporine to maintain him until he is able to get another kidney transplant, then cyclosporine will be tapered leaving prednisone. Started immunosupp 3 weeks PTA  -c/w Cyclosporin 100mg daily, prednisone 5mg po daily. Slow taper over 3-6 mo  -f/u nephro re taper timeline

## 2017-09-10 NOTE — DISCHARGE NOTE ADULT - MEDICATION SUMMARY - MEDICATIONS TO STOP TAKING
I will STOP taking the medications listed below when I get home from the hospital:    CellCept  -- 1250 milligram(s) by mouth 2 times a day    NIFEdipine 90 mg oral tablet, extended release  -- 1 tab(s) by mouth once a day  -- Avoid grapefruit and grapefruit juice while taking this medication.  It is very important that you take or use this exactly as directed.  Do not skip doses or discontinue unless directed by your doctor.  Some non-prescription drugs may aggravate your condition.  Read all labels carefully.  If a warning appears, check with your doctor before taking.  Swallow whole.  Do not crush.    cloNIDine 0.1 mg oral tablet  -- 1 tab(s) by mouth every 8 hours    calcium acetate 667 mg oral capsule  -- 2 cap(s) by mouth 3 times a day (with meals)    Demadex 10 mg oral tablet  -- 1 tab(s) by mouth once a day    sodium bicarbonate 650 mg oral tablet  -- 2 tab(s) by mouth 3 times a day

## 2017-09-10 NOTE — PROGRESS NOTE ADULT - PROBLEM SELECTOR PLAN 9
-duoneb prn Discovered had HCV infection in 1998 when had kidney transplantation, never treated. 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 LFTs wnl  -HCV VL 6.5 million  -hepatology consulted, will need f/u hepatology clinic for treatment  -f/u HIV, HAV

## 2017-09-10 NOTE — DISCHARGE NOTE ADULT - PATIENT PORTAL LINK FT
“You can access the Follow[facility] Patient Portal, offered by [facility], by registering with the following website: [email or web address]”

## 2017-09-10 NOTE — PROGRESS NOTE ADULT - PROBLEM SELECTOR PLAN 10
DVT ppx: heparin bridge and warfarin for new onset AF  Diet: consistent carb no snack/renal diet    Diabetic retinopathy: Legally blind, lives with sister  -keep room bright, can see shapes if bright light    BPH: cont home tamsulosin Diabetic retinopathy: Legally blind, lives with sister  -keep room bright, can see shapes if bright light  BPH: cont home tamsulosin  DVT ppx: heparin bridge and warfarin for new onset AF  Diet: consistent carb no snack/renal diet

## 2017-09-10 NOTE — PROGRESS NOTE ADULT - PROBLEM SELECTOR PLAN 8
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 LFTs wnl  -HCV VL 6.5 million  -hepatology consulted, will need f/u hepatology clinic for treatment  -f/u HIV, HAV Presented with Hg 5 s/p 1u pRBC (9/4, 9/9)  -H/H slowly downtrending  -inc to Epogen 6,000u with TTS HD sessions Presented with Hg 5 s/p 1u pRBC (9/4, 9/9)  -H/H slowly downtrending  -c/w Epogen 6,000u with TTS HD sessions

## 2017-09-10 NOTE — PROGRESS NOTE ADULT - ATTENDING COMMENTS
Pt with episode of coughing fit followed by what was described by family as tonic left arm posturing and "eyes rolled back". This lasted seconds, no bowel or bladder incontinence, no tongue biting, pt was aox4 after event, was able to recall complete hospital course, and events leading up to and including coughing fit. Neuro exam non-focal, full str, CN intact. No events on tele. VS stable. Unlikely to represent true seizure, given immediately preceding coughing fit, c/f vagal event. Per family, this has occurred in the past where he briefly lost consciousness following coughing event.  - check NCHCT given pt on a/c  - check CPK, prolactin now and in 6 hrs  - monitor for clinical changes

## 2017-09-10 NOTE — PROGRESS NOTE ADULT - PROBLEM SELECTOR PLAN 1
Stood up to go to the bathroom, weak and lightheaded, mild SOB. Denied CP, N/V. VS: SpO2 98% on RA, H 150, /72, AO x 3. EKG showed new onset AFib, new TWI lateral leads from prior 3/2017. Self converted NSR 9/8  -s/p start digoxin 0.25mg IV q6 x 4  -d/c cardizem drip (360 mg/day). Start cardizem 240mg oral daily  -On heparin bridge, start warfarin 5mg daily  -INR 2-3, spot dose warfarin. d/c heparin when therapeutic and 48h on heparin drip Stood up to go to the bathroom, weak and lightheaded, mild SOB. Denied CP, N/V. VS: SpO2 98% on RA, H 150, /72, AO x 3. EKG showed new onset AFib, new TWI lateral leads from prior 3/2017. Self converted NSR 9/8 for a day, now back in AF without RVR  -s/p start digoxin 0.25mg IV q6 x 4  -d/c cardizem drip (360 mg/day). Start cardizem 240mg oral daily  -On heparin bridge, start warfarin 5mg daily  -INR 2-3, spot dose warfarin. d/c heparin when therapeutic and 48h on heparin drip Stood up to go to the bathroom, weak and lightheaded, mild SOB. Denied CP, N/V. VS: SpO2 98% on RA, H 150, /72, AO x 3. EKG showed new onset AFib, new TWI lateral leads from prior 3/2017. Self converted NSR 9/8 for a day, now back in AF without RVR  -s/p start digoxin 0.25mg IV q6 x 4, s/p cardizem drip at 15 mg/hr  -c/w cardizem 240mg oral daily  -On heparin bridge, start warfarin 5mg daily  -goal INR 2-3, spot dose warfarin, c/w heparin bridge. D/c heparin when therapeutic and 48h on heparin drip  -warfarin 5mg at 6pm Stood up to go to the bathroom, weak and lightheaded, mild SOB. Denied CP, N/V. VS: SpO2 98% on RA, H 150, /72, AO x 3. EKG showed new onset AFib, new TWI lateral leads from prior 3/2017. Self converted NSR 9/8 for a day, now back in AF without RVR  -s/p start digoxin 0.25mg IV q6 x 4, s/p cardizem drip at 15 mg/hr  -c/w cardizem 240mg oral daily  -goal INR 2-3, spot dose warfarin, c/w heparin bridge. D/c heparin when therapeutic and 48h on heparin drip  -hold today's warfarin, INR needs to be <2 for IR permacath proced Monday Stood up to go to the bathroom, weak and lightheaded, mild SOB. Denied CP, N/V. VS: SpO2 98% on RA, H 150, /72, AO x 3. EKG showed new onset AFib, new TWI lateral leads from prior 3/2017. Self converted NSR 9/8 for a day, now back in AF without RVR  -s/p digoxin load s/p cardizem drip at 15 mg/hr  -c/w cardizem 240mg oral daily  -goal INR 2-3, spot dose warfarin, c/w heparin bridge. D/c heparin when therapeutic and 48h on heparin drip  -hold today's warfarin, INR needs to be <2 for IR permacath proced Monday

## 2017-09-10 NOTE — DISCHARGE NOTE ADULT - MEDICATION SUMMARY - MEDICATIONS TO TAKE
I will START or STAY ON the medications listed below when I get home from the hospital:    BD Jaclyn Pens  -- BD Jaclyn pen needles, 4mm  Use as directed #4 times daily  Disp #2 boxes    -- Indication: For Diabetes mellitus type I    Alcohol Prep Pads  -- Use as directed  Disp #2 boxes      -- Indication: For Diabetes mellitus type I    Lancets  -- Test blood sugar #4 times daily  Disp  #2 boxes    -- Indication: For Diabetes mellitus type I    glucose test strips  -- Test blood sugar #4 times daily  Disp 3  boxes    -- Indication: For Diabetes mellitus type I    predniSONE 5 mg oral tablet  -- 1 tab(s) by mouth once a day  -- Indication: For Renal Transplant    tamsulosin 0.4 mg oral capsule  -- 1 cap(s) by mouth once a day (at bedtime)  -- Indication: For BPH    Coumadin 7.5 mg oral tablet  -- 1 tab(s) by mouth once a day   -- Do not take this drug if you are pregnant.  It is very important that you take or use this exactly as directed.  Do not skip doses or discontinue unless directed by your doctor.  Obtain medical advice before taking any non-prescription drugs as some may affect the action of this medication.    -- Indication: For A Fib    Coumadin 1 mg oral tablet  -- 1 tab(s) by mouth once a day   -- Do not take this drug if you are pregnant.  It is very important that you take or use this exactly as directed.  Do not skip doses or discontinue unless directed by your doctor.  Obtain medical advice before taking any non-prescription drugs as some may affect the action of this medication.    -- Indication: For A Fib    Lantus Solostar Pen 100 units/mL subcutaneous solution  -- 28 unit(s) subcutaneous once a day . Please take at 9 am.   -- Do not drink alcoholic beverages when taking this medication.  It is very important that you take or use this exactly as directed.  Do not skip doses or discontinue unless directed by your doctor.  Keep in refrigerator.  Do not freeze.    -- Indication: For Diabetes mellitus type I    NovoLOG FlexPen 100 units/mL subcutaneous solution  -- 10 unit(s) subcutaneous 3 times a day (before meals)   -- Do not drink alcoholic beverages when taking this medication.  Keep in refrigerator.  Do not freeze.  Obtain medical advice before taking any non-prescription drugs as some may affect the action of this medication.    -- Indication: For Diabetes mellitus type I    pravastatin 10 mg oral tablet  -- 1 tab(s) by mouth once a day (at bedtime)  -- Indication: For HLD    metoprolol tartrate 50 mg oral tablet  -- 1 tab(s) by mouth 2 times a day  -- Indication: For HTN    cycloSPORINE 100 mg oral capsule  -- 1 tab(s) by mouth once a day   -- Indication: For Renal Transplant    Nasacort 55 mcg/inh nasal aerosol  --  into nose prn  -- Indication: For Rhinitis    calcium acetate 667 mg oral capsule  -- 2 cap(s) by mouth 3 times a day (with meals)  -- Indication: For ESRD needing dialysis    hydrALAZINE 50 mg oral tablet  -- 1 tab(s) by mouth 3 times a day  -- Indication: For HTN    calcitriol 0.5 mcg oral capsule  -- 1 cap(s) by mouth once a day  -- Indication: For ESRD needing dialysis

## 2017-09-10 NOTE — DISCHARGE NOTE ADULT - CARE PROVIDERS DIRECT ADDRESSES
,DirectAddress_Unknown,[email or web address]

## 2017-09-11 LAB
ALBUMIN SERPL ELPH-MCNC: 2.4 G/DL — LOW (ref 3.3–5)
ALP SERPL-CCNC: 83 U/L — SIGNIFICANT CHANGE UP (ref 40–120)
ALT FLD-CCNC: 162 U/L — HIGH (ref 4–41)
APTT BLD: 28.5 SEC — SIGNIFICANT CHANGE UP (ref 27.5–37.4)
APTT BLD: 30.2 SEC — SIGNIFICANT CHANGE UP (ref 27.5–37.4)
AST SERPL-CCNC: 241 U/L — HIGH (ref 4–40)
BASOPHILS # BLD AUTO: 0.03 K/UL — SIGNIFICANT CHANGE UP (ref 0–0.2)
BASOPHILS NFR BLD AUTO: 0.2 % — SIGNIFICANT CHANGE UP (ref 0–2)
BILIRUB SERPL-MCNC: 0.6 MG/DL — SIGNIFICANT CHANGE UP (ref 0.2–1.2)
BUN SERPL-MCNC: 49 MG/DL — HIGH (ref 7–23)
BUN SERPL-MCNC: 81 MG/DL — HIGH (ref 7–23)
CALCIUM SERPL-MCNC: 8.1 MG/DL — LOW (ref 8.4–10.5)
CALCIUM SERPL-MCNC: 8.4 MG/DL — SIGNIFICANT CHANGE UP (ref 8.4–10.5)
CHLORIDE SERPL-SCNC: 94 MMOL/L — LOW (ref 98–107)
CHLORIDE SERPL-SCNC: 95 MMOL/L — LOW (ref 98–107)
CO2 SERPL-SCNC: 23 MMOL/L — SIGNIFICANT CHANGE UP (ref 22–31)
CO2 SERPL-SCNC: 24 MMOL/L — SIGNIFICANT CHANGE UP (ref 22–31)
CREAT SERPL-MCNC: 4.12 MG/DL — HIGH (ref 0.5–1.3)
CREAT SERPL-MCNC: 6.19 MG/DL — HIGH (ref 0.5–1.3)
EOSINOPHIL # BLD AUTO: 0.28 K/UL — SIGNIFICANT CHANGE UP (ref 0–0.5)
EOSINOPHIL NFR BLD AUTO: 1.8 % — SIGNIFICANT CHANGE UP (ref 0–6)
GLUCOSE SERPL-MCNC: 215 MG/DL — HIGH (ref 70–99)
GLUCOSE SERPL-MCNC: 264 MG/DL — HIGH (ref 70–99)
HCT VFR BLD CALC: 20.2 % — CRITICAL LOW (ref 39–50)
HCT VFR BLD CALC: 20.2 % — CRITICAL LOW (ref 39–50)
HCT VFR BLD CALC: 25.5 % — LOW (ref 39–50)
HCT VFR BLD CALC: 26.8 % — LOW (ref 39–50)
HCV GENTYP BLD NAA+PROBE: SIGNIFICANT CHANGE UP
HGB BLD-MCNC: 6.6 G/DL — CRITICAL LOW (ref 13–17)
HGB BLD-MCNC: 6.6 G/DL — CRITICAL LOW (ref 13–17)
HGB BLD-MCNC: 8.2 G/DL — LOW (ref 13–17)
HGB BLD-MCNC: 8.8 G/DL — LOW (ref 13–17)
IMM GRANULOCYTES # BLD AUTO: 0.46 # — SIGNIFICANT CHANGE UP
IMM GRANULOCYTES NFR BLD AUTO: 3 % — HIGH (ref 0–1.5)
INR BLD: 1.17 — SIGNIFICANT CHANGE UP (ref 0.88–1.17)
LYMPHOCYTES # BLD AUTO: 0.66 K/UL — LOW (ref 1–3.3)
LYMPHOCYTES # BLD AUTO: 4.2 % — LOW (ref 13–44)
MAGNESIUM SERPL-MCNC: 2.1 MG/DL — SIGNIFICANT CHANGE UP (ref 1.6–2.6)
MANUAL SMEAR VERIFICATION: SIGNIFICANT CHANGE UP
MANUAL SMEAR VERIFICATION: SIGNIFICANT CHANGE UP
MCHC RBC-ENTMCNC: 28.8 PG — SIGNIFICANT CHANGE UP (ref 27–34)
MCHC RBC-ENTMCNC: 29.2 PG — SIGNIFICANT CHANGE UP (ref 27–34)
MCHC RBC-ENTMCNC: 29.2 PG — SIGNIFICANT CHANGE UP (ref 27–34)
MCHC RBC-ENTMCNC: 29.5 PG — SIGNIFICANT CHANGE UP (ref 27–34)
MCHC RBC-ENTMCNC: 32.2 % — SIGNIFICANT CHANGE UP (ref 32–36)
MCHC RBC-ENTMCNC: 32.7 % — SIGNIFICANT CHANGE UP (ref 32–36)
MCHC RBC-ENTMCNC: 32.7 % — SIGNIFICANT CHANGE UP (ref 32–36)
MCHC RBC-ENTMCNC: 32.8 % — SIGNIFICANT CHANGE UP (ref 32–36)
MCV RBC AUTO: 89.4 FL — SIGNIFICANT CHANGE UP (ref 80–100)
MCV RBC AUTO: 89.4 FL — SIGNIFICANT CHANGE UP (ref 80–100)
MCV RBC AUTO: 89.5 FL — SIGNIFICANT CHANGE UP (ref 80–100)
MCV RBC AUTO: 89.9 FL — SIGNIFICANT CHANGE UP (ref 80–100)
MONOCYTES # BLD AUTO: 0.93 K/UL — HIGH (ref 0–0.9)
MONOCYTES NFR BLD AUTO: 6 % — SIGNIFICANT CHANGE UP (ref 2–14)
NEUTROPHILS # BLD AUTO: 13.2 K/UL — HIGH (ref 1.8–7.4)
NEUTROPHILS NFR BLD AUTO: 84.8 % — HIGH (ref 43–77)
NRBC # FLD: 0.04 — SIGNIFICANT CHANGE UP
NRBC # FLD: 0.05 — SIGNIFICANT CHANGE UP
NRBC # FLD: 0.09 — SIGNIFICANT CHANGE UP
NRBC # FLD: 0.09 — SIGNIFICANT CHANGE UP
PHOSPHATE SERPL-MCNC: 5 MG/DL — HIGH (ref 2.5–4.5)
PLATELET # BLD AUTO: 234 K/UL — SIGNIFICANT CHANGE UP (ref 150–400)
PLATELET # BLD AUTO: 234 K/UL — SIGNIFICANT CHANGE UP (ref 150–400)
PLATELET # BLD AUTO: 240 K/UL — SIGNIFICANT CHANGE UP (ref 150–400)
PLATELET # BLD AUTO: 272 K/UL — SIGNIFICANT CHANGE UP (ref 150–400)
PMV BLD: 10.5 FL — SIGNIFICANT CHANGE UP (ref 7–13)
PMV BLD: 10.9 FL — SIGNIFICANT CHANGE UP (ref 7–13)
PMV BLD: 11 FL — SIGNIFICANT CHANGE UP (ref 7–13)
PMV BLD: 11 FL — SIGNIFICANT CHANGE UP (ref 7–13)
POTASSIUM SERPL-MCNC: 3.8 MMOL/L — SIGNIFICANT CHANGE UP (ref 3.5–5.3)
POTASSIUM SERPL-MCNC: 4.2 MMOL/L — SIGNIFICANT CHANGE UP (ref 3.5–5.3)
POTASSIUM SERPL-SCNC: 3.8 MMOL/L — SIGNIFICANT CHANGE UP (ref 3.5–5.3)
POTASSIUM SERPL-SCNC: 4.2 MMOL/L — SIGNIFICANT CHANGE UP (ref 3.5–5.3)
PROLACTIN SERPL-MCNC: 17.6 NG/ML — SIGNIFICANT CHANGE UP (ref 4.1–18.4)
PROT SERPL-MCNC: 5 G/DL — LOW (ref 6–8.3)
PROTHROM AB SERPL-ACNC: 13.2 SEC — HIGH (ref 9.8–13.1)
RBC # BLD: 2.26 M/UL — LOW (ref 4.2–5.8)
RBC # BLD: 2.26 M/UL — LOW (ref 4.2–5.8)
RBC # BLD: 2.85 M/UL — LOW (ref 4.2–5.8)
RBC # BLD: 2.98 M/UL — LOW (ref 4.2–5.8)
RBC # FLD: 13.5 % — SIGNIFICANT CHANGE UP (ref 10.3–14.5)
RBC # FLD: 14 % — SIGNIFICANT CHANGE UP (ref 10.3–14.5)
SODIUM SERPL-SCNC: 138 MMOL/L — SIGNIFICANT CHANGE UP (ref 135–145)
SODIUM SERPL-SCNC: 139 MMOL/L — SIGNIFICANT CHANGE UP (ref 135–145)
WBC # BLD: 15.56 K/UL — HIGH (ref 3.8–10.5)
WBC # BLD: 15.56 K/UL — HIGH (ref 3.8–10.5)
WBC # BLD: 16.49 K/UL — HIGH (ref 3.8–10.5)
WBC # BLD: 17.16 K/UL — HIGH (ref 3.8–10.5)
WBC # FLD AUTO: 15.56 K/UL — HIGH (ref 3.8–10.5)
WBC # FLD AUTO: 15.56 K/UL — HIGH (ref 3.8–10.5)
WBC # FLD AUTO: 16.49 K/UL — HIGH (ref 3.8–10.5)
WBC # FLD AUTO: 17.16 K/UL — HIGH (ref 3.8–10.5)

## 2017-09-11 PROCEDURE — 76937 US GUIDE VASCULAR ACCESS: CPT | Mod: 26

## 2017-09-11 PROCEDURE — 99233 SBSQ HOSP IP/OBS HIGH 50: CPT | Mod: GC

## 2017-09-11 PROCEDURE — 77001 FLUOROGUIDE FOR VEIN DEVICE: CPT | Mod: 26,GC

## 2017-09-11 PROCEDURE — 36558 INSERT TUNNELED CV CATH: CPT

## 2017-09-11 PROCEDURE — 99232 SBSQ HOSP IP/OBS MODERATE 35: CPT | Mod: GC

## 2017-09-11 RX ORDER — HEPARIN SODIUM 5000 [USP'U]/ML
7000 INJECTION INTRAVENOUS; SUBCUTANEOUS EVERY 6 HOURS
Qty: 0 | Refills: 0 | Status: DISCONTINUED | OUTPATIENT
Start: 2017-09-11 | End: 2017-09-14

## 2017-09-11 RX ORDER — ERYTHROPOIETIN 10000 [IU]/ML
7000 INJECTION, SOLUTION INTRAVENOUS; SUBCUTANEOUS
Qty: 0 | Refills: 0 | Status: DISCONTINUED | OUTPATIENT
Start: 2017-09-11 | End: 2017-10-02

## 2017-09-11 RX ORDER — HEPARIN SODIUM 5000 [USP'U]/ML
3500 INJECTION INTRAVENOUS; SUBCUTANEOUS EVERY 6 HOURS
Qty: 0 | Refills: 0 | Status: DISCONTINUED | OUTPATIENT
Start: 2017-09-11 | End: 2017-09-14

## 2017-09-11 RX ORDER — HEPARIN SODIUM 5000 [USP'U]/ML
INJECTION INTRAVENOUS; SUBCUTANEOUS
Qty: 25000 | Refills: 0 | Status: DISCONTINUED | OUTPATIENT
Start: 2017-09-11 | End: 2017-09-14

## 2017-09-11 RX ORDER — INSULIN LISPRO 100/ML
6 VIAL (ML) SUBCUTANEOUS
Qty: 0 | Refills: 0 | Status: DISCONTINUED | OUTPATIENT
Start: 2017-09-11 | End: 2017-09-12

## 2017-09-11 RX ORDER — INSULIN GLARGINE 100 [IU]/ML
18 INJECTION, SOLUTION SUBCUTANEOUS AT BEDTIME
Qty: 0 | Refills: 0 | Status: DISCONTINUED | OUTPATIENT
Start: 2017-09-11 | End: 2017-09-12

## 2017-09-11 RX ADMIN — Medication 80 MILLIGRAM(S): at 13:21

## 2017-09-11 RX ADMIN — ATORVASTATIN CALCIUM 80 MILLIGRAM(S): 80 TABLET, FILM COATED ORAL at 21:32

## 2017-09-11 RX ADMIN — Medication 1: at 09:26

## 2017-09-11 RX ADMIN — Medication: at 13:18

## 2017-09-11 RX ADMIN — TAMSULOSIN HYDROCHLORIDE 0.4 MILLIGRAM(S): 0.4 CAPSULE ORAL at 21:32

## 2017-09-11 RX ADMIN — Medication 80 MILLIGRAM(S): at 23:00

## 2017-09-11 RX ADMIN — Medication 100 MILLIGRAM(S): at 13:16

## 2017-09-11 RX ADMIN — Medication 81 MILLIGRAM(S): at 13:15

## 2017-09-11 RX ADMIN — ERYTHROPOIETIN 7000 UNIT(S): 10000 INJECTION, SOLUTION INTRAVENOUS; SUBCUTANEOUS at 09:13

## 2017-09-11 RX ADMIN — Medication 1: at 21:32

## 2017-09-11 RX ADMIN — Medication 240 MILLIGRAM(S): at 05:42

## 2017-09-11 RX ADMIN — Medication 5 MILLIGRAM(S): at 05:42

## 2017-09-11 RX ADMIN — CYCLOSPORINE 100 MILLIGRAM(S): 100 CAPSULE ORAL at 13:15

## 2017-09-11 RX ADMIN — Medication 667 MILLIGRAM(S): at 20:38

## 2017-09-11 RX ADMIN — INSULIN GLARGINE 18 UNIT(S): 100 INJECTION, SOLUTION SUBCUTANEOUS at 21:32

## 2017-09-11 NOTE — PROGRESS NOTE ADULT - PROBLEM SELECTOR PLAN 1
Will need chronic maintenance dialysis, awaiting perm cath placement with AVF.  Patient declines PD, as he is blind and feels will be too difficult at home. tolerating HD well, on MWF schedule in hospital.

## 2017-09-11 NOTE — PROGRESS NOTE ADULT - PROBLEM SELECTOR PLAN 4
Kidney transplant from living brother in 1998 2/2 diabetic nephropathy. Per nephro, RODRIGO or transplant rejection pushed him into ESRD requiring dialysis. Home regimen: CellCept 1250mg BID, cyclosporine 25mg q12h.  -Per nephrology, hold pt's CellCept, will c/w prednisone 5mg with pt's home cyclosporine to maintain him until he is able to get another kidney transplant, then cyclosporine will be tapered leaving prednisone. Started immunosupp 3 weeks PTA  -c/w Cyclosporin 100mg daily, prednisone 5mg po daily. Slow taper over 3-6 mo  -f/u nephro re taper timeline

## 2017-09-11 NOTE — PROGRESS NOTE ADULT - PROBLEM SELECTOR PLAN 5
Home regimen clonidine 0.1mg po q8h, nifedipine XR 90 mg qD, torsemide 10mg qD. Makes urine still  -on cardizem oral  -c/w lasix 80 IV BID

## 2017-09-11 NOTE — PROGRESS NOTE ADULT - SUBJECTIVE AND OBJECTIVE BOX
S/24 Events: Patient seen and examined. Denies CP, SOB, dizziness, LH, near syncope or sycope.   No acute events overnight. C/o dry cough.  Scheduled for perm cath today.   edema right LE  > LE On lasix 80 mg IV bid ( acc to RN does not void - on HD)   Telemetry : - AF 80's- 90's  O:  T(C): 36.3 (17 @ 15:10), Max: 36.9 (09-10-17 @ 22:19)  HR: 82 (17 @ 16:16) (71 - 98)  BP: 179/87 (17 @ 15:10) (109/56 - 179/87)  RR: 18 (17 @ 15:10) (16 - 18)  SpO2: 98% (17 @ 16:16) (97% - 98%)  Wt(kg): --  Daily     Daily Weight in k.2 (11 Sep 2017 09:43)  Gen: NAD  HEENT: EOMI  CV: RRR, normal S1 + S2, no m/r/g  Lungs: CTAB  Abd: soft, non-tender  Ext: No edema    Labs:                        8.8    16.49 )-----------( 272      ( 11 Sep 2017 13:50 )             26.8         139  |  95<L>  |  49<H>  ----------------------------<  215<H>  4.2   |  23  |  4.12<H>    Ca    8.4      11 Sep 2017 13:50  Phos  5.0       Mg     2.1         TPro  5.0<L>  /  Alb  2.4<L>  /  TBili  0.6  /  DBili  x   /  AST  241<H>  /  ALT  162<H>  /  AlkPhos  83      PT/INR - ( 11 Sep 2017 13:50 )   PT: 13.2 SEC;   INR: 1.17          PTT - ( 11 Sep 2017 13:50 )  PTT:30.2 SEC  CARDIAC MARKERS ( 10 Sep 2017 17:30 )  x     / x     / 112 u/L / x     / x      CARDIAC MARKERS ( 10 Sep 2017 15:30 )  x     / x     / 117 u/L / x     / x        Meds:  MEDICATIONS  (STANDING):  calcium acetate 667 milliGRAM(s) Oral three times a day with meals  predniSONE   Tablet 5 milliGRAM(s) Oral daily  tamsulosin 0.4 milliGRAM(s) Oral at bedtime  cycloSPORINE  (SandIMMUNE) 100 milliGRAM(s) Oral daily  atorvastatin 80 milliGRAM(s) Oral at bedtime  aspirin enteric coated 81 milliGRAM(s) Oral daily  insulin lispro (HumaLOG) corrective regimen sliding scale   SubCutaneous three times a day before meals  insulin lispro (HumaLOG) corrective regimen sliding scale   SubCutaneous at bedtime  diltiazem    milliGRAM(s) Oral daily  insulin lispro Injectable (HumaLOG) 5 Unit(s) SubCutaneous three times a day before meals  insulin glargine Injectable (LANTUS) 15 Unit(s) SubCutaneous at bedtime  senna 2 Tablet(s) Oral at bedtime  docusate sodium 100 milliGRAM(s) Oral three times a day  furosemide   Injectable 80 milliGRAM(s) IV Push two times a day  epoetin valente Injectable 7000 Unit(s) IV Push <User Schedule>    TTE : 17   Mitral annular calcification, otherwise normal mitral  valve. Minimal mitral regurgitation. Mild concentric left ventricular hypertrophy. Endocardium not well visualized; grossly normal left  ventricular systolic function. Normal right ventricular size and function. Small pericardial effusion posterior to the left  ventricle. Right pleural effusion.     A/P: Mr. 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 CHADS score is 2 ( HTN & DM ). CHADS VASC score (Stroke risk was 2.2% per year in >90,000 patients (the [place]i Atrial Fibrillation Cohort Study) and 2.9% risk of stroke/TIA/systemic embolism.   Score 2 or greater is “moderate-high” risk and should otherwise be an anticoagulation candidate.   Has Bled score is 2, however, given his risk benefit ratio recommend AC with warfarin and maintian INR 2-3 .   EF normal .     1) Continue AV lizet blockers      Currently on PO Cardizem 240 mg QD     2) Anticoagulate with warfarin and maintain INR 2-3.   On heparin gtt with  ( maintain PTT 60-80 )    3) Plan for rhythm control strategy if pericardial effusion resolves.    4) Patient scheduled for Perm cath .    5) Sequential teds to both legs while in bed please.

## 2017-09-11 NOTE — PROGRESS NOTE ADULT - SUBJECTIVE AND OBJECTIVE BOX
[facility] DIVISION OF KIDNEY DISEASES AND HYPERTENSION -- HEMODIALYSIS NOTE  --------------------------------------------------------------------------------  Chief Complaint: ESRD/Ongoing hemodialysis requirement    24 hour events/subjective: feels well. tolerating HD well, seen on HD. no complaints        PAST HISTORY  --------------------------------------------------------------------------------  No significant changes to PMH, PSH, FHx, SHx, unless otherwise noted    ALLERGIES & MEDICATIONS  --------------------------------------------------------------------------------  Allergies    No Known Allergies    Intolerances      Standing Inpatient Medications  calcium acetate 667 milliGRAM(s) Oral three times a day with meals  predniSONE   Tablet 5 milliGRAM(s) Oral daily  tamsulosin 0.4 milliGRAM(s) Oral at bedtime  cycloSPORINE  (SandIMMUNE) 100 milliGRAM(s) Oral daily  atorvastatin 80 milliGRAM(s) Oral at bedtime  aspirin enteric coated 81 milliGRAM(s) Oral daily  insulin lispro (HumaLOG) corrective regimen sliding scale   SubCutaneous three times a day before meals  insulin lispro (HumaLOG) corrective regimen sliding scale   SubCutaneous at bedtime  diltiazem    milliGRAM(s) Oral daily  insulin lispro Injectable (HumaLOG) 5 Unit(s) SubCutaneous three times a day before meals  insulin glargine Injectable (LANTUS) 15 Unit(s) SubCutaneous at bedtime  senna 2 Tablet(s) Oral at bedtime  docusate sodium 100 milliGRAM(s) Oral three times a day  furosemide   Injectable 80 milliGRAM(s) IV Push two times a day  epoetin valente Injectable 7000 Unit(s) IV Push <User Schedule>    PRN Inpatient Medications  heparin  Injectable 7000 Unit(s) IV Push every 6 hours PRN  heparin  Injectable 3500 Unit(s) IV Push every 6 hours PRN      REVIEW OF SYSTEMS  --------------------------------------------------------------------------------  Gen: No weight changes, fatigue, fevers/chills, weakness  Skin: No rashes  Respiratory: No dyspnea, cough, wheezing, hemoptysis  CV: No chest pain, PND, orthopnea  GI: No abdominal pain, diarrhea, constipation, nausea, vomiting, melena, hematochezia  MSK: No joint pain/swelling; no back pain; no edema  Neuro: No dizziness/lightheadedness, weakness, seizures, numbness, tingling  Psych: No significant nervousness, anxiety, stress, depression    All other systems were reviewed and are negative, except as noted.    VITALS/PHYSICAL EXAM  --------------------------------------------------------------------------------  T(C): 36.7 (09-11-17 @ 06:41), Max: 37.4 (09-10-17 @ 14:50)  HR: 87 (09-11-17 @ 06:41) (71 - 95)  BP: 118/53 (09-11-17 @ 06:41) (109/56 - 138/75)  RR: 18 (09-11-17 @ 06:41) (16 - 18)  SpO2: 98% (09-11-17 @ 05:35) (98% - 98%)  Wt(kg): --        Physical Exam:  	Gen: NAD, well-appearing  	HEENT: PERRL, supple neck, clear oropharynx  	Pulm: CTA B/L  	CV: RRR, S1S2; no rub  	Abd: +BS, soft, nontender/nondistended  	: No suprapubic tenderness  	LE: Warm, FROM, no clubbing, intact strength; + edema  	Psych: Normal affect and mood  	Skin: Warm, without rashes  	Vascular access: R IJ shiley - exit site clean, good flows.     LABS/STUDIES  --------------------------------------------------------------------------------              6.6    15.56 >-----------<  234      [09-11-17 @ 06:30]              20.2     138  |  94  |  81  ----------------------------<  264      [09-11-17 @ 06:30]  3.8   |  24  |  6.19        Ca     8.1     [09-11-17 @ 06:30]      Mg     2.1     [09-11-17 @ 06:30]      Phos  5.0     [09-11-17 @ 06:30]    TPro  5.0  /  Alb  2.4  /  TBili  0.6  /  DBili  x   /  AST  241  /  ALT  162  /  AlkPhos  83  [09-11-17 @ 06:30]    PT/INR: PT 13.1 , INR 1.17       [09-10-17 @ 05:10]  PTT: 28.5       [09-11-17 @ 06:30]          [09-10-17 @ 17:30]    Iron 21, TIBC 187, %sat --      [09-09-17 @ 06:55]  Ferritin 587.5      [09-09-17 @ 06:55]  .9 (Ca --)      [09-10-17 @ 05:10]   --  HbA1c 8.0      [08-16-17 @ 06:30]  TSH 2.88      [09-03-17 @ 13:45]  Lipid: chol 137, TG 97, HDL 52, LDL 68      [08-11-17 @ 07:20]    HBsAb <3.0      [09-03-17 @ 18:00]  HBsAg NEGATIVE      [09-03-17 @ 18:00]  HCV 14.48, Reactive      [09-03-17 @ 18:00]

## 2017-09-11 NOTE — PROGRESS NOTE ADULT - SUBJECTIVE AND OBJECTIVE BOX
Patient is a 52y old  Male who presents with a chief complaint of Altered mental status (10 Sep 2017 23:23)        SUBJECTIVE / OVERNIGHT EVENTS:  Patient was receiving HD on interview. He states that he feels well overall. He has no questions or concerns. Denies CP, palps, diaphoresis, SOB, light headedness, dizziness, fevers, chills, n/v/d/c.      MEDICATIONS  (STANDING):  calcium acetate 667 milliGRAM(s) Oral three times a day with meals  predniSONE   Tablet 5 milliGRAM(s) Oral daily  tamsulosin 0.4 milliGRAM(s) Oral at bedtime  cycloSPORINE  (SandIMMUNE) 100 milliGRAM(s) Oral daily  atorvastatin 80 milliGRAM(s) Oral at bedtime  aspirin enteric coated 81 milliGRAM(s) Oral daily  insulin lispro (HumaLOG) corrective regimen sliding scale   SubCutaneous three times a day before meals  insulin lispro (HumaLOG) corrective regimen sliding scale   SubCutaneous at bedtime  diltiazem    milliGRAM(s) Oral daily  insulin lispro Injectable (HumaLOG) 5 Unit(s) SubCutaneous three times a day before meals  insulin glargine Injectable (LANTUS) 15 Unit(s) SubCutaneous at bedtime  senna 2 Tablet(s) Oral at bedtime  docusate sodium 100 milliGRAM(s) Oral three times a day  furosemide   Injectable 80 milliGRAM(s) IV Push two times a day  epoetin valente Injectable 7000 Unit(s) IV Push <User Schedule>    MEDICATIONS  (PRN):        CAPILLARY BLOOD GLUCOSE  213 (11 Sep 2017 12:46)  156 (11 Sep 2017 09:06)  273 (11 Sep 2017 05:35)  218 (10 Sep 2017 22:37)  243 (10 Sep 2017 17:06)        I&O's Summary    11 Sep 2017 07:01  -  11 Sep 2017 16:05  --------------------------------------------------------  IN: 700 mL / OUT: 3100 mL / NET: -2400 mL        PHYSICAL EXAM  VS: T 97.3, HR 96, /87, RR 18, SpO2 98%   GENERAL: NAD, well-developed  HEAD:  Atraumatic, Normocephalic  EYES: EOMI, PERRLA, conjunctiva and sclera clear. Legally blind but can see changes in light  NECK: Supple, No JVD  CHEST/LUNG: CTAB, No wheeze  HEART: Regular rate and rhythm; No murmurs, rubs, or gallops  ABDOMEN: Soft, Nontender, Nondistended; Bowel sounds present  EXTREMITIES: Feet warm, No clubbing, cyanosis. 3+ pitting edema to below knees  PSYCH: AAOx3  SKIN: No rashes or lesions      LABS:                        8.8    16.49 )-----------( 272      ( 11 Sep 2017 13:50 )             26.8     09-11    139  |  95<L>  |  49<H>  ----------------------------<  215<H>  4.2   |  23  |  4.12<H>    Ca    8.4      11 Sep 2017 13:50  Phos  5.0     09-11  Mg     2.1     09-11    TPro  5.0<L>  /  Alb  2.4<L>  /  TBili  0.6  /  DBili  x   /  AST  241<H>  /  ALT  162<H>  /  AlkPhos  83  09-11    PT/INR - ( 11 Sep 2017 13:50 )   PT: 13.2 SEC;   INR: 1.17          PTT - ( 11 Sep 2017 13:50 )  PTT:30.2 SEC  CARDIAC MARKERS ( 10 Sep 2017 17:30 )  x     / x     / 112 u/L / x     / x      CARDIAC MARKERS ( 10 Sep 2017 15:30 )  x     / x     / 117 u/L / x     / x              RADIOLOGY & ADDITIONAL TESTS:    No new additional studies.

## 2017-09-11 NOTE — PROGRESS NOTE ADULT - PROBLEM SELECTOR PLAN 6
Resolved. Presented to the ED hypotensive, LOC, hypothermia, bradycardic in setting of hyperkalemia and hyperphosphatemia. Had been covering with broad spectrum vancomycin by level and zosyn in the MICU, but infectious etiology neg to date. Presented altered, hypotensive, hypothermic, bradycardic. Deranged vital signs more likely attributable to resulting hyperK resulting in PEA arrest. s/p Vanc and zosyn. No signs of infection. New leukocytosis from prednisone. CXR congested

## 2017-09-11 NOTE — PROGRESS NOTE ADULT - PROBLEM SELECTOR PLAN 1
Increase Lantus to 19U HS and humalog to 6-6-6 before meals plus JULIA. Goal glucose 100-180 Increase Lantus to 18U HS and humalog to 6-6-6 before meals plus JULIA. Goal glucose 100-180

## 2017-09-11 NOTE — PROGRESS NOTE ADULT - ATTENDING COMMENTS
Pt was seen and examed at bed side with resident.  51 y/o M PMH CKD s/p renal transplant (1998) on cyclosporine and CellCept, HTN, DM, legally blind BIBEMS P/w uremic encephalopathy. In ED, Pt became PEA arrest in the ED, intubated for airway protection. ROSC achieved s/p 1 round of epi and chest compressions. He was admitteed in MICU and put on pressors. s/p emergent HD for hyperkalemia. s/p Covered empirically with broad spectrum antibiotics (vanc and zosyn). Found Hgb 5 s/p 1u pRBC given and epogen was started. Pt was able to wean off pressors and pt extubated 9/4.   Renal follow up appreciated, continue Cyclosporine and prednisone started, for perm cath today. on lasix with close monitoring renal function. Pt was also found to have new-onset Afib, on coumadin, currently on heparin gtt due to planned procedure. Cardiology and Endo following. HR controlled with Cardizem. Monitor FS on Lantus and Humalog.

## 2017-09-11 NOTE — PROGRESS NOTE ADULT - ASSESSMENT
53 y/o M with h/o CKD s/p renal transplant (1998) on cyclosporine and CellCept, HTN, DM, legally blind BIBEMS for uremic encephalopathy. Pt became bradycardic and had PEA arrest in the ED, intubated for airway protection. ROSC achieved s/p 1 round of epi and chest compressions and pt was transferred to MICU. Immunosuppressants for renal transplant were held. Pt had emergent HD for hyperkalemia. Covered empirically with broad spectrum antibiotics (vanc and zosyn), Hgb 5 s/p 1u pRBC given and epogen was started. Pressors were weaned off and pt extubated 9/4. Cyclosporine restarted and prednisone started. Pt remains stable and transferred to floor for further management 9/5, now found to have new-onset AF. 51 y/o M with h/o CKD s/p renal transplant (1998) on cyclosporine and CellCept, HTN, DM, legally blind BIBEMS for uremic encephalopathy. Pt became bradycardic and had PEA arrest in the ED, intubated for airway protection. ROSC achieved s/p 1 round of epi and chest compressions and pt was transferred to MICU. Immunosuppressants for renal transplant were held. Pt had emergent HD for hyperkalemia. Covered empirically with broad spectrum antibiotics (vanc and zosyn), Hgb 5 s/p 1u pRBC given and epogen was started. Pressors were weaned off and pt extubated 9/4. Cyclosporine restarted and prednisone started. Pt remains stable and transferred to floor for further management 9/5, now found to have new-onset AF.

## 2017-09-11 NOTE — PROGRESS NOTE ADULT - PROBLEM SELECTOR PLAN 7
Home regimen lantus 22u daily, home pravastatin 10mg daily. A1C 8 this admission  -atorvastatin 10mg daily  -Lantus 15u qhs, humalog 5u premeal, JULIA  -Endocrine consulted  -f/u endocrine recs

## 2017-09-11 NOTE — PROGRESS NOTE ADULT - PROBLEM SELECTOR PLAN 2
Hx of CKD. 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 Hold home torsemide 10mg daily  -c/w lasix 80mg IV BID  -f/u BUE vein mapping, whether fistula will be done inpt

## 2017-09-11 NOTE — PROGRESS NOTE ADULT - PROBLEM SELECTOR PLAN 10
Diabetic retinopathy: Legally blind, lives with sister  -keep room bright, can see shapes if bright light  BPH: cont home tamsulosin  DVT ppx: heparin bridge and warfarin for new onset AF  Diet: consistent carb no snack/renal diet

## 2017-09-11 NOTE — PROGRESS NOTE ADULT - SUBJECTIVE AND OBJECTIVE BOX
Chief Complaint: Evaluating this 52 yr M for uncontrolled Type 1 DM    History: Patient has been NPO awaiting permacath placement.    MEDICATIONS  (STANDING):  calcium acetate 667 milliGRAM(s) Oral three times a day with meals  predniSONE   Tablet 5 milliGRAM(s) Oral daily  tamsulosin 0.4 milliGRAM(s) Oral at bedtime  cycloSPORINE  (SandIMMUNE) 100 milliGRAM(s) Oral daily  atorvastatin 80 milliGRAM(s) Oral at bedtime  aspirin enteric coated 81 milliGRAM(s) Oral daily  insulin lispro (HumaLOG) corrective regimen sliding scale   SubCutaneous three times a day before meals  insulin lispro (HumaLOG) corrective regimen sliding scale   SubCutaneous at bedtime  diltiazem    milliGRAM(s) Oral daily  insulin lispro Injectable (HumaLOG) 5 Unit(s) SubCutaneous three times a day before meals  insulin glargine Injectable (LANTUS) 15 Unit(s) SubCutaneous at bedtime  senna 2 Tablet(s) Oral at bedtime  docusate sodium 100 milliGRAM(s) Oral three times a day  furosemide   Injectable 80 milliGRAM(s) IV Push two times a day  epoetin valente Injectable 7000 Unit(s) IV Push <User Schedule>    MEDICATIONS  (PRN):      Allergies    No Known Allergies    Intolerances      Review of Systems:  Constitutional: No fever  Neuro: No tremors  HEENT: No pain  Cardiovascular: No chest pain, palpitations              PHYSICAL EXAM:  VITALS: T(C): 36.3 (09-11-17 @ 15:10)  T(F): 97.3 (09-11-17 @ 15:10), Max: 98.4 (09-10-17 @ 22:19)  HR: 96 (09-11-17 @ 15:10) (71 - 98)  BP: 179/87 (09-11-17 @ 15:10) (109/56 - 179/87)  RR:  (16 - 18)  SpO2:  (97% - 98%)  Wt(kg): --  GENERAL: NAD, well-groomed, well-developed  HEENT:  Atraumatic, Normocephalic, moist mucous membranes  THYROID: Normal size, no palpable nodules  RESPIRATORY: Clear to auscultation bilaterally; No rales, rhonchi, wheezing, or rubs  CARDIOVASCULAR: Regular rate and rhythm; No murmurs; no peripheral edema  GI: Soft, nontender, non distended, normal bowel sounds  SKIN: Dry, intact, No rashes or lesions  PSYCH: Alert and oriented x 3, normal affect, normal mood      CAPILLARY BLOOD GLUCOSE  213 (09-11 @ 12:46)  156 (09-11 @ 09:06)  273 (09-11 @ 05:35)  218 (09-10 @ 22:37)  243 (09-10 @ 17:06)  297 (09-10 @ 12:56)  251 (09-10 @ 09:11)  292 (09-09 @ 22:56)  209 (09-09 @ 17:11)  240 (09-09 @ 13:03)  260 (09-08 @ 17:15)      09-11    139  |  95<L>  |  49<H>  ----------------------------<  215<H>  4.2   |  23  |  4.12<H>    EGFR if : 18  EGFR if non : 16    Ca    8.4      09-11  Mg     2.1     09-11  Phos  5.0     09-11    TPro  5.0<L>  /  Alb  2.4<L>  /  TBili  0.6  /  DBili  x   /  AST  241<H>  /  ALT  162<H>  /  AlkPhos  83  09-11          Thyroid Function Tests:  09-03 @ 13:45 TSH 2.88 FreeT4 0.70 T3 71.7 Anti TPO -- Anti Thyroglobulin Ab -- TSI --      Hemoglobin A1C, Whole Blood: 8.0 % <H> [4.0 - 5.6] (08-16-17 @ 06:30)

## 2017-09-11 NOTE — PROGRESS NOTE ADULT - PROBLEM SELECTOR PLAN 8
Presented with Hg 8.8 s/p 1u pRBC (9/4, 9/9, 9/11)  -H/H slowly uptrending  -c/w Epogen 6,000u with TTS HD sessions Presented with Hg 8.8 s/p 1u pRBC (9/4, 9/9, 9/11)  -H/H slowly up trending  -c/w Epogen 6,000u with TTS HD sessions

## 2017-09-11 NOTE — PROGRESS NOTE ADULT - PROBLEM SELECTOR PLAN 1
Stood up to go to the bathroom, weak and lightheaded, mild SOB. Denied CP, N/V. VS: SpO2 98% on RA, H 150, /72, AO x 3. EKG showed new onset AFib, new TWI lateral leads from prior 3/2017. Self converted NSR 9/8 for a day, now back in AF without RVR  -s/p digoxin load s/p cardizem drip at 15 mg/hr  -c/w cardizem 240mg oral daily  -goal INR 2-3, spot dose warfarin, c/w heparin bridge. D/c heparin when therapeutic and 48h on heparin drip  -held today's warfarin, INR needs to be <2 for IR permacath proced today

## 2017-09-12 LAB
ALBUMIN SERPL ELPH-MCNC: 2.4 G/DL — LOW (ref 3.3–5)
ALP SERPL-CCNC: 87 U/L — SIGNIFICANT CHANGE UP (ref 40–120)
ALT FLD-CCNC: 119 U/L — HIGH (ref 4–41)
ANISOCYTOSIS BLD QL: SLIGHT — SIGNIFICANT CHANGE UP
APTT BLD: 29.7 SEC — SIGNIFICANT CHANGE UP (ref 27.5–37.4)
APTT BLD: 32.8 SEC — SIGNIFICANT CHANGE UP (ref 27.5–37.4)
APTT BLD: 42.4 SEC — HIGH (ref 27.5–37.4)
AST SERPL-CCNC: 89 U/L — HIGH (ref 4–40)
BASOPHILS # BLD AUTO: 0.04 K/UL — SIGNIFICANT CHANGE UP (ref 0–0.2)
BASOPHILS NFR BLD AUTO: 0.3 % — SIGNIFICANT CHANGE UP (ref 0–2)
BASOPHILS NFR SPEC: 0 % — SIGNIFICANT CHANGE UP (ref 0–2)
BILIRUB SERPL-MCNC: 0.7 MG/DL — SIGNIFICANT CHANGE UP (ref 0.2–1.2)
BUN SERPL-MCNC: 58 MG/DL — HIGH (ref 7–23)
CALCIUM SERPL-MCNC: 7.8 MG/DL — LOW (ref 8.4–10.5)
CHLORIDE SERPL-SCNC: 92 MMOL/L — LOW (ref 98–107)
CO2 SERPL-SCNC: 20 MMOL/L — LOW (ref 22–31)
CREAT SERPL-MCNC: 4.78 MG/DL — HIGH (ref 0.5–1.3)
EOSINOPHIL # BLD AUTO: 0.22 K/UL — SIGNIFICANT CHANGE UP (ref 0–0.5)
EOSINOPHIL NFR BLD AUTO: 1.6 % — SIGNIFICANT CHANGE UP (ref 0–6)
EOSINOPHIL NFR FLD: 2.6 % — SIGNIFICANT CHANGE UP (ref 0–6)
GIANT PLATELETS BLD QL SMEAR: PRESENT — SIGNIFICANT CHANGE UP
GLUCOSE SERPL-MCNC: 294 MG/DL — HIGH (ref 70–99)
HCT VFR BLD CALC: 22.7 % — LOW (ref 39–50)
HGB BLD-MCNC: 7.4 G/DL — LOW (ref 13–17)
HYPOCHROMIA BLD QL: SLIGHT — SIGNIFICANT CHANGE UP
IMM GRANULOCYTES # BLD AUTO: 0.29 # — SIGNIFICANT CHANGE UP
IMM GRANULOCYTES NFR BLD AUTO: 2.1 % — HIGH (ref 0–1.5)
INR BLD: 1.17 — SIGNIFICANT CHANGE UP (ref 0.88–1.17)
INR BLD: 1.22 — HIGH (ref 0.88–1.17)
LYMPHOCYTES # BLD AUTO: 0.61 K/UL — LOW (ref 1–3.3)
LYMPHOCYTES # BLD AUTO: 4.3 % — LOW (ref 13–44)
LYMPHOCYTES NFR SPEC AUTO: 5.2 % — LOW (ref 13–44)
MAGNESIUM SERPL-MCNC: 2 MG/DL — SIGNIFICANT CHANGE UP (ref 1.6–2.6)
MCHC RBC-ENTMCNC: 29.5 PG — SIGNIFICANT CHANGE UP (ref 27–34)
MCHC RBC-ENTMCNC: 32.6 % — SIGNIFICANT CHANGE UP (ref 32–36)
MCV RBC AUTO: 90.4 FL — SIGNIFICANT CHANGE UP (ref 80–100)
METAMYELOCYTES # FLD: 1.7 % — HIGH (ref 0–1)
MICROCYTES BLD QL: SLIGHT — SIGNIFICANT CHANGE UP
MONOCYTES # BLD AUTO: 1.12 K/UL — HIGH (ref 0–0.9)
MONOCYTES NFR BLD AUTO: 7.9 % — SIGNIFICANT CHANGE UP (ref 2–14)
MONOCYTES NFR BLD: 5.2 % — SIGNIFICANT CHANGE UP (ref 2–9)
NEUTROPHIL AB SER-ACNC: 84.5 % — HIGH (ref 43–77)
NEUTROPHILS # BLD AUTO: 11.84 K/UL — HIGH (ref 1.8–7.4)
NEUTROPHILS NFR BLD AUTO: 83.8 % — HIGH (ref 43–77)
NRBC # FLD: 0.06 — SIGNIFICANT CHANGE UP
PHOSPHATE SERPL-MCNC: 4.4 MG/DL — SIGNIFICANT CHANGE UP (ref 2.5–4.5)
PLATELET # BLD AUTO: 295 K/UL — SIGNIFICANT CHANGE UP (ref 150–400)
PLATELET COUNT - ESTIMATE: NORMAL — SIGNIFICANT CHANGE UP
PMV BLD: 11.1 FL — SIGNIFICANT CHANGE UP (ref 7–13)
POLYCHROMASIA BLD QL SMEAR: SLIGHT — SIGNIFICANT CHANGE UP
POTASSIUM SERPL-MCNC: 4.2 MMOL/L — SIGNIFICANT CHANGE UP (ref 3.5–5.3)
POTASSIUM SERPL-SCNC: 4.2 MMOL/L — SIGNIFICANT CHANGE UP (ref 3.5–5.3)
PROT SERPL-MCNC: 4.9 G/DL — LOW (ref 6–8.3)
PROTHROM AB SERPL-ACNC: 13.2 SEC — HIGH (ref 9.8–13.1)
PROTHROM AB SERPL-ACNC: 13.7 SEC — HIGH (ref 9.8–13.1)
RBC # BLD: 2.51 M/UL — LOW (ref 4.2–5.8)
RBC # FLD: 14.1 % — SIGNIFICANT CHANGE UP (ref 10.3–14.5)
SODIUM SERPL-SCNC: 134 MMOL/L — LOW (ref 135–145)
VARIANT LYMPHS # BLD: 0.8 % — SIGNIFICANT CHANGE UP
WBC # BLD: 14.12 K/UL — HIGH (ref 3.8–10.5)
WBC # FLD AUTO: 14.12 K/UL — HIGH (ref 3.8–10.5)

## 2017-09-12 PROCEDURE — 99233 SBSQ HOSP IP/OBS HIGH 50: CPT

## 2017-09-12 PROCEDURE — 99232 SBSQ HOSP IP/OBS MODERATE 35: CPT | Mod: GC

## 2017-09-12 RX ORDER — WARFARIN SODIUM 2.5 MG/1
5 TABLET ORAL ONCE
Qty: 0 | Refills: 0 | Status: COMPLETED | OUTPATIENT
Start: 2017-09-12 | End: 2017-09-12

## 2017-09-12 RX ORDER — DILTIAZEM HCL 120 MG
300 CAPSULE, EXT RELEASE 24 HR ORAL DAILY
Qty: 0 | Refills: 0 | Status: DISCONTINUED | OUTPATIENT
Start: 2017-09-13 | End: 2017-10-02

## 2017-09-12 RX ORDER — INSULIN GLARGINE 100 [IU]/ML
22 INJECTION, SOLUTION SUBCUTANEOUS AT BEDTIME
Qty: 0 | Refills: 0 | Status: DISCONTINUED | OUTPATIENT
Start: 2017-09-12 | End: 2017-09-14

## 2017-09-12 RX ORDER — INSULIN LISPRO 100/ML
7 VIAL (ML) SUBCUTANEOUS
Qty: 0 | Refills: 0 | Status: DISCONTINUED | OUTPATIENT
Start: 2017-09-12 | End: 2017-09-16

## 2017-09-12 RX ADMIN — CYCLOSPORINE 100 MILLIGRAM(S): 100 CAPSULE ORAL at 11:57

## 2017-09-12 RX ADMIN — TAMSULOSIN HYDROCHLORIDE 0.4 MILLIGRAM(S): 0.4 CAPSULE ORAL at 22:48

## 2017-09-12 RX ADMIN — Medication 80 MILLIGRAM(S): at 11:56

## 2017-09-12 RX ADMIN — HEPARIN SODIUM 2000 UNIT(S)/HR: 5000 INJECTION INTRAVENOUS; SUBCUTANEOUS at 07:50

## 2017-09-12 RX ADMIN — Medication 100 MILLIGRAM(S): at 11:57

## 2017-09-12 RX ADMIN — Medication 6 UNIT(S): at 09:35

## 2017-09-12 RX ADMIN — Medication 7 UNIT(S): at 18:49

## 2017-09-12 RX ADMIN — ATORVASTATIN CALCIUM 80 MILLIGRAM(S): 80 TABLET, FILM COATED ORAL at 22:48

## 2017-09-12 RX ADMIN — HEPARIN SODIUM 5000 UNIT(S): 5000 INJECTION INTRAVENOUS; SUBCUTANEOUS at 07:55

## 2017-09-12 RX ADMIN — SENNA PLUS 2 TABLET(S): 8.6 TABLET ORAL at 22:48

## 2017-09-12 RX ADMIN — Medication 4: at 15:05

## 2017-09-12 RX ADMIN — Medication 5: at 18:48

## 2017-09-12 RX ADMIN — HEPARIN SODIUM 3500 UNIT(S): 5000 INJECTION INTRAVENOUS; SUBCUTANEOUS at 17:52

## 2017-09-12 RX ADMIN — Medication 4: at 09:40

## 2017-09-12 RX ADMIN — WARFARIN SODIUM 5 MILLIGRAM(S): 2.5 TABLET ORAL at 17:49

## 2017-09-12 RX ADMIN — Medication 80 MILLIGRAM(S): at 17:49

## 2017-09-12 RX ADMIN — Medication 667 MILLIGRAM(S): at 11:57

## 2017-09-12 RX ADMIN — Medication 667 MILLIGRAM(S): at 09:41

## 2017-09-12 RX ADMIN — HEPARIN SODIUM 7000 UNIT(S): 5000 INJECTION INTRAVENOUS; SUBCUTANEOUS at 00:10

## 2017-09-12 RX ADMIN — Medication 240 MILLIGRAM(S): at 05:20

## 2017-09-12 RX ADMIN — Medication 81 MILLIGRAM(S): at 11:57

## 2017-09-12 RX ADMIN — HEPARIN SODIUM 1600 UNIT(S)/HR: 5000 INJECTION INTRAVENOUS; SUBCUTANEOUS at 00:10

## 2017-09-12 RX ADMIN — Medication 5 MILLIGRAM(S): at 05:20

## 2017-09-12 RX ADMIN — Medication 6 UNIT(S): at 15:06

## 2017-09-12 RX ADMIN — HEPARIN SODIUM 2200 UNIT(S)/HR: 5000 INJECTION INTRAVENOUS; SUBCUTANEOUS at 17:50

## 2017-09-12 RX ADMIN — Medication 667 MILLIGRAM(S): at 17:49

## 2017-09-12 NOTE — PROGRESS NOTE ADULT - SUBJECTIVE AND OBJECTIVE BOX
Chief Complaint: T1DM    History: Patient had permacath placed yesterday. Continues to be hyperglycemic in 300s.    MEDICATIONS  (STANDING):  calcium acetate 667 milliGRAM(s) Oral three times a day with meals  predniSONE   Tablet 5 milliGRAM(s) Oral daily  tamsulosin 0.4 milliGRAM(s) Oral at bedtime  cycloSPORINE  (SandIMMUNE) 100 milliGRAM(s) Oral daily  atorvastatin 80 milliGRAM(s) Oral at bedtime  aspirin enteric coated 81 milliGRAM(s) Oral daily  insulin lispro (HumaLOG) corrective regimen sliding scale   SubCutaneous three times a day before meals  insulin lispro (HumaLOG) corrective regimen sliding scale   SubCutaneous at bedtime  senna 2 Tablet(s) Oral at bedtime  docusate sodium 100 milliGRAM(s) Oral three times a day  furosemide   Injectable 80 milliGRAM(s) IV Push two times a day  epoetin valente Injectable 7000 Unit(s) IV Push <User Schedule>  heparin  Infusion.  Unit(s)/Hr (16 mL/Hr) IV Continuous <Continuous>  insulin glargine Injectable (LANTUS) 18 Unit(s) SubCutaneous at bedtime  insulin lispro Injectable (HumaLOG) 6 Unit(s) SubCutaneous three times a day before meals  diltiazem    milliGRAM(s) Oral daily  warfarin 5 milliGRAM(s) Oral once    MEDICATIONS  (PRN):  heparin  Injectable 7000 Unit(s) IV Push every 6 hours PRN For aPTT less than 40  heparin  Injectable 3500 Unit(s) IV Push every 6 hours PRN For aPTT between 40 - 57      Allergies    No Known Allergies    Intolerances      Review of Systems:  Constitutional: No fever  Eyes: No blurry vision  Neuro: No tremors  HEENT: No pain  Cardiovascular: No chest pain, palpitations  Respiratory: No SOB, no cough  GI: No nausea, vomiting, abdominal pain          PHYSICAL EXAM:  VITALS: T(C): 36.8 (09-12-17 @ 12:30)  T(F): 98.2 (09-12-17 @ 12:30), Max: 98.6 (09-12-17 @ 05:00)  HR: 84 (09-12-17 @ 16:32) (80 - 102)  BP: 156/84 (09-12-17 @ 12:30) (143/77 - 156/84)  RR:  (18 - 18)  SpO2:  (95% - 99%)  Wt(kg): --  GENERAL: NAD, well-groomed, well-developed  EYES: No proptosis, no lid lag, anicteric  HEENT:  Atraumatic, Normocephalic, moist mucous membranes  THYROID: Normal size, no palpable nodules  RESPIRATORY: Clear to auscultation bilaterally; No rales, rhonchi, wheezing, or rubs  CARDIOVASCULAR: Regular rate and rhythm; No murmurs; no peripheral edema  GI: Soft, nontender, non distended, normal bowel sounds  SKIN: Dry, intact, No rashes or lesions  PSYCH: Alert and oriented x 3, normal affect, normal mood      CAPILLARY BLOOD GLUCOSE  324 (09-12 @ 15:04)  336 (09-12 @ 09:04)  257 (09-11 @ 21:15)  213 (09-11 @ 12:46)  156 (09-11 @ 09:06)  273 (09-11 @ 05:35)  218 (09-10 @ 22:37)  243 (09-10 @ 17:06)  297 (09-10 @ 12:56)  251 (09-10 @ 09:11)  292 (09-09 @ 22:56)  209 (09-09 @ 17:11)      09-12    134<L>  |  92<L>  |  58<H>  ----------------------------<  294<H>  4.2   |  20<L>  |  4.78<H>    EGFR if : 15  EGFR if non : 13    Ca    7.8<L>      09-12  Mg     2.0     09-12  Phos  4.4     09-12    TPro  4.9<L>  /  Alb  2.4<L>  /  TBili  0.7  /  DBili  x   /  AST  89<H>  /  ALT  119<H>  /  AlkPhos  87  09-12          Thyroid Function Tests:  09-03 @ 13:45 TSH 2.88 FreeT4 0.70 T3 71.7 Anti TPO -- Anti Thyroglobulin Ab -- TSI --      Hemoglobin A1C, Whole Blood: 8.0 % <H> [4.0 - 5.6] (08-16-17 @ 06:30)

## 2017-09-12 NOTE — PROGRESS NOTE ADULT - SUBJECTIVE AND OBJECTIVE BOX
Patient can follow up with Dr. Mac Pichardo (vascular surgery) for AVF.     [facility]  [address]  [address]   [place] [address]    Tel: [phone] <[phone]>   Fax: [phone]

## 2017-09-12 NOTE — PROGRESS NOTE ADULT - PROBLEM SELECTOR PLAN 2
Hx of CKD. 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 Hold home torsemide 10mg daily  -c/w lasix 80mg IV BID  -f/u BUE vein mapping, whether fistula will be done inpt Hx of CKD. 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 Hold home torsemide 10mg daily  -c/w lasix 80mg IV BID  -f/u BUE vein mapping, vascular consult appreciated, AVF will be done out patient.

## 2017-09-12 NOTE — PROGRESS NOTE ADULT - PROBLEM SELECTOR PLAN 1
Increase Lantus to 24U HS and humalog to 8-8-8 before meals plus Humalog low dose correctional scale. Goal glucose 100-180 Increase Lantus to 22U HS and humalog to 7/7/7 before meals plus Humalog low dose correctional scale. Goal glucose 100-180

## 2017-09-12 NOTE — PROGRESS NOTE ADULT - PROBLEM SELECTOR PLAN 8
Patient requiring multiple units of pRBCs and EPO  -Hgb 7.4 this AM, stable since last unit of pRBC given yesterday AM  -c/w Epogen 7,000u with TTS HD sessions  -transfuse if Hgb <7.0 Patient requiring multiple units of pRBCs and EPO  -Hgb stable  -c/w Epogen 7,000u with TTS HD sessions  -transfuse if Hgb <7.0

## 2017-09-12 NOTE — PROGRESS NOTE ADULT - SUBJECTIVE AND OBJECTIVE BOX
Patient had episode of coughing with nausea and vomiting around the same time he had a 2.6 second pause (in  AFib). Currently feeling ok. Denies chest pain, shortness of breath, palpitations or lightheadedness.     Vital Signs Last 24 Hrs  T(C): 36.8 (12 Sep 2017 12:30), Max: 37 (12 Sep 2017 05:00)  T(F): 98.2 (12 Sep 2017 12:30), Max: 98.6 (12 Sep 2017 05:00)  HR: 91 (12 Sep 2017 12:30) (80 - 102)  BP: 156/84 (12 Sep 2017 12:30) (143/77 - 179/87)  BP(mean): 93 (12 Sep 2017 05:00) (93 - 93)  RR: 18 (12 Sep 2017 05:00) (18 - 18)  SpO2: 98% (12 Sep 2017 12:30) (95% - 99%)      Telemetry: Atrial fibrillation with heart rate 90's.     MEDICATIONS  (STANDING):  calcium acetate 667 milliGRAM(s) Oral three times a day with meals  predniSONE   Tablet 5 milliGRAM(s) Oral daily  tamsulosin 0.4 milliGRAM(s) Oral at bedtime  cycloSPORINE  (SandIMMUNE) 100 milliGRAM(s) Oral daily  atorvastatin 80 milliGRAM(s) Oral at bedtime  aspirin enteric coated 81 milliGRAM(s) Oral daily  insulin lispro (HumaLOG) corrective regimen sliding scale   SubCutaneous three times a day before meals  insulin lispro (HumaLOG) corrective regimen sliding scale   SubCutaneous at bedtime  senna 2 Tablet(s) Oral at bedtime  docusate sodium 100 milliGRAM(s) Oral three times a day  furosemide   Injectable 80 milliGRAM(s) IV Push two times a day  epoetin valente Injectable 7000 Unit(s) IV Push <User Schedule>  heparin  Infusion.  Unit(s)/Hr (16 mL/Hr) IV Continuous <Continuous>  insulin glargine Injectable (LANTUS) 18 Unit(s) SubCutaneous at bedtime  insulin lispro Injectable (HumaLOG) 6 Unit(s) SubCutaneous three times a day before meals  diltiazem    milliGRAM(s) Oral daily  warfarin 5 milliGRAM(s) Oral once    MEDICATIONS  (PRN):  heparin  Injectable 7000 Unit(s) IV Push every 6 hours PRN For aPTT less than 40  heparin  Injectable 3500 Unit(s) IV Push every 6 hours PRN For aPTT between 40 - 57    Physical exam:   Gen- well developed. Well nourished. No acute distress.   Resp- CTA bilaterally  CV-  S1 and S2 irregular rate and rhythm  ABD- nontender +bowel sounds  EXT- bilateraly lower extremity pitting edema . Left leg>right.  Neuro +confused and sometimes has difficulty complete his train of thought                            7.4    14.12 )-----------( 295              22.7        PT: 13.7 SEC;   INR: 1.22     PTT:32.8 SEC  09-12    134<L>  |  92<L>  |  58<H>  ----------------------------<  294<H>  4.2   |  20<L>  |  4.78<H>    Ca    7.8<L>       Phos  4.4     09-12  Mg     2.0     09-12    TPro  4.9<L>  /  Alb  2.4<L>  /  TBili  0.7  /  DBili  x   /  AST  89<H>  /  ALT  119<H>  /  AlkPhos  87  09-12    CARDIAC MARKERS ( 10 Sep 2017 17:30 )  x     / x     / 112 u/L / x     / x      CARDIAC MARKERS ( 10 Sep 2017 15:30 )  x     / x     / 117 u/L / x     / x

## 2017-09-12 NOTE — PROGRESS NOTE ADULT - ATTENDING COMMENTS
Pt was seen and examed at bed side with resident.  53 y/o M PMH CKD s/p renal transplant (1998) on cyclosporine and CellCept, HTN, DM, legally blind BIBEMS P/w uremic encephalopathy. In ED, Pt became PEA arrest in the ED, intubated for airway protection. ROSC achieved s/p 1 round of epi and chest compressions. He was admitted in MICU and put on pressors. s/p emergent HD for hyperkalemia. s/p Covered empirically with broad spectrum antibiotics (vanc and zosyn). Found Hgb 5 s/p multiple pRBC given and epogen was started. Pt was able to wean off pressors and pt extubated 9/4.   Renal follow up appreciated, continue Cyclosporine and prednisone, s/p perm cath 9/12,  on lasix with close monitoring renal function. Pt was also found to have new-onset Afib, on coumadin, with bridging heparin gtt. Vasculare consult appreciated, AVF as out patient. Cardiology and Endo following. Incereased Cardizem for HR control. Monitor FS on Lantus and Humalog.

## 2017-09-12 NOTE — PROGRESS NOTE ADULT - PROBLEM SELECTOR PLAN 5
Home regimen clonidine 0.1mg po q8h, nifedipine XR 90 mg qD, torsemide 10mg qD. Makes urine still  -on cardizem oral  -c/w lasix 80 IV BID Home regimen clonidine 0.1mg po q8h, nifedipine XR 90 mg qD, Turosemide 10 mg daily.. Makes urine still  -on cardizem oral  -c/w lasix 80 IV BID

## 2017-09-12 NOTE — PROGRESS NOTE ADULT - PROBLEM SELECTOR PLAN 1
Stood up to go to the bathroom, weak and lightheaded, mild SOB. Denied CP, N/V. VS: SpO2 98% on RA, H 150, /72, AO x 3. EKG showed new onset AFib, new TWI lateral leads from prior 3/2017. Self converted NSR 9/8 for a day, now back in AF without RVR  -s/p digoxin load s/p cardizem drip at 15 mg/hr  -c/w cardizem 240mg oral daily  -goal INR 2-3, spot dose warfarin, c/w heparin bridge. D/c heparin when therapeutic and 48h on heparin drip  -held yesterday's warfarin for permacath proced   -restarted hep gtt this AM  -f/u coags this AM new onset AFib, new TWI lateral leads from prior 3/2017. Self converted NSR 9/8 for a day, now back in AF without RVR  -s/p digoxin load s/p cardizem drip at 15 mg/hr  -increase cardizem 300mg oral daily  -goal INR 2-3, dose warfarin, c/w heparin bridge. D/c heparin when therapeutic and 48h on heparin drip  -restarted hep gtt after perm cath insertion.  -f/u coags in AM

## 2017-09-12 NOTE — PHYSICAL THERAPY INITIAL EVALUATION ADULT - ADDITIONAL COMMENTS
Patient reports living in an apartment no stairs to climb with his sister. He reports he was using a walking stick to ambulate due to his visual status.

## 2017-09-12 NOTE — PROGRESS NOTE ADULT - PROBLEM SELECTOR PLAN 7
Home regimen lantus 22u daily, home pravastatin 10mg daily. A1C 8 this admission  -atorvastatin 10mg daily  -Lantus 15u qhs, humalog 5u premeal, JULIA  -Endocrine consulted  -f/u endocrine recs Home regimen lantus 22u daily, home pravastatin 10mg daily. A1C 8 this admission  -atorvastatin 10mg daily  -Lantus 18u qhs, humalog 6u premeal, JULIA  -Endocrine appreciated

## 2017-09-12 NOTE — PROGRESS NOTE ADULT - SUBJECTIVE AND OBJECTIVE BOX
Patient is a 52y old  Male who presents with a chief complaint of Altered mental status (10 Sep 2017 23:23)        SUBJECTIVE / OVERNIGHT EVENTS:  -permacath placed yesterday  -Tele: 2 episodes of rapid Afib up to 141 and 137 for only a few seconds each time; asymptomatic with stable vitals during these episodes  -hep gtt restarted      Patient states that he would like to be more mobile. He understands that he is a fall risk but would like to have more assistance or staff watching him while he sits up or ambulates so he can move around more. He denies any CP, SOB, palps, diaphoresis, abdominal pain, fevers, chills, n/v/d/c. Has not had a BM for 2 days. Refuses laxatives at this time because he said his BMs are already soft.      MEDICATIONS  (STANDING):  calcium acetate 667 milliGRAM(s) Oral three times a day with meals  predniSONE   Tablet 5 milliGRAM(s) Oral daily  tamsulosin 0.4 milliGRAM(s) Oral at bedtime  cycloSPORINE  (SandIMMUNE) 100 milliGRAM(s) Oral daily  atorvastatin 80 milliGRAM(s) Oral at bedtime  aspirin enteric coated 81 milliGRAM(s) Oral daily  insulin lispro (HumaLOG) corrective regimen sliding scale   SubCutaneous three times a day before meals  insulin lispro (HumaLOG) corrective regimen sliding scale   SubCutaneous at bedtime  diltiazem    milliGRAM(s) Oral daily  senna 2 Tablet(s) Oral at bedtime  docusate sodium 100 milliGRAM(s) Oral three times a day  furosemide   Injectable 80 milliGRAM(s) IV Push two times a day  epoetin valente Injectable 7000 Unit(s) IV Push <User Schedule>  heparin  Infusion.  Unit(s)/Hr (16 mL/Hr) IV Continuous <Continuous>  insulin glargine Injectable (LANTUS) 18 Unit(s) SubCutaneous at bedtime  insulin lispro Injectable (HumaLOG) 6 Unit(s) SubCutaneous three times a day before meals    MEDICATIONS  (PRN):  heparin  Injectable 7000 Unit(s) IV Push every 6 hours PRN For aPTT less than 40  heparin  Injectable 3500 Unit(s) IV Push every 6 hours PRN For aPTT between 40 - 57        CAPILLARY BLOOD GLUCOSE  257 (11 Sep 2017 21:15)  213 (11 Sep 2017 12:46)  156 (11 Sep 2017 09:06)        I&O's Summary    11 Sep 2017 07:01  -  12 Sep 2017 07:00  --------------------------------------------------------  IN: 700 mL / OUT: 3100 mL / NET: -2400 mL        PHYSICAL EXAM  VS: T 98.6, , /70, RR 18, SpO2 99% on 2L O2 via NC  GENERAL: NAD, well-developed  HEAD:  Atraumatic, Normocephalic  EYES: EOMI, PERRLA, conjunctiva and sclera clear. Legally blind but can see changes in light  NECK: Supple, No JVD  CHEST/LUNG: CTAB, No wheeze  HEART: Regular rate and rhythm; No murmurs, rubs, or gallops  ABDOMEN: Soft, Nontender, Nondistended; Bowel sounds present  EXTREMITIES: Feet warm, No clubbing, cyanosis. 3+ pitting edema to below knees  PSYCH: AAOx3  SKIN: No rashes or lesions    LABS:                        7.4    14.12 )-----------( 295      ( 12 Sep 2017 06:35 )             22.7     09-11    139  |  95<L>  |  49<H>  ----------------------------<  215<H>  4.2   |  23  |  4.12<H>    Ca    8.4      11 Sep 2017 13:50  Phos  5.0     09-11  Mg     2.1     09-11    TPro  5.0<L>  /  Alb  2.4<L>  /  TBili  0.6  /  DBili  x   /  AST  241<H>  /  ALT  162<H>  /  AlkPhos  83  09-11    PT/INR - ( 12 Sep 2017 06:35 )   PT: 13.7 SEC;   INR: 1.22          PTT - ( 12 Sep 2017 06:35 )  PTT:32.8 SEC  CARDIAC MARKERS ( 10 Sep 2017 17:30 )  x     / x     / 112 u/L / x     / x      CARDIAC MARKERS ( 10 Sep 2017 15:30 )  x     / x     / 117 u/L / x     / x              RADIOLOGY & ADDITIONAL TESTS:    No new additional studies.

## 2017-09-12 NOTE — PROGRESS NOTE ADULT - ASSESSMENT
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 + Fluid overload.  Now s/p multiple hemodialysis treatments  and 4 units blood.  Hospital course complicated by new onset atrial fibrillation with rapid ventricular response. Now transitioned to oral Cardizem for rate control and heparin gtt to Coumadin for anticoagulation. 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 Found to be fluid overloaded.  Now s/p multiple hemodialysis treatments  and 4 units blood.  Hospital course complicated by new onset atrial fibrillation with rapid ventricular response. Now transitioned to oral Cardizem for rate control and heparin gtt to Coumadin for anticoagulation.  Continue current  regimen. Can consider an antiarrythmic agent if he remains in atrial fibrillation when pericardial effusion resolves.

## 2017-09-13 LAB
ALBUMIN SERPL ELPH-MCNC: 2.5 G/DL — LOW (ref 3.3–5)
ALP SERPL-CCNC: 107 U/L — SIGNIFICANT CHANGE UP (ref 40–120)
ALT FLD-CCNC: 98 U/L — HIGH (ref 4–41)
APTT BLD: 61.2 SEC — HIGH (ref 27.5–37.4)
APTT BLD: 74.5 SEC — HIGH (ref 27.5–37.4)
APTT BLD: 91.8 SEC — HIGH (ref 27.5–37.4)
AST SERPL-CCNC: 67 U/L — HIGH (ref 4–40)
BASOPHILS # BLD AUTO: 0.05 K/UL — SIGNIFICANT CHANGE UP (ref 0–0.2)
BASOPHILS NFR BLD AUTO: 0.4 % — SIGNIFICANT CHANGE UP (ref 0–2)
BILIRUB SERPL-MCNC: 0.6 MG/DL — SIGNIFICANT CHANGE UP (ref 0.2–1.2)
BUN SERPL-MCNC: 77 MG/DL — HIGH (ref 7–23)
CALCIUM SERPL-MCNC: 8.1 MG/DL — LOW (ref 8.4–10.5)
CHLORIDE SERPL-SCNC: 95 MMOL/L — LOW (ref 98–107)
CO2 SERPL-SCNC: 26 MMOL/L — SIGNIFICANT CHANGE UP (ref 22–31)
CREAT SERPL-MCNC: 5.24 MG/DL — HIGH (ref 0.5–1.3)
EOSINOPHIL # BLD AUTO: 0.24 K/UL — SIGNIFICANT CHANGE UP (ref 0–0.5)
EOSINOPHIL NFR BLD AUTO: 1.7 % — SIGNIFICANT CHANGE UP (ref 0–6)
GLUCOSE SERPL-MCNC: 207 MG/DL — HIGH (ref 70–99)
HCT VFR BLD CALC: 24.3 % — LOW (ref 39–50)
HGB BLD-MCNC: 8 G/DL — LOW (ref 13–17)
IMM GRANULOCYTES # BLD AUTO: 0.22 # — SIGNIFICANT CHANGE UP
IMM GRANULOCYTES NFR BLD AUTO: 1.6 % — HIGH (ref 0–1.5)
INR BLD: 1.25 — HIGH (ref 0.88–1.17)
LYMPHOCYTES # BLD AUTO: 0.6 K/UL — LOW (ref 1–3.3)
LYMPHOCYTES # BLD AUTO: 4.3 % — LOW (ref 13–44)
MAGNESIUM SERPL-MCNC: 2.1 MG/DL — SIGNIFICANT CHANGE UP (ref 1.6–2.6)
MCHC RBC-ENTMCNC: 29.7 PG — SIGNIFICANT CHANGE UP (ref 27–34)
MCHC RBC-ENTMCNC: 32.9 % — SIGNIFICANT CHANGE UP (ref 32–36)
MCV RBC AUTO: 90.3 FL — SIGNIFICANT CHANGE UP (ref 80–100)
MONOCYTES # BLD AUTO: 1.14 K/UL — HIGH (ref 0–0.9)
MONOCYTES NFR BLD AUTO: 8.2 % — SIGNIFICANT CHANGE UP (ref 2–14)
NEUTROPHILS # BLD AUTO: 11.59 K/UL — HIGH (ref 1.8–7.4)
NEUTROPHILS NFR BLD AUTO: 83.8 % — HIGH (ref 43–77)
NRBC # FLD: 0 — SIGNIFICANT CHANGE UP
PHOSPHATE SERPL-MCNC: 4.3 MG/DL — SIGNIFICANT CHANGE UP (ref 2.5–4.5)
PLATELET # BLD AUTO: 318 K/UL — SIGNIFICANT CHANGE UP (ref 150–400)
PMV BLD: 10.4 FL — SIGNIFICANT CHANGE UP (ref 7–13)
POTASSIUM SERPL-MCNC: 3.8 MMOL/L — SIGNIFICANT CHANGE UP (ref 3.5–5.3)
POTASSIUM SERPL-SCNC: 3.8 MMOL/L — SIGNIFICANT CHANGE UP (ref 3.5–5.3)
PROT SERPL-MCNC: 5.1 G/DL — LOW (ref 6–8.3)
PROTHROM AB SERPL-ACNC: 14.1 SEC — HIGH (ref 9.8–13.1)
RBC # BLD: 2.69 M/UL — LOW (ref 4.2–5.8)
RBC # FLD: 14.2 % — SIGNIFICANT CHANGE UP (ref 10.3–14.5)
SODIUM SERPL-SCNC: 137 MMOL/L — SIGNIFICANT CHANGE UP (ref 135–145)
WBC # BLD: 13.84 K/UL — HIGH (ref 3.8–10.5)
WBC # FLD AUTO: 13.84 K/UL — HIGH (ref 3.8–10.5)

## 2017-09-13 PROCEDURE — 99232 SBSQ HOSP IP/OBS MODERATE 35: CPT | Mod: GC

## 2017-09-13 PROCEDURE — 99233 SBSQ HOSP IP/OBS HIGH 50: CPT | Mod: GC

## 2017-09-13 PROCEDURE — 90935 HEMODIALYSIS ONE EVALUATION: CPT | Mod: GC

## 2017-09-13 RX ORDER — METOPROLOL TARTRATE 50 MG
25 TABLET ORAL
Qty: 0 | Refills: 0 | Status: DISCONTINUED | OUTPATIENT
Start: 2017-09-13 | End: 2017-09-16

## 2017-09-13 RX ORDER — WARFARIN SODIUM 2.5 MG/1
5 TABLET ORAL ONCE
Qty: 0 | Refills: 0 | Status: COMPLETED | OUTPATIENT
Start: 2017-09-13 | End: 2017-09-13

## 2017-09-13 RX ADMIN — Medication 81 MILLIGRAM(S): at 15:02

## 2017-09-13 RX ADMIN — INSULIN GLARGINE 22 UNIT(S): 100 INJECTION, SOLUTION SUBCUTANEOUS at 00:01

## 2017-09-13 RX ADMIN — Medication 80 MILLIGRAM(S): at 05:58

## 2017-09-13 RX ADMIN — ERYTHROPOIETIN 7000 UNIT(S): 10000 INJECTION, SOLUTION INTRAVENOUS; SUBCUTANEOUS at 09:04

## 2017-09-13 RX ADMIN — Medication 2: at 09:43

## 2017-09-13 RX ADMIN — Medication 7 UNIT(S): at 09:43

## 2017-09-13 RX ADMIN — Medication 5 MILLIGRAM(S): at 05:59

## 2017-09-13 RX ADMIN — HEPARIN SODIUM 2200 UNIT(S)/HR: 5000 INJECTION INTRAVENOUS; SUBCUTANEOUS at 01:50

## 2017-09-13 RX ADMIN — Medication 100 MILLIGRAM(S): at 15:03

## 2017-09-13 RX ADMIN — ATORVASTATIN CALCIUM 80 MILLIGRAM(S): 80 TABLET, FILM COATED ORAL at 22:38

## 2017-09-13 RX ADMIN — Medication 300 MILLIGRAM(S): at 05:59

## 2017-09-13 RX ADMIN — Medication 7 UNIT(S): at 18:15

## 2017-09-13 RX ADMIN — CYCLOSPORINE 100 MILLIGRAM(S): 100 CAPSULE ORAL at 15:02

## 2017-09-13 RX ADMIN — Medication 7 UNIT(S): at 13:45

## 2017-09-13 RX ADMIN — WARFARIN SODIUM 5 MILLIGRAM(S): 2.5 TABLET ORAL at 17:12

## 2017-09-13 RX ADMIN — Medication 25 MILLIGRAM(S): at 17:12

## 2017-09-13 RX ADMIN — Medication 667 MILLIGRAM(S): at 17:12

## 2017-09-13 RX ADMIN — Medication 667 MILLIGRAM(S): at 11:11

## 2017-09-13 RX ADMIN — INSULIN GLARGINE 22 UNIT(S): 100 INJECTION, SOLUTION SUBCUTANEOUS at 22:38

## 2017-09-13 RX ADMIN — Medication 1: at 00:12

## 2017-09-13 RX ADMIN — Medication 80 MILLIGRAM(S): at 17:12

## 2017-09-13 RX ADMIN — HEPARIN SODIUM 2200 UNIT(S)/HR: 5000 INJECTION INTRAVENOUS; SUBCUTANEOUS at 09:04

## 2017-09-13 RX ADMIN — TAMSULOSIN HYDROCHLORIDE 0.4 MILLIGRAM(S): 0.4 CAPSULE ORAL at 22:38

## 2017-09-13 NOTE — PROGRESS NOTE ADULT - PROBLEM SELECTOR PLAN 9
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 LFTs wnl  -HCV VL 6.5 million  -hepatology consulted, will need f/u hepatology clinic for treatment  -f/u HIV, HAV Discovered had HCV infection in 1998 when had kidney transplantation, never treated. 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 LFTs wnl  -HCV VL 6.5 million  -hepatology consulted, will need f/u hepatology clinic for treatment  -HIV neg, HAV neg

## 2017-09-13 NOTE — PROGRESS NOTE ADULT - SUBJECTIVE AND OBJECTIVE BOX
Patient resting quietly. Offers no complaints. Heart rate trends 120-140's today but he remains asymptomatic. Denies chest pain, shortness of breath, palpitations or lightheadedness.     Vital Signs Last 24 Hrs  T(C): 36.8 (13 Sep 2017 05:55), Max: 36.8 (12 Sep 2017 12:30)  T(F): 98.2 (13 Sep 2017 05:55), Max: 98.2 (12 Sep 2017 12:30)  HR: 86 (13 Sep 2017 10:20) (76 - 95)  BP: 130/78 (13 Sep 2017 08:54) (124/78 - 156/84)  BP(mean): --  RR: 18 (13 Sep 2017 05:55) (18 - 18)  SpO2: 96% (13 Sep 2017 10:20) (95% - 99%)    Telemetry: Atrial fibrillation with ventricular rates to 140bpm. Longest pause 1.3 seconds.    MEDICATIONS  (STANDING):  calcium acetate 667 milliGRAM(s) Oral three times a day with meals  predniSONE   Tablet 5 milliGRAM(s) Oral daily  tamsulosin 0.4 milliGRAM(s) Oral at bedtime  cycloSPORINE  (SandIMMUNE) 100 milliGRAM(s) Oral daily  atorvastatin 80 milliGRAM(s) Oral at bedtime  aspirin enteric coated 81 milliGRAM(s) Oral daily  insulin lispro (HumaLOG) corrective regimen sliding scale   SubCutaneous three times a day before meals  insulin lispro (HumaLOG) corrective regimen sliding scale   SubCutaneous at bedtime  senna 2 Tablet(s) Oral at bedtime  docusate sodium 100 milliGRAM(s) Oral three times a day  furosemide   Injectable 80 milliGRAM(s) IV Push two times a day  epoetin valente Injectable 7000 Unit(s) IV Push <User Schedule>  heparin  Infusion.  Unit(s)/Hr (16 mL/Hr) IV Continuous <Continuous>  diltiazem    milliGRAM(s) Oral daily  insulin glargine Injectable (LANTUS) 22 Unit(s) SubCutaneous at bedtime  insulin lispro Injectable (HumaLOG) 7 Unit(s) SubCutaneous three times a day before meals    MEDICATIONS  (PRN):  heparin  Injectable 7000 Unit(s) IV Push every 6 hours PRN For aPTT less than 40  heparin  Injectable 3500 Unit(s) IV Push every 6 hours PRN For aPTT between 40 - 57          Physical exam:   Gen- patient NAD. Quiet but answers questions.  Resp- CYA bilaterally  CV- S1 and S2 tachy, irregular rate/rhythm  ABD- soft nontender +bowel sounds  EXT- 2+ lower extremity pitting edema. Left leg > right.      LABS:  09/13/2017                        8.0    13.84 )-----------( 318                 24.3        PT: 13.7 SEC;   INR: 1.22    PTT:61.2 SEC      137  |  95<L>  |  77<H>  ----------------------------<  207<H>  3.8   |  26  |  5.24<H>    Ca    8.1<L>        Phos  4.3       Mg     2.1         TPro  5.1<L>  /  Alb  2.5<L>  /  TBili  0.6  /  DBili  x   /  AST  67<H>  /  ALT  98<H>  /  AlkPhos  107

## 2017-09-13 NOTE — PROGRESS NOTE ADULT - PROBLEM SELECTOR PLAN 8
Patient requiring multiple units of pRBCs and EPO  -Hgb stable  -c/w Epogen 7,000u with TTS HD sessions  -transfuse if Hgb <7.0

## 2017-09-13 NOTE — PROGRESS NOTE ADULT - SUBJECTIVE AND OBJECTIVE BOX
Patient is a 52y old  Male who presents with a chief complaint of Altered mental status (10 Sep 2017 23:23)        SUBJECTIVE / OVERNIGHT EVENTS:  -Tele: brief episode of rapid Afib to 140s and a pause for 1.3 seconds; pt. asymptomatic and otherwise hemodynamically stable during these events    No acute events overnight. Patient feels well with no complaints this morning. Denies CP, SOB, palps, fevers, chills, n/v/d/c.      MEDICATIONS  (STANDING):  calcium acetate 667 milliGRAM(s) Oral three times a day with meals  predniSONE   Tablet 5 milliGRAM(s) Oral daily  tamsulosin 0.4 milliGRAM(s) Oral at bedtime  cycloSPORINE  (SandIMMUNE) 100 milliGRAM(s) Oral daily  atorvastatin 80 milliGRAM(s) Oral at bedtime  aspirin enteric coated 81 milliGRAM(s) Oral daily  insulin lispro (HumaLOG) corrective regimen sliding scale   SubCutaneous three times a day before meals  insulin lispro (HumaLOG) corrective regimen sliding scale   SubCutaneous at bedtime  senna 2 Tablet(s) Oral at bedtime  docusate sodium 100 milliGRAM(s) Oral three times a day  furosemide   Injectable 80 milliGRAM(s) IV Push two times a day  epoetin valente Injectable 7000 Unit(s) IV Push <User Schedule>  heparin  Infusion.  Unit(s)/Hr (16 mL/Hr) IV Continuous <Continuous>  diltiazem    milliGRAM(s) Oral daily  insulin glargine Injectable (LANTUS) 22 Unit(s) SubCutaneous at bedtime  insulin lispro Injectable (HumaLOG) 7 Unit(s) SubCutaneous three times a day before meals    MEDICATIONS  (PRN):  heparin  Injectable 7000 Unit(s) IV Push every 6 hours PRN For aPTT less than 40  heparin  Injectable 3500 Unit(s) IV Push every 6 hours PRN For aPTT between 40 - 57        CAPILLARY BLOOD GLUCOSE  262 (12 Sep 2017 22:57)  354 (12 Sep 2017 18:46)  324 (12 Sep 2017 15:04)  336 (12 Sep 2017 09:04)        I&O's Summary    11 Sep 2017 07:01  -  12 Sep 2017 07:00  --------------------------------------------------------  IN: 700 mL / OUT: 3100 mL / NET: -2400 mL    12 Sep 2017 07:01  -  13 Sep 2017 06:49  --------------------------------------------------------  IN: 0 mL / OUT: 300 mL / NET: -300 mL        PHYSICAL EXAM  VS: 98.2, HR 76, /78, RR 18, SpO2 99% on RA  GENERAL: NAD, well-developed  HEAD:  Atraumatic, Normocephalic  EYES: EOMI, PERRLA, conjunctiva and sclera clear. Legally blind but can see changes in light  NECK: Supple, No JVD  CHEST/LUNG: CTAB, No wheeze  HEART: Regular rate and rhythm; No murmurs, rubs, or gallops  ABDOMEN: Soft, Nontender, Nondistended; Bowel sounds present  EXTREMITIES: Feet warm, No clubbing, cyanosis. 1+ pitting edema to below knees  PSYCH: AAOx3  SKIN: No rashes or lesions    LABS:                        7.4    14.12 )-----------( 295      ( 12 Sep 2017 06:35 )             22.7     09-12    134<L>  |  92<L>  |  58<H>  ----------------------------<  294<H>  4.2   |  20<L>  |  4.78<H>    Ca    7.8<L>      12 Sep 2017 06:35  Phos  4.4     09-12  Mg     2.0     09-12    TPro  4.9<L>  /  Alb  2.4<L>  /  TBili  0.7  /  DBili  x   /  AST  89<H>  /  ALT  119<H>  /  AlkPhos  87  09-12    PT/INR - ( 12 Sep 2017 06:35 )   PT: 13.7 SEC;   INR: 1.22          PTT - ( 12 Sep 2017 23:30 )  PTT:74.5 SEC          RADIOLOGY & ADDITIONAL TESTS:    Imaging Personally Reviewed:  Consultant(s) Notes Reviewed:    Care Discussed with Consultants/Other Providers:

## 2017-09-13 NOTE — PROGRESS NOTE ADULT - PROBLEM SELECTOR PLAN 1
new onset AFib, new TWI lateral leads from prior 3/2017. Self converted NSR 9/8 for a day, now back in AF without RVR  -c/w cardizem 300mg oral daily  -goal INR 2-3, dose warfarin, c/w heparin bridge (restarted 9/11). D/c heparin when therapeutic and 48h on heparin drip  -f/u coags in AM new onset AFib, new TWI lateral leads from prior 3/2017. Self converted NSR 9/8 for a day, now back in AF without RVR  -c/w cardizem 300mg oral daily, EP follow up   -goal INR 2-3, dose warfarin, c/w heparin bridge (restarted 9/11). D/c heparin when therapeutic  -f/u coags in AM

## 2017-09-13 NOTE — PROGRESS NOTE ADULT - ASSESSMENT
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 Found to be fluid overloaded.  Now s/p multiple hemodialysis treatments.  Hospital course complicated by new onset atrial fibrillation with rapid ventricular response. Now transitioned to oral Cardizem for rate control and heparin to Coumadin for anticoagulation.  Would opt for starting low dose beta blocker  (metoprolol 25mg bid) in addition to Cardizem for better rate control. Can consider an antiarrhythmic if the AFib with RVR persists when the pericardial effusion resolved.

## 2017-09-13 NOTE — PROGRESS NOTE ADULT - PROBLEM SELECTOR PLAN 1
Continue Lantus 22U HS and humalog 7/7/7 before meals plus Humalog low dose correctional scale. Goal glucose 100-180

## 2017-09-13 NOTE — PROGRESS NOTE ADULT - SUBJECTIVE AND OBJECTIVE BOX
[facility] Division of Kidney Diseases & Hypertension  HEMODIALYSIS NOTE  --------------------------------------------------------------------------------  Chief Complaint: ESRD/Ongoing hemodialysis requirement    24 hour events/subjective:    52 M with failed transplant now ESRD on HD.  Seen and evaluated on HD doing well.  No complaints today.      PAST HISTORY  --------------------------------------------------------------------------------  No significant changes to PMH, PSH, FHx, SHx, unless otherwise noted    ALLERGIES & MEDICATIONS  --------------------------------------------------------------------------------  Allergies    No Known Allergies    Intolerances      Standing Inpatient Medications  calcium acetate 667 milliGRAM(s) Oral three times a day with meals  predniSONE   Tablet 5 milliGRAM(s) Oral daily  tamsulosin 0.4 milliGRAM(s) Oral at bedtime  cycloSPORINE  (SandIMMUNE) 100 milliGRAM(s) Oral daily  atorvastatin 80 milliGRAM(s) Oral at bedtime  aspirin enteric coated 81 milliGRAM(s) Oral daily  insulin lispro (HumaLOG) corrective regimen sliding scale   SubCutaneous three times a day before meals  insulin lispro (HumaLOG) corrective regimen sliding scale   SubCutaneous at bedtime  senna 2 Tablet(s) Oral at bedtime  docusate sodium 100 milliGRAM(s) Oral three times a day  furosemide   Injectable 80 milliGRAM(s) IV Push two times a day  epoetin valente Injectable 7000 Unit(s) IV Push <User Schedule>  heparin  Infusion.  Unit(s)/Hr IV Continuous <Continuous>  diltiazem    milliGRAM(s) Oral daily  insulin glargine Injectable (LANTUS) 22 Unit(s) SubCutaneous at bedtime  insulin lispro Injectable (HumaLOG) 7 Unit(s) SubCutaneous three times a day before meals    PRN Inpatient Medications  heparin  Injectable 7000 Unit(s) IV Push every 6 hours PRN  heparin  Injectable 3500 Unit(s) IV Push every 6 hours PRN    VITALS/PHYSICAL EXAM  --------------------------------------------------------------------------------  T(C): 36.7 (09-13-17 @ 11:10), Max: 36.8 (09-12-17 @ 12:30)  HR: 94 (09-13-17 @ 11:10) (76 - 95)  BP: 142/69 (09-13-17 @ 11:10) (124/78 - 156/84)  RR: 18 (09-13-17 @ 11:10) (18 - 18)  SpO2: 96% (09-13-17 @ 10:20) (95% - 99%)  Wt(kg): --        09-12-17 @ 07:01  -  09-13-17 @ 07:00  --------------------------------------------------------  IN: 0 mL / OUT: 300 mL / NET: -300 mL      Physical Exam:  	Gen: NAD, well-appearing  	Pulm: CTA B/L  	CV: RRR, S1S2; no rub  	Psych: Normal affect and mood  	Skin: Warm, without rashes + edema  	Vascular access: right tunneled HD catheter    LABS/STUDIES  --------------------------------------------------------------------------------              8.0    13.84 >-----------<  318      [09-13-17 @ 08:05]              24.3     137  |  95  |  77  ----------------------------<  207      [09-13-17 @ 08:05]  3.8   |  26  |  5.24        Ca     8.1     [09-13-17 @ 08:05]      Mg     2.1     [09-13-17 @ 08:05]      Phos  4.3     [09-13-17 @ 08:05]    TPro  5.1  /  Alb  2.5  /  TBili  0.6  /  DBili  x   /  AST  67  /  ALT  98  /  AlkPhos  107  [09-13-17 @ 08:05]    PT/INR: PT 13.7 , INR 1.22       [09-12-17 @ 06:35]  PTT: 61.2       [09-13-17 @ 08:05]      Iron 21, TIBC 187, %sat --      [09-09-17 @ 06:55]  Ferritin 587.5      [09-09-17 @ 06:55]  .9 (Ca --)      [09-10-17 @ 05:10]   --

## 2017-09-13 NOTE — PROGRESS NOTE ADULT - PROBLEM SELECTOR PLAN 2
Hx of CKD. 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 Hold home torsemide 10mg daily  -c/w lasix 80mg IV BID  -Vascular plan for AVF as outpatient

## 2017-09-13 NOTE — PROGRESS NOTE ADULT - ATTENDING COMMENTS
Pt was seen and examed at bed side with resident.  51 y/o M PMH CKD s/p renal transplant (1998) on cyclosporine and CellCept, HTN, DM, legally blind BIBEMS P/w uremic encephalopathy. In ED, Pt became PEA arrest in the ED, intubated for airway protection. ROSC achieved s/p 1 round of epi and chest compressions. He was admitted in MICU and put on pressors. s/p emergent HD for hyperkalemia. s/p Covered empirically with broad spectrum antibiotics (vanc and zosyn). Found Hgb 5 s/p multiple pRBC given and epogen was started. Pt was able to wean off pressors and pt extubated 9/4.   Renal follow up appreciated, continue Cyclosporine and prednisone, s/p perm cath 9/12,  on lasix with close monitoring renal function. Pt was also found to have new-onset Afib, on coumadin, with bridging heparin gtt. Vasculare consult appreciated, AVF as out patient. Cardiology and Endo following. Continue Cardizem, added metoprolol for better HR control. Monitor FS on Lantus and Humalog.    Discussed with SW for outpatient HD arrangement.

## 2017-09-13 NOTE — PROGRESS NOTE ADULT - PROBLEM SELECTOR PLAN 5
Home regimen clonidine 0.1mg po q8h, nifedipine XR 90 mg qD, Turosemide 10 mg daily.. Makes urine still  -on cardizem 300mg oral  -c/w lasix 80 IV BID Home regimen clonidine 0.1mg po q8h, nifedipine XR 90 mg qD, Turosemide 10 mg daily. Makes urine still  -on cardizem 300mg oral  -c/w lasix 80 IV BID

## 2017-09-13 NOTE — PROGRESS NOTE ADULT - PROBLEM SELECTOR PLAN 7
Home regimen lantus 22u daily, home pravastatin 10mg daily. A1C 8 this admission  -atorvastatin 80mg daily  -Lantus 18u qhs, humalog 6u premeal, JULIA  -Endocrine appreciated Home regimen lantus 22u daily, home pravastatin 10mg daily. A1C 8 this admission  -atorvastatin 80mg daily  -Lantus 22u qhs, humalog 7u premeal, JULIA  -Endocrine appreciated

## 2017-09-13 NOTE — PROGRESS NOTE ADULT - SUBJECTIVE AND OBJECTIVE BOX
Chief Complaint: T1DM    History: Patient is eating well. Scheduled for HD today.     MEDICATIONS  (STANDING):  calcium acetate 667 milliGRAM(s) Oral three times a day with meals  predniSONE   Tablet 5 milliGRAM(s) Oral daily  tamsulosin 0.4 milliGRAM(s) Oral at bedtime  cycloSPORINE  (SandIMMUNE) 100 milliGRAM(s) Oral daily  atorvastatin 80 milliGRAM(s) Oral at bedtime  aspirin enteric coated 81 milliGRAM(s) Oral daily  insulin lispro (HumaLOG) corrective regimen sliding scale   SubCutaneous three times a day before meals  insulin lispro (HumaLOG) corrective regimen sliding scale   SubCutaneous at bedtime  senna 2 Tablet(s) Oral at bedtime  docusate sodium 100 milliGRAM(s) Oral three times a day  furosemide   Injectable 80 milliGRAM(s) IV Push two times a day  epoetin valente Injectable 7000 Unit(s) IV Push <User Schedule>  heparin  Infusion.  Unit(s)/Hr (16 mL/Hr) IV Continuous <Continuous>  diltiazem    milliGRAM(s) Oral daily  insulin glargine Injectable (LANTUS) 22 Unit(s) SubCutaneous at bedtime  insulin lispro Injectable (HumaLOG) 7 Unit(s) SubCutaneous three times a day before meals  metoprolol 25 milliGRAM(s) Oral two times a day    MEDICATIONS  (PRN):  heparin  Injectable 7000 Unit(s) IV Push every 6 hours PRN For aPTT less than 40  heparin  Injectable 3500 Unit(s) IV Push every 6 hours PRN For aPTT between 40 - 57      Allergies    No Known Allergies    Intolerances      Review of Systems:  Constitutional: No fever  Eyes: No blurry vision  Neuro: No tremors  HEENT: No pain      UNABLE TO OBTAIN    PHYSICAL EXAM:  VITALS: T(C): 36.5 (09-13-17 @ 14:15)  T(F): 97.7 (09-13-17 @ 14:15), Max: 98.2 (09-13-17 @ 05:55)  HR: 93 (09-13-17 @ 17:07) (76 - 95)  BP: 132/87 (09-13-17 @ 17:07) (124/78 - 148/78)  RR:  (18 - 18)  SpO2:  (95% - 99%)  Wt(kg): --  GENERAL: NAD, well-groomed, well-developed  EYES: No proptosis, no lid lag, anicteric  HEENT:  Atraumatic, Normocephalic, moist mucous membranes  RESPIRATORY: Clear to auscultation bilaterally; No rales, rhonchi, wheezing, or rubs  CARDIOVASCULAR: Regular rate and rhythm; No murmurs; no peripheral edema  GI: Soft, nontender, non distended, normal bowel sounds  PSYCH: Alert and oriented x 3, normal affect, normal mood      CAPILLARY BLOOD GLUCOSE  123 (09-13 @ 17:15)  208 (09-13 @ 08:54)  262 (09-12 @ 22:57)  354 (09-12 @ 18:46)  324 (09-12 @ 15:04)  336 (09-12 @ 09:04)  257 (09-11 @ 21:15)  213 (09-11 @ 12:46)  156 (09-11 @ 09:06)  273 (09-11 @ 05:35)  218 (09-10 @ 22:37)      09-13    137  |  95<L>  |  77<H>  ----------------------------<  207<H>  3.8   |  26  |  5.24<H>    EGFR if : 13  EGFR if non : 12    Ca    8.1<L>      09-13  Mg     2.1     09-13  Phos  4.3     09-13    TPro  5.1<L>  /  Alb  2.5<L>  /  TBili  0.6  /  DBili  x   /  AST  67<H>  /  ALT  98<H>  /  AlkPhos  107  09-13          Thyroid Function Tests:  09-03 @ 13:45 TSH 2.88 FreeT4 0.70 T3 71.7 Anti TPO -- Anti Thyroglobulin Ab -- TSI --      Hemoglobin A1C, Whole Blood: 8.0 % <H> [4.0 - 5.6] (08-16-17 @ 06:30)

## 2017-09-14 LAB
ALBUMIN SERPL ELPH-MCNC: 2.6 G/DL — LOW (ref 3.3–5)
ALP SERPL-CCNC: 114 U/L — SIGNIFICANT CHANGE UP (ref 40–120)
ALT FLD-CCNC: 87 U/L — HIGH (ref 4–41)
APTT BLD: > 200 SEC — CRITICAL HIGH (ref 27.5–37.4)
APTT BLD: > 200 SEC — CRITICAL HIGH (ref 27.5–37.4)
AST SERPL-CCNC: 78 U/L — HIGH (ref 4–40)
BASOPHILS # BLD AUTO: 0.03 K/UL — SIGNIFICANT CHANGE UP (ref 0–0.2)
BASOPHILS NFR BLD AUTO: 0.3 % — SIGNIFICANT CHANGE UP (ref 0–2)
BILIRUB SERPL-MCNC: 0.6 MG/DL — SIGNIFICANT CHANGE UP (ref 0.2–1.2)
BUN SERPL-MCNC: 55 MG/DL — HIGH (ref 7–23)
CALCIUM SERPL-MCNC: 8.1 MG/DL — LOW (ref 8.4–10.5)
CHLORIDE SERPL-SCNC: 98 MMOL/L — SIGNIFICANT CHANGE UP (ref 98–107)
CO2 SERPL-SCNC: 25 MMOL/L — SIGNIFICANT CHANGE UP (ref 22–31)
CREAT SERPL-MCNC: 4.06 MG/DL — HIGH (ref 0.5–1.3)
EOSINOPHIL # BLD AUTO: 0.1 K/UL — SIGNIFICANT CHANGE UP (ref 0–0.5)
EOSINOPHIL NFR BLD AUTO: 1 % — SIGNIFICANT CHANGE UP (ref 0–6)
GLUCOSE SERPL-MCNC: 51 MG/DL — LOW (ref 70–99)
HCT VFR BLD CALC: 24.4 % — LOW (ref 39–50)
HGB BLD-MCNC: 7.8 G/DL — LOW (ref 13–17)
IMM GRANULOCYTES # BLD AUTO: 0.09 # — SIGNIFICANT CHANGE UP
IMM GRANULOCYTES NFR BLD AUTO: 0.9 % — SIGNIFICANT CHANGE UP (ref 0–1.5)
INR BLD: 1.54 — HIGH (ref 0.88–1.17)
LYMPHOCYTES # BLD AUTO: 0.6 K/UL — LOW (ref 1–3.3)
LYMPHOCYTES # BLD AUTO: 6.1 % — LOW (ref 13–44)
MAGNESIUM SERPL-MCNC: 2 MG/DL — SIGNIFICANT CHANGE UP (ref 1.6–2.6)
MCHC RBC-ENTMCNC: 29.2 PG — SIGNIFICANT CHANGE UP (ref 27–34)
MCHC RBC-ENTMCNC: 32 % — SIGNIFICANT CHANGE UP (ref 32–36)
MCV RBC AUTO: 91.4 FL — SIGNIFICANT CHANGE UP (ref 80–100)
MONOCYTES # BLD AUTO: 1.02 K/UL — HIGH (ref 0–0.9)
MONOCYTES NFR BLD AUTO: 10.5 % — SIGNIFICANT CHANGE UP (ref 2–14)
NEUTROPHILS # BLD AUTO: 7.92 K/UL — HIGH (ref 1.8–7.4)
NEUTROPHILS NFR BLD AUTO: 81.2 % — HIGH (ref 43–77)
NRBC # FLD: 0 — SIGNIFICANT CHANGE UP
PHOSPHATE SERPL-MCNC: 3.4 MG/DL — SIGNIFICANT CHANGE UP (ref 2.5–4.5)
PLATELET # BLD AUTO: 290 K/UL — SIGNIFICANT CHANGE UP (ref 150–400)
PMV BLD: 10.6 FL — SIGNIFICANT CHANGE UP (ref 7–13)
POTASSIUM SERPL-MCNC: 3.4 MMOL/L — LOW (ref 3.5–5.3)
POTASSIUM SERPL-SCNC: 3.4 MMOL/L — LOW (ref 3.5–5.3)
PROT SERPL-MCNC: 5.1 G/DL — LOW (ref 6–8.3)
PROTHROM AB SERPL-ACNC: 17.4 SEC — HIGH (ref 9.8–13.1)
RBC # BLD: 2.67 M/UL — LOW (ref 4.2–5.8)
RBC # FLD: 14.4 % — SIGNIFICANT CHANGE UP (ref 10.3–14.5)
SODIUM SERPL-SCNC: 139 MMOL/L — SIGNIFICANT CHANGE UP (ref 135–145)
WBC # BLD: 9.76 K/UL — SIGNIFICANT CHANGE UP (ref 3.8–10.5)
WBC # FLD AUTO: 9.76 K/UL — SIGNIFICANT CHANGE UP (ref 3.8–10.5)

## 2017-09-14 PROCEDURE — 99232 SBSQ HOSP IP/OBS MODERATE 35: CPT

## 2017-09-14 PROCEDURE — 99233 SBSQ HOSP IP/OBS HIGH 50: CPT | Mod: GC

## 2017-09-14 RX ORDER — HUMAN INSULIN 100 [IU]/ML
7 INJECTION, SUSPENSION SUBCUTANEOUS ONCE
Qty: 0 | Refills: 0 | Status: COMPLETED | OUTPATIENT
Start: 2017-09-14 | End: 2017-09-14

## 2017-09-14 RX ORDER — HEPARIN SODIUM 5000 [USP'U]/ML
INJECTION INTRAVENOUS; SUBCUTANEOUS
Qty: 25000 | Refills: 0 | Status: DISCONTINUED | OUTPATIENT
Start: 2017-09-14 | End: 2017-09-17

## 2017-09-14 RX ORDER — INSULIN GLARGINE 100 [IU]/ML
22 INJECTION, SOLUTION SUBCUTANEOUS
Qty: 0 | Refills: 0 | Status: DISCONTINUED | OUTPATIENT
Start: 2017-09-15 | End: 2017-09-16

## 2017-09-14 RX ORDER — POTASSIUM CHLORIDE 20 MEQ
40 PACKET (EA) ORAL ONCE
Qty: 0 | Refills: 0 | Status: COMPLETED | OUTPATIENT
Start: 2017-09-14 | End: 2017-09-14

## 2017-09-14 RX ORDER — HEPARIN SODIUM 5000 [USP'U]/ML
7000 INJECTION INTRAVENOUS; SUBCUTANEOUS ONCE
Qty: 0 | Refills: 0 | Status: DISCONTINUED | OUTPATIENT
Start: 2017-09-14 | End: 2017-09-18

## 2017-09-14 RX ORDER — HEPARIN SODIUM 5000 [USP'U]/ML
7000 INJECTION INTRAVENOUS; SUBCUTANEOUS EVERY 6 HOURS
Qty: 0 | Refills: 0 | Status: DISCONTINUED | OUTPATIENT
Start: 2017-09-14 | End: 2017-09-18

## 2017-09-14 RX ORDER — HEPARIN SODIUM 5000 [USP'U]/ML
3500 INJECTION INTRAVENOUS; SUBCUTANEOUS EVERY 6 HOURS
Qty: 0 | Refills: 0 | Status: DISCONTINUED | OUTPATIENT
Start: 2017-09-14 | End: 2017-09-18

## 2017-09-14 RX ADMIN — Medication 7 UNIT(S): at 12:57

## 2017-09-14 RX ADMIN — CYCLOSPORINE 100 MILLIGRAM(S): 100 CAPSULE ORAL at 13:51

## 2017-09-14 RX ADMIN — ATORVASTATIN CALCIUM 80 MILLIGRAM(S): 80 TABLET, FILM COATED ORAL at 22:36

## 2017-09-14 RX ADMIN — Medication 1: at 22:44

## 2017-09-14 RX ADMIN — Medication 40 MILLIEQUIVALENT(S): at 10:39

## 2017-09-14 RX ADMIN — Medication 1: at 12:57

## 2017-09-14 RX ADMIN — Medication 80 MILLIGRAM(S): at 05:37

## 2017-09-14 RX ADMIN — Medication 25 MILLIGRAM(S): at 18:45

## 2017-09-14 RX ADMIN — HEPARIN SODIUM 0 UNIT(S)/HR: 5000 INJECTION INTRAVENOUS; SUBCUTANEOUS at 09:32

## 2017-09-14 RX ADMIN — Medication 5 MILLIGRAM(S): at 05:38

## 2017-09-14 RX ADMIN — Medication 667 MILLIGRAM(S): at 18:45

## 2017-09-14 RX ADMIN — Medication 25 MILLIGRAM(S): at 05:37

## 2017-09-14 RX ADMIN — HEPARIN SODIUM 1600 UNIT(S)/HR: 5000 INJECTION INTRAVENOUS; SUBCUTANEOUS at 19:06

## 2017-09-14 RX ADMIN — Medication 667 MILLIGRAM(S): at 12:59

## 2017-09-14 RX ADMIN — HUMAN INSULIN 7 UNIT(S): 100 INJECTION, SUSPENSION SUBCUTANEOUS at 22:35

## 2017-09-14 RX ADMIN — Medication 80 MILLIGRAM(S): at 18:45

## 2017-09-14 RX ADMIN — Medication 81 MILLIGRAM(S): at 13:51

## 2017-09-14 RX ADMIN — Medication 7 UNIT(S): at 17:43

## 2017-09-14 RX ADMIN — Medication 667 MILLIGRAM(S): at 09:37

## 2017-09-14 RX ADMIN — TAMSULOSIN HYDROCHLORIDE 0.4 MILLIGRAM(S): 0.4 CAPSULE ORAL at 22:36

## 2017-09-14 RX ADMIN — HEPARIN SODIUM 1900 UNIT(S)/HR: 5000 INJECTION INTRAVENOUS; SUBCUTANEOUS at 10:38

## 2017-09-14 RX ADMIN — Medication 300 MILLIGRAM(S): at 05:38

## 2017-09-14 RX ADMIN — Medication 2: at 17:38

## 2017-09-14 NOTE — PROGRESS NOTE ADULT - ASSESSMENT
53 y/o M with h/o CKD s/p renal transplant (1998) on cyclosporine and CellCept, HTN, DM, legally blind BIBEMS for uremic encephalopathy. Pt became bradycardic and had PEA arrest in the ED, intubated for airway protection. ROSC achieved s/p 1 round of epi and chest compressions and pt was transferred to MICU. Immunosuppressants for renal transplant were held. Pt had emergent HD for hyperkalemia. Covered empirically with broad spectrum antibiotics (vanc and zosyn), Hgb 5 s/p 1u pRBC given and epogen was started. Pressors were weaned off and pt extubated 9/4. Cyclosporine restarted and prednisone started. Pt remains stable and transferred to floor for further management 9/5, now found to have new-onset AF. Also s/p permacath w/ plan for AVF.

## 2017-09-14 NOTE — PROGRESS NOTE ADULT - ASSESSMENT
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  pt currently on the floor s/p permcath on hd now with new onset afib on heparin with bridge to coumadin    -continue with left upper extremity precautions  -pt will need full medicine and Cardiac clearance prior to surgery given his recent pea arrest and new onset afib  -avf can also be arranged to be completed as outpt

## 2017-09-14 NOTE — PROGRESS NOTE ADULT - SUBJECTIVE AND OBJECTIVE BOX
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  pt now on hd via right ij permcath.  pt now being treated for new onset afib.    pt at this time without any complaints.  pt is right hand dominant no history of ppm or surgeries of the right upper extremity      Vital Signs Last 24 Hrs  T(C): 36.6 (14 Sep 2017 14:38), Max: 37.1 (13 Sep 2017 20:10)  T(F): 97.9 (14 Sep 2017 14:38), Max: 98.8 (13 Sep 2017 20:10)  HR: 91 (14 Sep 2017 15:52) (71 - 98)  BP: 143/95 (14 Sep 2017 14:38) (132/84 - 145/87)  BP(mean): --  RR: 18 (14 Sep 2017 14:38) (18 - 18)  SpO2: 97% (14 Sep 2017 15:52) (95% - 100%)    PHYSICAL EXAM:  GEN: nad, lying in bed comfortably  NECK: right ij permcath in place, no jvd noted  PULSE EXAM:                                              LEFT                        RIGHT  Brachial:                 [2+ ]                              [ ]   Radial:                   [2+ ]                              [ ]     Hand: warm,  strength 5/5, pink arm precaution band in place lue  Neuro: sensation intact b/l upper extremities  Motor Upper: muscle strength 5/5 b/l upper extremities    LABS:                        7.8    9.76  )-----------( 290      ( 14 Sep 2017 07:55 )             24.4     09-14    139  |  98  |  55<H>  ----------------------------<  51<L>  3.4<L>   |  25  |  4.06<H>    Ca    8.1<L>      14 Sep 2017 07:55  Phos  3.4     09-14  Mg     2.0     09-14    TPro  5.1<L>  /  Alb  2.6<L>  /  TBili  0.6  /  DBili  x   /  AST  78<H>  /  ALT  87<H>  /  AlkPhos  114  09-14    PT/INR - ( 14 Sep 2017 07:55 )   PT: 17.4 SEC;   INR: 1.54          PTT - ( 14 Sep 2017 07:55 )  PTT:> 200.0 SEC    MEDICATIONS:  aspirin enteric coated 81 milliGRAM(s) Oral daily  heparin  Infusion.  Unit(s)/Hr IV Continuous <Continuous>  heparin  Injectable 7000 Unit(s) IV Push every 6 hours PRN  heparin  Injectable 3500 Unit(s) IV Push every 6 hours PRN      RADIOLOGY & ADDITIONAL TESTS:  vein mapping:    Left Findings: Left Cephalic Vein  Diameter (cm)  Proximal upper arm              0.51  Mid upper arm                           0.37  Distal upper arm                       0.55  Antecubital                            0.60  Proximal forearm                       0.30  Mid forearm                            0.34  Distal forearm                         0.38    Left Basilic Vein  [place]                             0.53  Mid upper arm                        0.43  Distal upper arm                     0.37  Left brachial artery: Normal velocities with triphasic  waveforms.  Left radial artery: Normal velocities with triphasic  waveforms.  Left ulnar artery: Normal velocities with triphasic  waveforms.

## 2017-09-14 NOTE — CHART NOTE - NSCHARTNOTEFT_GEN_A_CORE
Source: Patient [x ]    Family [x ]     other [] Sister  Diet : Regular, Consistent Carbohydrate, Renal  Patient reports [ ] nausea  [ ] vomiting [ ] diarrhea [ ] constipation  [ ]chewing problems [ ] swallowing issues  [ ] other:   PO intake:  < 50% [ ] 50-75% [ ]   % [x ]  other :  Source for PO intake [ x] Patient [x ] family [ ] chart [ ] staff [ ] other  Current Weight: 171.9 pounds   Nutrition follow-up for education.  No GI distress (nausea/vomiting/diarrhea/constipation.) No difficulties chewing and swallowing foods and fluids. Pt and sister was provided with extensive review of Renal diet principles including; phosphorus, potassium, and sodium content of food and recommended intake as well as recommended protein intake and High Biological Value Protein sources (i.e. chicken, turkey, beef, fish, eggs, etc.). Pt was also given written education materials.  Of note, Pt blind and sister helps with the preparation of meals and shopping.  Materials also email to Pt as he has assistive reading devices.  Pt and family made aware that RDN remains available.       Pertinent Medications:   MEDICATIONS  (STANDING):  calcium acetate 667 milliGRAM(s) Oral three times a day with meals  predniSONE   Tablet 5 milliGRAM(s) Oral daily  tamsulosin 0.4 milliGRAM(s) Oral at bedtime  cycloSPORINE  (SandIMMUNE) 100 milliGRAM(s) Oral daily  atorvastatin 80 milliGRAM(s) Oral at bedtime  aspirin enteric coated 81 milliGRAM(s) Oral daily  insulin lispro (HumaLOG) corrective regimen sliding scale   SubCutaneous three times a day before meals  insulin lispro (HumaLOG) corrective regimen sliding scale   SubCutaneous at bedtime  senna 2 Tablet(s) Oral at bedtime  docusate sodium 100 milliGRAM(s) Oral three times a day  furosemide   Injectable 80 milliGRAM(s) IV Push two times a day  epoetin valente Injectable 7000 Unit(s) IV Push <User Schedule>  heparin  Infusion.  Unit(s)/Hr (16 mL/Hr) IV Continuous <Continuous>  diltiazem    milliGRAM(s) Oral daily  insulin glargine Injectable (LANTUS) 22 Unit(s) SubCutaneous at bedtime  insulin lispro Injectable (HumaLOG) 7 Unit(s) SubCutaneous three times a day before meals  metoprolol 25 milliGRAM(s) Oral two times a day    Pertinent Labs:    09-14 Na139 mmol/L Glu 51 mg/dL<L> K+ 3.4 mmol/L<L> Cr  4.06 mg/dL<H> BUN 55 mg/dL<H> Phos 3.4 mg/dL Alb 2.6 g/dL<L> PAB n/a       Estimated Needs:   [x ] no change since previous assessment  [ ] recalculated:     Nutrition Diagnosis is [x] ongoing  [ ] resolved [ ] not applicable   New Nutrition Diagnosis: [x ] not applicable    Nutrition Recomendations:  1- Continue current diet which remains appropriate at this time  2- Monitor weights, labs, PO intake, tolerance to current diet  3- RDN remains available, reconsult as needed (Kirt Erickson RDN Pager [phone])

## 2017-09-14 NOTE — PROGRESS NOTE ADULT - SUBJECTIVE AND OBJECTIVE BOX
Chief Complaint: DM1    History: S/p hypoglycemia this AM. Patient believes this is secondary to receiving Lantus at night and would prefer to have Lantus moved to 9 am which is when he usually takes it at home.    MEDICATIONS  (STANDING):  calcium acetate 667 milliGRAM(s) Oral three times a day with meals  predniSONE   Tablet 5 milliGRAM(s) Oral daily  tamsulosin 0.4 milliGRAM(s) Oral at bedtime  cycloSPORINE  (SandIMMUNE) 100 milliGRAM(s) Oral daily  atorvastatin 80 milliGRAM(s) Oral at bedtime  aspirin enteric coated 81 milliGRAM(s) Oral daily  insulin lispro (HumaLOG) corrective regimen sliding scale   SubCutaneous three times a day before meals  insulin lispro (HumaLOG) corrective regimen sliding scale   SubCutaneous at bedtime  senna 2 Tablet(s) Oral at bedtime  docusate sodium 100 milliGRAM(s) Oral three times a day  furosemide   Injectable 80 milliGRAM(s) IV Push two times a day  epoetin valente Injectable 7000 Unit(s) IV Push <User Schedule>  heparin  Infusion.  Unit(s)/Hr (16 mL/Hr) IV Continuous <Continuous>  diltiazem    milliGRAM(s) Oral daily  insulin glargine Injectable (LANTUS) 22 Unit(s) SubCutaneous at bedtime  insulin lispro Injectable (HumaLOG) 7 Unit(s) SubCutaneous three times a day before meals  metoprolol 25 milliGRAM(s) Oral two times a day    MEDICATIONS  (PRN):  heparin  Injectable 7000 Unit(s) IV Push every 6 hours PRN For aPTT less than 40  heparin  Injectable 3500 Unit(s) IV Push every 6 hours PRN For aPTT between 40 - 57      Allergies    No Known Allergies    Intolerances      Review of Systems:  ALL OTHER SYSTEMS REVIEWED AND NEGATIVE      PHYSICAL EXAM:  VITALS: T(C): 36.6 (09-14-17 @ 14:38)  T(F): 97.9 (09-14-17 @ 14:38), Max: 98.8 (09-13-17 @ 20:10)  HR: 91 (09-14-17 @ 15:52) (71 - 98)  BP: 143/95 (09-14-17 @ 14:38) (132/84 - 145/87)  RR:  (18 - 18)  SpO2:  (95% - 100%)  Wt(kg): --  GENERAL: NAD, well-groomed, well-developed  EYES: blind  HEENT:  Atraumatic, Normocephalic, moist mucous membranes  SKIN: Dry, intact, No rashes or lesions  PSYCH: Alert and oriented x 3, normal affect, normal mood    CAPILLARY BLOOD GLUCOSE  207 (09-14 @ 17:02)  159 (09-14 @ 12:42)  91 (09-14 @ 09:51)  66 (09-14 @ 09:10)  150 (09-13 @ 22:29)  123 (09-13 @ 17:15)  208 (09-13 @ 08:54)  262 (09-12 @ 22:57)  354 (09-12 @ 18:46)  324 (09-12 @ 15:04)  336 (09-12 @ 09:04)  257 (09-11 @ 21:15)      09-14    139  |  98  |  55<H>  ----------------------------<  51<L>  3.4<L>   |  25  |  4.06<H>    EGFR if : 18  EGFR if non : 16    Ca    8.1<L>      09-14  Mg     2.0     09-14  Phos  3.4     09-14    TPro  5.1<L>  /  Alb  2.6<L>  /  TBili  0.6  /  DBili  x   /  AST  78<H>  /  ALT  87<H>  /  AlkPhos  114  09-14          Thyroid Function Tests:  09-03 @ 13:45 TSH 2.88 FreeT4 0.70 T3 71.7 Anti TPO -- Anti Thyroglobulin Ab -- TSI --      Hemoglobin A1C, Whole Blood: 8.0 % <H> [4.0 - 5.6] (08-16-17 @ 06:30)

## 2017-09-14 NOTE — PROGRESS NOTE ADULT - PROBLEM SELECTOR PLAN 9
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 LFTs wnl  -HCV VL 6.5 million  -hepatology consulted, will need f/u hepatology clinic for treatment  -HIV neg, HAV neg

## 2017-09-14 NOTE — PROGRESS NOTE ADULT - PROBLEM SELECTOR PLAN 2
Hx of CKD. 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 Hold home torsemide 10mg daily  -c/w lasix 80mg IV BID  -permacath in place; Vascular plan for AVF as outpatient  -social work involved and working on setting up outpatient dialysis Hx of CKD. 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 Hold home torsemide 10mg daily  -c/w lasix 80mg IV BID  -permacath in place; Vascular plan for AVF possible in patient as Pt/family requested, and HD center requirement.   -social work involved and working on setting up outpatient dialysis

## 2017-09-14 NOTE — PROGRESS NOTE ADULT - SUBJECTIVE AND OBJECTIVE BOX
Patient is a 52y old  Male who presents with a chief complaint of Altered mental status (10 Sep 2017 23:23)        SUBJECTIVE / OVERNIGHT EVENTS:  -Tele: Afib max range 110-120s    No acute events overnight. Patient feels well this morning with no complaints. Denies CP, palps, diaphoresis, SOB, abdominal pain, fevers, chills, n/v/d/c.      MEDICATIONS  (STANDING):  calcium acetate 667 milliGRAM(s) Oral three times a day with meals  predniSONE   Tablet 5 milliGRAM(s) Oral daily  tamsulosin 0.4 milliGRAM(s) Oral at bedtime  cycloSPORINE  (SandIMMUNE) 100 milliGRAM(s) Oral daily  atorvastatin 80 milliGRAM(s) Oral at bedtime  aspirin enteric coated 81 milliGRAM(s) Oral daily  insulin lispro (HumaLOG) corrective regimen sliding scale   SubCutaneous three times a day before meals  insulin lispro (HumaLOG) corrective regimen sliding scale   SubCutaneous at bedtime  senna 2 Tablet(s) Oral at bedtime  docusate sodium 100 milliGRAM(s) Oral three times a day  furosemide   Injectable 80 milliGRAM(s) IV Push two times a day  epoetin valente Injectable 7000 Unit(s) IV Push <User Schedule>  heparin  Infusion.  Unit(s)/Hr (16 mL/Hr) IV Continuous <Continuous>  diltiazem    milliGRAM(s) Oral daily  insulin glargine Injectable (LANTUS) 22 Unit(s) SubCutaneous at bedtime  insulin lispro Injectable (HumaLOG) 7 Unit(s) SubCutaneous three times a day before meals  metoprolol 25 milliGRAM(s) Oral two times a day    MEDICATIONS  (PRN):  heparin  Injectable 7000 Unit(s) IV Push every 6 hours PRN For aPTT less than 40  heparin  Injectable 3500 Unit(s) IV Push every 6 hours PRN For aPTT between 40 - 57        CAPILLARY BLOOD GLUCOSE  150 (13 Sep 2017 22:29)  123 (13 Sep 2017 17:15)  208 (13 Sep 2017 08:54)        I&O's Summary    13 Sep 2017 07:01  -  14 Sep 2017 07:00  --------------------------------------------------------  IN: 500 mL / OUT: 3000 mL / NET: -2500 mL        PHYSICAL EXAM  VS: T 98.8, HR 89, /87, RR 18, SpO2 99% on RA  GENERAL: NAD, well-developed  HEAD:  Atraumatic, Normocephalic  EYES: EOMI, PERRLA, conjunctiva and sclera clear. Legally blind but can see changes in light  NECK: Supple, No JVD  CHEST/LUNG: CTAB, No wheeze  HEART: Regular rate and rhythm; No murmurs, rubs, or gallops  ABDOMEN: Soft, Nontender, Nondistended; Bowel sounds present  EXTREMITIES: Feet warm, No clubbing, cyanosis. 1+ pitting edema midway up the calves  PSYCH: AAOx3  SKIN: No rashes or lesions    LABS:                        8.0    13.84 )-----------( 318      ( 13 Sep 2017 08:05 )             24.3     09-13    137  |  95<L>  |  77<H>  ----------------------------<  207<H>  3.8   |  26  |  5.24<H>    Ca    8.1<L>      13 Sep 2017 08:05  Phos  4.3     09-13  Mg     2.1     09-13    TPro  5.1<L>  /  Alb  2.5<L>  /  TBili  0.6  /  DBili  x   /  AST  67<H>  /  ALT  98<H>  /  AlkPhos  107  09-13    PT/INR - ( 13 Sep 2017 12:00 )   PT: 14.1 SEC;   INR: 1.25          PTT - ( 13 Sep 2017 12:00 )  PTT:91.8 SEC          RADIOLOGY & ADDITIONAL TESTS:    No new additional studies.

## 2017-09-14 NOTE — PROGRESS NOTE ADULT - PROBLEM SELECTOR PLAN 7
Home regimen lantus 22u daily, home pravastatin 10mg daily. A1C 8 this admission  -atorvastatin 80mg daily  -Lantus 22u qhs, humalog 7u premeal, JULIA  -Endocrine appreciated Home regimen lantus 22u daily, home pravastatin 10mg daily. A1C 8 this admission  -atorvastatin 80mg daily  -Lantus 22u qhs, humalog 7u premeal, JULIA  -Endocrine follow up. evening snack encourage to prevent AM hypoglycemia.

## 2017-09-14 NOTE — PROGRESS NOTE ADULT - PROBLEM SELECTOR PLAN 1
Patient believes hypoglycemic event this AM is related to him receiving Lantus at night. He prefers to receive Lantus at 9AM daily.  Will give one time dose of NPH 7 units at 9pm tonight.  Then resume Lantus 22u daily at 9AM tomorrow.  Continue Humalog 7/7/7 and low correction scales.

## 2017-09-14 NOTE — PROGRESS NOTE ADULT - ATTENDING COMMENTS
Pt was seen and examed at bed side with resident.  51 y/o M PMH CKD s/p renal transplant (1998) on cyclosporine and CellCept, HTN, DM, legally blind BIBEMS P/w uremic encephalopathy. In ED, Pt became PEA arrest in the ED, intubated for airway protection. ROSC achieved s/p 1 round of epi and chest compressions. He was admitted in MICU and put on pressors. s/p emergent HD for hyperkalemia. s/p Covered empirically with broad spectrum antibiotics (vanc and zosyn). Found Hgb 5 s/p multiple pRBC given and epogen was started. Pt was able to wean off pressors and pt extubated 9/4.   Renal follow up appreciated, continue Cyclosporine and prednisone, s/p perm cath 9/12,  on lasix with close monitoring renal function. Pt was also found to have new-onset Afib, on coumadin, with bridging heparin gtt. Vasculare consult appreciated, may need AVF as in-patient as HD center requested. Cardiology and Endo following. Continue Cardizem and metoprolol for better HR control. Monitor FS on Lantus and Humalog, encourage evening snack.  Discussed with SW for outpatient HD arrangement.    explained in details with Pt and sister at bedside in details above plan, and discussed with vascular. If plan for AVF, need cardiology clearance, and medical clearance.

## 2017-09-14 NOTE — PROGRESS NOTE ADULT - SUBJECTIVE AND OBJECTIVE BOX
S/24 Events: Patient seen and examined. Denies CP, SOB, dizziness, LH, near syncope or sycope.   No acute events overnight.   Telemetry : - SF 80's - 90's.   O:  T(C): 36.6 (17 @ 14:38), Max: 37.1 (17 @ 20:10)  HR: 71 (17 @ 14:38) (71 - 98)  BP: 143/95 (17 @ 14:38) (132/84 - 145/87)  RR: 18 (17 @ 14:38) (18 - 18)  SpO2: 99% (17 @ 14:38) (95% - 100%)  Wt(kg): --  Daily     Daily Weight in k (14 Sep 2017 05:35)  Gen: NAD  HEENT: EOMI  CV: RRR, normal S1 + S2, no m/r/g  Lungs: CTAB  Abd: soft, non-tender  Ext: + edema bl/ LE    Labs:                        7.8    9.76  )-----------( 290      ( 14 Sep 2017 07:55 )             24.4         139  |  98  |  55<H>  ----------------------------<  51<L>  3.4<L>   |  25  |  4.06<H>    Ca    8.1<L>      14 Sep 2017 07:55  Phos  3.4       Mg     2.0         TPro  5.1<L>  /  Alb  2.6<L>  /  TBili  0.6  /  DBili  x   /  AST  78<H>  /  ALT  87<H>  /  AlkPhos  114      PT/INR - ( 14 Sep 2017 07:55 )   PT: 17.4 SEC;   INR: 1.54          PTT - ( 14 Sep 2017 07:55 )  PTT:> 200.0 SEC    Meds:  MEDICATIONS  (STANDING):  calcium acetate 667 milliGRAM(s) Oral three times a day with meals  predniSONE   Tablet 5 milliGRAM(s) Oral daily  tamsulosin 0.4 milliGRAM(s) Oral at bedtime  cycloSPORINE  (SandIMMUNE) 100 milliGRAM(s) Oral daily  atorvastatin 80 milliGRAM(s) Oral at bedtime  aspirin enteric coated 81 milliGRAM(s) Oral daily  insulin lispro (HumaLOG) corrective regimen sliding scale   SubCutaneous three times a day before meals  insulin lispro (HumaLOG) corrective regimen sliding scale   SubCutaneous at bedtime  senna 2 Tablet(s) Oral at bedtime  docusate sodium 100 milliGRAM(s) Oral three times a day  furosemide   Injectable 80 milliGRAM(s) IV Push two times a day  epoetin valente Injectable 7000 Unit(s) IV Push <User Schedule>  heparin  Infusion.  Unit(s)/Hr (16 mL/Hr) IV Continuous <Continuous>  diltiazem    milliGRAM(s) Oral daily  insulin glargine Injectable (LANTUS) 22 Unit(s) SubCutaneous at bedtime  insulin lispro Injectable (HumaLOG) 7 Unit(s) SubCutaneous three times a day before meals  metoprolol 25 milliGRAM(s) Oral two times a day      A/P: Mr. 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 Found to be fluid overloaded.  Now s/p multiple hemodialysis treatments.  Hospital course complicated by new onset atrial fibrillation with rapid ventricular response. Now transitioned to oral Cardizem for rate control and heparin to Coumadin for anticoagulation.  1) Continue low dose beta blocker  (metoprolol 25mg bid) and Cardizem for better rate control.   2) Can consider rhythm control strategy +/- antiarrhythmic if pericardial effusion resolves.     Continue AV lizet blockers   3)  Anticoagulate with warfarin and maintain INR 2-3. INCREASE COUMADIN DOSE TO 7 mg tonight please.       On heparin gtt with  ( maintain PTT 60-80 )  4) Sequential teds to both legs while in bed please.   5) Eventual AV fistula on left side as per patient and sister.

## 2017-09-14 NOTE — PROGRESS NOTE ADULT - PROBLEM SELECTOR PLAN 5
Home regimen clonidine 0.1mg po q8h, nifedipine XR 90 mg qD, Turosemide 10 mg daily. Makes urine still  -on cardizem 300mg oral  -c/w lasix 80 IV BID Home regimen clonidine 0.1mg po q8h, nifedipine XR 90 mg qD, Turosemide 10 mg daily. Makes urine still  -on cardizem 300mg oral, metoprolol 25 mg bid  -c/w lasix 80 IV BID

## 2017-09-14 NOTE — PROGRESS NOTE ADULT - PROBLEM SELECTOR PLAN 1
new onset AFib, new TWI lateral leads from prior 3/2017. Self converted NSR 9/8 for a day, now back in AF without RVR  -c/w cardizem 300mg oral daily, EP follow up   -goal INR 2-3, dose warfarin, c/w heparin bridge (restarted 9/11). D/c heparin when therapeutic  -f/u coags in AM  -started lopressor 25mg BID yesterday new onset AFib, new TWI lateral leads from prior 3/2017. Self converted NSR 9/8 for a day, now back in AF without RVR  -c/w cardizem 300mg oral daily, started lopressor 25mg BID yesterday, HR improved, EP follow up appreciated.   -goal INR 2-3, dose warfarin, c/w heparin bridge (restarted 9/11). D/c heparin when therapeutic, will hold coumadin if AVF plan as in patient.   -f/u coags in AM

## 2017-09-15 LAB
ALBUMIN SERPL ELPH-MCNC: 2.5 G/DL — LOW (ref 3.3–5)
ALP SERPL-CCNC: 153 U/L — HIGH (ref 40–120)
ALT FLD-CCNC: 79 U/L — HIGH (ref 4–41)
APTT BLD: 154.9 SEC — CRITICAL HIGH (ref 27.5–37.4)
APTT BLD: 28.9 SEC — SIGNIFICANT CHANGE UP (ref 27.5–37.4)
APTT BLD: 39.7 SEC — HIGH (ref 27.5–37.4)
AST SERPL-CCNC: 67 U/L — HIGH (ref 4–40)
BACTERIA BLD CULT: SIGNIFICANT CHANGE UP
BASOPHILS # BLD AUTO: 0.03 K/UL — SIGNIFICANT CHANGE UP (ref 0–0.2)
BASOPHILS NFR BLD AUTO: 0.4 % — SIGNIFICANT CHANGE UP (ref 0–2)
BILIRUB SERPL-MCNC: 0.5 MG/DL — SIGNIFICANT CHANGE UP (ref 0.2–1.2)
BUN SERPL-MCNC: 43 MG/DL — HIGH (ref 7–23)
BUN SERPL-MCNC: 65 MG/DL — HIGH (ref 7–23)
CALCIUM SERPL-MCNC: 8 MG/DL — LOW (ref 8.4–10.5)
CALCIUM SERPL-MCNC: 8.4 MG/DL — SIGNIFICANT CHANGE UP (ref 8.4–10.5)
CHLORIDE SERPL-SCNC: 92 MMOL/L — LOW (ref 98–107)
CHLORIDE SERPL-SCNC: 99 MMOL/L — SIGNIFICANT CHANGE UP (ref 98–107)
CO2 SERPL-SCNC: 24 MMOL/L — SIGNIFICANT CHANGE UP (ref 22–31)
CO2 SERPL-SCNC: 24 MMOL/L — SIGNIFICANT CHANGE UP (ref 22–31)
CREAT SERPL-MCNC: 3.12 MG/DL — HIGH (ref 0.5–1.3)
CREAT SERPL-MCNC: 4.16 MG/DL — HIGH (ref 0.5–1.3)
EOSINOPHIL # BLD AUTO: 0.05 K/UL — SIGNIFICANT CHANGE UP (ref 0–0.5)
EOSINOPHIL NFR BLD AUTO: 0.7 % — SIGNIFICANT CHANGE UP (ref 0–6)
GLUCOSE SERPL-MCNC: 293 MG/DL — HIGH (ref 70–99)
GLUCOSE SERPL-MCNC: 480 MG/DL — CRITICAL HIGH (ref 70–99)
HCT VFR BLD CALC: 23.3 % — LOW (ref 39–50)
HCT VFR BLD CALC: 23.6 % — LOW (ref 39–50)
HGB BLD-MCNC: 7.3 G/DL — LOW (ref 13–17)
HGB BLD-MCNC: 7.8 G/DL — LOW (ref 13–17)
IMM GRANULOCYTES # BLD AUTO: 0.07 # — SIGNIFICANT CHANGE UP
IMM GRANULOCYTES NFR BLD AUTO: 1 % — SIGNIFICANT CHANGE UP (ref 0–1.5)
INR BLD: 1.39 — HIGH (ref 0.88–1.17)
LYMPHOCYTES # BLD AUTO: 0.51 K/UL — LOW (ref 1–3.3)
LYMPHOCYTES # BLD AUTO: 6.9 % — LOW (ref 13–44)
MAGNESIUM SERPL-MCNC: 1.7 MG/DL — SIGNIFICANT CHANGE UP (ref 1.6–2.6)
MAGNESIUM SERPL-MCNC: 1.8 MG/DL — SIGNIFICANT CHANGE UP (ref 1.6–2.6)
MCHC RBC-ENTMCNC: 29 PG — SIGNIFICANT CHANGE UP (ref 27–34)
MCHC RBC-ENTMCNC: 30.2 PG — SIGNIFICANT CHANGE UP (ref 27–34)
MCHC RBC-ENTMCNC: 31.3 % — LOW (ref 32–36)
MCHC RBC-ENTMCNC: 33.1 % — SIGNIFICANT CHANGE UP (ref 32–36)
MCV RBC AUTO: 91.5 FL — SIGNIFICANT CHANGE UP (ref 80–100)
MCV RBC AUTO: 92.5 FL — SIGNIFICANT CHANGE UP (ref 80–100)
MONOCYTES # BLD AUTO: 0.79 K/UL — SIGNIFICANT CHANGE UP (ref 0–0.9)
MONOCYTES NFR BLD AUTO: 10.7 % — SIGNIFICANT CHANGE UP (ref 2–14)
NEUTROPHILS # BLD AUTO: 5.91 K/UL — SIGNIFICANT CHANGE UP (ref 1.8–7.4)
NEUTROPHILS NFR BLD AUTO: 80.3 % — HIGH (ref 43–77)
NRBC # FLD: 0 — SIGNIFICANT CHANGE UP
NRBC # FLD: 0 — SIGNIFICANT CHANGE UP
PHOSPHATE SERPL-MCNC: 2.9 MG/DL — SIGNIFICANT CHANGE UP (ref 2.5–4.5)
PHOSPHATE SERPL-MCNC: 3.4 MG/DL — SIGNIFICANT CHANGE UP (ref 2.5–4.5)
PLATELET # BLD AUTO: 295 K/UL — SIGNIFICANT CHANGE UP (ref 150–400)
PLATELET # BLD AUTO: 300 K/UL — SIGNIFICANT CHANGE UP (ref 150–400)
PMV BLD: 10.4 FL — SIGNIFICANT CHANGE UP (ref 7–13)
PMV BLD: 12.2 FL — SIGNIFICANT CHANGE UP (ref 7–13)
POTASSIUM SERPL-MCNC: 4.1 MMOL/L — SIGNIFICANT CHANGE UP (ref 3.5–5.3)
POTASSIUM SERPL-MCNC: 4.1 MMOL/L — SIGNIFICANT CHANGE UP (ref 3.5–5.3)
POTASSIUM SERPL-SCNC: 4.1 MMOL/L — SIGNIFICANT CHANGE UP (ref 3.5–5.3)
POTASSIUM SERPL-SCNC: 4.1 MMOL/L — SIGNIFICANT CHANGE UP (ref 3.5–5.3)
PROT SERPL-MCNC: 5 G/DL — LOW (ref 6–8.3)
PROTHROM AB SERPL-ACNC: 15.7 SEC — HIGH (ref 9.8–13.1)
RBC # BLD: 2.52 M/UL — LOW (ref 4.2–5.8)
RBC # BLD: 2.58 M/UL — LOW (ref 4.2–5.8)
RBC # FLD: 14.2 % — SIGNIFICANT CHANGE UP (ref 10.3–14.5)
RBC # FLD: 14.3 % — SIGNIFICANT CHANGE UP (ref 10.3–14.5)
SODIUM SERPL-SCNC: 134 MMOL/L — LOW (ref 135–145)
SODIUM SERPL-SCNC: 138 MMOL/L — SIGNIFICANT CHANGE UP (ref 135–145)
WBC # BLD: 7.36 K/UL — SIGNIFICANT CHANGE UP (ref 3.8–10.5)
WBC # BLD: 9.4 K/UL — SIGNIFICANT CHANGE UP (ref 3.8–10.5)
WBC # FLD AUTO: 7.36 K/UL — SIGNIFICANT CHANGE UP (ref 3.8–10.5)
WBC # FLD AUTO: 9.4 K/UL — SIGNIFICANT CHANGE UP (ref 3.8–10.5)

## 2017-09-15 PROCEDURE — 99232 SBSQ HOSP IP/OBS MODERATE 35: CPT

## 2017-09-15 PROCEDURE — 99233 SBSQ HOSP IP/OBS HIGH 50: CPT | Mod: GC

## 2017-09-15 RX ORDER — TETRAHYDROZOLINE/POLYETHYL GLY 0.05 %-1 %
1 DROPS OPHTHALMIC (EYE) DAILY
Qty: 0 | Refills: 0 | Status: DISCONTINUED | OUTPATIENT
Start: 2017-09-15 | End: 2017-09-15

## 2017-09-15 RX ORDER — GLYCERIN 1 %
1 DROPS OPHTHALMIC (EYE) THREE TIMES A DAY
Qty: 0 | Refills: 0 | Status: DISCONTINUED | OUTPATIENT
Start: 2017-09-15 | End: 2017-10-02

## 2017-09-15 RX ORDER — INSULIN LISPRO 100/ML
2 VIAL (ML) SUBCUTANEOUS ONCE
Qty: 0 | Refills: 0 | Status: COMPLETED | OUTPATIENT
Start: 2017-09-15 | End: 2017-09-15

## 2017-09-15 RX ADMIN — Medication 7 UNIT(S): at 10:00

## 2017-09-15 RX ADMIN — Medication 2 UNIT(S): at 06:44

## 2017-09-15 RX ADMIN — Medication 3: at 10:02

## 2017-09-15 RX ADMIN — Medication 25 MILLIGRAM(S): at 11:18

## 2017-09-15 RX ADMIN — HEPARIN SODIUM 0 UNIT(S)/HR: 5000 INJECTION INTRAVENOUS; SUBCUTANEOUS at 02:19

## 2017-09-15 RX ADMIN — HEPARIN SODIUM 7000 UNIT(S): 5000 INJECTION INTRAVENOUS; SUBCUTANEOUS at 18:00

## 2017-09-15 RX ADMIN — Medication 300 MILLIGRAM(S): at 11:18

## 2017-09-15 RX ADMIN — Medication 7 UNIT(S): at 13:06

## 2017-09-15 RX ADMIN — Medication 7 UNIT(S): at 17:58

## 2017-09-15 RX ADMIN — HEPARIN SODIUM 1900 UNIT(S)/HR: 5000 INJECTION INTRAVENOUS; SUBCUTANEOUS at 17:58

## 2017-09-15 RX ADMIN — HEPARIN SODIUM 1300 UNIT(S)/HR: 5000 INJECTION INTRAVENOUS; SUBCUTANEOUS at 03:24

## 2017-09-15 RX ADMIN — ATORVASTATIN CALCIUM 80 MILLIGRAM(S): 80 TABLET, FILM COATED ORAL at 21:15

## 2017-09-15 RX ADMIN — ERYTHROPOIETIN 7000 UNIT(S): 10000 INJECTION, SOLUTION INTRAVENOUS; SUBCUTANEOUS at 08:41

## 2017-09-15 RX ADMIN — Medication 4: at 13:05

## 2017-09-15 RX ADMIN — HEPARIN SODIUM 1500 UNIT(S)/HR: 5000 INJECTION INTRAVENOUS; SUBCUTANEOUS at 10:43

## 2017-09-15 RX ADMIN — Medication 81 MILLIGRAM(S): at 11:19

## 2017-09-15 RX ADMIN — Medication 5 MILLIGRAM(S): at 05:50

## 2017-09-15 RX ADMIN — Medication 667 MILLIGRAM(S): at 17:18

## 2017-09-15 RX ADMIN — INSULIN GLARGINE 22 UNIT(S): 100 INJECTION, SOLUTION SUBCUTANEOUS at 09:57

## 2017-09-15 RX ADMIN — Medication 25 MILLIGRAM(S): at 17:18

## 2017-09-15 RX ADMIN — TAMSULOSIN HYDROCHLORIDE 0.4 MILLIGRAM(S): 0.4 CAPSULE ORAL at 21:17

## 2017-09-15 RX ADMIN — Medication 667 MILLIGRAM(S): at 12:36

## 2017-09-15 RX ADMIN — Medication 4: at 17:58

## 2017-09-15 RX ADMIN — Medication 1 DROP(S): at 12:36

## 2017-09-15 RX ADMIN — CYCLOSPORINE 100 MILLIGRAM(S): 100 CAPSULE ORAL at 11:19

## 2017-09-15 RX ADMIN — Medication 80 MILLIGRAM(S): at 17:18

## 2017-09-15 NOTE — PROGRESS NOTE ADULT - PROBLEM SELECTOR PROBLEM 10
Preventive measure

## 2017-09-15 NOTE — PROGRESS NOTE ADULT - ASSESSMENT
52 yr M with Type 1 DM HgA1C 8 admitted for AMS s/p cardiac arrest now on HD presents with hyperglycemia

## 2017-09-15 NOTE — PROGRESS NOTE ADULT - PROBLEM SELECTOR PLAN 1
new onset AFib, new TWI lateral leads from prior 3/2017. Self converted NSR 9/8 for a day, now back in AF without RVR  -c/w cardizem 300mg oral daily, started lopressor 25mg BID yesterday, HR improved, EP follow up appreciated.   -holding coumadin as pt. is planned for AVF as inpatient  -c/w hep gtt but lower dose as PTT markedly elevated (>200)  -f/u coags in AM new onset AFib, new TWI lateral leads from prior 3/2017. Self converted NSR 9/8 for a day, now back in AF without RVR  -c/w cardizem 300mg oral daily, started lopressor 25mg BID yesterday, HR still high, EP follow up appreciated, will further increase lopressor to 50 mg bid as BP tolerated.   -holding coumadin as pt. is planned for AVF as inpatient  -c/w hep gtt but lower dose as PTT markedly elevated (>200)  -f/u coags in AM

## 2017-09-15 NOTE — PROGRESS NOTE ADULT - SUBJECTIVE AND OBJECTIVE BOX
Chief Complaint: DM1    History: Lantus moved from nightly dosing to AM dosing. Patient with hyperglycemia this AM.    MEDICATIONS  (STANDING):  calcium acetate 667 milliGRAM(s) Oral three times a day with meals  predniSONE   Tablet 5 milliGRAM(s) Oral daily  tamsulosin 0.4 milliGRAM(s) Oral at bedtime  cycloSPORINE  (SandIMMUNE) 100 milliGRAM(s) Oral daily  atorvastatin 80 milliGRAM(s) Oral at bedtime  aspirin enteric coated 81 milliGRAM(s) Oral daily  insulin lispro (HumaLOG) corrective regimen sliding scale   SubCutaneous three times a day before meals  insulin lispro (HumaLOG) corrective regimen sliding scale   SubCutaneous at bedtime  senna 2 Tablet(s) Oral at bedtime  docusate sodium 100 milliGRAM(s) Oral three times a day  furosemide   Injectable 80 milliGRAM(s) IV Push two times a day  epoetin valente Injectable 7000 Unit(s) IV Push <User Schedule>  diltiazem    milliGRAM(s) Oral daily  insulin lispro Injectable (HumaLOG) 7 Unit(s) SubCutaneous three times a day before meals  metoprolol 25 milliGRAM(s) Oral two times a day  insulin glargine Injectable (LANTUS) 22 Unit(s) SubCutaneous <User Schedule>  heparin  Infusion.  Unit(s)/Hr (16 mL/Hr) IV Continuous <Continuous>  heparin  Injectable 7000 Unit(s) IV Push once    MEDICATIONS  (PRN):  heparin  Injectable 7000 Unit(s) IV Push every 6 hours PRN For aPTT less than 40  heparin  Injectable 3500 Unit(s) IV Push every 6 hours PRN For aPTT between 40 - 57  naphazoline/pheniramine Solution 1 Drop(s) Both EYES three times a day PRN eye discomfort      Allergies    No Known Allergies    Intolerances      Review of Systems:  Constitutional: No fever  Eyes: No blurry vision  Neuro: No tremors  HEENT: No pain  Cardiovascular: No chest pain, palpitations  Respiratory: No SOB, no cough  GI: No nausea, vomiting, abdominal pain  : No dysuria  Skin: no rash  Psych: no depression  Endocrine: no polyuria, polydipsia  Hem/lymph: no swelling  Osteoporosis: no fractures    ALL OTHER SYSTEMS REVIEWED AND NEGATIVE      PHYSICAL EXAM:  VITALS: T(C): 37.1 (09-15-17 @ 15:11)  T(F): 98.8 (09-15-17 @ 15:11), Max: 98.8 (09-15-17 @ 15:11)  HR: 89 (09-15-17 @ 15:11) (78 - 115)  BP: 124/72 (09-15-17 @ 15:11) (124/72 - 166/83)  RR:  (16 - 18)  SpO2:  (95% - 99%)  Wt(kg): --  GENERAL: NAD, well-groomed, well-developed  EYES: blind  HEENT:  Atraumatic, Normocephalic, moist mucous membranes  SKIN: Dry, intact, No rashes or lesions  PSYCH: Alert and oriented x 3, normal affect, normal mood    CAPILLARY BLOOD GLUCOSE  342 (09-15 @ 12:42)  279 (09-15 @ 08:44)  453 (09-15 @ 06:35)  458 (09-15 @ 06:31)  255 (09-14 @ 22:27)  207 (09-14 @ 17:02)  159 (09-14 @ 12:42)  91 (09-14 @ 09:51)  66 (09-14 @ 09:10)  150 (09-13 @ 22:29)  123 (09-13 @ 17:15)  208 (09-13 @ 08:54)  262 (09-12 @ 22:57)  354 (09-12 @ 18:46)      09-15    134<L>  |  92<L>  |  65<H>  ----------------------------<  480<HH>  4.1   |  24  |  4.16<H>    EGFR if : 18  EGFR if non : 15    Ca    8.0<L>      09-15  Mg     1.7     09-15  Phos  3.4     09-15    TPro  5.0<L>  /  Alb  2.5<L>  /  TBili  0.5  /  DBili  x   /  AST  67<H>  /  ALT  79<H>  /  AlkPhos  153<H>  09-15          Thyroid Function Tests:  09-03 @ 13:45 TSH 2.88 FreeT4 0.70 T3 71.7 Anti TPO -- Anti Thyroglobulin Ab -- TSI --      Hemoglobin A1C, Whole Blood: 8.0 % <H> [4.0 - 5.6] (08-16-17 @ 06:30)

## 2017-09-15 NOTE — PROGRESS NOTE ADULT - PROBLEM SELECTOR PLAN 1
Hyperglycemia this AM likely related to moving Lantus from night to AM dosing. Proceed with Lantus 22u at 9AM daily.  Continue Humalog 7/7/7 and low correction scales.

## 2017-09-15 NOTE — PROGRESS NOTE ADULT - SUBJECTIVE AND OBJECTIVE BOX
Patient is a 52y old  Male who presents with a chief complaint of Altered mental status (10 Sep 2017 23:23)        SUBJECTIVE / OVERNIGHT EVENTS:  -FSBG 458 this AM - given 2 units of humalog before patient went for HD (patient spoke with endo and requested lantus be given in AM instead so lantus held last night and patient given 7 units of NPH)  -Tele: multiple brief episodes of rapid Afib overnight to 130s    Patient is asymptomatic this AM with no complaints. He denies CP, SOB, palps, diaphoresis, fevers, chills, n/v/d/c.    MEDICATIONS  (STANDING):  calcium acetate 667 milliGRAM(s) Oral three times a day with meals  predniSONE   Tablet 5 milliGRAM(s) Oral daily  tamsulosin 0.4 milliGRAM(s) Oral at bedtime  cycloSPORINE  (SandIMMUNE) 100 milliGRAM(s) Oral daily  atorvastatin 80 milliGRAM(s) Oral at bedtime  aspirin enteric coated 81 milliGRAM(s) Oral daily  insulin lispro (HumaLOG) corrective regimen sliding scale   SubCutaneous three times a day before meals  insulin lispro (HumaLOG) corrective regimen sliding scale   SubCutaneous at bedtime  senna 2 Tablet(s) Oral at bedtime  docusate sodium 100 milliGRAM(s) Oral three times a day  furosemide   Injectable 80 milliGRAM(s) IV Push two times a day  epoetin valente Injectable 7000 Unit(s) IV Push <User Schedule>  diltiazem    milliGRAM(s) Oral daily  insulin lispro Injectable (HumaLOG) 7 Unit(s) SubCutaneous three times a day before meals  metoprolol 25 milliGRAM(s) Oral two times a day  insulin glargine Injectable (LANTUS) 22 Unit(s) SubCutaneous <User Schedule>  heparin  Infusion.  Unit(s)/Hr (16 mL/Hr) IV Continuous <Continuous>  heparin  Injectable 7000 Unit(s) IV Push once    MEDICATIONS  (PRN):  heparin  Injectable 7000 Unit(s) IV Push every 6 hours PRN For aPTT less than 40  heparin  Injectable 3500 Unit(s) IV Push every 6 hours PRN For aPTT between 40 - 57        CAPILLARY BLOOD GLUCOSE  453 (15 Sep 2017 06:35)  458 (15 Sep 2017 06:31)  255 (14 Sep 2017 22:27)  207 (14 Sep 2017 17:02)  159 (14 Sep 2017 12:42)  91 (14 Sep 2017 09:51)  66 (14 Sep 2017 09:10)        I&O's Summary      PHYSICAL EXAM  VS: T 98.1, , /83, RR 18  GENERAL: NAD, well-developed  HEAD:  Atraumatic, Normocephalic  EYES: EOMI, PERRLA, conjunctiva and sclera clear. Legally blind but can see changes in light  NECK: Supple, No JVD  CHEST/LUNG: CTAB, No wheeze  HEART: Regular rate and rhythm; No murmurs, rubs, or gallops  ABDOMEN: Soft, Nontender, Nondistended; Bowel sounds present  EXTREMITIES: Feet warm, No clubbing, cyanosis. 1+ pitting edema midway up the calves  PSYCH: AAOx3  SKIN: No rashes or lesions    LABS:                        7.8    9.40  )-----------( 300      ( 15 Sep 2017 01:30 )             23.6     09-14    139  |  98  |  55<H>  ----------------------------<  51<L>  3.4<L>   |  25  |  4.06<H>    Ca    8.1<L>      14 Sep 2017 07:55  Phos  3.4     09-14  Mg     2.0     09-14    TPro  5.1<L>  /  Alb  2.6<L>  /  TBili  0.6  /  DBili  x   /  AST  78<H>  /  ALT  87<H>  /  AlkPhos  114  09-14    PT/INR - ( 14 Sep 2017 07:55 )   PT: 17.4 SEC;   INR: 1.54          PTT - ( 15 Sep 2017 01:30 )  PTT:154.9 SEC          RADIOLOGY & ADDITIONAL TESTS:    Imaging Personally Reviewed:  Consultant(s) Notes Reviewed:    Care Discussed with Consultants/Other Providers:

## 2017-09-15 NOTE — PROGRESS NOTE ADULT - PROBLEM SELECTOR PLAN 5
Home regimen clonidine 0.1mg po q8h, nifedipine XR 90 mg qD, Turosemide 10 mg daily. Makes urine still  -on cardizem 300mg oral, metoprolol 25 mg bid  -c/w lasix 80 IV BID Home regimen clonidine 0.1mg po q8h, nifedipine XR 90 mg qD, Turosemide 10 mg daily. Makes urine still  -on cardizem 300mg oral, metoprolol 25 mg bid,  -c/w lasix 80 IV BID

## 2017-09-15 NOTE — PROGRESS NOTE ADULT - PROBLEM SELECTOR PLAN 2
Hx of CKD. 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 Hold home torsemide 10mg daily  -c/w lasix 80mg IV BID  -permacath in place; Vascular plan for AVF possible in patient as Pt/family requested, and HD center requirement.   -social work involved and working on setting up outpatient dialysis Hx of CKD. 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 Hold home torsemide 10mg daily  -c/w lasix 80mg IV BID  -permacath in place; Vascular plan for possible AVF  in patient as Pt/family requested, and HD center requirement.   -social work involved and working on setting up outpatient dialysis

## 2017-09-15 NOTE — PROGRESS NOTE ADULT - PROBLEM SELECTOR PLAN 4
in setting of renal failure. Continue phoslo 667mg TID with meals. Monitor serum PO4. PTh at goal (186)

## 2017-09-15 NOTE — PROGRESS NOTE ADULT - ATTENDING COMMENTS
Pt was seen and examed at bed side with resident.  53 y/o M PMH CKD s/p renal transplant (1998) on cyclosporine and CellCept, HTN, DM, legally blind BIBEMS P/w uremic encephalopathy. In ED, Pt became PEA arrest in the ED, intubated for airway protection. ROSC achieved s/p 1 round of epi and chest compressions. He was admitted in MICU and put on pressors. s/p emergent HD for hyperkalemia. s/p Covered empirically with broad spectrum antibiotics (vanc and zosyn). Found Hgb 5 s/p multiple pRBC given and epogen was started. Pt was able to wean off pressors and pt extubated 9/4.   Renal follow up appreciated, continue Cyclosporine and prednisone, s/p perm cath 9/12,  on lasix with close monitoring renal function. Pt was also found to have new-onset Afib, on coumadin, with bridging heparin gtt. Vasculare consult appreciated, may need AVF as in-patient as HD center requested, coumadin on held. Cardiology and Endo following. Continue Cardizem and, may increase metoprolol to 50 mg bid if BP tolerated for better HR control. Monitor FS on Lantus (adjusting to AM dose) and Humalog, encourage evening snack.  Discussed with SW for outpatient HD arrangement.    explained in details with Pt and sister at bedside in details above plan, and discussed with vascular. If plan for AVF, need cardiology clearance, and medical clearance, pending optimizing HR. Recent TTE normal LVEF. H/H stable.

## 2017-09-15 NOTE — PROGRESS NOTE ADULT - SUBJECTIVE AND OBJECTIVE BOX
[facility] DIVISION OF KIDNEY DISEASES AND HYPERTENSION -- HEMODIALYSIS NOTE  [phone]--------------------------------------------------------------------------------  Chief Complaint: ESRD/Ongoing hemodialysis requirement    HPI:  51 y/o M with h/o CKD s/p renal transplant in 1998, HTN, DM, legally blind admitted for  uremic encephalopathy initiated on dialysis.          PAST HISTORY  --------------------------------------------------------------------------------  No significant changes to PMH, PSH, FHx, SHx, unless otherwise noted    ALLERGIES & MEDICATIONS  --------------------------------------------------------------------------------  Allergies    No Known Allergies    Intolerances      Standing Inpatient Medications  calcium acetate 667 milliGRAM(s) Oral three times a day with meals  predniSONE   Tablet 5 milliGRAM(s) Oral daily  tamsulosin 0.4 milliGRAM(s) Oral at bedtime  cycloSPORINE  (SandIMMUNE) 100 milliGRAM(s) Oral daily  atorvastatin 80 milliGRAM(s) Oral at bedtime  aspirin enteric coated 81 milliGRAM(s) Oral daily  insulin lispro (HumaLOG) corrective regimen sliding scale   SubCutaneous three times a day before meals  insulin lispro (HumaLOG) corrective regimen sliding scale   SubCutaneous at bedtime  senna 2 Tablet(s) Oral at bedtime  docusate sodium 100 milliGRAM(s) Oral three times a day  furosemide   Injectable 80 milliGRAM(s) IV Push two times a day  epoetin valente Injectable 7000 Unit(s) IV Push <User Schedule>  diltiazem    milliGRAM(s) Oral daily  insulin lispro Injectable (HumaLOG) 7 Unit(s) SubCutaneous three times a day before meals  metoprolol 25 milliGRAM(s) Oral two times a day  insulin glargine Injectable (LANTUS) 22 Unit(s) SubCutaneous <User Schedule>  heparin  Infusion.  Unit(s)/Hr IV Continuous <Continuous>  heparin  Injectable 7000 Unit(s) IV Push once    PRN Inpatient Medications  heparin  Injectable 7000 Unit(s) IV Push every 6 hours PRN  heparin  Injectable 3500 Unit(s) IV Push every 6 hours PRN      REVIEW OF SYSTEMS  --------------------------------------------------------------------------------  no sob, no chest pain, + edema, no fever, no abdominal pain    VITALS/PHYSICAL EXAM  --------------------------------------------------------------------------------  T(C): 36.7 (09-15-17 @ 07:00), Max: 37 (09-14-17 @ 22:33)  HR: 106 (09-15-17 @ 07:00) (71 - 114)  BP: 166/83 (09-15-17 @ 07:00) (134/84 - 166/83)  RR: 18 (09-15-17 @ 07:00) (16 - 18)  SpO2: 98% (09-15-17 @ 05:49) (95% - 100%)  Wt(kg): --        Physical Exam:  	Gen: NAD  	HEENT: PERRL  	Pulm: CTA B/L  	CV: S1S2  	Abd: Soft  	Ext: + LE edema B/L  	Neuro: Awake  	Skin: Warm and Dry   	Vascular access: right tunneled catheter    LABS/STUDIES  --------------------------------------------------------------------------------              7.8    9.40  >-----------<  300      [09-15-17 @ 01:30]              23.6     139  |  98  |  55  ----------------------------<  51      [09-14-17 @ 07:55]  3.4   |  25  |  4.06        Ca     8.1     [09-14-17 @ 07:55]      Mg     2.0     [09-14-17 @ 07:55]      Phos  3.4     [09-14-17 @ 07:55]    TPro  5.1  /  Alb  2.6  /  TBili  0.6  /  DBili  x   /  AST  78  /  ALT  87  /  AlkPhos  114  [09-14-17 @ 07:55]    PT/INR: PT 17.4 , INR 1.54       [09-14-17 @ 07:55]  PTT: 154.9      [09-15-17 @ 01:30]      Iron 21, TIBC 187, %sat --      [09-09-17 @ 06:55]  Ferritin 587.5      [09-09-17 @ 06:55]  .9 (Ca --)      [09-10-17 @ 05:10]   --  HbA1c 8.0      [08-16-17 @ 06:30]  TSH 2.88      [09-03-17 @ 13:45]  Lipid: chol 137, TG 97, HDL 52, LDL 68      [08-11-17 @ 07:20]    HBsAb <3.0      [09-03-17 @ 18:00]  HBsAg NEGATIVE      [09-03-17 @ 18:00]  HCV 14.48, Reactive      [09-03-17 @ 18:00]

## 2017-09-15 NOTE — PROGRESS NOTE ADULT - PROBLEM SELECTOR PLAN 3
UF with HD.  BP stable. low salt diet UF with HD.  BP stable. low salt diet.  Ok to continue Iv diuretics for now.

## 2017-09-15 NOTE — PROGRESS NOTE ADULT - SUBJECTIVE AND OBJECTIVE BOX
Patient more alert today. Denies chest pain, palpitations, shortness of breath or lightheadedness. His blood pressure and heart rate have been stable during dialysis. However, today, when he come back to his room, his heart rate went to 140bpm. He was given Cardizem IVP and now his heart rate is 100bpm. He is asymptomatic.     Vital Signs Last 24 Hrs  T(C): 36.7 (15 Sep 2017 07:00), Max: 37 (14 Sep 2017 22:33)  T(F): 98.1 (15 Sep 2017 07:00), Max: 98.6 (14 Sep 2017 22:33)  HR: 115 (15 Sep 2017 11:18) (71 - 115)  BP: 162/94 (15 Sep 2017 11:18) (138/86 - 166/83)  BP(mean): --  RR: 17 (15 Sep 2017 11:18) (16 - 18)  SpO2: 99% (15 Sep 2017 11:18) (95% - 99%)    Telemetry: Atrial fibrillation with HR trends 60- 140bpm (asymptomatic)  MEDICATIONS  (STANDING):  calcium acetate 667 milliGRAM(s) Oral three times a day with meals  predniSONE   Tablet 5 milliGRAM(s) Oral daily  tamsulosin 0.4 milliGRAM(s) Oral at bedtime  cycloSPORINE  (SandIMMUNE) 100 milliGRAM(s) Oral daily  atorvastatin 80 milliGRAM(s) Oral at bedtime  aspirin enteric coated 81 milliGRAM(s) Oral daily  insulin lispro (HumaLOG) corrective regimen sliding scale   SubCutaneous three times a day before meals  insulin lispro (HumaLOG) corrective regimen sliding scale   SubCutaneous at bedtime  senna 2 Tablet(s) Oral at bedtime  docusate sodium 100 milliGRAM(s) Oral three times a day  furosemide   Injectable 80 milliGRAM(s) IV Push two times a day  epoetin valente Injectable 7000 Unit(s) IV Push <User Schedule>  diltiazem    milliGRAM(s) Oral daily  insulin lispro Injectable (HumaLOG) 7 Unit(s) SubCutaneous three times a day before meals  metoprolol 25 milliGRAM(s) Oral two times a day  insulin glargine Injectable (LANTUS) 22 Unit(s) SubCutaneous <User Schedule>  heparin  Infusion.  Unit(s)/Hr (16 mL/Hr) IV Continuous <Continuous>  heparin  Injectable 7000 Unit(s) IV Push once    MEDICATIONS  (PRN):  heparin  Injectable 7000 Unit(s) IV Push every 6 hours PRN For aPTT less than 40  heparin  Injectable 3500 Unit(s) IV Push every 6 hours PRN For aPTT between 40 - 57  naphazoline/pheniramine Solution 1 Drop(s) Both EYES three times a day PRN eye discomfort          Physical exam:   Gen- more alert and conversant.  Resp- CTA bilaterally  CV- S1 and S2 irregular rate and rhythm  ABD- soft nontender. Quiet bowel sounds  EXT- 2-3+ pitting edema lower extremities         LABS: 09/15/2017                     7.3    7.36  )-----------( 295                   23.3       PT: 15.7 SEC;   INR: 1.39     PTT:39.7 SEC      134<L>  |  92<L>  |  65<H>  ----------------------------<  480<HH>  4.1   |  24  |  4.16<H>    Ca    8.0<L>       Phos  3.4     09-15  Mg     1.7     09-15    TPro  5.0<L>  /  Alb  2.5<L>  /  TBili  0.5  /  DBili  x   /  AST  67<H>  /  ALT  79<H>  /  AlkPhos  153<H>  09-15

## 2017-09-15 NOTE — PROGRESS NOTE ADULT - PROBLEM SELECTOR PLAN 7
Home regimen lantus 22u daily, home pravastatin 10mg daily. A1C 8 this admission  -atorvastatin 80mg daily. FSBG elevated in 450s this AM but in s/o not giving lantus last night due to patient requesting AM doses. NPH 7 units given last night.  -gave 2 units humalog this AM, will follow up with more frequent FSBG   -Lantus 22u qd, humalog 7u premeal, JULIA  -f/u endo recs

## 2017-09-16 LAB
APTT BLD: 115.8 SEC — HIGH (ref 27.5–37.4)
APTT BLD: 38.8 SEC — HIGH (ref 27.5–37.4)
APTT BLD: 69.8 SEC — HIGH (ref 27.5–37.4)
APTT BLD: > 200 SEC — CRITICAL HIGH (ref 27.5–37.4)
BACTERIA BLD CULT: SIGNIFICANT CHANGE UP
BASOPHILS # BLD AUTO: 0.04 K/UL — SIGNIFICANT CHANGE UP (ref 0–0.2)
BASOPHILS NFR BLD AUTO: 0.4 % — SIGNIFICANT CHANGE UP (ref 0–2)
BUN SERPL-MCNC: 52 MG/DL — HIGH (ref 7–23)
CALCIUM SERPL-MCNC: 8.4 MG/DL — SIGNIFICANT CHANGE UP (ref 8.4–10.5)
CHLORIDE SERPL-SCNC: 96 MMOL/L — LOW (ref 98–107)
CO2 SERPL-SCNC: 23 MMOL/L — SIGNIFICANT CHANGE UP (ref 22–31)
CREAT SERPL-MCNC: 3.4 MG/DL — HIGH (ref 0.5–1.3)
EOSINOPHIL # BLD AUTO: 0.07 K/UL — SIGNIFICANT CHANGE UP (ref 0–0.5)
EOSINOPHIL NFR BLD AUTO: 0.8 % — SIGNIFICANT CHANGE UP (ref 0–6)
GLUCOSE SERPL-MCNC: 337 MG/DL — HIGH (ref 70–99)
HCT VFR BLD CALC: 23.8 % — LOW (ref 39–50)
HCT VFR BLD CALC: 23.8 % — LOW (ref 39–50)
HGB BLD-MCNC: 7.5 G/DL — LOW (ref 13–17)
HGB BLD-MCNC: 7.5 G/DL — LOW (ref 13–17)
IMM GRANULOCYTES # BLD AUTO: 0.07 # — SIGNIFICANT CHANGE UP
IMM GRANULOCYTES NFR BLD AUTO: 0.8 % — SIGNIFICANT CHANGE UP (ref 0–1.5)
LYMPHOCYTES # BLD AUTO: 0.79 K/UL — LOW (ref 1–3.3)
LYMPHOCYTES # BLD AUTO: 8.6 % — LOW (ref 13–44)
MAGNESIUM SERPL-MCNC: 1.7 MG/DL — SIGNIFICANT CHANGE UP (ref 1.6–2.6)
MCHC RBC-ENTMCNC: 28.7 PG — SIGNIFICANT CHANGE UP (ref 27–34)
MCHC RBC-ENTMCNC: 28.7 PG — SIGNIFICANT CHANGE UP (ref 27–34)
MCHC RBC-ENTMCNC: 31.5 % — LOW (ref 32–36)
MCHC RBC-ENTMCNC: 31.5 % — LOW (ref 32–36)
MCV RBC AUTO: 91.2 FL — SIGNIFICANT CHANGE UP (ref 80–100)
MCV RBC AUTO: 91.2 FL — SIGNIFICANT CHANGE UP (ref 80–100)
MONOCYTES # BLD AUTO: 0.97 K/UL — HIGH (ref 0–0.9)
MONOCYTES NFR BLD AUTO: 10.6 % — SIGNIFICANT CHANGE UP (ref 2–14)
NEUTROPHILS # BLD AUTO: 7.22 K/UL — SIGNIFICANT CHANGE UP (ref 1.8–7.4)
NEUTROPHILS NFR BLD AUTO: 78.8 % — HIGH (ref 43–77)
NRBC # FLD: 0 — SIGNIFICANT CHANGE UP
NRBC # FLD: 0 — SIGNIFICANT CHANGE UP
PHOSPHATE SERPL-MCNC: 3.4 MG/DL — SIGNIFICANT CHANGE UP (ref 2.5–4.5)
PLATELET # BLD AUTO: 309 K/UL — SIGNIFICANT CHANGE UP (ref 150–400)
PLATELET # BLD AUTO: 309 K/UL — SIGNIFICANT CHANGE UP (ref 150–400)
PMV BLD: 11 FL — SIGNIFICANT CHANGE UP (ref 7–13)
PMV BLD: 11 FL — SIGNIFICANT CHANGE UP (ref 7–13)
POTASSIUM SERPL-MCNC: 3.7 MMOL/L — SIGNIFICANT CHANGE UP (ref 3.5–5.3)
POTASSIUM SERPL-SCNC: 3.7 MMOL/L — SIGNIFICANT CHANGE UP (ref 3.5–5.3)
RBC # BLD: 2.61 M/UL — LOW (ref 4.2–5.8)
RBC # BLD: 2.61 M/UL — LOW (ref 4.2–5.8)
RBC # FLD: 14.2 % — SIGNIFICANT CHANGE UP (ref 10.3–14.5)
RBC # FLD: 14.2 % — SIGNIFICANT CHANGE UP (ref 10.3–14.5)
SODIUM SERPL-SCNC: 135 MMOL/L — SIGNIFICANT CHANGE UP (ref 135–145)
WBC # BLD: 9.16 K/UL — SIGNIFICANT CHANGE UP (ref 3.8–10.5)
WBC # BLD: 9.16 K/UL — SIGNIFICANT CHANGE UP (ref 3.8–10.5)
WBC # FLD AUTO: 9.16 K/UL — SIGNIFICANT CHANGE UP (ref 3.8–10.5)
WBC # FLD AUTO: 9.16 K/UL — SIGNIFICANT CHANGE UP (ref 3.8–10.5)

## 2017-09-16 PROCEDURE — 99232 SBSQ HOSP IP/OBS MODERATE 35: CPT

## 2017-09-16 PROCEDURE — 99233 SBSQ HOSP IP/OBS HIGH 50: CPT | Mod: GC

## 2017-09-16 RX ORDER — METOPROLOL TARTRATE 50 MG
50 TABLET ORAL
Qty: 0 | Refills: 0 | Status: DISCONTINUED | OUTPATIENT
Start: 2017-09-16 | End: 2017-10-02

## 2017-09-16 RX ORDER — METOPROLOL TARTRATE 50 MG
50 TABLET ORAL
Qty: 0 | Refills: 0 | Status: DISCONTINUED | OUTPATIENT
Start: 2017-09-16 | End: 2017-09-16

## 2017-09-16 RX ORDER — INSULIN GLARGINE 100 [IU]/ML
23 INJECTION, SOLUTION SUBCUTANEOUS
Qty: 0 | Refills: 0 | Status: DISCONTINUED | OUTPATIENT
Start: 2017-09-17 | End: 2017-09-17

## 2017-09-16 RX ORDER — INSULIN LISPRO 100/ML
VIAL (ML) SUBCUTANEOUS
Qty: 0 | Refills: 0 | Status: DISCONTINUED | OUTPATIENT
Start: 2017-09-16 | End: 2017-09-17

## 2017-09-16 RX ORDER — INSULIN LISPRO 100/ML
8 VIAL (ML) SUBCUTANEOUS
Qty: 0 | Refills: 0 | Status: DISCONTINUED | OUTPATIENT
Start: 2017-09-16 | End: 2017-09-17

## 2017-09-16 RX ADMIN — Medication 667 MILLIGRAM(S): at 09:54

## 2017-09-16 RX ADMIN — Medication 7 UNIT(S): at 09:52

## 2017-09-16 RX ADMIN — HEPARIN SODIUM 0 UNIT(S)/HR: 5000 INJECTION INTRAVENOUS; SUBCUTANEOUS at 08:28

## 2017-09-16 RX ADMIN — Medication 80 MILLIGRAM(S): at 06:32

## 2017-09-16 RX ADMIN — HEPARIN SODIUM 1800 UNIT(S)/HR: 5000 INJECTION INTRAVENOUS; SUBCUTANEOUS at 23:54

## 2017-09-16 RX ADMIN — Medication 7 UNIT(S): at 13:40

## 2017-09-16 RX ADMIN — HEPARIN SODIUM 2300 UNIT(S)/HR: 5000 INJECTION INTRAVENOUS; SUBCUTANEOUS at 01:42

## 2017-09-16 RX ADMIN — Medication 5: at 17:18

## 2017-09-16 RX ADMIN — Medication 8 UNIT(S): at 17:18

## 2017-09-16 RX ADMIN — HEPARIN SODIUM 7000 UNIT(S): 5000 INJECTION INTRAVENOUS; SUBCUTANEOUS at 01:41

## 2017-09-16 RX ADMIN — Medication 25 MILLIGRAM(S): at 06:31

## 2017-09-16 RX ADMIN — SENNA PLUS 2 TABLET(S): 8.6 TABLET ORAL at 22:40

## 2017-09-16 RX ADMIN — Medication 3: at 22:44

## 2017-09-16 RX ADMIN — Medication 667 MILLIGRAM(S): at 17:19

## 2017-09-16 RX ADMIN — HEPARIN SODIUM 2000 UNIT(S)/HR: 5000 INJECTION INTRAVENOUS; SUBCUTANEOUS at 17:09

## 2017-09-16 RX ADMIN — Medication 300 MILLIGRAM(S): at 06:31

## 2017-09-16 RX ADMIN — Medication 80 MILLIGRAM(S): at 17:17

## 2017-09-16 RX ADMIN — Medication 6: at 13:40

## 2017-09-16 RX ADMIN — TAMSULOSIN HYDROCHLORIDE 0.4 MILLIGRAM(S): 0.4 CAPSULE ORAL at 22:40

## 2017-09-16 RX ADMIN — Medication 3: at 09:52

## 2017-09-16 RX ADMIN — Medication 81 MILLIGRAM(S): at 13:21

## 2017-09-16 RX ADMIN — HEPARIN SODIUM 2000 UNIT(S)/HR: 5000 INJECTION INTRAVENOUS; SUBCUTANEOUS at 09:51

## 2017-09-16 RX ADMIN — Medication 667 MILLIGRAM(S): at 13:21

## 2017-09-16 RX ADMIN — CYCLOSPORINE 100 MILLIGRAM(S): 100 CAPSULE ORAL at 13:19

## 2017-09-16 RX ADMIN — Medication 50 MILLIGRAM(S): at 17:17

## 2017-09-16 RX ADMIN — Medication 5 MILLIGRAM(S): at 06:31

## 2017-09-16 RX ADMIN — ATORVASTATIN CALCIUM 80 MILLIGRAM(S): 80 TABLET, FILM COATED ORAL at 22:40

## 2017-09-16 RX ADMIN — Medication 100 MILLIGRAM(S): at 22:40

## 2017-09-16 RX ADMIN — INSULIN GLARGINE 22 UNIT(S): 100 INJECTION, SOLUTION SUBCUTANEOUS at 09:15

## 2017-09-16 NOTE — PROGRESS NOTE ADULT - ATTENDING COMMENTS
Pt was seen and examed at bed side with resident.  51 y/o M PMH CKD s/p renal transplant (1998) on cyclosporine and CellCept, HTN, DM, legally blind BIBEMS P/w uremic encephalopathy. In ED, Pt became PEA arrest in the ED, intubated for airway protection. ROSC achieved s/p 1 round of epi and chest compressions. He was admitted in MICU and put on pressors. s/p emergent HD for hyperkalemia. s/p Covered empirically with broad spectrum antibiotics (vanc and zosyn). Found Hgb 5 s/p multiple pRBC given and epogen was started. Pt was able to wean off pressors and pt extubated 9/4.   Renal follow up appreciated, continue Cyclosporine and prednisone, s/p perm cath 9/12,  on lasix with close monitoring renal function. Pt was also found to have new-onset Afib, on coumadin, with bridging heparin gtt. Vasculare consult appreciated, may need AVF as in-patient as HD center requested, coumadin on held. Cardiology and Endo following. Continue Cardizem and, increased metoprolol to 50 mg bid as BP tolerated for better HR control. Monitor FS on Lantus (adjusting to AM dose) and Humalog, increased humalog 8u TID as FS elevated, encourage consistent carb diet and  monitor for hypoglycemia event.  explained in details with Pt and sister at bedside in details above plan, plan for AVF, need cardiology clearance, and medical clearance, pending optimizing HR. Recent TTE normal LVEF. H/H stable.  SW arrange out patient HD.

## 2017-09-16 NOTE — PROGRESS NOTE ADULT - PROBLEM SELECTOR PLAN 5
- Home regimen clonidine 0.1mg po q8h, nifedipine XR 90 mg qD, Turosemide 10 mg daily  -c/w lasix 80 IV BID - Home regimen clonidine 0.1mg po q8h, nifedipine XR 90 mg qD, Turosemide 10 mg daily  -c/w lasix 80 IV BID, on cardizem and metoprolol

## 2017-09-16 NOTE — PROGRESS NOTE ADULT - SUBJECTIVE AND OBJECTIVE BOX
Chief Complaint: DM 1, hyperglycemia     History: Hyperglycemia persists. Patient is tolerating food. States it is hard for him to see what he is eating (legally blind and dim lighting in patient's room) but he is able to taste his food to determine what he is consuming. He reports that staff is helping him complete his menu choices daily. Typically takes 1 unit for every 20 grams of carbs at home.     MEDICATIONS  (STANDING):  calcium acetate 667 milliGRAM(s) Oral three times a day with meals  predniSONE   Tablet 5 milliGRAM(s) Oral daily  tamsulosin 0.4 milliGRAM(s) Oral at bedtime  cycloSPORINE  (SandIMMUNE) 100 milliGRAM(s) Oral daily  atorvastatin 80 milliGRAM(s) Oral at bedtime  aspirin enteric coated 81 milliGRAM(s) Oral daily  insulin lispro (HumaLOG) corrective regimen sliding scale   SubCutaneous at bedtime  senna 2 Tablet(s) Oral at bedtime  docusate sodium 100 milliGRAM(s) Oral three times a day  furosemide   Injectable 80 milliGRAM(s) IV Push two times a day  epoetin valente Injectable 7000 Unit(s) IV Push <User Schedule>  diltiazem    milliGRAM(s) Oral daily  insulin glargine Injectable (LANTUS) 22 Unit(s) SubCutaneous <User Schedule>  heparin  Infusion.  Unit(s)/Hr (16 mL/Hr) IV Continuous <Continuous>  heparin  Injectable 7000 Unit(s) IV Push once  metoprolol 50 milliGRAM(s) Oral two times a day  insulin lispro (HumaLOG) corrective regimen sliding scale   SubCutaneous three times a day before meals  insulin lispro Injectable (HumaLOG) 8 Unit(s) SubCutaneous three times a day before meals    MEDICATIONS  (PRN):  heparin  Injectable 7000 Unit(s) IV Push every 6 hours PRN For aPTT less than 40  heparin  Injectable 3500 Unit(s) IV Push every 6 hours PRN For aPTT between 40 - 57  naphazoline/pheniramine Solution 1 Drop(s) Both EYES three times a day PRN eye discomfort      No Known Allergies      Review of Systems:  Constitutional: No fever  Eyes: legally blind, dim lighting in room makes it difficult for him to differentiate food on plate but he is able to determine most items by taste  Cardiovascular: No chest pain, palpitations  Respiratory: No SOB, no cough  GI: No nausea, vomiting, abdominal pain        PHYSICAL EXAM:  VITALS: T(C): 36.8 (09-16-17 @ 13:12)  T(F): 98.3 (09-16-17 @ 13:12), Max: 98.8 (09-15-17 @ 15:11)  HR: 72 (09-16-17 @ 13:12) (72 - 96)  BP: 147/94 (09-16-17 @ 13:12) (115/74 - 152/93)  RR:  (16 - 18)  SpO2:  (97% - 99%)  Wt(kg): --  GENERAL: NAD  RESPIRATORY: Non labored  PSYCH: Alert and oriented x 3, normal affect      CAPILLARY BLOOD GLUCOSE  421 (09-16 @ 12:20)  289 (09-16 @ 08:57)  157 (09-15 @ 22:58)  303 (09-15 @ 17:25)  342 (09-15 @ 12:42)  279 (09-15 @ 08:44)  453 (09-15 @ 06:35)  458 (09-15 @ 06:31)  255 (09-14 @ 22:27)  207 (09-14 @ 17:02)  159 (09-14 @ 12:42)  91 (09-14 @ 09:51)  66 (09-14 @ 09:10)  150 (09-13 @ 22:29)  123 (09-13 @ 17:15)      09-16    135  |  96<L>  |  52<H>  ----------------------------<  337<H>  3.7   |  23  |  3.40<H>    EGFR if : 23  EGFR if non : 20    Ca    8.4      09-16  Mg     1.7     09-16  Phos  3.4     09-16    TPro  5.0<L>  /  Alb  2.5<L>  /  TBili  0.5  /  DBili  x   /  AST  67<H>  /  ALT  79<H>  /  AlkPhos  153<H>  09-15          Thyroid Function Tests:  09-03 @ 13:45 TSH 2.88 FreeT4 0.70 T3 71.7 Anti TPO -- Anti Thyroglobulin Ab -- TSI --      Hemoglobin A1C, Whole Blood: 8.0 % <H> [4.0 - 5.6] (08-16-17 @ 06:30)

## 2017-09-16 NOTE — PROGRESS NOTE ADULT - SUBJECTIVE AND OBJECTIVE BOX
Patient is a 52y old  Male who presents with a chief complaint of Altered mental status (10 Sep 2017 23:23)     INTERVAL HPI/OVERNIGHT EVENTS:  No events overnight.  Denies chest pain, shortness of breath, palpitations.  Tolerating PO.      MEDICATIONS  (STANDING):  calcium acetate 667 milliGRAM(s) Oral three times a day with meals  predniSONE   Tablet 5 milliGRAM(s) Oral daily  tamsulosin 0.4 milliGRAM(s) Oral at bedtime  cycloSPORINE  (SandIMMUNE) 100 milliGRAM(s) Oral daily  atorvastatin 80 milliGRAM(s) Oral at bedtime  aspirin enteric coated 81 milliGRAM(s) Oral daily  insulin lispro (HumaLOG) corrective regimen sliding scale   SubCutaneous three times a day before meals  insulin lispro (HumaLOG) corrective regimen sliding scale   SubCutaneous at bedtime  senna 2 Tablet(s) Oral at bedtime  docusate sodium 100 milliGRAM(s) Oral three times a day  furosemide   Injectable 80 milliGRAM(s) IV Push two times a day  epoetin valente Injectable 7000 Unit(s) IV Push <User Schedule>  diltiazem    milliGRAM(s) Oral daily  insulin lispro Injectable (HumaLOG) 7 Unit(s) SubCutaneous three times a day before meals  metoprolol 25 milliGRAM(s) Oral two times a day  insulin glargine Injectable (LANTUS) 22 Unit(s) SubCutaneous <User Schedule>  heparin  Infusion.  Unit(s)/Hr (16 mL/Hr) IV Continuous <Continuous>  heparin  Injectable 7000 Unit(s) IV Push once    MEDICATIONS  (PRN):  heparin  Injectable 7000 Unit(s) IV Push every 6 hours PRN For aPTT less than 40  heparin  Injectable 3500 Unit(s) IV Push every 6 hours PRN For aPTT between 40 - 57  naphazoline/pheniramine Solution 1 Drop(s) Both EYES three times a day PRN eye discomfort      Allergies    No Known Allergies    Intolerances        REVIEW OF SYSTEMS:  CONSTITUTIONAL: No fever, weight loss, or fatigue  ENMT:  No throat pain, no sinus pain.  RESPIRATORY: No cough, wheezing, chills or hemoptysis; No shortness of breath  CARDIOVASCULAR: No chest pain, palpitations, dizziness  GASTROINTESTINAL: +occasional nausea.  No abdominal or epigastric pain. no diarrhea/constipation.  GENITOURINARY: No dysuria, frequency  NEUROLOGICAL: No headaches, loss of strength, numbness, or tremors  SKIN: No itching, burning, rashes, or lesions   MUSCULOSKELETAL: +leg swelling, no pain.  PSYCHIATRIC: Denies depression, anxiety      Vital Signs Last 24 Hrs  T(C): 36.9 (16 Sep 2017 06:28), Max: 37.1 (15 Sep 2017 15:11)  T(F): 98.4 (16 Sep 2017 06:28), Max: 98.8 (15 Sep 2017 15:11)  HR: 96 (16 Sep 2017 06:28) (76 - 115)  BP: 152/93 (16 Sep 2017 06:28) (115/74 - 166/75)  BP(mean): --  RR: 18 (16 Sep 2017 06:28) (16 - 18)  SpO2: 99% (16 Sep 2017 06:28) (97% - 99%)    PHYSICAL EXAM:  GENERAL: NAD, well-groomed, well-developed  HEAD:  Atraumatic, Normocephalic  EYES: EOMI, PERRLA, conjunctiva and sclera clear  ENMT: MMM  NECK: Supple, No JVD  NERVOUS SYSTEM: AOX3, motor and sensation grossly intact in b/l UE and b/l LE  CHEST/LUNG: Clear to auscultation bilaterally; No rales, rhonchi, wheezing, or rubs  HEART: +irregularly irregular, no murmur  ABDOMEN: s/nt/nd, + BS   EXTREMITIES:  3+ bilateral LE edema   SKIN: No rashes or lesions    LABS:                        7.5    9.16  )-----------( 309      ( 16 Sep 2017 07:50 )             23.8     16 Sep 2017 07:50    135    |  96     |  52     ----------------------------<  337    3.7     |  23     |  3.40     Ca    8.4        16 Sep 2017 07:50  Phos  3.4       16 Sep 2017 07:50  Mg     1.7       16 Sep 2017 07:50      PT/INR - ( 15 Sep 2017 07:55 )   PT: 15.7 SEC;   INR: 1.39          PTT - ( 16 Sep 2017 07:50 )  PTT:> 200.0 SEC  CAPILLARY BLOOD GLUCOSE  289 (16 Sep 2017 08:57)  157 (15 Sep 2017 22:58)  303 (15 Sep 2017 17:25)  342 (15 Sep 2017 12:42) Patient is a 52y old  Male who presents with a chief complaint of Altered mental status (10 Sep 2017 23:23)     INTERVAL HPI/OVERNIGHT EVENTS:  No events overnight.  Denies chest pain, shortness of breath, palpitations.  Tolerating PO.  FS elevated. brief tachycardia on tele, resolved.     MEDICATIONS  (STANDING):  calcium acetate 667 milliGRAM(s) Oral three times a day with meals  predniSONE   Tablet 5 milliGRAM(s) Oral daily  tamsulosin 0.4 milliGRAM(s) Oral at bedtime  cycloSPORINE  (SandIMMUNE) 100 milliGRAM(s) Oral daily  atorvastatin 80 milliGRAM(s) Oral at bedtime  aspirin enteric coated 81 milliGRAM(s) Oral daily  insulin lispro (HumaLOG) corrective regimen sliding scale   SubCutaneous three times a day before meals  insulin lispro (HumaLOG) corrective regimen sliding scale   SubCutaneous at bedtime  senna 2 Tablet(s) Oral at bedtime  docusate sodium 100 milliGRAM(s) Oral three times a day  furosemide   Injectable 80 milliGRAM(s) IV Push two times a day  epoetin valente Injectable 7000 Unit(s) IV Push <User Schedule>  diltiazem    milliGRAM(s) Oral daily  insulin lispro Injectable (HumaLOG) 7 Unit(s) SubCutaneous three times a day before meals  metoprolol 25 milliGRAM(s) Oral two times a day  insulin glargine Injectable (LANTUS) 22 Unit(s) SubCutaneous <User Schedule>  heparin  Infusion.  Unit(s)/Hr (16 mL/Hr) IV Continuous <Continuous>  heparin  Injectable 7000 Unit(s) IV Push once    MEDICATIONS  (PRN):  heparin  Injectable 7000 Unit(s) IV Push every 6 hours PRN For aPTT less than 40  heparin  Injectable 3500 Unit(s) IV Push every 6 hours PRN For aPTT between 40 - 57  naphazoline/pheniramine Solution 1 Drop(s) Both EYES three times a day PRN eye discomfort      Allergies    No Known Allergies    Intolerances        REVIEW OF SYSTEMS:  CONSTITUTIONAL: No fever, weight loss, or fatigue  ENMT:  No throat pain, no sinus pain.  RESPIRATORY: No cough, wheezing, chills or hemoptysis; No shortness of breath  CARDIOVASCULAR: No chest pain, palpitations, dizziness  GASTROINTESTINAL: +occasional nausea.  No abdominal or epigastric pain. no diarrhea/constipation.  GENITOURINARY: No dysuria, frequency  NEUROLOGICAL: No headaches, loss of strength, numbness, or tremors  SKIN: No itching, burning, rashes, or lesions   MUSCULOSKELETAL: mild leg swelling, no pain.  PSYCHIATRIC: Denies depression, anxiety      Vital Signs Last 24 Hrs  T(C): 36.9 (16 Sep 2017 06:28), Max: 37.1 (15 Sep 2017 15:11)  T(F): 98.4 (16 Sep 2017 06:28), Max: 98.8 (15 Sep 2017 15:11)  HR: 96 (16 Sep 2017 06:28) (76 - 115)  BP: 152/93 (16 Sep 2017 06:28) (115/74 - 166/75)  BP(mean): --  RR: 18 (16 Sep 2017 06:28) (16 - 18)  SpO2: 99% (16 Sep 2017 06:28) (97% - 99%)    PHYSICAL EXAM:  GENERAL: NAD, well-groomed, well-developed  HEAD:  Atraumatic, Normocephalic  EYES: EOMI, PERRLA, conjunctiva and sclera clear  ENMT: MMM  NECK: Supple, No JVD  NERVOUS SYSTEM: AOX3, motor and sensation grossly intact in b/l UE and b/l LE  CHEST/LUNG: Clear to auscultation bilaterally; No rales, rhonchi, wheezing, or rubs  HEART: +irregularly irregular, no murmur  ABDOMEN: s/nt/nd, + BS   EXTREMITIES:  3+ bilateral LE edema   SKIN: No rashes or lesions    LABS:                        7.5    9.16  )-----------( 309      ( 16 Sep 2017 07:50 )             23.8     16 Sep 2017 07:50    135    |  96     |  52     ----------------------------<  337    3.7     |  23     |  3.40     Ca    8.4        16 Sep 2017 07:50  Phos  3.4       16 Sep 2017 07:50  Mg     1.7       16 Sep 2017 07:50      PT/INR - ( 15 Sep 2017 07:55 )   PT: 15.7 SEC;   INR: 1.39          PTT - ( 16 Sep 2017 07:50 )  PTT:> 200.0 SEC  CAPILLARY BLOOD GLUCOSE  289 (16 Sep 2017 08:57)  157 (15 Sep 2017 22:58)  303 (15 Sep 2017 17:25)  342 (15 Sep 2017 12:42)

## 2017-09-16 NOTE — PROGRESS NOTE ADULT - PROBLEM SELECTOR PLAN 7
Patient requiring multiple units of pRBCs and EPO  -Hgb stable  -c/w Epogen 7,000u with TTS HD sessions  -transfuse if Hgb <7.0 -Hgb stable today   -c/w Epogen w/ HD   -transfuse if Hgb <7.0

## 2017-09-16 NOTE — PROGRESS NOTE ADULT - ASSESSMENT
52M with h/o CKD s/p renal transplant (1998) on cyclosporine and CellCept, HTN, DM, legally blind BIBEMS for uremic encephalopathy. Pt became bradycardic and had PEA arrest in the ED, intubated for airway protection. ROSC achieved s/p 1 round of epi and chest compressions and pt was transferred to MICU. Immunosuppressants for renal transplant were held. Pt had emergent HD for hyperkalemia. Covered empirically with broad spectrum antibiotics (vanc and zosyn), Hgb 5 s/p 1u pRBC given and epogen was started. Pressors were weaned off and pt extubated 9/4. Cyclosporine restarted and prednisone started. Pt remains stable and transferred to floor for further management 9/5, now found to have new-onset AF. Also s/p permacath w/ plan for AVF.

## 2017-09-16 NOTE — PROGRESS NOTE ADULT - PROBLEM SELECTOR PLAN 2
- c/w HD per renal MWF, no need for HD today   -c/w lasix 80mg IV BID  -permacath in place -  Vascular plan for possible AVF

## 2017-09-16 NOTE — PROGRESS NOTE ADULT - PROBLEM SELECTOR PLAN 4
- Kidney transplant from living brother in 1998 2/2 diabetic nephropathy.  -c/w Cyclosporin 100mg daily, prednisone 5mg po daily.  Holding Cellcept.

## 2017-09-16 NOTE — PROGRESS NOTE ADULT - PROBLEM SELECTOR PLAN 8
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 LFTs wnl  -HCV VL 6.5 million  -hepatology consulted, will need f/u hepatology clinic for treatment  -HIV neg, HAV neg - not yet treated    -hepatology consulted, will need f/u hepatology clinic for treatment   -HIV neg, HAV neg

## 2017-09-16 NOTE — PROGRESS NOTE ADULT - PROBLEM SELECTOR PLAN 1
Hyperglycemia  Increased Lantus 23 units at 9AM daily.  Increased Humalog to 8 units TID before meals and low correction scales before meals and QHS    Patient reports he has an appointment pending with Dr Baker  Hyperglycemia  Increased Lantus 23 units at 9AM daily.  Agree with increase of Humalog to 8 units TID before meals   Would continue low correction scales before meals and QHS    Patient reports he has an appointment pending with Dr Baker

## 2017-09-16 NOTE — PROGRESS NOTE ADULT - PROBLEM SELECTOR PLAN 1
- currently rate controlled on PO cardizem, metoprolol 50 bid   -holding coumadin as pt. is planned for AVF as inpatient, c/w heparin gtt  - appreciate EP recs - currently rate controlled on PO cardizem, metoprolol 50 bid w/ hold parameters   -holding coumadin as pt. is planned for AVF as inpatient, c/w heparin gtt  - appreciate EP recs  - f/u TTE - currently rate controlled on PO Cardizem, metoprolol 50 bid w/ hold parameters   -holding coumadin as pt. is planned for AVF as inpatient, c/w heparin gtt  - appreciate EP recs  - f/u TTE

## 2017-09-16 NOTE — PROGRESS NOTE ADULT - PROBLEM SELECTOR PLAN 6
Home regime: n lantus 22u daily, home pravastatin 10mg daily.   A1C 8   -atorvastatin 80mg daily  - appreciate endo recs: c/w lantus 22U 9am daily, humalog 7/7/7, JULIA   - FS elevated to 300s yesterday, likely 2/2 lantus changed to AM.  Pt also had container of cookies at bedside, encouraged consistent carb diet Home regime: lantus 22u daily, home pravastatin 10mg daily.   A1C 8   -atorvastatin 80mg daily  - appreciate endo recs: c/w lantus 22U 9am daily, humalog 7/7/7, JULIA   - FS elevated to 300s yesterday, likely 2/2 lantus changed to AM.  Pt also had container of cookies at bedside, encouraged consistent carb diet Home regime: lantus 22u daily, home pravastatin 10mg daily.   A1C 8   -atorvastatin 80mg daily  - appreciate endo recs: c/w lantus 22U 9am daily, as FS elevated, will increase humalog 8u with meals, JULIA. close monitor for hypoglycemia event.  - Pt also had container of cookies at bedside, encouraged consistent carb diet

## 2017-09-17 LAB
APTT BLD: 145 SEC — CRITICAL HIGH (ref 27.5–37.4)
APTT BLD: 35.9 SEC — SIGNIFICANT CHANGE UP (ref 27.5–37.4)
B-OH-BUTYR SERPL-SCNC: 0 MMOL/L — SIGNIFICANT CHANGE UP (ref 0–0.4)
BASOPHILS # BLD AUTO: 0.06 K/UL — SIGNIFICANT CHANGE UP (ref 0–0.2)
BASOPHILS NFR BLD AUTO: 0.6 % — SIGNIFICANT CHANGE UP (ref 0–2)
BUN SERPL-MCNC: 69 MG/DL — HIGH (ref 7–23)
BUN SERPL-MCNC: 75 MG/DL — HIGH (ref 7–23)
BUN SERPL-MCNC: 75 MG/DL — HIGH (ref 7–23)
BUN SERPL-MCNC: 79 MG/DL — HIGH (ref 7–23)
CALCIUM SERPL-MCNC: 8.4 MG/DL — SIGNIFICANT CHANGE UP (ref 8.4–10.5)
CALCIUM SERPL-MCNC: 8.6 MG/DL — SIGNIFICANT CHANGE UP (ref 8.4–10.5)
CHLORIDE SERPL-SCNC: 89 MMOL/L — LOW (ref 98–107)
CHLORIDE SERPL-SCNC: 90 MMOL/L — LOW (ref 98–107)
CHLORIDE SERPL-SCNC: 91 MMOL/L — LOW (ref 98–107)
CHLORIDE SERPL-SCNC: 91 MMOL/L — LOW (ref 98–107)
CO2 SERPL-SCNC: 21 MMOL/L — LOW (ref 22–31)
CO2 SERPL-SCNC: 22 MMOL/L — SIGNIFICANT CHANGE UP (ref 22–31)
CO2 SERPL-SCNC: 24 MMOL/L — SIGNIFICANT CHANGE UP (ref 22–31)
CO2 SERPL-SCNC: 24 MMOL/L — SIGNIFICANT CHANGE UP (ref 22–31)
CREAT SERPL-MCNC: 3.84 MG/DL — HIGH (ref 0.5–1.3)
CREAT SERPL-MCNC: 4.04 MG/DL — HIGH (ref 0.5–1.3)
CREAT SERPL-MCNC: 4.25 MG/DL — HIGH (ref 0.5–1.3)
CREAT SERPL-MCNC: 4.76 MG/DL — HIGH (ref 0.5–1.3)
EOSINOPHIL # BLD AUTO: 0.06 K/UL — SIGNIFICANT CHANGE UP (ref 0–0.5)
EOSINOPHIL NFR BLD AUTO: 0.6 % — SIGNIFICANT CHANGE UP (ref 0–6)
GLUCOSE SERPL-MCNC: 215 MG/DL — HIGH (ref 70–99)
GLUCOSE SERPL-MCNC: 323 MG/DL — HIGH (ref 70–99)
GLUCOSE SERPL-MCNC: 380 MG/DL — HIGH (ref 70–99)
GLUCOSE SERPL-MCNC: 473 MG/DL — CRITICAL HIGH (ref 70–99)
HCT VFR BLD CALC: 23.2 % — LOW (ref 39–50)
HCT VFR BLD CALC: 23.2 % — LOW (ref 39–50)
HGB BLD-MCNC: 7.4 G/DL — LOW (ref 13–17)
HGB BLD-MCNC: 7.4 G/DL — LOW (ref 13–17)
IMM GRANULOCYTES # BLD AUTO: 0.07 # — SIGNIFICANT CHANGE UP
IMM GRANULOCYTES NFR BLD AUTO: 0.7 % — SIGNIFICANT CHANGE UP (ref 0–1.5)
LYMPHOCYTES # BLD AUTO: 0.58 K/UL — LOW (ref 1–3.3)
LYMPHOCYTES # BLD AUTO: 6.1 % — LOW (ref 13–44)
MAGNESIUM SERPL-MCNC: 1.6 MG/DL — SIGNIFICANT CHANGE UP (ref 1.6–2.6)
MAGNESIUM SERPL-MCNC: 1.6 MG/DL — SIGNIFICANT CHANGE UP (ref 1.6–2.6)
MAGNESIUM SERPL-MCNC: 1.7 MG/DL — SIGNIFICANT CHANGE UP (ref 1.6–2.6)
MAGNESIUM SERPL-MCNC: 1.8 MG/DL — SIGNIFICANT CHANGE UP (ref 1.6–2.6)
MCHC RBC-ENTMCNC: 28.9 PG — SIGNIFICANT CHANGE UP (ref 27–34)
MCHC RBC-ENTMCNC: 28.9 PG — SIGNIFICANT CHANGE UP (ref 27–34)
MCHC RBC-ENTMCNC: 31.9 % — LOW (ref 32–36)
MCHC RBC-ENTMCNC: 31.9 % — LOW (ref 32–36)
MCV RBC AUTO: 90.6 FL — SIGNIFICANT CHANGE UP (ref 80–100)
MCV RBC AUTO: 90.6 FL — SIGNIFICANT CHANGE UP (ref 80–100)
MONOCYTES # BLD AUTO: 0.76 K/UL — SIGNIFICANT CHANGE UP (ref 0–0.9)
MONOCYTES NFR BLD AUTO: 8 % — SIGNIFICANT CHANGE UP (ref 2–14)
NEUTROPHILS # BLD AUTO: 7.93 K/UL — HIGH (ref 1.8–7.4)
NEUTROPHILS NFR BLD AUTO: 84 % — HIGH (ref 43–77)
NRBC # FLD: 0 — SIGNIFICANT CHANGE UP
NRBC # FLD: 0 — SIGNIFICANT CHANGE UP
PHOSPHATE SERPL-MCNC: 4 MG/DL — SIGNIFICANT CHANGE UP (ref 2.5–4.5)
PHOSPHATE SERPL-MCNC: 4.2 MG/DL — SIGNIFICANT CHANGE UP (ref 2.5–4.5)
PHOSPHATE SERPL-MCNC: 4.5 MG/DL — SIGNIFICANT CHANGE UP (ref 2.5–4.5)
PHOSPHATE SERPL-MCNC: 4.8 MG/DL — HIGH (ref 2.5–4.5)
PLATELET # BLD AUTO: 290 K/UL — SIGNIFICANT CHANGE UP (ref 150–400)
PLATELET # BLD AUTO: 290 K/UL — SIGNIFICANT CHANGE UP (ref 150–400)
PMV BLD: 11.7 FL — SIGNIFICANT CHANGE UP (ref 7–13)
PMV BLD: 11.7 FL — SIGNIFICANT CHANGE UP (ref 7–13)
POTASSIUM SERPL-MCNC: 3.9 MMOL/L — SIGNIFICANT CHANGE UP (ref 3.5–5.3)
POTASSIUM SERPL-MCNC: 3.9 MMOL/L — SIGNIFICANT CHANGE UP (ref 3.5–5.3)
POTASSIUM SERPL-MCNC: 4 MMOL/L — SIGNIFICANT CHANGE UP (ref 3.5–5.3)
POTASSIUM SERPL-MCNC: 4.1 MMOL/L — SIGNIFICANT CHANGE UP (ref 3.5–5.3)
POTASSIUM SERPL-SCNC: 3.9 MMOL/L — SIGNIFICANT CHANGE UP (ref 3.5–5.3)
POTASSIUM SERPL-SCNC: 3.9 MMOL/L — SIGNIFICANT CHANGE UP (ref 3.5–5.3)
POTASSIUM SERPL-SCNC: 4 MMOL/L — SIGNIFICANT CHANGE UP (ref 3.5–5.3)
POTASSIUM SERPL-SCNC: 4.1 MMOL/L — SIGNIFICANT CHANGE UP (ref 3.5–5.3)
RBC # BLD: 2.56 M/UL — LOW (ref 4.2–5.8)
RBC # BLD: 2.56 M/UL — LOW (ref 4.2–5.8)
RBC # FLD: 13.8 % — SIGNIFICANT CHANGE UP (ref 10.3–14.5)
RBC # FLD: 13.8 % — SIGNIFICANT CHANGE UP (ref 10.3–14.5)
SODIUM SERPL-SCNC: 129 MMOL/L — LOW (ref 135–145)
SODIUM SERPL-SCNC: 129 MMOL/L — LOW (ref 135–145)
SODIUM SERPL-SCNC: 131 MMOL/L — LOW (ref 135–145)
SODIUM SERPL-SCNC: 132 MMOL/L — LOW (ref 135–145)
WBC # BLD: 9.46 K/UL — SIGNIFICANT CHANGE UP (ref 3.8–10.5)
WBC # BLD: 9.46 K/UL — SIGNIFICANT CHANGE UP (ref 3.8–10.5)
WBC # FLD AUTO: 9.46 K/UL — SIGNIFICANT CHANGE UP (ref 3.8–10.5)
WBC # FLD AUTO: 9.46 K/UL — SIGNIFICANT CHANGE UP (ref 3.8–10.5)

## 2017-09-17 PROCEDURE — 99232 SBSQ HOSP IP/OBS MODERATE 35: CPT

## 2017-09-17 PROCEDURE — 99233 SBSQ HOSP IP/OBS HIGH 50: CPT | Mod: GC

## 2017-09-17 RX ORDER — INSULIN GLARGINE 100 [IU]/ML
5 INJECTION, SOLUTION SUBCUTANEOUS ONCE
Qty: 0 | Refills: 0 | Status: COMPLETED | OUTPATIENT
Start: 2017-09-17 | End: 2017-09-17

## 2017-09-17 RX ORDER — HEPARIN SODIUM 5000 [USP'U]/ML
1900 INJECTION INTRAVENOUS; SUBCUTANEOUS
Qty: 25000 | Refills: 0 | Status: DISCONTINUED | OUTPATIENT
Start: 2017-09-17 | End: 2017-09-18

## 2017-09-17 RX ORDER — INSULIN LISPRO 100/ML
12 VIAL (ML) SUBCUTANEOUS
Qty: 0 | Refills: 0 | Status: DISCONTINUED | OUTPATIENT
Start: 2017-09-17 | End: 2017-09-17

## 2017-09-17 RX ORDER — INSULIN LISPRO 100/ML
10 VIAL (ML) SUBCUTANEOUS
Qty: 0 | Refills: 0 | Status: DISCONTINUED | OUTPATIENT
Start: 2017-09-17 | End: 2017-09-17

## 2017-09-17 RX ORDER — INSULIN LISPRO 100/ML
3 VIAL (ML) SUBCUTANEOUS ONCE
Qty: 0 | Refills: 0 | Status: COMPLETED | OUTPATIENT
Start: 2017-09-17 | End: 2017-09-17

## 2017-09-17 RX ORDER — INSULIN LISPRO 100/ML
VIAL (ML) SUBCUTANEOUS
Qty: 0 | Refills: 0 | Status: DISCONTINUED | OUTPATIENT
Start: 2017-09-17 | End: 2017-09-17

## 2017-09-17 RX ORDER — INSULIN GLARGINE 100 [IU]/ML
28 INJECTION, SOLUTION SUBCUTANEOUS EVERY MORNING
Qty: 0 | Refills: 0 | Status: DISCONTINUED | OUTPATIENT
Start: 2017-09-17 | End: 2017-09-17

## 2017-09-17 RX ORDER — INSULIN GLARGINE 100 [IU]/ML
28 INJECTION, SOLUTION SUBCUTANEOUS
Qty: 0 | Refills: 0 | Status: DISCONTINUED | OUTPATIENT
Start: 2017-09-18 | End: 2017-09-18

## 2017-09-17 RX ORDER — INSULIN LISPRO 100/ML
VIAL (ML) SUBCUTANEOUS EVERY 4 HOURS
Qty: 0 | Refills: 0 | Status: DISCONTINUED | OUTPATIENT
Start: 2017-09-17 | End: 2017-09-18

## 2017-09-17 RX ADMIN — CYCLOSPORINE 100 MILLIGRAM(S): 100 CAPSULE ORAL at 12:55

## 2017-09-17 RX ADMIN — Medication 50 MILLIGRAM(S): at 18:18

## 2017-09-17 RX ADMIN — Medication 3 UNIT(S): at 13:25

## 2017-09-17 RX ADMIN — HEPARIN SODIUM 0 UNIT(S)/HR: 5000 INJECTION INTRAVENOUS; SUBCUTANEOUS at 15:57

## 2017-09-17 RX ADMIN — Medication 5 MILLIGRAM(S): at 05:30

## 2017-09-17 RX ADMIN — HEPARIN SODIUM 2200 UNIT(S)/HR: 5000 INJECTION INTRAVENOUS; SUBCUTANEOUS at 08:31

## 2017-09-17 RX ADMIN — TAMSULOSIN HYDROCHLORIDE 0.4 MILLIGRAM(S): 0.4 CAPSULE ORAL at 21:13

## 2017-09-17 RX ADMIN — Medication 50 MILLIGRAM(S): at 05:32

## 2017-09-17 RX ADMIN — ATORVASTATIN CALCIUM 80 MILLIGRAM(S): 80 TABLET, FILM COATED ORAL at 21:12

## 2017-09-17 RX ADMIN — Medication 667 MILLIGRAM(S): at 10:00

## 2017-09-17 RX ADMIN — Medication 81 MILLIGRAM(S): at 12:55

## 2017-09-17 RX ADMIN — INSULIN GLARGINE 23 UNIT(S): 100 INJECTION, SOLUTION SUBCUTANEOUS at 08:44

## 2017-09-17 RX ADMIN — Medication 6: at 12:56

## 2017-09-17 RX ADMIN — Medication 80 MILLIGRAM(S): at 18:18

## 2017-09-17 RX ADMIN — INSULIN GLARGINE 5 UNIT(S): 100 INJECTION, SOLUTION SUBCUTANEOUS at 11:14

## 2017-09-17 RX ADMIN — Medication 1 DROP(S): at 22:57

## 2017-09-17 RX ADMIN — Medication 4: at 18:18

## 2017-09-17 RX ADMIN — Medication 667 MILLIGRAM(S): at 18:18

## 2017-09-17 RX ADMIN — Medication 10 UNIT(S): at 12:57

## 2017-09-17 RX ADMIN — HEPARIN SODIUM 1900 UNIT(S)/HR: 5000 INJECTION INTRAVENOUS; SUBCUTANEOUS at 21:11

## 2017-09-17 RX ADMIN — Medication 80 MILLIGRAM(S): at 05:30

## 2017-09-17 RX ADMIN — Medication 2: at 21:45

## 2017-09-17 RX ADMIN — Medication 300 MILLIGRAM(S): at 05:30

## 2017-09-17 RX ADMIN — HEPARIN SODIUM 7000 UNIT(S): 5000 INJECTION INTRAVENOUS; SUBCUTANEOUS at 08:38

## 2017-09-17 RX ADMIN — Medication 8 UNIT(S): at 06:34

## 2017-09-17 RX ADMIN — Medication 6: at 06:00

## 2017-09-17 RX ADMIN — Medication 100 MILLIGRAM(S): at 05:30

## 2017-09-17 RX ADMIN — Medication 667 MILLIGRAM(S): at 12:55

## 2017-09-17 NOTE — PROGRESS NOTE ADULT - PROBLEM SELECTOR PLAN 1
Hyperglycemia  Increased Lantus 28 units at 9AM daily. Gave 5 units this AM to account for 28 units total as patient received only 23 units at 9am.  Give humalog 3 units sq x1 now  increase of Humalog to 12 units TID before meals   Check 3 am FS  Check BMP and BHB at 2pm  Would continue low correction scales before meals and QHS.    Will trend and f/u.  Call fellow on call if hyperglycemia continues to persist  Add hypoglycemia protocol  Patient reports he has an appointment pending with Dr Baker

## 2017-09-17 NOTE — PROGRESS NOTE ADULT - SUBJECTIVE AND OBJECTIVE BOX
Patient is a 52y old  Male who presents with a chief complaint of Altered mental status (10 Sep 2017 23:23)        SUBJECTIVE / OVERNIGHT EVENTS:  -Tele: Afib w/ HR 80s, brief episodes of rapid afib up to 153, triplets, couplets  -FSB this AM     Patient upset about high FSBG but otherwise has no other complaints. Denies any CP, palps, diaphoresis, SOB, fevers, chills, n/v/d/c.      MEDICATIONS  (STANDING):  calcium acetate 667 milliGRAM(s) Oral three times a day with meals  predniSONE   Tablet 5 milliGRAM(s) Oral daily  tamsulosin 0.4 milliGRAM(s) Oral at bedtime  cycloSPORINE  (SandIMMUNE) 100 milliGRAM(s) Oral daily  atorvastatin 80 milliGRAM(s) Oral at bedtime  aspirin enteric coated 81 milliGRAM(s) Oral daily  insulin lispro (HumaLOG) corrective regimen sliding scale   SubCutaneous at bedtime  senna 2 Tablet(s) Oral at bedtime  docusate sodium 100 milliGRAM(s) Oral three times a day  furosemide   Injectable 80 milliGRAM(s) IV Push two times a day  epoetin valente Injectable 7000 Unit(s) IV Push <User Schedule>  diltiazem    milliGRAM(s) Oral daily  heparin  Infusion.  Unit(s)/Hr (16 mL/Hr) IV Continuous <Continuous>  heparin  Injectable 7000 Unit(s) IV Push once  metoprolol 50 milliGRAM(s) Oral two times a day  insulin lispro (HumaLOG) corrective regimen sliding scale   SubCutaneous three times a day before meals  insulin lispro Injectable (HumaLOG) 8 Unit(s) SubCutaneous three times a day before meals  insulin glargine Injectable (LANTUS) 23 Unit(s) SubCutaneous <User Schedule>    MEDICATIONS  (PRN):  heparin  Injectable 7000 Unit(s) IV Push every 6 hours PRN For aPTT less than 40  heparin  Injectable 3500 Unit(s) IV Push every 6 hours PRN For aPTT between 40 - 57  naphazoline/pheniramine Solution 1 Drop(s) Both EYES three times a day PRN eye discomfort        CAPILLARY BLOOD GLUCOSE  460 (17 Sep 2017 07:28)  508 (17 Sep 2017 06:32)  457 (17 Sep 2017 05:51)  371 (16 Sep 2017 21:32)  383 (16 Sep 2017 17:30)  421 (16 Sep 2017 12:20)  289 (16 Sep 2017 08:57)        I&O's Summary      PHYSICAL EXAM  VS: T 98.4, HR 85, /98, RR 18, SpO2 100% on RA  GENERAL: NAD, well-developed  HEAD:  Atraumatic, Normocephalic  EYES: EOMI, PERRLA, conjunctiva and sclera clear. Legally blind but can see changes in light  NECK: Supple, No JVD  CHEST/LUNG: CTAB, No wheeze  HEART: Regular rate and rhythm; No murmurs, rubs, or gallops  ABDOMEN: Soft, Nontender, Nondistended; Bowel sounds present  EXTREMITIES: Feet warm, No clubbing, cyanosis. 1+ pitting edema midway up the calves  PSYCH: AAOx3  SKIN: No rashes or lesions      LABS:                        7.5    9.16  )-----------( 309      ( 16 Sep 2017 07:50 )             23.8     -    135  |  96<L>  |  52<H>  ----------------------------<  337<H>  3.7   |  23  |  3.40<H>    Ca    8.4      16 Sep 2017 07:50  Phos  3.4       Mg     1.7     -      PT/INR - ( 15 Sep 2017 07:55 )   PT: 15.7 SEC;   INR: 1.39          PTT - ( 16 Sep 2017 23:13 )  PTT:115.8 SEC          RADIOLOGY & ADDITIONAL TESTS:    No new additional studies.

## 2017-09-17 NOTE — PROGRESS NOTE ADULT - PROBLEM SELECTOR PLAN 1
PT on MWF schedule. Last hd on 9/15 with 2.6 removed. Patient has no indication for urgent HD today. Plan for HD tomorrow.

## 2017-09-17 NOTE — PROGRESS NOTE ADULT - PROBLEM SELECTOR PLAN 2
- c/w HD per renal MWF  -c/w lasix 80mg IV BID  -permacath in place -  Vascular plan for AVF as inpatient - c/w HD per renal MWF  -c/w lasix 80mg IV BID  -permacath in place -  Vascular plan for AVF as inpatient, need further medical stabilization/control DM, prior to OR. pending medical clearance, discussed with nephrology

## 2017-09-17 NOTE — PROGRESS NOTE ADULT - PROBLEM SELECTOR PLAN 4
in setting of renal failure. Continue phoslo 667mg TID with meals. Monitor serum phosphorus. PTH at goal (186)

## 2017-09-17 NOTE — PROGRESS NOTE ADULT - PROBLEM SELECTOR PLAN 8
- not yet treated    -hepatology consulted, will need f/u hepatology clinic for treatment   -HIV neg, HAV neg

## 2017-09-17 NOTE — PROGRESS NOTE ADULT - SUBJECTIVE AND OBJECTIVE BOX
[facility] DIVISION OF KIDNEY DISEASES AND HYPERTENSION -- FOLLOW UP NOTE  --------------------------------------------------------------------------------    HPI: HPI: 51 y/o M with h/o CKD s/p renal transplant in 1998, HTN, DM, legally blind admitted for uremic encephalopathy initiated on dialysis. Last HD on 9/15/17. Patient seen today complaining of LE edema. Denies CP or SOB.     PAST HISTORY  --------------------------------------------------------------------------------  No significant changes to PMH, PSH, FHx, SHx, unless otherwise noted    ALLERGIES & MEDICATIONS  --------------------------------------------------------------------------------  Allergies    No Known Allergies    Intolerances      Standing Inpatient Medications  calcium acetate 667 milliGRAM(s) Oral three times a day with meals  predniSONE   Tablet 5 milliGRAM(s) Oral daily  tamsulosin 0.4 milliGRAM(s) Oral at bedtime  cycloSPORINE  (SandIMMUNE) 100 milliGRAM(s) Oral daily  atorvastatin 80 milliGRAM(s) Oral at bedtime  aspirin enteric coated 81 milliGRAM(s) Oral daily  insulin lispro (HumaLOG) corrective regimen sliding scale   SubCutaneous at bedtime  senna 2 Tablet(s) Oral at bedtime  docusate sodium 100 milliGRAM(s) Oral three times a day  furosemide   Injectable 80 milliGRAM(s) IV Push two times a day  epoetin valente Injectable 7000 Unit(s) IV Push <User Schedule>  diltiazem    milliGRAM(s) Oral daily  heparin  Infusion.  Unit(s)/Hr IV Continuous <Continuous>  heparin  Injectable 7000 Unit(s) IV Push once  metoprolol 50 milliGRAM(s) Oral two times a day  insulin lispro (HumaLOG) corrective regimen sliding scale   SubCutaneous three times a day before meals  insulin lispro Injectable (HumaLOG) 12 Unit(s) SubCutaneous three times a day before meals    PRN Inpatient Medications  heparin  Injectable 7000 Unit(s) IV Push every 6 hours PRN  heparin  Injectable 3500 Unit(s) IV Push every 6 hours PRN  naphazoline/pheniramine Solution 1 Drop(s) Both EYES three times a day PRN      REVIEW OF SYSTEMS  --------------------------------------------------------------------------------  Gen: No weakness  Skin: No rashes  Head/Eyes/Ears/Mouth: No headache  Respiratory: No dyspnea  CV: No chest pain  GI: No abdominal pain  MSK: +edema  Neuro: No dizziness/lightheadedness  Heme: No easy bruising or bleeding  Psych: No significant nervousness    All other systems were reviewed and are negative, except as noted.    VITALS/PHYSICAL EXAM  --------------------------------------------------------------------------------  T(C): 36.9 (09-17-17 @ 05:28), Max: 36.9 (09-17-17 @ 05:28)  HR: 85 (09-17-17 @ 05:28) (78 - 85)  BP: 153/98 (09-17-17 @ 05:28) (129/82 - 153/98)  RR: 18 (09-17-17 @ 05:28) (18 - 18)  SpO2: 100% (09-17-17 @ 05:28) (100% - 100%)  Wt(kg): --      Physical Exam:  	Gen: NAD  	HEENT: NCAT  	Pulm: CTA B/L  	CV: S1S2  	Abd: Soft  	Ext: + LE edema B/L  	Neuro: Awake  	Skin: Warm and Dry   	Vascular access: right tunneled catheter    LABS/STUDIES  --------------------------------------------------------------------------------              7.4    9.46  >-----------<  290      [09-17-17 @ 07:10]              23.2     129  |  90  |  69  ----------------------------<  473      [09-17-17 @ 07:10]  3.9   |  21  |  3.84        Ca     8.4     [09-17-17 @ 07:10]      Mg     1.6     [09-17-17 @ 07:10]      Phos  4.0     [09-17-17 @ 07:10]        PTT: 35.9       [09-17-17 @ 07:10]      Creatinine Trend:  SCr 3.84 [09-17 @ 07:10]  SCr 3.40 [09-16 @ 07:50]  SCr 3.12 [09-15 @ 17:00]  SCr 4.16 [09-15 @ 07:00]  SCr 4.06 [09-14 @ 07:55]        Iron 21, TIBC 187, %sat --      [09-09-17 @ 06:55]  Ferritin 587.5      [09-09-17 @ 06:55]  .9 (Ca --)      [09-10-17 @ 05:10]   --  HbA1c 8.0      [08-16-17 @ 06:30]  TSH 2.88      [09-03-17 @ 13:45]  Lipid: chol 137, TG 97, HDL 52, LDL 68      [08-11-17 @ 07:20]    HBsAb <3.0      [09-03-17 @ 18:00]  HBsAg NEGATIVE      [09-03-17 @ 18:00]  HCV 14.48, Reactive      [09-03-17 @ 18:00]

## 2017-09-17 NOTE — PROGRESS NOTE ADULT - ATTENDING COMMENTS
I spoke with the medical attending.  Patient currently tachy with hyperglycemia.  Not medically optimized for AVF yet.

## 2017-09-17 NOTE — PROGRESS NOTE ADULT - PROBLEM SELECTOR PLAN 1
- currently rate controlled on PO Cardizem, metoprolol 50 bid w/ hold parameters   -holding coumadin as pt. is planned for AVF as inpatient, c/w heparin gtt  - appreciate EP recs  - f/u TTE - currently rate controlled on PO Cardizem, metoprolol 50 bid w/ hold parameters   -holding coumadin as pt. is planned for AVF as inpatient, c/w heparin gtt  - appreciate EP recs  - TTE nonsignificant.

## 2017-09-17 NOTE — PROVIDER CONTACT NOTE (OTHER) - ACTION/TREATMENT ORDERED:
As per MD, no interventions as this time. Recheck FS in 1 hour. Passed on to day RN Emmett, [address] [place] [address].

## 2017-09-17 NOTE — PROGRESS NOTE ADULT - SUBJECTIVE AND OBJECTIVE BOX
Chief Complaint: DM 1, hyperglycemia     History: Hyperglycemia persists. Patient is tolerating food. Ate oatmeal and eggs for breakfast.  Not eating or drinking anything additional to what is presented on trays. Typically takes 1 unit for every 20 grams of carbs at home.     MEDICATIONS  (STANDING):  calcium acetate 667 milliGRAM(s) Oral three times a day with meals  predniSONE   Tablet 5 milliGRAM(s) Oral daily  tamsulosin 0.4 milliGRAM(s) Oral at bedtime  cycloSPORINE  (SandIMMUNE) 100 milliGRAM(s) Oral daily  atorvastatin 80 milliGRAM(s) Oral at bedtime  aspirin enteric coated 81 milliGRAM(s) Oral daily  insulin lispro (HumaLOG) corrective regimen sliding scale   SubCutaneous at bedtime  senna 2 Tablet(s) Oral at bedtime  docusate sodium 100 milliGRAM(s) Oral three times a day  furosemide   Injectable 80 milliGRAM(s) IV Push two times a day  epoetin valente Injectable 7000 Unit(s) IV Push <User Schedule>  diltiazem    milliGRAM(s) Oral daily  insulin glargine Injectable (LANTUS) 23 Unit(s) SubCutaneous <User Schedule>  heparin  Infusion.  Unit(s)/Hr (16 mL/Hr) IV Continuous <Continuous>  heparin  Injectable 7000 Unit(s) IV Push once  metoprolol 50 milliGRAM(s) Oral two times a day  insulin lispro (HumaLOG) corrective regimen sliding scale   SubCutaneous three times a day before meals  insulin lispro Injectable (HumaLOG) 8 Unit(s) SubCutaneous three times a day before meals    MEDICATIONS  (PRN):  heparin  Injectable 7000 Unit(s) IV Push every 6 hours PRN For aPTT less than 40  heparin  Injectable 3500 Unit(s) IV Push every 6 hours PRN For aPTT between 40 - 57  naphazoline/pheniramine Solution 1 Drop(s) Both EYES three times a day PRN eye discomfort      No Known Allergies      Review of Systems:  Constitutional: No fever  Eyes: legally blind, dim lighting in room makes it difficult for him to differentiate food on plate but he is able to determine most items by taste  Cardiovascular: No chest pain, palpitations  Respiratory: No SOB, no cough  GI: No nausea, vomiting, abdominal pain        PHYSICAL EXAM:  Vital Signs Last 24 Hrs  T(C): 36.9 (17 Sep 2017 05:28), Max: 36.9 (17 Sep 2017 05:28)  T(F): 98.4 (17 Sep 2017 05:28), Max: 98.4 (17 Sep 2017 05:28)  HR: 85 (17 Sep 2017 05:28) (78 - 85)  BP: 153/98 (17 Sep 2017 05:28) (129/82 - 153/98)  BP(mean): --  RR: 18 (17 Sep 2017 05:28) (18 - 18)  SpO2: 100% (17 Sep 2017 05:28) (100% - 100%)  GENERAL: NAD  RESPIRATORY: Non labored  PSYCH: Alert and oriented x 3, normal affect      CAPILLARY BLOOD GLUCOSE  404 (17 Sep 2017 12:21)  399 (17 Sep 2017 08:44)  460 (17 Sep 2017 07:28)  508 (17 Sep 2017 06:32)  457 (17 Sep 2017 05:51)  371 (16 Sep 2017 21:32)  383 (16 Sep 2017 17:30)  421 (09-16 @ 12:20)  289 (09-16 @ 08:57)  157 (09-15 @ 22:58)  303 (09-15 @ 17:25)  342 (09-15 @ 12:42)  279 (09-15 @ 08:44)  453 (09-15 @ 06:35)  458 (09-15 @ 06:31)  255 (09-14 @ 22:27)  207 (09-14 @ 17:02)  159 (09-14 @ 12:42)  91 (09-14 @ 09:51)  66 (09-14 @ 09:10)  150 (09-13 @ 22:29)  123 (09-13 @ 17:15)      09-17    129<L>  |  90<L>  |  69<H>  ----------------------------<  473<HH>  3.9   |  21<L>  |  3.84<H>    Ca    8.4      17 Sep 2017 07:10  Phos  4.0     09-17  Mg     1.6     09-17              Thyroid Function Tests:  09-03 @ 13:45 TSH 2.88 FreeT4 0.70 T3 71.7 Anti TPO -- Anti Thyroglobulin Ab -- TSI --      Hemoglobin A1C, Whole Blood: 8.0 % <H> [4.0 - 5.6] (08-16-17 @ 06:30)

## 2017-09-17 NOTE — PROVIDER CONTACT NOTE (OTHER) - ACTION/TREATMENT ORDERED:
As per MD, follow insulin slide scale for sugar of 457 but hold off on standing dose of insulin. Re-check FS in 30 minutes.

## 2017-09-17 NOTE — PROGRESS NOTE ADULT - PROBLEM SELECTOR PLAN 5
- Home regimen clonidine 0.1mg po q8h, nifedipine XR 90 mg qD, Turosemide 10 mg daily  -c/w lasix 80 IV BID, on cardizem and metoprolol

## 2017-09-17 NOTE — PROGRESS NOTE ADULT - ATTENDING COMMENTS
Pt was seen and examed at bed side with resident.  51 y/o M PMH CKD s/p renal transplant (1998) on cyclosporine and CellCept, HTN, DM, legally blind BIBEMS P/w uremic encephalopathy. In ED, Pt became PEA arrest in the ED, intubated for airway protection. ROSC achieved s/p 1 round of epi and chest compressions. He was admitted in MICU and put on pressors. s/p emergent HD for hyperkalemia. s/p Covered empirically with broad spectrum antibiotics (vanc and zosyn). Found Hgb 5 s/p multiple pRBC given and epogen was started. Pt was able to wean off pressors and pt extubated 9/4.   Renal follow up appreciated, continue Cyclosporine and prednisone, s/p perm cath 9/12,  on lasix with close monitoring renal function. Pt was also found to have new-onset Afib, on coumadin, with bridging heparin gtt. Vasculare consult appreciated, need AVF as in-patient as HD center requested, coumadin on held. Cardiology and Endo following. Continue Cardizem and, increased metoprolol to 50 mg bid as BP tolerated for better HR control. Monitor FS on Lantus (adjusting to AM dose) and Humalog, hyperglycemia,  increased lantus 28 u and humalog 12u TID, repeat BMP,  3AM FS.  encourage consistent carb diet and  monitor for hypoglycemia event.  explained in details with Pt and sister at bedside in details above plan, plan for AVF, need cardiology clearance, and medical clearance, pending optimizing HR and optimizing DM regimen. Recent TTE normal LVEF. H/H stable.  SW arrange out patient HD.

## 2017-09-17 NOTE — PROGRESS NOTE ADULT - PROBLEM SELECTOR PLAN 6
Home regime: lantus 22u daily, home pravastatin 10mg daily.   A1C 8   -atorvastatin 80mg daily  - appreciate endo recs: c/w lantus 22U 9am daily and humalog 8u with meals, JULIA. close monitor for hypoglycemia event. Home regime: lantus 22u daily, home pravastatin 10mg daily.   A1C 8   -hyperglycemic to 508 this AM but has been downtrending after humalog  -appreciate endo recs: additional 5 units lantus now and increasing daily lantus to 28U and humalog 10U with meals, c/w JULIA; close monitoring for hypoglycemia  -FSBG ordered for 3AM  -endo also recommends taking off 75% of goal fluid during HD tomorrow - will discuss with Renal team  -anion gap of 18 - f/u BMP at 2pm  -atorvastatin 80mg daily Home regime: lantus 22u daily, home pravastatin 10mg daily.   A1C 8   -hyperglycemic to 508 this AM but has been downtrending after humalog  -appreciate endo recs: additional 5 units lantus now and increasing daily lantus to 28U and humalog 12U with meals, c/w Mod ISS; close monitoring for hypoglycemia  -FSBG ordered for 3AM  -endo also recommends taking off 75% of goal fluid during HD tomorrow - will discuss with Renal team  -anion gap of 18 - f/u BMP at 2pm  -atorvastatin 80mg daily Home regime: lantus 22u daily, home pravastatin 10mg daily.   A1C 8   -hyperglycemic to 508 this AM but has been downtrending after humalog  -appreciate endo recs: additional 5 units lantus now and increasing daily lantus to 28U and humalog 12U with meals, c/w Mod ISS; close monitoring for hypoglycemia  -FSBG ordered for 3AM  -endo also recommends taking off 75% of goal fluid during HD tomorrow - will discuss with Renal team  - discussed with ENDO and Pt. if hyperglycemia difficult to control, may need lantus in both AM and QHS. repeat BMP testing.   -anion gap of 18 - f/u BMP at 2pm  -atorvastatin 80mg daily

## 2017-09-18 LAB
APTT BLD: 131.6 SEC — CRITICAL HIGH (ref 27.5–37.4)
APTT BLD: 34.3 SEC — SIGNIFICANT CHANGE UP (ref 27.5–37.4)
APTT BLD: 87.4 SEC — HIGH (ref 27.5–37.4)
BASOPHILS # BLD AUTO: 0.04 K/UL — SIGNIFICANT CHANGE UP (ref 0–0.2)
BASOPHILS NFR BLD AUTO: 0.5 % — SIGNIFICANT CHANGE UP (ref 0–2)
BUN SERPL-MCNC: 80 MG/DL — HIGH (ref 7–23)
BUN SERPL-MCNC: 83 MG/DL — HIGH (ref 7–23)
CALCIUM SERPL-MCNC: 8.4 MG/DL — SIGNIFICANT CHANGE UP (ref 8.4–10.5)
CALCIUM SERPL-MCNC: 8.4 MG/DL — SIGNIFICANT CHANGE UP (ref 8.4–10.5)
CHLORIDE SERPL-SCNC: 91 MMOL/L — LOW (ref 98–107)
CHLORIDE SERPL-SCNC: 93 MMOL/L — LOW (ref 98–107)
CO2 SERPL-SCNC: 22 MMOL/L — SIGNIFICANT CHANGE UP (ref 22–31)
CO2 SERPL-SCNC: 24 MMOL/L — SIGNIFICANT CHANGE UP (ref 22–31)
CREAT SERPL-MCNC: 4.66 MG/DL — HIGH (ref 0.5–1.3)
CREAT SERPL-MCNC: 4.7 MG/DL — HIGH (ref 0.5–1.3)
EOSINOPHIL # BLD AUTO: 0.12 K/UL — SIGNIFICANT CHANGE UP (ref 0–0.5)
EOSINOPHIL NFR BLD AUTO: 1.4 % — SIGNIFICANT CHANGE UP (ref 0–6)
GLUCOSE SERPL-MCNC: 189 MG/DL — HIGH (ref 70–99)
GLUCOSE SERPL-MCNC: 227 MG/DL — HIGH (ref 70–99)
HCT VFR BLD CALC: 22.7 % — LOW (ref 39–50)
HCT VFR BLD CALC: 22.7 % — LOW (ref 39–50)
HCT VFR BLD CALC: 24.1 % — LOW (ref 39–50)
HGB BLD-MCNC: 7.3 G/DL — LOW (ref 13–17)
HGB BLD-MCNC: 7.3 G/DL — LOW (ref 13–17)
HGB BLD-MCNC: 7.7 G/DL — LOW (ref 13–17)
IMM GRANULOCYTES # BLD AUTO: 0.1 # — SIGNIFICANT CHANGE UP
IMM GRANULOCYTES NFR BLD AUTO: 1.2 % — SIGNIFICANT CHANGE UP (ref 0–1.5)
INR BLD: 1.18 — HIGH (ref 0.88–1.17)
LYMPHOCYTES # BLD AUTO: 0.9 K/UL — LOW (ref 1–3.3)
LYMPHOCYTES # BLD AUTO: 10.5 % — LOW (ref 13–44)
MAGNESIUM SERPL-MCNC: 1.6 MG/DL — SIGNIFICANT CHANGE UP (ref 1.6–2.6)
MAGNESIUM SERPL-MCNC: 1.7 MG/DL — SIGNIFICANT CHANGE UP (ref 1.6–2.6)
MCHC RBC-ENTMCNC: 28.7 PG — SIGNIFICANT CHANGE UP (ref 27–34)
MCHC RBC-ENTMCNC: 29.2 PG — SIGNIFICANT CHANGE UP (ref 27–34)
MCHC RBC-ENTMCNC: 29.2 PG — SIGNIFICANT CHANGE UP (ref 27–34)
MCHC RBC-ENTMCNC: 32 % — SIGNIFICANT CHANGE UP (ref 32–36)
MCHC RBC-ENTMCNC: 32.2 % — SIGNIFICANT CHANGE UP (ref 32–36)
MCHC RBC-ENTMCNC: 32.2 % — SIGNIFICANT CHANGE UP (ref 32–36)
MCV RBC AUTO: 89.9 FL — SIGNIFICANT CHANGE UP (ref 80–100)
MCV RBC AUTO: 90.8 FL — SIGNIFICANT CHANGE UP (ref 80–100)
MCV RBC AUTO: 90.8 FL — SIGNIFICANT CHANGE UP (ref 80–100)
MONOCYTES # BLD AUTO: 1.15 K/UL — HIGH (ref 0–0.9)
MONOCYTES NFR BLD AUTO: 13.4 % — SIGNIFICANT CHANGE UP (ref 2–14)
NEUTROPHILS # BLD AUTO: 6.27 K/UL — SIGNIFICANT CHANGE UP (ref 1.8–7.4)
NEUTROPHILS NFR BLD AUTO: 73 % — SIGNIFICANT CHANGE UP (ref 43–77)
NRBC # FLD: 0 — SIGNIFICANT CHANGE UP
PHOSPHATE SERPL-MCNC: 4.7 MG/DL — HIGH (ref 2.5–4.5)
PHOSPHATE SERPL-MCNC: 4.8 MG/DL — HIGH (ref 2.5–4.5)
PLATELET # BLD AUTO: 276 K/UL — SIGNIFICANT CHANGE UP (ref 150–400)
PLATELET # BLD AUTO: 291 K/UL — SIGNIFICANT CHANGE UP (ref 150–400)
PLATELET # BLD AUTO: 291 K/UL — SIGNIFICANT CHANGE UP (ref 150–400)
PMV BLD: 11.3 FL — SIGNIFICANT CHANGE UP (ref 7–13)
PMV BLD: 11.6 FL — SIGNIFICANT CHANGE UP (ref 7–13)
PMV BLD: 11.6 FL — SIGNIFICANT CHANGE UP (ref 7–13)
POTASSIUM SERPL-MCNC: 3.6 MMOL/L — SIGNIFICANT CHANGE UP (ref 3.5–5.3)
POTASSIUM SERPL-MCNC: 3.7 MMOL/L — SIGNIFICANT CHANGE UP (ref 3.5–5.3)
POTASSIUM SERPL-SCNC: 3.6 MMOL/L — SIGNIFICANT CHANGE UP (ref 3.5–5.3)
POTASSIUM SERPL-SCNC: 3.7 MMOL/L — SIGNIFICANT CHANGE UP (ref 3.5–5.3)
PROTHROM AB SERPL-ACNC: 13.3 SEC — HIGH (ref 9.8–13.1)
RBC # BLD: 2.5 M/UL — LOW (ref 4.2–5.8)
RBC # BLD: 2.5 M/UL — LOW (ref 4.2–5.8)
RBC # BLD: 2.68 M/UL — LOW (ref 4.2–5.8)
RBC # FLD: 13.8 % — SIGNIFICANT CHANGE UP (ref 10.3–14.5)
SODIUM SERPL-SCNC: 131 MMOL/L — LOW (ref 135–145)
SODIUM SERPL-SCNC: 133 MMOL/L — LOW (ref 135–145)
WBC # BLD: 6.73 K/UL — SIGNIFICANT CHANGE UP (ref 3.8–10.5)
WBC # BLD: 8.58 K/UL — SIGNIFICANT CHANGE UP (ref 3.8–10.5)
WBC # BLD: 8.58 K/UL — SIGNIFICANT CHANGE UP (ref 3.8–10.5)
WBC # FLD AUTO: 6.73 K/UL — SIGNIFICANT CHANGE UP (ref 3.8–10.5)
WBC # FLD AUTO: 8.58 K/UL — SIGNIFICANT CHANGE UP (ref 3.8–10.5)
WBC # FLD AUTO: 8.58 K/UL — SIGNIFICANT CHANGE UP (ref 3.8–10.5)

## 2017-09-18 PROCEDURE — 99232 SBSQ HOSP IP/OBS MODERATE 35: CPT

## 2017-09-18 PROCEDURE — 90935 HEMODIALYSIS ONE EVALUATION: CPT

## 2017-09-18 PROCEDURE — 99233 SBSQ HOSP IP/OBS HIGH 50: CPT | Mod: GC

## 2017-09-18 RX ORDER — DEXTROSE 50 % IN WATER 50 %
12.5 SYRINGE (ML) INTRAVENOUS ONCE
Qty: 0 | Refills: 0 | Status: DISCONTINUED | OUTPATIENT
Start: 2017-09-18 | End: 2017-10-02

## 2017-09-18 RX ORDER — DEXTROSE 50 % IN WATER 50 %
1 SYRINGE (ML) INTRAVENOUS ONCE
Qty: 0 | Refills: 0 | Status: DISCONTINUED | OUTPATIENT
Start: 2017-09-18 | End: 2017-10-02

## 2017-09-18 RX ORDER — INSULIN LISPRO 100/ML
8 VIAL (ML) SUBCUTANEOUS
Qty: 0 | Refills: 0 | Status: DISCONTINUED | OUTPATIENT
Start: 2017-09-18 | End: 2017-09-22

## 2017-09-18 RX ORDER — INSULIN LISPRO 100/ML
5 VIAL (ML) SUBCUTANEOUS ONCE
Qty: 0 | Refills: 0 | Status: DISCONTINUED | OUTPATIENT
Start: 2017-09-18 | End: 2017-09-18

## 2017-09-18 RX ORDER — HEPARIN SODIUM 5000 [USP'U]/ML
7000 INJECTION INTRAVENOUS; SUBCUTANEOUS EVERY 6 HOURS
Qty: 0 | Refills: 0 | Status: DISCONTINUED | OUTPATIENT
Start: 2017-09-18 | End: 2017-09-19

## 2017-09-18 RX ORDER — GLUCAGON INJECTION, SOLUTION 0.5 MG/.1ML
1 INJECTION, SOLUTION SUBCUTANEOUS ONCE
Qty: 0 | Refills: 0 | Status: DISCONTINUED | OUTPATIENT
Start: 2017-09-18 | End: 2017-10-02

## 2017-09-18 RX ORDER — HEPARIN SODIUM 5000 [USP'U]/ML
INJECTION INTRAVENOUS; SUBCUTANEOUS
Qty: 25000 | Refills: 0 | Status: DISCONTINUED | OUTPATIENT
Start: 2017-09-18 | End: 2017-09-19

## 2017-09-18 RX ORDER — INSULIN LISPRO 100/ML
1 VIAL (ML) SUBCUTANEOUS ONCE
Qty: 0 | Refills: 0 | Status: COMPLETED | OUTPATIENT
Start: 2017-09-18 | End: 2017-09-18

## 2017-09-18 RX ORDER — INSULIN GLARGINE 100 [IU]/ML
22 INJECTION, SOLUTION SUBCUTANEOUS EVERY MORNING
Qty: 0 | Refills: 0 | Status: DISCONTINUED | OUTPATIENT
Start: 2017-09-18 | End: 2017-09-19

## 2017-09-18 RX ORDER — INSULIN LISPRO 100/ML
VIAL (ML) SUBCUTANEOUS AT BEDTIME
Qty: 0 | Refills: 0 | Status: DISCONTINUED | OUTPATIENT
Start: 2017-09-18 | End: 2017-10-01

## 2017-09-18 RX ORDER — INSULIN GLARGINE 100 [IU]/ML
28 INJECTION, SOLUTION SUBCUTANEOUS EVERY MORNING
Qty: 0 | Refills: 0 | Status: DISCONTINUED | OUTPATIENT
Start: 2017-09-18 | End: 2017-09-18

## 2017-09-18 RX ORDER — INSULIN LISPRO 100/ML
3 VIAL (ML) SUBCUTANEOUS ONCE
Qty: 0 | Refills: 0 | Status: COMPLETED | OUTPATIENT
Start: 2017-09-18 | End: 2017-09-18

## 2017-09-18 RX ORDER — INSULIN LISPRO 100/ML
VIAL (ML) SUBCUTANEOUS
Qty: 0 | Refills: 0 | Status: DISCONTINUED | OUTPATIENT
Start: 2017-09-18 | End: 2017-10-01

## 2017-09-18 RX ORDER — SODIUM CHLORIDE 9 MG/ML
1000 INJECTION, SOLUTION INTRAVENOUS
Qty: 0 | Refills: 0 | Status: DISCONTINUED | OUTPATIENT
Start: 2017-09-18 | End: 2017-10-02

## 2017-09-18 RX ORDER — INSULIN GLARGINE 100 [IU]/ML
14 INJECTION, SOLUTION SUBCUTANEOUS
Qty: 0 | Refills: 0 | Status: DISCONTINUED | OUTPATIENT
Start: 2017-09-18 | End: 2017-09-18

## 2017-09-18 RX ORDER — DEXTROSE 50 % IN WATER 50 %
25 SYRINGE (ML) INTRAVENOUS ONCE
Qty: 0 | Refills: 0 | Status: DISCONTINUED | OUTPATIENT
Start: 2017-09-18 | End: 2017-10-02

## 2017-09-18 RX ORDER — HEPARIN SODIUM 5000 [USP'U]/ML
3500 INJECTION INTRAVENOUS; SUBCUTANEOUS EVERY 6 HOURS
Qty: 0 | Refills: 0 | Status: DISCONTINUED | OUTPATIENT
Start: 2017-09-18 | End: 2017-09-19

## 2017-09-18 RX ADMIN — HEPARIN SODIUM 7000 UNIT(S): 5000 INJECTION INTRAVENOUS; SUBCUTANEOUS at 18:37

## 2017-09-18 RX ADMIN — CYCLOSPORINE 100 MILLIGRAM(S): 100 CAPSULE ORAL at 11:30

## 2017-09-18 RX ADMIN — Medication 50 MILLIGRAM(S): at 11:28

## 2017-09-18 RX ADMIN — ERYTHROPOIETIN 7000 UNIT(S): 10000 INJECTION, SOLUTION INTRAVENOUS; SUBCUTANEOUS at 09:23

## 2017-09-18 RX ADMIN — Medication 300 MILLIGRAM(S): at 11:28

## 2017-09-18 RX ADMIN — Medication 3 UNIT(S): at 18:33

## 2017-09-18 RX ADMIN — Medication 1 DROP(S): at 11:31

## 2017-09-18 RX ADMIN — Medication 80 MILLIGRAM(S): at 18:07

## 2017-09-18 RX ADMIN — HEPARIN SODIUM 1600 UNIT(S)/HR: 5000 INJECTION INTRAVENOUS; SUBCUTANEOUS at 12:16

## 2017-09-18 RX ADMIN — INSULIN GLARGINE 28 UNIT(S): 100 INJECTION, SOLUTION SUBCUTANEOUS at 12:16

## 2017-09-18 RX ADMIN — Medication 8 UNIT(S): at 16:34

## 2017-09-18 RX ADMIN — Medication 81 MILLIGRAM(S): at 11:30

## 2017-09-18 RX ADMIN — Medication 80 MILLIGRAM(S): at 12:17

## 2017-09-18 RX ADMIN — TAMSULOSIN HYDROCHLORIDE 0.4 MILLIGRAM(S): 0.4 CAPSULE ORAL at 21:42

## 2017-09-18 RX ADMIN — HEPARIN SODIUM 1900 UNIT(S)/HR: 5000 INJECTION INTRAVENOUS; SUBCUTANEOUS at 00:41

## 2017-09-18 RX ADMIN — Medication 1 UNIT(S): at 20:49

## 2017-09-18 RX ADMIN — HEPARIN SODIUM 0 UNIT(S)/HR: 5000 INJECTION INTRAVENOUS; SUBCUTANEOUS at 08:09

## 2017-09-18 RX ADMIN — Medication 50 MILLIGRAM(S): at 18:07

## 2017-09-18 RX ADMIN — Medication 5 MILLIGRAM(S): at 05:37

## 2017-09-18 RX ADMIN — Medication 667 MILLIGRAM(S): at 18:07

## 2017-09-18 RX ADMIN — HEPARIN SODIUM 2000 UNIT(S)/HR: 5000 INJECTION INTRAVENOUS; SUBCUTANEOUS at 18:33

## 2017-09-18 RX ADMIN — Medication: at 16:34

## 2017-09-18 RX ADMIN — ATORVASTATIN CALCIUM 80 MILLIGRAM(S): 80 TABLET, FILM COATED ORAL at 21:42

## 2017-09-18 NOTE — CONSULT NOTE ADULT - SUBJECTIVE AND OBJECTIVE BOX
House Cardiology Initial Consult  Consult Spectra [phone]    CHIEF COMPLAINT:  Initially Altered mental status     HISTORY OF PRESENT ILLNESS:  52yMale w/ a PMHx of CKD s/p renal transplant (1998) on cyclosporine and mycophenolate, HTN, type I DM, legally blind and h/o uremic encephalopathy who presented for AMS and subsequently had PEA arrest secondary to hyperkalemia requiring emergent HD on 9/03. Initially ROSC was achieved with chest compression and epi x1 and he was transferred to the MICU were he was found to have new-onset AFib on 9/7 and started on diltiazem drip then switched to oral and started on metoprolol. He has not been properly rate controlled since.    Cardiology consultation for pre-operative clearance for AV-fistula placement. The patient states that before hospitalization he was on insulin, he has about a 40pack year smoking history and IVDU in past, was able to walk 1/2 mile and up a flight of stairs w/ SOB, he never had a history of CAD or CHF, did not have problems with previous surgeries.  The patient states that he is doing ok while in the hospital and has not had any recent chest pain, HONG (walking to the restroom), SOB, palpitations, syncopal episodes, abdominal pain, diaphoresis, N/V. However he does mention he legs have been swollen but is unclear for how long or if there has been any change.     Allergies    No Known Allergies    MEDICATIONS:  tamsulosin 0.4 milliGRAM(s) Oral at bedtime  aspirin enteric coated 81 milliGRAM(s) Oral daily  furosemide   Injectable 80 milliGRAM(s) IV Push two times a day  diltiazem    milliGRAM(s) Oral daily  metoprolol 50 milliGRAM(s) Oral two times a day  heparin  Infusion.  Unit(s)/Hr IV Continuous <Continuous>  heparin  Injectable 7000 Unit(s) IV Push every 6 hours PRN  heparin  Injectable 3500 Unit(s) IV Push every 6 hours PRN  senna 2 Tablet(s) Oral at bedtime  docusate sodium 100 milliGRAM(s) Oral three times a day  predniSONE   Tablet 5 milliGRAM(s) Oral daily  atorvastatin 80 milliGRAM(s) Oral at bedtime  insulin lispro (HumaLOG) corrective regimen sliding scale   SubCutaneous every 4 hours  insulin glargine Injectable (LANTUS) 28 Unit(s) SubCutaneous every morning  calcium acetate 667 milliGRAM(s) Oral three times a day with meals  cycloSPORINE  (SandIMMUNE) 100 milliGRAM(s) Oral daily  epoetin valente Injectable 7000 Unit(s) IV Push <User Schedule>  naphazoline/pheniramine Solution 1 Drop(s) Both EYES three times a day PRN      PAST MEDICAL & SURGICAL HISTORY:  Hypertension  Legally blind  Diabetes mellitus type I  Renal transplant, status post  CKD (chronic kidney disease)  Renal transplant, status post  History of tonsillectomy      FAMILY HISTORY:  No pertinent family history in first degree relatives      SOCIAL HISTORY:    [X ] Smoker 40PK YR  IVDU    Review of Systems:  Constitutional: [ ] Fever [ ] Chills [ ] Fatigue [ ] Weight change   HEENT: [X ] Blind [ ] Eye Pain [ ] Headache [ ] Runny nose [ ] Sore Throat   Respiratory: [ ] Cough [ ] Wheezing [ ] Shortness of breath  Cardiovascular: [ ] Chest Pain [ ] Palpitations [ ] HONG [ ] PND [ ] Orthopnea  Gastrointestinal: [ ] Abdominal Pain [ ] Diarrhea [ ] Constipation [ ] Hemorrhoids [ ] Nausea [ ] Vomiting  Genitourinary: [ ] Nocturia [ ] Dysuria [ ] Incontinence  Extremities: [X ] Swelling [ ] Joint Pain  Neurologic: [ ] Focal deficit [ ] Paresthesias [ ] Syncope  Skin: [ ] Rash [ ] Ecchymoses [ ] Wounds [ ] Lesions  Psychiatry: [ ] Depression [ ] Suicidal/Homicidal Ideation [ ] Anxiety [ ] Sleep Disturbances  [X ] 10 point review of systems is otherwise negative except as mentioned above            [ ]Unable to obtain  PHYSICAL EXAM:  T(C): 36.6 (09-18-17 @ 10:45), Max: 36.8 (09-17-17 @ 15:43)  HR: 130 (09-18-17 @ 10:45) (66 - 130)  BP: 130/80 (09-18-17 @ 10:45) (128/73 - 166/87)  RR: 18 (09-18-17 @ 10:45) (18 - 18)  SpO2: 99% (09-18-17 @ 05:34) (99% - 99%)        Appearance: Normal	  HEENT:   esotropia of left eye, poor dentition, Normal oral mucosa, PERRL, EOMI	  Cardiovascular: irregularly irregular rhythm with continuous coarse murmur at LLSB only, +RVH, displaced PMI, +JVD, 2+ pitting edema, <2sec capillary refill  Respiratory: Bilateral inspiratory fine crackles Lower lobes.   Psychiatry: A & O x 3, Mood & affect appropriate  Gastrointestinal:  soft nt nd normoactive bs	  Skin: No rashes, No ecchymoses, No cyanosis	  Neurologic: grossly non-focal  Extremities: Normal range of motion, No clubbing, cyanosis  Vascular: Right shiley cath, Peripheral pulses palpable 2+ bilaterally      LABS:	 	    CBC Full  -  ( 18 Sep 2017 06:23 )  WBC Count : 8.58 K/uL  Hemoglobin : 7.3 g/dL  Hematocrit : 22.7 %  Platelet Count - Automated : 291 K/uL  Mean Cell Volume : 90.8 fL  Mean Cell Hemoglobin : 29.2 pg  Mean Cell Hemoglobin Concentration : 32.2 %  Auto Neutrophil # : 6.27 K/uL  Auto Lymphocyte # : 0.90 K/uL  Auto Monocyte # : 1.15 K/uL  Auto Eosinophil # : 0.12 K/uL  Auto Basophil # : 0.04 K/uL  Auto Neutrophil % : 73.0 %  Auto Lymphocyte % : 10.5 %  Auto Monocyte % : 13.4 %  Auto Eosinophil % : 1.4 %  Auto Basophil % : 0.5 %    09-18    131<L>  |  91<L>  |  83<H>  ----------------------------<  227<H>  3.6   |  22  |  4.70<H>  09-18    133<L>  |  93<L>  |  80<H>  ----------------------------<  189<H>  3.7   |  24  |  4.66<H>    Ca    8.4      18 Sep 2017 06:23  Ca    8.4      18 Sep 2017 02:35  Phos  4.7     09-18  Phos  4.8     09-18  Mg     1.7     09-18  Mg     1.6     09-18        proBNP:   Lipid Profile:   HgA1c: Hemoglobin A1C, Whole Blood: 8.0 % (08-16 @ 06:30)    TSH:       CARDIAC MARKERS:  Troponin T, Serum (09.09.17 @ 06:55)    Troponin T, Serum: 0.92: Delta: 0.94 on 09/08/    RESULT CALLED TO: MSVivek JOSE CATHLEEN RODRIGUEZ.  DATE / TIME CALLED: 09/09/17 0911  CALLED BY: PRIMO BIRMINGHAM  Delta: 0.94 on 09/08/  INCLUDES THE 99th PERCENTILE OF A HEALTHY  POPULATION AT A  METHOD C.V. OF 10% OR LE0.92: SS.    TROPONIN T IS MEASURED BY THE [facility] ELECSYS ECLIA METHOD. ng/mL          TELEMETRY: atrial fibrillation with RVR between 120s-140s  ECG:   	    PREVIOUS DIAGNOSTIC TESTING:    [ ] Echocardiogram:  < from: Transthoracic Echocardiogram (09.08.17 @ 16:55) >  CONCLUSIONS:  1. Mitral annular calcification, otherwise normal mitral  valve. Minimal mitral regurgitation.  2. Mild concentric left ventricular hypertrophy.  3. Endocardium not well visualized; grossly normal left  ventricular systolic function.  4. Normal right ventricular size and function.  5. Small pericardial effusion posterior to the left  ventricle.  6. Right pleural effusion.    < end of copied text > House Cardiology Initial Consult  Consult Spectra [phone]    CHIEF COMPLAINT:  Initially Altered mental status     HISTORY OF PRESENT ILLNESS:  52yMale w/ a PMHx of CKD s/p renal transplant (1998) on cyclosporine and mycophenolate, HTN, type I DM, legally blind and h/o uremic encephalopathy who presented for AMS and subsequently had PEA arrest secondary to hyperkalemia requiring emergent HD on 9/03. Initially ROSC was achieved with chest compression and epi x1 and he was transferred to the MICU were he was found to have new-onset AFib on 9/7 and started on diltiazem drip then switched to oral and started on metoprolol. He has not been properly rate controlled since.    Cardiology consultation for pre-operative clearance for AV-fistula placement. The patient states that before hospitalization he was on insulin, he has about a 40pack year smoking history and IVDU in past, was able to walk 1/2 mile and up a flight of stairs w/ SOB, he never had a history of CAD or CHF, did not have problems with previous surgeries.  The patient states that he is doing ok while in the hospital and has not had any recent chest pain, HONG (walking to the restroom), SOB, palpitations, syncopal episodes, abdominal pain, diaphoresis, N/V. However he does mention he legs have been swollen but is unclear for how long or if there has been any change.     Allergies    No Known Allergies    MEDICATIONS:  tamsulosin 0.4 milliGRAM(s) Oral at bedtime  aspirin enteric coated 81 milliGRAM(s) Oral daily  furosemide   Injectable 80 milliGRAM(s) IV Push two times a day  diltiazem    milliGRAM(s) Oral daily  metoprolol 50 milliGRAM(s) Oral two times a day  heparin  Infusion.  Unit(s)/Hr IV Continuous <Continuous>  heparin  Injectable 7000 Unit(s) IV Push every 6 hours PRN  heparin  Injectable 3500 Unit(s) IV Push every 6 hours PRN  senna 2 Tablet(s) Oral at bedtime  docusate sodium 100 milliGRAM(s) Oral three times a day  predniSONE   Tablet 5 milliGRAM(s) Oral daily  atorvastatin 80 milliGRAM(s) Oral at bedtime  insulin lispro (HumaLOG) corrective regimen sliding scale   SubCutaneous every 4 hours  insulin glargine Injectable (LANTUS) 28 Unit(s) SubCutaneous every morning  calcium acetate 667 milliGRAM(s) Oral three times a day with meals  cycloSPORINE  (SandIMMUNE) 100 milliGRAM(s) Oral daily  epoetin valente Injectable 7000 Unit(s) IV Push <User Schedule>  naphazoline/pheniramine Solution 1 Drop(s) Both EYES three times a day PRN      PAST MEDICAL & SURGICAL HISTORY:  Hypertension  Legally blind  Diabetes mellitus type I  Renal transplant, status post  CKD (chronic kidney disease)  Renal transplant, status post  History of tonsillectomy      FAMILY HISTORY:  No pertinent family history in first degree relatives      SOCIAL HISTORY:    [X ] Smoker 40PK YR  IVDU    Review of Systems:  Constitutional: [ ] Fever [ ] Chills [ ] Fatigue [ ] Weight change   HEENT: [X ] Blind [ ] Eye Pain [ ] Headache [ ] Runny nose [ ] Sore Throat   Respiratory: [ ] Cough [ ] Wheezing [ ] Shortness of breath  Cardiovascular: [ ] Chest Pain [ ] Palpitations [ ] HONG [ ] PND [ ] Orthopnea  Gastrointestinal: [ ] Abdominal Pain [ ] Diarrhea [ ] Constipation [ ] Hemorrhoids [ ] Nausea [ ] Vomiting  Genitourinary: [ ] Nocturia [ ] Dysuria [ ] Incontinence  Extremities: [X ] Swelling [ ] Joint Pain  Neurologic: [ ] Focal deficit [ ] Paresthesias [ ] Syncope  Skin: [ ] Rash [ ] Ecchymoses [ ] Wounds [ ] Lesions  Psychiatry: [ ] Depression [ ] Suicidal/Homicidal Ideation [ ] Anxiety [ ] Sleep Disturbances  [X ] 10 point review of systems is otherwise negative except as mentioned above            [ ]Unable to obtain  PHYSICAL EXAM:  T(C): 36.6 (09-18-17 @ 10:45), Max: 36.8 (09-17-17 @ 15:43)  HR: 130 (09-18-17 @ 10:45) (66 - 130)  BP: 130/80 (09-18-17 @ 10:45) (128/73 - 166/87)  RR: 18 (09-18-17 @ 10:45) (18 - 18)  SpO2: 99% (09-18-17 @ 05:34) (99% - 99%)        Appearance: Normal	  HEENT:   esotropia of left eye, poor dentition, Normal oral mucosa, PERRL, EOMI	  Cardiovascular: irregularly irregular rhythm at ~120bpm with continuous coarse murmur at LLSB only, +RVH, displaced PMI, +JVD, 2+ pitting edema, <2sec capillary refill  Respiratory: Bilateral inspiratory fine crackles Lower lobes.   Psychiatry: A & O x 3, Mood & affect appropriate  Gastrointestinal:  soft nt nd normoactive bs	  Skin: No rashes, No ecchymoses, No cyanosis	  Neurologic: grossly non-focal  Extremities: Normal range of motion, No clubbing, cyanosis  Vascular: Right shiley cath, Peripheral pulses palpable 2+ bilaterally      LABS:	 	    CBC Full  -  ( 18 Sep 2017 06:23 )  WBC Count : 8.58 K/uL  Hemoglobin : 7.3 g/dL  Hematocrit : 22.7 %  Platelet Count - Automated : 291 K/uL  Mean Cell Volume : 90.8 fL  Mean Cell Hemoglobin : 29.2 pg  Mean Cell Hemoglobin Concentration : 32.2 %  Auto Neutrophil # : 6.27 K/uL  Auto Lymphocyte # : 0.90 K/uL  Auto Monocyte # : 1.15 K/uL  Auto Eosinophil # : 0.12 K/uL  Auto Basophil # : 0.04 K/uL  Auto Neutrophil % : 73.0 %  Auto Lymphocyte % : 10.5 %  Auto Monocyte % : 13.4 %  Auto Eosinophil % : 1.4 %  Auto Basophil % : 0.5 %    09-18    131<L>  |  91<L>  |  83<H>  ----------------------------<  227<H>  3.6   |  22  |  4.70<H>  09-18    133<L>  |  93<L>  |  80<H>  ----------------------------<  189<H>  3.7   |  24  |  4.66<H>    Ca    8.4      18 Sep 2017 06:23  Ca    8.4      18 Sep 2017 02:35  Phos  4.7     09-18  Phos  4.8     09-18  Mg     1.7     09-18  Mg     1.6     09-18        proBNP:   Lipid Profile:   HgA1c: Hemoglobin A1C, Whole Blood: 8.0 % (08-16 @ 06:30)    TSH:       CARDIAC MARKERS:  Troponin T, Serum (09.09.17 @ 06:55)    Troponin T, Serum: 0.92: Delta: 0.94 on 09/08/    RESULT CALLED TO: MS. JOSE CONNOLLY RN.  DATE / TIME CALLED: 09/09/17 0911  CALLED BY: PRIMO BIRMINGHAM  Delta: 0.94 on 09/08/  INCLUDES THE 99th PERCENTILE OF A HEALTHY  POPULATION AT A  METHOD C.V. OF 10% OR LE0.92: SS.    TROPONIN T IS MEASURED BY THE [facility] ELECS[employer] ECLIA METHOD. ng/mL          TELEMETRY: atrial fibrillation with RVR between 120s-140s  ECG:   	    PREVIOUS DIAGNOSTIC TESTING:    [ ] Echocardiogram:  < from: Transthoracic Echocardiogram (09.08.17 @ 16:55) >  CONCLUSIONS:  1. Mitral annular calcification, otherwise normal mitral  valve. Minimal mitral regurgitation.  2. Mild concentric left ventricular hypertrophy.  3. Endocardium not well visualized; grossly normal left  ventricular systolic function.  4. Normal right ventricular size and function.  5. Small pericardial effusion posterior to the left  ventricle.  6. Right pleural effusion.    < end of copied text >

## 2017-09-18 NOTE — PROGRESS NOTE ADULT - PROBLEM SELECTOR PLAN 1
- currently rate controlled on PO Cardizem, metoprolol 50 bid w/ hold parameters   -holding coumadin as pt. is planned for AVF as inpatient, c/w heparin gtt  - appreciate EP recs  - TTE nonsignificant. - currently rate controlled on PO Cardizem, metoprolol 50 bid w/ hold parameters   -holding coumadin as pt. is planned for AVF as inpatient, c/w heparin gtt  - appreciate EP recs  - TTE nonsignificant  - afib w/ RVR after HD today, hemodynamically stable, gave cardizem and lopressor that pt was scheduled for, will re-evaluate. - currently rate controlled on PO Cardizem, metoprolol 50 bid w/ hold parameters   -holding coumadin as pt. is planned for AVF as inpatient, c/w heparin gtt  - appreciate EP recs  - TTE nonsignificant  - afib w/ RVR after HD today, hemodynamically stable, gave cardizem and lopressor that pt was scheduled for, will re-evaluate. Need to continue metoprolol  and cardizem while NPO, and during HD for HR control  -cardiology clearance reviewed in sunrise for AVF.

## 2017-09-18 NOTE — PROGRESS NOTE ADULT - SUBJECTIVE AND OBJECTIVE BOX
Chief Complaint: DM1    History: Was NPO earlier today but procedure cancelled. He ate eggs from breakfast and never received meal tray for lunch.  No hypoglycemia.    MEDICATIONS  (STANDING):  calcium acetate 667 milliGRAM(s) Oral three times a day with meals  predniSONE   Tablet 5 milliGRAM(s) Oral daily  tamsulosin 0.4 milliGRAM(s) Oral at bedtime  cycloSPORINE  (SandIMMUNE) 100 milliGRAM(s) Oral daily  atorvastatin 80 milliGRAM(s) Oral at bedtime  aspirin enteric coated 81 milliGRAM(s) Oral daily  senna 2 Tablet(s) Oral at bedtime  docusate sodium 100 milliGRAM(s) Oral three times a day  furosemide   Injectable 80 milliGRAM(s) IV Push two times a day  epoetin valente Injectable 7000 Unit(s) IV Push <User Schedule>  diltiazem    milliGRAM(s) Oral daily  metoprolol 50 milliGRAM(s) Oral two times a day  insulin lispro (HumaLOG) corrective regimen sliding scale   SubCutaneous every 4 hours  insulin glargine Injectable (LANTUS) 28 Unit(s) SubCutaneous every morning  heparin  Infusion.  Unit(s)/Hr (16 mL/Hr) IV Continuous <Continuous>    MEDICATIONS  (PRN):  naphazoline/pheniramine Solution 1 Drop(s) Both EYES three times a day PRN eye discomfort  heparin  Injectable 7000 Unit(s) IV Push every 6 hours PRN For aPTT less than 40  heparin  Injectable 3500 Unit(s) IV Push every 6 hours PRN For aPTT between 40 - 57      Allergies    No Known Allergies    Intolerances      Review of Systems:    ALL OTHER SYSTEMS REVIEWED AND NEGATIVE      PHYSICAL EXAM:  VITALS: T(C): 37.3 (09-18-17 @ 14:00)  T(F): 99.2 (09-18-17 @ 14:00), Max: 99.2 (09-18-17 @ 14:00)  HR: 101 (09-18-17 @ 14:00) (66 - 130)  BP: 138/76 (09-18-17 @ 14:00) (128/73 - 166/87)  RR:  (18 - 18)  SpO2:  (97% - 99%)  Wt(kg): --  GENERAL: NAD, well-groomed, well-developed  EYES: blind  HEENT:  Atraumatic, Normocephalic, moist mucous membranes  SKIN: Dry, intact, No rashes or lesions  NEURO: no tremor  PSYCH: Alert and oriented x 3, normal affect, normal mood    CAPILLARY BLOOD GLUCOSE  208 (09-18 @ 12:00)  153 (09-18 @ 08:59)  225 (09-18 @ 05:52)  225 (09-18 @ 05:50)  202 (09-18 @ 02:47)  253 (09-17 @ 21:40)  336 (09-17 @ 17:11)  404 (09-17 @ 12:21)  399 (09-17 @ 08:44)  460 (09-17 @ 07:28)  508 (09-17 @ 06:32)  457 (09-17 @ 05:51)  371 (09-16 @ 21:32)  383 (09-16 @ 17:30)  421 (09-16 @ 12:20)  289 (09-16 @ 08:57)  157 (09-15 @ 22:58)  303 (09-15 @ 17:25)      09-18    131<L>  |  91<L>  |  83<H>  ----------------------------<  227<H>  3.6   |  22  |  4.70<H>    EGFR if : 15  EGFR if non : 13    Ca    8.4      09-18  Mg     1.7     09-18  Phos  4.7     09-18            Thyroid Function Tests:  09-03 @ 13:45 TSH 2.88 FreeT4 0.70 T3 71.7 Anti TPO -- Anti Thyroglobulin Ab -- TSI --      Hemoglobin A1C, Whole Blood: 8.0 % <H> [4.0 - 5.6] (08-16-17 @ 06:30)

## 2017-09-18 NOTE — PROGRESS NOTE ADULT - PROBLEM SELECTOR PLAN 5
Pt with failed renal transplant. Advise slow taper off of immunosuppressants over 3-6 months continue prednisone and cyclosporine at current dose.

## 2017-09-18 NOTE — PROGRESS NOTE ADULT - SUBJECTIVE AND OBJECTIVE BOX
[facility] Division of Kidney Diseases & Hypertension  HEMODIALYSIS NOTE  --------------------------------------------------------------------------------  Chief Complaint: ESRD/Ongoing hemodialysis requirement    24 hour events/subjective:    HPI: HPI: 51 y/o M with h/o CKD s/p renal transplant in 1998, HTN, DM, legally blind admitted for uremic encephalopathy initiated on dialysis. Last HD on 9/15/17. Patient seen and evaluated on HD today no complaints.      PAST HISTORY  --------------------------------------------------------------------------------  No significant changes to PMH, PSH, FHx, SHx, unless otherwise noted    ALLERGIES & MEDICATIONS  --------------------------------------------------------------------------------  Allergies    No Known Allergies    Intolerances      Standing Inpatient Medications  calcium acetate 667 milliGRAM(s) Oral three times a day with meals  predniSONE   Tablet 5 milliGRAM(s) Oral daily  tamsulosin 0.4 milliGRAM(s) Oral at bedtime  cycloSPORINE  (SandIMMUNE) 100 milliGRAM(s) Oral daily  atorvastatin 80 milliGRAM(s) Oral at bedtime  aspirin enteric coated 81 milliGRAM(s) Oral daily  senna 2 Tablet(s) Oral at bedtime  docusate sodium 100 milliGRAM(s) Oral three times a day  furosemide   Injectable 80 milliGRAM(s) IV Push two times a day  epoetin valente Injectable 7000 Unit(s) IV Push <User Schedule>  diltiazem    milliGRAM(s) Oral daily  heparin  Injectable 7000 Unit(s) IV Push once  metoprolol 50 milliGRAM(s) Oral two times a day  insulin glargine Injectable (LANTUS) 28 Unit(s) SubCutaneous <User Schedule>  insulin lispro (HumaLOG) corrective regimen sliding scale   SubCutaneous every 4 hours  heparin  Infusion. 1900 Unit(s)/Hr IV Continuous <Continuous>    PRN Inpatient Medications  heparin  Injectable 7000 Unit(s) IV Push every 6 hours PRN  heparin  Injectable 3500 Unit(s) IV Push every 6 hours PRN  naphazoline/pheniramine Solution 1 Drop(s) Both EYES three times a day PRN      REVIEW OF SYSTEMS  --------------------------------------------------------------------------------  no CP no SOB    VITALS/PHYSICAL EXAM  --------------------------------------------------------------------------------  T(C): 36.8 (09-18-17 @ 06:17), Max: 36.8 (09-17-17 @ 15:43)  HR: 98 (09-18-17 @ 06:17) (66 - 98)  BP: 166/87 (09-18-17 @ 06:17) (128/73 - 166/87)  RR: 18 (09-18-17 @ 06:17) (18 - 18)  SpO2: 99% (09-18-17 @ 05:34) (99% - 99%)  Wt(kg): --        Physical Exam:  	Gen: NAD  	Pulm: CTA B/L  	CV: S1S2  	Abd: Soft  	Ext: + LE edema B/L  	Neuro: Awake  	Skin: Warm and Dry   	Vascular access: right tunneled catheter	    LABS/STUDIES  --------------------------------------------------------------------------------              7.3    8.58  >-----------<  291      [09-18-17 @ 06:23]              22.7     131  |  91  |  83  ----------------------------<  227      [09-18-17 @ 06:23]  3.6   |  22  |  4.70        Ca     8.4     [09-18-17 @ 06:23]      Mg     1.7     [09-18-17 @ 06:23]      Phos  4.7     [09-18-17 @ 06:23]        PTT: 87.4       [09-17-17 @ 23:55]

## 2017-09-18 NOTE — PROGRESS NOTE ADULT - PROBLEM SELECTOR PLAN 2
- c/w HD per renal MWF  -c/w lasix 80mg IV BID  -permacath in place -  Vascular plan for AVF as inpatient, need further medical stabilization/control DM, prior to OR. pending medical clearance, discussed with nephrology -c/w HD per renal MWF  -c/w lasix 80mg IV BID  -permacath in place - OR tomorrow for AVF, medical clearance done, cardiac clearance pending (cards consulted)

## 2017-09-18 NOTE — PROGRESS NOTE ADULT - PROBLEM SELECTOR PLAN 9
Diabetic retinopathy: Legally blind, lives with sister  -keep room bright, can see shapes if bright light  BPH: cont home tamsulosin  DVT ppx: heparin bridge and warfarin for new onset AF  Diet: consistent carb no snack/renal diet Diabetic retinopathy: Legally blind, lives with sister  -keep room bright, can see shapes if bright light  BPH: cont home tamsulosin  DVT ppx: heparin gtt  Diet: consistent carb no snack/renal diet

## 2017-09-18 NOTE — PROGRESS NOTE ADULT - PROBLEM SELECTOR PLAN 6
Home regime: lantus 22u daily, home pravastatin 10mg daily.   A1C 8   -hyperglycemic to 508 this AM but has been downtrending after humalog  -appreciate endo recs: additional 5 units lantus now and increasing daily lantus to 28U and humalog 12U with meals, c/w Mod ISS; close monitoring for hypoglycemia  -FSBG ordered for 3AM  -endo also recommends taking off 75% of goal fluid during HD tomorrow - will discuss with Renal team  - discussed with ENDO and Pt. if hyperglycemia difficult to control, may need lantus in both AM and QHS. repeat BMP testing.   -anion gap of 18 - f/u BMP at 2pm  -atorvastatin 80mg daily Home regime: lantus 22u daily, home pravastatin 10mg daily.   A1C 8   -FSG in the 200s this AM  -appreciate endo recs: additional 5 units lantus now and increasing daily lantus to 28U and humalog 12U with meals, c/w Mod ISS; close monitoring for hypoglycemia  -FSBG ordered for 3AM  -endo also recommends taking off 75% of goal fluid during HD tomorrow - will discuss with Renal team  - discussed with ENDO and Pt. if hyperglycemia difficult to control, may need lantus in both AM and QHS. repeat BMP testing.   -anion gap of 18 - f/u BMP at 2pm  -atorvastatin 80mg daily Home regime: lantus 22u daily, home pravastatin 10mg daily.   A1C 8   -FSG in the 200s this AM  -appreciate endo recs: will give lantus 28 units today; c/w Mod ISS for premeal; close monitoring for hypoglycemia  - if hyperglycemia difficult to control, may need lantus in both AM and QHS  -anion gap of 16, improved from 18  -atorvastatin 80mg daily

## 2017-09-18 NOTE — PROGRESS NOTE ADULT - ASSESSMENT
52yMale w/ a PMHx of CKD s/p renal transplant (1998) on cyclosporine and mycophenolate, HTN, type I DM, legally blind and h/o uremic encephalopathy who presented with AMS and subsequently had PEA arrest secondary to hyperkalemia requiring emergent HD on 9/03, now atrial fibrillation. His rate had not been well controlled on Cardizem 300mg daily and metoprolol 50 mg daily so the beta blocker was increased to 50mg bid. This morning his cardiac medications were held in anticipation of AV fistula surgery. His heart rate is now 160's. He remains asymptomatic and hemodynamically stable.   Would give Cardizem and metoprolol and not hold for surgery which is rescheduled for tomorrow.  Can not give an antiarrhythmic drug until he can get a DAMIEN to rule out a left atrial /left atrial appendage thrombus and be anticoagulated without disruption for at least one month. Rate control seem to be the best option at this time. Titrate the metoprolol as blood pressure permits.

## 2017-09-18 NOTE — PROGRESS NOTE ADULT - SUBJECTIVE AND OBJECTIVE BOX
GERRIMILOEDIL  52y  Male  Patient is a 52y old  Male who presents with a chief complaint of Altered mental status (10 Sep 2017 23:23)    INTERVAL HPI/OVERNIGHT EVENTS:  No overnight events. Pt receiving HD this AM. Pt went into afib w/ RVR following HD. No CP, SOB, palpitations, fever/chills, abd pain. Has been NPO for possible AVF today but rescheduled for tomorrow.    REVIEW OF SYSTEMS:  CONSTITUTIONAL: No weakness, fevers or chills  EYES/ENT: +BLINDNESS;  No vertigo or throat pain   NECK: No pain or stiffness  RESPIRATORY: No cough, wheezing, hemoptysis; No shortness of breath  CARDIOVASCULAR: No chest pain or palpitations  GASTROINTESTINAL: No abdominal or epigastric pain. No nausea, vomiting, or hematemesis; No diarrhea or constipation. No melena or hematochezia.  GENITOURINARY: No dysuria, frequency or hematuria  NEUROLOGICAL: No numbness or weakness  SKIN: No itching, burning, rashes, or lesions   MSK: +LE EDEMA BILATERALLY, PRESSURE ON AMBULATION  All other review of systems is negative unless indicated above.	    T(C): 36.7 (09-18-17 @ 09:35), Max: 36.8 (09-17-17 @ 15:43)  HR: 92 (09-18-17 @ 09:35) (66 - 98)  BP: 163/76 (09-18-17 @ 09:35) (128/73 - 166/87)  RR: 18 (09-18-17 @ 09:35) (18 - 18)  SpO2: 99% (09-18-17 @ 05:34) (99% - 99%)  Wt(kg): --Vital Signs Last 24 Hrs  T(C): 36.7 (18 Sep 2017 09:35), Max: 36.8 (17 Sep 2017 15:43)  T(F): 98.1 (18 Sep 2017 09:35), Max: 98.3 (17 Sep 2017 15:43)  HR: 92 (18 Sep 2017 09:35) (66 - 98)  BP: 163/76 (18 Sep 2017 09:35) (128/73 - 166/87)  BP(mean): --  RR: 18 (18 Sep 2017 09:35) (18 - 18)  SpO2: 99% (18 Sep 2017 05:34) (99% - 99%)  Wt(kg): --    PHYSICAL EXAM:  General: NAD  Neurology: A&Ox3, nonfocal  Head:  Normocephalic, atraumatic, +BLINDNESS  ENT:  Mucosa moist, no ulcerations  Neck:  Supple, no sinuses or palpable masses  Lymphatic:  No palpable cervical, supraclavicular, axillary or inguinal adenopathy  Respiratory: CTA B/L  CV: RRR, S1S2, no murmur  Abdominal: Soft, NT, ND no palpable mass  MSK: +LE EDEMA BILATERALLY, + peripheral pulses, FROM all 4 extremity    LABS:                        7.3    8.58  )-----------( 291      ( 18 Sep 2017 06:23 )             22.7     09-18    131<L>  |  91<L>  |  83<H>  ----------------------------<  227<H>  3.6   |  22  |  4.70<H>    Ca    8.4      18 Sep 2017 06:23  Phos  4.7     09-18  Mg     1.7     09-18      PT/INR - ( 18 Sep 2017 06:23 )   PT: 13.3 SEC;   INR: 1.18       PTT - ( 18 Sep 2017 06:23 )  PTT:131.6 SEC    CAPILLARY BLOOD GLUCOSE  153 (18 Sep 2017 08:59)  225 (18 Sep 2017 05:52)  225 (18 Sep 2017 05:50)  202 (18 Sep 2017 02:47)  253 (17 Sep 2017 21:40)  336 (17 Sep 2017 17:11)  404 (17 Sep 2017 12:21)    RADIOLOGY & ADDITIONAL TESTS: No interval imaging.

## 2017-09-18 NOTE — PROGRESS NOTE ADULT - PROBLEM SELECTOR PLAN 4
- Kidney transplant from living brother in 1998 2/2 diabetic nephropathy.  -c/w Cyclosporin 100mg daily, prednisone 5mg po daily.  Holding Cellcept. -Kidney transplant from living brother in 1998 2/2 diabetic nephropathy.  -c/w Cyclosporin 100mg daily, prednisone 5mg po daily.  Holding Cellcept.

## 2017-09-18 NOTE — PROGRESS NOTE ADULT - PROBLEM SELECTOR PLAN 1
BG improved today.  Continue Lantus 28u qdaily in AM.  Resume Humalog premeal 8/8/8  resume low correction scales before meals and QHS.      Outpatient endocrine follow up: appointment pending with Dr Baker

## 2017-09-18 NOTE — PROGRESS NOTE ADULT - ATTENDING COMMENTS
Pt was seen and examed at bed side with resident.  53 y/o M PMH CKD s/p renal transplant (1998) on cyclosporine and CellCept, HTN, DM, legally blind BIBEMS P/w uremic encephalopathy. In ED, Pt became PEA arrest in the ED, intubated for airway protection. ROSC achieved s/p 1 round of epi and chest compressions. He was admitted in MICU and put on pressors. s/p emergent HD for hyperkalemia. s/p Covered empirically with broad spectrum antibiotics (vanc and zosyn). Found Hgb 5 s/p multiple pRBC given and epogen was started. Pt was able to wean off pressors and pt extubated 9/4.   Renal follow up appreciated, continue Cyclosporine and prednisone, s/p perm cath 9/12,  on lasix with close monitoring renal function. Pt was also found to have new-onset Afib, on coumadin, with bridging heparin gtt. Vasculare consult appreciated, need AVF as in-patient as HD center requested, coumadin on held. Cardiology and Endo following. Continue Cardizem and, increased metoprolol to 50 mg bid as BP tolerated for better HR control, Pt need to continue cardizem and metoprolol while NPO and during HD. Monitor FS on Lantus (adjusting to AM dose) and Humalog, hyperglycemia,  increased lantus 28 u and humalog 12u TID, repeat BMP improving. encourage consistent carb diet and  monitor for hypoglycemia event.  plan for AVF in AM, cardiology clearance in chart. Pt is medically optimized for planned AVF. Recent TTE normal LVEF. H/H stable. Pt need continue cardizem and metoprolol while NPO and during HD for continuous HR control.  SW arrange out patient HD.

## 2017-09-18 NOTE — PROGRESS NOTE ADULT - SUBJECTIVE AND OBJECTIVE BOX
Patient looks much better today. He is conversant and sitting up at the side of the bed without complaints despite a heart rate of 150-160's. His surgery has been postponed until tomorrow. He did have dialysis earlier today.        Vital Signs Last 24 Hrs  T(C): 36.6 (18 Sep 2017 10:45), Max: 36.8 (17 Sep 2017 15:43)  T(F): 97.8 (18 Sep 2017 10:45), Max: 98.3 (17 Sep 2017 15:43)  HR: 130 (18 Sep 2017 10:45) (66 - 130)  BP: 130/80 (18 Sep 2017 10:45) (128/73 - 166/87)  BP(mean): --  RR: 18 (18 Sep 2017 10:45) (18 - 18)  SpO2: 99% (18 Sep 2017 05:34) (99% - 99%)      Telemetry:  atrial fibrillation 150-163bpm.    MEDICATIONS  (STANDING):  calcium acetate 667 milliGRAM(s) Oral three times a day with meals  predniSONE   Tablet 5 milliGRAM(s) Oral daily  tamsulosin 0.4 milliGRAM(s) Oral at bedtime  cycloSPORINE  (SandIMMUNE) 100 milliGRAM(s) Oral daily  atorvastatin 80 milliGRAM(s) Oral at bedtime  aspirin enteric coated 81 milliGRAM(s) Oral daily  senna 2 Tablet(s) Oral at bedtime  docusate sodium 100 milliGRAM(s) Oral three times a day  furosemide   Injectable 80 milliGRAM(s) IV Push two times a day  epoetin valente Injectable 7000 Unit(s) IV Push <User Schedule>  diltiazem    milliGRAM(s) Oral daily  metoprolol 50 milliGRAM(s) Oral two times a day  insulin lispro (HumaLOG) corrective regimen sliding scale   SubCutaneous every 4 hours  insulin glargine Injectable (LANTUS) 28 Unit(s) SubCutaneous every morning  heparin  Infusion.  Unit(s)/Hr (16 mL/Hr) IV Continuous <Continuous>    MEDICATIONS  (PRN):  naphazoline/pheniramine Solution 1 Drop(s) Both EYES three times a day PRN eye discomfort  heparin  Injectable 7000 Unit(s) IV Push every 6 hours PRN For aPTT less than 40  heparin  Injectable 3500 Unit(s) IV Push every 6 hours PRN For aPTT between 40 - 57      Physical exam:   Gen-  patient alert, conversant in NAD.  Resp-  CTA bilaterally  CV-  S1 and S2 irregular irregular  ABD- soft nontender +bowel sounds.  EXT-  lower extremities remain grossly edematous.  Neuro A&O x3                            7.3    8.58  )-----------( 291      ( 18 Sep 2017 06:23 )             22.7     PT/INR - ( 18 Sep 2017 06:23 )   PT: 13.3 SEC;   INR: 1.18          PTT - ( 18 Sep 2017 06:23 )  PTT:131.6 SEC  09-18    131<L>  |  91<L>  |  83<H>  ----------------------------<  227<H>  3.6   |  22  |  4.70<H>    Ca    8.4      18 Sep 2017 06:23  Phos  4.7     09-18  Mg     1.7     09-18

## 2017-09-18 NOTE — PROGRESS NOTE ADULT - ASSESSMENT
52 M with Type 1 DM HbA1C 8 admitted for AMS s/p cardiac arrest now on HD presents with hyperglycemia

## 2017-09-18 NOTE — PROGRESS NOTE ADULT - PROBLEM SELECTOR PLAN 5
- Home regimen clonidine 0.1mg po q8h, nifedipine XR 90 mg qD, Turosemide 10 mg daily  -c/w lasix 80 IV BID, on cardizem and metoprolol - Home regimen clonidine 0.1mg po q8h, nifedipine XR 90 mg qD, Turosemide 10 mg daily - holding for now  -c/w lasix 80 IV BID, on cardizem and metoprolol

## 2017-09-18 NOTE — CHART NOTE - NSCHARTNOTEFT_GEN_A_CORE
Called by vascular for medical clearance, possible AVF today.   Pt FS improved overnight, HR controlled, recent ECHO normal LVEF. Pt is medically optimized for planned procedure from medicine point of view. Also called for cardiology clearance.

## 2017-09-18 NOTE — CONSULT NOTE ADULT - ASSESSMENT
52yMale w/ a PMHx of CKD s/p renal transplant (1998) on cyclosporine and mycophenolate, HTN, type I DM, legally blind and h/o uremic encephalopathy who presented for AMS and subsequently had PEA arrest secondary to hyperkalemia requiring emergent HD on 9/03 now atrial fibrillation. The patient has HTN and insulin dependant DM, with good functional status before hospitalization with stated approx 4METs previously. The PEA arrest was likely due to metabolic derrangements. His rate has not been well controlled despite metoprolol 50 and diltiazem 300 and appears volume overloaded on exam. TTE does not show HF signs, and no previously TTE to compare to. The patient is scheduled for a low risk AV fistula surgery with RCRI of 2 with 6.6% cardiovascular event risk, no need for further cardiac testing  -before surgical intervention please control rate to <120bpm with metoprolol and diltiazem  -manage volume status with daily weights and I+Os  -will follow up after surgical intervention to monitor for cardiac events 52yMale w/ a PMHx of CKD s/p renal transplant (1998) on cyclosporine and mycophenolate, HTN, type I DM, legally blind and h/o uremic encephalopathy who presented for AMS and subsequently had PEA arrest secondary to hyperkalemia requiring emergent HD on 9/03 now atrial fibrillation. The patient has HTN and insulin dependant DM, with good functional status before hospitalization with stated approx 4METs previously. The PEA arrest was likely due to metabolic derrangements. His rate has not been well controlled despite metoprolol 50 and diltiazem 300 and appears volume overloaded on exam. TTE does not show HF signs, and no previously TTE to compare to. The patient is scheduled for a AV fistula surgery with RCRI of 2 with 6.6% cardiovascular event risk, no need for further cardiac testing  -before surgical intervention please control rate with metoprolol and diltiazem, can give before surgery  -manage volume status with daily weights and I+Os  -will follow up after surgical intervention to monitor for cardiac events

## 2017-09-18 NOTE — PROGRESS NOTE ADULT - SUBJECTIVE AND OBJECTIVE BOX
S: feels okay, no acute events overnight.       Vital Signs Last 24 Hrs  T(C): 36.8 (18 Sep 2017 06:17), Max: 36.8 (17 Sep 2017 15:43)  T(F): 98.2 (18 Sep 2017 06:17), Max: 98.3 (17 Sep 2017 15:43)  HR: 98 (18 Sep 2017 06:17) (66 - 98)  BP: 166/87 (18 Sep 2017 06:17) (128/73 - 166/87)  BP(mean): --  RR: 18 (18 Sep 2017 06:17) (18 - 18)  SpO2: 99% (18 Sep 2017 05:34) (99% - 99%)    PHYSICAL EXAM:  GEN: nad, lying in bed comfortably  NECK: right IJ permcath in place, no jvd noted  PULSE EXAM:                                              LEFT                          Brachial:                 [2+ ]                                Radial:                   [2+ ]                                  Hand: warm,  strength 5/5, pink arm precaution band in place lue  Neuro: sensation intact b/l upper extremities  Motor Upper: muscle strength 5/5 b/l upper extremities    LABS:                              7.3                  131  | 22   | 83           8.58  >-----------< 291     ------------------------< 227                   22.7                 3.6  | 91   | 4.70                                         Ca 8.4   Mg 1.7   Ph 4.7          RADIOLOGY & ADDITIONAL TESTS:  vein mapping:    Left Findings: Left Cephalic Vein  Diameter (cm)  Proximal upper arm              0.51  Mid upper arm                           0.37  Distal upper arm                       0.55  Antecubital                            0.60  Proximal forearm                       0.30  Mid forearm                            0.34  Distal forearm                         0.38    Left Basilic Vein  [place]                             0.53  Mid upper arm                        0.43  Distal upper arm                     0.37  Left brachial artery: Normal velocities with triphasic  waveforms.  Left radial artery: Normal velocities with triphasic  waveforms.  Left ulnar artery: Normal velocities with triphasic  waveforms.          Assessment and Plan:   · Assessment	  A/P: 51 yo male with history of DM, HTN, legally blind, renal disease s/p kidney transplant admitted with uremic encephalopathy secondary to failed renal transplant with complicated course of bradycardia and PEA arrest requiring intubation and MICU stay.  He is currently on the floor s/p permcath, on HD now with new onset afib on heparin infusion.   -LUE y precautions  -Full  medicine and Cardiac clearance prior to surgery given his recent pea arrest and new onset afib  - Discussed with Dr. Staples (vascular surgery) timing of surgery, no plan for surgery today. I will communicate with the primary team regarding the timing depending on availability of the surgeon and the OR.

## 2017-09-19 LAB
APTT BLD: 33.6 SEC — SIGNIFICANT CHANGE UP (ref 27.5–37.4)
APTT BLD: 34 SEC — SIGNIFICANT CHANGE UP (ref 27.5–37.4)
APTT BLD: 84.9 SEC — HIGH (ref 27.5–37.4)
BLD GP AB SCN SERPL QL: NEGATIVE — SIGNIFICANT CHANGE UP
BUN SERPL-MCNC: 56 MG/DL — HIGH (ref 7–23)
CALCIUM SERPL-MCNC: 8.4 MG/DL — SIGNIFICANT CHANGE UP (ref 8.4–10.5)
CHLORIDE SERPL-SCNC: 97 MMOL/L — LOW (ref 98–107)
CO2 SERPL-SCNC: 27 MMOL/L — SIGNIFICANT CHANGE UP (ref 22–31)
CREAT SERPL-MCNC: 3.31 MG/DL — HIGH (ref 0.5–1.3)
GLUCOSE SERPL-MCNC: 84 MG/DL — SIGNIFICANT CHANGE UP (ref 70–99)
HCT VFR BLD CALC: 23.3 % — LOW (ref 39–50)
HCT VFR BLD CALC: 23.4 % — LOW (ref 39–50)
HGB BLD-MCNC: 7.5 G/DL — LOW (ref 13–17)
HGB BLD-MCNC: 7.7 G/DL — LOW (ref 13–17)
INR BLD: 1.11 — SIGNIFICANT CHANGE UP (ref 0.88–1.17)
MAGNESIUM SERPL-MCNC: 1.8 MG/DL — SIGNIFICANT CHANGE UP (ref 1.6–2.6)
MCHC RBC-ENTMCNC: 28.6 PG — SIGNIFICANT CHANGE UP (ref 27–34)
MCHC RBC-ENTMCNC: 29.4 PG — SIGNIFICANT CHANGE UP (ref 27–34)
MCHC RBC-ENTMCNC: 32.2 % — SIGNIFICANT CHANGE UP (ref 32–36)
MCHC RBC-ENTMCNC: 32.9 % — SIGNIFICANT CHANGE UP (ref 32–36)
MCV RBC AUTO: 88.9 FL — SIGNIFICANT CHANGE UP (ref 80–100)
MCV RBC AUTO: 89.3 FL — SIGNIFICANT CHANGE UP (ref 80–100)
NRBC # FLD: 0 — SIGNIFICANT CHANGE UP
NRBC # FLD: 0 — SIGNIFICANT CHANGE UP
PHOSPHATE SERPL-MCNC: 3.5 MG/DL — SIGNIFICANT CHANGE UP (ref 2.5–4.5)
PLATELET # BLD AUTO: 276 K/UL — SIGNIFICANT CHANGE UP (ref 150–400)
PLATELET # BLD AUTO: 296 K/UL — SIGNIFICANT CHANGE UP (ref 150–400)
PMV BLD: 11 FL — SIGNIFICANT CHANGE UP (ref 7–13)
PMV BLD: 11.6 FL — SIGNIFICANT CHANGE UP (ref 7–13)
POTASSIUM SERPL-MCNC: 3.3 MMOL/L — LOW (ref 3.5–5.3)
POTASSIUM SERPL-SCNC: 3.3 MMOL/L — LOW (ref 3.5–5.3)
PROTHROM AB SERPL-ACNC: 12.5 SEC — SIGNIFICANT CHANGE UP (ref 9.8–13.1)
RBC # BLD: 2.62 M/UL — LOW (ref 4.2–5.8)
RBC # BLD: 2.62 M/UL — LOW (ref 4.2–5.8)
RBC # FLD: 13.8 % — SIGNIFICANT CHANGE UP (ref 10.3–14.5)
RBC # FLD: 14 % — SIGNIFICANT CHANGE UP (ref 10.3–14.5)
RH IG SCN BLD-IMP: POSITIVE — SIGNIFICANT CHANGE UP
SODIUM SERPL-SCNC: 137 MMOL/L — SIGNIFICANT CHANGE UP (ref 135–145)
WBC # BLD: 6.52 K/UL — SIGNIFICANT CHANGE UP (ref 3.8–10.5)
WBC # BLD: 7 K/UL — SIGNIFICANT CHANGE UP (ref 3.8–10.5)
WBC # FLD AUTO: 6.52 K/UL — SIGNIFICANT CHANGE UP (ref 3.8–10.5)
WBC # FLD AUTO: 7 K/UL — SIGNIFICANT CHANGE UP (ref 3.8–10.5)

## 2017-09-19 PROCEDURE — 99233 SBSQ HOSP IP/OBS HIGH 50: CPT | Mod: GC

## 2017-09-19 PROCEDURE — 99232 SBSQ HOSP IP/OBS MODERATE 35: CPT

## 2017-09-19 RX ORDER — HEPARIN SODIUM 5000 [USP'U]/ML
INJECTION INTRAVENOUS; SUBCUTANEOUS
Qty: 25000 | Refills: 0 | Status: DISCONTINUED | OUTPATIENT
Start: 2017-09-19 | End: 2017-09-20

## 2017-09-19 RX ORDER — INSULIN GLARGINE 100 [IU]/ML
25 INJECTION, SOLUTION SUBCUTANEOUS EVERY MORNING
Qty: 0 | Refills: 0 | Status: DISCONTINUED | OUTPATIENT
Start: 2017-09-19 | End: 2017-09-20

## 2017-09-19 RX ORDER — HEPARIN SODIUM 5000 [USP'U]/ML
3500 INJECTION INTRAVENOUS; SUBCUTANEOUS EVERY 6 HOURS
Qty: 0 | Refills: 0 | Status: DISCONTINUED | OUTPATIENT
Start: 2017-09-19 | End: 2017-09-27

## 2017-09-19 RX ORDER — POTASSIUM CHLORIDE 20 MEQ
20 PACKET (EA) ORAL
Qty: 0 | Refills: 0 | Status: COMPLETED | OUTPATIENT
Start: 2017-09-19 | End: 2017-09-19

## 2017-09-19 RX ORDER — HEPARIN SODIUM 5000 [USP'U]/ML
7000 INJECTION INTRAVENOUS; SUBCUTANEOUS EVERY 6 HOURS
Qty: 0 | Refills: 0 | Status: DISCONTINUED | OUTPATIENT
Start: 2017-09-19 | End: 2017-09-27

## 2017-09-19 RX ORDER — DEXTROSE 50 % IN WATER 50 %
1 SYRINGE (ML) INTRAVENOUS ONCE
Qty: 0 | Refills: 0 | Status: COMPLETED | OUTPATIENT
Start: 2017-09-19 | End: 2017-09-19

## 2017-09-19 RX ORDER — INSULIN GLARGINE 100 [IU]/ML
28 INJECTION, SOLUTION SUBCUTANEOUS EVERY MORNING
Qty: 0 | Refills: 0 | Status: DISCONTINUED | OUTPATIENT
Start: 2017-09-19 | End: 2017-09-19

## 2017-09-19 RX ADMIN — HEPARIN SODIUM 2000 UNIT(S)/HR: 5000 INJECTION INTRAVENOUS; SUBCUTANEOUS at 19:32

## 2017-09-19 RX ADMIN — CYCLOSPORINE 100 MILLIGRAM(S): 100 CAPSULE ORAL at 11:29

## 2017-09-19 RX ADMIN — HEPARIN SODIUM 7000 UNIT(S): 5000 INJECTION INTRAVENOUS; SUBCUTANEOUS at 19:33

## 2017-09-19 RX ADMIN — INSULIN GLARGINE 25 UNIT(S): 100 INJECTION, SOLUTION SUBCUTANEOUS at 17:37

## 2017-09-19 RX ADMIN — Medication 80 MILLIGRAM(S): at 05:46

## 2017-09-19 RX ADMIN — Medication 80 MILLIGRAM(S): at 17:33

## 2017-09-19 RX ADMIN — HEPARIN SODIUM 1600 UNIT(S)/HR: 5000 INJECTION INTRAVENOUS; SUBCUTANEOUS at 11:42

## 2017-09-19 RX ADMIN — Medication 20 MILLIEQUIVALENT(S): at 11:28

## 2017-09-19 RX ADMIN — Medication 1 DOSE(S): at 08:57

## 2017-09-19 RX ADMIN — Medication 8 UNIT(S): at 17:42

## 2017-09-19 RX ADMIN — Medication 300 MILLIGRAM(S): at 05:46

## 2017-09-19 RX ADMIN — Medication 5 MILLIGRAM(S): at 05:46

## 2017-09-19 RX ADMIN — Medication 50 MILLIGRAM(S): at 05:47

## 2017-09-19 RX ADMIN — Medication 3: at 17:42

## 2017-09-19 RX ADMIN — Medication 20 MILLIEQUIVALENT(S): at 13:11

## 2017-09-19 RX ADMIN — Medication 81 MILLIGRAM(S): at 11:28

## 2017-09-19 RX ADMIN — Medication 50 MILLIGRAM(S): at 17:33

## 2017-09-19 RX ADMIN — ATORVASTATIN CALCIUM 80 MILLIGRAM(S): 80 TABLET, FILM COATED ORAL at 21:39

## 2017-09-19 RX ADMIN — TAMSULOSIN HYDROCHLORIDE 0.4 MILLIGRAM(S): 0.4 CAPSULE ORAL at 21:39

## 2017-09-19 RX ADMIN — Medication 667 MILLIGRAM(S): at 17:32

## 2017-09-19 NOTE — PROGRESS NOTE ADULT - PROBLEM SELECTOR PLAN 6
Home regime: lantus 22u at home, home pravastatin 10mg daily.   -HbA1C 8  -FSG in the 200s this AM  -lantus dose decreased to 22units today given NPO  -appreciate endo recs: lantus 28 units in the qAM and humalog 8/8/8 premeal; close monitoring for hypoglycemia  -if hyperglycemia difficult to control, may need lantus in both AM and QHS  -atorvastatin 80mg daily Home regime: lantus 22u at home, home pravastatin 10mg daily.   -HbA1C 8  -, 69 this AM, restarted lantus at 28 units qAM and humalog 8 units premeal given pt not getting AVF today so not NPO  -appreciate endo recs: lantus 28 units in the qAM and humalog 8/8/8 premeal; close monitoring for hypoglycemia  -if hyperglycemia difficult to control, may need lantus in both AM and QHS  -atorvastatin 80mg daily

## 2017-09-19 NOTE — PROGRESS NOTE ADULT - PROBLEM SELECTOR PLAN 8
-not yet treated    -hepatology consulted, will need f/u hepatology clinic for treatment   -HIV neg, HAV neg

## 2017-09-19 NOTE — PROGRESS NOTE ADULT - SUBJECTIVE AND OBJECTIVE BOX
EDIL CIFUENTES  52y  Male  Patient is a 52y old  Male who presents with a chief complaint of Altered mental status (10 Sep 2017 23:23)    INTERVAL HPI/OVERNIGHT EVENTS:  No overnight events. No CP, SOB, N/V, abd pain, fevers/chills. Pt is NPO at midnight for AVF. Pt is otherwise ok but wants to eat. Pt received 1 unit SSI overnight for coverage.    REVIEW OF SYSTEMS:  CONSTITUTIONAL: No weakness, fevers or chills  EYES/ENT: +BLINDNESS;  No vertigo or throat pain   NECK: No pain or stiffness  RESPIRATORY: No cough, wheezing, hemoptysis; No shortness of breath  CARDIOVASCULAR: No chest pain or palpitations  GASTROINTESTINAL: No abdominal or epigastric pain. No nausea, vomiting, or hematemesis; No diarrhea or constipation. No melena or hematochezia.  GENITOURINARY: No dysuria, frequency or hematuria  NEUROLOGICAL: No numbness or weakness  SKIN: No itching, burning, rashes, or lesions   MSK: +LE EDEMA BILATERALLY, IMPROVED  All other review of systems is negative unless indicated above.    T(C): 36.8 (09-19-17 @ 05:42), Max: 37.3 (09-18-17 @ 14:00)  HR: 95 (09-19-17 @ 05:42) (75 - 130)  BP: 150/87 (09-19-17 @ 05:42) (130/80 - 163/76)  RR: 18 (09-19-17 @ 05:42) (18 - 18)  SpO2: 99% (09-19-17 @ 05:42) (97% - 99%)  Wt(kg): --Vital Signs Last 24 Hrs  T(C): 36.8 (19 Sep 2017 05:42), Max: 37.3 (18 Sep 2017 14:00)  T(F): 98.3 (19 Sep 2017 05:42), Max: 99.2 (18 Sep 2017 14:00)  HR: 95 (19 Sep 2017 05:42) (75 - 130)  BP: 150/87 (19 Sep 2017 05:42) (130/80 - 163/76)  BP(mean): --  RR: 18 (19 Sep 2017 05:42) (18 - 18)  SpO2: 99% (19 Sep 2017 05:42) (97% - 99%)  Wt(kg): --    PHYSICAL EXAM:  General: NAD  Neurology: A&Ox3, nonfocal  Head:  Normocephalic, atraumatic, +BLINDNESS  ENT:  Mucosa moist, no ulcerations  Neck:  Supple, no sinuses or palpable masses  Lymphatic:  No palpable cervical, supraclavicular, axillary or inguinal adenopathy  Respiratory: CTA B/L  CV: RRR, S1S2, no murmur  Abdominal: Soft, NT, ND no palpable mass  MSK: +LE EDEMA BILATERALLY, + peripheral pulses, FROM all 4 extremity    LABS:                        7.5    7.00  )-----------( 296      ( 19 Sep 2017 06:50 )             23.3     09-19    137  |  97<L>  |  56<H>  ----------------------------<  84  3.3<L>   |  27  |  3.31<H>    Ca    8.4      19 Sep 2017 06:50  Phos  3.5     09-19  Mg     1.8     09-19    PT/INR - ( 19 Sep 2017 06:50 )   PT: 12.5 SEC;   INR: 1.11       PTT - ( 19 Sep 2017 06:50 )  PTT:33.6 SEC    CAPILLARY BLOOD GLUCOSE  212 (18 Sep 2017 22:46)  195 (18 Sep 2017 20:11)  269 (18 Sep 2017 17:49)  260 (18 Sep 2017 16:00)  208 (18 Sep 2017 12:00)  153 (18 Sep 2017 08:59)    RADIOLOGY & ADDITIONAL TESTS: No interval imaging.

## 2017-09-19 NOTE — PROGRESS NOTE ADULT - ATTENDING COMMENTS
Pt was seen and examed at bed side with resident.  51 y/o M PMH CKD s/p renal transplant (1998) on cyclosporine and CellCept, HTN, DM, legally blind BIBEMS P/w uremic encephalopathy. In ED, Pt became PEA arrest in the ED, intubated for airway protection. ROSC achieved s/p 1 round of epi and chest compressions. He was admitted in MICU and put on pressors. s/p emergent HD for hyperkalemia. s/p Covered empirically with broad spectrum antibiotics (vanc and zosyn). Found Hgb 5 s/p multiple pRBC given and epogen was started. Pt was able to wean off pressors and pt extubated 9/4.   Renal follow up appreciated, continue Cyclosporine and prednisone, s/p perm cath 9/12,  on lasix with close monitoring renal function. Pt was also found to have new-onset Afib, on coumadin, with bridging heparin gtt. Vasculare consult appreciated, need AVF as in-patient as HD center requested, coumadin on held. Cardiology and Endo following. Continue Cardizem and, increased metoprolol to 50 mg bid as BP tolerated for better HR control, Pt need to continue cardizem and metoprolol while NPO and during HD. Monitor FS on Lantus (adjusting to AM dose) and Humalog, hyperglycemia,  increased lantus 28 u and humalog 8u TID, repeat BMP improving. encourage consistent carb diet and  monitor for hypoglycemia event.  plan for AVF changed to Thur afternoon as confirmed with vascular attending. cardiology clearance in chart. Pt is medically optimized for planned AVF. Recent TTE normal LVEF. H/H stable. Pt need continue Cardizem and metoprolol while NPO and during HD for continuous HR control.  SW arrange out patient HD.  Discussed above plan with nephrology, vascular, Pt and Sister, JIMMY, and RN

## 2017-09-19 NOTE — PROGRESS NOTE ADULT - SUBJECTIVE AND OBJECTIVE BOX
Chief Complaint: DM1    History: Was NPO again in AM but then procedure moved. Hypoglycemia noted in AM. Lantus was held.    MEDICATIONS  (STANDING):  calcium acetate 667 milliGRAM(s) Oral three times a day with meals  predniSONE   Tablet 5 milliGRAM(s) Oral daily  tamsulosin 0.4 milliGRAM(s) Oral at bedtime  cycloSPORINE  (SandIMMUNE) 100 milliGRAM(s) Oral daily  atorvastatin 80 milliGRAM(s) Oral at bedtime  aspirin enteric coated 81 milliGRAM(s) Oral daily  senna 2 Tablet(s) Oral at bedtime  docusate sodium 100 milliGRAM(s) Oral three times a day  furosemide   Injectable 80 milliGRAM(s) IV Push two times a day  epoetin valente Injectable 7000 Unit(s) IV Push <User Schedule>  diltiazem    milliGRAM(s) Oral daily  metoprolol 50 milliGRAM(s) Oral two times a day  insulin lispro Injectable (HumaLOG) 8 Unit(s) SubCutaneous three times a day before meals  insulin lispro (HumaLOG) corrective regimen sliding scale   SubCutaneous three times a day before meals  insulin lispro (HumaLOG) corrective regimen sliding scale   SubCutaneous at bedtime  dextrose 5%. 1000 milliLiter(s) (50 mL/Hr) IV Continuous <Continuous>  dextrose 50% Injectable 12.5 Gram(s) IV Push once  dextrose 50% Injectable 25 Gram(s) IV Push once  dextrose 50% Injectable 25 Gram(s) IV Push once  heparin  Infusion.  Unit(s)/Hr (16 mL/Hr) IV Continuous <Continuous>  insulin glargine Injectable (LANTUS) 25 Unit(s) SubCutaneous every morning    MEDICATIONS  (PRN):  naphazoline/pheniramine Solution 1 Drop(s) Both EYES three times a day PRN eye discomfort  dextrose Gel 1 Dose(s) Oral once PRN Blood Glucose LESS THAN 70 milliGRAM(s)/deciLiter  glucagon  Injectable 1 milliGRAM(s) IntraMuscular once PRN Glucose <70 milliGRAM(s)/deciLiter  heparin  Injectable 7000 Unit(s) IV Push every 6 hours PRN For aPTT less than 40  heparin  Injectable 3500 Unit(s) IV Push every 6 hours PRN For aPTT between 40 - 57      Allergies    No Known Allergies    Intolerances      Review of Systems:    ALL OTHER SYSTEMS REVIEWED AND NEGATIVE      PHYSICAL EXAM:  VITALS: T(C): 36.9 (09-19-17 @ 14:16)  T(F): 98.5 (09-19-17 @ 14:16), Max: 98.5 (09-19-17 @ 14:16)  HR: 90 (09-19-17 @ 17:31) (75 - 95)  BP: 153/76 (09-19-17 @ 17:31) (144/88 - 153/76)  RR:  (18 - 18)  SpO2:  (98% - 99%)  Wt(kg): --  GENERAL: NAD, well-groomed, well-developed  EYES: blind  HEENT:  Atraumatic, Normocephalic, moist mucous membranes  SKIN: Dry, intact, No rashes or lesions  PSYCH: Alert and oriented x 3, normal affect, normal mood    CAPILLARY BLOOD GLUCOSE  258 (09-19 @ 17:03)  139 (09-19 @ 12:50)  102 (09-19 @ 09:15)  69 (09-19 @ 08:42)  212 (09-18 @ 22:46)  195 (09-18 @ 20:11)  269 (09-18 @ 17:49)  260 (09-18 @ 16:00)  208 (09-18 @ 12:00)  153 (09-18 @ 08:59)  225 (09-18 @ 05:52)  225 (09-18 @ 05:50)  202 (09-18 @ 02:47)  253 (09-17 @ 21:40)  336 (09-17 @ 17:11)  404 (09-17 @ 12:21)  399 (09-17 @ 08:44)  460 (09-17 @ 07:28)  508 (09-17 @ 06:32)  457 (09-17 @ 05:51)  371 (09-16 @ 21:32)      09-19    137  |  97<L>  |  56<H>  ----------------------------<  84  3.3<L>   |  27  |  3.31<H>    EGFR if : 23  EGFR if non : 20    Ca    8.4      09-19  Mg     1.8     09-19  Phos  3.5     09-19            Thyroid Function Tests:  09-03 @ 13:45 TSH 2.88 FreeT4 0.70 T3 71.7 Anti TPO -- Anti Thyroglobulin Ab -- TSI --      Hemoglobin A1C, Whole Blood: 8.0 % <H> [4.0 - 5.6] (08-16-17 @ 06:30)

## 2017-09-19 NOTE — PROGRESS NOTE ADULT - PROBLEM SELECTOR PLAN 9
Diabetic retinopathy: Legally blind, lives with sister  -keep room bright, can see shapes if bright light  BPH: cont home tamsulosin  DVT ppx: heparin gtt held this AM for AVF  Diet: consistent carb no snack/renal diet Diabetic retinopathy: Legally blind, lives with sister  -keep room bright, can see shapes if bright light  BPH: cont home tamsulosin  DVT ppx: heparin gtt  Diet: consistent carb no snack/renal diet

## 2017-09-19 NOTE — PROGRESS NOTE ADULT - SUBJECTIVE AND OBJECTIVE BOX
Patient comfortable. No complaints of chest pain, shortness of breath, palpitations or lightheadedness. Resting comfortably. NPO for anticipated surgery today.        Vital Signs Last 24 Hrs  T(C): 36.8 (19 Sep 2017 05:42), Max: 37.3 (18 Sep 2017 14:00)  T(F): 98.3 (19 Sep 2017 05:42), Max: 99.2 (18 Sep 2017 14:00)  HR: 95 (19 Sep 2017 05:42) (75 - 130)  BP: 150/87 (19 Sep 2017 05:42) (130/80 - 163/76)  BP(mean): --  RR: 18 (19 Sep 2017 05:42) (18 - 18)  SpO2: 99% (19 Sep 2017 05:42) (97% - 99%)      Telemetry:  atrial fibrillation with ventricular rate 70-80's.     MEDICATIONS  (STANDING):  calcium acetate 667 milliGRAM(s) Oral three times a day with meals  predniSONE   Tablet 5 milliGRAM(s) Oral daily  tamsulosin 0.4 milliGRAM(s) Oral at bedtime  cycloSPORINE  (SandIMMUNE) 100 milliGRAM(s) Oral daily  atorvastatin 80 milliGRAM(s) Oral at bedtime  aspirin enteric coated 81 milliGRAM(s) Oral daily  senna 2 Tablet(s) Oral at bedtime  docusate sodium 100 milliGRAM(s) Oral three times a day  furosemide   Injectable 80 milliGRAM(s) IV Push two times a day  epoetin valente Injectable 7000 Unit(s) IV Push <User Schedule>  diltiazem    milliGRAM(s) Oral daily  metoprolol 50 milliGRAM(s) Oral two times a day  insulin lispro Injectable (HumaLOG) 8 Unit(s) SubCutaneous three times a day before meals  insulin lispro (HumaLOG) corrective regimen sliding scale   SubCutaneous three times a day before meals  insulin lispro (HumaLOG) corrective regimen sliding scale   SubCutaneous at bedtime  dextrose 5%. 1000 milliLiter(s) (50 mL/Hr) IV Continuous <Continuous>  dextrose 50% Injectable 12.5 Gram(s) IV Push once  dextrose 50% Injectable 25 Gram(s) IV Push once  dextrose 50% Injectable 25 Gram(s) IV Push once  insulin glargine Injectable (LANTUS) 22 Unit(s) SubCutaneous every morning    MEDICATIONS  (PRN):  naphazoline/pheniramine Solution 1 Drop(s) Both EYES three times a day PRN eye discomfort  dextrose Gel 1 Dose(s) Oral once PRN Blood Glucose LESS THAN 70 milliGRAM(s)/deciLiter  glucagon  Injectable 1 milliGRAM(s) IntraMuscular once PRN Glucose <70 milliGRAM(s)/deciLiter          Physical exam:   Gen-  Comfortable. NAD  Resp- CTA bilaterally  CV- Normal S1 and S2.  Irregular rate and rhythm  ABD- soft nontender nondistended +maya sounds  EXT- bilateral lower leg edema    LABS:  09/19/2017                        7.5    7.00  )-----------( 296      ( 19 Sep 2017 06:50 )             23.3     PT/INR - ( 19 Sep 2017 06:50 )   PT: 12.5 SEC;   INR: 1.11          PTT - ( 19 Sep 2017 06:50 )  PTT:33.6 SEC  09-19    137  |  97<L>  |  56<H>  ----------------------------<  84  3.3<L>   |  27  |  3.31<H>    Ca    8.4        Phos  3.5      Mg     1.8

## 2017-09-19 NOTE — PROGRESS NOTE ADULT - PROBLEM SELECTOR PLAN 4
-Kidney transplant from living brother in 1998 2/2 diabetic nephropathy.  -c/w Cyclosporin 100mg daily, prednisone 5mg po daily.  Holding Cellcept.

## 2017-09-19 NOTE — PROGRESS NOTE ADULT - ASSESSMENT
52yMale w/ a PMHx of CKD s/p renal transplant (1998) on cyclosporine and mycophenolate, HTN, type I DM, legally blind and h/o uremic encephalopathy who presented with AMS and subsequently had PEA arrest secondary to hyperkalemia requiring emergent HD on 9/03, now atrial fibrillation. His rate had not been well controlled on Cardizem 300mg daily and metoprolol 50 mg daily so the beta blocker was increased to 50mg bid. This morning he received his cardiac medications and his heart rate trends are significantly improved. Will continue to monitor.

## 2017-09-19 NOTE — PROGRESS NOTE ADULT - PROBLEM SELECTOR PLAN 2
-c/w HD per renal MWF  -c/w lasix 80mg IV BID  -permacath in place - OR tomorrow for AVF, medical clearance done, cardiac clearance done -c/w HD per renal MWF  -c/w lasix 80mg IV BID  -permacath in place - AVF rescheculed for Thur afternoon as confirmed with vascular Dr. Staples. medical clearance done, cardiac clearance done  -Renal consult appreciated, discussed with RN, SW, Pt and sister above plan.

## 2017-09-19 NOTE — PROGRESS NOTE ADULT - PROBLEM SELECTOR PLAN 1
Hypoglycemia this AM.  Also Lantus was held which is inappropriate in Type 1 DM.  Reduce Lantus to 25 u daily and give dose now.  Continue Humalog 8/8/8 and low correction scales before meals and QHS.      Outpatient endocrine follow up: appointment pending with Dr Baker

## 2017-09-19 NOTE — PROGRESS NOTE ADULT - PROBLEM SELECTOR PLAN 1
-currently rate controlled on PO Cardizem, metoprolol 50 bid w/ hold parameters   -holding coumadin as pt. is planned for AVF as inpatient, c/w heparin gtt  -appreciate EP recs  - TTE nonsignificant  -pt continuing lopressor and cardizem while NPO given he has tendency to go into   afib w/ RVR if he misses doses  - [facility] -currently rate controlled on PO Cardizem, metoprolol 50 bid w/ hold parameters   -holding coumadin as pt. is planned for AVF as inpatient, c/w heparin gtt  -appreciate EP recs  - TTE nonsignificant  -continue lopressor and cardizem while NPO given he has tendency to go into   afib w/ RVR if he misses doses  - [facility]

## 2017-09-19 NOTE — PROGRESS NOTE ADULT - PROBLEM SELECTOR PLAN 5
-home regimen clonidine 0.1mg po q8h, nifedipine XR 90 mg qD, Turosemide 10 mg daily - holding for now  -c/w lasix 80 IV BID, on cardizem and metoprolol

## 2017-09-20 LAB
ANISOCYTOSIS BLD QL: SLIGHT — SIGNIFICANT CHANGE UP
APTT BLD: 152.1 SEC — CRITICAL HIGH (ref 27.5–37.4)
APTT BLD: 37.4 SEC — SIGNIFICANT CHANGE UP (ref 27.5–37.4)
APTT BLD: > 200 SEC — CRITICAL HIGH (ref 27.5–37.4)
BASOPHILS # BLD AUTO: 0.03 K/UL — SIGNIFICANT CHANGE UP (ref 0–0.2)
BASOPHILS NFR BLD AUTO: 0.5 % — SIGNIFICANT CHANGE UP (ref 0–2)
BASOPHILS NFR SPEC: 0 % — SIGNIFICANT CHANGE UP (ref 0–2)
BLD GP AB SCN SERPL QL: NEGATIVE — SIGNIFICANT CHANGE UP
BUN SERPL-MCNC: 59 MG/DL — HIGH (ref 7–23)
CALCIUM SERPL-MCNC: 8.2 MG/DL — LOW (ref 8.4–10.5)
CHLORIDE SERPL-SCNC: 99 MMOL/L — SIGNIFICANT CHANGE UP (ref 98–107)
CO2 SERPL-SCNC: 25 MMOL/L — SIGNIFICANT CHANGE UP (ref 22–31)
CREAT SERPL-MCNC: 3.45 MG/DL — HIGH (ref 0.5–1.3)
EOSINOPHIL # BLD AUTO: 0.02 K/UL — SIGNIFICANT CHANGE UP (ref 0–0.5)
EOSINOPHIL NFR BLD AUTO: 0.3 % — SIGNIFICANT CHANGE UP (ref 0–6)
EOSINOPHIL NFR FLD: 0.9 % — SIGNIFICANT CHANGE UP (ref 0–6)
GIANT PLATELETS BLD QL SMEAR: PRESENT — SIGNIFICANT CHANGE UP
GLUCOSE SERPL-MCNC: 86 MG/DL — SIGNIFICANT CHANGE UP (ref 70–99)
HCT VFR BLD CALC: 22.8 % — LOW (ref 39–50)
HCT VFR BLD CALC: 26.2 % — LOW (ref 39–50)
HGB BLD-MCNC: 7.3 G/DL — LOW (ref 13–17)
HGB BLD-MCNC: 8.2 G/DL — LOW (ref 13–17)
HYPOCHROMIA BLD QL: SLIGHT — SIGNIFICANT CHANGE UP
IMM GRANULOCYTES # BLD AUTO: 0.08 # — SIGNIFICANT CHANGE UP
IMM GRANULOCYTES NFR BLD AUTO: 1.4 % — SIGNIFICANT CHANGE UP (ref 0–1.5)
INR BLD: 1.12 — SIGNIFICANT CHANGE UP (ref 0.88–1.17)
LYMPHOCYTES # BLD AUTO: 0.36 K/UL — LOW (ref 1–3.3)
LYMPHOCYTES # BLD AUTO: 6.2 % — LOW (ref 13–44)
LYMPHOCYTES NFR SPEC AUTO: 4.4 % — LOW (ref 13–44)
MACROCYTES BLD QL: SLIGHT — SIGNIFICANT CHANGE UP
MAGNESIUM SERPL-MCNC: 1.7 MG/DL — SIGNIFICANT CHANGE UP (ref 1.6–2.6)
MCHC RBC-ENTMCNC: 27.9 PG — SIGNIFICANT CHANGE UP (ref 27–34)
MCHC RBC-ENTMCNC: 28.4 PG — SIGNIFICANT CHANGE UP (ref 27–34)
MCHC RBC-ENTMCNC: 31.3 % — LOW (ref 32–36)
MCHC RBC-ENTMCNC: 32 % — SIGNIFICANT CHANGE UP (ref 32–36)
MCV RBC AUTO: 88.7 FL — SIGNIFICANT CHANGE UP (ref 80–100)
MCV RBC AUTO: 89.1 FL — SIGNIFICANT CHANGE UP (ref 80–100)
MONOCYTES # BLD AUTO: 0.51 K/UL — SIGNIFICANT CHANGE UP (ref 0–0.9)
MONOCYTES NFR BLD AUTO: 8.7 % — SIGNIFICANT CHANGE UP (ref 2–14)
MONOCYTES NFR BLD: 7.9 % — SIGNIFICANT CHANGE UP (ref 2–9)
NEUTROPHIL AB SER-ACNC: 85.9 % — HIGH (ref 43–77)
NEUTROPHILS # BLD AUTO: 4.83 K/UL — SIGNIFICANT CHANGE UP (ref 1.8–7.4)
NEUTROPHILS NFR BLD AUTO: 82.9 % — HIGH (ref 43–77)
NRBC # FLD: 0 — SIGNIFICANT CHANGE UP
NRBC # FLD: 0 — SIGNIFICANT CHANGE UP
PHOSPHATE SERPL-MCNC: 3.5 MG/DL — SIGNIFICANT CHANGE UP (ref 2.5–4.5)
PLATELET # BLD AUTO: 249 K/UL — SIGNIFICANT CHANGE UP (ref 150–400)
PLATELET # BLD AUTO: 304 K/UL — SIGNIFICANT CHANGE UP (ref 150–400)
PLATELET COUNT - ESTIMATE: NORMAL — SIGNIFICANT CHANGE UP
PMV BLD: 11.2 FL — SIGNIFICANT CHANGE UP (ref 7–13)
PMV BLD: 11.6 FL — SIGNIFICANT CHANGE UP (ref 7–13)
POLYCHROMASIA BLD QL SMEAR: SLIGHT — SIGNIFICANT CHANGE UP
POTASSIUM SERPL-MCNC: 3.5 MMOL/L — SIGNIFICANT CHANGE UP (ref 3.5–5.3)
POTASSIUM SERPL-SCNC: 3.5 MMOL/L — SIGNIFICANT CHANGE UP (ref 3.5–5.3)
PROTHROM AB SERPL-ACNC: 12.6 SEC — SIGNIFICANT CHANGE UP (ref 9.8–13.1)
RBC # BLD: 2.57 M/UL — LOW (ref 4.2–5.8)
RBC # BLD: 2.94 M/UL — LOW (ref 4.2–5.8)
RBC # FLD: 13.6 % — SIGNIFICANT CHANGE UP (ref 10.3–14.5)
RBC # FLD: 13.7 % — SIGNIFICANT CHANGE UP (ref 10.3–14.5)
REVIEW TO FOLLOW: YES — SIGNIFICANT CHANGE UP
RH IG SCN BLD-IMP: POSITIVE — SIGNIFICANT CHANGE UP
SODIUM SERPL-SCNC: 140 MMOL/L — SIGNIFICANT CHANGE UP (ref 135–145)
VARIANT LYMPHS # BLD: 0.9 % — SIGNIFICANT CHANGE UP
WBC # BLD: 5.83 K/UL — SIGNIFICANT CHANGE UP (ref 3.8–10.5)
WBC # BLD: 6.07 K/UL — SIGNIFICANT CHANGE UP (ref 3.8–10.5)
WBC # FLD AUTO: 5.83 K/UL — SIGNIFICANT CHANGE UP (ref 3.8–10.5)
WBC # FLD AUTO: 6.07 K/UL — SIGNIFICANT CHANGE UP (ref 3.8–10.5)

## 2017-09-20 PROCEDURE — 99232 SBSQ HOSP IP/OBS MODERATE 35: CPT

## 2017-09-20 PROCEDURE — 99233 SBSQ HOSP IP/OBS HIGH 50: CPT

## 2017-09-20 PROCEDURE — 99233 SBSQ HOSP IP/OBS HIGH 50: CPT | Mod: GC

## 2017-09-20 RX ORDER — INSULIN GLARGINE 100 [IU]/ML
23 INJECTION, SOLUTION SUBCUTANEOUS EVERY MORNING
Qty: 0 | Refills: 0 | Status: DISCONTINUED | OUTPATIENT
Start: 2017-09-21 | End: 2017-09-21

## 2017-09-20 RX ORDER — POTASSIUM CHLORIDE 20 MEQ
20 PACKET (EA) ORAL
Qty: 0 | Refills: 0 | Status: COMPLETED | OUTPATIENT
Start: 2017-09-20 | End: 2017-09-20

## 2017-09-20 RX ADMIN — Medication 50 MILLIGRAM(S): at 05:27

## 2017-09-20 RX ADMIN — Medication 20 MILLIEQUIVALENT(S): at 11:08

## 2017-09-20 RX ADMIN — Medication 80 MILLIGRAM(S): at 11:09

## 2017-09-20 RX ADMIN — HEPARIN SODIUM 0 UNIT(S)/HR: 5000 INJECTION INTRAVENOUS; SUBCUTANEOUS at 02:23

## 2017-09-20 RX ADMIN — ATORVASTATIN CALCIUM 80 MILLIGRAM(S): 80 TABLET, FILM COATED ORAL at 22:16

## 2017-09-20 RX ADMIN — Medication 300 MILLIGRAM(S): at 11:08

## 2017-09-20 RX ADMIN — HEPARIN SODIUM 0 UNIT(S)/HR: 5000 INJECTION INTRAVENOUS; SUBCUTANEOUS at 18:09

## 2017-09-20 RX ADMIN — Medication 81 MILLIGRAM(S): at 11:08

## 2017-09-20 RX ADMIN — Medication 50 MILLIGRAM(S): at 17:21

## 2017-09-20 RX ADMIN — HEPARIN SODIUM 1800 UNIT(S)/HR: 5000 INJECTION INTRAVENOUS; SUBCUTANEOUS at 19:42

## 2017-09-20 RX ADMIN — Medication 8 UNIT(S): at 14:20

## 2017-09-20 RX ADMIN — CYCLOSPORINE 100 MILLIGRAM(S): 100 CAPSULE ORAL at 11:08

## 2017-09-20 RX ADMIN — HEPARIN SODIUM 2100 UNIT(S)/HR: 5000 INJECTION INTRAVENOUS; SUBCUTANEOUS at 10:48

## 2017-09-20 RX ADMIN — ERYTHROPOIETIN 7000 UNIT(S): 10000 INJECTION, SOLUTION INTRAVENOUS; SUBCUTANEOUS at 07:14

## 2017-09-20 RX ADMIN — Medication 2: at 18:13

## 2017-09-20 RX ADMIN — Medication 5 MILLIGRAM(S): at 05:27

## 2017-09-20 RX ADMIN — TAMSULOSIN HYDROCHLORIDE 0.4 MILLIGRAM(S): 0.4 CAPSULE ORAL at 22:16

## 2017-09-20 RX ADMIN — Medication: at 14:20

## 2017-09-20 RX ADMIN — Medication 8 UNIT(S): at 18:13

## 2017-09-20 RX ADMIN — Medication 100 MILLIGRAM(S): at 11:08

## 2017-09-20 RX ADMIN — Medication 1 DROP(S): at 02:11

## 2017-09-20 RX ADMIN — HEPARIN SODIUM 7000 UNIT(S): 5000 INJECTION INTRAVENOUS; SUBCUTANEOUS at 11:07

## 2017-09-20 RX ADMIN — HEPARIN SODIUM 1700 UNIT(S)/HR: 5000 INJECTION INTRAVENOUS; SUBCUTANEOUS at 03:31

## 2017-09-20 RX ADMIN — Medication 667 MILLIGRAM(S): at 11:09

## 2017-09-20 RX ADMIN — Medication 667 MILLIGRAM(S): at 17:21

## 2017-09-20 RX ADMIN — INSULIN GLARGINE 25 UNIT(S): 100 INJECTION, SOLUTION SUBCUTANEOUS at 07:13

## 2017-09-20 NOTE — PROGRESS NOTE ADULT - SUBJECTIVE AND OBJECTIVE BOX
Patient is a 52y old  Male who presents with a chief complaint of Altered mental status (10 Sep 2017 23:23)        SUBJECTIVE / OVERNIGHT EVENTS:  -Tele- afib 80s-100, 3 beats NSVT, PVCs, couplets, 2.03 second pause - asymptomatic overnight  -.1  -Patient currently undergoing HD  -OR tomorrow for AVF    No acute events overnight. Patient has no complaints of CP, palps, SOB, abdominal pain, fevers, chills, n/v/d/c.    MEDICATIONS  (STANDING):  calcium acetate 667 milliGRAM(s) Oral three times a day with meals  predniSONE   Tablet 5 milliGRAM(s) Oral daily  tamsulosin 0.4 milliGRAM(s) Oral at bedtime  cycloSPORINE  (SandIMMUNE) 100 milliGRAM(s) Oral daily  atorvastatin 80 milliGRAM(s) Oral at bedtime  aspirin enteric coated 81 milliGRAM(s) Oral daily  senna 2 Tablet(s) Oral at bedtime  docusate sodium 100 milliGRAM(s) Oral three times a day  furosemide   Injectable 80 milliGRAM(s) IV Push two times a day  epoetin valente Injectable 7000 Unit(s) IV Push <User Schedule>  diltiazem    milliGRAM(s) Oral daily  metoprolol 50 milliGRAM(s) Oral two times a day  insulin lispro Injectable (HumaLOG) 8 Unit(s) SubCutaneous three times a day before meals  insulin lispro (HumaLOG) corrective regimen sliding scale   SubCutaneous three times a day before meals  insulin lispro (HumaLOG) corrective regimen sliding scale   SubCutaneous at bedtime  dextrose 5%. 1000 milliLiter(s) (50 mL/Hr) IV Continuous <Continuous>  dextrose 50% Injectable 12.5 Gram(s) IV Push once  dextrose 50% Injectable 25 Gram(s) IV Push once  dextrose 50% Injectable 25 Gram(s) IV Push once  heparin  Infusion.  Unit(s)/Hr (16 mL/Hr) IV Continuous <Continuous>  insulin glargine Injectable (LANTUS) 25 Unit(s) SubCutaneous every morning    MEDICATIONS  (PRN):  naphazoline/pheniramine Solution 1 Drop(s) Both EYES three times a day PRN eye discomfort  dextrose Gel 1 Dose(s) Oral once PRN Blood Glucose LESS THAN 70 milliGRAM(s)/deciLiter  glucagon  Injectable 1 milliGRAM(s) IntraMuscular once PRN Glucose <70 milliGRAM(s)/deciLiter  heparin  Injectable 7000 Unit(s) IV Push every 6 hours PRN For aPTT less than 40  heparin  Injectable 3500 Unit(s) IV Push every 6 hours PRN For aPTT between 40 - 57        CAPILLARY BLOOD GLUCOSE  105 (20 Sep 2017 05:01)  225 (19 Sep 2017 22:33)  258 (19 Sep 2017 17:03)  139 (19 Sep 2017 12:50)  102 (19 Sep 2017 09:15)  69 (19 Sep 2017 08:42)        I&O's Summary      PHYSICAL EXAM  VS: T 98.2, RR 88, /77, RR 18, SpO2 99% on RA  GENERAL: NAD, well-developed  HEAD:  Atraumatic, Normocephalic  EYES: EOMI, PERRLA, conjunctiva and sclera clear. Legally blind but can see changes in light  NECK: Supple, No JVD  CHEST/LUNG: CTAB, No wheeze  HEART: Regular rate and rhythm; No murmurs, rubs, or gallops  ABDOMEN: Soft, Nontender, Nondistended; Bowel sounds present  EXTREMITIES: Feet warm, No clubbing, cyanosis. 1+ pitting edema midway up the calves  PSYCH: AAOx3  SKIN: No rashes or lesions    LABS:                        7.3    6.07  )-----------( 304      ( 20 Sep 2017 06:10 )             22.8     09-20    140  |  99  |  59<H>  ----------------------------<  86  3.5   |  25  |  3.45<H>    Ca    8.2<L>      20 Sep 2017 06:10  Phos  3.5     09-20  Mg     1.7     09-20      PT/INR - ( 19 Sep 2017 06:50 )   PT: 12.5 SEC;   INR: 1.11          PTT - ( 20 Sep 2017 01:30 )  PTT:152.1 SEC          RADIOLOGY & ADDITIONAL TESTS:    No new additional tests.

## 2017-09-20 NOTE — PROGRESS NOTE ADULT - PROBLEM SELECTOR PLAN 2
-c/w HD per renal MWF  -c/w lasix 80mg IV BID  -permacath in place - AVF rescheculed for Thur afternoon as confirmed with vascular Dr. Staples. medical clearance done, cardiac clearance done  -Renal consult appreciated, discussed with RN, SW, Pt and sister above plan. -c/w HD per renal MWF  -c/w lasix 80mg IV BID  -permacath in place - AVF rescheculed for Thur afternoon as confirmed with vascular Dr. Staples. medical clearance done, cardiac clearance done. OR schedule confirmed.  -Renal consult appreciated, discussed with RN, SW, Pt and sister above plan.

## 2017-09-20 NOTE — PROGRESS NOTE ADULT - PROBLEM SELECTOR PLAN 9
Diabetic retinopathy: Legally blind, lives with sister  -keep room bright, can see shapes if bright light  BPH: cont home tamsulosin  DVT ppx: heparin gtt  Diet: consistent carb no snack/renal diet

## 2017-09-20 NOTE — PROGRESS NOTE ADULT - SUBJECTIVE AND OBJECTIVE BOX
Chief Complaint: DM1    History: For OR tomorrow afternoon.  Tolerating po.  Glucose borderline low this AM. Prebreakfast Humalog was held. Subsequent hyperglycemia.    MEDICATIONS  (STANDING):  calcium acetate 667 milliGRAM(s) Oral three times a day with meals  predniSONE   Tablet 5 milliGRAM(s) Oral daily  tamsulosin 0.4 milliGRAM(s) Oral at bedtime  cycloSPORINE  (SandIMMUNE) 100 milliGRAM(s) Oral daily  atorvastatin 80 milliGRAM(s) Oral at bedtime  aspirin enteric coated 81 milliGRAM(s) Oral daily  senna 2 Tablet(s) Oral at bedtime  docusate sodium 100 milliGRAM(s) Oral three times a day  epoetin valente Injectable 7000 Unit(s) IV Push <User Schedule>  diltiazem    milliGRAM(s) Oral daily  metoprolol 50 milliGRAM(s) Oral two times a day  insulin lispro Injectable (HumaLOG) 8 Unit(s) SubCutaneous three times a day before meals  insulin lispro (HumaLOG) corrective regimen sliding scale   SubCutaneous three times a day before meals  insulin lispro (HumaLOG) corrective regimen sliding scale   SubCutaneous at bedtime  dextrose 5%. 1000 milliLiter(s) (50 mL/Hr) IV Continuous <Continuous>  dextrose 50% Injectable 12.5 Gram(s) IV Push once  dextrose 50% Injectable 25 Gram(s) IV Push once  dextrose 50% Injectable 25 Gram(s) IV Push once  heparin  Infusion.  Unit(s)/Hr (16 mL/Hr) IV Continuous <Continuous>  insulin glargine Injectable (LANTUS) 25 Unit(s) SubCutaneous every morning    MEDICATIONS  (PRN):  naphazoline/pheniramine Solution 1 Drop(s) Both EYES three times a day PRN eye discomfort  dextrose Gel 1 Dose(s) Oral once PRN Blood Glucose LESS THAN 70 milliGRAM(s)/deciLiter  glucagon  Injectable 1 milliGRAM(s) IntraMuscular once PRN Glucose <70 milliGRAM(s)/deciLiter  heparin  Injectable 7000 Unit(s) IV Push every 6 hours PRN For aPTT less than 40  heparin  Injectable 3500 Unit(s) IV Push every 6 hours PRN For aPTT between 40 - 57      Allergies    No Known Allergies    Intolerances      Review of Systems:    ALL OTHER SYSTEMS REVIEWED AND NEGATIVE    PHYSICAL EXAM:  VITALS: T(C): 36.8 (09-20-17 @ 12:49)  T(F): 98.2 (09-20-17 @ 12:49), Max: 98.6 (09-20-17 @ 05:01)  HR: 106 (09-20-17 @ 13:45) (88 - 145)  BP: 138/70 (09-20-17 @ 12:49) (134/88 - 156/90)  RR:  (18 - 18)  SpO2:  (99% - 99%)  Wt(kg): --  GENERAL: NAD, well-groomed, well-developed  EYES: blind  HEENT:  Atraumatic, Normocephalic, moist mucous membranes  RESPIRATORY: nonlabored  SKIN: Dry, intact, No rashes or lesions  PSYCH: Alert and oriented x 3, normal affect, normal mood    CAPILLARY BLOOD GLUCOSE  312 (09-20 @ 13:36)  76 (09-20 @ 08:04)  105 (09-20 @ 05:01)  225 (09-19 @ 22:33)  258 (09-19 @ 17:03)  139 (09-19 @ 12:50)  102 (09-19 @ 09:15)  69 (09-19 @ 08:42)  212 (09-18 @ 22:46)  195 (09-18 @ 20:11)  269 (09-18 @ 17:49)  260 (09-18 @ 16:00)  208 (09-18 @ 12:00)  153 (09-18 @ 08:59)  225 (09-18 @ 05:52)  225 (09-18 @ 05:50)  202 (09-18 @ 02:47)  253 (09-17 @ 21:40)  336 (09-17 @ 17:11)      09-20    140  |  99  |  59<H>  ----------------------------<  86  3.5   |  25  |  3.45<H>    EGFR if : 22  EGFR if non African American: 19    Ca    8.2<L>      09-20  Mg     1.7     09-20  Phos  3.5     09-20            Thyroid Function Tests:  09-03 @ 13:45 TSH 2.88 FreeT4 0.70 T3 71.7 Anti TPO -- Anti Thyroglobulin Ab -- TSI --      Hemoglobin A1C, Whole Blood: 8.0 % <H> [4.0 - 5.6] (08-16-17 @ 06:30)

## 2017-09-20 NOTE — PROGRESS NOTE ADULT - SUBJECTIVE AND OBJECTIVE BOX
pt seen while on hd, pt without any complaints denies any cp/sob    Vital Signs Last 24 Hrs  T(C): 36.9 (20 Sep 2017 09:38), Max: 37 (20 Sep 2017 05:01)  T(F): 98.4 (20 Sep 2017 09:38), Max: 98.6 (20 Sep 2017 05:01)  HR: 120 (20 Sep 2017 10:46) (85 - 120)  BP: 155/85 (20 Sep 2017 10:46) (134/88 - 156/90)  BP(mean): --  RR: 18 (20 Sep 2017 09:38) (18 - 18)  SpO2: 99% (20 Sep 2017 05:01) (98% - 99%)  gen: nad  extremity: left upper extremity radial pulse is palpable, no edema,  strength 5/5, skin intact, pink arm band in place

## 2017-09-20 NOTE — PROGRESS NOTE ADULT - SUBJECTIVE AND OBJECTIVE BOX
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 Seen and evaluated after HD today.  Denied complaints.    PAST HISTORY  --------------------------------------------------------------------------------  No significant changes to PMH, PSH, FHx, SHx, unless otherwise noted    ALLERGIES & MEDICATIONS  --------------------------------------------------------------------------------  Allergies    No Known Allergies    Intolerances      Standing Inpatient Medications  calcium acetate 667 milliGRAM(s) Oral three times a day with meals  predniSONE   Tablet 5 milliGRAM(s) Oral daily  tamsulosin 0.4 milliGRAM(s) Oral at bedtime  cycloSPORINE  (SandIMMUNE) 100 milliGRAM(s) Oral daily  atorvastatin 80 milliGRAM(s) Oral at bedtime  aspirin enteric coated 81 milliGRAM(s) Oral daily  senna 2 Tablet(s) Oral at bedtime  docusate sodium 100 milliGRAM(s) Oral three times a day  furosemide   Injectable 80 milliGRAM(s) IV Push two times a day  epoetin valente Injectable 7000 Unit(s) IV Push <User Schedule>  diltiazem    milliGRAM(s) Oral daily  metoprolol 50 milliGRAM(s) Oral two times a day  insulin lispro Injectable (HumaLOG) 8 Unit(s) SubCutaneous three times a day before meals  insulin lispro (HumaLOG) corrective regimen sliding scale   SubCutaneous three times a day before meals  insulin lispro (HumaLOG) corrective regimen sliding scale   SubCutaneous at bedtime  dextrose 5%. 1000 milliLiter(s) IV Continuous <Continuous>  dextrose 50% Injectable 12.5 Gram(s) IV Push once  dextrose 50% Injectable 25 Gram(s) IV Push once  dextrose 50% Injectable 25 Gram(s) IV Push once  heparin  Infusion.  Unit(s)/Hr IV Continuous <Continuous>  insulin glargine Injectable (LANTUS) 25 Unit(s) SubCutaneous every morning    PRN Inpatient Medications  naphazoline/pheniramine Solution 1 Drop(s) Both EYES three times a day PRN  dextrose Gel 1 Dose(s) Oral once PRN  glucagon  Injectable 1 milliGRAM(s) IntraMuscular once PRN  heparin  Injectable 7000 Unit(s) IV Push every 6 hours PRN  heparin  Injectable 3500 Unit(s) IV Push every 6 hours PRN      REVIEW OF SYSTEMS  --------------------------------------------------------------------------------  No complaints.    All other systems were reviewed and are negative, except as noted.    VITALS/PHYSICAL EXAM  --------------------------------------------------------------------------------  T(C): 36.8 (09-20-17 @ 12:49), Max: 37 (09-20-17 @ 05:01)  HR: 145 (09-20-17 @ 12:49) (85 - 145)  BP: 138/70 (09-20-17 @ 12:49) (134/88 - 156/90)  RR: 18 (09-20-17 @ 12:49) (18 - 18)  SpO2: 99% (09-20-17 @ 12:49) (98% - 99%)  Wt(kg): --        09-20-17 @ 07:01  -  09-20-17 @ 12:57  --------------------------------------------------------  IN: 400 mL / OUT: 2900 mL / NET: -2500 mL      Physical Exam:  	Gen: NAD  	HEENT: anicteric  	Pulm: CTA B/L  	CV: S1S2; no rub + edema  	Abd: soft  	Psych: Normal affect and mood  	Skin: Warm  	Vascular access: right tunneled HD catheter    LABS/STUDIES  --------------------------------------------------------------------------------              8.2    5.83  >-----------<  249      [09-20-17 @ 09:15]              26.2     140  |  99  |  59  ----------------------------<  86      [09-20-17 @ 06:10]  3.5   |  25  |  3.45        Ca     8.2     [09-20-17 @ 06:10]      Mg     1.7     [09-20-17 @ 06:10]      Phos  3.5     [09-20-17 @ 06:10]      PT/INR: PT 12.6 , INR 1.12       [09-20-17 @ 09:15]  PTT: 37.4       [09-20-17 @ 09:15]      Creatinine Trend:  SCr 3.45 [09-20 @ 06:10]  SCr 3.31 [09-19 @ 06:50]  SCr 4.70 [09-18 @ 06:23]  SCr 4.66 [09-18 @ 02:35]  SCr 4.76 [09-17 @ 22:20]

## 2017-09-20 NOTE — PROGRESS NOTE ADULT - SUBJECTIVE AND OBJECTIVE BOX
S/24 Events: Patient seen and examined. Denies CP, SOB, dizziness, LH, near syncope or sycope.   No acute events overnight.   Telemetry : - 's - 160's.   O:  T(C): 36.8 (17 @ 12:49), Max: 37 (17 @ 05:01)  HR: 106 (17 @ 13:45) (88 - 145)  BP: 138/70 (17 @ 12:49) (134/88 - 156/90)  RR: 18 (17 @ 12:49) (18 - 18)  SpO2: 99% (17 @ 12:49) (99% - 99%)  Wt(kg): --  Daily     Daily Weight in k.2 (20 Sep 2017 09:38)  Gen: NAD  HEENT: EOMI  CV: RRR, normal S1 + S2, no m/r/g  Lungs: CTAB  Abd: soft, non-tender  Ext: No edema    Labs:                       8.2    5.83  )-----------( 249      ( 20 Sep 2017 09:15 )             26.2     09-20    140  |  99  |  59<H>  ----------------------------<  86  3.5   |  25  |  3.45<H>    Ca    8.2<L>      20 Sep 2017 06:10  Phos  3.5     09-20  Mg     1.7     09-20      PT/INR - ( 20 Sep 2017 09:15 )   PT: 12.6 SEC;   INR: 1.12          PTT - ( 20 Sep 2017 09:15 )  PTT:37.4 SEC    Meds:  MEDICATIONS  (STANDING):  calcium acetate 667 milliGRAM(s) Oral three times a day with meals  predniSONE   Tablet 5 milliGRAM(s) Oral daily  tamsulosin 0.4 milliGRAM(s) Oral at bedtime  cycloSPORINE  (SandIMMUNE) 100 milliGRAM(s) Oral daily  atorvastatin 80 milliGRAM(s) Oral at bedtime  senna 2 Tablet(s) Oral at bedtime  docusate sodium 100 milliGRAM(s) Oral three times a day  epoetin valente Injectable 7000 Unit(s) IV Push <User Schedule>  diltiazem    milliGRAM(s) Oral daily  metoprolol 50 milliGRAM(s) Oral two times a day  insulin lispro Injectable (HumaLOG) 8 Unit(s) SubCutaneous three times a day before meals  insulin lispro (HumaLOG) corrective regimen sliding scale   SubCutaneous three times a day before meals  insulin lispro (HumaLOG) corrective regimen sliding scale   SubCutaneous at bedtime  dextrose 5%. 1000 milliLiter(s) (50 mL/Hr) IV Continuous <Continuous>  dextrose 50% Injectable 12.5 Gram(s) IV Push once  dextrose 50% Injectable 25 Gram(s) IV Push once  dextrose 50% Injectable 25 Gram(s) IV Push once  heparin  Infusion.  Unit(s)/Hr (16 mL/Hr) IV Continuous <Continuous>     TTE: normal EF, minimal MR, small pericardial effusion posterior to LV, right   A/P: Mr. Young is a pleasant 52yMale w/ a PMHx of CKD s/p renal transplant () on cyclosporine and mycophenolate, HTN, type I DM, legally blind and h/o uremic encephalopathy who presented with AMS and subsequently had PEA arrest secondary to hyperkalemia requiring emergent HD on , now in persistent atrial fibrillation. His rate had not been well controlled on Cardizem 300mg daily and metoprolol 50 mg daily so the beta blocker was increased to 50mg bid.  He is awaiting AVF tomorrow.   AF :   Currently warfarin on HOLD due to the impending surgery ( AVF ), Continue heparin gtt adn maintain PTT   Increase cardizem to 360 mg ( HR up to 150's x 24 hr )  Continue Metoprolol 50 mg bid     LE edema : S/24 Events: Patient seen and examined. Denies CP, SOB, dizziness, LH, near syncope or sycope.   No acute events overnight.   Telemetry : - 's - 160's.   O:  T(C): 36.8 (17 @ 12:49), Max: 37 (17 @ 05:01)  HR: 106 (17 @ 13:45) (88 - 145)  BP: 138/70 (17 @ 12:49) (134/88 - 156/90)  RR: 18 (17 @ 12:49) (18 - 18)  SpO2: 99% (17 @ 12:49) (99% - 99%)  Wt(kg): --  Daily     Daily Weight in k.2 (20 Sep 2017 09:38)  Gen: NAD  HEENT: EOMI  CV: RRR, normal S1 + S2, no m/r/g  Lungs: CTAB  Abd: soft, non-tender  Ext: No edema    Labs:                       8.2    5.83  )-----------( 249      ( 20 Sep 2017 09:15 )             26.2     09-20    140  |  99  |  59<H>  ----------------------------<  86  3.5   |  25  |  3.45<H>    Ca    8.2<L>      20 Sep 2017 06:10  Phos  3.5     09-20  Mg     1.7     09-20      PT/INR - ( 20 Sep 2017 09:15 )   PT: 12.6 SEC;   INR: 1.12          PTT - ( 20 Sep 2017 09:15 )  PTT:37.4 SEC    Meds:  MEDICATIONS  (STANDING):  calcium acetate 667 milliGRAM(s) Oral three times a day with meals  predniSONE   Tablet 5 milliGRAM(s) Oral daily  tamsulosin 0.4 milliGRAM(s) Oral at bedtime  cycloSPORINE  (SandIMMUNE) 100 milliGRAM(s) Oral daily  atorvastatin 80 milliGRAM(s) Oral at bedtime  senna 2 Tablet(s) Oral at bedtime  docusate sodium 100 milliGRAM(s) Oral three times a day  epoetin valente Injectable 7000 Unit(s) IV Push <User Schedule>  diltiazem    milliGRAM(s) Oral daily  metoprolol 50 milliGRAM(s) Oral two times a day  insulin lispro Injectable (HumaLOG) 8 Unit(s) SubCutaneous three times a day before meals  insulin lispro (HumaLOG) corrective regimen sliding scale   SubCutaneous three times a day before meals  insulin lispro (HumaLOG) corrective regimen sliding scale   SubCutaneous at bedtime  dextrose 5%. 1000 milliLiter(s) (50 mL/Hr) IV Continuous <Continuous>  dextrose 50% Injectable 12.5 Gram(s) IV Push once  dextrose 50% Injectable 25 Gram(s) IV Push once  dextrose 50% Injectable 25 Gram(s) IV Push once  heparin  Infusion.  Unit(s)/Hr (16 mL/Hr) IV Continuous <Continuous>     TTE: normal EF, minimal MR, small pericardial effusion posterior to LV, right pleural effusion.   A/P: Mr. Young is a pleasant 52yMale w/ a PMHx of CKD s/p renal transplant () on cyclosporine and mycophenolate, HTN, type I DM, legally blind and h/o uremic encephalopathy who presented with AMS and subsequently had PEA arrest secondary to hyperkalemia requiring emergent HD on , now in persistent atrial fibrillation. His rate had not been well controlled on Cardizem 300mg daily and metoprolol 50 mg daily so the beta blocker was increased to 50mg bid.  He is awaiting AVF tomorrow.   AF :   Currently warfarin on HOLD due to the impending surgery ( AVF ), Continue heparin gtt and maintain PTT   Increase cardizem to 360 mg ( HR up to 150's x 24 hr )  Continue Metoprolol 50 mg bid   Plan for repeat TTE to evaluate pericardial effusion, if resolved plan for rhythm control strategy ( DAMIEN with DCCV).    Bilateral LE edema:   unclear etiology if secondary to impaired perfusion vs HF in the setting of preserved EF.   Was on IV lasix in the past.   Consider LE doppler studies  HF consult.     Consider all other management per primary team.

## 2017-09-20 NOTE — PROGRESS NOTE ADULT - PROBLEM SELECTOR PLAN 1
-currently rate controlled on PO Cardizem, metoprolol 50 bid w/ hold parameters   -holding coumadin as pt. is planned for AVF as inpatient, c/w heparin gtt  -appreciate EP recs  - TTE nonsignificant  -continue lopressor and cardizem while NPO given he has tendency to go into   afib w/ RVR if he misses doses  - cardiology clearance reviewed [facility] for [facility]

## 2017-09-20 NOTE — PROGRESS NOTE ADULT - ATTENDING COMMENTS
Pt was seen and examed at bed side with resident.  53 y/o M PMH CKD s/p renal transplant (1998) on cyclosporine and CellCept, HTN, DM, legally blind BIBEMS P/w uremic encephalopathy. In ED, Pt became PEA arrest in the ED, intubated for airway protection. ROSC achieved s/p 1 round of epi and chest compressions. He was admitted in MICU and put on pressors. s/p emergent HD for hyperkalemia. s/p Covered empirically with broad spectrum antibiotics (vanc and zosyn). Found Hgb 5 s/p multiple pRBC given and epogen was started. Pt was able to wean off pressors and pt extubated 9/4.   Renal follow up appreciated, continue Cyclosporine and prednisone, s/p perm cath 9/12,  on lasix with close monitoring renal function. Pt was also found to have new-onset Afib, on coumadin, with bridging heparin gtt. Vasculare consult appreciated, need AVF as in-patient as HD center requested, coumadin on held. Cardiology and Endo following. Continue Cardizem and, increased metoprolol to 50 mg bid as BP tolerated for better HR control, Pt need to continue cardizem and metoprolol while NPO and during HD. Monitor FS on Lantus (adjusting to AM dose) and Humalog, hyperglycemia,  decreased lantus 23 u and humalog 8u TID, repeat BMP improving. encourage consistent carb diet and  monitor for hypoglycemia event.  plan for AVF changed to Thur afternoon as confirmed with vascular attending. cardiology clearance in chart. Pt is medically optimized for planned AVF. Recent TTE normal LVEF. H/H stable. Pt need continue Cardizem and metoprolol while NPO and during HD for continuous HR control. Appreciated ENDO input to give lantus 20 u while NPO.  SW arrange out patient HD.

## 2017-09-20 NOTE — PROGRESS NOTE ADULT - ASSESSMENT
51 yo male with history of dm, htn, legally blind, renal disease s/p kidney transplant admitted with uremic encephalopathy 2/2 failed renal transplant with complicated course of bradycardia and pea arrest requiring intubation and micu stay.  pt currently on the floor s/p permcath on hd now with new onset afib on heparin    -continue with left upper extremity precautions  -medicine and cardiac clearance appreciated   -plan for left upper extremity radiocephalic avf tomorrow

## 2017-09-20 NOTE — PROGRESS NOTE ADULT - PROBLEM SELECTOR PLAN 6
Home regime: lantus 22u at home, home pravastatin 10mg daily.  -HbA1C 8  - this AM  -c/w lantus at 28 units qAM and humalog 8 units premeal   -appreciate endo recs: lantus 28 units in the qAM and humalog 8/8/8 premeal; -close monitoring for hypoglycemia  -if hyperglycemia difficult to control, may need lantus in both AM and QHS  -atorvastatin 80mg daily Home regime: lantus 22u at home, home pravastatin 10mg daily.  -HbA1C 8  - this AM  -c/w lantus at 28 units qAM and humalog 8 units premeal   -appreciate endo recs: lantus 25 units in the qAM and humalog 8/8/8 premeal; -close monitoring for hypoglycemia  -if hyperglycemia difficult to control, may need lantus in both AM and QHS  -atorvastatin 80mg daily Home regime: lantus 22u at home, home pravastatin 10mg daily.  -HbA1C 8  - this AM  -c/w lantus at 23 units qAM and humalog 8 units premeal   -appreciate endo recs: lantus 20 units while NPO,  -close monitoring for hypoglycemia  -atorvastatin 80mg daily

## 2017-09-20 NOTE — PROGRESS NOTE ADULT - PROBLEM SELECTOR PLAN 1
PT on MWF schedule. Tolerated HD well today.  Plan for next HD friday.  I spoke with Dr. Staples patient to go for OR fistula 2 pm tomorrow.

## 2017-09-20 NOTE — PROGRESS NOTE ADULT - PROBLEM SELECTOR PLAN 1
Borderline Hypoglycemia this AM.  Subsequent hyperglycemia after prebreakfast Humalog was held.  Reduce Lantus to 23 u daily (but would give 20u daily for NPO status).  Prefer to give Lantus in AM tomorrow but patient states he will not accept basal insulin until he is no longer NPO after surgery.  Continue Humalog 8/8/8 and low correction scales before meals and QHS.      Outpatient endocrine follow up: appointment pending with Dr Baker

## 2017-09-21 ENCOUNTER — APPOINTMENT (OUTPATIENT)
Dept: VASCULAR SURGERY | Facility: HOSPITAL | Age: 52
End: 2017-09-21

## 2017-09-21 DIAGNOSIS — E78.4 OTHER HYPERLIPIDEMIA: ICD-10-CM

## 2017-09-21 LAB
APTT BLD: 34.4 SEC — SIGNIFICANT CHANGE UP (ref 27.5–37.4)
BUN SERPL-MCNC: 56 MG/DL — HIGH (ref 7–23)
BUN SERPL-MCNC: 58 MG/DL — HIGH (ref 7–23)
CALCIUM SERPL-MCNC: 8.3 MG/DL — LOW (ref 8.4–10.5)
CALCIUM SERPL-MCNC: 8.3 MG/DL — LOW (ref 8.4–10.5)
CHLORIDE SERPL-SCNC: 98 MMOL/L — SIGNIFICANT CHANGE UP (ref 98–107)
CHLORIDE SERPL-SCNC: 99 MMOL/L — SIGNIFICANT CHANGE UP (ref 98–107)
CO2 SERPL-SCNC: 23 MMOL/L — SIGNIFICANT CHANGE UP (ref 22–31)
CO2 SERPL-SCNC: 28 MMOL/L — SIGNIFICANT CHANGE UP (ref 22–31)
CREAT SERPL-MCNC: 3.28 MG/DL — HIGH (ref 0.5–1.3)
CREAT SERPL-MCNC: 3.32 MG/DL — HIGH (ref 0.5–1.3)
GLUCOSE SERPL-MCNC: 462 MG/DL — CRITICAL HIGH (ref 70–99)
GLUCOSE SERPL-MCNC: 544 MG/DL — CRITICAL HIGH (ref 70–99)
HCT VFR BLD CALC: 25.4 % — LOW (ref 39–50)
HGB BLD-MCNC: 8.1 G/DL — LOW (ref 13–17)
MAGNESIUM SERPL-MCNC: 1.6 MG/DL — SIGNIFICANT CHANGE UP (ref 1.6–2.6)
MCHC RBC-ENTMCNC: 28.9 PG — SIGNIFICANT CHANGE UP (ref 27–34)
MCHC RBC-ENTMCNC: 31.9 % — LOW (ref 32–36)
MCV RBC AUTO: 90.7 FL — SIGNIFICANT CHANGE UP (ref 80–100)
NRBC # FLD: 0 — SIGNIFICANT CHANGE UP
PHOSPHATE SERPL-MCNC: 2.3 MG/DL — LOW (ref 2.5–4.5)
PLATELET # BLD AUTO: 239 K/UL — SIGNIFICANT CHANGE UP (ref 150–400)
PMV BLD: 11.5 FL — SIGNIFICANT CHANGE UP (ref 7–13)
POTASSIUM SERPL-MCNC: 5.2 MMOL/L — SIGNIFICANT CHANGE UP (ref 3.5–5.3)
POTASSIUM SERPL-MCNC: 5.5 MMOL/L — HIGH (ref 3.5–5.3)
POTASSIUM SERPL-SCNC: 5.2 MMOL/L — SIGNIFICANT CHANGE UP (ref 3.5–5.3)
POTASSIUM SERPL-SCNC: 5.5 MMOL/L — HIGH (ref 3.5–5.3)
RBC # BLD: 2.8 M/UL — LOW (ref 4.2–5.8)
RBC # FLD: 13.9 % — SIGNIFICANT CHANGE UP (ref 10.3–14.5)
SODIUM SERPL-SCNC: 135 MMOL/L — SIGNIFICANT CHANGE UP (ref 135–145)
SODIUM SERPL-SCNC: 137 MMOL/L — SIGNIFICANT CHANGE UP (ref 135–145)
WBC # BLD: 5.53 K/UL — SIGNIFICANT CHANGE UP (ref 3.8–10.5)
WBC # FLD AUTO: 5.53 K/UL — SIGNIFICANT CHANGE UP (ref 3.8–10.5)

## 2017-09-21 PROCEDURE — 99232 SBSQ HOSP IP/OBS MODERATE 35: CPT | Mod: GC

## 2017-09-21 PROCEDURE — 99233 SBSQ HOSP IP/OBS HIGH 50: CPT | Mod: GC

## 2017-09-21 RX ORDER — INSULIN GLARGINE 100 [IU]/ML
20 INJECTION, SOLUTION SUBCUTANEOUS ONCE
Qty: 0 | Refills: 0 | Status: COMPLETED | OUTPATIENT
Start: 2017-09-21 | End: 2017-09-21

## 2017-09-21 RX ORDER — INSULIN LISPRO 100/ML
4 VIAL (ML) SUBCUTANEOUS ONCE
Qty: 0 | Refills: 0 | Status: COMPLETED | OUTPATIENT
Start: 2017-09-21 | End: 2017-09-21

## 2017-09-21 RX ORDER — INSULIN GLARGINE 100 [IU]/ML
20 INJECTION, SOLUTION SUBCUTANEOUS EVERY MORNING
Qty: 0 | Refills: 0 | Status: DISCONTINUED | OUTPATIENT
Start: 2017-09-22 | End: 2017-09-22

## 2017-09-21 RX ORDER — HEPARIN SODIUM 5000 [USP'U]/ML
INJECTION INTRAVENOUS; SUBCUTANEOUS
Qty: 25000 | Refills: 0 | Status: DISCONTINUED | OUTPATIENT
Start: 2017-09-21 | End: 2017-09-27

## 2017-09-21 RX ADMIN — ATORVASTATIN CALCIUM 80 MILLIGRAM(S): 80 TABLET, FILM COATED ORAL at 21:53

## 2017-09-21 RX ADMIN — Medication 50 MILLIGRAM(S): at 05:17

## 2017-09-21 RX ADMIN — CYCLOSPORINE 100 MILLIGRAM(S): 100 CAPSULE ORAL at 17:45

## 2017-09-21 RX ADMIN — Medication: at 08:53

## 2017-09-21 RX ADMIN — Medication: at 18:56

## 2017-09-21 RX ADMIN — Medication 667 MILLIGRAM(S): at 17:45

## 2017-09-21 RX ADMIN — Medication 8 UNIT(S): at 18:56

## 2017-09-21 RX ADMIN — HEPARIN SODIUM 1600 UNIT(S)/HR: 5000 INJECTION INTRAVENOUS; SUBCUTANEOUS at 21:52

## 2017-09-21 RX ADMIN — INSULIN GLARGINE 20 UNIT(S): 100 INJECTION, SOLUTION SUBCUTANEOUS at 10:32

## 2017-09-21 RX ADMIN — TAMSULOSIN HYDROCHLORIDE 0.4 MILLIGRAM(S): 0.4 CAPSULE ORAL at 21:53

## 2017-09-21 RX ADMIN — Medication 4 UNIT(S): at 10:32

## 2017-09-21 RX ADMIN — Medication 300 MILLIGRAM(S): at 05:17

## 2017-09-21 RX ADMIN — Medication 5 MILLIGRAM(S): at 05:17

## 2017-09-21 RX ADMIN — Medication 1: at 23:10

## 2017-09-21 RX ADMIN — Medication: at 12:09

## 2017-09-21 RX ADMIN — Medication 50 MILLIGRAM(S): at 17:45

## 2017-09-21 NOTE — PROGRESS NOTE ADULT - ATTENDING COMMENTS
Agree with Dr. Garcia assessment and plan above.  The patient is brittle Type 1 DM and requires basal insulin in order for sugars to reach moderate levels. The patient had refused lantus last night causing hyperglycemic excursions.  He is currently not in DKA and his fingersticks are down-trending.  Monitor closely. Surgery has been rescheduled

## 2017-09-21 NOTE — PROGRESS NOTE ADULT - ATTENDING COMMENTS
Pt was seen and examed at bed side with resident.  53 y/o M PMH CKD s/p renal transplant (1998) on cyclosporine and CellCept, HTN, DM, legally blind BIBEMS P/w uremic encephalopathy. In ED, Pt became PEA arrest in the ED, intubated for airway protection. ROSC achieved s/p 1 round of epi and chest compressions. He was admitted in MICU and put on pressors. s/p emergent HD for hyperkalemia. s/p Covered empirically with broad spectrum antibiotics (vanc and zosyn). Found Hgb 5 s/p multiple pRBC given and epogen was started. Pt was able to wean off pressors and pt extubated 9/4.   Renal follow up appreciated, continue Cyclosporine and prednisone, s/p perm cath 9/12. Pt was also found to have new-onset Afib, on coumadin, with bridging heparin gtt. Vasculare consult appreciated, need AVF as in-patient as HD center requested, coumadin on held. Cardiology and Endo following. Continue Cardizem and, increased metoprolol to 50 mg bid as BP tolerated for better HR control, Pt need to continue cardizem and metoprolol while NPO and during HD.   hyperglycemia while NPO, Pt refused evening lantus. After multiple discussion, received 20 u today. AVF cancelled due to hyperglycemia, repeat BMP no DKI. Monitor FS closely, will restart Lantus 20u in AM and Humalog 8u TID. Encourage consistent carb diet and  monitor for hypoglycemia event.  Plan for AVF likely next week as discussed with vascular. cardiology clearance in chart. Need further optimizing insuline regimen.  Recent TTE normal LVEF. H/H stable. Pt need continue Cardizem and metoprolol while NPO and during HD for continuous HR control. Appreciated ENDO input.   SW arrange out patient HD.    Discussed above plan with Pt and sister at bed side. Discussed with RN and nursing supervisor to coordinate pre-op care of Pt for Brittle FS and HR control.

## 2017-09-21 NOTE — PROVIDER CONTACT NOTE (OTHER) - ACTION/TREATMENT ORDERED:
Dr Gasca instructed RN to give am lantus dose & sliding scale coverage, pt refused and requested to talk to Dr Gasca again, Dr Gasca notified and will come and speak to pt

## 2017-09-21 NOTE — PROGRESS NOTE ADULT - ASSESSMENT
52M with h/o CKD s/p renal transplant (1998) on cyclosporine and CellCept, HTN, DM, legally blind BIBEMS for uremic encephalopathy. Pt became bradycardic and had PEA arrest in the ED, intubated for airway protection. ROSC achieved s/p 1 round of epi and chest compressions and pt was transferred to MICU. Immunosuppressants for renal transplant were held. Pt had emergent HD for hyperkalemia. Covered empirically with broad spectrum antibiotics (vanc and zosyn), Hgb 5 s/p 1u pRBC given and epogen was started. Pressors were weaned off and pt extubated 9/4. Cyclosporine restarted and prednisone started. Pt remains stable and transferred to floor for further management 9/5, now found to have new-onset AF. Also s/p permacath w/ plan for AVF. 52M with h/o CKD s/p renal transplant (1998) on cyclosporine and CellCept, HTN, DM, legally blind BIBEMS for uremic encephalopathy. Pt became bradycardic and had PEA arrest in the ED, intubated for airway protection. ROSC achieved s/p 1 round of epi and chest compressions and pt was transferred to MICU. Immunosuppressants for renal transplant were held. Pt had emergent HD for hyperkalemia. Covered empirically with broad spectrum antibiotics (vanc and zosyn), Hgb 5 s/p 1u pRBC given and epogen was started. Pressors were weaned off and pt extubated 9/4. Cyclosporine restarted and prednisone started. Pt remains stable and transferred to floor for further management 9/5, now found to have new-onset AF. Also s/p permacath w/ plan for AVF. AVF canceled due to hyperglycemia.

## 2017-09-21 NOTE — PROGRESS NOTE ADULT - PROBLEM SELECTOR PLAN 2
-c/w HD per renal MWF  -c/w lasix 80mg IV BID  -permacath in place - AVF rescheduled for today at ~2pm as confirmed with vascular Dr. Staples. medical clearance done, cardiac clearance done. OR schedule confirmed.  -Renal consult appreciated, discussed with RN, SW, Pt and sister above plan. -c/w HD per renal MWF  -c/w lasix 80mg IV BID  -permacath in place - AVF with vascular Dr. Staples due to hyperglycemia.   -Renal f/u

## 2017-09-21 NOTE — PROGRESS NOTE ADULT - SUBJECTIVE AND OBJECTIVE BOX
Patient is a 52y old  Male who presents with a chief complaint of Altered mental status (10 Sep 2017 23:23)        SUBJECTIVE / OVERNIGHT EVENTS:  -Tele: converted to NSR since 4am, HR in 70s  -FSBG this AM was 552 - given lantus 23 units and humalog 6 units     Patient states that he feels thirsty but otherwise no other symptoms. Denies abdominal pain, n/v/d, headaches, fatigued, CP, SOB, palps.    MEDICATIONS  (STANDING):  calcium acetate 667 milliGRAM(s) Oral three times a day with meals  predniSONE   Tablet 5 milliGRAM(s) Oral daily  tamsulosin 0.4 milliGRAM(s) Oral at bedtime  cycloSPORINE  (SandIMMUNE) 100 milliGRAM(s) Oral daily  atorvastatin 80 milliGRAM(s) Oral at bedtime  senna 2 Tablet(s) Oral at bedtime  docusate sodium 100 milliGRAM(s) Oral three times a day  epoetin valente Injectable 7000 Unit(s) IV Push <User Schedule>  diltiazem    milliGRAM(s) Oral daily  metoprolol 50 milliGRAM(s) Oral two times a day  insulin lispro Injectable (HumaLOG) 8 Unit(s) SubCutaneous three times a day before meals  insulin lispro (HumaLOG) corrective regimen sliding scale   SubCutaneous three times a day before meals  insulin lispro (HumaLOG) corrective regimen sliding scale   SubCutaneous at bedtime  dextrose 5%. 1000 milliLiter(s) (50 mL/Hr) IV Continuous <Continuous>  dextrose 50% Injectable 12.5 Gram(s) IV Push once  dextrose 50% Injectable 25 Gram(s) IV Push once  dextrose 50% Injectable 25 Gram(s) IV Push once  insulin glargine Injectable (LANTUS) 23 Unit(s) SubCutaneous every morning    MEDICATIONS  (PRN):  naphazoline/pheniramine Solution 1 Drop(s) Both EYES three times a day PRN eye discomfort  dextrose Gel 1 Dose(s) Oral once PRN Blood Glucose LESS THAN 70 milliGRAM(s)/deciLiter  glucagon  Injectable 1 milliGRAM(s) IntraMuscular once PRN Glucose <70 milliGRAM(s)/deciLiter  heparin  Injectable 7000 Unit(s) IV Push every 6 hours PRN For aPTT less than 40  heparin  Injectable 3500 Unit(s) IV Push every 6 hours PRN For aPTT between 40 - 57        CAPILLARY BLOOD GLUCOSE  552 (21 Sep 2017 06:50)  222 (20 Sep 2017 23:03)  218 (20 Sep 2017 16:45)  312 (20 Sep 2017 13:36)  76 (20 Sep 2017 08:04)        I&O's Summary    20 Sep 2017 07:01  -  21 Sep 2017 06:58  --------------------------------------------------------  IN: 400 mL / OUT: 2900 mL / NET: -2500 mL        PHYSICAL EXAM  VS: T 98.7, HR 92, /71, RR 16, SpO2 99% on RA  GENERAL: NAD, well-developed  HEAD:  Atraumatic, Normocephalic  EYES: EOMI, PERRLA, conjunctiva and sclera clear. Legally blind but can see changes in light  NECK: Supple, No JVD  CHEST/LUNG: CTAB, No wheeze  HEART: Regular rate and rhythm; No murmurs, rubs, or gallops  ABDOMEN: Soft, Nontender, Nondistended; Bowel sounds present  EXTREMITIES: Feet warm, No clubbing, cyanosis. 2+ pitting edema to just below the knees  PSYCH: AAOx3  SKIN: No rashes or lesions    LABS:                        8.2    5.83  )-----------( 249      ( 20 Sep 2017 09:15 )             26.2     09-20    140  |  99  |  59<H>  ----------------------------<  86  3.5   |  25  |  3.45<H>    Ca    8.2<L>      20 Sep 2017 06:10  Phos  3.5     09-20  Mg     1.7     09-20      PT/INR - ( 20 Sep 2017 09:15 )   PT: 12.6 SEC;   INR: 1.12          PTT - ( 20 Sep 2017 16:45 )  PTT:> 200.0 SEC          RADIOLOGY & ADDITIONAL TESTS:    No new additional studies. Patient is a 52y old  Male who presents with a chief complaint of Altered mental status (10 Sep 2017 23:23)        SUBJECTIVE / OVERNIGHT EVENTS:  -Tele: converted to NSR since 4am, HR in 70s  -FSBG this AM was 552 - given lantus 23 units and humalog 6 units. Pt refused lantus last night, and this morning after multiple discussion, agreed 20u lantus.       Patient states that he feels thirsty but otherwise no other symptoms. Denies abdominal pain, n/v/d, headaches, fatigued, CP, SOB, palps.    MEDICATIONS  (STANDING):  calcium acetate 667 milliGRAM(s) Oral three times a day with meals  predniSONE   Tablet 5 milliGRAM(s) Oral daily  tamsulosin 0.4 milliGRAM(s) Oral at bedtime  cycloSPORINE  (SandIMMUNE) 100 milliGRAM(s) Oral daily  atorvastatin 80 milliGRAM(s) Oral at bedtime  senna 2 Tablet(s) Oral at bedtime  docusate sodium 100 milliGRAM(s) Oral three times a day  epoetin valente Injectable 7000 Unit(s) IV Push <User Schedule>  diltiazem    milliGRAM(s) Oral daily  metoprolol 50 milliGRAM(s) Oral two times a day  insulin lispro Injectable (HumaLOG) 8 Unit(s) SubCutaneous three times a day before meals  insulin lispro (HumaLOG) corrective regimen sliding scale   SubCutaneous three times a day before meals  insulin lispro (HumaLOG) corrective regimen sliding scale   SubCutaneous at bedtime  dextrose 5%. 1000 milliLiter(s) (50 mL/Hr) IV Continuous <Continuous>  dextrose 50% Injectable 12.5 Gram(s) IV Push once  dextrose 50% Injectable 25 Gram(s) IV Push once  dextrose 50% Injectable 25 Gram(s) IV Push once  insulin glargine Injectable (LANTUS) 23 Unit(s) SubCutaneous every morning    MEDICATIONS  (PRN):  naphazoline/pheniramine Solution 1 Drop(s) Both EYES three times a day PRN eye discomfort  dextrose Gel 1 Dose(s) Oral once PRN Blood Glucose LESS THAN 70 milliGRAM(s)/deciLiter  glucagon  Injectable 1 milliGRAM(s) IntraMuscular once PRN Glucose <70 milliGRAM(s)/deciLiter  heparin  Injectable 7000 Unit(s) IV Push every 6 hours PRN For aPTT less than 40  heparin  Injectable 3500 Unit(s) IV Push every 6 hours PRN For aPTT between 40 - 57        CAPILLARY BLOOD GLUCOSE  552 (21 Sep 2017 06:50)  222 (20 Sep 2017 23:03)  218 (20 Sep 2017 16:45)  312 (20 Sep 2017 13:36)  76 (20 Sep 2017 08:04)        I&O's Summary    20 Sep 2017 07:01  -  21 Sep 2017 06:58  --------------------------------------------------------  IN: 400 mL / OUT: 2900 mL / NET: -2500 mL        PHYSICAL EXAM  VS: T 98.7, HR 92, /71, RR 16, SpO2 99% on RA  GENERAL: NAD, well-developed  HEAD:  Atraumatic, Normocephalic  EYES: EOMI, PERRLA, conjunctiva and sclera clear. Legally blind but can see changes in light  NECK: Supple, No JVD  CHEST/LUNG: CTAB, No wheeze  HEART: Regular rate and rhythm; No murmurs, rubs, or gallops  ABDOMEN: Soft, Nontender, Nondistended; Bowel sounds present  EXTREMITIES: Feet warm, No clubbing, cyanosis. 2+ pitting edema to just below the knees  PSYCH: AAOx3  SKIN: No rashes or lesions    LABS:                        8.2    5.83  )-----------( 249      ( 20 Sep 2017 09:15 )             26.2     09-20    140  |  99  |  59<H>  ----------------------------<  86  3.5   |  25  |  3.45<H>    Ca    8.2<L>      20 Sep 2017 06:10  Phos  3.5     09-20  Mg     1.7     09-20      PT/INR - ( 20 Sep 2017 09:15 )   PT: 12.6 SEC;   INR: 1.12          PTT - ( 20 Sep 2017 16:45 )  PTT:> 200.0 SEC          RADIOLOGY & ADDITIONAL TESTS:    No new additional studies.

## 2017-09-21 NOTE — PROGRESS NOTE ADULT - SUBJECTIVE AND OBJECTIVE BOX
Chief Complaint: T1DM    History: Patient refused lantus last night and had hyperglycemia in 500s this AM. Subsequently received Lantus 20U. He is currently NPO for AV fistula.    MEDICATIONS  (STANDING):  calcium acetate 667 milliGRAM(s) Oral three times a day with meals  predniSONE   Tablet 5 milliGRAM(s) Oral daily  tamsulosin 0.4 milliGRAM(s) Oral at bedtime  cycloSPORINE  (SandIMMUNE) 100 milliGRAM(s) Oral daily  atorvastatin 80 milliGRAM(s) Oral at bedtime  senna 2 Tablet(s) Oral at bedtime  docusate sodium 100 milliGRAM(s) Oral three times a day  epoetin valente Injectable 7000 Unit(s) IV Push <User Schedule>  diltiazem    milliGRAM(s) Oral daily  metoprolol 50 milliGRAM(s) Oral two times a day  insulin lispro Injectable (HumaLOG) 8 Unit(s) SubCutaneous three times a day before meals  insulin lispro (HumaLOG) corrective regimen sliding scale   SubCutaneous three times a day before meals  insulin lispro (HumaLOG) corrective regimen sliding scale   SubCutaneous at bedtime  dextrose 5%. 1000 milliLiter(s) (50 mL/Hr) IV Continuous <Continuous>  dextrose 50% Injectable 12.5 Gram(s) IV Push once  dextrose 50% Injectable 25 Gram(s) IV Push once  dextrose 50% Injectable 25 Gram(s) IV Push once  insulin glargine Injectable (LANTUS) 23 Unit(s) SubCutaneous every morning    MEDICATIONS  (PRN):  naphazoline/pheniramine Solution 1 Drop(s) Both EYES three times a day PRN eye discomfort  dextrose Gel 1 Dose(s) Oral once PRN Blood Glucose LESS THAN 70 milliGRAM(s)/deciLiter  glucagon  Injectable 1 milliGRAM(s) IntraMuscular once PRN Glucose <70 milliGRAM(s)/deciLiter  heparin  Injectable 7000 Unit(s) IV Push every 6 hours PRN For aPTT less than 40  heparin  Injectable 3500 Unit(s) IV Push every 6 hours PRN For aPTT between 40 - 57      Allergies    No Known Allergies    Intolerances      Review of Systems:  Constitutional: No fever  Eyes: No blurry vision  Neuro: No tremors  HEENT: No pain  Cardiovascular: No chest pain, palpitations  Respiratory: No SOB, no cough          UNABLE TO OBTAIN    PHYSICAL EXAM:  VITALS: T(C): 36.9 (09-21-17 @ 14:20)  T(F): 98.4 (09-21-17 @ 14:20), Max: 98.7 (09-21-17 @ 05:12)  HR: 80 (09-21-17 @ 14:20) (80 - 165)  BP: 143/81 (09-21-17 @ 14:20) (143/81 - 157/71)  RR:  (16 - 18)  SpO2:  (98% - 99%)  Wt(kg): --  GENERAL: NAD  EYES: No proptosis, no lid lag, anicteric  HEENT:  Atraumatic, Normocephalic, moist mucous membranes  RESPIRATORY: Clear to auscultation bilaterally; No rales, rhonchi, wheezing, or rubs  CARDIOVASCULAR: Regular rate and rhythm; No murmurs; no peripheral edema  GI: Soft, nontender, non distended, normal bowel sounds  SKIN: Dry, intact, No rashes or lesions      CAPILLARY BLOOD GLUCOSE  424 (09-21 @ 14:14)  472 (09-21 @ 12:10)  510 (09-21 @ 09:53)  552 (09-21 @ 06:50)  222 (09-20 @ 23:03)  218 (09-20 @ 16:45)  312 (09-20 @ 13:36)  76 (09-20 @ 08:04)  105 (09-20 @ 05:01)  225 (09-19 @ 22:33)  258 (09-19 @ 17:03)  139 (09-19 @ 12:50)  102 (09-19 @ 09:15)  69 (09-19 @ 08:42)  212 (09-18 @ 22:46)  195 (09-18 @ 20:11)  269 (09-18 @ 17:49)  260 (09-18 @ 16:00)      09-21    135  |  99  |  58<H>  ----------------------------<  544<HH>  5.5<H>   |  23  |  3.32<H>    EGFR if : 23  EGFR if non : 20    Ca    8.3<L>      09-21  Mg     1.6     09-21  Phos  2.3     09-21            Thyroid Function Tests:  09-03 @ 13:45 TSH 2.88 FreeT4 0.70 T3 71.7 Anti TPO -- Anti Thyroglobulin Ab -- TSI --      Hemoglobin A1C, Whole Blood: 8.0 % <H> [4.0 - 5.6] (08-16-17 @ 06:30) Chief Complaint: T1DM    History: Patient refused lantus last night and had hyperglycemia in 500s this AM. Subsequently received Lantus 20U. Surgery was cancelled due to hyperglycemia and hyperkalemia.     MEDICATIONS  (STANDING):  calcium acetate 667 milliGRAM(s) Oral three times a day with meals  predniSONE   Tablet 5 milliGRAM(s) Oral daily  tamsulosin 0.4 milliGRAM(s) Oral at bedtime  cycloSPORINE  (SandIMMUNE) 100 milliGRAM(s) Oral daily  atorvastatin 80 milliGRAM(s) Oral at bedtime  senna 2 Tablet(s) Oral at bedtime  docusate sodium 100 milliGRAM(s) Oral three times a day  epoetin valente Injectable 7000 Unit(s) IV Push <User Schedule>  diltiazem    milliGRAM(s) Oral daily  metoprolol 50 milliGRAM(s) Oral two times a day  insulin lispro Injectable (HumaLOG) 8 Unit(s) SubCutaneous three times a day before meals  insulin lispro (HumaLOG) corrective regimen sliding scale   SubCutaneous three times a day before meals  insulin lispro (HumaLOG) corrective regimen sliding scale   SubCutaneous at bedtime  dextrose 5%. 1000 milliLiter(s) (50 mL/Hr) IV Continuous <Continuous>  dextrose 50% Injectable 12.5 Gram(s) IV Push once  dextrose 50% Injectable 25 Gram(s) IV Push once  dextrose 50% Injectable 25 Gram(s) IV Push once  insulin glargine Injectable (LANTUS) 23 Unit(s) SubCutaneous every morning    MEDICATIONS  (PRN):  naphazoline/pheniramine Solution 1 Drop(s) Both EYES three times a day PRN eye discomfort  dextrose Gel 1 Dose(s) Oral once PRN Blood Glucose LESS THAN 70 milliGRAM(s)/deciLiter  glucagon  Injectable 1 milliGRAM(s) IntraMuscular once PRN Glucose <70 milliGRAM(s)/deciLiter  heparin  Injectable 7000 Unit(s) IV Push every 6 hours PRN For aPTT less than 40  heparin  Injectable 3500 Unit(s) IV Push every 6 hours PRN For aPTT between 40 - 57      Allergies    No Known Allergies    Intolerances      Review of Systems:  Constitutional: No fever  Eyes: No blurry vision  Neuro: No tremors  HEENT: No pain  Cardiovascular: No chest pain, palpitations  Respiratory: No SOB, no cough      PHYSICAL EXAM:  VITALS: T(C): 36.9 (09-21-17 @ 14:20)  T(F): 98.4 (09-21-17 @ 14:20), Max: 98.7 (09-21-17 @ 05:12)  HR: 80 (09-21-17 @ 14:20) (80 - 165)  BP: 143/81 (09-21-17 @ 14:20) (143/81 - 157/71)  RR:  (16 - 18)  SpO2:  (98% - 99%)  Wt(kg): --  GENERAL: NAD  EYES: No proptosis, no lid lag, anicteric  HEENT:  Atraumatic, Normocephalic, moist mucous membranes  RESPIRATORY: Clear to auscultation bilaterally; No rales, rhonchi, wheezing, or rubs  CARDIOVASCULAR: Regular rate and rhythm; No murmurs; no peripheral edema  GI: Soft, nontender, non distended, normal bowel sounds  SKIN: Dry, intact, No rashes or lesions      CAPILLARY BLOOD GLUCOSE  424 (09-21 @ 14:14)  472 (09-21 @ 12:10)  Humalog 7  510 (09-21 @ 09:53)  552 (09-21 @ 06:50)  Lantus 20 + humalog 4  222 (09-20 @ 23:03)  218 (09-20 @ 16:45)  312 (09-20 @ 13:36)  76 (09-20 @ 08:04)  105 (09-20 @ 05:01)  225 (09-19 @ 22:33)  258 (09-19 @ 17:03)  139 (09-19 @ 12:50)  102 (09-19 @ 09:15)  69 (09-19 @ 08:42)  212 (09-18 @ 22:46)  195 (09-18 @ 20:11)  269 (09-18 @ 17:49)  260 (09-18 @ 16:00)      09-21    135  |  99  |  58<H>  ----------------------------<  544<HH>  5.5<H>   |  23  |  3.32<H>    EGFR if : 23  EGFR if non : 20    Ca    8.3<L>      09-21  Mg     1.6     09-21  Phos  2.3     09-21      Thyroid Function Tests:  09-03 @ 13:45 TSH 2.88 FreeT4 0.70 T3 71.7 Anti TPO -- Anti Thyroglobulin Ab -- TSI --      Hemoglobin A1C, Whole Blood: 8.0 % <H> [4.0 - 5.6] (08-16-17 @ 06:30)

## 2017-09-21 NOTE — PROGRESS NOTE ADULT - PROBLEM SELECTOR PLAN 1
-converted to NSR at 4am today  -currently on PO Cardizem, metoprolol 50 bid w/ hold parameters   -holding coumadin as pt. is planned for AVF today  -hep gtt dc/d at midnight  -appreciate EP recs  - TTE nonsignificant  -continue lopressor and cardizem while NPO given he has tendency to go into   afib w/ RVR if he misses doses  - cardiology clearance reviewed in sunrise for AVF

## 2017-09-21 NOTE — PROGRESS NOTE ADULT - SUBJECTIVE AND OBJECTIVE BOX
S/ Events: Patient seen and examined. Denies CP, SOB, dizziness, LH, near syncope or sycope.   No acute events overnight.   Telemetry : - Self converted to sinus rhythm @ 4 am. Remains in SR. On Cardizem 300mg QD.   Blood sugars > 500 this am.   O:  T(C): 37.1 (17 @ 11:53), Max: 37.1 (17 @ 05:12)  HR: 92 (17 @ 11:53) (84 - 165)  BP: 157/71 (17 @ 11:53) (138/70 - 157/71)  RR: 18 (17 @ 11:53) (16 - 18)  SpO2: 99% (17 @ 11:53) (99% - 99%)  Wt(kg): --  Daily Height in cm: 182.88 (21 Sep 2017 11:53)    Daily Weight in k.2 (21 Sep 2017 05:12)  Gen: NAD  HEENT: EOMI  CV: RRR, normal S1 + S2, no m/r/g  Lungs: CTAB  Abd: soft, non-tender  Ext: No edema    Labs:                        8.1    5.53  )-----------( 239      ( 21 Sep 2017 07:03 )             25.4     09-20    140  |  99  |  59<H>  ----------------------------<  86  3.5   |  25  |  3.45<H>    Ca    8.2<L>      20 Sep 2017 06:10  Phos  3.5     09-20  Mg     1.7     09-20      PT/INR - ( 20 Sep 2017 09:15 )   PT: 12.6 SEC;   INR: 1.12        PTT - ( 21 Sep 2017 07:03 )  PTT:34.4 SEC    Meds:  MEDICATIONS  (STANDING):  calcium acetate 667 milliGRAM(s) Oral three times a day with meals  predniSONE   Tablet 5 milliGRAM(s) Oral daily  tamsulosin 0.4 milliGRAM(s) Oral at bedtime  cycloSPORINE  (SandIMMUNE) 100 milliGRAM(s) Oral daily  atorvastatin 80 milliGRAM(s) Oral at bedtime  senna 2 Tablet(s) Oral at bedtime  docusate sodium 100 milliGRAM(s) Oral three times a day  epoetin valente Injectable 7000 Unit(s) IV Push <User Schedule>  diltiazem    milliGRAM(s) Oral daily  metoprolol 50 milliGRAM(s) Oral two times a day  insulin lispro Injectable (HumaLOG) 8 Unit(s) SubCutaneous three times a day before meals  insulin lispro (HumaLOG) corrective regimen sliding scale   SubCutaneous three times a day before meals  insulin lispro (HumaLOG) corrective regimen sliding scale   SubCutaneous at bedtime  dextrose 5%. 1000 milliLiter(s) (50 mL/Hr) IV Continuous <Continuous>  dextrose 50% Injectable 12.5 Gram(s) IV Push once  dextrose 50% Injectable 25 Gram(s) IV Push once  dextrose 50% Injectable 25 Gram(s) IV Push once  insulin glargine Injectable (LANTUS) 23 Unit(s) SubCutaneous every morning        TTE: normal EF, minimal MR, small pericardial effusion posterior to LV, right pleural effusion.     A/P: Mr. Young is a pleasant 52yMale w/ a PMHx of CKD s/p renal transplant () on cyclosporine and mycophenolate, HTN, type I DM, legally blind and h/o uremic encephalopathy who presented with AMS and subsequently had PEA arrest secondary to hyperkalemia requiring emergent HD on , now in persistent atrial fibrillation. His rate had not been well controlled on Cardizem 300mg daily and metoprolol 50 mg daily so the beta blocker was increased to 50mg bid.  He is awaiting AVF today pending normalization of blood sugars ( was 552   AF :   Currently warfarin on HOLD due to the impending surgery ( AVF ), Continue heparin gtt and maintain PTT   Continue Metoprolol 50 mg bid and cardizem 300 QD ( hold off from increasing to 360 mg since currently in SR).   Plan for repeat TTE to evaluate pericardial effusion.    Bilateral LE edema:  Unclear etiology if secondary to impaired perfusion vs HF in the setting of preserved EF.   Was on IV lasix in the past.   Consider LE doppler studies  HF consult called.     Consider all other management per primary team.

## 2017-09-21 NOTE — PROGRESS NOTE ADULT - PROBLEM SELECTOR PLAN 1
Refused Lantus last night and hyperglycemic in 500s this AM. Took Lantus 20U this AM. Will suggest continuing to check glucose every four hours and humalog as per low dose correctional scale while NPO. If restarted on diet, continue humalog 8-8-8 with meals. Do not give Lantus tonight-administer 20U tomorrow AM.       Prefer to give Lantus in AM tomorrow but patient states he will not accept basal insulin until he is no longer NPO after surgery.  Continue Humalog 8/8/8 and low correction scales before meals and QHS.      Outpatient endocrine follow up: appointment pending with Dr Baker  Refused Lantus last night and hyperglycemic in 500s this AM. Took Lantus 20U this AM. Will suggest continuing to check glucose every four hours and humalog as per low dose correctional scale while NPO. If restarted on diet, continue humalog 8-8-8 with meals. Do not give Lantus tonight-administer 20U tomorrow AM.     Continue Humalog 8/8/8 and low correction scales before meals and QHS.      Outpatient endocrine follow up: appointment pending with Dr Baker

## 2017-09-21 NOTE — PROGRESS NOTE ADULT - PROBLEM SELECTOR PLAN 6
Home regime: lantus 22u at home, home pravastatin 10mg daily.  -HbA1C 8  - this AM  -gave lantus 23 units and humalog 6 units  -FSBG q2h prior to AVF surgery today  -close monitoring for hypoglycemia  -atorvastatin 80mg daily Home regime: lantus 22u at home, home pravastatin 10mg daily.  -HbA1C 8  - this AM, repeat BMP not in DKA, FS slowly trending down, close monitor FS when restart feeding, Endo follow up appreciated.   -will gave lantus 20 units in AM and humalog 8 units  -FSBG q4h   -atorvastatin 80mg daily

## 2017-09-22 DIAGNOSIS — R60.0 LOCALIZED EDEMA: ICD-10-CM

## 2017-09-22 LAB
APTT BLD: 36.5 SEC — SIGNIFICANT CHANGE UP (ref 27.5–37.4)
APTT BLD: 52.1 SEC — HIGH (ref 27.5–37.4)
APTT BLD: 92 SEC — HIGH (ref 27.5–37.4)
BASOPHILS # BLD AUTO: 0.05 K/UL — SIGNIFICANT CHANGE UP (ref 0–0.2)
BASOPHILS # BLD AUTO: 0.06 K/UL — SIGNIFICANT CHANGE UP (ref 0–0.2)
BASOPHILS NFR BLD AUTO: 0.8 % — SIGNIFICANT CHANGE UP (ref 0–2)
BASOPHILS NFR BLD AUTO: 1.1 % — SIGNIFICANT CHANGE UP (ref 0–2)
BUN SERPL-MCNC: 28 MG/DL — HIGH (ref 7–23)
BUN SERPL-MCNC: 56 MG/DL — HIGH (ref 7–23)
CALCIUM SERPL-MCNC: 8.2 MG/DL — LOW (ref 8.4–10.5)
CALCIUM SERPL-MCNC: 8.8 MG/DL — SIGNIFICANT CHANGE UP (ref 8.4–10.5)
CHLORIDE SERPL-SCNC: 96 MMOL/L — LOW (ref 98–107)
CHLORIDE SERPL-SCNC: 98 MMOL/L — SIGNIFICANT CHANGE UP (ref 98–107)
CO2 SERPL-SCNC: 23 MMOL/L — SIGNIFICANT CHANGE UP (ref 22–31)
CO2 SERPL-SCNC: 28 MMOL/L — SIGNIFICANT CHANGE UP (ref 22–31)
CREAT SERPL-MCNC: 2.06 MG/DL — HIGH (ref 0.5–1.3)
CREAT SERPL-MCNC: 3.26 MG/DL — HIGH (ref 0.5–1.3)
EOSINOPHIL # BLD AUTO: 0.04 K/UL — SIGNIFICANT CHANGE UP (ref 0–0.5)
EOSINOPHIL # BLD AUTO: 0.11 K/UL — SIGNIFICANT CHANGE UP (ref 0–0.5)
EOSINOPHIL NFR BLD AUTO: 0.7 % — SIGNIFICANT CHANGE UP (ref 0–6)
EOSINOPHIL NFR BLD AUTO: 1.7 % — SIGNIFICANT CHANGE UP (ref 0–6)
GLUCOSE SERPL-MCNC: 252 MG/DL — HIGH (ref 70–99)
GLUCOSE SERPL-MCNC: 325 MG/DL — HIGH (ref 70–99)
HCT VFR BLD CALC: 23.6 % — LOW (ref 39–50)
HCT VFR BLD CALC: 23.6 % — LOW (ref 39–50)
HCT VFR BLD CALC: 29.1 % — LOW (ref 39–50)
HGB BLD-MCNC: 7.4 G/DL — LOW (ref 13–17)
HGB BLD-MCNC: 7.4 G/DL — LOW (ref 13–17)
HGB BLD-MCNC: 9 G/DL — LOW (ref 13–17)
IMM GRANULOCYTES # BLD AUTO: 0.08 # — SIGNIFICANT CHANGE UP
IMM GRANULOCYTES # BLD AUTO: 0.09 # — SIGNIFICANT CHANGE UP
IMM GRANULOCYTES NFR BLD AUTO: 1.2 % — SIGNIFICANT CHANGE UP (ref 0–1.5)
IMM GRANULOCYTES NFR BLD AUTO: 1.6 % — HIGH (ref 0–1.5)
INR BLD: 1.1 — SIGNIFICANT CHANGE UP (ref 0.88–1.17)
LYMPHOCYTES # BLD AUTO: 0.49 K/UL — LOW (ref 1–3.3)
LYMPHOCYTES # BLD AUTO: 0.77 K/UL — LOW (ref 1–3.3)
LYMPHOCYTES # BLD AUTO: 11.8 % — LOW (ref 13–44)
LYMPHOCYTES # BLD AUTO: 8.6 % — LOW (ref 13–44)
MAGNESIUM SERPL-MCNC: 1.5 MG/DL — LOW (ref 1.6–2.6)
MAGNESIUM SERPL-MCNC: 1.7 MG/DL — SIGNIFICANT CHANGE UP (ref 1.6–2.6)
MCHC RBC-ENTMCNC: 28 PG — SIGNIFICANT CHANGE UP (ref 27–34)
MCHC RBC-ENTMCNC: 28.4 PG — SIGNIFICANT CHANGE UP (ref 27–34)
MCHC RBC-ENTMCNC: 28.4 PG — SIGNIFICANT CHANGE UP (ref 27–34)
MCHC RBC-ENTMCNC: 30.9 % — LOW (ref 32–36)
MCHC RBC-ENTMCNC: 31.4 % — LOW (ref 32–36)
MCHC RBC-ENTMCNC: 31.4 % — LOW (ref 32–36)
MCV RBC AUTO: 90.4 FL — SIGNIFICANT CHANGE UP (ref 80–100)
MONOCYTES # BLD AUTO: 0.64 K/UL — SIGNIFICANT CHANGE UP (ref 0–0.9)
MONOCYTES # BLD AUTO: 0.99 K/UL — HIGH (ref 0–0.9)
MONOCYTES NFR BLD AUTO: 11.2 % — SIGNIFICANT CHANGE UP (ref 2–14)
MONOCYTES NFR BLD AUTO: 15.2 % — HIGH (ref 2–14)
NEUTROPHILS # BLD AUTO: 4.37 K/UL — SIGNIFICANT CHANGE UP (ref 1.8–7.4)
NEUTROPHILS # BLD AUTO: 4.5 K/UL — SIGNIFICANT CHANGE UP (ref 1.8–7.4)
NEUTROPHILS NFR BLD AUTO: 69.3 % — SIGNIFICANT CHANGE UP (ref 43–77)
NEUTROPHILS NFR BLD AUTO: 76.8 % — SIGNIFICANT CHANGE UP (ref 43–77)
NRBC # FLD: 0 — SIGNIFICANT CHANGE UP
PHOSPHATE SERPL-MCNC: 2.2 MG/DL — LOW (ref 2.5–4.5)
PHOSPHATE SERPL-MCNC: 3 MG/DL — SIGNIFICANT CHANGE UP (ref 2.5–4.5)
PLATELET # BLD AUTO: 262 K/UL — SIGNIFICANT CHANGE UP (ref 150–400)
PLATELET # BLD AUTO: 262 K/UL — SIGNIFICANT CHANGE UP (ref 150–400)
PLATELET # BLD AUTO: 267 K/UL — SIGNIFICANT CHANGE UP (ref 150–400)
PMV BLD: 10.8 FL — SIGNIFICANT CHANGE UP (ref 7–13)
PMV BLD: 10.8 FL — SIGNIFICANT CHANGE UP (ref 7–13)
PMV BLD: 11.5 FL — SIGNIFICANT CHANGE UP (ref 7–13)
POTASSIUM SERPL-MCNC: 4 MMOL/L — SIGNIFICANT CHANGE UP (ref 3.5–5.3)
POTASSIUM SERPL-MCNC: 4.4 MMOL/L — SIGNIFICANT CHANGE UP (ref 3.5–5.3)
POTASSIUM SERPL-SCNC: 4 MMOL/L — SIGNIFICANT CHANGE UP (ref 3.5–5.3)
POTASSIUM SERPL-SCNC: 4.4 MMOL/L — SIGNIFICANT CHANGE UP (ref 3.5–5.3)
PROTHROM AB SERPL-ACNC: 12.4 SEC — SIGNIFICANT CHANGE UP (ref 9.8–13.1)
RBC # BLD: 2.61 M/UL — LOW (ref 4.2–5.8)
RBC # BLD: 2.61 M/UL — LOW (ref 4.2–5.8)
RBC # BLD: 3.22 M/UL — LOW (ref 4.2–5.8)
RBC # FLD: 13.6 % — SIGNIFICANT CHANGE UP (ref 10.3–14.5)
SODIUM SERPL-SCNC: 134 MMOL/L — LOW (ref 135–145)
SODIUM SERPL-SCNC: 140 MMOL/L — SIGNIFICANT CHANGE UP (ref 135–145)
WBC # BLD: 5.69 K/UL — SIGNIFICANT CHANGE UP (ref 3.8–10.5)
WBC # BLD: 6.5 K/UL — SIGNIFICANT CHANGE UP (ref 3.8–10.5)
WBC # BLD: 6.5 K/UL — SIGNIFICANT CHANGE UP (ref 3.8–10.5)
WBC # FLD AUTO: 5.69 K/UL — SIGNIFICANT CHANGE UP (ref 3.8–10.5)
WBC # FLD AUTO: 6.5 K/UL — SIGNIFICANT CHANGE UP (ref 3.8–10.5)
WBC # FLD AUTO: 6.5 K/UL — SIGNIFICANT CHANGE UP (ref 3.8–10.5)

## 2017-09-22 PROCEDURE — 93970 EXTREMITY STUDY: CPT | Mod: 26

## 2017-09-22 PROCEDURE — 90935 HEMODIALYSIS ONE EVALUATION: CPT

## 2017-09-22 PROCEDURE — 99232 SBSQ HOSP IP/OBS MODERATE 35: CPT

## 2017-09-22 PROCEDURE — 99223 1ST HOSP IP/OBS HIGH 75: CPT

## 2017-09-22 PROCEDURE — 99233 SBSQ HOSP IP/OBS HIGH 50: CPT | Mod: GC

## 2017-09-22 PROCEDURE — 99232 SBSQ HOSP IP/OBS MODERATE 35: CPT | Mod: GC

## 2017-09-22 RX ORDER — MAGNESIUM SULFATE 500 MG/ML
1 VIAL (ML) INJECTION ONCE
Qty: 0 | Refills: 0 | Status: COMPLETED | OUTPATIENT
Start: 2017-09-22 | End: 2017-09-22

## 2017-09-22 RX ORDER — INSULIN LISPRO 100/ML
9 VIAL (ML) SUBCUTANEOUS
Qty: 0 | Refills: 0 | Status: DISCONTINUED | OUTPATIENT
Start: 2017-09-22 | End: 2017-09-24

## 2017-09-22 RX ORDER — INSULIN GLARGINE 100 [IU]/ML
23 INJECTION, SOLUTION SUBCUTANEOUS EVERY MORNING
Qty: 0 | Refills: 0 | Status: DISCONTINUED | OUTPATIENT
Start: 2017-09-22 | End: 2017-09-23

## 2017-09-22 RX ADMIN — Medication 2: at 09:59

## 2017-09-22 RX ADMIN — Medication 667 MILLIGRAM(S): at 11:33

## 2017-09-22 RX ADMIN — Medication 50 MILLIGRAM(S): at 10:40

## 2017-09-22 RX ADMIN — Medication 63.75 MILLIMOLE(S): at 17:25

## 2017-09-22 RX ADMIN — Medication 300 MILLIGRAM(S): at 10:40

## 2017-09-22 RX ADMIN — Medication 5 MILLIGRAM(S): at 05:14

## 2017-09-22 RX ADMIN — TAMSULOSIN HYDROCHLORIDE 0.4 MILLIGRAM(S): 0.4 CAPSULE ORAL at 21:23

## 2017-09-22 RX ADMIN — HEPARIN SODIUM 2200 UNIT(S)/HR: 5000 INJECTION INTRAVENOUS; SUBCUTANEOUS at 16:44

## 2017-09-22 RX ADMIN — Medication 2: at 18:23

## 2017-09-22 RX ADMIN — Medication 667 MILLIGRAM(S): at 10:40

## 2017-09-22 RX ADMIN — HEPARIN SODIUM 1800 UNIT(S)/HR: 5000 INJECTION INTRAVENOUS; SUBCUTANEOUS at 08:32

## 2017-09-22 RX ADMIN — Medication 9 UNIT(S): at 18:23

## 2017-09-22 RX ADMIN — ATORVASTATIN CALCIUM 80 MILLIGRAM(S): 80 TABLET, FILM COATED ORAL at 21:23

## 2017-09-22 RX ADMIN — ERYTHROPOIETIN 7000 UNIT(S): 10000 INJECTION, SOLUTION INTRAVENOUS; SUBCUTANEOUS at 07:31

## 2017-09-22 RX ADMIN — Medication 667 MILLIGRAM(S): at 17:25

## 2017-09-22 RX ADMIN — Medication 50 MILLIGRAM(S): at 17:25

## 2017-09-22 RX ADMIN — CYCLOSPORINE 100 MILLIGRAM(S): 100 CAPSULE ORAL at 11:33

## 2017-09-22 RX ADMIN — Medication 100 GRAM(S): at 10:40

## 2017-09-22 RX ADMIN — Medication 8 UNIT(S): at 09:59

## 2017-09-22 RX ADMIN — Medication 8 UNIT(S): at 15:09

## 2017-09-22 RX ADMIN — Medication 1: at 15:08

## 2017-09-22 RX ADMIN — Medication 1 DROP(S): at 23:10

## 2017-09-22 RX ADMIN — INSULIN GLARGINE 20 UNIT(S): 100 INJECTION, SOLUTION SUBCUTANEOUS at 08:27

## 2017-09-22 RX ADMIN — HEPARIN SODIUM 2200 UNIT(S)/HR: 5000 INJECTION INTRAVENOUS; SUBCUTANEOUS at 23:10

## 2017-09-22 NOTE — CONSULT NOTE ADULT - PROVIDER SPECIALTY LIST ADULT
Cardiology
Cardiology
Electrophysiology
Endocrinology
Gastroenterology
Nephrology
Vascular Surgery
Heart Failure

## 2017-09-22 NOTE — PROGRESS NOTE ADULT - SUBJECTIVE AND OBJECTIVE BOX
Chief Complaint: T1DM    History:Patient is eating well.     MEDICATIONS  (STANDING):  calcium acetate 667 milliGRAM(s) Oral three times a day with meals  predniSONE   Tablet 5 milliGRAM(s) Oral daily  tamsulosin 0.4 milliGRAM(s) Oral at bedtime  cycloSPORINE  (SandIMMUNE) 100 milliGRAM(s) Oral daily  atorvastatin 80 milliGRAM(s) Oral at bedtime  senna 2 Tablet(s) Oral at bedtime  docusate sodium 100 milliGRAM(s) Oral three times a day  epoetin valente Injectable 7000 Unit(s) IV Push <User Schedule>  diltiazem    milliGRAM(s) Oral daily  metoprolol 50 milliGRAM(s) Oral two times a day  insulin lispro Injectable (HumaLOG) 8 Unit(s) SubCutaneous three times a day before meals  insulin lispro (HumaLOG) corrective regimen sliding scale   SubCutaneous three times a day before meals  insulin lispro (HumaLOG) corrective regimen sliding scale   SubCutaneous at bedtime  dextrose 5%. 1000 milliLiter(s) (50 mL/Hr) IV Continuous <Continuous>  dextrose 50% Injectable 12.5 Gram(s) IV Push once  dextrose 50% Injectable 25 Gram(s) IV Push once  dextrose 50% Injectable 25 Gram(s) IV Push once  insulin glargine Injectable (LANTUS) 20 Unit(s) SubCutaneous every morning  heparin  Infusion.  Unit(s)/Hr (16 mL/Hr) IV Continuous <Continuous>  sodium phosphate IVPB 15 milliMole(s) IV Intermittent once    MEDICATIONS  (PRN):  naphazoline/pheniramine Solution 1 Drop(s) Both EYES three times a day PRN eye discomfort  dextrose Gel 1 Dose(s) Oral once PRN Blood Glucose LESS THAN 70 milliGRAM(s)/deciLiter  glucagon  Injectable 1 milliGRAM(s) IntraMuscular once PRN Glucose <70 milliGRAM(s)/deciLiter  heparin  Injectable 7000 Unit(s) IV Push every 6 hours PRN For aPTT less than 40  heparin  Injectable 3500 Unit(s) IV Push every 6 hours PRN For aPTT between 40 - 57      Allergies    No Known Allergies    Intolerances      Review of Systems:  Constitutional: No fever  Eyes: No blurry vision  Neuro: No tremors  HEENT: No pain  Cardiovascular: No chest pain, palpitations  Respiratory: No SOB, no cough  GI: No nausea, vomiting, abdominal pain      ALL OTHER SYSTEMS REVIEWED AND NEGATIVE        PHYSICAL EXAM:  VITALS: T(C): 37.1 (09-22-17 @ 15:29)  T(F): 98.7 (09-22-17 @ 15:29), Max: 98.7 (09-22-17 @ 05:15)  HR: 88 (09-22-17 @ 15:29) (82 - 97)  BP: 135/79 (09-22-17 @ 15:29) (135/79 - 173/91)  RR:  (16 - 18)  SpO2:  (97% - 100%)  Wt(kg): --  GENERAL: NAD, well-groomed, well-developed  EYES: No proptosis, no lid lag, anicteric  HEENT:  Atraumatic, Normocephalic, moist mucous membranes  THYROID: Normal size, no palpable nodules  RESPIRATORY: Clear to auscultation bilaterally; No rales, rhonchi, wheezing, or rubs  CARDIOVASCULAR: Regular rate and rhythm; No murmurs; no peripheral edema  GI: Soft, nontender, non distended, normal bowel sounds        CAPILLARY BLOOD GLUCOSE  168 (09-22 @ 15:08)  219 (09-22 @ 09:53)  207 (09-22 @ 08:10)  311 (09-22 @ 05:15)  291 (09-21 @ 22:47)  344 (09-21 @ 16:45)  424 (09-21 @ 14:14)  472 (09-21 @ 12:10)  510 (09-21 @ 09:53)  552 (09-21 @ 06:50)  222 (09-20 @ 23:03)  218 (09-20 @ 16:45)  312 (09-20 @ 13:36)  76 (09-20 @ 08:04)  105 (09-20 @ 05:01)  225 (09-19 @ 22:33)  258 (09-19 @ 17:03)      09-22    140  |  96<L>  |  28<H>  ----------------------------<  252<H>  4.0   |  28  |  2.06<H>    EGFR if : 42  EGFR if non : 36    Ca    8.8      09-22  Mg     1.7     09-22  Phos  2.2     09-22            Thyroid Function Tests:  09-03 @ 13:45 TSH 2.88 FreeT4 0.70 T3 71.7 Anti TPO -- Anti Thyroglobulin Ab -- TSI --      Hemoglobin A1C, Whole Blood: 8.0 % <H> [4.0 - 5.6] (08-16-17 @ 06:30) Chief Complaint: T1DM    History:Patient is eating well.     MEDICATIONS  (STANDING):  calcium acetate 667 milliGRAM(s) Oral three times a day with meals  predniSONE   Tablet 5 milliGRAM(s) Oral daily  tamsulosin 0.4 milliGRAM(s) Oral at bedtime  cycloSPORINE  (SandIMMUNE) 100 milliGRAM(s) Oral daily  atorvastatin 80 milliGRAM(s) Oral at bedtime  senna 2 Tablet(s) Oral at bedtime  docusate sodium 100 milliGRAM(s) Oral three times a day  epoetin valente Injectable 7000 Unit(s) IV Push <User Schedule>  diltiazem    milliGRAM(s) Oral daily  metoprolol 50 milliGRAM(s) Oral two times a day  insulin lispro Injectable (HumaLOG) 8 Unit(s) SubCutaneous three times a day before meals  insulin lispro (HumaLOG) corrective regimen sliding scale   SubCutaneous three times a day before meals  insulin lispro (HumaLOG) corrective regimen sliding scale   SubCutaneous at bedtime  dextrose 5%. 1000 milliLiter(s) (50 mL/Hr) IV Continuous <Continuous>  dextrose 50% Injectable 12.5 Gram(s) IV Push once  dextrose 50% Injectable 25 Gram(s) IV Push once  dextrose 50% Injectable 25 Gram(s) IV Push once  insulin glargine Injectable (LANTUS) 20 Unit(s) SubCutaneous every morning  heparin  Infusion.  Unit(s)/Hr (16 mL/Hr) IV Continuous <Continuous>  sodium phosphate IVPB 15 milliMole(s) IV Intermittent once    MEDICATIONS  (PRN):  naphazoline/pheniramine Solution 1 Drop(s) Both EYES three times a day PRN eye discomfort  dextrose Gel 1 Dose(s) Oral once PRN Blood Glucose LESS THAN 70 milliGRAM(s)/deciLiter  glucagon  Injectable 1 milliGRAM(s) IntraMuscular once PRN Glucose <70 milliGRAM(s)/deciLiter  heparin  Injectable 7000 Unit(s) IV Push every 6 hours PRN For aPTT less than 40  heparin  Injectable 3500 Unit(s) IV Push every 6 hours PRN For aPTT between 40 - 57      Allergies    No Known Allergies    Intolerances      Review of Systems:  Constitutional: No fever  Eyes: No blurry vision  Neuro: No tremors  HEENT: No pain  Cardiovascular: No chest pain, palpitations  Respiratory: No SOB, no cough  GI: No nausea, vomiting, abdominal pain      ALL OTHER SYSTEMS REVIEWED AND NEGATIVE    PHYSICAL EXAM:  VITALS: T(C): 37.1 (09-22-17 @ 15:29)  T(F): 98.7 (09-22-17 @ 15:29), Max: 98.7 (09-22-17 @ 05:15)  HR: 88 (09-22-17 @ 15:29) (82 - 97)  BP: 135/79 (09-22-17 @ 15:29) (135/79 - 173/91)  RR:  (16 - 18)  SpO2:  (97% - 100%)  Wt(kg): --  GENERAL: NAD, well-groomed, well-developed  EYES: No proptosis, no lid lag, anicteric  HEENT:  Atraumatic, Normocephalic, moist mucous membranes  THYROID: Normal size, no palpable nodules  RESPIRATORY: Clear to auscultation bilaterally; No rales, rhonchi, wheezing, or rubs  CARDIOVASCULAR: Regular rate and rhythm; No murmurs; no peripheral edema  GI: Soft, nontender, non distended, normal bowel sounds        CAPILLARY BLOOD GLUCOSE  168 (09-22 @ 15:08)  219 (09-22 @ 09:53)  207 (09-22 @ 08:10)  311 (09-22 @ 05:15)  291 (09-21 @ 22:47)  344 (09-21 @ 16:45)  424 (09-21 @ 14:14)  472 (09-21 @ 12:10)  510 (09-21 @ 09:53)  552 (09-21 @ 06:50)  222 (09-20 @ 23:03)  218 (09-20 @ 16:45)  312 (09-20 @ 13:36)  76 (09-20 @ 08:04)  105 (09-20 @ 05:01)  225 (09-19 @ 22:33)  258 (09-19 @ 17:03)      09-22    140  |  96<L>  |  28<H>  ----------------------------<  252<H>  4.0   |  28  |  2.06<H>    EGFR if : 42  EGFR if non : 36    Ca    8.8      09-22  Mg     1.7     09-22  Phos  2.2     09-22            Thyroid Function Tests:  09-03 @ 13:45 TSH 2.88 FreeT4 0.70 T3 71.7 Anti TPO -- Anti Thyroglobulin Ab -- TSI --      Hemoglobin A1C, Whole Blood: 8.0 % <H> [4.0 - 5.6] (08-16-17 @ 06:30)

## 2017-09-22 NOTE — PROGRESS NOTE ADULT - ATTENDING COMMENTS
Agree with Dr. Garcia assessment and plan above.  The patient's glucose levels have improved as he was adherent to basal insulin.  For now, increase lantus to 23 and increase humalog to 9-9-9 TID before meals. Continue current sliding scales

## 2017-09-22 NOTE — CONSULT NOTE ADULT - CONSULT REQUESTED DATE/TIME
03-Sep-2017 14:42
08-Sep-2017 05:26
07-Sep-2017 07:30
07-Sep-2017 14:54
08-Sep-2017 15:06
08-Sep-2017 16:11
18-Sep-2017 12:38
22-Sep-2017 16:48

## 2017-09-22 NOTE — PROGRESS NOTE ADULT - SUBJECTIVE AND OBJECTIVE BOX
Patient is a 52y old  Male who presents with a chief complaint of Altered mental status (10 Sep 2017 23:23)        SUBJECTIVE / OVERNIGHT EVENTS:  -Tele Afib 70s-110s, PATs  -Hypertensive with SBPs 160s-170s  -FSBG 311 this AM - lantus 20units this am, humalog 8/8/8  -HD this AM    Patient does not have any complaints this morning. Denies CP, palps, SOB, diaphoresis, fevers, chills, abdominal pain, n/v/d/c.    MEDICATIONS  (STANDING):  calcium acetate 667 milliGRAM(s) Oral three times a day with meals  predniSONE   Tablet 5 milliGRAM(s) Oral daily  tamsulosin 0.4 milliGRAM(s) Oral at bedtime  cycloSPORINE  (SandIMMUNE) 100 milliGRAM(s) Oral daily  atorvastatin 80 milliGRAM(s) Oral at bedtime  senna 2 Tablet(s) Oral at bedtime  docusate sodium 100 milliGRAM(s) Oral three times a day  epoetin valente Injectable 7000 Unit(s) IV Push <User Schedule>  diltiazem    milliGRAM(s) Oral daily  metoprolol 50 milliGRAM(s) Oral two times a day  insulin lispro Injectable (HumaLOG) 8 Unit(s) SubCutaneous three times a day before meals  insulin lispro (HumaLOG) corrective regimen sliding scale   SubCutaneous three times a day before meals  insulin lispro (HumaLOG) corrective regimen sliding scale   SubCutaneous at bedtime  dextrose 5%. 1000 milliLiter(s) (50 mL/Hr) IV Continuous <Continuous>  dextrose 50% Injectable 12.5 Gram(s) IV Push once  dextrose 50% Injectable 25 Gram(s) IV Push once  dextrose 50% Injectable 25 Gram(s) IV Push once  insulin glargine Injectable (LANTUS) 20 Unit(s) SubCutaneous every morning  heparin  Infusion.  Unit(s)/Hr (16 mL/Hr) IV Continuous <Continuous>    MEDICATIONS  (PRN):  naphazoline/pheniramine Solution 1 Drop(s) Both EYES three times a day PRN eye discomfort  dextrose Gel 1 Dose(s) Oral once PRN Blood Glucose LESS THAN 70 milliGRAM(s)/deciLiter  glucagon  Injectable 1 milliGRAM(s) IntraMuscular once PRN Glucose <70 milliGRAM(s)/deciLiter  heparin  Injectable 7000 Unit(s) IV Push every 6 hours PRN For aPTT less than 40  heparin  Injectable 3500 Unit(s) IV Push every 6 hours PRN For aPTT between 40 - 57        CAPILLARY BLOOD GLUCOSE  311 (22 Sep 2017 05:15)  291 (21 Sep 2017 22:47)  344 (21 Sep 2017 16:45)  424 (21 Sep 2017 14:14)  472 (21 Sep 2017 12:10)  510 (21 Sep 2017 09:53)        I&O's Summary      PHYSICAL EXAM  VS: T 97.7, HR 87, /91, RR 18, SpO2 99% on RA  GENERAL: NAD, well-developed  HEAD:  Atraumatic, Normocephalic  EYES: EOMI, conjunctiva and sclera clear. Legally blind but can see changes in light  NECK: Supple, No JVD  CHEST/LUNG: CTAB, No wheeze  HEART: Regular rate and rhythm; No murmurs, rubs, or gallops  ABDOMEN: Soft, Nontender, Nondistended; Bowel sounds present  EXTREMITIES: Feet warm, No clubbing, cyanosis. 2+ pitting edema to just below the knees  PSYCH: AAOx3  SKIN: No rashes or lesions    LABS:                        7.4    6.50  )-----------( 262      ( 22 Sep 2017 06:00 )             23.6     09-22    134<L>  |  98  |  56<H>  ----------------------------<  325<H>  4.4   |  23  |  3.26<H>    Ca    8.2<L>      22 Sep 2017 06:00  Phos  3.0     09-22  Mg     1.5     09-22      PT/INR - ( 22 Sep 2017 06:00 )   PT: 12.4 SEC;   INR: 1.10          PTT - ( 22 Sep 2017 06:00 )  PTT:52.1 SEC          RADIOLOGY & ADDITIONAL TESTS:    No new additional studies.

## 2017-09-22 NOTE — PROGRESS NOTE ADULT - ATTENDING COMMENTS
Pt was seen and examed at bed side with resident.  51 y/o M PMH CKD s/p renal transplant (1998) on cyclosporine and CellCept, HTN, DM, legally blind BIBEMS P/w uremic encephalopathy. In ED, Pt became PEA arrest in the ED, intubated for airway protection. ROSC achieved s/p 1 round of epi and chest compressions. He was admitted in MICU and put on pressors. s/p emergent HD for hyperkalemia. s/p Covered empirically with broad spectrum antibiotics (vanc and zosyn). Found Hgb 5 s/p multiple PRBC given and epogen was started. Pt was able to wean off pressors and pt extubated 9/4.   Renal follow up appreciated, continue Cyclosporine and prednisone, s/p perm cath 9/12. Pt was also found to have new-onset Afib, on coumadin, with bridging heparin gtt. Vasculare consult appreciated, need AVF as in-patient as HD center requested, coumadin on held. Cardiology and Endo following. Continue Cardizem and, increased metoprolol to 50 mg bid as BP tolerated for better HR control, Pt need to continue Cardizem and metoprolol while NPO and during HD.   hyperglycemia while NPO, Pt refused evening lantus. After multiple discussion, received 20 u today. AVF cancelled due to hyperglycemia, repeat BMP no DKA. Monitor FS closely, will restart Lantus 20u in AM and Humalog 8u TID. Encourage consistent carb diet and  monitor for hypoglycemia event.  Plan for AVF likely next week as discussed with vascular. cardiology clearance in chart. Need further optimizing insuline regimen.  Recent TTE normal LVEF. H/H stable. Pt need continue Cardizem and metoprolol while NPO and during HD for continuous HR control. Appreciated ENDO input.   SW arrange out patient HD.    Discussed above plan with Pt and sister at bed side. Discussed with RN and nursing supervisor to coordinate pre-op care of Pt for [place] FS and HR control. Sign out close monitor luis e-operatively  to covering team.

## 2017-09-22 NOTE — CONSULT NOTE ADULT - SUBJECTIVE AND OBJECTIVE BOX
Date of Admission: 9/22/17    CHIEF COMPLAINT: LE edema    HISTORY OF PRESENT ILLNESS:    53 y/o pleasant male w/ PMHX of CKD w renal transplant, HTN, DM II, legally blind       Allergies    No Known Allergies    Intolerances    	    MEDICATIONS:  tamsulosin 0.4 milliGRAM(s) Oral at bedtime  diltiazem    milliGRAM(s) Oral daily  metoprolol 50 milliGRAM(s) Oral two times a day  heparin  Injectable 7000 Unit(s) IV Push every 6 hours PRN  heparin  Injectable 3500 Unit(s) IV Push every 6 hours PRN  heparin  Infusion.  Unit(s)/Hr IV Continuous <Continuous>          senna 2 Tablet(s) Oral at bedtime  docusate sodium 100 milliGRAM(s) Oral three times a day    predniSONE   Tablet 5 milliGRAM(s) Oral daily  atorvastatin 80 milliGRAM(s) Oral at bedtime  insulin lispro Injectable (HumaLOG) 8 Unit(s) SubCutaneous three times a day before meals  insulin lispro (HumaLOG) corrective regimen sliding scale   SubCutaneous three times a day before meals  insulin lispro (HumaLOG) corrective regimen sliding scale   SubCutaneous at bedtime  dextrose Gel 1 Dose(s) Oral once PRN  dextrose 50% Injectable 12.5 Gram(s) IV Push once  dextrose 50% Injectable 25 Gram(s) IV Push once  dextrose 50% Injectable 25 Gram(s) IV Push once  glucagon  Injectable 1 milliGRAM(s) IntraMuscular once PRN  insulin glargine Injectable (LANTUS) 20 Unit(s) SubCutaneous every morning    calcium acetate 667 milliGRAM(s) Oral three times a day with meals  cycloSPORINE  (SandIMMUNE) 100 milliGRAM(s) Oral daily  epoetin valente Injectable 7000 Unit(s) IV Push <User Schedule>  naphazoline/pheniramine Solution 1 Drop(s) Both EYES three times a day PRN  dextrose 5%. 1000 milliLiter(s) IV Continuous <Continuous>  sodium phosphate IVPB 15 milliMole(s) IV Intermittent once      PAST MEDICAL & SURGICAL HISTORY:  Hypertension  Legally blind  Diabetes mellitus type I  Renal transplant, status post  CKD (chronic kidney disease)  Renal transplant, status post  History of tonsillectomy      FAMILY HISTORY:  No pertinent family history in first degree relatives      SOCIAL HISTORY:    [ ] Non-smoker  [ ] Smoker  [ ] Alcohol      REVIEW OF SYSTEMS:  CONSTITUTIONAL: No fever, weight loss, or fatigue  EYES: No eye pain, visual disturbances, or discharge  ENMT:  No difficulty hearing, tinnitus, vertigo; No sinus or throat pain  NECK: No pain or stiffness  RESPIRATORY: No cough, wheezing, chills or hemoptysis; No Shortness of Breath  CARDIOVASCULAR: No chest pain, palpitations, passing out, dizziness, or leg swelling  GASTROINTESTINAL: No abdominal or epigastric pain. No nausea, vomiting, or hematemesis; No diarrhea or constipation. No melena or hematochezia.  GENITOURINARY: No dysuria, frequency, hematuria, or incontinence  NEUROLOGICAL: No headaches, memory loss, loss of strength, numbness, or tremors  SKIN: No itching, burning, rashes, or lesions   LYMPH Nodes: No enlarged glands  ENDOCRINE: No heat or cold intolerance; No hair loss  MUSCULOSKELETAL: No joint pain or swelling; No muscle, back, or extremity pain  PSYCHIATRIC: No depression, anxiety, mood swings, or difficulty sleeping  HEME/LYMPH: No easy bruising, or bleeding gums  ALLERY AND IMMUNOLOGIC: No hives or eczema	    [ ] All others negative	  [ ] Unable to obtain    PHYSICAL EXAM:  T(C): 37.1 (09-22-17 @ 15:29), Max: 37.1 (09-22-17 @ 05:15)  HR: 88 (09-22-17 @ 15:29) (82 - 97)  BP: 135/79 (09-22-17 @ 15:29) (135/79 - 173/91)  RR: 17 (09-22-17 @ 15:29) (16 - 18)  SpO2: 98% (09-22-17 @ 15:29) (97% - 100%)  Wt(kg): --  I&O's Summary    22 Sep 2017 07:01  -  22 Sep 2017 16:48  --------------------------------------------------------  IN: 400 mL / OUT: 3000 mL / NET: -2600 mL        Appearance: Normal	  HEENT:   Normal oral mucosa, PERRL, EOMI	  Lymphatic: No lymphadenopathy  Cardiovascular: Normal S1 S2, No JVD, No murmurs, No edema  Respiratory: Lungs clear to auscultation	  Psychiatry: A & O x 3, Mood & affect appropriate  Gastrointestinal:  Soft, Non-tender, + BS	  Skin: No rashes, No ecchymoses, No cyanosis	  Neurologic: Non-focal  Extremities: Normal range of motion, No clubbing, cyanosis or edema  Vascular: Peripheral pulses palpable 2+ bilaterally        LABS:	 	    CBC Full  -  ( 22 Sep 2017 11:30 )  WBC Count : 5.69 K/uL  Hemoglobin : 9.0 g/dL  Hematocrit : 29.1 %  Platelet Count - Automated : 267 K/uL  Mean Cell Volume : 90.4 fL  Mean Cell Hemoglobin : 28.0 pg  Mean Cell Hemoglobin Concentration : 30.9 %  Auto Neutrophil # : 4.37 K/uL  Auto Lymphocyte # : 0.49 K/uL  Auto Monocyte # : 0.64 K/uL  Auto Eosinophil # : 0.04 K/uL  Auto Basophil # : 0.06 K/uL  Auto Neutrophil % : 76.8 %  Auto Lymphocyte % : 8.6 %  Auto Monocyte % : 11.2 %  Auto Eosinophil % : 0.7 %  Auto Basophil % : 1.1 %    09-22    140  |  96<L>  |  28<H>  ----------------------------<  252<H>  4.0   |  28  |  2.06<H>  09-22    134<L>  |  98  |  56<H>  ----------------------------<  325<H>  4.4   |  23  |  3.26<H>    Ca    8.8      22 Sep 2017 11:30  Ca    8.2<L>      22 Sep 2017 06:00  Phos  2.2     09-22  Phos  3.0     09-22  Mg     1.7     09-22  Mg     1.5     09-22        proBNP:   Lipid Profile:   HgA1c:   TSH:       CARDIAC MARKERS:            TELEMETRY: 	    ECG:  	  RADIOLOGY:  OTHER: 	    PREVIOUS DIAGNOSTIC TESTING:    [ ] Echocardiogram:  [ ]  Catheterization:  [ ] Stress Test:  	  	  ASSESSMENT/PLAN: Date of Admission: 9/22/17    CHIEF COMPLAINT: LE edema    HISTORY OF PRESENT ILLNESS:    53 y/o pleasant male w/ PMHX of CKD w renal transplant s/p cyclosporin and CellCEpt, HTN, DM II, legally blind was brought in by EMS for uremic encephalopathy. Patient was found to be bradycardic, hyperkalemic and had PEA arrest in ED, he was given epi, ROSC was acheieved, he was intubated and taken to MICU. He was given dialysis for hyperkalemia.   HF team was called for an evaluation for possible diastolic heart failure related worsening LE edema. Today he appears to be comfortable,       Allergies    No Known Allergies    Intolerances    	    MEDICATIONS:  tamsulosin 0.4 milliGRAM(s) Oral at bedtime  diltiazem    milliGRAM(s) Oral daily  metoprolol 50 milliGRAM(s) Oral two times a day  heparin  Injectable 7000 Unit(s) IV Push every 6 hours PRN  heparin  Injectable 3500 Unit(s) IV Push every 6 hours PRN  heparin  Infusion.  Unit(s)/Hr IV Continuous <Continuous>          senna 2 Tablet(s) Oral at bedtime  docusate sodium 100 milliGRAM(s) Oral three times a day    predniSONE   Tablet 5 milliGRAM(s) Oral daily  atorvastatin 80 milliGRAM(s) Oral at bedtime  insulin lispro Injectable (HumaLOG) 8 Unit(s) SubCutaneous three times a day before meals  insulin lispro (HumaLOG) corrective regimen sliding scale   SubCutaneous three times a day before meals  insulin lispro (HumaLOG) corrective regimen sliding scale   SubCutaneous at bedtime  dextrose Gel 1 Dose(s) Oral once PRN  dextrose 50% Injectable 12.5 Gram(s) IV Push once  dextrose 50% Injectable 25 Gram(s) IV Push once  dextrose 50% Injectable 25 Gram(s) IV Push once  glucagon  Injectable 1 milliGRAM(s) IntraMuscular once PRN  insulin glargine Injectable (LANTUS) 20 Unit(s) SubCutaneous every morning    calcium acetate 667 milliGRAM(s) Oral three times a day with meals  cycloSPORINE  (SandIMMUNE) 100 milliGRAM(s) Oral daily  epoetin valente Injectable 7000 Unit(s) IV Push <User Schedule>  naphazoline/pheniramine Solution 1 Drop(s) Both EYES three times a day PRN  dextrose 5%. 1000 milliLiter(s) IV Continuous <Continuous>  sodium phosphate IVPB 15 milliMole(s) IV Intermittent once      PAST MEDICAL & SURGICAL HISTORY:  Hypertension  Legally blind  Diabetes mellitus type I  Renal transplant, status post  CKD (chronic kidney disease)  Renal transplant, status post  History of tonsillectomy      FAMILY HISTORY:  No pertinent family history in first degree relatives      SOCIAL HISTORY:    [ ] Non-smoker  [ ] Smoker  [ ] Alcohol      REVIEW OF SYSTEMS:  CONSTITUTIONAL: No fever, weight loss, or fatigue  EYES: No eye pain, visual disturbances, or discharge  ENMT:  No difficulty hearing, tinnitus, vertigo; No sinus or throat pain  NECK: No pain or stiffness  RESPIRATORY: No cough, wheezing, chills or hemoptysis; No Shortness of Breath  CARDIOVASCULAR: No chest pain, palpitations, passing out, dizziness, or leg swelling  GASTROINTESTINAL: No abdominal or epigastric pain. No nausea, vomiting, or hematemesis; No diarrhea or constipation. No melena or hematochezia.  GENITOURINARY: No dysuria, frequency, hematuria, or incontinence  NEUROLOGICAL: No headaches, memory loss, loss of strength, numbness, or tremors  SKIN: No itching, burning, rashes, or lesions   LYMPH Nodes: No enlarged glands  ENDOCRINE: No heat or cold intolerance; No hair loss  MUSCULOSKELETAL: No joint pain or swelling; No muscle, back, or extremity pain  PSYCHIATRIC: No depression, anxiety, mood swings, or difficulty sleeping  HEME/LYMPH: No easy bruising, or bleeding gums  ALLERY AND IMMUNOLOGIC: No hives or eczema	    [ ] All others negative	  [ ] Unable to obtain    PHYSICAL EXAM:  T(C): 37.1 (09-22-17 @ 15:29), Max: 37.1 (09-22-17 @ 05:15)  HR: 88 (09-22-17 @ 15:29) (82 - 97)  BP: 135/79 (09-22-17 @ 15:29) (135/79 - 173/91)  RR: 17 (09-22-17 @ 15:29) (16 - 18)  SpO2: 98% (09-22-17 @ 15:29) (97% - 100%)  Wt(kg): --  I&O's Summary    22 Sep 2017 07:01  -  22 Sep 2017 16:48  --------------------------------------------------------  IN: 400 mL / OUT: 3000 mL / NET: -2600 mL        Appearance: Normal	  HEENT:   Normal oral mucosa, PERRL, EOMI	  Lymphatic: No lymphadenopathy  Cardiovascular: Normal S1 S2, No JVD, No murmurs, No edema  Respiratory: Lungs clear to auscultation	  Psychiatry: A & O x 3, Mood & affect appropriate  Gastrointestinal:  Soft, Non-tender, + BS	  Skin: No rashes, No ecchymoses, No cyanosis	  Neurologic: Non-focal  Extremities: Normal range of motion, No clubbing, cyanosis or edema  Vascular: Peripheral pulses palpable 2+ bilaterally        LABS:	 	    CBC Full  -  ( 22 Sep 2017 11:30 )  WBC Count : 5.69 K/uL  Hemoglobin : 9.0 g/dL  Hematocrit : 29.1 %  Platelet Count - Automated : 267 K/uL  Mean Cell Volume : 90.4 fL  Mean Cell Hemoglobin : 28.0 pg  Mean Cell Hemoglobin Concentration : 30.9 %  Auto Neutrophil # : 4.37 K/uL  Auto Lymphocyte # : 0.49 K/uL  Auto Monocyte # : 0.64 K/uL  Auto Eosinophil # : 0.04 K/uL  Auto Basophil # : 0.06 K/uL  Auto Neutrophil % : 76.8 %  Auto Lymphocyte % : 8.6 %  Auto Monocyte % : 11.2 %  Auto Eosinophil % : 0.7 %  Auto Basophil % : 1.1 %    09-22    140  |  96<L>  |  28<H>  ----------------------------<  252<H>  4.0   |  28  |  2.06<H>  09-22    134<L>  |  98  |  56<H>  ----------------------------<  325<H>  4.4   |  23  |  3.26<H>    Ca    8.8      22 Sep 2017 11:30  Ca    8.2<L>      22 Sep 2017 06:00  Phos  2.2     09-22  Phos  3.0     09-22  Mg     1.7     09-22  Mg     1.5     09-22        proBNP:   Lipid Profile:   HgA1c:   TSH:       CARDIAC MARKERS:            TELEMETRY: 	    ECG:  	  RADIOLOGY:  OTHER: 	    PREVIOUS DIAGNOSTIC TESTING:    [ ] Echocardiogram:  [ ]  Catheterization:  [ ] Stress Test:  	  	  ASSESSMENT/PLAN: Date of Admission: 9/22/17    CHIEF COMPLAINT: LE edema    HISTORY OF PRESENT ILLNESS:    53 y/o pleasant male w/ PMHX of CKD w renal transplant s/p cyclosporin and CellCEpt, HTN, DM II, legally blind was brought in by EMS for uremic encephalopathy. Patient was found to be bradycardic, hyperkalemic and had PEA arrest in ED, he was given epi, ROSC was acheieved, he was intubated and taken to MICU. He was given dialysis for hyperkalemia. Hospitilization also remarkable for new a.fib and permcath placement. HF team was called for an evaluation for possible diastolic heart failure related worsening LE edema. Today he appears to be comfortable, no acute SOB, orthopnea, PND, CP, palpitations, dizziness or syncope.     Allergies    No Known Allergies  	    MEDICATIONS:  tamsulosin 0.4 milliGRAM(s) Oral at bedtime  diltiazem    milliGRAM(s) Oral daily  metoprolol 50 milliGRAM(s) Oral two times a day  heparin  Injectable 7000 Unit(s) IV Push every 6 hours PRN  heparin  Injectable 3500 Unit(s) IV Push every 6 hours PRN  heparin  Infusion.  Unit(s)/Hr IV Continuous <Continuous>  senna 2 Tablet(s) Oral at bedtime  docusate sodium 100 milliGRAM(s) Oral three times a day    predniSONE   Tablet 5 milliGRAM(s) Oral daily  atorvastatin 80 milliGRAM(s) Oral at bedtime  insulin lispro Injectable (HumaLOG) 8 Unit(s) SubCutaneous three times a day before meals  insulin lispro (HumaLOG) corrective regimen sliding scale   SubCutaneous three times a day before meals  insulin lispro (HumaLOG) corrective regimen sliding scale   SubCutaneous at bedtime  dextrose Gel 1 Dose(s) Oral once PRN  dextrose 50% Injectable 12.5 Gram(s) IV Push once  dextrose 50% Injectable 25 Gram(s) IV Push once  dextrose 50% Injectable 25 Gram(s) IV Push once  glucagon  Injectable 1 milliGRAM(s) IntraMuscular once PRN  insulin glargine Injectable (LANTUS) 20 Unit(s) SubCutaneous every morning    calcium acetate 667 milliGRAM(s) Oral three times a day with meals  cycloSPORINE  (SandIMMUNE) 100 milliGRAM(s) Oral daily  epoetin valente Injectable 7000 Unit(s) IV Push <User Schedule>  naphazoline/pheniramine Solution 1 Drop(s) Both EYES three times a day PRN  dextrose 5%. 1000 milliLiter(s) IV Continuous <Continuous>  sodium phosphate IVPB 15 milliMole(s) IV Intermittent once      PAST MEDICAL & SURGICAL HISTORY:  Hypertension  Legally blind  Diabetes mellitus type I  Renal transplant, status post  CKD (chronic kidney disease)  Renal transplant, status post  History of tonsillectomy      FAMILY HISTORY:  No pertinent family history in first degree relatives      SOCIAL HISTORY:    Former smoker cigarette, former cocaine and heroin use. Former ETOH abuse.       REVIEW OF SYSTEMS:  CONSTITUTIONAL: No fever, weight loss, or fatigue  EYES: No eye pain, visual disturbances, or discharge. He is blind.  ENMT:  No difficulty hearing, tinnitus, vertigo; No sinus or throat pain  NECK: No pain or stiffness  RESPIRATORY: No cough, wheezing, chills or hemoptysis; No Shortness of Breath  CARDIOVASCULAR: No chest pain, palpitations, passing out, dizziness. Has moderate LE edema.   GASTROINTESTINAL: No abdominal or epigastric pain. No nausea, vomiting, or hematemesis; No diarrhea or constipation. No melena or hematochezia.  GENITOURINARY: No dysuria, frequency, hematuria, or incontinence  NEUROLOGICAL: No headaches, memory loss, loss of strength, numbness, or tremors  SKIN: No itching, burning, rashes, or lesions   LYMPH Nodes: No enlarged glands  ENDOCRINE: No heat or cold intolerance; No hair loss  MUSCULOSKELETAL: No joint pain or swelling; No muscle, back, or extremity pain  PSYCHIATRIC: No depression, anxiety, mood swings, or difficulty sleeping  HEME/LYMPH: No easy bruising, or bleeding gums  ALLERY AND IMMUNOLOGIC: No hives or eczema	      PHYSICAL EXAM:  T(C): 37.1 (09-22-17 @ 15:29), Max: 37.1 (09-22-17 @ 05:15)  HR: 88 (09-22-17 @ 15:29) (82 - 97)  BP: 135/79 (09-22-17 @ 15:29) (135/79 - 173/91)  RR: 17 (09-22-17 @ 15:29) (16 - 18)  SpO2: 98% (09-22-17 @ 15:29) (97% - 100%)  Wt(kg): --  I&O's Summary    22 Sep 2017 07:01  -  22 Sep 2017 16:48  --------------------------------------------------------  IN: 400 mL / OUT: 3000 mL / NET: -2600 mL        Appearance: Normal	  HEENT:   Normal oral mucosa, PERRL, EOMI	  Lymphatic: No lymphadenopathy  Cardiovascular: Normal S1 S2, No JVD, No murmurs. 4+ LE edema b/l.  Respiratory: coarse breath sounds.   Psychiatry: A & O x 3, Mood & affect appropriate  Gastrointestinal:  Soft, Non-tender, + BS	  Skin: No rashes, No ecchymoses, No cyanosis	  Neurologic: Non-focal  Extremities: Normal range of motion, No clubbing, cyanosis or edema  Vascular: Peripheral pulses palpable 2+ bilaterally        LABS:	 	    CBC Full  -  ( 22 Sep 2017 11:30 )  WBC Count : 5.69 K/uL  Hemoglobin : 9.0 g/dL  Hematocrit : 29.1 %  Platelet Count - Automated : 267 K/uL  Mean Cell Volume : 90.4 fL  Mean Cell Hemoglobin : 28.0 pg  Mean Cell Hemoglobin Concentration : 30.9 %  Auto Neutrophil # : 4.37 K/uL  Auto Lymphocyte # : 0.49 K/uL  Auto Monocyte # : 0.64 K/uL  Auto Eosinophil # : 0.04 K/uL  Auto Basophil # : 0.06 K/uL  Auto Neutrophil % : 76.8 %  Auto Lymphocyte % : 8.6 %  Auto Monocyte % : 11.2 %  Auto Eosinophil % : 0.7 %  Auto Basophil % : 1.1 %    09-22    140  |  96<L>  |  28<H>  ----------------------------<  252<H>  4.0   |  28  |  2.06<H>  09-22    134<L>  |  98  |  56<H>  ----------------------------<  325<H>  4.4   |  23  |  3.26<H>    Ca    8.8      22 Sep 2017 11:30  Ca    8.2<L>      22 Sep 2017 06:00  Phos  2.2     09-22  Phos  3.0     09-22  Mg     1.7     09-22  Mg     1.5     09-22    < from: Transthoracic Echocardiogram (09.08.17 @ 16:55) >  Dimensions:     Normal Values:  LA:     3.6 cm    2.0 - 4.0 cm  Ao:     3.4 cm    2.0 - 3.8 cm  SEPTUM: 1.1 cm    0.6 - 1.2 cm  PWT:    1.1 cm    0.6 - 1.1 cm  LVIDd:  5.0 cm    3.0 - 5.6 cm  LVIDs:  3.2 cm    1.8 - 4.0 cm  Derived Variables:  LVMI: 101 g/m2  RWT: 0.44  Fractional short: 36 %  Ejection Fraction (Teicholtz): 65 %  ------------------------------------------------------------------------  OBSERVATIONS:  Mitral Valve: Mitral annular calcification, otherwise  normal mitral valve. Minimal mitral regurgitation.  Aortic Root: Normal aortic root.  Aortic Valve: Aortic valve leaflet morphology not well  visualized.  Left Atrium: Normal left atrium.  Left Ventricle: Endocardium not well visualized; grossly  normal left ventricular systolic function. Mild concentric  left ventricular hypertrophy.  Right Heart: Normal right atrium. Normal right ventricular  size andfunction. Normal tricuspid valve.  Minimal  tricuspid regurgitation. Normal pulmonic valve.  Pericardium/PleuraSmall pericardial effusion posterior to  the left ventricle. Right pleural effusion.    < end of copied text >

## 2017-09-22 NOTE — CONSULT NOTE ADULT - PROBLEM SELECTOR RECOMMENDATION 9
-Lower extremity edema not related to diastolic heart failure.   -He has elevated proteins in urine and low albumin- which is likely the reason for LE edema.   -Would continue with HD and fluid removal.  -Please call with question. No further recs.

## 2017-09-22 NOTE — PROGRESS NOTE ADULT - ASSESSMENT
52yMale w/ a PMHx of CKD s/p renal transplant (1998) on cyclosporine and mycophenolate, HTN, type I DM, legally blind  who presented with AMS and uremic encephalopathy.  Subsequently had PEA arrest secondary to hyperkalemia requiring emergent HD on 9/03. +Pericardial effusion with atrial fibrillation, rate controlled with Cardizem 300mg daily and metoprolol 50 mg bid. On 9/21 at 4:02 am, patient self converted to and has remained in NSR.   Continue telemetry.  Repeat echocardiogram to follow-up on pericardial effusion. Will continue current medications -> consider reducing doses after AVF surgery.

## 2017-09-22 NOTE — PROGRESS NOTE ADULT - PROBLEM SELECTOR PLAN 6
Home regime: lantus 22u at home, home pravastatin 10mg daily.  -HbA1C 8  - this AM  -Endo follow up appreciated.   -will gave lantus 20 units in AM and humalog 8 units  -FSBG q4h   -atorvastatin 80mg daily

## 2017-09-22 NOTE — PROGRESS NOTE ADULT - PROBLEM SELECTOR PLAN 1
Increase Lantus to 23U in AM and increase humalog 9-9-9 with meals.   Continue Humalog low correction scales before meals and QHS.      Outpatient endocrine follow up: appointment pending with Dr Baker

## 2017-09-22 NOTE — CONSULT NOTE ADULT - ATTENDING COMMENTS
Pt seen and evaluated. Suspect will revert to NSR, but continue current medications. Will need systemic AC.    This is a patient of Dr. Sylvester's but we were called as a second opinion.
No active cardiac conditions. >4 METS activity approximately one month ago, without angina. May safely proceed with planned procedure without further cardiac testing.
51 y/o pleasant male w/ PMHX of CKD w renal transplant s/p cyclosporin and CellCEpt, HTN, DM II, legally blind was brought in by EMS for uremic encephalopathy. Patient was found to be bradycardic, hyperkalemic and had PEA arrest in ED requiring intubation and treatment in MICU. Overall had graft failure and is now s/p permacath with plan for likely AVF. Also noted to be in afib which has been treated with dilt/metoprolol and started on heparin gtt for plan for AVF placement. Noted to have bilateral LE edema which pt states has been there for past 3-4 weeks. Denies HONG/orthopnea/PND. Noted to have h/o heavy EtOH use but had quit 3 years prior. On exam, JVD approx 8 cm, irreg irreg rhythm. clear lungs, bilateral pitting lower extremity edema to thighs. Labs reviewed noted to have albumin 2.5 and spot urine protein/Cr ratio of 5. TTE personally reviewed with no evidence of diastolic dysfunction. Overall pt has likely lower extremity edema from nephrotic range proteinuria and hypoalbuminemia and does not have heart failure.   - agree with treatment of afib; EP following  - volume management per renal  - unclear utility of continuing cyclosporine if patient has graft failure; recommending checking cyclosporine level; has toxicities which likely outweigh any benefit for pt at this point; defer to renal    Thank you for this consult. Will sign off. Please call w/ questions.
Pt. with longstanding chronic hepatitis C on immunosuppression. It is unclear whether he has fibrosis - PLT >200, albumin is decreased however nonspecific, other LFTs normal. He does have decreased body hair and beginning Dupuytren's contractures, but no spider angiomas or gynecomastia. Transaminases are normal, indicating that he does not have active hepatitis. Hepatitis C has been reported to become active in transplanted patients, but the frequency is very low and has not had any signs of it in almost 20 yrs. Would follow-up as outpatient.
52M uncontrolled DM1, blind, new ESRD. Agree with Lantus 12u qhs and Humalog 3/3/3. Low correction scales. Will follow.  DM education for sister to help patient with insulin administration at home. Will need outpatient endocrine followup [phone].
A case of CKD stage V with s/p kidney transplant admitted with deteriorating mental status. Advised to initiate HD.

## 2017-09-22 NOTE — PROGRESS NOTE ADULT - PROBLEM SELECTOR PLAN 1
-converted back to Afib last night  -currently on PO Cardizem, metoprolol 50 bid w/ hold parameters   -holding coumadin as pt. is planned for AVF next week  -hep gtt restarted  -appreciate EP recs  - TTE nonsignificant  -continue lopressor and cardizem when NPO and before HD sessions given he has tendency to go into afib w/ RVR if he misses doses  - cardiology clearance reviewed in [facility] for AVF

## 2017-09-22 NOTE — PROGRESS NOTE ADULT - PROBLEM SELECTOR PLAN 1
Pt. tolerating HD well. BP elevated during HD today. Continue with current UF goal with HD as tolerated. RIJ HD catheter functioning well. Monitor BP and labs.

## 2017-09-22 NOTE — PROGRESS NOTE ADULT - PROBLEM SELECTOR PLAN 5
-home regimen clonidine 0.1mg po q8h, nifedipine XR 90 mg qD, Turosemide 10 mg daily - holding for now  -c/w lasix 80 IV BID, on cardizem and metoprolol -home regimen clonidine 0.1mg po q8h, nifedipine XR 90 mg qD, Turosemide 10 mg daily - holding for now  -c/w Cardizem and metoprolol

## 2017-09-22 NOTE — PROGRESS NOTE ADULT - SUBJECTIVE AND OBJECTIVE BOX
Patient feeling well. Denies chest pain, shortness of breath, palpitations or lightheadedness. No events overnight. Remains in sinus rhythm.         Vital Signs Last 24 Hrs  T(C): 36.6 (22 Sep 2017 09:15), Max: 37.1 (22 Sep 2017 05:15)  T(F): 97.9 (22 Sep 2017 09:15), Max: 98.7 (22 Sep 2017 05:15)  HR: 97 (22 Sep 2017 10:38) (82 - 97)  BP: 159/67 (22 Sep 2017 10:38) (159/67 - 173/91)  BP(mean): --  RR: 18 (22 Sep 2017 09:15) (16 - 18)  SpO2: 99% (22 Sep 2017 05:15) (97% - 100%)      Telemetry: Normal sinus rhythm 70-90's.    MEDICATIONS  (STANDING):  calcium acetate 667 milliGRAM(s) Oral three times a day with meals  predniSONE   Tablet 5 milliGRAM(s) Oral daily  tamsulosin 0.4 milliGRAM(s) Oral at bedtime  cycloSPORINE  (SandIMMUNE) 100 milliGRAM(s) Oral daily  atorvastatin 80 milliGRAM(s) Oral at bedtime  senna 2 Tablet(s) Oral at bedtime  docusate sodium 100 milliGRAM(s) Oral three times a day  epoetin valente Injectable 7000 Unit(s) IV Push <User Schedule>  diltiazem    milliGRAM(s) Oral daily  metoprolol 50 milliGRAM(s) Oral two times a day  insulin lispro Injectable (HumaLOG) 8 Unit(s) SubCutaneous three times a day before meals  insulin lispro (HumaLOG) corrective regimen sliding scale   SubCutaneous three times a day before meals  insulin lispro (HumaLOG) corrective regimen sliding scale   SubCutaneous at bedtime  dextrose 5%. 1000 milliLiter(s) (50 mL/Hr) IV Continuous <Continuous>  dextrose 50% Injectable 12.5 Gram(s) IV Push once  dextrose 50% Injectable 25 Gram(s) IV Push once  dextrose 50% Injectable 25 Gram(s) IV Push once  insulin glargine Injectable (LANTUS) 20 Unit(s) SubCutaneous every morning  heparin  Infusion.  Unit(s)/Hr (16 mL/Hr) IV Continuous <Continuous>    MEDICATIONS  (PRN):  naphazoline/pheniramine Solution 1 Drop(s) Both EYES three times a day PRN eye discomfort  dextrose Gel 1 Dose(s) Oral once PRN Blood Glucose LESS THAN 70 milliGRAM(s)/deciLiter  glucagon  Injectable 1 milliGRAM(s) IntraMuscular once PRN Glucose <70 milliGRAM(s)/deciLiter  heparin  Injectable 7000 Unit(s) IV Push every 6 hours PRN For aPTT less than 40  heparin  Injectable 3500 Unit(s) IV Push every 6 hours PRN For aPTT between 40 - 57          Physical exam:   Gen- Patient looks well. Alert,  attentive,  NAD   Resp- CTA bilaterally  CV- S1 and S2 normal. RRR No murmurs or rubs  ABD- soft nontender +bowel sounds  EXT- 2-3+ pitting edema lower extremities  Vascular access RIJ HD catheter site without bleeding or evidence of infection      LABS: 089/22/2017                        9.0    5.69  )-----------( 267      ( 22 Sep 2017 11:30 )             29.1        PT: 12.4 SEC;   INR: 1.10    PTT:52.1 SEC    09-22    134<L>  |  98  |  56<H>  ----------------------------<  325<H>  4.4   |  23  |  3.26<H>    Ca    8.2<L>       Phos  3.0       Mg     1.5

## 2017-09-22 NOTE — PROGRESS NOTE ADULT - ASSESSMENT
52M with h/o CKD s/p renal transplant (1998) on cyclosporine and CellCept, HTN, DM, legally blind BIBEMS for uremic encephalopathy. Pt became bradycardic and had PEA arrest in the ED, intubated for airway protection. ROSC achieved s/p 1 round of epi and chest compressions and pt was transferred to MICU. Immunosuppressants for renal transplant were held. Pt had emergent HD for hyperkalemia. Covered empirically with broad spectrum antibiotics (vanc and zosyn), Hgb 5 s/p 1u pRBC given and epogen was started. Pressors were weaned off and pt extubated 9/4. Cyclosporine restarted and prednisone started. Pt remains stable and transferred to floor for further management 9/5, now found to have new-onset AF. Also s/p permacath w/ plan for AVF. AVF canceled due to hyperglycemia.

## 2017-09-22 NOTE — PROGRESS NOTE ADULT - PROBLEM SELECTOR PLAN 3
Hyperphosphatemia in setting of renal failure. Serum phosphorus below target. Recommend to discontinue Phoslo (calcium acetate). Monitor serum phosphorus.

## 2017-09-22 NOTE — PROGRESS NOTE ADULT - ASSESSMENT
51 y/o M with h/o CKD s/p kidney transplant in 1998, HTN, DM, legally blind admitted for uremic encephalopathy initiated on dialysis. Pt. seen during HD. /98 at time of dialysis rounds.

## 2017-09-22 NOTE — PROGRESS NOTE ADULT - SUBJECTIVE AND OBJECTIVE BOX
[facility] DIVISION OF KIDNEY DISEASES AND HYPERTENSION --   --------------------------------------------------------------------------------  Chief Complaint: ESRD/Ongoing hemodialysis requirement    24 hour events/subjective: 51 y/o M with h/o CKD s/p kidney transplant in 1998, HTN, DM, legally blind admitted for uremic encephalopathy initiated on dialysis. Pt. seen during HD. No complaints offered during HD. No complaints of CP, SOB, HA or dizziness.       PAST HISTORY  --------------------------------------------------------------------------------  No significant changes to PMH, PSH, FHx, SHx, unless otherwise noted    ALLERGIES & MEDICATIONS  --------------------------------------------------------------------------------  Allergies    No Known Allergies    Intolerances      Standing Inpatient Medications  calcium acetate 667 milliGRAM(s) Oral three times a day with meals  predniSONE   Tablet 5 milliGRAM(s) Oral daily  tamsulosin 0.4 milliGRAM(s) Oral at bedtime  cycloSPORINE  (SandIMMUNE) 100 milliGRAM(s) Oral daily  atorvastatin 80 milliGRAM(s) Oral at bedtime  senna 2 Tablet(s) Oral at bedtime  docusate sodium 100 milliGRAM(s) Oral three times a day  epoetin valente Injectable 7000 Unit(s) IV Push <User Schedule>  diltiazem    milliGRAM(s) Oral daily  metoprolol 50 milliGRAM(s) Oral two times a day  insulin lispro Injectable (HumaLOG) 8 Unit(s) SubCutaneous three times a day before meals  insulin lispro (HumaLOG) corrective regimen sliding scale   SubCutaneous three times a day before meals  insulin lispro (HumaLOG) corrective regimen sliding scale   SubCutaneous at bedtime  dextrose 5%. 1000 milliLiter(s) IV Continuous <Continuous>  dextrose 50% Injectable 12.5 Gram(s) IV Push once  dextrose 50% Injectable 25 Gram(s) IV Push once  dextrose 50% Injectable 25 Gram(s) IV Push once  insulin glargine Injectable (LANTUS) 20 Unit(s) SubCutaneous every morning  heparin  Infusion.  Unit(s)/Hr IV Continuous <Continuous>  magnesium sulfate  IVPB 1 Gram(s) IV Intermittent once    PRN Inpatient Medications  naphazoline/pheniramine Solution 1 Drop(s) Both EYES three times a day PRN  dextrose Gel 1 Dose(s) Oral once PRN  glucagon  Injectable 1 milliGRAM(s) IntraMuscular once PRN  heparin  Injectable 7000 Unit(s) IV Push every 6 hours PRN  heparin  Injectable 3500 Unit(s) IV Push every 6 hours PRN      REVIEW OF SYSTEMS  --------------------------------------------------------------------------------  Gen: No fevers/chills  Respiratory: No dyspnea  CV: No chest pain  GI: No abdominal pain  MSK: Bilateral LE swelling+  Neuro: No dizziness/lightheadedness    VITALS/PHYSICAL EXAM  --------------------------------------------------------------------------------  T(C): 36.5 (09-22-17 @ 05:55), Max: 37.1 (09-21-17 @ 11:53)  HR: 87 (09-22-17 @ 05:55) (80 - 92)  BP: 173/91 (09-22-17 @ 05:55) (143/81 - 173/91)  RR: 18 (09-22-17 @ 05:55) (16 - 18)  SpO2: 99% (09-22-17 @ 05:15) (97% - 100%)  Wt(kg): --  Height (cm): 182.88 (09-21-17 @ 11:53)  Weight (kg): 88 (09-21-17 @ 11:53)  BMI (kg/m2): 26.3 (09-21-17 @ 11:53)  BSA (m2): 2.1 (09-21-17 @ 11:53)      Physical Exam:  	Gen: well-appearing  	Pulm: CTA B/L  	CV: RRR, S1S2; no rub  	Abd: +BS, soft  	LE: bilateral LE pitting edema present  	Vascular access: Right IJ HD catheter site: no bleeding.       LABS/STUDIES  --------------------------------------------------------------------------------              7.4    6.50  >-----------<  262      [09-22-17 @ 06:00]              23.6     134  |  98  |  56  ----------------------------<  325      [09-22-17 @ 06:00]  4.4   |  23  |  3.26        Ca     8.2     [09-22-17 @ 06:00]      Mg     1.5     [09-22-17 @ 06:00]      Phos  3.0     [09-22-17 @ 06:00]      PT/INR: PT 12.4 , INR 1.10       [09-22-17 @ 06:00]  PTT: 52.1       [09-22-17 @ 06:00]      Iron 21, TIBC 187, %sat --      [09-09-17 @ 06:55]  Ferritin 587.5      [09-09-17 @ 06:55]  .9 (Ca --)      [09-10-17 @ 05:10]   --  HbA1c 8.0      [08-16-17 @ 06:30]  TSH 2.88      [09-03-17 @ 13:45]  Lipid: chol 137, TG 97, HDL 52, LDL 68      [08-11-17 @ 07:20]    HBsAb <3.0      [09-03-17 @ 18:00]  HBsAg NEGATIVE      [09-03-17 @ 18:00]  HCV 14.48, Reactive      [09-03-17 @ 18:00]

## 2017-09-22 NOTE — CONSULT NOTE ADULT - ASSESSMENT
53 y/o pleasant male w/ PMHX of CKD w renal transplant s/p cyclosporin and CellCEpt, HTN, DM II, legally blind was brought in by EMS for uremic encephalopathy. Patient was found to be bradycardic, hyperkalemic and had PEA arrest in ED, he was given epi, ROSC was acheieved, he was intubated and taken to MICU. He was given dialysis for hyperkalemia. Hospitilization also remarkable for new a.fib and permcath placement. HF team was called for an evaluation for possible diastolic heart failure related worsening LE edema. Today he appears to be comfortable, no acute SOB, orthopnea, PND, CP, palpitations, dizziness or syncope.

## 2017-09-22 NOTE — PROGRESS NOTE ADULT - PROBLEM SELECTOR PLAN 2
-c/w HD per renal MWF  -c/w lasix 80mg IV BID  -permacath in place - AVF with vascular Dr. Staples due to hyperglycemia.   -Renal f/u -c/w HD per renal MWF  -permacath in place - AVF with vascular Dr. Staples, possible next week.  -Renal f/u

## 2017-09-23 LAB
APTT BLD: 40.1 SEC — HIGH (ref 27.5–37.4)
APTT BLD: > 200 SEC — CRITICAL HIGH (ref 27.5–37.4)
APTT BLD: > 200 SEC — CRITICAL HIGH (ref 27.5–37.4)
HCT VFR BLD CALC: 26.4 % — LOW (ref 39–50)
HGB BLD-MCNC: 8.1 G/DL — LOW (ref 13–17)
MCHC RBC-ENTMCNC: 27.6 PG — SIGNIFICANT CHANGE UP (ref 27–34)
MCHC RBC-ENTMCNC: 30.7 % — LOW (ref 32–36)
MCV RBC AUTO: 89.8 FL — SIGNIFICANT CHANGE UP (ref 80–100)
NRBC # FLD: 0 — SIGNIFICANT CHANGE UP
PLATELET # BLD AUTO: 222 K/UL — SIGNIFICANT CHANGE UP (ref 150–400)
PMV BLD: 10.5 FL — SIGNIFICANT CHANGE UP (ref 7–13)
RBC # BLD: 2.94 M/UL — LOW (ref 4.2–5.8)
RBC # FLD: 13.8 % — SIGNIFICANT CHANGE UP (ref 10.3–14.5)
WBC # BLD: 5.67 K/UL — SIGNIFICANT CHANGE UP (ref 3.8–10.5)
WBC # FLD AUTO: 5.67 K/UL — SIGNIFICANT CHANGE UP (ref 3.8–10.5)

## 2017-09-23 PROCEDURE — 99233 SBSQ HOSP IP/OBS HIGH 50: CPT | Mod: GC

## 2017-09-23 RX ORDER — INSULIN GLARGINE 100 [IU]/ML
25 INJECTION, SOLUTION SUBCUTANEOUS EVERY MORNING
Qty: 0 | Refills: 0 | Status: DISCONTINUED | OUTPATIENT
Start: 2017-09-23 | End: 2017-09-23

## 2017-09-23 RX ORDER — INSULIN GLARGINE 100 [IU]/ML
23 INJECTION, SOLUTION SUBCUTANEOUS EVERY MORNING
Qty: 0 | Refills: 0 | Status: DISCONTINUED | OUTPATIENT
Start: 2017-09-23 | End: 2017-09-24

## 2017-09-23 RX ADMIN — SENNA PLUS 2 TABLET(S): 8.6 TABLET ORAL at 21:04

## 2017-09-23 RX ADMIN — HEPARIN SODIUM 3500 UNIT(S): 5000 INJECTION INTRAVENOUS; SUBCUTANEOUS at 07:41

## 2017-09-23 RX ADMIN — INSULIN GLARGINE 23 UNIT(S): 100 INJECTION, SOLUTION SUBCUTANEOUS at 11:00

## 2017-09-23 RX ADMIN — Medication 5 MILLIGRAM(S): at 05:08

## 2017-09-23 RX ADMIN — Medication 300 MILLIGRAM(S): at 05:08

## 2017-09-23 RX ADMIN — HEPARIN SODIUM 2100 UNIT(S)/HR: 5000 INJECTION INTRAVENOUS; SUBCUTANEOUS at 15:30

## 2017-09-23 RX ADMIN — Medication: at 13:23

## 2017-09-23 RX ADMIN — Medication 50 MILLIGRAM(S): at 05:08

## 2017-09-23 RX ADMIN — HEPARIN SODIUM 0 UNIT(S)/HR: 5000 INJECTION INTRAVENOUS; SUBCUTANEOUS at 22:20

## 2017-09-23 RX ADMIN — TAMSULOSIN HYDROCHLORIDE 0.4 MILLIGRAM(S): 0.4 CAPSULE ORAL at 21:04

## 2017-09-23 RX ADMIN — Medication 9 UNIT(S): at 18:10

## 2017-09-23 RX ADMIN — Medication: at 09:19

## 2017-09-23 RX ADMIN — HEPARIN SODIUM 2400 UNIT(S)/HR: 5000 INJECTION INTRAVENOUS; SUBCUTANEOUS at 07:39

## 2017-09-23 RX ADMIN — Medication 100 MILLIGRAM(S): at 21:04

## 2017-09-23 RX ADMIN — Medication 9 UNIT(S): at 09:17

## 2017-09-23 RX ADMIN — Medication 667 MILLIGRAM(S): at 12:30

## 2017-09-23 RX ADMIN — HEPARIN SODIUM 1800 UNIT(S)/HR: 5000 INJECTION INTRAVENOUS; SUBCUTANEOUS at 23:22

## 2017-09-23 RX ADMIN — ATORVASTATIN CALCIUM 80 MILLIGRAM(S): 80 TABLET, FILM COATED ORAL at 21:04

## 2017-09-23 RX ADMIN — Medication 9 UNIT(S): at 13:23

## 2017-09-23 RX ADMIN — HEPARIN SODIUM 0 UNIT(S)/HR: 5000 INJECTION INTRAVENOUS; SUBCUTANEOUS at 14:33

## 2017-09-23 RX ADMIN — CYCLOSPORINE 100 MILLIGRAM(S): 100 CAPSULE ORAL at 11:26

## 2017-09-23 RX ADMIN — Medication 667 MILLIGRAM(S): at 18:10

## 2017-09-23 RX ADMIN — Medication 1 DROP(S): at 11:26

## 2017-09-23 RX ADMIN — Medication 667 MILLIGRAM(S): at 09:18

## 2017-09-23 RX ADMIN — Medication 50 MILLIGRAM(S): at 18:10

## 2017-09-23 NOTE — PROGRESS NOTE ADULT - PROBLEM SELECTOR PLAN 2
-c/w HD per renal MWF  -permacath in place - AVF with vascular Dr. Staples, possible next week.  -Renal f/u

## 2017-09-23 NOTE — PROGRESS NOTE ADULT - PROBLEM SELECTOR PLAN 6
Home regime: lantus 22u at home, home pravastatin 10mg daily.  -HbA1C 8  - this yesterday AM (this AM pending)  -Endo follow up appreciated.   -will give lantus 23 units in AM and humalog 9/9/9 units  -low ISS  -FSBG q6h   -atorvastatin 80mg daily Home regime: lantus 22u at home, home pravastatin 10mg daily.  -HbA1C 8  - this AM  -Endo follow up appreciated.   -will give lantus 23 units in AM and humalog 9/9/9 units  -low ISS  -FSBG q6h   -atorvastatin 80mg daily

## 2017-09-23 NOTE — PROGRESS NOTE ADULT - PROBLEM SELECTOR PLAN 1
Increase Lantus to 25U in AM   continue with humalog 9-9-9 with meals.   Continue Humalog low correction scales before meals and QHS.    D/c with basal/bolus dose TBD  Outpatient endocrine follow up: appointment pending with Dr Baker

## 2017-09-23 NOTE — PROGRESS NOTE ADULT - SUBJECTIVE AND OBJECTIVE BOX
Patient is a 52y old  Male who presents with a chief complaint of Altered mental status (10 Sep 2017 23:23)        SUBJECTIVE / OVERNIGHT EVENTS:  -Tele: NSR 80s      MEDICATIONS  (STANDING):  calcium acetate 667 milliGRAM(s) Oral three times a day with meals  predniSONE   Tablet 5 milliGRAM(s) Oral daily  tamsulosin 0.4 milliGRAM(s) Oral at bedtime  cycloSPORINE  (SandIMMUNE) 100 milliGRAM(s) Oral daily  atorvastatin 80 milliGRAM(s) Oral at bedtime  senna 2 Tablet(s) Oral at bedtime  docusate sodium 100 milliGRAM(s) Oral three times a day  epoetin valente Injectable 7000 Unit(s) IV Push <User Schedule>  diltiazem    milliGRAM(s) Oral daily  metoprolol 50 milliGRAM(s) Oral two times a day  insulin lispro (HumaLOG) corrective regimen sliding scale   SubCutaneous three times a day before meals  insulin lispro (HumaLOG) corrective regimen sliding scale   SubCutaneous at bedtime  dextrose 5%. 1000 milliLiter(s) (50 mL/Hr) IV Continuous <Continuous>  dextrose 50% Injectable 12.5 Gram(s) IV Push once  dextrose 50% Injectable 25 Gram(s) IV Push once  dextrose 50% Injectable 25 Gram(s) IV Push once  heparin  Infusion.  Unit(s)/Hr (16 mL/Hr) IV Continuous <Continuous>  insulin glargine Injectable (LANTUS) 23 Unit(s) SubCutaneous every morning  insulin lispro Injectable (HumaLOG) 9 Unit(s) SubCutaneous three times a day before meals    MEDICATIONS  (PRN):  naphazoline/pheniramine Solution 1 Drop(s) Both EYES three times a day PRN eye discomfort  dextrose Gel 1 Dose(s) Oral once PRN Blood Glucose LESS THAN 70 milliGRAM(s)/deciLiter  glucagon  Injectable 1 milliGRAM(s) IntraMuscular once PRN Glucose <70 milliGRAM(s)/deciLiter  heparin  Injectable 7000 Unit(s) IV Push every 6 hours PRN For aPTT less than 40  heparin  Injectable 3500 Unit(s) IV Push every 6 hours PRN For aPTT between 40 - 57        CAPILLARY BLOOD GLUCOSE  202 (22 Sep 2017 21:28)  205 (22 Sep 2017 17:03)  168 (22 Sep 2017 15:08)  219 (22 Sep 2017 09:53)  207 (22 Sep 2017 08:10)        I&O's Summary    22 Sep 2017 07:01  -  23 Sep 2017 06:48  --------------------------------------------------------  IN: 400 mL / OUT: 3000 mL / NET: -2600 mL        PHYSICAL EXAM  VS: T 98.4, HR 89, /80, RR 16, SpO2 99% on RA  GENERAL: NAD, well-developed  HEAD:  Atraumatic, Normocephalic  EYES: EOMI, conjunctiva and sclera clear. Legally blind but can see changes in light  NECK: Supple, No JVD  CHEST/LUNG: CTAB, No wheeze  HEART: Regular rate and rhythm; No murmurs, rubs, or gallops  ABDOMEN: Soft, Nontender, Nondistended; Bowel sounds present  EXTREMITIES: Feet warm, No clubbing, cyanosis. 2+ pitting edema to just below the knees  PSYCH: AAOx3  SKIN: No rashes or lesions    LABS:                        9.0    5.69  )-----------( 267      ( 22 Sep 2017 11:30 )             29.1     09-22    140  |  96<L>  |  28<H>  ----------------------------<  252<H>  4.0   |  28  |  2.06<H>    Ca    8.8      22 Sep 2017 11:30  Phos  2.2     09-22  Mg     1.7     09-22      PT/INR - ( 22 Sep 2017 06:00 )   PT: 12.4 SEC;   INR: 1.10          PTT - ( 22 Sep 2017 22:32 )  PTT:92.0 SEC          RADIOLOGY & ADDITIONAL TESTS:    BLE Duplex Venous U/S:  IMPRESSION:     No evidence of bilateral lower extremity deep venous thrombosis.    Left Baker's cyst. Patient is a 52y old  Male who presents with a chief complaint of Altered mental status (10 Sep 2017 23:23)        SUBJECTIVE / OVERNIGHT EVENTS:  -Tele: NSR 80s, many PACs, multiple missed beats, asymptomatic throughout  -FSBG 315 this AM      MEDICATIONS  (STANDING):  calcium acetate 667 milliGRAM(s) Oral three times a day with meals  predniSONE   Tablet 5 milliGRAM(s) Oral daily  tamsulosin 0.4 milliGRAM(s) Oral at bedtime  cycloSPORINE  (SandIMMUNE) 100 milliGRAM(s) Oral daily  atorvastatin 80 milliGRAM(s) Oral at bedtime  senna 2 Tablet(s) Oral at bedtime  docusate sodium 100 milliGRAM(s) Oral three times a day  epoetin valente Injectable 7000 Unit(s) IV Push <User Schedule>  diltiazem    milliGRAM(s) Oral daily  metoprolol 50 milliGRAM(s) Oral two times a day  insulin lispro (HumaLOG) corrective regimen sliding scale   SubCutaneous three times a day before meals  insulin lispro (HumaLOG) corrective regimen sliding scale   SubCutaneous at bedtime  dextrose 5%. 1000 milliLiter(s) (50 mL/Hr) IV Continuous <Continuous>  dextrose 50% Injectable 12.5 Gram(s) IV Push once  dextrose 50% Injectable 25 Gram(s) IV Push once  dextrose 50% Injectable 25 Gram(s) IV Push once  heparin  Infusion.  Unit(s)/Hr (16 mL/Hr) IV Continuous <Continuous>  insulin glargine Injectable (LANTUS) 23 Unit(s) SubCutaneous every morning  insulin lispro Injectable (HumaLOG) 9 Unit(s) SubCutaneous three times a day before meals    MEDICATIONS  (PRN):  naphazoline/pheniramine Solution 1 Drop(s) Both EYES three times a day PRN eye discomfort  dextrose Gel 1 Dose(s) Oral once PRN Blood Glucose LESS THAN 70 milliGRAM(s)/deciLiter  glucagon  Injectable 1 milliGRAM(s) IntraMuscular once PRN Glucose <70 milliGRAM(s)/deciLiter  heparin  Injectable 7000 Unit(s) IV Push every 6 hours PRN For aPTT less than 40  heparin  Injectable 3500 Unit(s) IV Push every 6 hours PRN For aPTT between 40 - 57        CAPILLARY BLOOD GLUCOSE  202 (22 Sep 2017 21:28)  205 (22 Sep 2017 17:03)  168 (22 Sep 2017 15:08)  219 (22 Sep 2017 09:53)  207 (22 Sep 2017 08:10)        I&O's Summary    22 Sep 2017 07:01  -  23 Sep 2017 06:48  --------------------------------------------------------  IN: 400 mL / OUT: 3000 mL / NET: -2600 mL        PHYSICAL EXAM  VS: T 98.4, HR 89, /80, RR 16, SpO2 99% on RA  GENERAL: NAD, well-developed  HEAD:  Atraumatic, Normocephalic  EYES: EOMI, conjunctiva and sclera clear. Legally blind but can see changes in light  NECK: Supple, No JVD  CHEST/LUNG: CTAB, No wheeze  HEART: Regular rate and rhythm; No murmurs, rubs, or gallops  ABDOMEN: Soft, Nontender, Nondistended; Bowel sounds present  EXTREMITIES: Feet warm, No clubbing, cyanosis. 2+ pitting edema to just below the knees  PSYCH: AAOx3  SKIN: No rashes or lesions    LABS:                        9.0    5.69  )-----------( 267      ( 22 Sep 2017 11:30 )             29.1     09-22    140  |  96<L>  |  28<H>  ----------------------------<  252<H>  4.0   |  28  |  2.06<H>    Ca    8.8      22 Sep 2017 11:30  Phos  2.2     09-22  Mg     1.7     09-22      PT/INR - ( 22 Sep 2017 06:00 )   PT: 12.4 SEC;   INR: 1.10          PTT - ( 22 Sep 2017 22:32 )  PTT:92.0 SEC          RADIOLOGY & ADDITIONAL TESTS:    BLE Duplex Venous U/S:  IMPRESSION:     No evidence of bilateral lower extremity deep venous thrombosis.    Left Baker's cyst. Patient is a 52y old  Male who presents with a chief complaint of Altered mental status (10 Sep 2017 23:23)        SUBJECTIVE / OVERNIGHT EVENTS:  -Tele: NSR 80s  -FSBG 315 this AM      MEDICATIONS  (STANDING):  calcium acetate 667 milliGRAM(s) Oral three times a day with meals  predniSONE   Tablet 5 milliGRAM(s) Oral daily  tamsulosin 0.4 milliGRAM(s) Oral at bedtime  cycloSPORINE  (SandIMMUNE) 100 milliGRAM(s) Oral daily  atorvastatin 80 milliGRAM(s) Oral at bedtime  senna 2 Tablet(s) Oral at bedtime  docusate sodium 100 milliGRAM(s) Oral three times a day  epoetin valente Injectable 7000 Unit(s) IV Push <User Schedule>  diltiazem    milliGRAM(s) Oral daily  metoprolol 50 milliGRAM(s) Oral two times a day  insulin lispro (HumaLOG) corrective regimen sliding scale   SubCutaneous three times a day before meals  insulin lispro (HumaLOG) corrective regimen sliding scale   SubCutaneous at bedtime  dextrose 5%. 1000 milliLiter(s) (50 mL/Hr) IV Continuous <Continuous>  dextrose 50% Injectable 12.5 Gram(s) IV Push once  dextrose 50% Injectable 25 Gram(s) IV Push once  dextrose 50% Injectable 25 Gram(s) IV Push once  heparin  Infusion.  Unit(s)/Hr (16 mL/Hr) IV Continuous <Continuous>  insulin glargine Injectable (LANTUS) 23 Unit(s) SubCutaneous every morning  insulin lispro Injectable (HumaLOG) 9 Unit(s) SubCutaneous three times a day before meals    MEDICATIONS  (PRN):  naphazoline/pheniramine Solution 1 Drop(s) Both EYES three times a day PRN eye discomfort  dextrose Gel 1 Dose(s) Oral once PRN Blood Glucose LESS THAN 70 milliGRAM(s)/deciLiter  glucagon  Injectable 1 milliGRAM(s) IntraMuscular once PRN Glucose <70 milliGRAM(s)/deciLiter  heparin  Injectable 7000 Unit(s) IV Push every 6 hours PRN For aPTT less than 40  heparin  Injectable 3500 Unit(s) IV Push every 6 hours PRN For aPTT between 40 - 57        CAPILLARY BLOOD GLUCOSE  202 (22 Sep 2017 21:28)  205 (22 Sep 2017 17:03)  168 (22 Sep 2017 15:08)  219 (22 Sep 2017 09:53)  207 (22 Sep 2017 08:10)        I&O's Summary    22 Sep 2017 07:01  -  23 Sep 2017 06:48  --------------------------------------------------------  IN: 400 mL / OUT: 3000 mL / NET: -2600 mL        PHYSICAL EXAM  VS: T 98.4, HR 89, /80, RR 16, SpO2 99% on RA  GENERAL: NAD, well-developed  HEAD:  Atraumatic, Normocephalic  EYES: EOMI, conjunctiva and sclera clear. Legally blind but can see changes in light  NECK: Supple, No JVD  CHEST/LUNG: CTAB, No wheeze  HEART: Regular rate and rhythm; No murmurs, rubs, or gallops  ABDOMEN: Soft, Nontender, Nondistended; Bowel sounds present  EXTREMITIES: Feet warm, No clubbing, cyanosis. 2+ pitting edema to just below the knees  PSYCH: AAOx3  SKIN: No rashes or lesions    LABS:                        9.0    5.69  )-----------( 267      ( 22 Sep 2017 11:30 )             29.1     09-22    140  |  96<L>  |  28<H>  ----------------------------<  252<H>  4.0   |  28  |  2.06<H>    Ca    8.8      22 Sep 2017 11:30  Phos  2.2     09-22  Mg     1.7     09-22      PT/INR - ( 22 Sep 2017 06:00 )   PT: 12.4 SEC;   INR: 1.10          PTT - ( 22 Sep 2017 22:32 )  PTT:92.0 SEC          RADIOLOGY & ADDITIONAL TESTS:    BLE Duplex Venous U/S:  IMPRESSION:     No evidence of bilateral lower extremity deep venous thrombosis.    Left Baker's cyst.

## 2017-09-23 NOTE — PROGRESS NOTE ADULT - PROBLEM SELECTOR PLAN 5
-home regimen clonidine 0.1mg po q8h, nifedipine XR 90 mg qD, Turosemide 10 mg daily - holding for now  -c/w Cardizem and metoprolol

## 2017-09-23 NOTE — PROGRESS NOTE ADULT - ATTENDING COMMENTS
Patient seen and examined, chart/lab reviewed.  Pt is a 51 y/o male with failed renal transplant (living related renal transplant 1998 at [facility]), HTN, DM-2, legally blind, PAF, now dialysis dependent, s/p LIJ permacath and plan for AVF creation.    Pt feels ok, denies chest pain/sob.   PE: lungs clear, heart regular, abdomen soft; extrem: 3+ leg edema b/l    Assessment/plan:  # ESRD due to failed renal transplant: off cellcept, on cyclosporoine/prednisone, s/p permacath, plan for AVF creation next wk, f/u vascular surgery; HD as scheduled, needs to remove more fluids; needs outpt HD setup prior to discharge  # PAF: currently in SR, c/w heparin drip, rate controlled on cardizem and lopressor, f/u cardiology  # DM-2: insulin regimen per endo  # Anemia of CKD: monitor CBC, no indication for transfusion  # H/o chronic HCV: outpt f/u with hepatology for tx

## 2017-09-23 NOTE — PROGRESS NOTE ADULT - PROBLEM SELECTOR PLAN 1
-self-converted to NSR last night  -currently on PO Cardizem, metoprolol 50 bid w/ hold parameters   -holding coumadin as pt. is planned for AVF next week  -c/w hep gtt  -appreciate EP recs  - TTE w/ EF 65%  -continue lopressor and cardizem when NPO and before HD sessions given he has tendency to go into afib w/ RVR if he misses doses  - cardiology clearance reviewed in sunrise for AVF

## 2017-09-23 NOTE — PROGRESS NOTE ADULT - SUBJECTIVE AND OBJECTIVE BOX
Chief Complaint:  Hyperglycemia    History:  Pt doing well, denies any complaints. Good appetite. This AM had peaches, FS in 300s.    MEDICATIONS  (STANDING):  calcium acetate 667 milliGRAM(s) Oral three times a day with meals  predniSONE   Tablet 5 milliGRAM(s) Oral daily  tamsulosin 0.4 milliGRAM(s) Oral at bedtime  cycloSPORINE  (SandIMMUNE) 100 milliGRAM(s) Oral daily  atorvastatin 80 milliGRAM(s) Oral at bedtime  senna 2 Tablet(s) Oral at bedtime  docusate sodium 100 milliGRAM(s) Oral three times a day  epoetin valente Injectable 7000 Unit(s) IV Push <User Schedule>  diltiazem    milliGRAM(s) Oral daily  metoprolol 50 milliGRAM(s) Oral two times a day  insulin lispro (HumaLOG) corrective regimen sliding scale   SubCutaneous three times a day before meals  insulin lispro (HumaLOG) corrective regimen sliding scale   SubCutaneous at bedtime  dextrose 5%. 1000 milliLiter(s) (50 mL/Hr) IV Continuous <Continuous>  dextrose 50% Injectable 12.5 Gram(s) IV Push once  dextrose 50% Injectable 25 Gram(s) IV Push once  dextrose 50% Injectable 25 Gram(s) IV Push once  heparin  Infusion.  Unit(s)/Hr (16 mL/Hr) IV Continuous <Continuous>  insulin lispro Injectable (HumaLOG) 9 Unit(s) SubCutaneous three times a day before meals  insulin glargine Injectable (LANTUS) 25 Unit(s) SubCutaneous every morning    MEDICATIONS  (PRN):  naphazoline/pheniramine Solution 1 Drop(s) Both EYES three times a day PRN eye discomfort  dextrose Gel 1 Dose(s) Oral once PRN Blood Glucose LESS THAN 70 milliGRAM(s)/deciLiter  glucagon  Injectable 1 milliGRAM(s) IntraMuscular once PRN Glucose <70 milliGRAM(s)/deciLiter  heparin  Injectable 7000 Unit(s) IV Push every 6 hours PRN For aPTT less than 40  heparin  Injectable 3500 Unit(s) IV Push every 6 hours PRN For aPTT between 40 - 57      Allergies    No Known Allergies    Review of Systems:  Constitutional: No fever  Cardiovascular: No chest pain, palpitations  Respiratory: No SOB, no cough  GI: No nausea, vomiting, abdominal pain    ALL OTHER SYSTEMS REVIEWED AND NEGATIVE    PHYSICAL EXAM:  VITALS: T(C): 36.9 (09-23-17 @ 05:06)  T(F): 98.4 (09-23-17 @ 05:06), Max: 98.7 (09-22-17 @ 15:29)  HR: 89 (09-23-17 @ 05:06) (82 - 89)  BP: 143/80 (09-23-17 @ 05:06) (135/79 - 167/83)  RR:  (16 - 18)  SpO2:  (98% - 99%)  Wt(kg): --  GENERAL: NAD  RESPIRATORY: Clear to auscultation bilaterally; No rales, rhonchi, wheezing, or rubs  CARDIOVASCULAR: Regular rate and rhythm; No murmurs; no peripheral edema  GI: Soft, nontender, non distended, normal bowel sounds  SKIN: + permacath    APILLARY BLOOD GLUCOSE  239 (09-23 @ 12:51)  315 (09-23 @ 09:16)  202 (09-22 @ 21:28)  205 (09-22 @ 17:03)  168 (09-22 @ 15:08)  219 (09-22 @ 09:53)  207 (09-22 @ 08:10)  311 (09-22 @ 05:15)  291 (09-21 @ 22:47)  344 (09-21 @ 16:45)  424 (09-21 @ 14:14)  472 (09-21 @ 12:10)  510 (09-21 @ 09:53)  552 (09-21 @ 06:50)  222 (09-20 @ 23:03)  218 (09-20 @ 16:45)      09-22    140  |  96<L>  |  28<H>  ----------------------------<  252<H>  4.0   |  28  |  2.06<H>    EGFR if : 42  EGFR if non : 36    Ca    8.8      09-22  Mg     1.7     09-22  Phos  2.2     09-22    Thyroid Function Tests:  09-03 @ 13:45 TSH 2.88 FreeT4 0.70 T3 71.7 Anti TPO -- Anti Thyroglobulin Ab -- TSI --    Hemoglobin A1C, Whole Blood: 8.0 % <H> [4.0 - 5.6] (08-16-17 @ 06:30)

## 2017-09-24 LAB
APTT BLD: 57.9 SEC — HIGH (ref 27.5–37.4)
APTT BLD: 87.2 SEC — HIGH (ref 27.5–37.4)
BASOPHILS # BLD AUTO: 0.05 K/UL — SIGNIFICANT CHANGE UP (ref 0–0.2)
BASOPHILS NFR BLD AUTO: 0.8 % — SIGNIFICANT CHANGE UP (ref 0–2)
BUN SERPL-MCNC: 45 MG/DL — HIGH (ref 7–23)
CALCIUM SERPL-MCNC: 8 MG/DL — LOW (ref 8.4–10.5)
CHLORIDE SERPL-SCNC: 96 MMOL/L — LOW (ref 98–107)
CO2 SERPL-SCNC: 26 MMOL/L — SIGNIFICANT CHANGE UP (ref 22–31)
CREAT SERPL-MCNC: 2.83 MG/DL — HIGH (ref 0.5–1.3)
EOSINOPHIL # BLD AUTO: 0.13 K/UL — SIGNIFICANT CHANGE UP (ref 0–0.5)
EOSINOPHIL NFR BLD AUTO: 2.2 % — SIGNIFICANT CHANGE UP (ref 0–6)
GLUCOSE SERPL-MCNC: 303 MG/DL — HIGH (ref 70–99)
HCT VFR BLD CALC: 25.5 % — LOW (ref 39–50)
HCT VFR BLD CALC: 25.5 % — LOW (ref 39–50)
HGB BLD-MCNC: 8.1 G/DL — LOW (ref 13–17)
HGB BLD-MCNC: 8.1 G/DL — LOW (ref 13–17)
IMM GRANULOCYTES # BLD AUTO: 0.15 # — SIGNIFICANT CHANGE UP
IMM GRANULOCYTES NFR BLD AUTO: 2.5 % — HIGH (ref 0–1.5)
LYMPHOCYTES # BLD AUTO: 0.97 K/UL — LOW (ref 1–3.3)
LYMPHOCYTES # BLD AUTO: 16.3 % — SIGNIFICANT CHANGE UP (ref 13–44)
MAGNESIUM SERPL-MCNC: 1.7 MG/DL — SIGNIFICANT CHANGE UP (ref 1.6–2.6)
MCHC RBC-ENTMCNC: 28.4 PG — SIGNIFICANT CHANGE UP (ref 27–34)
MCHC RBC-ENTMCNC: 28.4 PG — SIGNIFICANT CHANGE UP (ref 27–34)
MCHC RBC-ENTMCNC: 31.8 % — LOW (ref 32–36)
MCHC RBC-ENTMCNC: 31.8 % — LOW (ref 32–36)
MCV RBC AUTO: 89.5 FL — SIGNIFICANT CHANGE UP (ref 80–100)
MCV RBC AUTO: 89.5 FL — SIGNIFICANT CHANGE UP (ref 80–100)
MONOCYTES # BLD AUTO: 0.85 K/UL — SIGNIFICANT CHANGE UP (ref 0–0.9)
MONOCYTES NFR BLD AUTO: 14.3 % — HIGH (ref 2–14)
NEUTROPHILS # BLD AUTO: 3.8 K/UL — SIGNIFICANT CHANGE UP (ref 1.8–7.4)
NEUTROPHILS NFR BLD AUTO: 63.9 % — SIGNIFICANT CHANGE UP (ref 43–77)
NRBC # FLD: 0 — SIGNIFICANT CHANGE UP
NRBC # FLD: 0 — SIGNIFICANT CHANGE UP
PHOSPHATE SERPL-MCNC: 3.7 MG/DL — SIGNIFICANT CHANGE UP (ref 2.5–4.5)
PLATELET # BLD AUTO: 164 K/UL — SIGNIFICANT CHANGE UP (ref 150–400)
PLATELET # BLD AUTO: 164 K/UL — SIGNIFICANT CHANGE UP (ref 150–400)
PMV BLD: 9.5 FL — SIGNIFICANT CHANGE UP (ref 7–13)
PMV BLD: 9.5 FL — SIGNIFICANT CHANGE UP (ref 7–13)
POTASSIUM SERPL-MCNC: 4.1 MMOL/L — SIGNIFICANT CHANGE UP (ref 3.5–5.3)
POTASSIUM SERPL-SCNC: 4.1 MMOL/L — SIGNIFICANT CHANGE UP (ref 3.5–5.3)
RBC # BLD: 2.85 M/UL — LOW (ref 4.2–5.8)
RBC # BLD: 2.85 M/UL — LOW (ref 4.2–5.8)
RBC # FLD: 13.5 % — SIGNIFICANT CHANGE UP (ref 10.3–14.5)
RBC # FLD: 13.5 % — SIGNIFICANT CHANGE UP (ref 10.3–14.5)
SODIUM SERPL-SCNC: 133 MMOL/L — LOW (ref 135–145)
WBC # BLD: 5.95 K/UL — SIGNIFICANT CHANGE UP (ref 3.8–10.5)
WBC # BLD: 5.95 K/UL — SIGNIFICANT CHANGE UP (ref 3.8–10.5)
WBC # FLD AUTO: 5.95 K/UL — SIGNIFICANT CHANGE UP (ref 3.8–10.5)
WBC # FLD AUTO: 5.95 K/UL — SIGNIFICANT CHANGE UP (ref 3.8–10.5)

## 2017-09-24 PROCEDURE — 99233 SBSQ HOSP IP/OBS HIGH 50: CPT | Mod: GC

## 2017-09-24 PROCEDURE — 99232 SBSQ HOSP IP/OBS MODERATE 35: CPT | Mod: GC

## 2017-09-24 RX ORDER — INSULIN LISPRO 100/ML
2 VIAL (ML) SUBCUTANEOUS AT BEDTIME
Qty: 0 | Refills: 0 | Status: DISCONTINUED | OUTPATIENT
Start: 2017-09-24 | End: 2017-09-28

## 2017-09-24 RX ORDER — INSULIN LISPRO 100/ML
10 VIAL (ML) SUBCUTANEOUS
Qty: 0 | Refills: 0 | Status: DISCONTINUED | OUTPATIENT
Start: 2017-09-24 | End: 2017-10-01

## 2017-09-24 RX ORDER — INSULIN GLARGINE 100 [IU]/ML
22 INJECTION, SOLUTION SUBCUTANEOUS EVERY MORNING
Qty: 0 | Refills: 0 | Status: DISCONTINUED | OUTPATIENT
Start: 2017-09-24 | End: 2017-09-26

## 2017-09-24 RX ORDER — INSULIN LISPRO 100/ML
9 VIAL (ML) SUBCUTANEOUS
Qty: 0 | Refills: 0 | Status: DISCONTINUED | OUTPATIENT
Start: 2017-09-24 | End: 2017-10-01

## 2017-09-24 RX ADMIN — Medication 50 MILLIGRAM(S): at 05:23

## 2017-09-24 RX ADMIN — Medication 9 UNIT(S): at 18:23

## 2017-09-24 RX ADMIN — ATORVASTATIN CALCIUM 80 MILLIGRAM(S): 80 TABLET, FILM COATED ORAL at 21:35

## 2017-09-24 RX ADMIN — Medication 5: at 09:46

## 2017-09-24 RX ADMIN — Medication 5 MILLIGRAM(S): at 05:23

## 2017-09-24 RX ADMIN — Medication 50 MILLIGRAM(S): at 17:21

## 2017-09-24 RX ADMIN — HEPARIN SODIUM 1800 UNIT(S)/HR: 5000 INJECTION INTRAVENOUS; SUBCUTANEOUS at 12:59

## 2017-09-24 RX ADMIN — Medication 300 MILLIGRAM(S): at 05:23

## 2017-09-24 RX ADMIN — Medication 1 DROP(S): at 01:35

## 2017-09-24 RX ADMIN — HEPARIN SODIUM 1800 UNIT(S)/HR: 5000 INJECTION INTRAVENOUS; SUBCUTANEOUS at 05:49

## 2017-09-24 RX ADMIN — TAMSULOSIN HYDROCHLORIDE 0.4 MILLIGRAM(S): 0.4 CAPSULE ORAL at 21:35

## 2017-09-24 RX ADMIN — Medication 2 UNIT(S): at 21:40

## 2017-09-24 RX ADMIN — CYCLOSPORINE 100 MILLIGRAM(S): 100 CAPSULE ORAL at 13:37

## 2017-09-24 RX ADMIN — INSULIN GLARGINE 23 UNIT(S): 100 INJECTION, SOLUTION SUBCUTANEOUS at 09:45

## 2017-09-24 RX ADMIN — Medication 9 UNIT(S): at 09:43

## 2017-09-24 RX ADMIN — Medication 6: at 13:36

## 2017-09-24 RX ADMIN — Medication 9 UNIT(S): at 13:35

## 2017-09-24 RX ADMIN — Medication: at 18:21

## 2017-09-24 NOTE — PROGRESS NOTE ADULT - PROBLEM SELECTOR PLAN 1
- patient hyperglycemic this AM because of overcorrection and bedtime snack  - lower Lantus to 22u qAM  - change Humalog to 10/9/9  - add bedtime snack humalog 2u  - Continue Humalog low correction scales before meals and QHS.    D/c with basal/bolus dose TBD  Outpatient endocrine follow up: appointment pending with Dr Baker  - patient hyperglycemic this AM because of overcorrection and bedtime snack  - lower Lantus to 22u qAM  - change Humalog to 10/9/9  - check 2am glucose to see if fasting glucose is rebound hyperglycemia  - add bedtime snack humalog 2u if eating  - Continue Humalog low correction scales before meals and QHS.    D/c with basal/bolus dose TBD  Outpatient endocrine follow up: appointment pending with Dr Baker

## 2017-09-24 NOTE — PROGRESS NOTE ADULT - PROBLEM SELECTOR PLAN 1
-self-converted to NSR yesterday  -currently on PO Cardizem, metoprolol 50 bid w/ hold parameters   -holding coumadin as pt. is planned for AVF next week  -c/w hep gtt  -appreciate EP recs  - TTE w/ EF 65%  -continue lopressor and cardizem when NPO and before HD sessions given he has tendency to go into afib w/ RVR if he misses doses  - cardiology [place]

## 2017-09-24 NOTE — PROGRESS NOTE ADULT - ASSESSMENT
52M with h/o CKD s/p renal transplant (1998) on cyclosporine and CellCept, HTN, DM, legally blind BIBEMS for uremic encephalopathy. Pt became bradycardic and had PEA arrest in the ED, intubated for airway protection. ROSC achieved s/p 1 round of epi and chest compressions and pt was transferred to MICU. Immunosuppressants for renal transplant were held. Pt had emergent HD for hyperkalemia. Covered empirically with broad spectrum antibiotics (vanc and zosyn), Hgb 5 s/p 1u pRBC given and epogen was started. Pressors were weaned off and pt extubated 9/4. Cyclosporine restarted and prednisone started. Pt remains stable and transferred to floor for further management 9/5, now found to have new-onset AFibg. Also s/p permacath w/ plan for AVF. AVF canceled due to hyperglycemia, plans to try again this coming week.

## 2017-09-24 NOTE — PROGRESS NOTE ADULT - PROBLEM SELECTOR PLAN 1
ESRD on HD MWF. Last HD was on 9/22. Pt. tolerated HD well without any complications with 2.6 L of fluid removed. RIJ HD catheter functioning well. Currently pt. not SOB. Will arrange for HD tomorrow. Monitor BP and labs.

## 2017-09-24 NOTE — PROGRESS NOTE ADULT - PROBLEM SELECTOR PLAN 9
Diabetic retinopathy: Legally blind, lives with sister  -keep room bright, can see shapes if bright light  BPH: cont home tamsulosin  DVT ppx: heparin gtt  Diet: consistent carb no snack/renal diet    Giovanni Dempsey MD  Resident Physician  Emergency Medicine &  Internal Medicine  PGY-2  Pager: [phone]    After 7PM please page [phone]

## 2017-09-24 NOTE — PROGRESS NOTE ADULT - ATTENDING COMMENTS
Patient seen and examined, chart/lab reviewed.  Pt is a 53 y/o male with failed renal transplant (living related renal transplant 1998 at [facility]), HTN, DM-2, legally blind, PAF, now dialysis dependent, s/p RIJ permacath and plan for AVF creation.    Pt feels ok, denies chest pain/sob.   PE: lungs clear, heart regular, abdomen soft; extrem: 3+ leg edema b/l    Assessment/plan:  # ESRD due to failed renal transplant: off cellcept, on cyclosporoine/prednisone, s/p RIJ permacath, plan for AVF creation next wk (oupt dialysis center requesting AVF creation prior to discharge), f/u vascular surgery; HD as scheduled, needs to remove more fluids; outpt HD setup, f/u SW  # PAF: currently in SR, c/w heparin drip, rate controlled on cardizem and lopressor, f/u cardiology  # DM-2: insulin regimen per endo  # Anemia of CKD: monitor CBC, no indication for transfusion  # H/o chronic HCV: outpt f/u with hepatology for tx

## 2017-09-24 NOTE — PROGRESS NOTE ADULT - SUBJECTIVE AND OBJECTIVE BOX
Chief Complaint: T1DM    History:  Overnight patient was hypoglycemic, drank 4 juices and then ate a glucerna bar at midnight. This AM woke up high in 300s. No complaints.    MEDICATIONS  (STANDING):  predniSONE   Tablet 5 milliGRAM(s) Oral daily  tamsulosin 0.4 milliGRAM(s) Oral at bedtime  cycloSPORINE  (SandIMMUNE) 100 milliGRAM(s) Oral daily  atorvastatin 80 milliGRAM(s) Oral at bedtime  senna 2 Tablet(s) Oral at bedtime  docusate sodium 100 milliGRAM(s) Oral three times a day  epoetin valente Injectable 7000 Unit(s) IV Push <User Schedule>  diltiazem    milliGRAM(s) Oral daily  metoprolol 50 milliGRAM(s) Oral two times a day  insulin lispro (HumaLOG) corrective regimen sliding scale   SubCutaneous three times a day before meals  insulin lispro (HumaLOG) corrective regimen sliding scale   SubCutaneous at bedtime  dextrose 5%. 1000 milliLiter(s) (50 mL/Hr) IV Continuous <Continuous>  dextrose 50% Injectable 12.5 Gram(s) IV Push once  dextrose 50% Injectable 25 Gram(s) IV Push once  dextrose 50% Injectable 25 Gram(s) IV Push once  heparin  Infusion.  Unit(s)/Hr (16 mL/Hr) IV Continuous <Continuous>  insulin lispro Injectable (HumaLOG) 9 Unit(s) SubCutaneous three times a day before meals  insulin glargine Injectable (LANTUS) 23 Unit(s) SubCutaneous every morning    MEDICATIONS  (PRN):  naphazoline/pheniramine Solution 1 Drop(s) Both EYES three times a day PRN eye discomfort  dextrose Gel 1 Dose(s) Oral once PRN Blood Glucose LESS THAN 70 milliGRAM(s)/deciLiter  glucagon  Injectable 1 milliGRAM(s) IntraMuscular once PRN Glucose <70 milliGRAM(s)/deciLiter  heparin  Injectable 7000 Unit(s) IV Push every 6 hours PRN For aPTT less than 40  heparin  Injectable 3500 Unit(s) IV Push every 6 hours PRN For aPTT between 40 - 57      Allergies    No Known Allergies    Review of Systems:  Constitutional: No fever  Cardiovascular: No chest pain, palpitations  Respiratory: No SOB, no cough  GI: No nausea, vomiting, abdominal pain    ALL OTHER SYSTEMS REVIEWED AND NEGATIVE    PHYSICAL EXAM:  VITALS: T(C): 36.7 (09-24-17 @ 15:28)  T(F): 98.1 (09-24-17 @ 15:28), Max: 98.4 (09-24-17 @ 05:21)  HR: 90 (09-24-17 @ 15:28) (78 - 97)  BP: 176/94 (09-24-17 @ 15:28) (158/88 - 189/94)  RR:  (18 - 18)  SpO2:  (97% - 100%)  Wt(kg): --  GENERAL: NAD  RESPIRATORY: Clear to auscultation bilaterally; No rales, rhonchi, wheezing, or rubs  CARDIOVASCULAR: Regular rate and rhythm; No murmurs; no peripheral edema  GI: Soft, nontender, non distended, normal bowel sounds    CAPILLARY BLOOD GLUCOSE  332 (09-24 @ 15:13)  460 (09-24 @ 13:18)  391 (09-24 @ 09:08)  106 (09-23 @ 23:15)  86 (09-23 @ 23:00)  69 (09-23 @ 22:45)  74 (09-23 @ 17:27)  239 (09-23 @ 12:51)  315 (09-23 @ 09:16)  202 (09-22 @ 21:28)  205 (09-22 @ 17:03)  168 (09-22 @ 15:08)  219 (09-22 @ 09:53)  207 (09-22 @ 08:10)  311 (09-22 @ 05:15)  291 (09-21 @ 22:47)  344 (09-21 @ 16:45)      09-24    133<L>  |  96<L>  |  45<H>  ----------------------------<  303<H>  4.1   |  26  |  2.83<H>    EGFR if : 28  EGFR if non : 24    Ca    8.0<L>      09-24  Mg     1.7     09-24  Phos  3.7     09-24            Thyroid Function Tests:  09-03 @ 13:45 TSH 2.88 FreeT4 0.70 T3 71.7 Anti TPO -- Anti Thyroglobulin Ab -- TSI --      Hemoglobin A1C, Whole Blood: 8.0 % <H> [4.0 - 5.6] (08-16-17 @ 06:30)

## 2017-09-24 NOTE — PROGRESS NOTE ADULT - ASSESSMENT
51 y/o M with h/o CKD s/p kidney transplant in 1998, HTN, DM, legally blind admitted for uremic encephalopathy initiated on dialysis.

## 2017-09-24 NOTE — PROGRESS NOTE ADULT - PROBLEM SELECTOR PLAN 6
Home regime: lantus 22u at home, home pravastatin 10mg daily.  -HbA1C 8  - this AM  -Endo follow up appreciated.   -will give lantus 23 units in AM and humalog 9/9/9 units  -low ISS  -FSBG q6h   -atorvastatin 80mg daily

## 2017-09-24 NOTE — PROGRESS NOTE ADULT - PROBLEM SELECTOR PLAN 2
-c/w HD per renal MWF  -permacath in place - AVF with vascular Dr. Staples, possibly this coming week.  -Renal f/u

## 2017-09-24 NOTE — PROGRESS NOTE ADULT - ATTENDING COMMENTS
I was present during and reviewed clinical and lab data as well as assessment and plan as documented by the house staff as noted. Please contact if any additional questions with any change in clinical condition or on availability of any additional information or reports.

## 2017-09-24 NOTE — PROGRESS NOTE ADULT - SUBJECTIVE AND OBJECTIVE BOX
[facility] DIVISION OF KIDNEY DISEASES AND HYPERTENSION -- FOLLOW UP NOTE  --------------------------------------------------------------------------------  HPI: This is a 53 y/o M with h/o CKD s/p kidney transplant in 1998, HTN, DM, legally blind admitted for uremic encephalopathy initiated on dialysis. Pt. last HD was on 9/22. Pt. tolerated HD well without complications. Currently pt. denies any CP, SOB, HA or dizziness.       PAST HISTORY  --------------------------------------------------------------------------------  No significant changes to PMH, PSH, FHx, SHx, unless otherwise noted    ALLERGIES & MEDICATIONS  --------------------------------------------------------------------------------  Allergies    No Known Allergies    Intolerances      Standing Inpatient Medications  calcium acetate 667 milliGRAM(s) Oral three times a day with meals  predniSONE   Tablet 5 milliGRAM(s) Oral daily  tamsulosin 0.4 milliGRAM(s) Oral at bedtime  cycloSPORINE  (SandIMMUNE) 100 milliGRAM(s) Oral daily  atorvastatin 80 milliGRAM(s) Oral at bedtime  senna 2 Tablet(s) Oral at bedtime  docusate sodium 100 milliGRAM(s) Oral three times a day  epoetin valente Injectable 7000 Unit(s) IV Push <User Schedule>  diltiazem    milliGRAM(s) Oral daily  metoprolol 50 milliGRAM(s) Oral two times a day  insulin lispro (HumaLOG) corrective regimen sliding scale   SubCutaneous three times a day before meals  insulin lispro (HumaLOG) corrective regimen sliding scale   SubCutaneous at bedtime  dextrose 5%. 1000 milliLiter(s) IV Continuous <Continuous>  dextrose 50% Injectable 12.5 Gram(s) IV Push once  dextrose 50% Injectable 25 Gram(s) IV Push once  dextrose 50% Injectable 25 Gram(s) IV Push once  heparin  Infusion.  Unit(s)/Hr IV Continuous <Continuous>  insulin lispro Injectable (HumaLOG) 9 Unit(s) SubCutaneous three times a day before meals  insulin glargine Injectable (LANTUS) 23 Unit(s) SubCutaneous every morning    PRN Inpatient Medications  naphazoline/pheniramine Solution 1 Drop(s) Both EYES three times a day PRN  dextrose Gel 1 Dose(s) Oral once PRN  glucagon  Injectable 1 milliGRAM(s) IntraMuscular once PRN  heparin  Injectable 7000 Unit(s) IV Push every 6 hours PRN  heparin  Injectable 3500 Unit(s) IV Push every 6 hours PRN      REVIEW OF SYSTEMS  --------------------------------------------------------------------------------  Gen: No fevers/chills  Respiratory: No dyspnea  CV: No chest pain  GI: No abdominal pain  MSK: Bilateral LE swelling+  Neuro: No dizziness/lightheadedness    All other systems were reviewed and are negative, except as noted.    VITALS/PHYSICAL EXAM  --------------------------------------------------------------------------------  T(C): 36.9 (09-24-17 @ 05:21), Max: 36.9 (09-24-17 @ 05:21)  HR: 97 (09-24-17 @ 05:21) (78 - 97)  BP: 179/94 (09-24-17 @ 05:21) (156/90 - 189/94)  RR: 18 (09-24-17 @ 05:21) (18 - 18)  SpO2: 100% (09-24-17 @ 05:21) (100% - 100%)  Wt(kg): --        09-23-17 @ 07:01  -  09-24-17 @ 07:00  --------------------------------------------------------  IN: 720 mL / OUT: 900 mL / NET: -180 mL      Physical Exam:  	Gen: well-appearing  	Pulm: CTA B/L  	CV: RRR, S1S2; no rub  	Abd: +BS, soft  	LE: bilateral LE pitting edema present  	Vascular access: Right IJ HD catheter site: no bleeding.     LABS/STUDIES  --------------------------------------------------------------------------------              8.1    5.95  >-----------<  164      [09-24-17 @ 04:40]              25.5     133  |  96  |  45  ----------------------------<  303      [09-24-17 @ 04:40]  4.1   |  26  |  2.83        Ca     8.0     [09-24-17 @ 04:40]      Mg     1.7     [09-24-17 @ 04:40]      Phos  3.7     [09-24-17 @ 04:40]        PTT: 87.2       [09-24-17 @ 04:40]      Creatinine Trend:  SCr 2.83 [09-24 @ 04:40]  SCr 2.06 [09-22 @ 11:30]  SCr 3.26 [09-22 @ 06:00]  SCr 3.28 [09-21 @ 13:45]  SCr 3.32 [09-21 @ 11:30]        Iron 21, TIBC 187, %sat --      [09-09-17 @ 06:55]  Ferritin 587.5      [09-09-17 @ 06:55]  .9 (Ca --)      [09-10-17 @ 05:10]   --  HbA1c 8.0      [08-16-17 @ 06:30]  TSH 2.88      [09-03-17 @ 13:45]  Lipid: chol 137, TG 97, HDL 52, LDL 68      [08-11-17 @ 07:20]    HBsAb <3.0      [09-03-17 @ 18:00]  HBsAg NEGATIVE      [09-03-17 @ 18:00]  HCV 14.48, Reactive      [09-03-17 @ 18:00] [facility] DIVISION OF KIDNEY DISEASES AND HYPERTENSION -- FOLLOW UP NOTE  --------------------------------------------------------------------------------  HPI: This is a 53 y/o M with h/o CKD s/p kidney transplant in 1998, HTN, DM, legally blind admitted for uremic encephalopathy initiated on dialysis. Pt. last HD was on 9/22. Pt. tolerated HD well without complications. Currently pt. denies any CP, SOB, HA or dizziness.       PAST HISTORY  --------------------------------------------------------------------------------  No significant changes to PMH, PSH, FHx, SHx, unless otherwise noted    ALLERGIES & MEDICATIONS  --------------------------------------------------------------------------------  Allergies    No Known Allergies    Intolerances      Standing Inpatient Medications  calcium acetate 667 milliGRAM(s) Oral three times a day with meals  predniSONE   Tablet 5 milliGRAM(s) Oral daily  tamsulosin 0.4 milliGRAM(s) Oral at bedtime  cycloSPORINE  (SandIMMUNE) 100 milliGRAM(s) Oral daily  atorvastatin 80 milliGRAM(s) Oral at bedtime  senna 2 Tablet(s) Oral at bedtime  docusate sodium 100 milliGRAM(s) Oral three times a day  epoetin valente Injectable 7000 Unit(s) IV Push <User Schedule>  diltiazem    milliGRAM(s) Oral daily  metoprolol 50 milliGRAM(s) Oral two times a day  insulin lispro (HumaLOG) corrective regimen sliding scale   SubCutaneous three times a day before meals  insulin lispro (HumaLOG) corrective regimen sliding scale   SubCutaneous at bedtime  dextrose 5%. 1000 milliLiter(s) IV Continuous <Continuous>  dextrose 50% Injectable 12.5 Gram(s) IV Push once  dextrose 50% Injectable 25 Gram(s) IV Push once  dextrose 50% Injectable 25 Gram(s) IV Push once  heparin  Infusion.  Unit(s)/Hr IV Continuous <Continuous>  insulin lispro Injectable (HumaLOG) 9 Unit(s) SubCutaneous three times a day before meals  insulin glargine Injectable (LANTUS) 23 Unit(s) SubCutaneous every morning    PRN Inpatient Medications  naphazoline/pheniramine Solution 1 Drop(s) Both EYES three times a day PRN  dextrose Gel 1 Dose(s) Oral once PRN  glucagon  Injectable 1 milliGRAM(s) IntraMuscular once PRN  heparin  Injectable 7000 Unit(s) IV Push every 6 hours PRN  heparin  Injectable 3500 Unit(s) IV Push every 6 hours PRN      REVIEW OF SYSTEMS  --------------------------------------------------------------------------------  Gen: No fevers/chills  Respiratory: No dyspnea  CV: No chest pain  GI: No abdominal pain  MSK: Bilateral LE swelling+  Neuro: No dizziness/lightheadedness    All other systems were reviewed and are negative, except as noted.    VITALS/PHYSICAL EXAM  --------------------------------------------------------------------------------  T(C): 36.9 (09-24-17 @ 05:21), Max: 36.9 (09-24-17 @ 05:21)  HR: 97 (09-24-17 @ 05:21) (78 - 97)  BP: 179/94 (09-24-17 @ 05:21) (156/90 - 189/94)  RR: 18 (09-24-17 @ 05:21) (18 - 18)  SpO2: 100% (09-24-17 @ 05:21) (100% - 100%)          09-23-17 @ 07:01  -  09-24-17 @ 07:00  --------------------------------------------------------  IN: 720 mL / OUT: 900 mL / NET: -180 mL      Physical Exam:  	Gen: well-appearing  	Pulm: CTA B/L  	CV: RRR, S1S2; no rub  	Abd: +BS, soft  	LE: bilateral LE pitting edema present  	Vascular access: Right IJ HD catheter site: no bleeding.     LABS/STUDIES  --------------------------------------------------------------------------------              8.1    5.95  >-----------<  164      [09-24-17 @ 04:40]              25.5     133  |  96  |  45  ----------------------------<  303      [09-24-17 @ 04:40]  4.1   |  26  |  2.83        Ca     8.0     [09-24-17 @ 04:40]      Mg     1.7     [09-24-17 @ 04:40]      Phos  3.7     [09-24-17 @ 04:40]        PTT: 87.2       [09-24-17 @ 04:40]      Creatinine Trend:  SCr 2.83 [09-24 @ 04:40]  SCr 2.06 [09-22 @ 11:30]  SCr 3.26 [09-22 @ 06:00]  SCr 3.28 [09-21 @ 13:45]  SCr 3.32 [09-21 @ 11:30]        Iron 21, TIBC 187, %sat --      [09-09-17 @ 06:55]  Ferritin 587.5      [09-09-17 @ 06:55]  .9 (Ca --)      [09-10-17 @ 05:10]   --  HbA1c 8.0      [08-16-17 @ 06:30]  TSH 2.88      [09-03-17 @ 13:45]  Lipid: chol 137, TG 97, HDL 52, LDL 68      [08-11-17 @ 07:20]    HBsAb <3.0      [09-03-17 @ 18:00]  HBsAg NEGATIVE      [09-03-17 @ 18:00]  HCV 14.48, Reactive      [09-03-17 @ 18:00]

## 2017-09-25 LAB
APTT BLD: 85.3 SEC — HIGH (ref 27.5–37.4)
BASOPHILS # BLD AUTO: 0.04 K/UL — SIGNIFICANT CHANGE UP (ref 0–0.2)
BASOPHILS NFR BLD AUTO: 0.6 % — SIGNIFICANT CHANGE UP (ref 0–2)
BUN SERPL-MCNC: 47 MG/DL — HIGH (ref 7–23)
CALCIUM SERPL-MCNC: 8.2 MG/DL — LOW (ref 8.4–10.5)
CHLORIDE SERPL-SCNC: 100 MMOL/L — SIGNIFICANT CHANGE UP (ref 98–107)
CO2 SERPL-SCNC: 24 MMOL/L — SIGNIFICANT CHANGE UP (ref 22–31)
CREAT SERPL-MCNC: 2.76 MG/DL — HIGH (ref 0.5–1.3)
EOSINOPHIL # BLD AUTO: 0.17 K/UL — SIGNIFICANT CHANGE UP (ref 0–0.5)
EOSINOPHIL NFR BLD AUTO: 2.6 % — SIGNIFICANT CHANGE UP (ref 0–6)
GLUCOSE SERPL-MCNC: 235 MG/DL — HIGH (ref 70–99)
HCT VFR BLD CALC: 26 % — LOW (ref 39–50)
HCT VFR BLD CALC: 26 % — LOW (ref 39–50)
HGB BLD-MCNC: 8.3 G/DL — LOW (ref 13–17)
HGB BLD-MCNC: 8.3 G/DL — LOW (ref 13–17)
IMM GRANULOCYTES # BLD AUTO: 0.14 # — SIGNIFICANT CHANGE UP
IMM GRANULOCYTES NFR BLD AUTO: 2.2 % — HIGH (ref 0–1.5)
LYMPHOCYTES # BLD AUTO: 1.02 K/UL — SIGNIFICANT CHANGE UP (ref 1–3.3)
LYMPHOCYTES # BLD AUTO: 15.9 % — SIGNIFICANT CHANGE UP (ref 13–44)
MAGNESIUM SERPL-MCNC: 1.7 MG/DL — SIGNIFICANT CHANGE UP (ref 1.6–2.6)
MCHC RBC-ENTMCNC: 28.3 PG — SIGNIFICANT CHANGE UP (ref 27–34)
MCHC RBC-ENTMCNC: 28.3 PG — SIGNIFICANT CHANGE UP (ref 27–34)
MCHC RBC-ENTMCNC: 31.9 % — LOW (ref 32–36)
MCHC RBC-ENTMCNC: 31.9 % — LOW (ref 32–36)
MCV RBC AUTO: 88.7 FL — SIGNIFICANT CHANGE UP (ref 80–100)
MCV RBC AUTO: 88.7 FL — SIGNIFICANT CHANGE UP (ref 80–100)
MONOCYTES # BLD AUTO: 0.85 K/UL — SIGNIFICANT CHANGE UP (ref 0–0.9)
MONOCYTES NFR BLD AUTO: 13.2 % — SIGNIFICANT CHANGE UP (ref 2–14)
NEUTROPHILS # BLD AUTO: 4.2 K/UL — SIGNIFICANT CHANGE UP (ref 1.8–7.4)
NEUTROPHILS NFR BLD AUTO: 65.5 % — SIGNIFICANT CHANGE UP (ref 43–77)
NRBC # FLD: 0 — SIGNIFICANT CHANGE UP
NRBC # FLD: 0 — SIGNIFICANT CHANGE UP
PHOSPHATE SERPL-MCNC: 3.5 MG/DL — SIGNIFICANT CHANGE UP (ref 2.5–4.5)
PLATELET # BLD AUTO: 145 K/UL — LOW (ref 150–400)
PLATELET # BLD AUTO: 145 K/UL — LOW (ref 150–400)
PMV BLD: 9.6 FL — SIGNIFICANT CHANGE UP (ref 7–13)
PMV BLD: 9.6 FL — SIGNIFICANT CHANGE UP (ref 7–13)
POTASSIUM SERPL-MCNC: 3.9 MMOL/L — SIGNIFICANT CHANGE UP (ref 3.5–5.3)
POTASSIUM SERPL-SCNC: 3.9 MMOL/L — SIGNIFICANT CHANGE UP (ref 3.5–5.3)
RBC # BLD: 2.93 M/UL — LOW (ref 4.2–5.8)
RBC # BLD: 2.93 M/UL — LOW (ref 4.2–5.8)
RBC # FLD: 13.6 % — SIGNIFICANT CHANGE UP (ref 10.3–14.5)
RBC # FLD: 13.6 % — SIGNIFICANT CHANGE UP (ref 10.3–14.5)
SODIUM SERPL-SCNC: 137 MMOL/L — SIGNIFICANT CHANGE UP (ref 135–145)
WBC # BLD: 6.42 K/UL — SIGNIFICANT CHANGE UP (ref 3.8–10.5)
WBC # BLD: 6.42 K/UL — SIGNIFICANT CHANGE UP (ref 3.8–10.5)
WBC # FLD AUTO: 6.42 K/UL — SIGNIFICANT CHANGE UP (ref 3.8–10.5)
WBC # FLD AUTO: 6.42 K/UL — SIGNIFICANT CHANGE UP (ref 3.8–10.5)

## 2017-09-25 PROCEDURE — 93306 TTE W/DOPPLER COMPLETE: CPT | Mod: 26

## 2017-09-25 PROCEDURE — 99233 SBSQ HOSP IP/OBS HIGH 50: CPT | Mod: GC

## 2017-09-25 PROCEDURE — 90935 HEMODIALYSIS ONE EVALUATION: CPT

## 2017-09-25 RX ORDER — HYDRALAZINE HCL 50 MG
25 TABLET ORAL THREE TIMES A DAY
Qty: 0 | Refills: 0 | Status: DISCONTINUED | OUTPATIENT
Start: 2017-09-25 | End: 2017-09-26

## 2017-09-25 RX ADMIN — Medication 50 MILLIGRAM(S): at 05:57

## 2017-09-25 RX ADMIN — TAMSULOSIN HYDROCHLORIDE 0.4 MILLIGRAM(S): 0.4 CAPSULE ORAL at 21:25

## 2017-09-25 RX ADMIN — ERYTHROPOIETIN 7000 UNIT(S): 10000 INJECTION, SOLUTION INTRAVENOUS; SUBCUTANEOUS at 00:22

## 2017-09-25 RX ADMIN — Medication 2: at 18:06

## 2017-09-25 RX ADMIN — Medication 50 MILLIGRAM(S): at 17:24

## 2017-09-25 RX ADMIN — Medication 300 MILLIGRAM(S): at 05:57

## 2017-09-25 RX ADMIN — HEPARIN SODIUM 1800 UNIT(S)/HR: 5000 INJECTION INTRAVENOUS; SUBCUTANEOUS at 09:27

## 2017-09-25 RX ADMIN — Medication 1: at 12:58

## 2017-09-25 RX ADMIN — Medication 5 MILLIGRAM(S): at 05:39

## 2017-09-25 RX ADMIN — Medication 25 MILLIGRAM(S): at 21:26

## 2017-09-25 RX ADMIN — Medication 1 DROP(S): at 10:35

## 2017-09-25 RX ADMIN — INSULIN GLARGINE 22 UNIT(S): 100 INJECTION, SOLUTION SUBCUTANEOUS at 08:57

## 2017-09-25 RX ADMIN — Medication 2 UNIT(S): at 21:28

## 2017-09-25 RX ADMIN — ATORVASTATIN CALCIUM 80 MILLIGRAM(S): 80 TABLET, FILM COATED ORAL at 21:25

## 2017-09-25 RX ADMIN — Medication 9 UNIT(S): at 18:07

## 2017-09-25 RX ADMIN — Medication 10 UNIT(S): at 09:06

## 2017-09-25 RX ADMIN — Medication 9 UNIT(S): at 12:59

## 2017-09-25 RX ADMIN — CYCLOSPORINE 100 MILLIGRAM(S): 100 CAPSULE ORAL at 17:21

## 2017-09-25 NOTE — PROGRESS NOTE ADULT - ASSESSMENT
51 y/o M with h/o CKD s/p kidney transplant in 1998, HTN, DM, legally blind admitted for uremic encephalopathy initiated on dialysis. Pt. seen during HD. /81 at time of dialysis rounds.

## 2017-09-25 NOTE — PROGRESS NOTE ADULT - PROBLEM SELECTOR PLAN 1
Pt. tolerating HD well. BP elevated during HD today. Continue with current/increased UF goal with HD as tolerated. BP meds may need to be adjusted, if BP remains elevated. RIJ HD catheter functioning well. Monitor BP and labs.

## 2017-09-25 NOTE — PROGRESS NOTE ADULT - ASSESSMENT
52yMale w/ a PMHx of CKD s/p renal transplant (1998) on cyclosporine and mycophenolate, HTN, type I DM, legally blind  who presented with AMS and uremic encephalopathy.  Subsequently had PEA arrest secondary to hyperkalemia requiring emergent HD on 9/03. +Pericardial effusion with atrial fibrillation, rate controlled with Cardizem 300mg daily and metoprolol 50 mg bid. On 9/21 at 4:02 am, patient self converted to and has remained in NSR.   Continue telemetry.  Repeat echocardiogram to follow-up on pericardial effusion. Will continue current medications

## 2017-09-25 NOTE — PROGRESS NOTE ADULT - PROBLEM SELECTOR PLAN 9
Diabetic retinopathy: Legally blind, lives with sister  -keep room bright, can see shapes if bright light  BPH: cont home tamsulosin  DVT ppx: heparin gtt  Diet: consistent carb no snack/renal diet    Giovanni Dempsey MD  Resident Physician  Emergency Medicine &  Internal Medicine  PGY-2  Pager: [phone]    After 7PM please page [phone] Diabetic retinopathy: Legally blind, lives with sister  -keep room bright, can see shapes if bright light  BPH: cont home tamsulosin  DVT ppx: heparin gtt  Diet: consistent carb no snack/renal diet

## 2017-09-25 NOTE — PROGRESS NOTE ADULT - SUBJECTIVE AND OBJECTIVE BOX
[facility] DIVISION OF KIDNEY DISEASES AND HYPERTENSION --   --------------------------------------------------------------------------------  Chief Complaint: ESRD/Ongoing hemodialysis requirement    HPI: 51 y/o M with h/o CKD s/p kidney transplant in 1998, HTN, DM, legally blind admitted for uremic encephalopathy initiated on dialysis. Pt. seen during HD. No complaints offered during HD. No complaints of CP, SOB, HA or dizziness.     PAST HISTORY  --------------------------------------------------------------------------------  No significant changes to PMH, PSH, FHx, SHx, unless otherwise noted    ALLERGIES & MEDICATIONS  --------------------------------------------------------------------------------  Allergies    No Known Allergies    Intolerances      Standing Inpatient Medications  predniSONE   Tablet 5 milliGRAM(s) Oral daily  tamsulosin 0.4 milliGRAM(s) Oral at bedtime  cycloSPORINE  (SandIMMUNE) 100 milliGRAM(s) Oral daily  atorvastatin 80 milliGRAM(s) Oral at bedtime  senna 2 Tablet(s) Oral at bedtime  docusate sodium 100 milliGRAM(s) Oral three times a day  epoetin valente Injectable 7000 Unit(s) IV Push <User Schedule>  diltiazem    milliGRAM(s) Oral daily  metoprolol 50 milliGRAM(s) Oral two times a day  insulin lispro (HumaLOG) corrective regimen sliding scale   SubCutaneous three times a day before meals  insulin lispro (HumaLOG) corrective regimen sliding scale   SubCutaneous at bedtime  dextrose 5%. 1000 milliLiter(s) IV Continuous <Continuous>  dextrose 50% Injectable 12.5 Gram(s) IV Push once  dextrose 50% Injectable 25 Gram(s) IV Push once  dextrose 50% Injectable 25 Gram(s) IV Push once  heparin  Infusion.  Unit(s)/Hr IV Continuous <Continuous>  insulin glargine Injectable (LANTUS) 22 Unit(s) SubCutaneous every morning  insulin lispro Injectable (HumaLOG) 10 Unit(s) SubCutaneous before breakfast  insulin lispro Injectable (HumaLOG) 9 Unit(s) SubCutaneous before lunch  insulin lispro Injectable (HumaLOG) 9 Unit(s) SubCutaneous before dinner  insulin lispro Injectable (HumaLOG) 2 Unit(s) SubCutaneous at bedtime    PRN Inpatient Medications  naphazoline/pheniramine Solution 1 Drop(s) Both EYES three times a day PRN  dextrose Gel 1 Dose(s) Oral once PRN  glucagon  Injectable 1 milliGRAM(s) IntraMuscular once PRN  heparin  Injectable 7000 Unit(s) IV Push every 6 hours PRN  heparin  Injectable 3500 Unit(s) IV Push every 6 hours PRN      REVIEW OF SYSTEMS  --------------------------------------------------------------------------------  Gen: No fevers/chills  Respiratory: No dyspnea  CV: No chest pain  GI: No abdominal pain  MSK: Bilateral LE swelling+  Neuro: No dizziness/lightheadedness  VITALS/PHYSICAL EXAM  --------------------------------------------------------------------------------  T(C): 36.6 (09-25-17 @ 06:25), Max: 37.1 (09-24-17 @ 21:34)  HR: 81 (09-25-17 @ 06:25) (56 - 90)  BP: 192/101 (09-25-17 @ 06:25) (162/84 - 192/101)  RR: 16 (09-25-17 @ 06:25) (16 - 18)  SpO2: 100% (09-25-17 @ 05:29) (97% - 100%)  Wt(kg): --        Physical Exam:  	Gen: well-appearing  	Pulm: CTA B/L  	CV: RRR, S1S2; no rub  	Abd: +BS, soft  	LE: bilateral LE pitting edema present  	Vascular access: Right IJ HD catheter site: no bleeding.       LABS/STUDIES  --------------------------------------------------------------------------------              8.3    6.42  >-----------<  145      [09-25-17 @ 06:25]              26.0     137  |  100  |  47  ----------------------------<  235      [09-25-17 @ 06:25]  3.9   |  24  |  2.76        Ca     8.2     [09-25-17 @ 06:25]      Mg     1.7     [09-25-17 @ 06:25]      Phos  3.5     [09-25-17 @ 06:25]    Iron 21, TIBC 187, %sat --      [09-09-17 @ 06:55]  Ferritin 587.5      [09-09-17 @ 06:55]  .9 (Ca --)      [09-10-17 @ 05:10]   --  HbA1c 8.0      [08-16-17 @ 06:30]  TSH 2.88      [09-03-17 @ 13:45]  Lipid: chol 137, TG 97, HDL 52, LDL 68      [08-11-17 @ 07:20]    HBsAb <3.0      [09-03-17 @ 18:00]  HBsAg NEGATIVE      [09-03-17 @ 18:00]  HCV 14.48, Reactive      [09-03-17 @ 18:00]

## 2017-09-25 NOTE — PROGRESS NOTE ADULT - SUBJECTIVE AND OBJECTIVE BOX
Patient is a 52y old  Male who presents with a chief complaint of Altered mental status (10 Sep 2017 23:23)      OVERNIGHT EVENTS: No acute events overnight, remained in NSR 50-80s on Tele.  SUBJECTIVE: Patient seen and examined at bedside. Stable, denies current complaints.       MEDICATIONS  (STANDING):  predniSONE   Tablet 5 milliGRAM(s) Oral daily  tamsulosin 0.4 milliGRAM(s) Oral at bedtime  cycloSPORINE  (SandIMMUNE) 100 milliGRAM(s) Oral daily  atorvastatin 80 milliGRAM(s) Oral at bedtime  senna 2 Tablet(s) Oral at bedtime  docusate sodium 100 milliGRAM(s) Oral three times a day  epoetin valente Injectable 7000 Unit(s) IV Push <User Schedule>  diltiazem    milliGRAM(s) Oral daily  metoprolol 50 milliGRAM(s) Oral two times a day  insulin lispro (HumaLOG) corrective regimen sliding scale   SubCutaneous three times a day before meals  insulin lispro (HumaLOG) corrective regimen sliding scale   SubCutaneous at bedtime  dextrose 5%. 1000 milliLiter(s) (50 mL/Hr) IV Continuous <Continuous>  dextrose 50% Injectable 12.5 Gram(s) IV Push once  dextrose 50% Injectable 25 Gram(s) IV Push once  dextrose 50% Injectable 25 Gram(s) IV Push once  heparin  Infusion.  Unit(s)/Hr (16 mL/Hr) IV Continuous <Continuous>  insulin glargine Injectable (LANTUS) 22 Unit(s) SubCutaneous every morning  insulin lispro Injectable (HumaLOG) 10 Unit(s) SubCutaneous before breakfast  insulin lispro Injectable (HumaLOG) 9 Unit(s) SubCutaneous before lunch  insulin lispro Injectable (HumaLOG) 9 Unit(s) SubCutaneous before dinner  insulin lispro Injectable (HumaLOG) 2 Unit(s) SubCutaneous at bedtime    MEDICATIONS  (PRN):  naphazoline/pheniramine Solution 1 Drop(s) Both EYES three times a day PRN eye discomfort  dextrose Gel 1 Dose(s) Oral once PRN Blood Glucose LESS THAN 70 milliGRAM(s)/deciLiter  glucagon  Injectable 1 milliGRAM(s) IntraMuscular once PRN Glucose <70 milliGRAM(s)/deciLiter  heparin  Injectable 7000 Unit(s) IV Push every 6 hours PRN For aPTT less than 40  heparin  Injectable 3500 Unit(s) IV Push every 6 hours PRN For aPTT between 40 - 57      T(C): 36.6 (09-25-17 @ 06:25), Max: 37.1 (09-24-17 @ 21:34)  HR: 81 (09-25-17 @ 06:25) (56 - 90)  BP: 192/101 (09-25-17 @ 06:25) (162/84 - 192/101)  RR: 16 (09-25-17 @ 06:25) (16 - 18)  SpO2: 100% (09-25-17 @ 05:29) (97% - 100%)  CAPILLARY BLOOD GLUCOSE  100 (25 Sep 2017 07:43)  203 (25 Sep 2017 05:44)  189 (24 Sep 2017 21:40)  292 (24 Sep 2017 17:06)  332 (24 Sep 2017 15:13)  460 (24 Sep 2017 13:18)  391 (24 Sep 2017 09:08)      PHYSICAL EXAM  GENERAL: NAD, well-developed  HEAD:  Atraumatic, Normocephalic  EYES: EOMI, conjunctiva and sclera clear. Legally blind but can see changes in light  NECK: Supple, No JVD  CHEST/LUNG: CTAB, No wheeze  HEART: Regular rate and rhythm; No murmurs, rubs, or gallops  ABDOMEN: Soft, Nontender, Nondistended; Bowel sounds present  EXTREMITIES: Feet warm, No clubbing, cyanosis. 2+ pitting edema to just below the knees  PSYCH: AAOx3  SKIN: No rashes or lesions      LABS:                        8.3    6.42  )-----------( 145      ( 25 Sep 2017 06:25 )             26.0     09-25    137  |  100  |  47<H>  ----------------------------<  235<H>  3.9   |  24  |  2.76<H>    Ca    8.2<L>      25 Sep 2017 06:25  Phos  3.5     09-25  Mg     1.7     09-25      PTT - ( 25 Sep 2017 06:25 )  PTT:85.3 SEC          RADIOLOGY & ADDITIONAL TESTS:    Imaging Personally Reviewed:  Consultant(s) Notes Reviewed:    Care Discussed with Consultants/Other Providers:      Pooja Steen D.O.  Medicine Intern  pager [phone]

## 2017-09-25 NOTE — PROGRESS NOTE ADULT - PROBLEM SELECTOR PLAN 6
Home regime: lantus 22u at home, home pravastatin 10mg daily.  -HbA1C 8  -Endo follow up appreciated.   -will give lantus 22 units in AM and humalog 10/9/9 units premeal  -low ISS  -FSBG q6h   -atorvastatin 80mg daily

## 2017-09-25 NOTE — PROGRESS NOTE ADULT - PROBLEM SELECTOR PLAN 1
-self-converted to NSR 9/23  -currently on PO Cardizem, metoprolol 50 bid w/ hold parameters   -holding coumadin as pt. is planned for AVF this week  -c/w hep gtt  -appreciate EP recs  - TTE w/ EF 65%  -continue lopressor and cardizem when NPO and before HD sessions given he has tendency to go into afib w/ RVR if he misses doses  - cardiology [facility] reviewed in [facility] for [facility]

## 2017-09-25 NOTE — PROGRESS NOTE ADULT - ATTENDING COMMENTS
Patient seen and examined. Agree with above note by resident.    1. ESRD on HD - need AVF placement prior to DC. Will reach out to vascular team to schedule procedure. C/w Hd per renal.   2. DM2 uncontrolled - currently FS are in acceptable range. Would c/w current management  3. HTN - uncontrolled. Goal BP<140. Start hydralazine. Will titrate up as needed  4. renal transplant status - c/w Cyclosporin 100mg daily and prednisone.   5. Afib -  rate control with cardizem. on heparin gtt. After AVF placement, will start coumadin.     Plan discussed with pt

## 2017-09-25 NOTE — CHART NOTE - NSCHARTNOTEFT_GEN_A_CORE
Source: Patient [x ]    Family [x ]     other [ x] Review of the patient's medical chart ,RN, Sister at bedside    Current Diet : Renal, Consistent Carbohydrate   Reported:  [ ] nausea  [ ] vomiting [ ] diarrhea [ ] constipation  [ ]chewing problems [ ] swallowing issues  [ ] other:   PO intake:  < 50% [ ] 50-75% [ ]   % [ x]  other :  Current Weight: Weight (kg): 88 (09-21 @ 11:53)  Patient reports good PO intake at present.  No GI distress (nausea/vomiting/diarrhea/constipation.) Pt is making menu selections. Recommended diet modifications reinforced    __________________ Pertinent Medications__________________   MEDICATIONS  (STANDING):  predniSONE   Tablet 5 milliGRAM(s) Oral daily  tamsulosin 0.4 milliGRAM(s) Oral at bedtime  cycloSPORINE  (SandIMMUNE) 100 milliGRAM(s) Oral daily  atorvastatin 80 milliGRAM(s) Oral at bedtime  senna 2 Tablet(s) Oral at bedtime  docusate sodium 100 milliGRAM(s) Oral three times a day  epoetin valente Injectable 7000 Unit(s) IV Push <User Schedule>  diltiazem    milliGRAM(s) Oral daily  metoprolol 50 milliGRAM(s) Oral two times a day  insulin lispro (HumaLOG) corrective regimen sliding scale   SubCutaneous three times a day before meals  insulin lispro (HumaLOG) corrective regimen sliding scale   SubCutaneous at bedtime  dextrose 5%. 1000 milliLiter(s) (50 mL/Hr) IV Continuous <Continuous>  dextrose 50% Injectable 12.5 Gram(s) IV Push once  dextrose 50% Injectable 25 Gram(s) IV Push once  dextrose 50% Injectable 25 Gram(s) IV Push once  heparin  Infusion.  Unit(s)/Hr (16 mL/Hr) IV Continuous <Continuous>  insulin glargine Injectable (LANTUS) 22 Unit(s) SubCutaneous every morning  insulin lispro Injectable (HumaLOG) 10 Unit(s) SubCutaneous before breakfast  insulin lispro Injectable (HumaLOG) 9 Unit(s) SubCutaneous before lunch  insulin lispro Injectable (HumaLOG) 9 Unit(s) SubCutaneous before dinner  insulin lispro Injectable (HumaLOG) 2 Unit(s) SubCutaneous at bedtime  hydrALAZINE 25 milliGRAM(s) Oral three times a day    MEDICATIONS  (PRN):  naphazoline/pheniramine Solution 1 Drop(s) Both EYES three times a day PRN eye discomfort  dextrose Gel 1 Dose(s) Oral once PRN Blood Glucose LESS THAN 70 milliGRAM(s)/deciLiter  glucagon  Injectable 1 milliGRAM(s) IntraMuscular once PRN Glucose <70 milliGRAM(s)/deciLiter  heparin  Injectable 7000 Unit(s) IV Push every 6 hours PRN For aPTT less than 40  heparin  Injectable 3500 Unit(s) IV Push every 6 hours PRN For aPTT between 40 - 57      __________________ Pertinent Labs__________________   09-25 Na137 mmol/L Glu 235 mg/dL<H> K+ 3.9 mmol/L Cr  2.76 mg/dL<H> BUN 47 mg/dL<H> Phos 3.5 mg/dL Alb n/a   PAB n/a         Estimated Needs:   [ x] no change since previous assessment  [ ] recalculated:     Interventions:     Recommend  1- Continue current diet order, which remains appropriate at this time.   2- Monitor weights, labs, BM's, skin integrity, p.o. intake.   3- Please Encourage po intake, assist with meals and menu selections, provide alternatives PRN.   4- RD remains available, re-consult as needed.       Monitoring and Evaluation:     [x ] PO intake [ x] Tolerance to diet prescription [x ] weights [ x] follow up per protocol  [ ] other:

## 2017-09-25 NOTE — PROGRESS NOTE ADULT - SUBJECTIVE AND OBJECTIVE BOX
Patient is a 52y old  Male who presents with a chief complaint of Altered mental status. Alert and awake now. Denies chest pain, SOB, palpitations or dizziness.      PAST MEDICAL & SURGICAL HISTORY:  Hypertension  Legally blind  Diabetes mellitus type I  Renal transplant, status post  CKD (chronic kidney disease)  No pertinent past medical history  Renal transplant, status post  History of tonsillectomy  No significant past surgical history      MEDICATIONS  (STANDING):  predniSONE   Tablet 5 milliGRAM(s) Oral daily  tamsulosin 0.4 milliGRAM(s) Oral at bedtime  cycloSPORINE  (SandIMMUNE) 100 milliGRAM(s) Oral daily  atorvastatin 80 milliGRAM(s) Oral at bedtime  senna 2 Tablet(s) Oral at bedtime  docusate sodium 100 milliGRAM(s) Oral three times a day  epoetin valente Injectable 7000 Unit(s) IV Push <User Schedule>  diltiazem    milliGRAM(s) Oral daily  metoprolol 50 milliGRAM(s) Oral two times a day  insulin lispro (HumaLOG) corrective regimen sliding scale   SubCutaneous three times a day before meals  insulin lispro (HumaLOG) corrective regimen sliding scale   SubCutaneous at bedtime  dextrose 5%. 1000 milliLiter(s) (50 mL/Hr) IV Continuous <Continuous>  dextrose 50% Injectable 12.5 Gram(s) IV Push once  dextrose 50% Injectable 25 Gram(s) IV Push once  dextrose 50% Injectable 25 Gram(s) IV Push once  heparin  Infusion.  Unit(s)/Hr (16 mL/Hr) IV Continuous <Continuous>  insulin glargine Injectable (LANTUS) 22 Unit(s) SubCutaneous every morning  insulin lispro Injectable (HumaLOG) 10 Unit(s) SubCutaneous before breakfast  insulin lispro Injectable (HumaLOG) 9 Unit(s) SubCutaneous before lunch  insulin lispro Injectable (HumaLOG) 9 Unit(s) SubCutaneous before dinner  insulin lispro Injectable (HumaLOG) 2 Unit(s) SubCutaneous at bedtime    MEDICATIONS  (PRN):  naphazoline/pheniramine Solution 1 Drop(s) Both EYES three times a day PRN eye discomfort  dextrose Gel 1 Dose(s) Oral once PRN Blood Glucose LESS THAN 70 milliGRAM(s)/deciLiter  glucagon  Injectable 1 milliGRAM(s) IntraMuscular once PRN Glucose <70 milliGRAM(s)/deciLiter  heparin  Injectable 7000 Unit(s) IV Push every 6 hours PRN For aPTT less than 40  heparin  Injectable 3500 Unit(s) IV Push every 6 hours PRN For aPTT between 40 - 57            Vital Signs Last 24 Hrs  T(C): 36.6 (25 Sep 2017 06:25), Max: 37.1 (24 Sep 2017 21:34)  T(F): 97.9 (25 Sep 2017 06:25), Max: 98.7 (24 Sep 2017 21:34)  HR: 81 (25 Sep 2017 06:25) (56 - 90)  BP: 192/101 (25 Sep 2017 06:25) (162/84 - 192/101)  BP(mean): --  RR: 16 (25 Sep 2017 06:25) (16 - 18)  SpO2: 100% (25 Sep 2017 05:29) (97% - 100%)      INTERPRETATION OF TELEMETRY: Sinus rhythm with HR 70s-80s, PVCs      LABS:                        8.3    6.42  )-----------( 145      ( 25 Sep 2017 06:25 )             26.0     09-25    137  |  100  |  47<H>  ----------------------------<  235<H>  3.9   |  24  |  2.76<H>    Ca    8.2<L>      25 Sep 2017 06:25  Phos  3.5     09-25  Mg     1.7     09-25          PTT - ( 25 Sep 2017 06:25 )  PTT:85.3 SEC        PHYSICAL EXAM:    GENERAL: In no apparent distress, well nourished, and hydrated.  HEAD:  Atraumatic, Normocephalic  HEART: Regular rate and rhythm; No murmurs, rubs, or gallops.  PULMONARY: Clear to auscultation and perfusion.  No rales, wheezing, or rhonchi bilaterally.  ABDOMEN: Soft, Nontender, Nondistended; Bowel sounds present  EXTREMITIES:  2+ LE edema

## 2017-09-26 LAB
APTT BLD: 56.8 SEC — HIGH (ref 27.5–37.4)
APTT BLD: 65.8 SEC — HIGH (ref 27.5–37.4)
BUN SERPL-MCNC: 37 MG/DL — HIGH (ref 7–23)
CALCIUM SERPL-MCNC: 8.3 MG/DL — LOW (ref 8.4–10.5)
CHLORIDE SERPL-SCNC: 99 MMOL/L — SIGNIFICANT CHANGE UP (ref 98–107)
CO2 SERPL-SCNC: 24 MMOL/L — SIGNIFICANT CHANGE UP (ref 22–31)
CREAT SERPL-MCNC: 2.65 MG/DL — HIGH (ref 0.5–1.3)
GLUCOSE SERPL-MCNC: 263 MG/DL — HIGH (ref 70–99)
HCT VFR BLD CALC: 27.7 % — LOW (ref 39–50)
HGB BLD-MCNC: 8.8 G/DL — LOW (ref 13–17)
MAGNESIUM SERPL-MCNC: 1.6 MG/DL — SIGNIFICANT CHANGE UP (ref 1.6–2.6)
MCHC RBC-ENTMCNC: 28.2 PG — SIGNIFICANT CHANGE UP (ref 27–34)
MCHC RBC-ENTMCNC: 31.8 % — LOW (ref 32–36)
MCV RBC AUTO: 88.8 FL — SIGNIFICANT CHANGE UP (ref 80–100)
NRBC # FLD: 0 — SIGNIFICANT CHANGE UP
PHOSPHATE SERPL-MCNC: 3.3 MG/DL — SIGNIFICANT CHANGE UP (ref 2.5–4.5)
PLATELET # BLD AUTO: 127 K/UL — LOW (ref 150–400)
PMV BLD: 10.5 FL — SIGNIFICANT CHANGE UP (ref 7–13)
POTASSIUM SERPL-MCNC: 4 MMOL/L — SIGNIFICANT CHANGE UP (ref 3.5–5.3)
POTASSIUM SERPL-SCNC: 4 MMOL/L — SIGNIFICANT CHANGE UP (ref 3.5–5.3)
RBC # BLD: 3.12 M/UL — LOW (ref 4.2–5.8)
RBC # FLD: 13.8 % — SIGNIFICANT CHANGE UP (ref 10.3–14.5)
SODIUM SERPL-SCNC: 137 MMOL/L — SIGNIFICANT CHANGE UP (ref 135–145)
WBC # BLD: 6.54 K/UL — SIGNIFICANT CHANGE UP (ref 3.8–10.5)
WBC # FLD AUTO: 6.54 K/UL — SIGNIFICANT CHANGE UP (ref 3.8–10.5)

## 2017-09-26 PROCEDURE — 99233 SBSQ HOSP IP/OBS HIGH 50: CPT | Mod: GC

## 2017-09-26 PROCEDURE — 99232 SBSQ HOSP IP/OBS MODERATE 35: CPT

## 2017-09-26 RX ORDER — HYDRALAZINE HCL 50 MG
50 TABLET ORAL THREE TIMES A DAY
Qty: 0 | Refills: 0 | Status: DISCONTINUED | OUTPATIENT
Start: 2017-09-26 | End: 2017-10-02

## 2017-09-26 RX ORDER — INSULIN GLARGINE 100 [IU]/ML
22 INJECTION, SOLUTION SUBCUTANEOUS EVERY MORNING
Qty: 0 | Refills: 0 | Status: DISCONTINUED | OUTPATIENT
Start: 2017-09-26 | End: 2017-09-27

## 2017-09-26 RX ORDER — INSULIN LISPRO 100/ML
5 VIAL (ML) SUBCUTANEOUS ONCE
Qty: 0 | Refills: 0 | Status: COMPLETED | OUTPATIENT
Start: 2017-09-26 | End: 2017-09-26

## 2017-09-26 RX ORDER — INSULIN GLARGINE 100 [IU]/ML
25 INJECTION, SOLUTION SUBCUTANEOUS AT BEDTIME
Qty: 0 | Refills: 0 | Status: DISCONTINUED | OUTPATIENT
Start: 2017-09-26 | End: 2017-09-26

## 2017-09-26 RX ADMIN — Medication 50 MILLIGRAM(S): at 13:15

## 2017-09-26 RX ADMIN — Medication 50 MILLIGRAM(S): at 05:29

## 2017-09-26 RX ADMIN — Medication 3: at 09:35

## 2017-09-26 RX ADMIN — CYCLOSPORINE 100 MILLIGRAM(S): 100 CAPSULE ORAL at 13:15

## 2017-09-26 RX ADMIN — Medication 10 UNIT(S): at 09:35

## 2017-09-26 RX ADMIN — HEPARIN SODIUM 2000 UNIT(S)/HR: 5000 INJECTION INTRAVENOUS; SUBCUTANEOUS at 11:04

## 2017-09-26 RX ADMIN — Medication 9 UNIT(S): at 13:15

## 2017-09-26 RX ADMIN — Medication 5 UNIT(S): at 16:00

## 2017-09-26 RX ADMIN — Medication 50 MILLIGRAM(S): at 21:07

## 2017-09-26 RX ADMIN — Medication 9 UNIT(S): at 18:14

## 2017-09-26 RX ADMIN — Medication 1: at 18:14

## 2017-09-26 RX ADMIN — ATORVASTATIN CALCIUM 80 MILLIGRAM(S): 80 TABLET, FILM COATED ORAL at 21:07

## 2017-09-26 RX ADMIN — Medication 300 MILLIGRAM(S): at 05:29

## 2017-09-26 RX ADMIN — Medication 2: at 13:15

## 2017-09-26 RX ADMIN — Medication 5 MILLIGRAM(S): at 05:29

## 2017-09-26 RX ADMIN — TAMSULOSIN HYDROCHLORIDE 0.4 MILLIGRAM(S): 0.4 CAPSULE ORAL at 21:07

## 2017-09-26 RX ADMIN — INSULIN GLARGINE 22 UNIT(S): 100 INJECTION, SOLUTION SUBCUTANEOUS at 09:37

## 2017-09-26 RX ADMIN — HEPARIN SODIUM 2000 UNIT(S)/HR: 5000 INJECTION INTRAVENOUS; SUBCUTANEOUS at 19:21

## 2017-09-26 RX ADMIN — Medication 50 MILLIGRAM(S): at 17:29

## 2017-09-26 RX ADMIN — Medication 25 MILLIGRAM(S): at 05:29

## 2017-09-26 NOTE — PROGRESS NOTE ADULT - ASSESSMENT
52yMale w/ a PMHx of CKD s/p renal transplant (1998) on cyclosporine and mycophenolate, HTN, type I DM, legally blind  who presented with AMS and uremic encephalopathy.  Subsequently had PEA arrest secondary to hyperkalemia requiring emergent HD on 9/03. +Pericardial effusion with atrial fibrillation, rate controlled with Cardizem 300mg daily and metoprolol 50 mg bid. On 9/21 at 4:02 am, patient self converted to and has remained in NSR.   Continue telemetry.    Repeat echocardiogram with small pericardial effusion posterior and lateral to left ventricle. Right pleural effusion noted.   Continue current medications -> consider reducing doses after AVF surgery.

## 2017-09-26 NOTE — PROGRESS NOTE ADULT - PROBLEM SELECTOR PLAN 5
-home regimen clonidine 0.1mg po q8h, nifedipine XR 90 mg qD, Turosemide 10 mg daily - holding for now  -c/w Cardizem and metoprolol and hydralazine

## 2017-09-26 NOTE — PROGRESS NOTE ADULT - ATTENDING COMMENTS
Patient seen and examined. Agree with above note by resident.    1. ESRD on HD - need AVF placement prior to DC. Will reach out to vascular team to schedule procedure. C/w Hd per renal.   2. DM2 uncontrolled - FS are labile. Continue to monitor.   3. HTN - uncontrolled. Goal BP<140. Titrate hydralazine to 50TID  4. renal transplant status - c/w Cyclosporin 100mg daily and prednisone. Tapered off cellcept.   5. Afib -  rate control with cardizem. on heparin gtt. After AVF placement, will start coumadin.     Plan discussed with pt .

## 2017-09-26 NOTE — PROGRESS NOTE ADULT - PROBLEM SELECTOR PLAN 1
-self-converted to NSR 9/21  -currently on PO Cardizem, metoprolol 50 bid w/ hold parameters.  -hydralazine 25 TID added yesterday for BP control to goal <140. Remained in SBP 170s overnight, will increased to 50 and monitor.   -c/w hep gtt, holding coumadin as pt. is planned for AVF this week  -appreciate EP recs - Repeat TTE 9/25 demonstrated small pericardial effusion and small right pleural effuction.   - TTE w/ EF 65%  -continue lopressor and cardizem when NPO and before HD sessions given he has tendency to go into afib w/ RVR if he misses doses  - cardiology clearance reviewed in [facility] for AVF

## 2017-09-26 NOTE — PROGRESS NOTE ADULT - ASSESSMENT
52 M with Type 1 DM HbA1C 8 admitted for AMS s/p cardiac arrest now on HD presents with hyperglycemia. Awaiting AV fistula.

## 2017-09-26 NOTE — PROGRESS NOTE ADULT - SUBJECTIVE AND OBJECTIVE BOX
Chief Complaint: DM1    History: BG were improved yesterday (Day of HD) but higher today.  Patient reports tolerating po and avoiding excessive carbohydrates.    MEDICATIONS  (STANDING):  predniSONE   Tablet 5 milliGRAM(s) Oral daily  tamsulosin 0.4 milliGRAM(s) Oral at bedtime  cycloSPORINE  (SandIMMUNE) 100 milliGRAM(s) Oral daily  atorvastatin 80 milliGRAM(s) Oral at bedtime  senna 2 Tablet(s) Oral at bedtime  docusate sodium 100 milliGRAM(s) Oral three times a day  epoetin valente Injectable 7000 Unit(s) IV Push <User Schedule>  diltiazem    milliGRAM(s) Oral daily  metoprolol 50 milliGRAM(s) Oral two times a day  insulin lispro (HumaLOG) corrective regimen sliding scale   SubCutaneous three times a day before meals  insulin lispro (HumaLOG) corrective regimen sliding scale   SubCutaneous at bedtime  dextrose 5%. 1000 milliLiter(s) (50 mL/Hr) IV Continuous <Continuous>  dextrose 50% Injectable 12.5 Gram(s) IV Push once  dextrose 50% Injectable 25 Gram(s) IV Push once  dextrose 50% Injectable 25 Gram(s) IV Push once  heparin  Infusion.  Unit(s)/Hr (16 mL/Hr) IV Continuous <Continuous>  insulin lispro Injectable (HumaLOG) 10 Unit(s) SubCutaneous before breakfast  insulin lispro Injectable (HumaLOG) 9 Unit(s) SubCutaneous before lunch  insulin lispro Injectable (HumaLOG) 9 Unit(s) SubCutaneous before dinner  insulin lispro Injectable (HumaLOG) 2 Unit(s) SubCutaneous at bedtime  hydrALAZINE 50 milliGRAM(s) Oral three times a day  insulin glargine Injectable (LANTUS) 22 Unit(s) SubCutaneous every morning    MEDICATIONS  (PRN):  naphazoline/pheniramine Solution 1 Drop(s) Both EYES three times a day PRN eye discomfort  dextrose Gel 1 Dose(s) Oral once PRN Blood Glucose LESS THAN 70 milliGRAM(s)/deciLiter  glucagon  Injectable 1 milliGRAM(s) IntraMuscular once PRN Glucose <70 milliGRAM(s)/deciLiter  heparin  Injectable 7000 Unit(s) IV Push every 6 hours PRN For aPTT less than 40  heparin  Injectable 3500 Unit(s) IV Push every 6 hours PRN For aPTT between 40 - 57      Allergies    No Known Allergies    Intolerances      Review of Systems:    ALL OTHER SYSTEMS REVIEWED AND NEGATIVE        PHYSICAL EXAM:  VITALS: T(C): 37.3 (09-26-17 @ 13:12)  T(F): 99.2 (09-26-17 @ 13:12), Max: 99.7 (09-26-17 @ 06:04)  HR: 87 (09-26-17 @ 17:27) (78 - 94)  BP: 155/94 (09-26-17 @ 17:27) (155/94 - 178/86)  RR:  (18 - 18)  SpO2:  (98% - 100%)  Wt(kg): --  GENERAL: NAD, well-groomed, well-developed  EYES: blind  HEENT:  Atraumatic, Normocephalic, moist mucous membranes  SKIN: Dry, intact, No rashes or lesions  PSYCH: Alert and oriented x 3, normal affect, normal mood    CAPILLARY BLOOD GLUCOSE  200 (09-26 @ 17:05)  414 (09-26 @ 15:45)  243 (09-26 @ 12:41)  300 (09-26 @ 08:55)  143 (09-25 @ 21:27)  202 (09-25 @ 16:59)  190 (09-25 @ 12:50)  100 (09-25 @ 07:43)  203 (09-25 @ 05:44)  189 (09-24 @ 21:40)  292 (09-24 @ 17:06)  332 (09-24 @ 15:13)  460 (09-24 @ 13:18)  391 (09-24 @ 09:08)  106 (09-23 @ 23:15)  86 (09-23 @ 23:00)  69 (09-23 @ 22:45)      09-26    137  |  99  |  37<H>  ----------------------------<  263<H>  4.0   |  24  |  2.65<H>    EGFR if : 31  EGFR if non : 27    Ca    8.3<L>      09-26  Mg     1.6     09-26  Phos  3.3     09-26            Thyroid Function Tests:  09-03 @ 13:45 TSH 2.88 FreeT4 0.70 T3 71.7 Anti TPO -- Anti Thyroglobulin Ab -- TSI --      Hemoglobin A1C, Whole Blood: 8.0 % <H> [4.0 - 5.6] (08-16-17 @ 06:30)

## 2017-09-26 NOTE — PROGRESS NOTE ADULT - SUBJECTIVE AND OBJECTIVE BOX
Patient is a 52y old  Male who presents with a chief complaint of Altered mental status (10 Sep 2017 23:23)      OVERNIGHT EVENTS: No acute events overnight.   SUBJECTIVE: Patient seen and examined at bedside. Denies current complaints.        MEDICATIONS  (STANDING):  predniSONE   Tablet 5 milliGRAM(s) Oral daily  tamsulosin 0.4 milliGRAM(s) Oral at bedtime  cycloSPORINE  (SandIMMUNE) 100 milliGRAM(s) Oral daily  atorvastatin 80 milliGRAM(s) Oral at bedtime  senna 2 Tablet(s) Oral at bedtime  docusate sodium 100 milliGRAM(s) Oral three times a day  epoetin valente Injectable 7000 Unit(s) IV Push <User Schedule>  diltiazem    milliGRAM(s) Oral daily  metoprolol 50 milliGRAM(s) Oral two times a day  insulin lispro (HumaLOG) corrective regimen sliding scale   SubCutaneous three times a day before meals  insulin lispro (HumaLOG) corrective regimen sliding scale   SubCutaneous at bedtime  dextrose 5%. 1000 milliLiter(s) (50 mL/Hr) IV Continuous <Continuous>  dextrose 50% Injectable 12.5 Gram(s) IV Push once  dextrose 50% Injectable 25 Gram(s) IV Push once  dextrose 50% Injectable 25 Gram(s) IV Push once  heparin  Infusion.  Unit(s)/Hr (16 mL/Hr) IV Continuous <Continuous>  insulin glargine Injectable (LANTUS) 22 Unit(s) SubCutaneous every morning  insulin lispro Injectable (HumaLOG) 10 Unit(s) SubCutaneous before breakfast  insulin lispro Injectable (HumaLOG) 9 Unit(s) SubCutaneous before lunch  insulin lispro Injectable (HumaLOG) 9 Unit(s) SubCutaneous before dinner  insulin lispro Injectable (HumaLOG) 2 Unit(s) SubCutaneous at bedtime  hydrALAZINE 25 milliGRAM(s) Oral three times a day    MEDICATIONS  (PRN):  naphazoline/pheniramine Solution 1 Drop(s) Both EYES three times a day PRN eye discomfort  dextrose Gel 1 Dose(s) Oral once PRN Blood Glucose LESS THAN 70 milliGRAM(s)/deciLiter  glucagon  Injectable 1 milliGRAM(s) IntraMuscular once PRN Glucose <70 milliGRAM(s)/deciLiter  heparin  Injectable 7000 Unit(s) IV Push every 6 hours PRN For aPTT less than 40  heparin  Injectable 3500 Unit(s) IV Push every 6 hours PRN For aPTT between 40 - 57      T(C): 37.6 (09-26-17 @ 06:04), Max: 37.6 (09-26-17 @ 06:04)  HR: 82 (09-26-17 @ 06:04) (78 - 94)  BP: 160/74 (09-26-17 @ 06:04) (153/85 - 178/86)  RR: 18 (09-26-17 @ 06:04) (16 - 18)  SpO2: 100% (09-26-17 @ 06:04) (98% - 100%)  CAPILLARY BLOOD GLUCOSE  143 (25 Sep 2017 21:27)  202 (25 Sep 2017 16:59)  190 (25 Sep 2017 12:50)        I&O's Summary    25 Sep 2017 07:01  -  26 Sep 2017 07:00  --------------------------------------------------------  IN: 400 mL / OUT: 3000 mL / NET: -2600 mL        PHYSICAL EXAM  GENERAL: NAD, well-developed  HEAD:  Atraumatic, Normocephalic  EYES: EOMI, PERRLA, conjunctiva and sclera clear  CHEST/LUNG: Clear to auscultation bilaterally; No wheeze  HEART: +S1 +S2, Regular rate and rhythm  ABDOMEN: Soft, Nontender, Nondistended; Bowel sounds present  NEURO: No focal neurologic deficits, sensation and motor function grossly intact.   EXTREMITIES:  Pink and warm, Peripheral Pulses intact. +B/L LE edema. +RIJ HD Cath, C/D/I.       LABS:                        8.3    6.42  )-----------( 145      ( 25 Sep 2017 06:25 )             26.0     09-25    137  |  100  |  47<H>  ----------------------------<  235<H>  3.9   |  24  |  2.76<H>    Ca    8.2<L>      25 Sep 2017 06:25  Phos  3.5     09-25  Mg     1.7     09-25      PTT - ( 25 Sep 2017 06:25 )  PTT:85.3 SEC          RADIOLOGY & ADDITIONAL TESTS:    Imaging Personally Reviewed:  Consultant(s) Notes Reviewed:    Care Discussed with Consultants/Other Providers:      Pooja Steen D.O.  Medicine Intern  pager [phone]

## 2017-09-26 NOTE — PROGRESS NOTE ADULT - SUBJECTIVE AND OBJECTIVE BOX
chief complaint: Patient feeling well. No complaints. Denies chest pain, shortness of breath or palpitations. No plans for AVF today.    Vital Signs Last 24 Hrs  T(C): 37.6 (26 Sep 2017 06:04), Max: 37.6 (26 Sep 2017 06:04)  T(F): 99.7 (26 Sep 2017 06:04), Max: 99.7 (26 Sep 2017 06:04)  HR: 82 (26 Sep 2017 06:04) (78 - 94)  BP: 160/74 (26 Sep 2017 06:04) (153/85 - 178/86)  BP(mean): --  RR: 18 (26 Sep 2017 06:04) (18 - 18)  SpO2: 100% (26 Sep 2017 06:04) (98% - 100%)      Telemetry: Normal sinus rhythm. HR 70-90's.    MEDICATIONS  (STANDING):  predniSONE   Tablet 5 milliGRAM(s) Oral daily  tamsulosin 0.4 milliGRAM(s) Oral at bedtime  cycloSPORINE  (SandIMMUNE) 100 milliGRAM(s) Oral daily  atorvastatin 80 milliGRAM(s) Oral at bedtime  senna 2 Tablet(s) Oral at bedtime  docusate sodium 100 milliGRAM(s) Oral three times a day  epoetin valente Injectable 7000 Unit(s) IV Push <User Schedule>  diltiazem    milliGRAM(s) Oral daily  metoprolol 50 milliGRAM(s) Oral two times a day  insulin lispro (HumaLOG) corrective regimen sliding scale   SubCutaneous three times a day before meals  insulin lispro (HumaLOG) corrective regimen sliding scale   SubCutaneous at bedtime  dextrose 5%. 1000 milliLiter(s) (50 mL/Hr) IV Continuous <Continuous>  dextrose 50% Injectable 12.5 Gram(s) IV Push once  dextrose 50% Injectable 25 Gram(s) IV Push once  dextrose 50% Injectable 25 Gram(s) IV Push once  heparin  Infusion.  Unit(s)/Hr (16 mL/Hr) IV Continuous <Continuous>  insulin glargine Injectable (LANTUS) 22 Unit(s) SubCutaneous every morning  insulin lispro Injectable (HumaLOG) 10 Unit(s) SubCutaneous before breakfast  insulin lispro Injectable (HumaLOG) 9 Unit(s) SubCutaneous before lunch  insulin lispro Injectable (HumaLOG) 9 Unit(s) SubCutaneous before dinner  insulin lispro Injectable (HumaLOG) 2 Unit(s) SubCutaneous at bedtime  hydrALAZINE 25 milliGRAM(s) Oral three times a day    MEDICATIONS  (PRN):  naphazoline/pheniramine Solution 1 Drop(s) Both EYES three times a day PRN eye discomfort  dextrose Gel 1 Dose(s) Oral once PRN Blood Glucose LESS THAN 70 milliGRAM(s)/deciLiter  glucagon  Injectable 1 milliGRAM(s) IntraMuscular once PRN Glucose <70 milliGRAM(s)/deciLiter  heparin  Injectable 7000 Unit(s) IV Push every 6 hours PRN For aPTT less than 40  heparin  Injectable 3500 Unit(s) IV Push every 6 hours PRN For aPTT between 40 - 57          Physical exam:   Gen- Well developed. Well nourished . NAD.  Resp- CTA bilaterally but slightly decreased right lower lobe.  Right ACW permacath  CV-  Normal S1 and S2. RRR  ABD- soft NT ND +bowel sounds  EXT- bilateral lower extremity 3+ edema  Neuro  A&O x3.       LABS:  09/26/2017                       8.8    6.54  )-----------( 127                   27.7      PTT:56.8 SEC      137  |  99  |  37<H>  ----------------------------<  263<H>  4.0   |  24  |  2.65<H>    Ca    8.3<L>       Phos  3.3       Mg     1.6

## 2017-09-26 NOTE — PROGRESS NOTE ADULT - PROBLEM SELECTOR PLAN 1
Patient noted with possible trend of lower glucose on HD days and higher glucose on non-HD days.  Would continue Lantus to 22u qAM tomorrow (HD day).  Will consider trial of Lantus 25u qam on non-HD days (Thursday).  Continue Humalog 10/9/9/2    Patient is optimized for AVF surgery from diabetes standpoint.  - change Humalog to 10/9/9  - check 2am glucose to see if fasting glucose is rebound hyperglycemia  - add bedtime snack humalog 2u if eating  - Continue Humalog low correction scales before meals and QHS.    D/c with basal/bolus dose TBD  Outpatient endocrine follow up: appointment pending with Dr Baker

## 2017-09-26 NOTE — PROGRESS NOTE ADULT - PROBLEM SELECTOR PLAN 6
Home regime: lantus 22u at home, home pravastatin 10mg daily.  -HbA1C 8  -Endo follow up appreciated.   -will give lantus 22 units in AM and humalog 10/9/9 units premeal  -atorvastatin 80mg daily

## 2017-09-27 LAB
APTT BLD: 84.6 SEC — HIGH (ref 27.5–37.4)
BUN SERPL-MCNC: 43 MG/DL — HIGH (ref 7–23)
CALCIUM SERPL-MCNC: 8.2 MG/DL — LOW (ref 8.4–10.5)
CHLORIDE SERPL-SCNC: 99 MMOL/L — SIGNIFICANT CHANGE UP (ref 98–107)
CO2 SERPL-SCNC: 23 MMOL/L — SIGNIFICANT CHANGE UP (ref 22–31)
CREAT SERPL-MCNC: 2.87 MG/DL — HIGH (ref 0.5–1.3)
GLUCOSE SERPL-MCNC: 210 MG/DL — HIGH (ref 70–99)
HBV SURFACE AG SER-ACNC: NEGATIVE — SIGNIFICANT CHANGE UP
HCT VFR BLD CALC: 26.8 % — LOW (ref 39–50)
HGB BLD-MCNC: 8.3 G/DL — LOW (ref 13–17)
MAGNESIUM SERPL-MCNC: 1.6 MG/DL — SIGNIFICANT CHANGE UP (ref 1.6–2.6)
MCHC RBC-ENTMCNC: 27.3 PG — SIGNIFICANT CHANGE UP (ref 27–34)
MCHC RBC-ENTMCNC: 31 % — LOW (ref 32–36)
MCV RBC AUTO: 88.2 FL — SIGNIFICANT CHANGE UP (ref 80–100)
NRBC # FLD: 0 — SIGNIFICANT CHANGE UP
PHOSPHATE SERPL-MCNC: 3.7 MG/DL — SIGNIFICANT CHANGE UP (ref 2.5–4.5)
PLATELET # BLD AUTO: 140 K/UL — LOW (ref 150–400)
PMV BLD: 10.1 FL — SIGNIFICANT CHANGE UP (ref 7–13)
POTASSIUM SERPL-MCNC: 3.7 MMOL/L — SIGNIFICANT CHANGE UP (ref 3.5–5.3)
POTASSIUM SERPL-SCNC: 3.7 MMOL/L — SIGNIFICANT CHANGE UP (ref 3.5–5.3)
RBC # BLD: 3.04 M/UL — LOW (ref 4.2–5.8)
RBC # FLD: 13.9 % — SIGNIFICANT CHANGE UP (ref 10.3–14.5)
SODIUM SERPL-SCNC: 136 MMOL/L — SIGNIFICANT CHANGE UP (ref 135–145)
WBC # BLD: 7.69 K/UL — SIGNIFICANT CHANGE UP (ref 3.8–10.5)
WBC # FLD AUTO: 7.69 K/UL — SIGNIFICANT CHANGE UP (ref 3.8–10.5)

## 2017-09-27 PROCEDURE — 99233 SBSQ HOSP IP/OBS HIGH 50: CPT | Mod: GC

## 2017-09-27 PROCEDURE — 90935 HEMODIALYSIS ONE EVALUATION: CPT

## 2017-09-27 RX ORDER — INSULIN GLARGINE 100 [IU]/ML
25 INJECTION, SOLUTION SUBCUTANEOUS
Qty: 0 | Refills: 0 | Status: DISCONTINUED | OUTPATIENT
Start: 2017-09-27 | End: 2017-09-28

## 2017-09-27 RX ORDER — HEPARIN SODIUM 5000 [USP'U]/ML
7000 INJECTION INTRAVENOUS; SUBCUTANEOUS ONCE
Qty: 0 | Refills: 0 | Status: DISCONTINUED | OUTPATIENT
Start: 2017-09-27 | End: 2017-09-27

## 2017-09-27 RX ORDER — HEPARIN SODIUM 5000 [USP'U]/ML
3500 INJECTION INTRAVENOUS; SUBCUTANEOUS EVERY 6 HOURS
Qty: 0 | Refills: 0 | Status: DISCONTINUED | OUTPATIENT
Start: 2017-09-27 | End: 2017-10-02

## 2017-09-27 RX ORDER — HEPARIN SODIUM 5000 [USP'U]/ML
2000 INJECTION INTRAVENOUS; SUBCUTANEOUS
Qty: 25000 | Refills: 0 | Status: DISCONTINUED | OUTPATIENT
Start: 2017-09-27 | End: 2017-10-02

## 2017-09-27 RX ORDER — ACETAMINOPHEN 500 MG
650 TABLET ORAL EVERY 6 HOURS
Qty: 0 | Refills: 0 | Status: DISCONTINUED | OUTPATIENT
Start: 2017-09-27 | End: 2017-10-02

## 2017-09-27 RX ORDER — HEPARIN SODIUM 5000 [USP'U]/ML
7000 INJECTION INTRAVENOUS; SUBCUTANEOUS EVERY 6 HOURS
Qty: 0 | Refills: 0 | Status: DISCONTINUED | OUTPATIENT
Start: 2017-09-27 | End: 2017-10-02

## 2017-09-27 RX ORDER — INSULIN GLARGINE 100 [IU]/ML
22 INJECTION, SOLUTION SUBCUTANEOUS
Qty: 0 | Refills: 0 | Status: DISCONTINUED | OUTPATIENT
Start: 2017-09-27 | End: 2017-10-01

## 2017-09-27 RX ORDER — HEPARIN SODIUM 5000 [USP'U]/ML
INJECTION INTRAVENOUS; SUBCUTANEOUS
Qty: 25000 | Refills: 0 | Status: DISCONTINUED | OUTPATIENT
Start: 2017-09-27 | End: 2017-09-27

## 2017-09-27 RX ADMIN — TAMSULOSIN HYDROCHLORIDE 0.4 MILLIGRAM(S): 0.4 CAPSULE ORAL at 21:18

## 2017-09-27 RX ADMIN — ERYTHROPOIETIN 7000 UNIT(S): 10000 INJECTION, SOLUTION INTRAVENOUS; SUBCUTANEOUS at 06:45

## 2017-09-27 RX ADMIN — Medication 50 MILLIGRAM(S): at 17:15

## 2017-09-27 RX ADMIN — Medication: at 10:15

## 2017-09-27 RX ADMIN — Medication 2: at 17:17

## 2017-09-27 RX ADMIN — Medication 300 MILLIGRAM(S): at 05:17

## 2017-09-27 RX ADMIN — Medication 50 MILLIGRAM(S): at 10:08

## 2017-09-27 RX ADMIN — CYCLOSPORINE 100 MILLIGRAM(S): 100 CAPSULE ORAL at 13:00

## 2017-09-27 RX ADMIN — Medication 9 UNIT(S): at 12:58

## 2017-09-27 RX ADMIN — INSULIN GLARGINE 22 UNIT(S): 100 INJECTION, SOLUTION SUBCUTANEOUS at 10:06

## 2017-09-27 RX ADMIN — Medication 5 MILLIGRAM(S): at 05:16

## 2017-09-27 RX ADMIN — ATORVASTATIN CALCIUM 80 MILLIGRAM(S): 80 TABLET, FILM COATED ORAL at 21:18

## 2017-09-27 RX ADMIN — Medication 50 MILLIGRAM(S): at 05:17

## 2017-09-27 RX ADMIN — Medication 10 UNIT(S): at 10:15

## 2017-09-27 RX ADMIN — Medication 50 MILLIGRAM(S): at 10:05

## 2017-09-27 RX ADMIN — Medication 650 MILLIGRAM(S): at 21:29

## 2017-09-27 RX ADMIN — Medication 650 MILLIGRAM(S): at 20:45

## 2017-09-27 RX ADMIN — HEPARIN SODIUM 2000 UNIT(S)/HR: 5000 INJECTION INTRAVENOUS; SUBCUTANEOUS at 02:44

## 2017-09-27 RX ADMIN — Medication 9 UNIT(S): at 17:15

## 2017-09-27 RX ADMIN — Medication 2: at 13:00

## 2017-09-27 RX ADMIN — HEPARIN SODIUM 2000 UNIT(S)/HR: 5000 INJECTION INTRAVENOUS; SUBCUTANEOUS at 10:07

## 2017-09-27 NOTE — PROGRESS NOTE ADULT - PROBLEM SELECTOR PLAN 1
Pt. tolerating HD well. BP elevated during HD today. Continue with increased UF goal with HD as tolerated. BP meds may need to be adjusted, if BP remains elevated. RIJ HD catheter functioning well. Monitor BP and labs

## 2017-09-27 NOTE — PROGRESS NOTE ADULT - ASSESSMENT
53 y/o M with h/o CKD s/p kidney transplant in 1998, HTN, DM, legally blind admitted for uremic encephalopathy initiated on dialysis. Pt. seen during HD. /98 at time of dialysis rounds.

## 2017-09-27 NOTE — PROGRESS NOTE ADULT - SUBJECTIVE AND OBJECTIVE BOX
[facility] DIVISION OF KIDNEY DISEASES AND HYPERTENSION --   --------------------------------------------------------------------------------  Chief Complaint: ESRD/Ongoing hemodialysis requirement    HPI: 53 y/o M with h/o CKD s/p kidney transplant in 1998, HTN, DM, legally blind admitted for uremic encephalopathy initiated on dialysis. Pt. seen during HD. No complaints offered during HD. No complaints of CP, SOB, HA or dizziness.         PAST HISTORY  --------------------------------------------------------------------------------  No significant changes to PMH, PSH, FHx, SHx, unless otherwise noted    ALLERGIES & MEDICATIONS  --------------------------------------------------------------------------------  Allergies    No Known Allergies    Intolerances      Standing Inpatient Medications  predniSONE   Tablet 5 milliGRAM(s) Oral daily  tamsulosin 0.4 milliGRAM(s) Oral at bedtime  cycloSPORINE  (SandIMMUNE) 100 milliGRAM(s) Oral daily  atorvastatin 80 milliGRAM(s) Oral at bedtime  senna 2 Tablet(s) Oral at bedtime  docusate sodium 100 milliGRAM(s) Oral three times a day  epoetin valente Injectable 7000 Unit(s) IV Push <User Schedule>  diltiazem    milliGRAM(s) Oral daily  metoprolol 50 milliGRAM(s) Oral two times a day  insulin lispro (HumaLOG) corrective regimen sliding scale   SubCutaneous three times a day before meals  insulin lispro (HumaLOG) corrective regimen sliding scale   SubCutaneous at bedtime  dextrose 5%. 1000 milliLiter(s) IV Continuous <Continuous>  dextrose 50% Injectable 12.5 Gram(s) IV Push once  dextrose 50% Injectable 25 Gram(s) IV Push once  dextrose 50% Injectable 25 Gram(s) IV Push once  insulin lispro Injectable (HumaLOG) 10 Unit(s) SubCutaneous before breakfast  insulin lispro Injectable (HumaLOG) 9 Unit(s) SubCutaneous before lunch  insulin lispro Injectable (HumaLOG) 9 Unit(s) SubCutaneous before dinner  insulin lispro Injectable (HumaLOG) 2 Unit(s) SubCutaneous at bedtime  hydrALAZINE 50 milliGRAM(s) Oral three times a day  insulin glargine Injectable (LANTUS) 22 Unit(s) SubCutaneous every morning  heparin  Infusion. 2000 Unit(s)/Hr IV Continuous <Continuous>    PRN Inpatient Medications  naphazoline/pheniramine Solution 1 Drop(s) Both EYES three times a day PRN  dextrose Gel 1 Dose(s) Oral once PRN  glucagon  Injectable 1 milliGRAM(s) IntraMuscular once PRN  heparin  Injectable 7000 Unit(s) IV Push every 6 hours PRN  heparin  Injectable 3500 Unit(s) IV Push every 6 hours PRN      REVIEW OF SYSTEMS  --------------------------------------------------------------------------------  Gen: No fevers/chills  Respiratory: No dyspnea  CV: No chest pain  GI: No abdominal pain  MSK: Bilateral LE swelling+  Neuro: No dizziness/lightheadedness  VITALS/PHYSICAL EXAM  --------------------------------------------------------------------------------  T(C): 36.7 (09-27-17 @ 06:20), Max: 37.3 (09-26-17 @ 13:12)  HR: 77 (09-27-17 @ 06:20) (77 - 100)  BP: 173/93 (09-27-17 @ 06:20) (150/85 - 173/93)  RR: 18 (09-27-17 @ 06:20) (18 - 18)  SpO2: 99% (09-27-17 @ 05:15) (99% - 99%)  Wt(kg): --        09-27-17 @ 07:01  -  09-27-17 @ 08:44  --------------------------------------------------------  IN: 500 mL / OUT: 1500 mL / NET: -1000 mL      Physical Exam:  	Gen: well-appearing  	Pulm: CTA B/L  	CV: RRR, S1S2; no rub  	Abd: +BS, soft  	LE: bilateral LE pitting edema present  	Vascular access: Right IJ HD catheter site: no bleeding      LABS/STUDIES  --------------------------------------------------------------------------------              8.3    7.69  >-----------<  140      [09-27-17 @ 06:50]              26.8     136  |  99  |  43  ----------------------------<  210      [09-27-17 @ 06:50]  3.7   |  23  |  2.87        Ca     8.2     [09-27-17 @ 06:50]      Mg     1.6     [09-27-17 @ 06:50]      Phos  3.7     [09-27-17 @ 06:50]    PTT: 84.6       [09-27-17 @ 01:30]    Iron 21, TIBC 187, %sat --      [09-09-17 @ 06:55]  Ferritin 587.5      [09-09-17 @ 06:55]  .9 (Ca --)      [09-10-17 @ 05:10]   --  HbA1c 8.0      [08-16-17 @ 06:30]  TSH 2.88      [09-03-17 @ 13:45]  Lipid: chol 137, TG 97, HDL 52, LDL 68      [08-11-17 @ 07:20]    HBsAb <3.0      [09-03-17 @ 18:00]  HBsAg NEGATIVE      [09-03-17 @ 18:00]  HCV 14.48, Reactive      [09-03-17 @ 18:00]

## 2017-09-27 NOTE — PROGRESS NOTE ADULT - PROBLEM SELECTOR PLAN 3
Hyperphosphatemia in setting of renal failure. Serum phosphorus in acceptable range. Monitor serum phosphorus.

## 2017-09-27 NOTE — PROGRESS NOTE ADULT - SUBJECTIVE AND OBJECTIVE BOX
Patient returned from dialysis. Vital signs stable. Feels well. No chest pain, shortness of breath or palpitations during or after his treatment.    Vital Signs Last 24 Hrs  T(C): 36.7 (27 Sep 2017 06:20), Max: 37.3 (26 Sep 2017 13:12)  T(F): 98 (27 Sep 2017 06:20), Max: 99.2 (26 Sep 2017 13:12)  HR: 91 (27 Sep 2017 09:49) (77 - 100)  BP: 170/83 (27 Sep 2017 09:49) (150/85 - 173/93)  BP(mean): --  RR: 18 (27 Sep 2017 06:20) (18 - 18)  SpO2: 99% (27 Sep 2017 05:15) (99% - 99%)      Telemetry:  NSR 70's-100bpm     MEDICATIONS  (STANDING):  predniSONE   Tablet 5 milliGRAM(s) Oral daily  tamsulosin 0.4 milliGRAM(s) Oral at bedtime  cycloSPORINE  (SandIMMUNE) 100 milliGRAM(s) Oral daily  atorvastatin 80 milliGRAM(s) Oral at bedtime  senna 2 Tablet(s) Oral at bedtime  docusate sodium 100 milliGRAM(s) Oral three times a day  epoetin valente Injectable 7000 Unit(s) IV Push <User Schedule>  diltiazem    milliGRAM(s) Oral daily  metoprolol 50 milliGRAM(s) Oral two times a day  insulin lispro (HumaLOG) corrective regimen sliding scale   SubCutaneous three times a day before meals  insulin lispro (HumaLOG) corrective regimen sliding scale   SubCutaneous at bedtime  dextrose 5%. 1000 milliLiter(s) (50 mL/Hr) IV Continuous <Continuous>  dextrose 50% Injectable 12.5 Gram(s) IV Push once  dextrose 50% Injectable 25 Gram(s) IV Push once  dextrose 50% Injectable 25 Gram(s) IV Push once  insulin lispro Injectable (HumaLOG) 10 Unit(s) SubCutaneous before breakfast  insulin lispro Injectable (HumaLOG) 9 Unit(s) SubCutaneous before lunch  insulin lispro Injectable (HumaLOG) 9 Unit(s) SubCutaneous before dinner  insulin lispro Injectable (HumaLOG) 2 Unit(s) SubCutaneous at bedtime  hydrALAZINE 50 milliGRAM(s) Oral three times a day  insulin glargine Injectable (LANTUS) 22 Unit(s) SubCutaneous every morning  heparin  Infusion. 2000 Unit(s)/Hr (20 mL/Hr) IV Continuous <Continuous>    MEDICATIONS  (PRN):  naphazoline/pheniramine Solution 1 Drop(s) Both EYES three times a day PRN eye discomfort  dextrose Gel 1 Dose(s) Oral once PRN Blood Glucose LESS THAN 70 milliGRAM(s)/deciLiter  glucagon  Injectable 1 milliGRAM(s) IntraMuscular once PRN Glucose <70 milliGRAM(s)/deciLiter  heparin  Injectable 7000 Unit(s) IV Push every 6 hours PRN For aPTT less than 40  heparin  Injectable 3500 Unit(s) IV Push every 6 hours PRN For aPTT between 40 - 57          Physical exam:   Gen- Alert NAD  Resp- CTA bilaterally. No wheeze  CV- S1 and S2 normal RRR    No JVD    RIJ HD cath C/D/I  ABD- soft nontender +bowel sounds  EXT- lower extremities remain edematous  Neuro A&O x3                            8.3    7.69  )-----------( 140      ( 27 Sep 2017 06:50 )             26.8     PTT - ( 27 Sep 2017 01:30 )  PTT:84.6 SEC  09-27    136  |  99  |  43<H>  ----------------------------<  210<H>  3.7   |  23  |  2.87<H>    Ca    8.2<L>      27 Sep 2017 06:50  Phos  3.7     09-27  Mg     1.6     09-27

## 2017-09-27 NOTE — PROGRESS NOTE ADULT - ASSESSMENT
52yMale w/ a PMHx of CKD s/p renal transplant (1998) on cyclosporine and mycophenolate, HTN, type I DM, legally blind  who presented with AMS and uremic encephalopathy.  Subsequently had PEA arrest secondary to hyperkalemia requiring emergent HD on 9/03. +Pericardial effusion with atrial fibrillation, rate controlled with Cardizem 300mg daily and metoprolol 50 mg bid. On 9/21 at 4:02 am, patient self converted to and has remained in NSR. He has been continued on Cardizem and metoprolol. Consider dose reduction post AVF surgery which is planned for October 2. To remain on Heparin. Coumadin on hold.  Continue telemetry.    Repeat echocardiogram with small pericardial effusion posterior and lateral to left ventricle. Right pleural effusion noted.

## 2017-09-27 NOTE — PROGRESS NOTE ADULT - PROBLEM SELECTOR PLAN 1
-self-converted to NSR 9/21  -currently on PO Cardizem, metoprolol 50 bid w/ hold parameters.  -c/w hep gtt, holding coumadin as pt. is planned for AVF this week  -appreciate EP recs - Repeat TTE 9/25 demonstrated small pericardial effusion and small right pleural effusion.   - TTE w/ EF 65%  -continue lopressor and cardizem when NPO and before HD sessions given he has tendency to go into afib w/ RVR if he misses doses  - cardiology clearance reviewed in sunrise for AVF

## 2017-09-27 NOTE — PROGRESS NOTE ADULT - ATTENDING COMMENTS
Patient seen and examined. Agree with above note by resident.    1. ESRD on HD - need AVF placement prior to DC. Will reach out to vascular team to schedule procedure. currently planned for 10/2/17. C/w Hd per renal.   2. DM2 uncontrolled - FS are labile. Continue to monitor.   3. HTN - uncontrolled. Goal BP<140. Titrate hydralazine to 50TID  4. renal transplant status - c/w Cyclosporin 100mg daily and prednisone. Tapered off cellcept.   5. Afib -  rate control with cardizem. on heparin gtt. After AVF placement, will start coumadin.     Plan discussed with pt .

## 2017-09-27 NOTE — PROGRESS NOTE ADULT - PROBLEM SELECTOR PLAN 5
-home regimen clonidine 0.1mg po q8h, nifedipine XR 90 mg qD, Furosemide 10 mg daily - holding for now  -c/w Cardizem and metoprolol and hydralazine

## 2017-09-27 NOTE — PROGRESS NOTE ADULT - SUBJECTIVE AND OBJECTIVE BOX
Patient is a 52y old  Male who presents with a chief complaint of Altered mental status (10 Sep 2017 23:23)      OVERNIGHT EVENTS: No acute events overnight.  SUBJECTIVE: Patient seen and examined at bedside in dialysis, denies current complaints.      MEDICATIONS  (STANDING):  predniSONE   Tablet 5 milliGRAM(s) Oral daily  tamsulosin 0.4 milliGRAM(s) Oral at bedtime  cycloSPORINE  (SandIMMUNE) 100 milliGRAM(s) Oral daily  atorvastatin 80 milliGRAM(s) Oral at bedtime  senna 2 Tablet(s) Oral at bedtime  docusate sodium 100 milliGRAM(s) Oral three times a day  epoetin valente Injectable 7000 Unit(s) IV Push <User Schedule>  diltiazem    milliGRAM(s) Oral daily  metoprolol 50 milliGRAM(s) Oral two times a day  insulin lispro (HumaLOG) corrective regimen sliding scale   SubCutaneous three times a day before meals  insulin lispro (HumaLOG) corrective regimen sliding scale   SubCutaneous at bedtime  dextrose 5%. 1000 milliLiter(s) (50 mL/Hr) IV Continuous <Continuous>  dextrose 50% Injectable 12.5 Gram(s) IV Push once  dextrose 50% Injectable 25 Gram(s) IV Push once  dextrose 50% Injectable 25 Gram(s) IV Push once  insulin lispro Injectable (HumaLOG) 10 Unit(s) SubCutaneous before breakfast  insulin lispro Injectable (HumaLOG) 9 Unit(s) SubCutaneous before lunch  insulin lispro Injectable (HumaLOG) 9 Unit(s) SubCutaneous before dinner  insulin lispro Injectable (HumaLOG) 2 Unit(s) SubCutaneous at bedtime  hydrALAZINE 50 milliGRAM(s) Oral three times a day  insulin glargine Injectable (LANTUS) 22 Unit(s) SubCutaneous every morning  heparin  Infusion.  Unit(s)/Hr (16 mL/Hr) IV Continuous <Continuous>  heparin  Injectable 7000 Unit(s) IV Push once    MEDICATIONS  (PRN):  naphazoline/pheniramine Solution 1 Drop(s) Both EYES three times a day PRN eye discomfort  dextrose Gel 1 Dose(s) Oral once PRN Blood Glucose LESS THAN 70 milliGRAM(s)/deciLiter  glucagon  Injectable 1 milliGRAM(s) IntraMuscular once PRN Glucose <70 milliGRAM(s)/deciLiter  heparin  Injectable 7000 Unit(s) IV Push every 6 hours PRN For aPTT less than 40  heparin  Injectable 3500 Unit(s) IV Push every 6 hours PRN For aPTT between 40 - 57      T(C): 36.7 (09-27-17 @ 06:20), Max: 37.3 (09-26-17 @ 13:12)  HR: 77 (09-27-17 @ 06:20) (77 - 100)  BP: 173/93 (09-27-17 @ 06:20) (150/85 - 173/93)  RR: 18 (09-27-17 @ 06:20) (18 - 18)  SpO2: 99% (09-27-17 @ 05:15) (99% - 99%)  CAPILLARY BLOOD GLUCOSE  208 (27 Sep 2017 05:15)  220 (27 Sep 2017 02:00)  74 (26 Sep 2017 22:06)  200 (26 Sep 2017 17:05)  414 (26 Sep 2017 15:45)  243 (26 Sep 2017 12:41)  300 (26 Sep 2017 08:55)      PHYSICAL EXAM  GENERAL: NAD, well-developed  HEAD:  Atraumatic, Normocephalic  EYES: EOMI, PERRLA, conjunctiva and sclera clear  CHEST/LUNG: Clear to auscultation bilaterally; No wheeze  HEART: +S1 +S2, Regular rate and rhythm  ABDOMEN: Soft, Nontender, Nondistended; Bowel sounds present  NEURO: No focal neurologic deficits, sensation and motor function grossly intact.   EXTREMITIES:  Pink and warm, Peripheral Pulses intact. +B/L LE edema. +RIJ HD Cath, C/D/I.         LABS:                        8.3    7.69  )-----------( 140      ( 27 Sep 2017 06:50 )             26.8     09-27    136  |  99  |  43<H>  ----------------------------<  210<H>  3.7   |  23  |  2.87<H>    Ca    8.2<L>      27 Sep 2017 06:50  Phos  3.7     09-27  Mg     1.6     09-27      PTT - ( 27 Sep 2017 01:30 )  PTT:84.6 SEC          RADIOLOGY & ADDITIONAL TESTS:    Imaging Personally Reviewed:  Consultant(s) Notes Reviewed:    Care Discussed with Consultants/Other Providers:      Pooja Steen D.O.  Medicine Intern  pager [phone]

## 2017-09-27 NOTE — PROGRESS NOTE ADULT - PROBLEM SELECTOR PLAN 6
Home regime: lantus 22u at home, home pravastatin 10mg daily.  -HbA1C 8  -Endo follow up appreciated.   -will give lantus 22 units in AM and humalog 10/9/9/2. Will trial giving 25u Lantus on mornings he doesn't have hemodialysis  -atorvastatin 80mg daily

## 2017-09-28 LAB
APTT BLD: 126.8 SEC — CRITICAL HIGH (ref 27.5–37.4)
APTT BLD: 31.8 SEC — SIGNIFICANT CHANGE UP (ref 27.5–37.4)
APTT BLD: 72.4 SEC — HIGH (ref 27.5–37.4)
APTT BLD: 74.6 SEC — HIGH (ref 27.5–37.4)
BUN SERPL-MCNC: 38 MG/DL — HIGH (ref 7–23)
CALCIUM SERPL-MCNC: 8.3 MG/DL — LOW (ref 8.4–10.5)
CHLORIDE SERPL-SCNC: 98 MMOL/L — SIGNIFICANT CHANGE UP (ref 98–107)
CO2 SERPL-SCNC: 23 MMOL/L — SIGNIFICANT CHANGE UP (ref 22–31)
CREAT SERPL-MCNC: 2.87 MG/DL — HIGH (ref 0.5–1.3)
GLUCOSE SERPL-MCNC: 294 MG/DL — HIGH (ref 70–99)
HCT VFR BLD CALC: 29.8 % — LOW (ref 39–50)
HGB BLD-MCNC: 9 G/DL — LOW (ref 13–17)
MAGNESIUM SERPL-MCNC: 1.8 MG/DL — SIGNIFICANT CHANGE UP (ref 1.6–2.6)
MCHC RBC-ENTMCNC: 26.9 PG — LOW (ref 27–34)
MCHC RBC-ENTMCNC: 30.2 % — LOW (ref 32–36)
MCV RBC AUTO: 89.2 FL — SIGNIFICANT CHANGE UP (ref 80–100)
NRBC # FLD: 0.04 — SIGNIFICANT CHANGE UP
PHOSPHATE SERPL-MCNC: 3.4 MG/DL — SIGNIFICANT CHANGE UP (ref 2.5–4.5)
PLATELET # BLD AUTO: 123 K/UL — LOW (ref 150–400)
PMV BLD: 10.1 FL — SIGNIFICANT CHANGE UP (ref 7–13)
POTASSIUM SERPL-MCNC: 4.1 MMOL/L — SIGNIFICANT CHANGE UP (ref 3.5–5.3)
POTASSIUM SERPL-SCNC: 4.1 MMOL/L — SIGNIFICANT CHANGE UP (ref 3.5–5.3)
RBC # BLD: 3.34 M/UL — LOW (ref 4.2–5.8)
RBC # FLD: 14.5 % — SIGNIFICANT CHANGE UP (ref 10.3–14.5)
SODIUM SERPL-SCNC: 135 MMOL/L — SIGNIFICANT CHANGE UP (ref 135–145)
WBC # BLD: 9.34 K/UL — SIGNIFICANT CHANGE UP (ref 3.8–10.5)
WBC # FLD AUTO: 9.34 K/UL — SIGNIFICANT CHANGE UP (ref 3.8–10.5)

## 2017-09-28 PROCEDURE — 99233 SBSQ HOSP IP/OBS HIGH 50: CPT | Mod: GC

## 2017-09-28 PROCEDURE — 99233 SBSQ HOSP IP/OBS HIGH 50: CPT

## 2017-09-28 RX ORDER — INSULIN GLARGINE 100 [IU]/ML
26 INJECTION, SOLUTION SUBCUTANEOUS
Qty: 0 | Refills: 0 | Status: DISCONTINUED | OUTPATIENT
Start: 2017-09-28 | End: 2017-10-01

## 2017-09-28 RX ORDER — INSULIN LISPRO 100/ML
3 VIAL (ML) SUBCUTANEOUS AT BEDTIME
Qty: 0 | Refills: 0 | Status: DISCONTINUED | OUTPATIENT
Start: 2017-09-28 | End: 2017-10-01

## 2017-09-28 RX ADMIN — Medication 4: at 12:55

## 2017-09-28 RX ADMIN — Medication 3 UNIT(S): at 22:19

## 2017-09-28 RX ADMIN — TAMSULOSIN HYDROCHLORIDE 0.4 MILLIGRAM(S): 0.4 CAPSULE ORAL at 21:23

## 2017-09-28 RX ADMIN — HEPARIN SODIUM 1800 UNIT(S)/HR: 5000 INJECTION INTRAVENOUS; SUBCUTANEOUS at 08:29

## 2017-09-28 RX ADMIN — Medication 50 MILLIGRAM(S): at 21:23

## 2017-09-28 RX ADMIN — HEPARIN SODIUM 1800 UNIT(S)/HR: 5000 INJECTION INTRAVENOUS; SUBCUTANEOUS at 23:43

## 2017-09-28 RX ADMIN — Medication 5 MILLIGRAM(S): at 05:30

## 2017-09-28 RX ADMIN — Medication 1 DROP(S): at 11:10

## 2017-09-28 RX ADMIN — ATORVASTATIN CALCIUM 80 MILLIGRAM(S): 80 TABLET, FILM COATED ORAL at 21:23

## 2017-09-28 RX ADMIN — Medication 9 UNIT(S): at 12:56

## 2017-09-28 RX ADMIN — Medication 50 MILLIGRAM(S): at 05:30

## 2017-09-28 RX ADMIN — INSULIN GLARGINE 25 UNIT(S): 100 INJECTION, SOLUTION SUBCUTANEOUS at 09:11

## 2017-09-28 RX ADMIN — Medication 50 MILLIGRAM(S): at 12:55

## 2017-09-28 RX ADMIN — Medication 2 UNIT(S): at 04:08

## 2017-09-28 RX ADMIN — Medication 50 MILLIGRAM(S): at 17:37

## 2017-09-28 RX ADMIN — Medication 10 UNIT(S): at 09:10

## 2017-09-28 RX ADMIN — Medication 2: at 17:49

## 2017-09-28 RX ADMIN — CYCLOSPORINE 100 MILLIGRAM(S): 100 CAPSULE ORAL at 11:11

## 2017-09-28 RX ADMIN — HEPARIN SODIUM 1800 UNIT(S)/HR: 5000 INJECTION INTRAVENOUS; SUBCUTANEOUS at 15:36

## 2017-09-28 RX ADMIN — Medication 50 MILLIGRAM(S): at 05:29

## 2017-09-28 RX ADMIN — Medication 9 UNIT(S): at 17:50

## 2017-09-28 RX ADMIN — Medication 4: at 09:09

## 2017-09-28 RX ADMIN — Medication 300 MILLIGRAM(S): at 05:30

## 2017-09-28 NOTE — PROGRESS NOTE ADULT - ATTENDING COMMENTS
Patient seen and examined. Agree with above note by resident.    1. ESRD on HD - need AVF placement prior to DC. Procedure planned for  10/2/17  2. DM2 uncontrolled - FS are labile. Continue to monitor.   3. HTN - uncontrolled. Goal BP<140. Titrate hydralazine to 50TID  4. renal transplant status - c/w Cyclosporin 100mg daily and prednisone. Tapered off cellcept.   5. Afib -  rate control with cardizem. on heparin gtt. After AVF placement, will start coumadin.     Plan discussed with pt .

## 2017-09-28 NOTE — PROGRESS NOTE ADULT - PROBLEM SELECTOR PLAN 1
Patient noted with trend of lower glucose on HD days and higher glucose on non-HD days.  Would continue Lantus 22u qAM on HD days (mon/wed/fri).  Would increase Lantus to 26u qAM on non-HD days.  Need to clarify if patient will be receiving extra HD session on Saturday in order to plan for correct insulin dose.  For Monday AM if surgery is scheduled would give him one time dose of Lantus 20u Monday AM for NPO status.  Would also increase bedtime snack Humalog to 3 units.  Continue Humalog 10/9/9 for 3 main meals.  Continue low correction scale qac and low bedtime scale.  Patient requests that nursing wake him up at midnight to eat his bedtime snack.  Patient is optimized for AVF surgery from diabetes standpoint.     D/c with basal/bolus dose TBD  Outpatient endocrine follow up: appointment pending with Dr Baker

## 2017-09-28 NOTE — PROGRESS NOTE ADULT - ASSESSMENT
52yMale w/ a PMHx of CKD s/p renal transplant (1998) on cyclosporine and mycophenolate, HTN, type I DM, legally blind  who presented with AMS and uremic encephalopathy.  Subsequently had PEA arrest secondary to hyperkalemia requiring emergent HD on 9/03. +Pericardial effusion with atrial fibrillation, rate controlled with Cardizem 300mg daily and metoprolol 50 mg bid. On 9/21 at 4:02 am, patient self converted to and has remained in NSR.  He has been continued on Cardizem and metoprolol. Consider dose reduction post AVF surgery which is planned for October 2.   Continue telemetry.   Coumadin on hold until post AVF surgery. Continue heparin gtt with PTT goal 50-70.

## 2017-09-28 NOTE — PROGRESS NOTE ADULT - SUBJECTIVE AND OBJECTIVE BOX
Patient denies chest discomfort, lightheadedness, palpitations or shortness of breath. Very upset about morning elevated blood sugars.    Vital Signs Last 24 Hrs  T(C): 37.2 (28 Sep 2017 05:28), Max: 37.4 (27 Sep 2017 14:26)  T(F): 99 (28 Sep 2017 05:28), Max: 99.3 (27 Sep 2017 14:26)  HR: 95 (28 Sep 2017 05:28) (95 - 101)  BP: 159/87 (28 Sep 2017 05:28) (124/76 - 159/87)  BP(mean): --  RR: 18 (28 Sep 2017 05:28) (18 - 18)  SpO2: 100% (28 Sep 2017 05:28) (96% - 100%)    Telemetry:  Normal sinus rhythm 80-90's.    MEDICATIONS  (STANDING):  predniSONE   Tablet 5 milliGRAM(s) Oral daily  tamsulosin 0.4 milliGRAM(s) Oral at bedtime  cycloSPORINE  (SandIMMUNE) 100 milliGRAM(s) Oral daily  atorvastatin 80 milliGRAM(s) Oral at bedtime  senna 2 Tablet(s) Oral at bedtime  docusate sodium 100 milliGRAM(s) Oral three times a day  epoetin valente Injectable 7000 Unit(s) IV Push <User Schedule>  diltiazem    milliGRAM(s) Oral daily  metoprolol 50 milliGRAM(s) Oral two times a day  insulin lispro (HumaLOG) corrective regimen sliding scale   SubCutaneous three times a day before meals  insulin lispro (HumaLOG) corrective regimen sliding scale   SubCutaneous at bedtime  dextrose 5%. 1000 milliLiter(s) (50 mL/Hr) IV Continuous <Continuous>  dextrose 50% Injectable 12.5 Gram(s) IV Push once  dextrose 50% Injectable 25 Gram(s) IV Push once  dextrose 50% Injectable 25 Gram(s) IV Push once  insulin lispro Injectable (HumaLOG) 10 Unit(s) SubCutaneous before breakfast  insulin lispro Injectable (HumaLOG) 9 Unit(s) SubCutaneous before lunch  insulin lispro Injectable (HumaLOG) 9 Unit(s) SubCutaneous before dinner  insulin lispro Injectable (HumaLOG) 2 Unit(s) SubCutaneous at bedtime  hydrALAZINE 50 milliGRAM(s) Oral three times a day  heparin  Infusion. 2000 Unit(s)/Hr (20 mL/Hr) IV Continuous <Continuous>  insulin glargine Injectable (LANTUS) 22 Unit(s) SubCutaneous <User Schedule>  insulin glargine Injectable (LANTUS) 25 Unit(s) SubCutaneous <User Schedule>    MEDICATIONS  (PRN):  naphazoline/pheniramine Solution 1 Drop(s) Both EYES three times a day PRN eye discomfort  dextrose Gel 1 Dose(s) Oral once PRN Blood Glucose LESS THAN 70 milliGRAM(s)/deciLiter  glucagon  Injectable 1 milliGRAM(s) IntraMuscular once PRN Glucose <70 milliGRAM(s)/deciLiter  heparin  Injectable 7000 Unit(s) IV Push every 6 hours PRN For aPTT less than 40  heparin  Injectable 3500 Unit(s) IV Push every 6 hours PRN For aPTT between 40 - 57  acetaminophen   Tablet. 650 milliGRAM(s) Oral every 6 hours PRN Moderate Pain (4 - 6)          Physical exam:   Gen- NAD.   Resp- CTA bilaterally  CV- Normal S1 and S2 RRR  ABD- not examined.  EXT- significantly less edema lower legs bilaterally  Neuro nonfocal                            9.0    9.34  )-----------( 123      ( 28 Sep 2017 07:15 )             29.8     PTT - ( 28 Sep 2017 07:15 )  PTT:126.8 SEC  09-28    135  |  98  |  38<H>  ----------------------------<  294<H>  4.1   |  23  |  2.87<H>    Ca    8.3<L>      28 Sep 2017 07:15  Phos  3.4     09-28  Mg     1.8     09-28        Echocardiogram:  Limited study to re-evaluate pericardial effusion.  Small  pericardial effusion posterior and lateral to the LV.  A  right pleural effusion is noted.  ------------------------------------------------------------------------  Confirmed on  9/25/2017 - 19:08:15 by Osorio Funes M.D.  ------------------------------------------------------------------------

## 2017-09-28 NOTE — PROGRESS NOTE ADULT - SUBJECTIVE AND OBJECTIVE BOX
Chief Complaint: DM1    History: Patient reports eating his "bedtime snack" late at 4AM this morning.  Denies hypoglycemic symptoms.  He reports possible OR for Monday for AV fistula.    MEDICATIONS  (STANDING):  predniSONE   Tablet 5 milliGRAM(s) Oral daily  tamsulosin 0.4 milliGRAM(s) Oral at bedtime  cycloSPORINE  (SandIMMUNE) 100 milliGRAM(s) Oral daily  atorvastatin 80 milliGRAM(s) Oral at bedtime  senna 2 Tablet(s) Oral at bedtime  docusate sodium 100 milliGRAM(s) Oral three times a day  epoetin valente Injectable 7000 Unit(s) IV Push <User Schedule>  diltiazem    milliGRAM(s) Oral daily  metoprolol 50 milliGRAM(s) Oral two times a day  insulin lispro (HumaLOG) corrective regimen sliding scale   SubCutaneous three times a day before meals  insulin lispro (HumaLOG) corrective regimen sliding scale   SubCutaneous at bedtime  dextrose 5%. 1000 milliLiter(s) (50 mL/Hr) IV Continuous <Continuous>  dextrose 50% Injectable 12.5 Gram(s) IV Push once  dextrose 50% Injectable 25 Gram(s) IV Push once  dextrose 50% Injectable 25 Gram(s) IV Push once  insulin lispro Injectable (HumaLOG) 10 Unit(s) SubCutaneous before breakfast  insulin lispro Injectable (HumaLOG) 9 Unit(s) SubCutaneous before lunch  insulin lispro Injectable (HumaLOG) 9 Unit(s) SubCutaneous before dinner  insulin lispro Injectable (HumaLOG) 2 Unit(s) SubCutaneous at bedtime  hydrALAZINE 50 milliGRAM(s) Oral three times a day  heparin  Infusion. 2000 Unit(s)/Hr (20 mL/Hr) IV Continuous <Continuous>  insulin glargine Injectable (LANTUS) 22 Unit(s) SubCutaneous <User Schedule>  insulin glargine Injectable (LANTUS) 25 Unit(s) SubCutaneous <User Schedule>    MEDICATIONS  (PRN):  naphazoline/pheniramine Solution 1 Drop(s) Both EYES three times a day PRN eye discomfort  dextrose Gel 1 Dose(s) Oral once PRN Blood Glucose LESS THAN 70 milliGRAM(s)/deciLiter  glucagon  Injectable 1 milliGRAM(s) IntraMuscular once PRN Glucose <70 milliGRAM(s)/deciLiter  heparin  Injectable 7000 Unit(s) IV Push every 6 hours PRN For aPTT less than 40  heparin  Injectable 3500 Unit(s) IV Push every 6 hours PRN For aPTT between 40 - 57  acetaminophen   Tablet. 650 milliGRAM(s) Oral every 6 hours PRN Moderate Pain (4 - 6)      Allergies    No Known Allergies    Intolerances      Review of Systems:  ALL OTHER SYSTEMS REVIEWED AND NEGATIVE      PHYSICAL EXAM:  VITALS: T(C): 36.8 (09-28-17 @ 14:00)  T(F): 98.3 (09-28-17 @ 14:00), Max: 99.1 (09-27-17 @ 21:15)  HR: 93 (09-28-17 @ 14:00) (92 - 97)  BP: 121/64 (09-28-17 @ 14:00) (121/64 - 159/87)  RR:  (18 - 18)  SpO2:  (99% - 100%)  Wt(kg): --  GENERAL: NAD, well-groomed, well-developed  EYES: blind  HEENT:  Atraumatic, Normocephalic, moist mucous membranes  NEURO: no tremor  CV: + LE edema  PSYCH: Alert and oriented x 3, normal affect, normal mood    CAPILLARY BLOOD GLUCOSE  327 (09-28 @ 12:46)  344 (09-28 @ 08:48)  283 (09-28 @ 04:06)  185 (09-27 @ 21:54)  214 (09-27 @ 17:14)  250 (09-27 @ 12:37)  143 (09-27 @ 08:19)  208 (09-27 @ 05:15)  220 (09-27 @ 02:00)  74 (09-26 @ 22:06)  200 (09-26 @ 17:05)  414 (09-26 @ 15:45)  243 (09-26 @ 12:41)  300 (09-26 @ 08:55)  143 (09-25 @ 21:27)  202 (09-25 @ 16:59)      09-28    135  |  98  |  38<H>  ----------------------------<  294<H>  4.1   |  23  |  2.87<H>    EGFR if : 28  EGFR if non : 24    Ca    8.3<L>      09-28  Mg     1.8     09-28  Phos  3.4     09-28            Thyroid Function Tests:  09-03 @ 13:45 TSH 2.88 FreeT4 0.70 T3 71.7 Anti TPO -- Anti Thyroglobulin Ab -- TSI --      Hemoglobin A1C, Whole Blood: 8.0 % <H> [4.0 - 5.6] (08-16-17 @ 06:30)

## 2017-09-28 NOTE — PROGRESS NOTE ADULT - SUBJECTIVE AND OBJECTIVE BOX
Patient is a 52y old  Male who presents with a chief complaint of Altered mental status (10 Sep 2017 23:23)      OVERNIGHT EVENTS: No acute events overnight.  SUBJECTIVE: Patient seen and examined at bedside. Denies current complaints.       MEDICATIONS  (STANDING):  predniSONE   Tablet 5 milliGRAM(s) Oral daily  tamsulosin 0.4 milliGRAM(s) Oral at bedtime  cycloSPORINE  (SandIMMUNE) 100 milliGRAM(s) Oral daily  atorvastatin 80 milliGRAM(s) Oral at bedtime  senna 2 Tablet(s) Oral at bedtime  docusate sodium 100 milliGRAM(s) Oral three times a day  epoetin valente Injectable 7000 Unit(s) IV Push <User Schedule>  diltiazem    milliGRAM(s) Oral daily  metoprolol 50 milliGRAM(s) Oral two times a day  insulin lispro (HumaLOG) corrective regimen sliding scale   SubCutaneous three times a day before meals  insulin lispro (HumaLOG) corrective regimen sliding scale   SubCutaneous at bedtime  dextrose 5%. 1000 milliLiter(s) (50 mL/Hr) IV Continuous <Continuous>  dextrose 50% Injectable 12.5 Gram(s) IV Push once  dextrose 50% Injectable 25 Gram(s) IV Push once  dextrose 50% Injectable 25 Gram(s) IV Push once  insulin lispro Injectable (HumaLOG) 10 Unit(s) SubCutaneous before breakfast  insulin lispro Injectable (HumaLOG) 9 Unit(s) SubCutaneous before lunch  insulin lispro Injectable (HumaLOG) 9 Unit(s) SubCutaneous before dinner  insulin lispro Injectable (HumaLOG) 2 Unit(s) SubCutaneous at bedtime  hydrALAZINE 50 milliGRAM(s) Oral three times a day  heparin  Infusion. 2000 Unit(s)/Hr (20 mL/Hr) IV Continuous <Continuous>  insulin glargine Injectable (LANTUS) 22 Unit(s) SubCutaneous <User Schedule>  insulin glargine Injectable (LANTUS) 25 Unit(s) SubCutaneous <User Schedule>    MEDICATIONS  (PRN):  naphazoline/pheniramine Solution 1 Drop(s) Both EYES three times a day PRN eye discomfort  dextrose Gel 1 Dose(s) Oral once PRN Blood Glucose LESS THAN 70 milliGRAM(s)/deciLiter  glucagon  Injectable 1 milliGRAM(s) IntraMuscular once PRN Glucose <70 milliGRAM(s)/deciLiter  heparin  Injectable 7000 Unit(s) IV Push every 6 hours PRN For aPTT less than 40  heparin  Injectable 3500 Unit(s) IV Push every 6 hours PRN For aPTT between 40 - 57  acetaminophen   Tablet. 650 milliGRAM(s) Oral every 6 hours PRN Moderate Pain (4 - 6)      T(C): 37.2 (09-28-17 @ 05:28), Max: 37.4 (09-27-17 @ 14:26)  HR: 95 (09-28-17 @ 05:28) (91 - 101)  BP: 159/87 (09-28-17 @ 05:28) (124/76 - 175/89)  RR: 18 (09-28-17 @ 05:28) (18 - 18)  SpO2: 100% (09-28-17 @ 05:28) (96% - 100%)  CAPILLARY BLOOD GLUCOSE  283 (28 Sep 2017 04:06)  185 (27 Sep 2017 21:54)  214 (27 Sep 2017 17:14)  250 (27 Sep 2017 12:37)  143 (27 Sep 2017 08:19)        I&O's Summary    27 Sep 2017 07:01  -  28 Sep 2017 07:00  --------------------------------------------------------  IN: 900 mL / OUT: 4800 mL / NET: -3900 mL        PHYSICAL EXAM  GENERAL: NAD, well-developed  HEAD:  Atraumatic, Normocephalic  EYES: EOMI, PERRLA, conjunctiva and sclera clear  CHEST/LUNG: Clear to auscultation bilaterally; No wheeze  HEART: +S1 +S2, Regular rate and rhythm  ABDOMEN: Soft, Nontender, Nondistended; Bowel sounds present  NEURO: No focal neurologic deficits, sensation and motor function grossly intact.   EXTREMITIES:  Pink and warm, Peripheral Pulses intact. +B/L LE edema. +RIJ HD Cath, C/D/I.         LABS:                        8.3    7.69  )-----------( 140      ( 27 Sep 2017 06:50 )             26.8     09-27    136  |  99  |  43<H>  ----------------------------<  210<H>  3.7   |  23  |  2.87<H>    Ca    8.2<L>      27 Sep 2017 06:50  Phos  3.7     09-27  Mg     1.6     09-27      PTT - ( 27 Sep 2017 01:30 )  PTT:84.6 SEC          RADIOLOGY & ADDITIONAL TESTS:    Imaging Personally Reviewed:  Consultant(s) Notes Reviewed:    Care Discussed with Consultants/Other Providers:      Pooja Steen D.O.  Medicine Intern  pager [phone]

## 2017-09-29 LAB
APTT BLD: 85.6 SEC — HIGH (ref 27.5–37.4)
BUN SERPL-MCNC: 47 MG/DL — HIGH (ref 7–23)
CALCIUM SERPL-MCNC: 8.6 MG/DL — SIGNIFICANT CHANGE UP (ref 8.4–10.5)
CHLORIDE SERPL-SCNC: 97 MMOL/L — LOW (ref 98–107)
CO2 SERPL-SCNC: 24 MMOL/L — SIGNIFICANT CHANGE UP (ref 22–31)
CREAT SERPL-MCNC: 3.3 MG/DL — HIGH (ref 0.5–1.3)
GLUCOSE SERPL-MCNC: 240 MG/DL — HIGH (ref 70–99)
HCT VFR BLD CALC: 28.1 % — LOW (ref 39–50)
HGB BLD-MCNC: 8.5 G/DL — LOW (ref 13–17)
MAGNESIUM SERPL-MCNC: 1.7 MG/DL — SIGNIFICANT CHANGE UP (ref 1.6–2.6)
MCHC RBC-ENTMCNC: 27.2 PG — SIGNIFICANT CHANGE UP (ref 27–34)
MCHC RBC-ENTMCNC: 30.2 % — LOW (ref 32–36)
MCV RBC AUTO: 89.8 FL — SIGNIFICANT CHANGE UP (ref 80–100)
NRBC # FLD: 0 — SIGNIFICANT CHANGE UP
PHOSPHATE SERPL-MCNC: 4.4 MG/DL — SIGNIFICANT CHANGE UP (ref 2.5–4.5)
PLATELET # BLD AUTO: 147 K/UL — LOW (ref 150–400)
PMV BLD: 10.1 FL — SIGNIFICANT CHANGE UP (ref 7–13)
POTASSIUM SERPL-MCNC: 3.9 MMOL/L — SIGNIFICANT CHANGE UP (ref 3.5–5.3)
POTASSIUM SERPL-SCNC: 3.9 MMOL/L — SIGNIFICANT CHANGE UP (ref 3.5–5.3)
RBC # BLD: 3.13 M/UL — LOW (ref 4.2–5.8)
RBC # FLD: 14.5 % — SIGNIFICANT CHANGE UP (ref 10.3–14.5)
SODIUM SERPL-SCNC: 136 MMOL/L — SIGNIFICANT CHANGE UP (ref 135–145)
WBC # BLD: 8.81 K/UL — SIGNIFICANT CHANGE UP (ref 3.8–10.5)
WBC # FLD AUTO: 8.81 K/UL — SIGNIFICANT CHANGE UP (ref 3.8–10.5)

## 2017-09-29 PROCEDURE — 99233 SBSQ HOSP IP/OBS HIGH 50: CPT

## 2017-09-29 PROCEDURE — 90935 HEMODIALYSIS ONE EVALUATION: CPT

## 2017-09-29 PROCEDURE — 99233 SBSQ HOSP IP/OBS HIGH 50: CPT | Mod: GC

## 2017-09-29 RX ORDER — INSULIN LISPRO 100/ML
2 VIAL (ML) SUBCUTANEOUS ONCE
Qty: 0 | Refills: 0 | Status: DISCONTINUED | OUTPATIENT
Start: 2017-09-29 | End: 2017-09-30

## 2017-09-29 RX ADMIN — Medication 300 MILLIGRAM(S): at 05:24

## 2017-09-29 RX ADMIN — TAMSULOSIN HYDROCHLORIDE 0.4 MILLIGRAM(S): 0.4 CAPSULE ORAL at 22:20

## 2017-09-29 RX ADMIN — Medication 3 UNIT(S): at 22:28

## 2017-09-29 RX ADMIN — ERYTHROPOIETIN 7000 UNIT(S): 10000 INJECTION, SOLUTION INTRAVENOUS; SUBCUTANEOUS at 08:29

## 2017-09-29 RX ADMIN — Medication 50 MILLIGRAM(S): at 17:33

## 2017-09-29 RX ADMIN — Medication 50 MILLIGRAM(S): at 14:57

## 2017-09-29 RX ADMIN — CYCLOSPORINE 100 MILLIGRAM(S): 100 CAPSULE ORAL at 11:21

## 2017-09-29 RX ADMIN — Medication 100 MILLIGRAM(S): at 22:20

## 2017-09-29 RX ADMIN — HEPARIN SODIUM 1800 UNIT(S)/HR: 5000 INJECTION INTRAVENOUS; SUBCUTANEOUS at 08:28

## 2017-09-29 RX ADMIN — Medication 50 MILLIGRAM(S): at 05:24

## 2017-09-29 RX ADMIN — Medication 2: at 17:58

## 2017-09-29 RX ADMIN — Medication 1 DROP(S): at 10:43

## 2017-09-29 RX ADMIN — Medication 5 MILLIGRAM(S): at 05:24

## 2017-09-29 RX ADMIN — Medication 4: at 11:21

## 2017-09-29 RX ADMIN — ATORVASTATIN CALCIUM 80 MILLIGRAM(S): 80 TABLET, FILM COATED ORAL at 22:20

## 2017-09-29 RX ADMIN — Medication 9 UNIT(S): at 17:58

## 2017-09-29 RX ADMIN — Medication 50 MILLIGRAM(S): at 22:20

## 2017-09-29 RX ADMIN — INSULIN GLARGINE 22 UNIT(S): 100 INJECTION, SOLUTION SUBCUTANEOUS at 09:08

## 2017-09-29 RX ADMIN — SENNA PLUS 2 TABLET(S): 8.6 TABLET ORAL at 22:20

## 2017-09-29 RX ADMIN — Medication 9 UNIT(S): at 13:12

## 2017-09-29 NOTE — PROGRESS NOTE ADULT - SUBJECTIVE AND OBJECTIVE BOX
Chief Complaint: DM1    History: Patient received breakfast and hemodialysis today with no Humalog given. Subsequent hyperglycemia.    MEDICATIONS  (STANDING):  predniSONE   Tablet 5 milliGRAM(s) Oral daily  tamsulosin 0.4 milliGRAM(s) Oral at bedtime  cycloSPORINE  (SandIMMUNE) 100 milliGRAM(s) Oral daily  atorvastatin 80 milliGRAM(s) Oral at bedtime  senna 2 Tablet(s) Oral at bedtime  docusate sodium 100 milliGRAM(s) Oral three times a day  epoetin valente Injectable 7000 Unit(s) IV Push <User Schedule>  diltiazem    milliGRAM(s) Oral daily  metoprolol 50 milliGRAM(s) Oral two times a day  insulin lispro (HumaLOG) corrective regimen sliding scale   SubCutaneous three times a day before meals  insulin lispro (HumaLOG) corrective regimen sliding scale   SubCutaneous at bedtime  dextrose 5%. 1000 milliLiter(s) (50 mL/Hr) IV Continuous <Continuous>  dextrose 50% Injectable 12.5 Gram(s) IV Push once  dextrose 50% Injectable 25 Gram(s) IV Push once  dextrose 50% Injectable 25 Gram(s) IV Push once  insulin lispro Injectable (HumaLOG) 10 Unit(s) SubCutaneous before breakfast  insulin lispro Injectable (HumaLOG) 9 Unit(s) SubCutaneous before lunch  insulin lispro Injectable (HumaLOG) 9 Unit(s) SubCutaneous before dinner  hydrALAZINE 50 milliGRAM(s) Oral three times a day  heparin  Infusion. 2000 Unit(s)/Hr (20 mL/Hr) IV Continuous <Continuous>  insulin glargine Injectable (LANTUS) 22 Unit(s) SubCutaneous <User Schedule>  insulin lispro Injectable (HumaLOG) 3 Unit(s) SubCutaneous at bedtime  insulin glargine Injectable (LANTUS) 26 Unit(s) SubCutaneous <User Schedule>  insulin lispro Injectable (HumaLOG) 2 Unit(s) SubCutaneous once    MEDICATIONS  (PRN):  naphazoline/pheniramine Solution 1 Drop(s) Both EYES three times a day PRN eye discomfort  dextrose Gel 1 Dose(s) Oral once PRN Blood Glucose LESS THAN 70 milliGRAM(s)/deciLiter  glucagon  Injectable 1 milliGRAM(s) IntraMuscular once PRN Glucose <70 milliGRAM(s)/deciLiter  heparin  Injectable 7000 Unit(s) IV Push every 6 hours PRN For aPTT less than 40  heparin  Injectable 3500 Unit(s) IV Push every 6 hours PRN For aPTT between 40 - 57  acetaminophen   Tablet. 650 milliGRAM(s) Oral every 6 hours PRN Moderate Pain (4 - 6)      Allergies    No Known Allergies    Intolerances      Review of Systems:    ALL OTHER SYSTEMS REVIEWED AND NEGATIVE      PHYSICAL EXAM:  VITALS: T(C): 37.1 (09-29-17 @ 15:18)  T(F): 98.7 (09-29-17 @ 15:18), Max: 99.1 (09-28-17 @ 21:20)  HR: 88 (09-29-17 @ 15:18) (79 - 97)  BP: 145/88 (09-29-17 @ 15:18) (135/69 - 174/87)  RR:  (18 - 18)  SpO2:  (98% - 100%)  Wt(kg): --  GENERAL: NAD, well-groomed, well-developed  EYES: blind  HEENT:  Atraumatic, Normocephalic, moist mucous membranes  PSYCH: Alert and oriented x 3, normal affect, normal mood    CAPILLARY BLOOD GLUCOSE  388 (09-29 @ 12:11)  347 (09-29 @ 10:42)  176 (09-29 @ 08:56)  206 (09-29 @ 05:20)  177 (09-28 @ 21:48)  214 (09-28 @ 17:47)  237 (09-28 @ 16:36)  327 (09-28 @ 12:46)  344 (09-28 @ 08:48)  283 (09-28 @ 04:06)  185 (09-27 @ 21:54)  214 (09-27 @ 17:14)  250 (09-27 @ 12:37)  143 (09-27 @ 08:19)  208 (09-27 @ 05:15)  220 (09-27 @ 02:00)  74 (09-26 @ 22:06)  200 (09-26 @ 17:05)      09-29    136  |  97<L>  |  47<H>  ----------------------------<  240<H>  3.9   |  24  |  3.30<H>    EGFR if : 24  EGFR if non : 20    Ca    8.6      09-29  Mg     1.7     09-29  Phos  4.4     09-29            Thyroid Function Tests:  09-03 @ 13:45 TSH 2.88 FreeT4 0.70 T3 71.7 Anti TPO -- Anti Thyroglobulin Ab -- TSI --      Hemoglobin A1C, Whole Blood: 8.0 % <H> [4.0 - 5.6] (08-16-17 @ 06:30)

## 2017-09-29 NOTE — PROGRESS NOTE ADULT - SUBJECTIVE AND OBJECTIVE BOX
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 Pt. now ESRD. Pt. seen during HD. No complaints offered during HD. No complaints of CP, SOB, HA or dizziness.     PAST HISTORY  --------------------------------------------------------------------------------  No significant changes to PMH, PSH, FHx, SHx, unless otherwise noted    ALLERGIES & MEDICATIONS  --------------------------------------------------------------------------------  Allergies    No Known Allergies    Intolerances      Standing Inpatient Medications  predniSONE   Tablet 5 milliGRAM(s) Oral daily  tamsulosin 0.4 milliGRAM(s) Oral at bedtime  cycloSPORINE  (SandIMMUNE) 100 milliGRAM(s) Oral daily  atorvastatin 80 milliGRAM(s) Oral at bedtime  senna 2 Tablet(s) Oral at bedtime  docusate sodium 100 milliGRAM(s) Oral three times a day  epoetin valente Injectable 7000 Unit(s) IV Push <User Schedule>  diltiazem    milliGRAM(s) Oral daily  metoprolol 50 milliGRAM(s) Oral two times a day  insulin lispro (HumaLOG) corrective regimen sliding scale   SubCutaneous three times a day before meals  insulin lispro (HumaLOG) corrective regimen sliding scale   SubCutaneous at bedtime  dextrose 5%. 1000 milliLiter(s) IV Continuous <Continuous>  dextrose 50% Injectable 12.5 Gram(s) IV Push once  dextrose 50% Injectable 25 Gram(s) IV Push once  dextrose 50% Injectable 25 Gram(s) IV Push once  insulin lispro Injectable (HumaLOG) 10 Unit(s) SubCutaneous before breakfast  insulin lispro Injectable (HumaLOG) 9 Unit(s) SubCutaneous before lunch  insulin lispro Injectable (HumaLOG) 9 Unit(s) SubCutaneous before dinner  hydrALAZINE 50 milliGRAM(s) Oral three times a day  heparin  Infusion. 2000 Unit(s)/Hr IV Continuous <Continuous>  insulin glargine Injectable (LANTUS) 22 Unit(s) SubCutaneous <User Schedule>  insulin lispro Injectable (HumaLOG) 3 Unit(s) SubCutaneous at bedtime  insulin glargine Injectable (LANTUS) 26 Unit(s) SubCutaneous <User Schedule>    PRN Inpatient Medications  naphazoline/pheniramine Solution 1 Drop(s) Both EYES three times a day PRN  dextrose Gel 1 Dose(s) Oral once PRN  glucagon  Injectable 1 milliGRAM(s) IntraMuscular once PRN  heparin  Injectable 7000 Unit(s) IV Push every 6 hours PRN  heparin  Injectable 3500 Unit(s) IV Push every 6 hours PRN  acetaminophen   Tablet. 650 milliGRAM(s) Oral every 6 hours PRN      REVIEW OF SYSTEMS  --------------------------------------------------------------------------------  Gen: No fevers/chills  Respiratory: No dyspnea  CV: No chest pain  GI: No abdominal pain  MSK: Bilateral LE swelling+  Neuro: No dizziness/lightheadedness    VITALS/PHYSICAL EXAM  --------------------------------------------------------------------------------  T(C): 36.7 (09-29-17 @ 06:20), Max: 37.3 (09-28-17 @ 21:20)  HR: 79 (09-29-17 @ 06:20) (79 - 97)  BP: 174/87 (09-29-17 @ 06:20) (121/64 - 174/87)  RR: 18 (09-29-17 @ 06:20) (18 - 18)  SpO2: 100% (09-29-17 @ 05:20) (99% - 100%)  Wt(kg): --        Physical Exam:  	Gen: resting, NAD	  	Pulm: CTA B/L  	CV: RRR, S1S2; no rub  	Abd: +BS, soft  	LE: bilateral LE pitting edema present  	Vascular access: Right IJ HD catheter site: no bleeding    LABS/STUDIES  --------------------------------------------------------------------------------              8.5    8.81  >-----------<  147      [09-29-17 @ 06:30]              28.1     135  |  98  |  38  ----------------------------<  294      [09-28-17 @ 07:15]  4.1   |  23  |  2.87        Ca     8.3     [09-28-17 @ 07:15]      Mg     1.8     [09-28-17 @ 07:15]      Phos  3.4     [09-28-17 @ 07:15]    PTT: 72.4       [09-28-17 @ 21:50]    Iron 21, TIBC 187, %sat --      [09-09-17 @ 06:55]  Ferritin 587.5      [09-09-17 @ 06:55]  .9 (Ca --)      [09-10-17 @ 05:10]   --  HbA1c 8.0      [08-16-17 @ 06:30]  TSH 2.88      [09-03-17 @ 13:45]  Lipid: chol 137, TG 97, HDL 52, LDL 68      [08-11-17 @ 07:20]    HBsAb <3.0      [09-03-17 @ 18:00]  HBsAg NEGATIVE      [09-27-17 @ 06:50]  HCV 14.48, Reactive      [09-03-17 @ 18:00]

## 2017-09-29 NOTE — PROGRESS NOTE ADULT - ATTENDING COMMENTS
Patient seen and examined. Agree with above note by resident.    1. ESRD on HD - need AVF placement prior to DC. Procedure planned for  10/2/17  2. DM2 uncontrolled - FS are labile. Continue to monitor.   3. HTN - uncontrolled. Goal BP<140. Titrate hydralazine to 50TID  4. renal transplant status - c/w Cyclosporin 100mg daily and prednisone. Tapered off cellcept.   5. Afib persistent -  rate control with cardizem. on heparin gtt. After AVF placement, will start coumadin.     Plan discussed with pt .

## 2017-09-29 NOTE — PROGRESS NOTE ADULT - ASSESSMENT
53 y/o M with h/o CKD s/p kidney transplant in 1998, HTN, DM, legally blind admitted for uremic encephalopathy initiated on dialysis. Pt. seen during HD. /87 at time of dialysis rounds.

## 2017-09-29 NOTE — PROGRESS NOTE ADULT - PROBLEM SELECTOR PLAN 1
Pt. tolerating HD well. BP elevated during HD today. UF goal increased with HD. BP meds may need to be adjusted, if BP remains elevated. Plan for additional HD session tomorrow for LE edema/fluid overload management. RIJ HD catheter functioning well. Monitor BP and labs

## 2017-09-29 NOTE — PROGRESS NOTE ADULT - PROBLEM SELECTOR PLAN 1
Patient noted with trend of lower glucose on HD days and higher glucose on non-HD days.  Would continue Lantus 22u qAM on HD days (mon/wed/fri).  Since tomorrow (Saturday) he is planned for extra HD session he should receive Lantus 22 units tomorrow AM.  Would continue Lantus 26u qAM on non-HD days.   For Monday AM if surgery is scheduled would give him one time dose of Lantus 20u Monday AM for NPO status.  Continue bedtime snack Humalog  3 units.  Continue Humalog 10/9/9 for 3 main meals.  Continue low correction scale qac and low bedtime scale.  Patient requests that nursing wake him up at midnight to eat his bedtime snack.  Patient is optimized for AVF surgery from diabetes standpoint.     D/c with basal/bolus dose TBD  Outpatient endocrine follow up: appointment pending with Dr Baker

## 2017-09-29 NOTE — PROGRESS NOTE ADULT - SUBJECTIVE AND OBJECTIVE BOX
Patient is a 52y old  Male who presents with a chief complaint of Altered mental status (10 Sep 2017 23:23)      OVERNIGHT EVENTS: No acute events overnight.  SUBJECTIVE: Patient seen and examined at bedside. Denies current complaints.       MEDICATIONS  (STANDING):  predniSONE   Tablet 5 milliGRAM(s) Oral daily  tamsulosin 0.4 milliGRAM(s) Oral at bedtime  cycloSPORINE  (SandIMMUNE) 100 milliGRAM(s) Oral daily  atorvastatin 80 milliGRAM(s) Oral at bedtime  senna 2 Tablet(s) Oral at bedtime  docusate sodium 100 milliGRAM(s) Oral three times a day  epoetin valente Injectable 7000 Unit(s) IV Push <User Schedule>  diltiazem    milliGRAM(s) Oral daily  metoprolol 50 milliGRAM(s) Oral two times a day  insulin lispro (HumaLOG) corrective regimen sliding scale   SubCutaneous three times a day before meals  insulin lispro (HumaLOG) corrective regimen sliding scale   SubCutaneous at bedtime  dextrose 5%. 1000 milliLiter(s) (50 mL/Hr) IV Continuous <Continuous>  dextrose 50% Injectable 12.5 Gram(s) IV Push once  dextrose 50% Injectable 25 Gram(s) IV Push once  dextrose 50% Injectable 25 Gram(s) IV Push once  insulin lispro Injectable (HumaLOG) 10 Unit(s) SubCutaneous before breakfast  insulin lispro Injectable (HumaLOG) 9 Unit(s) SubCutaneous before lunch  insulin lispro Injectable (HumaLOG) 9 Unit(s) SubCutaneous before dinner  hydrALAZINE 50 milliGRAM(s) Oral three times a day  heparin  Infusion. 2000 Unit(s)/Hr (20 mL/Hr) IV Continuous <Continuous>  insulin glargine Injectable (LANTUS) 22 Unit(s) SubCutaneous <User Schedule>  insulin lispro Injectable (HumaLOG) 3 Unit(s) SubCutaneous at bedtime  insulin glargine Injectable (LANTUS) 26 Unit(s) SubCutaneous <User Schedule>    MEDICATIONS  (PRN):  naphazoline/pheniramine Solution 1 Drop(s) Both EYES three times a day PRN eye discomfort  dextrose Gel 1 Dose(s) Oral once PRN Blood Glucose LESS THAN 70 milliGRAM(s)/deciLiter  glucagon  Injectable 1 milliGRAM(s) IntraMuscular once PRN Glucose <70 milliGRAM(s)/deciLiter  heparin  Injectable 7000 Unit(s) IV Push every 6 hours PRN For aPTT less than 40  heparin  Injectable 3500 Unit(s) IV Push every 6 hours PRN For aPTT between 40 - 57  acetaminophen   Tablet. 650 milliGRAM(s) Oral every 6 hours PRN Moderate Pain (4 - 6)      T(C): 36.7 (09-29-17 @ 06:20), Max: 37.3 (09-28-17 @ 21:20)  HR: 79 (09-29-17 @ 06:20) (79 - 97)  BP: 174/87 (09-29-17 @ 06:20) (121/64 - 174/87)  RR: 18 (09-29-17 @ 06:20) (18 - 18)  SpO2: 100% (09-29-17 @ 05:20) (99% - 100%)  CAPILLARY BLOOD GLUCOSE  206 (29 Sep 2017 05:20)  177 (28 Sep 2017 21:48)  214 (28 Sep 2017 17:47)  237 (28 Sep 2017 16:36)  327 (28 Sep 2017 12:46)  344 (28 Sep 2017 08:48)      PHYSICAL EXAM  GENERAL: NAD, well-developed  HEAD:  Atraumatic, Normocephalic  EYES: EOMI, PERRLA, conjunctiva and sclera clear  CHEST/LUNG: Clear to auscultation bilaterally; No wheeze  HEART: +S1 +S2, Regular rate and rhythm  ABDOMEN: Soft, Nontender, Nondistended; Bowel sounds present  NEURO: No focal neurologic deficits, sensation and motor function grossly intact.   EXTREMITIES:  Pink and warm, Peripheral Pulses intact. +B/L LE edema. +RIJ HD Cath, C/D/I.         LABS:                        9.0    9.34  )-----------( 123      ( 28 Sep 2017 07:15 )             29.8     09-28    135  |  98  |  38<H>  ----------------------------<  294<H>  4.1   |  23  |  2.87<H>    Ca    8.3<L>      28 Sep 2017 07:15  Phos  3.4     09-28  Mg     1.8     09-28      PTT - ( 28 Sep 2017 21:50 )  PTT:72.4 SEC          RADIOLOGY & ADDITIONAL TESTS:    Imaging Personally Reviewed:  Consultant(s) Notes Reviewed:    Care Discussed with Consultants/Other Providers:      Pooja Steen D.O.  Medicine Intern  pager [phone]

## 2017-09-29 NOTE — PROGRESS NOTE ADULT - PROBLEM SELECTOR PLAN 1
-self-converted to NSR 9/21  -currently on PO Cardizem, metoprolol 50 bid w/ hold parameters.  -c/w hep gtt, holding coumadin as pt. is planned for AVF on Monday, 10/2.  -appreciate EP recs - Repeat TTE 9/25 demonstrated small pericardial effusion and small right pleural effusion. TTE w/ EF 65%  -continue lopressor and cardizem when NPO and before HD sessions given he has tendency to go into afib w/ RVR if he misses doses  - [facility]

## 2017-09-30 LAB
APTT BLD: 38.6 SEC — HIGH (ref 27.5–37.4)
APTT BLD: > 200 SEC — CRITICAL HIGH (ref 27.5–37.4)
BUN SERPL-MCNC: 55 MG/DL — HIGH (ref 7–23)
CALCIUM SERPL-MCNC: 8.7 MG/DL — SIGNIFICANT CHANGE UP (ref 8.4–10.5)
CHLORIDE SERPL-SCNC: 95 MMOL/L — LOW (ref 98–107)
CO2 SERPL-SCNC: 20 MMOL/L — LOW (ref 22–31)
CREAT SERPL-MCNC: 3.48 MG/DL — HIGH (ref 0.5–1.3)
GLUCOSE SERPL-MCNC: 411 MG/DL — HIGH (ref 70–99)
HCT VFR BLD CALC: 30.3 % — LOW (ref 39–50)
HGB BLD-MCNC: 9.3 G/DL — LOW (ref 13–17)
MAGNESIUM SERPL-MCNC: 1.7 MG/DL — SIGNIFICANT CHANGE UP (ref 1.6–2.6)
MCHC RBC-ENTMCNC: 27.4 PG — SIGNIFICANT CHANGE UP (ref 27–34)
MCHC RBC-ENTMCNC: 30.7 % — LOW (ref 32–36)
MCV RBC AUTO: 89.1 FL — SIGNIFICANT CHANGE UP (ref 80–100)
NRBC # FLD: 0.04 — SIGNIFICANT CHANGE UP
PHOSPHATE SERPL-MCNC: 4.6 MG/DL — HIGH (ref 2.5–4.5)
PLATELET # BLD AUTO: 127 K/UL — LOW (ref 150–400)
PMV BLD: 11.2 FL — SIGNIFICANT CHANGE UP (ref 7–13)
POTASSIUM SERPL-MCNC: 4.7 MMOL/L — SIGNIFICANT CHANGE UP (ref 3.5–5.3)
POTASSIUM SERPL-SCNC: 4.7 MMOL/L — SIGNIFICANT CHANGE UP (ref 3.5–5.3)
RBC # BLD: 3.4 M/UL — LOW (ref 4.2–5.8)
RBC # FLD: 14.9 % — HIGH (ref 10.3–14.5)
SODIUM SERPL-SCNC: 135 MMOL/L — SIGNIFICANT CHANGE UP (ref 135–145)
WBC # BLD: 9.96 K/UL — SIGNIFICANT CHANGE UP (ref 3.8–10.5)
WBC # FLD AUTO: 9.96 K/UL — SIGNIFICANT CHANGE UP (ref 3.8–10.5)

## 2017-09-30 PROCEDURE — 99232 SBSQ HOSP IP/OBS MODERATE 35: CPT

## 2017-09-30 PROCEDURE — 99233 SBSQ HOSP IP/OBS HIGH 50: CPT | Mod: GC

## 2017-09-30 RX ADMIN — Medication 9 UNIT(S): at 17:55

## 2017-09-30 RX ADMIN — Medication 50 MILLIGRAM(S): at 17:01

## 2017-09-30 RX ADMIN — ATORVASTATIN CALCIUM 80 MILLIGRAM(S): 80 TABLET, FILM COATED ORAL at 21:51

## 2017-09-30 RX ADMIN — Medication 50 MILLIGRAM(S): at 05:26

## 2017-09-30 RX ADMIN — HEPARIN SODIUM 2200 UNIT(S)/HR: 5000 INJECTION INTRAVENOUS; SUBCUTANEOUS at 09:45

## 2017-09-30 RX ADMIN — Medication 5: at 08:59

## 2017-09-30 RX ADMIN — Medication 50 MILLIGRAM(S): at 21:51

## 2017-09-30 RX ADMIN — HEPARIN SODIUM 7000 UNIT(S): 5000 INJECTION INTRAVENOUS; SUBCUTANEOUS at 09:46

## 2017-09-30 RX ADMIN — Medication 50 MILLIGRAM(S): at 15:42

## 2017-09-30 RX ADMIN — Medication 5 MILLIGRAM(S): at 05:26

## 2017-09-30 RX ADMIN — Medication 300 MILLIGRAM(S): at 05:26

## 2017-09-30 RX ADMIN — Medication 1: at 17:56

## 2017-09-30 RX ADMIN — Medication 3: at 13:32

## 2017-09-30 RX ADMIN — Medication 10 UNIT(S): at 08:59

## 2017-09-30 RX ADMIN — TAMSULOSIN HYDROCHLORIDE 0.4 MILLIGRAM(S): 0.4 CAPSULE ORAL at 21:51

## 2017-09-30 RX ADMIN — HEPARIN SODIUM 0 UNIT(S)/HR: 5000 INJECTION INTRAVENOUS; SUBCUTANEOUS at 17:02

## 2017-09-30 RX ADMIN — Medication 3 UNIT(S): at 21:52

## 2017-09-30 RX ADMIN — CYCLOSPORINE 100 MILLIGRAM(S): 100 CAPSULE ORAL at 13:32

## 2017-09-30 RX ADMIN — Medication 9 UNIT(S): at 13:32

## 2017-09-30 RX ADMIN — HEPARIN SODIUM 1900 UNIT(S)/HR: 5000 INJECTION INTRAVENOUS; SUBCUTANEOUS at 18:03

## 2017-09-30 RX ADMIN — INSULIN GLARGINE 26 UNIT(S): 100 INJECTION, SOLUTION SUBCUTANEOUS at 08:59

## 2017-09-30 NOTE — PROGRESS NOTE ADULT - ATTENDING COMMENTS
Patient seen and examined. Agree with above note by resident.    1. ESRD on HD - need AVF placement prior to DC. Procedure planned for  10/2/17  2. DM2 uncontrolled - FS are labile. Continue to monitor.   3. HTN - Goal BP<140. Monitor BP.  4. renal transplant status - c/w Cyclosporin 100mg daily and prednisone. Tapered off cellcept.   5. Afib persistent -  rate control with cardizem. on heparin gtt. After AVF placement, will start coumadin.     Plan discussed with pt

## 2017-09-30 NOTE — PROGRESS NOTE ADULT - SUBJECTIVE AND OBJECTIVE BOX
Patient is a 52y old  Male who presents with a chief complaint of Altered mental status (10 Sep 2017 23:23)      OVERNIGHT EVENTS: No acute events overnight.  SUBJECTIVE: Patient seen and examined at bedside. Denies current complaints.  He is getting HD today.      MEDICATIONS  (STANDING):  predniSONE   Tablet 5 milliGRAM(s) Oral daily  tamsulosin 0.4 milliGRAM(s) Oral at bedtime  cycloSPORINE  (SandIMMUNE) 100 milliGRAM(s) Oral daily  atorvastatin 80 milliGRAM(s) Oral at bedtime  senna 2 Tablet(s) Oral at bedtime  docusate sodium 100 milliGRAM(s) Oral three times a day  epoetin valente Injectable 7000 Unit(s) IV Push <User Schedule>  diltiazem    milliGRAM(s) Oral daily  metoprolol 50 milliGRAM(s) Oral two times a day  insulin lispro (HumaLOG) corrective regimen sliding scale   SubCutaneous three times a day before meals  insulin lispro (HumaLOG) corrective regimen sliding scale   SubCutaneous at bedtime  dextrose 5%. 1000 milliLiter(s) (50 mL/Hr) IV Continuous <Continuous>  dextrose 50% Injectable 12.5 Gram(s) IV Push once  dextrose 50% Injectable 25 Gram(s) IV Push once  dextrose 50% Injectable 25 Gram(s) IV Push once  insulin lispro Injectable (HumaLOG) 10 Unit(s) SubCutaneous before breakfast  insulin lispro Injectable (HumaLOG) 9 Unit(s) SubCutaneous before lunch  insulin lispro Injectable (HumaLOG) 9 Unit(s) SubCutaneous before dinner  hydrALAZINE 50 milliGRAM(s) Oral three times a day  heparin  Infusion. 2000 Unit(s)/Hr (20 mL/Hr) IV Continuous <Continuous>  insulin glargine Injectable (LANTUS) 22 Unit(s) SubCutaneous <User Schedule>  insulin lispro Injectable (HumaLOG) 3 Unit(s) SubCutaneous at bedtime  insulin glargine Injectable (LANTUS) 26 Unit(s) SubCutaneous <User Schedule>    MEDICATIONS  (PRN):  naphazoline/pheniramine Solution 1 Drop(s) Both EYES three times a day PRN eye discomfort  dextrose Gel 1 Dose(s) Oral once PRN Blood Glucose LESS THAN 70 milliGRAM(s)/deciLiter  glucagon  Injectable 1 milliGRAM(s) IntraMuscular once PRN Glucose <70 milliGRAM(s)/deciLiter  heparin  Injectable 7000 Unit(s) IV Push every 6 hours PRN For aPTT less than 40  heparin  Injectable 3500 Unit(s) IV Push every 6 hours PRN For aPTT between 40 - 57  acetaminophen   Tablet. 650 milliGRAM(s) Oral every 6 hours PRN Moderate Pain (4 - 6)    CAPILLARY BLOOD GLUCOSE  159 (29 Sep 2017 22:36)  216 (29 Sep 2017 16:57)  388 (29 Sep 2017 12:11)  347 (29 Sep 2017 10:42)  176 (29 Sep 2017 08:56)    Vital Signs Last 24 Hrs  T(C): 36.7 (30 Sep 2017 05:28), Max: 37.1 (29 Sep 2017 15:18)  T(F): 98.1 (30 Sep 2017 05:28), Max: 98.7 (29 Sep 2017 15:18)  HR: 95 (30 Sep 2017 05:28) (75 - 95)  BP: 157/80 (30 Sep 2017 05:28) (135/69 - 166/85)  BP(mean): --  RR: 18 (30 Sep 2017 05:28) (18 - 18)  SpO2: 98% (30 Sep 2017 05:28) (98% - 100%)    PHYSICAL EXAM  GENERAL: NAD, well-developed  HEAD:  Atraumatic, Normocephalic  EYES: conjunctiva and sclera clear  CHEST/LUNG: Clear to auscultation bilaterally; No wheeze, rales or rhonchi  HEART: +S1 +S2, Regular rate and rhythm, no murmurs  ABDOMEN: Soft, Nontender, Nondistended; Bowel sounds present  NEURO: No focal neurologic deficits, sensation and motor function grossly intact.   EXTREMITIES:  Pink and warm, Peripheral Pulses intact. +B/L LE edema. +RIJ HD Cath, C/D/I.         LABS:                            8.5    8.81  )-----------( 147      ( 29 Sep 2017 06:30 )             28.1     09-29    136  |  97<L>  |  47<H>  ----------------------------<  240<H>  3.9   |  24  |  3.30<H>    Ca    8.6      29 Sep 2017 06:30  Phos  4.4     09-29  Mg     1.7     09-29      RADIOLOGY & ADDITIONAL TESTS:    Imaging Personally Reviewed:  Consultant(s) Notes Reviewed:    Care Discussed with Consultants/Other Providers:

## 2017-09-30 NOTE — PROGRESS NOTE ADULT - PROBLEM SELECTOR PLAN 6
Home regime: lantus 22u at home, home pravastatin 10mg daily.  -HbA1C 8  -Endo follow up appreciated.   - Since patient getting HD today, will give lantus 22 units and humalog 10/9/9/2. Will trial giving 25u Lantus on mornings he doesn't have hemodialysis  -atorvastatin 80mg daily

## 2017-09-30 NOTE — PROGRESS NOTE ADULT - PROBLEM SELECTOR PLAN 1
-self-converted to NSR 9/21  -currently on PO Cardizem, metoprolol 50 bid w/ hold parameters.  -c/w hep gtt, holding coumadin as pt. is planned for AVF on Monday, 10/2.  -appreciate EP recs - Repeat TTE 9/25 demonstrated small pericardial effusion and small right pleural effusion. TTE w/ EF 65%  -continue lopressor and cardizem when NPO and before HD sessions given he has tendency to go into afib w/ RVR if he misses doses  - [facility]

## 2017-09-30 NOTE — PROGRESS NOTE ADULT - PROBLEM SELECTOR PLAN 1
Hyperglycemia this AM  No HD today as per patient  Received 26 units of Lantus this, which is a newly increased dose  Pre meal Humalog also adjusted yesterday  Mid day FS trending down from AM hyperglycemia    Would c/w insulin as ordered for today and evaluate fasting FS tmrw to determine is further Lantus titration is needed since Lantus dose for today was new.          Please page Endocrine at  if hyperglycemia persists   D/c with basal/bolus dose TBD  Outpatient endocrine follow up: appointment pending with Dr Baker

## 2017-09-30 NOTE — PROGRESS NOTE ADULT - ASSESSMENT
52M with h/o CKD s/p renal transplant (1998) on cyclosporine and CellCept, HTN, DM, legally blind BIBEMS for uremic encephalopathy. Pt became bradycardic and had PEA arrest in the ED, intubated for airway protection. ROSC achieved s/p 1 round of epi and chest compressions and pt was transferred to MICU. Immunosuppressants for renal transplant were held. Pt had emergent HD for hyperkalemia. Covered empirically with broad spectrum antibiotics (vanc and zosyn), Hgb 5 s/p 1u pRBC given and epogen was started. Pressors were weaned off and pt extubated 9/4. Cyclosporine restarted and prednisone started. Pt remains stable and transferred to floor for further management 9/5, now found to have new-onset AFib. Also s/p permacath w/ plan for AVF. AVF canceled due to hyperglycemia. AVF will be placed on Monday 10/2 at 5 pm.

## 2017-09-30 NOTE — PROGRESS NOTE ADULT - SUBJECTIVE AND OBJECTIVE BOX
Chief Complaint: DM 1    History: hyperglycemia this am- patient received PRN snack humalog last night and all doses as ordered. Lantus 26 units (newly increased dose) was given this am and mid day FS is trending down from fasting FS (see below) Patient denies complaints, tolerating PO.     MEDICATIONS  (STANDING):  predniSONE   Tablet 5 milliGRAM(s) Oral daily  tamsulosin 0.4 milliGRAM(s) Oral at bedtime  cycloSPORINE  (SandIMMUNE) 100 milliGRAM(s) Oral daily  atorvastatin 80 milliGRAM(s) Oral at bedtime  senna 2 Tablet(s) Oral at bedtime  docusate sodium 100 milliGRAM(s) Oral three times a day  epoetin valente Injectable 7000 Unit(s) IV Push <User Schedule>  diltiazem    milliGRAM(s) Oral daily  metoprolol 50 milliGRAM(s) Oral two times a day  insulin lispro (HumaLOG) corrective regimen sliding scale   SubCutaneous three times a day before meals  insulin lispro (HumaLOG) corrective regimen sliding scale   SubCutaneous at bedtime  dextrose 5%. 1000 milliLiter(s) (50 mL/Hr) IV Continuous <Continuous>  dextrose 50% Injectable 12.5 Gram(s) IV Push once  dextrose 50% Injectable 25 Gram(s) IV Push once  dextrose 50% Injectable 25 Gram(s) IV Push once  insulin lispro Injectable (HumaLOG) 10 Unit(s) SubCutaneous before breakfast  insulin lispro Injectable (HumaLOG) 9 Unit(s) SubCutaneous before lunch  insulin lispro Injectable (HumaLOG) 9 Unit(s) SubCutaneous before dinner  hydrALAZINE 50 milliGRAM(s) Oral three times a day  heparin  Infusion. 2000 Unit(s)/Hr (20 mL/Hr) IV Continuous <Continuous>  insulin glargine Injectable (LANTUS) 22 Unit(s) SubCutaneous <User Schedule>  insulin lispro Injectable (HumaLOG) 3 Unit(s) SubCutaneous at bedtime  insulin glargine Injectable (LANTUS) 26 Unit(s) SubCutaneous <User Schedule>  insulin lispro Injectable (HumaLOG) 2 Unit(s) SubCutaneous once    MEDICATIONS  (PRN):  naphazoline/pheniramine Solution 1 Drop(s) Both EYES three times a day PRN eye discomfort  dextrose Gel 1 Dose(s) Oral once PRN Blood Glucose LESS THAN 70 milliGRAM(s)/deciLiter  glucagon  Injectable 1 milliGRAM(s) IntraMuscular once PRN Glucose <70 milliGRAM(s)/deciLiter  heparin  Injectable 7000 Unit(s) IV Push every 6 hours PRN For aPTT less than 40  heparin  Injectable 3500 Unit(s) IV Push every 6 hours PRN For aPTT between 40 - 57  acetaminophen   Tablet. 650 milliGRAM(s) Oral every 6 hours PRN Moderate Pain (4 - 6)        No Known Allergies      Review of Systems:  Constitutional: No fever  Cardiovascular: No chest pain, palpitations  Respiratory: No SOB, no cough  GI: No nausea, vomiting, abdominal pain        PHYSICAL EXAM:  VITALS: T(C): 36.7 (09-30-17 @ 05:28)  T(F): 98.1 (09-30-17 @ 05:28), Max: 98.7 (09-29-17 @ 15:18)  HR: 95 (09-30-17 @ 05:28) (75 - 95)  BP: 157/80 (09-30-17 @ 05:28) (143/80 - 166/85)  RR:  (18 - 18)  SpO2:  (98% - 100%)  Wt(kg): --  GENERAL: NAD, well-groomed  HEENT:  Atraumatic, Normocephalic, moist mucous membranes  RESPIRATORY: non labored  GI: Soft, nontender, non distended  SKIN: Dry, warm  PSYCH: Alert and oriented x 3, normal affect, normal mood      CAPILLARY BLOOD GLUCOSE  289 (09-30 @ 13:02)  400 (09-30 @ 08:56)  159 (09-29 @ 22:36)  216 (09-29 @ 16:57)  388 (09-29 @ 12:11)  347 (09-29 @ 10:42)  176 (09-29 @ 08:56)  206 (09-29 @ 05:20)  177 (09-28 @ 21:48)  214 (09-28 @ 17:47)  237 (09-28 @ 16:36)  327 (09-28 @ 12:46)  344 (09-28 @ 08:48)  283 (09-28 @ 04:06)  185 (09-27 @ 21:54)  214 (09-27 @ 17:14)      09-30    135  |  95<L>  |  55<H>  ----------------------------<  411<H>  4.7   |  20<L>  |  3.48<H>    EGFR if : 22  EGFR if non African American: 19    Ca    8.7      09-30  Mg     1.7     09-30  Phos  4.6     09-30            Thyroid Function Tests:  09-03 @ 13:45 TSH 2.88 FreeT4 0.70 T3 71.7 Anti TPO -- Anti Thyroglobulin Ab -- TSI --      Hemoglobin A1C, Whole Blood: 8.0 % <H> [4.0 - 5.6] (08-16-17 @ 06:30)

## 2017-09-30 NOTE — PROGRESS NOTE ADULT - ASSESSMENT
52 M with Type 1 DM HbA1C 8 admitted for AMS s/p cardiac arrest now on HD presents with hyperglycemia. Awaiting AV fistula but now delayed due to hyperglycemia this AM

## 2017-09-30 NOTE — PROGRESS NOTE ADULT - PROBLEM SELECTOR PLAN 2
-c/w HD per renal MWF  - Pt to get hemodialysis today as per renal for fluid overload/LE edema.  -permacath in place - AVF with vascular Dr. Staples, planned for Monday 10/2

## 2017-10-01 DIAGNOSIS — E10.10 TYPE 1 DIABETES MELLITUS WITH KETOACIDOSIS WITHOUT COMA: ICD-10-CM

## 2017-10-01 LAB
APTT BLD: 160.1 SEC — CRITICAL HIGH (ref 27.5–37.4)
APTT BLD: 38.9 SEC — HIGH (ref 27.5–37.4)
APTT BLD: 61.3 SEC — HIGH (ref 27.5–37.4)
B-OH-BUTYR SERPL-SCNC: 0.7 MMOL/L — HIGH (ref 0–0.4)
BASE EXCESS BLDV CALC-SCNC: -3.3 MMOL/L — SIGNIFICANT CHANGE UP
BLD GP AB SCN SERPL QL: NEGATIVE — SIGNIFICANT CHANGE UP
BLOOD GAS VENOUS - CREATININE: 3.09 MG/DL — HIGH (ref 0.5–1.3)
BUN SERPL-MCNC: 62 MG/DL — HIGH (ref 7–23)
CALCIUM SERPL-MCNC: 8.7 MG/DL — SIGNIFICANT CHANGE UP (ref 8.4–10.5)
CHLORIDE BLDV-SCNC: 101 MMOL/L — SIGNIFICANT CHANGE UP (ref 96–108)
CHLORIDE SERPL-SCNC: 97 MMOL/L — LOW (ref 98–107)
CO2 SERPL-SCNC: 15 MMOL/L — LOW (ref 22–31)
CREAT SERPL-MCNC: 3.23 MG/DL — HIGH (ref 0.5–1.3)
GAS PNL BLDV: 129 MMOL/L — LOW (ref 136–146)
GLUCOSE BLDV-MCNC: 359 — HIGH (ref 70–99)
GLUCOSE SERPL-MCNC: 358 MG/DL — HIGH (ref 70–99)
HCO3 BLDV-SCNC: 22 MMOL/L — SIGNIFICANT CHANGE UP (ref 20–27)
HCT VFR BLD CALC: 31.4 % — LOW (ref 39–50)
HCT VFR BLDV CALC: 32.2 % — LOW (ref 39–51)
HGB BLD-MCNC: 9.7 G/DL — LOW (ref 13–17)
HGB BLDV-MCNC: 10.4 G/DL — LOW (ref 13–17)
INR BLD: 0.94 — SIGNIFICANT CHANGE UP (ref 0.88–1.17)
LACTATE BLDV-MCNC: 0.7 MMOL/L — SIGNIFICANT CHANGE UP (ref 0.5–2)
MAGNESIUM SERPL-MCNC: 1.7 MG/DL — SIGNIFICANT CHANGE UP (ref 1.6–2.6)
MCHC RBC-ENTMCNC: 27.9 PG — SIGNIFICANT CHANGE UP (ref 27–34)
MCHC RBC-ENTMCNC: 30.9 % — LOW (ref 32–36)
MCV RBC AUTO: 90.2 FL — SIGNIFICANT CHANGE UP (ref 80–100)
NRBC # FLD: 0.02 — SIGNIFICANT CHANGE UP
PCO2 BLDV: 32 MMHG — LOW (ref 41–51)
PH BLDV: 7.42 PH — SIGNIFICANT CHANGE UP (ref 7.32–7.43)
PHOSPHATE SERPL-MCNC: 4.5 MG/DL — SIGNIFICANT CHANGE UP (ref 2.5–4.5)
PLATELET # BLD AUTO: 163 K/UL — SIGNIFICANT CHANGE UP (ref 150–400)
PMV BLD: 11 FL — SIGNIFICANT CHANGE UP (ref 7–13)
PO2 BLDV: 85 MMHG — HIGH (ref 35–40)
POTASSIUM BLDV-SCNC: 3.8 MMOL/L — SIGNIFICANT CHANGE UP (ref 3.4–4.5)
POTASSIUM SERPL-MCNC: 4.4 MMOL/L — SIGNIFICANT CHANGE UP (ref 3.5–5.3)
POTASSIUM SERPL-SCNC: 4.4 MMOL/L — SIGNIFICANT CHANGE UP (ref 3.5–5.3)
PROTHROM AB SERPL-ACNC: 10.5 SEC — SIGNIFICANT CHANGE UP (ref 9.8–13.1)
RBC # BLD: 3.48 M/UL — LOW (ref 4.2–5.8)
RBC # FLD: 15 % — HIGH (ref 10.3–14.5)
RH IG SCN BLD-IMP: POSITIVE — SIGNIFICANT CHANGE UP
SAO2 % BLDV: 97.8 % — HIGH (ref 60–85)
SODIUM SERPL-SCNC: 136 MMOL/L — SIGNIFICANT CHANGE UP (ref 135–145)
WBC # BLD: 10.74 K/UL — HIGH (ref 3.8–10.5)
WBC # FLD AUTO: 10.74 K/UL — HIGH (ref 3.8–10.5)

## 2017-10-01 PROCEDURE — 99232 SBSQ HOSP IP/OBS MODERATE 35: CPT

## 2017-10-01 PROCEDURE — 99233 SBSQ HOSP IP/OBS HIGH 50: CPT | Mod: GC

## 2017-10-01 RX ORDER — INSULIN GLARGINE 100 [IU]/ML
27 INJECTION, SOLUTION SUBCUTANEOUS
Qty: 0 | Refills: 0 | Status: DISCONTINUED | OUTPATIENT
Start: 2017-10-01 | End: 2017-10-01

## 2017-10-01 RX ORDER — INSULIN LISPRO 100/ML
10 VIAL (ML) SUBCUTANEOUS
Qty: 0 | Refills: 0 | Status: DISCONTINUED | OUTPATIENT
Start: 2017-10-01 | End: 2017-10-02

## 2017-10-01 RX ORDER — INSULIN LISPRO 100/ML
5 VIAL (ML) SUBCUTANEOUS AT BEDTIME
Qty: 0 | Refills: 0 | Status: DISCONTINUED | OUTPATIENT
Start: 2017-10-01 | End: 2017-10-02

## 2017-10-01 RX ORDER — INSULIN GLARGINE 100 [IU]/ML
23 INJECTION, SOLUTION SUBCUTANEOUS
Qty: 0 | Refills: 0 | Status: DISCONTINUED | OUTPATIENT
Start: 2017-10-01 | End: 2017-10-01

## 2017-10-01 RX ORDER — INSULIN LISPRO 100/ML
2 VIAL (ML) SUBCUTANEOUS ONCE
Qty: 0 | Refills: 0 | Status: COMPLETED | OUTPATIENT
Start: 2017-10-01 | End: 2017-10-01

## 2017-10-01 RX ORDER — SODIUM CHLORIDE 9 MG/ML
1000 INJECTION, SOLUTION INTRAVENOUS
Qty: 0 | Refills: 0 | Status: DISCONTINUED | OUTPATIENT
Start: 2017-10-01 | End: 2017-10-02

## 2017-10-01 RX ORDER — INSULIN GLARGINE 100 [IU]/ML
28 INJECTION, SOLUTION SUBCUTANEOUS
Qty: 0 | Refills: 0 | Status: DISCONTINUED | OUTPATIENT
Start: 2017-10-01 | End: 2017-10-02

## 2017-10-01 RX ORDER — INSULIN LISPRO 100/ML
11 VIAL (ML) SUBCUTANEOUS
Qty: 0 | Refills: 0 | Status: DISCONTINUED | OUTPATIENT
Start: 2017-10-01 | End: 2017-10-02

## 2017-10-01 RX ORDER — INSULIN GLARGINE 100 [IU]/ML
24 INJECTION, SOLUTION SUBCUTANEOUS
Qty: 0 | Refills: 0 | Status: DISCONTINUED | OUTPATIENT
Start: 2017-10-01 | End: 2017-10-02

## 2017-10-01 RX ORDER — INSULIN GLARGINE 100 [IU]/ML
24 INJECTION, SOLUTION SUBCUTANEOUS
Qty: 0 | Refills: 0 | Status: DISCONTINUED | OUTPATIENT
Start: 2017-10-01 | End: 2017-10-01

## 2017-10-01 RX ORDER — INSULIN LISPRO 100/ML
VIAL (ML) SUBCUTANEOUS EVERY 4 HOURS
Qty: 0 | Refills: 0 | Status: DISCONTINUED | OUTPATIENT
Start: 2017-10-01 | End: 2017-10-02

## 2017-10-01 RX ADMIN — ATORVASTATIN CALCIUM 80 MILLIGRAM(S): 80 TABLET, FILM COATED ORAL at 22:33

## 2017-10-01 RX ADMIN — HEPARIN SODIUM 2000 UNIT(S)/HR: 5000 INJECTION INTRAVENOUS; SUBCUTANEOUS at 16:50

## 2017-10-01 RX ADMIN — Medication 5 MILLIGRAM(S): at 05:47

## 2017-10-01 RX ADMIN — Medication 50 MILLIGRAM(S): at 05:46

## 2017-10-01 RX ADMIN — Medication 300 MILLIGRAM(S): at 05:46

## 2017-10-01 RX ADMIN — CYCLOSPORINE 100 MILLIGRAM(S): 100 CAPSULE ORAL at 13:52

## 2017-10-01 RX ADMIN — Medication 8: at 16:46

## 2017-10-01 RX ADMIN — Medication 2: at 22:32

## 2017-10-01 RX ADMIN — Medication 50 MILLIGRAM(S): at 22:32

## 2017-10-01 RX ADMIN — Medication 4: at 08:51

## 2017-10-01 RX ADMIN — Medication 10 UNIT(S): at 13:42

## 2017-10-01 RX ADMIN — Medication 10 UNIT(S): at 08:50

## 2017-10-01 RX ADMIN — Medication 11 UNIT(S): at 20:07

## 2017-10-01 RX ADMIN — HEPARIN SODIUM 2000 UNIT(S)/HR: 5000 INJECTION INTRAVENOUS; SUBCUTANEOUS at 10:15

## 2017-10-01 RX ADMIN — Medication 50 MILLIGRAM(S): at 13:52

## 2017-10-01 RX ADMIN — HEPARIN SODIUM 1600 UNIT(S)/HR: 5000 INJECTION INTRAVENOUS; SUBCUTANEOUS at 01:39

## 2017-10-01 RX ADMIN — Medication 50 MILLIGRAM(S): at 18:46

## 2017-10-01 RX ADMIN — Medication 2 UNIT(S): at 16:53

## 2017-10-01 RX ADMIN — TAMSULOSIN HYDROCHLORIDE 0.4 MILLIGRAM(S): 0.4 CAPSULE ORAL at 22:32

## 2017-10-01 RX ADMIN — HEPARIN SODIUM 7000 UNIT(S): 5000 INJECTION INTRAVENOUS; SUBCUTANEOUS at 10:17

## 2017-10-01 RX ADMIN — HEPARIN SODIUM 0 UNIT(S)/HR: 5000 INJECTION INTRAVENOUS; SUBCUTANEOUS at 00:35

## 2017-10-01 RX ADMIN — INSULIN GLARGINE 26 UNIT(S): 100 INJECTION, SOLUTION SUBCUTANEOUS at 08:51

## 2017-10-01 NOTE — PROGRESS NOTE ADULT - PROBLEM SELECTOR PLAN 1
-self-converted to NSR 9/21  -currently on PO Cardizem, metoprolol 50 bid w/ hold parameters.  -c/w hep gtt, holding coumadin as pt. is planned for AVF on Monday, 10/2.  -appreciate EP recs - Repeat TTE 9/25 demonstrated small pericardial effusion and small right pleural effusion. TTE w/ EF 65%  -continue lopressor and cardizem when NPO and before HD sessions given he has tendency to go into afib w/ RVR if he misses doses  - [facility] - Concern for development of DM1 DKA given todays bmp  - noted to be on latnus regimen per endo however this morning appears to have gap. d/w endo and currently following. patient now noted to have basal/bolus regimen per endo given brittle DM1  - per endo patient will need MICU consult for evaluation of insulin gtt. pending hydroxybuturate.  - IF not MICU candidate and completing AV fistula- will need 6am FS and if 140 or greater will get regular dosing schedule. If that FS is less than 140 will need to reduce lantus dose by 20U.  -atorvastatin 80mg daily

## 2017-10-01 NOTE — PROGRESS NOTE ADULT - PROBLEM SELECTOR PLAN 3
PTH elevated 186. Corrected Ca at goal 8.4 - 9.5. PTH at goal <600 -c/w HD per renal MWF  - Pt to get hemodialysis today as per renal for fluid overload/LE edema.  -permacath in place - AVF with vascular Dr. Staples, planned for Monday 10/2

## 2017-10-01 NOTE — PROGRESS NOTE ADULT - PROBLEM SELECTOR PLAN 1
target bg 100-180mg/dl- above target with AG 24.  check BHB  No HD today as per patient  Received 26 units of Lantus this AM for non HD day.    increase Lantus to 28 units non HD days, and 24 units for HD days.  change correction scale to moderate q 4 hours.  increase Humalog for breakfast and lunch to 10 units AC, increase humalog pre dinner to 11 units AC.  increase pre HS snack humalog to 5 units SQ daily.  Once BHB is resulted, will determine further management, patient may need MICU consult for insulin gtt.         Please page Endocrine at  if hyperglycemia persists   D/c with basal/bolus dose TBD  Outpatient endocrine follow up: appointment pending with Dr Baker  target bg 100-180mg/dl- above target with AG 24.  check BHB  No HD today as per patient  Received 26 units of Lantus this AM for non HD day.    increase Lantus to 28 units non HD days, and 24 units for HD days.  change correction scale to moderate q 4 hours.  increase Humalog for breakfast and lunch to 10 units AC, increase humalog pre dinner to 11 units AC.  increase pre HS snack humalog to 5 units SQ daily.  Once BHB is resulted, will determine further management, patient may need MICU consult for insulin gtt.       Discussed with attending Dr. Patrick.  Recommends MICU consult for IV insulin infusion.  D/c with basal/bolus dose TBD  Outpatient endocrine follow up: appointment pending with Dr Baker  target bg 100-180mg/dl- above target with AG 24.  check BHB  No HD today as per patient  Received 26 units of Lantus this AM for non HD day.    increase Lantus to 28 units non HD days, and 24 units for HD days.  change correction scale to moderate q 4 hours.  increase Humalog for breakfast and lunch to 10 units AC, increase humalog pre dinner to 11 units AC.  increase pre HS snack humalog to 5 units SQ daily.  Once BHB is resulted, will determine further management, patient may need MICU consult for insulin gtt.  Would consult with nephrology regarding IVF.       Discussed with attending Dr. Patrick.  Recommends MICU consult for IV insulin infusion.  D/c with basal/bolus dose TBD  Outpatient endocrine follow up: appointment pending with Dr Baker

## 2017-10-01 NOTE — PROGRESS NOTE ADULT - PROBLEM SELECTOR PLAN 5
-home regimen clonidine 0.1mg po q8h, nifedipine XR 90 mg qD, Furosemide 10 mg daily - holding for now  -c/w Cardizem and metoprolol and hydralazine -Kidney transplant from living brother in 1998 2/2 diabetic nephropathy.  -c/w Cyclosporin 100mg daily, prednisone 5mg po daily.  Holding Cellcept.

## 2017-10-01 NOTE — PROGRESS NOTE ADULT - PROBLEM SELECTOR PLAN 2
-c/w HD per renal MWF  - Pt to get hemodialysis today as per renal for fluid overload/LE edema.  -permacath in place - AVF with vascular Dr. Staples, planned for Monday 10/2 -self-converted to NSR 9/21  -currently on PO Cardizem, metoprolol 50 bid w/ hold parameters.  -c/w hep gtt, holding coumadin as pt. is planned for AVF on Monday, 10/2.  -appreciate EP recs - Repeat TTE 9/25 demonstrated small pericardial effusion and small right pleural effusion. TTE w/ EF 65%  -continue lopressor and cardizem when NPO and before HD sessions given he has tendency to go into afib w/ RVR if he misses doses  - [facility]

## 2017-10-01 NOTE — CHART NOTE - NSCHARTNOTEFT_GEN_A_CORE
PRE OPERATIVE NOTE    Pre-op Diagnosis: ESRD  Procedure: AVF creation  Surgeon: Dr. Staples                          9.7    10.74 )-----------( 163      ( 01 Oct 2017 09:00 )             31.4     10-01    136  |  97<L>  |  62<H>  ----------------------------<  358<H>  4.4   |  15<L>  |  3.23<H>    Ca    8.7      01 Oct 2017 09:00  Phos  4.5     10-01  Mg     1.7     10-01        PT/INR - ( 01 Oct 2017 07:00 )   PT: 10.5 SEC;   INR: 0.94          PTT - ( 01 Oct 2017 07:00 )  PTT:38.9 SEC    CAPILLARY BLOOD GLUCOSE  379 (01 Oct 2017 12:53)      Type & Screen: Type + Screen (10.01.17 @ 00:00)    ABO Interpretation: O    Rh Interpretation: Positive    Antibody Screen: Negative    EKG: < from: 12 Lead ECG (09.03.17 @ 13:19) >      Diagnosis Line Marked sinus bradycardia  Non-specific intra-ventricular conduction delay  ST elevation, consider early repolarization    < end of copied text >      Echocardiogram: < from: Transthoracic Echocardiogram (09.25.17 @ 16:14) >    CONCLUSIONS:  Limited study to re-evaluate pericardial effusion.  Small  pericardial effusion posterior and lateral to the LV.  A  right pleural effusion is noted.    < end of copied text >        A/P: 52y Male planned for AVF creation  NPO past midnight, except medications  IVF  Pain/nausea control  AM labs  Consent in chart  tamsulosin  diltiazem CD  metoprolol  hydrALAZINE

## 2017-10-01 NOTE — PROGRESS NOTE ADULT - ASSESSMENT
52 M with Type 1 DM HbA1C 8 admitted for AMS s/p cardiac arrest now on HD presents with hyperglycemia. Awaiting AV fistula but now delayed due to hyperglycemia, likely DKA now. AG 24

## 2017-10-01 NOTE — PROGRESS NOTE ADULT - SUBJECTIVE AND OBJECTIVE BOX
Chief Complaint: DM 1    History: hyperglycemia this am- AG 24.  Patient received all insulin as ordered, including HS snack.  Patient ate half turkey sandwich.  Reports frustration with bg management.  Is not eating lunch for now.  Breakfast was pancakes and eggs.      MEDICATIONS  (STANDING):  predniSONE   Tablet 5 milliGRAM(s) Oral daily  tamsulosin 0.4 milliGRAM(s) Oral at bedtime  cycloSPORINE  (SandIMMUNE) 100 milliGRAM(s) Oral daily  atorvastatin 80 milliGRAM(s) Oral at bedtime  senna 2 Tablet(s) Oral at bedtime  docusate sodium 100 milliGRAM(s) Oral three times a day  epoetin valente Injectable 7000 Unit(s) IV Push <User Schedule>  diltiazem    milliGRAM(s) Oral daily  metoprolol 50 milliGRAM(s) Oral two times a day  insulin lispro (HumaLOG) corrective regimen sliding scale   SubCutaneous three times a day before meals  insulin lispro (HumaLOG) corrective regimen sliding scale   SubCutaneous at bedtime  dextrose 5%. 1000 milliLiter(s) (50 mL/Hr) IV Continuous <Continuous>  dextrose 50% Injectable 12.5 Gram(s) IV Push once  dextrose 50% Injectable 25 Gram(s) IV Push once  dextrose 50% Injectable 25 Gram(s) IV Push once  insulin lispro Injectable (HumaLOG) 10 Unit(s) SubCutaneous before breakfast  insulin lispro Injectable (HumaLOG) 9 Unit(s) SubCutaneous before lunch  insulin lispro Injectable (HumaLOG) 9 Unit(s) SubCutaneous before dinner  hydrALAZINE 50 milliGRAM(s) Oral three times a day  heparin  Infusion. 2000 Unit(s)/Hr (20 mL/Hr) IV Continuous <Continuous>  insulin glargine Injectable (LANTUS) 22 Unit(s) SubCutaneous <User Schedule>  insulin lispro Injectable (HumaLOG) 3 Unit(s) SubCutaneous at bedtime  insulin glargine Injectable (LANTUS) 26 Unit(s) SubCutaneous <User Schedule>      MEDICATIONS  (PRN):  naphazoline/pheniramine Solution 1 Drop(s) Both EYES three times a day PRN eye discomfort  dextrose Gel 1 Dose(s) Oral once PRN Blood Glucose LESS THAN 70 milliGRAM(s)/deciLiter  glucagon  Injectable 1 milliGRAM(s) IntraMuscular once PRN Glucose <70 milliGRAM(s)/deciLiter  heparin  Injectable 7000 Unit(s) IV Push every 6 hours PRN For aPTT less than 40  heparin  Injectable 3500 Unit(s) IV Push every 6 hours PRN For aPTT between 40 - 57  acetaminophen   Tablet. 650 milliGRAM(s) Oral every 6 hours PRN Moderate Pain (4 - 6)        No Known Allergies      Review of Systems:  Constitutional: No fever  Cardiovascular: No chest pain, palpitations  Respiratory: No SOB, no cough  GI: No nausea, vomiting, abdominal pain        PHYSICAL EXAM:  Vital Signs Last 24 Hrs  T(C): 36.6 (01 Oct 2017 05:48), Max: 36.7 (30 Sep 2017 15:09)  T(F): 97.9 (01 Oct 2017 05:48), Max: 98.1 (30 Sep 2017 15:09)  HR: 99 (01 Oct 2017 05:48) (91 - 99)  BP: 165/90 (01 Oct 2017 05:48) (154/88 - 171/89)  BP(mean): --  RR: 18 (01 Oct 2017 05:48) (18 - 18)  SpO2: 99% (01 Oct 2017 05:48) (99% - 100%)  GENERAL: NAD, well-groomed  HEENT:  Atraumatic, Normocephalic, moist mucous membranes  RESPIRATORY: non labored  GI: Soft, nontender, non distended  SKIN: Dry, warm  PSYCH: Alert and oriented x 3, normal affect, normal mood    CAPILLARY BLOOD GLUCOSE  379 (01 Oct 2017 12:53)  340 (01 Oct 2017 08:35)  164 (30 Sep 2017 21:53)  163 (30 Sep 2017 16:54)  289 (09-30 @ 13:02)  400 (09-30 @ 08:56)  159 (09-29 @ 22:36)  216 (09-29 @ 16:57)  388 (09-29 @ 12:11)  347 (09-29 @ 10:42)  176 (09-29 @ 08:56)  206 (09-29 @ 05:20)  177 (09-28 @ 21:48)  214 (09-28 @ 17:47)  237 (09-28 @ 16:36)  327 (09-28 @ 12:46)  344 (09-28 @ 08:48)  283 (09-28 @ 04:06)  185 (09-27 @ 21:54)  214 (09-27 @ 17:14)    10-01    136  |  97<L>  |  62<H>  ----------------------------<  358<H>  4.4   |  15<L>  |  3.23<H>    Ca    8.7      01 Oct 2017 09:00  Phos  4.5     10-01  Mg     1.7     10-01    BHB pending....      Thyroid Function Tests:  09-03 @ 13:45 TSH 2.88 FreeT4 0.70 T3 71.7 Anti TPO -- Anti Thyroglobulin Ab -- TSI --      Hemoglobin A1C, Whole Blood: 8.0 % <H> [4.0 - 5.6] (08-16-17 @ 06:30)

## 2017-10-01 NOTE — PROGRESS NOTE ADULT - ASSESSMENT
52M with h/o CKD s/p renal transplant (1998) on cyclosporine and CellCept, HTN, DM, legally blind BIBEMS for uremic encephalopathy. Pt became bradycardic and had PEA arrest in the ED, intubated for airway protection. ROSC achieved s/p 1 round of epi and chest compressions and pt was transferred to MICU. Immunosuppressants for renal transplant were held. Pt had emergent HD for hyperkalemia. Covered empirically with broad spectrum antibiotics (vanc and zosyn), Hgb 5 s/p 1u pRBC given and epogen was started. Pressors were weaned off and pt extubated 9/4. Cyclosporine restarted and prednisone started. Pt remains stable and transferred to floor for further management 9/5, now found to have new-onset AFib. Also s/p permacath w/ plan for AVF. AVF canceled due to hyperglycemia. AVF will be placed on Monday 10/2 at 5 pm. 52M with h/o CKD s/p renal transplant (1998) on cyclosporine and CellCept, HTN, DM, legally blind BIBEMS for uremic encephalopathy. Pt became bradycardic and had PEA arrest in the ED, intubated for airway protection. ROSC achieved s/p 1 round of epi and chest compressions and pt was transferred to MICU. Immunosuppressants for renal transplant were held. Pt had emergent HD for hyperkalemia. Covered empirically with broad spectrum antibiotics (vanc and zosyn), Hgb 5 s/p 1u pRBC given and epogen was started. Pressors were weaned off and pt extubated 9/4. Cyclosporine restarted and prednisone started. Pt remains stable and transferred to floor for further management 9/5, now found to have new-onset AFib. Also s/p permacath w/ plan for AVF. AVF canceled due to hyperglycemia. AVF will be placed on Monday 10/2 at 5 pm however today is noted to develop AG and therefore concern that he maybe developing DKA

## 2017-10-01 NOTE — PROGRESS NOTE ADULT - SUBJECTIVE AND OBJECTIVE BOX
Medicine Team 3 Progress Note- PGY2    =========================================  CONTACT INFO  Adriano Gage M.D., PGY-2  Pager: NS- [phone], [facility]- [phone]    Mon-Fri: pager covered by day team 7am-7pm;   ***Academic conferences M-F 8am-9am & 12pm-1pm- page ONLY if URGENT or if Consultant  /Geetha: see chart, primary physician assigned available 7am-12pm  Sat/Light Cross Coverage 12pm-7pm: NS- page [phone] for Team1-4, [facility]- pager forwarded to covering Resident  For Night coverage 7pm-7am: NS- page [phone] Team1-3, page [phone] Team4 & Care Model; [facility]- page [phone] Team1-3,[id number] Tea4-CareA/B  =========================================    Chief Complaint: Patient is a 52y old  Male who presents with a chief complaint of Altered mental status (10 Sep 2017 23:23)      INTERVAL HPI/OVERNIGHT EVENTS:     ROS:  Constiutional: no fevers, no chills, no night sweats  CV: no CP, no palpitations  Pulm: no SOB, no cough  GI: no n/v/d  : no painful urination or incontinence  All other systems negative    MEDICATIONS  (STANDING):  predniSONE   Tablet 5 milliGRAM(s) Oral daily  tamsulosin 0.4 milliGRAM(s) Oral at bedtime  cycloSPORINE  (SandIMMUNE) 100 milliGRAM(s) Oral daily  atorvastatin 80 milliGRAM(s) Oral at bedtime  senna 2 Tablet(s) Oral at bedtime  docusate sodium 100 milliGRAM(s) Oral three times a day  epoetin valente Injectable 7000 Unit(s) IV Push <User Schedule>  diltiazem    milliGRAM(s) Oral daily  metoprolol 50 milliGRAM(s) Oral two times a day  insulin lispro (HumaLOG) corrective regimen sliding scale   SubCutaneous three times a day before meals  insulin lispro (HumaLOG) corrective regimen sliding scale   SubCutaneous at bedtime  dextrose 5%. 1000 milliLiter(s) (50 mL/Hr) IV Continuous <Continuous>  dextrose 50% Injectable 12.5 Gram(s) IV Push once  dextrose 50% Injectable 25 Gram(s) IV Push once  dextrose 50% Injectable 25 Gram(s) IV Push once  insulin lispro Injectable (HumaLOG) 10 Unit(s) SubCutaneous before breakfast  insulin lispro Injectable (HumaLOG) 9 Unit(s) SubCutaneous before lunch  insulin lispro Injectable (HumaLOG) 9 Unit(s) SubCutaneous before dinner  hydrALAZINE 50 milliGRAM(s) Oral three times a day  heparin  Infusion. 2000 Unit(s)/Hr (20 mL/Hr) IV Continuous <Continuous>  insulin glargine Injectable (LANTUS) 22 Unit(s) SubCutaneous <User Schedule>  insulin lispro Injectable (HumaLOG) 3 Unit(s) SubCutaneous at bedtime  insulin glargine Injectable (LANTUS) 26 Unit(s) SubCutaneous <User Schedule>    MEDICATIONS  (PRN):  naphazoline/pheniramine Solution 1 Drop(s) Both EYES three times a day PRN eye discomfort  dextrose Gel 1 Dose(s) Oral once PRN Blood Glucose LESS THAN 70 milliGRAM(s)/deciLiter  glucagon  Injectable 1 milliGRAM(s) IntraMuscular once PRN Glucose <70 milliGRAM(s)/deciLiter  heparin  Injectable 7000 Unit(s) IV Push every 6 hours PRN For aPTT less than 40  heparin  Injectable 3500 Unit(s) IV Push every 6 hours PRN For aPTT between 40 - 57  acetaminophen   Tablet. 650 milliGRAM(s) Oral every 6 hours PRN Moderate Pain (4 - 6)      Vital Signs Last 24 Hrs  T(C): 36.6 (01 Oct 2017 05:48), Max: 36.7 (30 Sep 2017 15:09)  T(F): 97.9 (01 Oct 2017 05:48), Max: 98.1 (30 Sep 2017 15:09)  HR: 99 (01 Oct 2017 05:48) (91 - 99)  BP: 165/90 (01 Oct 2017 05:48) (154/88 - 171/89)  BP(mean): --  RR: 18 (01 Oct 2017 05:48) (18 - 18)  SpO2: 99% (01 Oct 2017 05:48) (99% - 100%)  Supplemental O2: [ ] No, on Room Air [ ] Yes,     I&O's Detail    CAPILLARY BLOOD GLUCOSE  340 (01 Oct 2017 08:35)  164 (30 Sep 2017 21:53)  163 (30 Sep 2017 16:54)  289 (30 Sep 2017 13:02)          PHYSICAL EXAM:  Daily     Daily Weight in k.8 (01 Oct 2017 05:48)   GENERAL: NAD,   HEAD:  NC/AT  EYES: PERRLA, EOMI, white sclerae  ENMT: MMM, no oropharyngeal lesions or erythema appreciated, dentition well appearing  Neck: trachea midline, no appreciable masses  Pulm: normal work of breathing, CTA b/l  CV: S1&S2+, rrr, no m/r/g appreciated  ABDOMEN: bs+, soft, nt, nd, no appreciable organomegaly  : no cva tenderness, no ulloa placed  EXTREMITIES:  2+ peripheral pulses, no appreciable edema in b/l LE  LYMPH: no appreciable lymphadenopathy in head or neck  Neuro: A&Ox3, no focal deficits  SKIN: warm and dry, no visible rash    LABS:                        9.7    10.74 )-----------( 163      ( 01 Oct 2017 09:00 )             31.4     10-01    136  |  97<L>  |  62<H>  ----------------------------<  358<H>  4.4   |  15<L>  |  3.23<H>    Ca    8.7      01 Oct 2017 09:00  Phos  4.5     10-01  Mg     1.7     10-        PT/INR - ( 01 Oct 2017 07:00 )   PT: 10.5 SEC;   INR: 0.94          PTT - ( 01 Oct 2017 07:00 )  PTT:38.9 SEC            Microbiology:    RADIOLOGY & ADDITIONAL TESTS:    Xray -   CT -  MRI -     Imaging Personally Reviewed:  [ ] YES  [ ] NO    Consultant(s) Notes Reviewed:  [ ] YES  [ ] NO    Care Discussed with Consultants/Other Providers [ ] YES  [ ] NO Medicine Team 3 Progress Note- PGY2    =========================================  CONTACT INFO  Adriano Gage M.D., PGY-2  Pager: NS- [phone], [facility]- [phone]    Mon-Fri: pager covered by day team 7am-7pm;   ***Academic conferences M-F 8am-9am & 12pm-1pm- page ONLY if URGENT or if Consultant  /Geetha: see chart, primary physician assigned available 7am-12pm  Sat/Light Cross Coverage 12pm-7pm: NS- page [phone] for Team1-4, [facility]- pager forwarded to covering Resident  For Night coverage 7pm-7am: NS- page [phone] Team1-3, page [phone] Team4 & Care Model; [facility]- page [phone] Team1-3,[id number] Tea4-CareA/B  =========================================    Chief Complaint: Patient is a 52y old  Male who presents with a chief complaint of Altered mental status (10 Sep 2017 23:23)      INTERVAL HPI/OVERNIGHT EVENTS: no acute events overnight. The patient feels well, no cp, palpitations, sob, cough, n/v/d. He does not want have NPO order as he wants lunch tray delivered but will not eat until instructed by endocrinology as he may have AG reopening    ROS:  Constiutional: no fevers, no chills, no night sweats  CV: no CP, no palpitations  Pulm: no SOB, no cough  GI: no n/v/d  : no painful urination or incontinence  All other systems negative    MEDICATIONS  (STANDING):  predniSONE   Tablet 5 milliGRAM(s) Oral daily  tamsulosin 0.4 milliGRAM(s) Oral at bedtime  cycloSPORINE  (SandIMMUNE) 100 milliGRAM(s) Oral daily  atorvastatin 80 milliGRAM(s) Oral at bedtime  senna 2 Tablet(s) Oral at bedtime  docusate sodium 100 milliGRAM(s) Oral three times a day  epoetin valente Injectable 7000 Unit(s) IV Push <User Schedule>  diltiazem    milliGRAM(s) Oral daily  metoprolol 50 milliGRAM(s) Oral two times a day  insulin lispro (HumaLOG) corrective regimen sliding scale   SubCutaneous three times a day before meals  insulin lispro (HumaLOG) corrective regimen sliding scale   SubCutaneous at bedtime  dextrose 5%. 1000 milliLiter(s) (50 mL/Hr) IV Continuous <Continuous>  dextrose 50% Injectable 12.5 Gram(s) IV Push once  dextrose 50% Injectable 25 Gram(s) IV Push once  dextrose 50% Injectable 25 Gram(s) IV Push once  insulin lispro Injectable (HumaLOG) 10 Unit(s) SubCutaneous before breakfast  insulin lispro Injectable (HumaLOG) 9 Unit(s) SubCutaneous before lunch  insulin lispro Injectable (HumaLOG) 9 Unit(s) SubCutaneous before dinner  hydrALAZINE 50 milliGRAM(s) Oral three times a day  heparin  Infusion. 2000 Unit(s)/Hr (20 mL/Hr) IV Continuous <Continuous>  insulin glargine Injectable (LANTUS) 22 Unit(s) SubCutaneous <User Schedule>  insulin lispro Injectable (HumaLOG) 3 Unit(s) SubCutaneous at bedtime  insulin glargine Injectable (LANTUS) 26 Unit(s) SubCutaneous <User Schedule>    MEDICATIONS  (PRN):  naphazoline/pheniramine Solution 1 Drop(s) Both EYES three times a day PRN eye discomfort  dextrose Gel 1 Dose(s) Oral once PRN Blood Glucose LESS THAN 70 milliGRAM(s)/deciLiter  glucagon  Injectable 1 milliGRAM(s) IntraMuscular once PRN Glucose <70 milliGRAM(s)/deciLiter  heparin  Injectable 7000 Unit(s) IV Push every 6 hours PRN For aPTT less than 40  heparin  Injectable 3500 Unit(s) IV Push every 6 hours PRN For aPTT between 40 - 57  acetaminophen   Tablet. 650 milliGRAM(s) Oral every 6 hours PRN Moderate Pain (4 - 6)      Vital Signs Last 24 Hrs  T(C): 36.6 (01 Oct 2017 05:48), Max: 36.7 (30 Sep 2017 15:09)  T(F): 97.9 (01 Oct 2017 05:48), Max: 98.1 (30 Sep 2017 15:09)  HR: 99 (01 Oct 2017 05:48) (91 - 99)  BP: 165/90 (01 Oct 2017 05:48) (154/88 - 171/89)  BP(mean): --  RR: 18 (01 Oct 2017 05:48) (18 - 18)  SpO2: 99% (01 Oct 2017 05:48) (99% - 100%)  Supplemental O2: [ ] No, on Room Air [ ] Yes,     I&O's Detail    CAPILLARY BLOOD GLUCOSE  340 (01 Oct 2017 08:35)  164 (30 Sep 2017 21:53)  163 (30 Sep 2017 16:54)  289 (30 Sep 2017 13:02)          PHYSICAL EXAM:  Daily Weight in k.8 (01 Oct 2017 05:48)   GENERAL: NAD, thin  HEAD:  NC/AT  EYES: PERRLA, EOMI, white sclerae  ENMT: MMM, no oropharyngeal lesions or erythema appreciate  Neck: trachea midline, no appreciable masses  Pulm: normal work of breathing, CTA b/l  CV: S1&S2+, rrr, no m/r/g appreciated  ABDOMEN: bs+, soft, nt, nd, no appreciable organomegaly  : no cva tenderness, no ulloa placed  EXTREMITIES:  2+ peripheral pulses, no appreciable edema in b/l LE  LYMPH: no appreciable lymphadenopathy in head or neck  Neuro: A&Ox3, no focal deficits  SKIN: warm and dry, no visible rash, tattoos    LABS:                        9.7    10.74 )-----------( 163      ( 01 Oct 2017 09:00 )             31.4     10-01    136  |  97<L>  |  62<H>  ----------------------------<  358<H>  4.4   |  15<L>  |  3.23<H>    Ca    8.7      01 Oct 2017 09:00  Phos  4.5     10-01  Mg     1.7     10-        PT/INR - ( 01 Oct 2017 07:00 )   PT: 10.5 SEC;   INR: 0.94          PTT - ( 01 Oct 2017 07:00 )  PTT:38.9 SEC        Microbiology:    RADIOLOGY & ADDITIONAL TESTS:    Xray -   CT -  MRI -     Imaging Personally Reviewed:  [ ] YES  [ ] NO    Consultant(s) Notes Reviewed:  [X ] YES  [ ] NO    Care Discussed with Consultants/Other Providers [X ] YES  [ ] NO

## 2017-10-01 NOTE — PROVIDER CONTACT NOTE (OTHER) - RECOMMENDATIONS
called team, md responded with "who's dying". discussed issue with md. he will call endocrine called team, md responded with "who's dying". " I am covering 70 patients". unless "pt is dying, it is not a high priority." discussed issue with md. he will call endocrine

## 2017-10-01 NOTE — PROGRESS NOTE ADULT - PROBLEM SELECTOR PROBLEM 1
Atrial fibrillation, new onset Diabetic ketoacidosis without coma associated with type 1 diabetes mellitus

## 2017-10-01 NOTE — PROVIDER CONTACT NOTE (OTHER) - ACTION/TREATMENT ORDERED:
give 11 units now, then check fs as scheduled at 10pm give sliding scale and standing for snack at that time., repeated to md

## 2017-10-01 NOTE — PROGRESS NOTE ADULT - PROBLEM SELECTOR PLAN 4
-Kidney transplant from living brother in 1998 2/2 diabetic nephropathy.  -c/w Cyclosporin 100mg daily, prednisone 5mg po daily.  Holding Cellcept. PTH elevated 186. Corrected Ca at goal 8.4 - 9.5. PTH at goal <600

## 2017-10-01 NOTE — CHART NOTE - NSCHARTNOTEFT_GEN_A_CORE
Medicine Team 3 Chart update- PGY2    =========================================  CONTACT INFO  Adriano Gage M.D., PGY-2  Pager: NS- [phone], [facility]- [phone]    Mon-Fri: pager covered by day team 7am-7pm;   ***Academic conferences M-F 8am-9am & 12pm-1pm- page ONLY if URGENT or if Consultant  /Geetha: see chart, primary physician assigned available 7am-12pm  Sat/Light Cross Coverage 12pm-7pm: NS- page [phone] for Team1-4, [facility]- pager forwarded to covering Resident  For Night coverage 7pm-7am: NS- page [phone] Team1-3, page [phone] Team4 & Care Model; [facility]- page [phone] Team1-3,[id number] Tea4-CareA/B  =========================================  Throughout today I have been in discussion w/ Endo regarding expanding AG (over last 3d 15->20->24). Endo requested MICU consult. Initially called MICU however fellow wanted VBG and Beta Hydroxy-butyrate returned prior to hearing. when returned beta hydroxybuturate at 0.7 & VBG pH 7.42. Per MICU no need for official consult at this time for which endo agreed. Endo then requested that Nephrology be informed fo IVF given acidosis and per on call nephrology fellow patient could receive IVF w/o restriction given that he was planned to have HD tomorrow w/ exception of not having KCl additive or LR. Per Endo, pt should be started on NS at 75cc/hr for 8hr and have FS checked at 6am. If FS at 6am >140 then keep insulin regimen, if less than 140 then cut lantus by 20U as pt is scheduled for av fistula. Endo informed that they will update patient of the plan      Labs  10-01    136  |  97<L>  |  62<H>  ----------------------------<  358<H>  4.4   |  15<L>  |  3.23<H>    Beta Hydroxy-Butyrate (10.01.17 @ 13:33)    Beta Hydroxy-Butyrate: 0.7 mmol/L      Blood Gas Venous Comprehensive (10.01.17 @ 16:10)    Blood Gas Venous - Lactate: 0.7: Please note updated reference range. mmol/L    Blood Gas Venous - Chloride: 101 mmol/L    Blood Gas Venous - Creatinine: 3.09: Delta: 12.10 on 09/03/  Delta: 12.10 on 09/03/ mg/dL    pH, Venous: 7.42 pH    pCO2, Venous: 32 mmHg    pO2, Venous: 85 mmHg    HCO3, Venous: 22 mmol/L    Base Excess, Venous: -3.3: REFERENCE RANGE = -3 + 2 mmol/L mmol/L    Oxygen Saturation, Venous: 97.8 %    Blood Gas Venous - Sodium: 129 mmol/L    Blood Gas Venous - Potassium: 3.8 mmol/L    Blood Gas Venous - Glucose: 359    Blood Gas Venous - Hemoglobin: 10.4: Delta: 7.2 on 09/08/  Delta: 7.2 on 09/08/ g/dL    Blood Gas Venous - Hematocrit: 32.2: Delta: 22.4 on 09/08/  Delta: 22.4 on 09/08/ Helena Gage PGY-2

## 2017-10-01 NOTE — PROGRESS NOTE ADULT - PROBLEM SELECTOR PLAN 6
Home regime: lantus 22u at home, home pravastatin 10mg daily.  -HbA1C 8  -Endo follow up appreciated.   - Since patient getting HD today, will give lantus 22 units and humalog 10/9/9/2. Will trial giving 25u Lantus on mornings he doesn't have hemodialysis  -atorvastatin 80mg daily -home regimen clonidine 0.1mg po q8h, nifedipine XR 90 mg qD, Furosemide 10 mg daily - holding for now  -c/w Cardizem and metoprolol and hydralazine

## 2017-10-01 NOTE — PROGRESS NOTE ADULT - ATTENDING COMMENTS
Patient seen and examined. Agree with above note by resident.    1. ESRD on HD - need AVF placement prior to DC. Procedure planned for  10/2/17  2. DM2 uncontrolled - FS are labile. Continue to monitor. Appreciate endo recs. Plan tomorrow prior to procedure is lantus 20U in AM.   3. HTN - Goal BP<140. Monitor BP.  4. renal transplant status - c/w Cyclosporin 100mg daily and prednisone. Tapered off cellcept.   5. Afib persistent -  rate control with cardizem. on heparin gtt. After AVF placement, will start coumadin.     Plan discussed with pt . Patient seen and examined. Agree with above note by resident.    1. ESRD on HD - need AVF placement prior to DC. Procedure planned for  10/2/17  2. DM2 uncontrolled - hyperglycemic today with Fs in 300s. Worsening gap acidosis as well. Endocrine to assess and help with management especially since a procedure is planned for tomorrow. Will follow recs.   3. HTN - Goal BP<140. Monitor BP.  4. renal transplant status - c/w Cyclosporin 100mg daily and prednisone. Tapered off cellcept.   5. Afib persistent -  rate control with cardizem. on heparin gtt. After AVF placement, will start coumadin.     Plan discussed with pt .

## 2017-10-02 ENCOUNTER — APPOINTMENT (OUTPATIENT)
Dept: VASCULAR SURGERY | Facility: HOSPITAL | Age: 52
End: 2017-10-02

## 2017-10-02 LAB
APTT BLD: 102.3 SEC — HIGH (ref 27.5–37.4)
APTT BLD: 109 SEC — HIGH (ref 27.5–37.4)
BLD GP AB SCN SERPL QL: NEGATIVE — SIGNIFICANT CHANGE UP
GLUCOSE BLDV-MCNC: 142 — HIGH (ref 70–99)
HCT VFR BLD CALC: 28.6 % — LOW (ref 39–50)
HGB BLD-MCNC: 9 G/DL — LOW (ref 13–17)
INR BLD: 0.94 — SIGNIFICANT CHANGE UP (ref 0.88–1.17)
MCHC RBC-ENTMCNC: 28.2 PG — SIGNIFICANT CHANGE UP (ref 27–34)
MCHC RBC-ENTMCNC: 31.5 % — LOW (ref 32–36)
MCV RBC AUTO: 89.7 FL — SIGNIFICANT CHANGE UP (ref 80–100)
NRBC # FLD: 0 — SIGNIFICANT CHANGE UP
PLATELET # BLD AUTO: 154 K/UL — SIGNIFICANT CHANGE UP (ref 150–400)
PMV BLD: 10.9 FL — SIGNIFICANT CHANGE UP (ref 7–13)
POTASSIUM BLDV-SCNC: 3.8 MMOL/L — SIGNIFICANT CHANGE UP (ref 3.4–4.5)
PROTHROM AB SERPL-ACNC: 10.5 SEC — SIGNIFICANT CHANGE UP (ref 9.8–13.1)
RBC # BLD: 3.19 M/UL — LOW (ref 4.2–5.8)
RBC # FLD: 15 % — HIGH (ref 10.3–14.5)
RH IG SCN BLD-IMP: POSITIVE — SIGNIFICANT CHANGE UP
WBC # BLD: 10.57 K/UL — HIGH (ref 3.8–10.5)
WBC # FLD AUTO: 10.57 K/UL — HIGH (ref 3.8–10.5)

## 2017-10-02 PROCEDURE — 90935 HEMODIALYSIS ONE EVALUATION: CPT

## 2017-10-02 PROCEDURE — 99232 SBSQ HOSP IP/OBS MODERATE 35: CPT

## 2017-10-02 PROCEDURE — 99233 SBSQ HOSP IP/OBS HIGH 50: CPT | Mod: GC

## 2017-10-02 RX ORDER — CYCLOSPORINE 100 MG/1
100 CAPSULE ORAL DAILY
Qty: 0 | Refills: 0 | Status: DISCONTINUED | OUTPATIENT
Start: 2017-10-02 | End: 2017-10-02

## 2017-10-02 RX ORDER — ERYTHROPOIETIN 10000 [IU]/ML
6000 INJECTION, SOLUTION INTRAVENOUS; SUBCUTANEOUS
Qty: 0 | Refills: 0 | Status: DISCONTINUED | OUTPATIENT
Start: 2017-10-02 | End: 2017-10-10

## 2017-10-02 RX ORDER — INSULIN LISPRO 100/ML
VIAL (ML) SUBCUTANEOUS AT BEDTIME
Qty: 0 | Refills: 0 | Status: DISCONTINUED | OUTPATIENT
Start: 2017-10-02 | End: 2017-10-03

## 2017-10-02 RX ORDER — INSULIN LISPRO 100/ML
VIAL (ML) SUBCUTANEOUS
Qty: 0 | Refills: 0 | Status: DISCONTINUED | OUTPATIENT
Start: 2017-10-02 | End: 2017-10-03

## 2017-10-02 RX ORDER — METOPROLOL TARTRATE 50 MG
50 TABLET ORAL
Qty: 0 | Refills: 0 | Status: DISCONTINUED | OUTPATIENT
Start: 2017-10-02 | End: 2017-10-10

## 2017-10-02 RX ORDER — ACETAMINOPHEN 500 MG
650 TABLET ORAL EVERY 6 HOURS
Qty: 0 | Refills: 0 | Status: DISCONTINUED | OUTPATIENT
Start: 2017-10-02 | End: 2017-10-10

## 2017-10-02 RX ORDER — HEPARIN SODIUM 5000 [USP'U]/ML
5000 INJECTION INTRAVENOUS; SUBCUTANEOUS EVERY 8 HOURS
Qty: 0 | Refills: 0 | Status: DISCONTINUED | OUTPATIENT
Start: 2017-10-02 | End: 2017-10-02

## 2017-10-02 RX ORDER — CALCITRIOL 0.5 UG/1
0.5 CAPSULE ORAL DAILY
Qty: 0 | Refills: 0 | Status: DISCONTINUED | OUTPATIENT
Start: 2017-10-02 | End: 2017-10-10

## 2017-10-02 RX ORDER — HEPARIN SODIUM 5000 [USP'U]/ML
2000 INJECTION INTRAVENOUS; SUBCUTANEOUS
Qty: 25000 | Refills: 0 | Status: DISCONTINUED | OUTPATIENT
Start: 2017-10-03 | End: 2017-10-04

## 2017-10-02 RX ORDER — DOCUSATE SODIUM 100 MG
100 CAPSULE ORAL THREE TIMES A DAY
Qty: 0 | Refills: 0 | Status: DISCONTINUED | OUTPATIENT
Start: 2017-10-02 | End: 2017-10-10

## 2017-10-02 RX ORDER — TAMSULOSIN HYDROCHLORIDE 0.4 MG/1
0.4 CAPSULE ORAL AT BEDTIME
Qty: 0 | Refills: 0 | Status: DISCONTINUED | OUTPATIENT
Start: 2017-10-02 | End: 2017-10-10

## 2017-10-02 RX ORDER — HYDRALAZINE HCL 50 MG
50 TABLET ORAL THREE TIMES A DAY
Qty: 0 | Refills: 0 | Status: DISCONTINUED | OUTPATIENT
Start: 2017-10-02 | End: 2017-10-10

## 2017-10-02 RX ORDER — SENNA PLUS 8.6 MG/1
2 TABLET ORAL AT BEDTIME
Qty: 0 | Refills: 0 | Status: DISCONTINUED | OUTPATIENT
Start: 2017-10-02 | End: 2017-10-10

## 2017-10-02 RX ADMIN — HEPARIN SODIUM 1800 UNIT(S)/HR: 5000 INJECTION INTRAVENOUS; SUBCUTANEOUS at 00:19

## 2017-10-02 RX ADMIN — Medication 1 DROP(S): at 12:30

## 2017-10-02 RX ADMIN — Medication: at 13:32

## 2017-10-02 RX ADMIN — Medication 50 MILLIGRAM(S): at 10:15

## 2017-10-02 RX ADMIN — Medication 50 MILLIGRAM(S): at 18:39

## 2017-10-02 RX ADMIN — TAMSULOSIN HYDROCHLORIDE 0.4 MILLIGRAM(S): 0.4 CAPSULE ORAL at 21:26

## 2017-10-02 RX ADMIN — ERYTHROPOIETIN 7000 UNIT(S): 10000 INJECTION, SOLUTION INTRAVENOUS; SUBCUTANEOUS at 08:16

## 2017-10-02 RX ADMIN — Medication 50 MILLIGRAM(S): at 21:26

## 2017-10-02 RX ADMIN — Medication 4: at 05:50

## 2017-10-02 RX ADMIN — Medication 650 MILLIGRAM(S): at 22:43

## 2017-10-02 RX ADMIN — INSULIN GLARGINE 24 UNIT(S): 100 INJECTION, SOLUTION SUBCUTANEOUS at 09:21

## 2017-10-02 RX ADMIN — HEPARIN SODIUM 5000 UNIT(S): 5000 INJECTION INTRAVENOUS; SUBCUTANEOUS at 21:26

## 2017-10-02 RX ADMIN — HEPARIN SODIUM 1600 UNIT(S)/HR: 5000 INJECTION INTRAVENOUS; SUBCUTANEOUS at 08:35

## 2017-10-02 RX ADMIN — Medication 300 MILLIGRAM(S): at 12:30

## 2017-10-02 RX ADMIN — SODIUM CHLORIDE 30 MILLILITER(S): 9 INJECTION, SOLUTION INTRAVENOUS at 00:18

## 2017-10-02 RX ADMIN — Medication 650 MILLIGRAM(S): at 23:16

## 2017-10-02 RX ADMIN — CYCLOSPORINE 100 MILLIGRAM(S): 100 CAPSULE ORAL at 12:30

## 2017-10-02 RX ADMIN — Medication 50 MILLIGRAM(S): at 10:16

## 2017-10-02 RX ADMIN — Medication 5 MILLIGRAM(S): at 05:49

## 2017-10-02 RX ADMIN — Medication 50 MILLIGRAM(S): at 13:33

## 2017-10-02 NOTE — PROGRESS NOTE ADULT - PROBLEM SELECTOR PLAN 1
Pt. tolerating HD well. BP elevated during HD today. Continue with current UF goal  with HD as tolerated. BP meds may need to be adjusted, if BP remains elevated. [facility]IJ HD catheter functioning well. Monitor BP and labs. Pt. awaiting LUE AVF creation surgery

## 2017-10-02 NOTE — PROGRESS NOTE ADULT - SUBJECTIVE AND OBJECTIVE BOX
Surgery Post-Op Check    Patient is s/p LUE AVF creation. Resting comfortably in bed. Reports mild pain at surgical site. LUE is warm and well perfused. No numbness or tingling. Radial and ulnar pulses palpable. Sensory/motor intact. Has been ambulating and tolerating food. Dressing is clean/dry/intact. No swelling/hematoma.     Vital Signs Last 24 Hrs  T(F): 97.7 (10-02-17 @ 21:13), Max: 98.4 (10-02-17 @ 05:48)  HR: 79 (10-02-17 @ 21:13)  BP: 115/68 (10-02-17 @ 21:13)  RR: 16 (10-02-17 @ 21:13)  SpO2: 99% (10-02-17 @ 21:13)  Wt(kg): --   CAPILLARY BLOOD GLUCOSE  234 (02 Oct 2017 21:20)          GENERAL: Awake, NAD  CHEST/LUNG: Unlabored breathing  EXTREMITIES:  LUE as described above. No pain or swelling in legs.    Assessment: 52M s/p LUE AVF creation. Tolerated procedure well.      Plan:  - Reg diet  - Ambulate as tolerated  - May restart heparin drip at midnight  - Restart insulin regimen per endocrinology  - Restart antirejection meds at midnight  - Incentive spirometry  - Care per primary team

## 2017-10-02 NOTE — BRIEF OPERATIVE NOTE - PROCEDURE
<<-----Click on this checkbox to enter Procedure Arteriovenous anastomosis  10/02/2017    Active  CATHIE

## 2017-10-02 NOTE — PROGRESS NOTE ADULT - SUBJECTIVE AND OBJECTIVE BOX
Patient is a 52y old  Male who presents with a chief complaint of Altered mental status (10 Sep 2017 23:23)      OVERNIGHT EVENTS: No acute events overnight.  SUBJECTIVE: Patient seen and examined at bedside. No complaints. Planned for AVF today. NPO.       MEDICATIONS  (STANDING):  predniSONE   Tablet 5 milliGRAM(s) Oral daily  tamsulosin 0.4 milliGRAM(s) Oral at bedtime  cycloSPORINE  (SandIMMUNE) 100 milliGRAM(s) Oral daily  atorvastatin 80 milliGRAM(s) Oral at bedtime  senna 2 Tablet(s) Oral at bedtime  docusate sodium 100 milliGRAM(s) Oral three times a day  epoetin valente Injectable 7000 Unit(s) IV Push <User Schedule>  diltiazem    milliGRAM(s) Oral daily  metoprolol 50 milliGRAM(s) Oral two times a day  dextrose 5%. 1000 milliLiter(s) (50 mL/Hr) IV Continuous <Continuous>  dextrose 50% Injectable 12.5 Gram(s) IV Push once  dextrose 50% Injectable 25 Gram(s) IV Push once  dextrose 50% Injectable 25 Gram(s) IV Push once  hydrALAZINE 50 milliGRAM(s) Oral three times a day  heparin  Infusion. 2000 Unit(s)/Hr (20 mL/Hr) IV Continuous <Continuous>  insulin lispro (HumaLOG) corrective regimen sliding scale   SubCutaneous every 4 hours  insulin lispro Injectable (HumaLOG) 10 Unit(s) SubCutaneous before breakfast  insulin lispro Injectable (HumaLOG) 10 Unit(s) SubCutaneous before lunch  insulin lispro Injectable (HumaLOG) 11 Unit(s) SubCutaneous before dinner  insulin lispro Injectable (HumaLOG) 5 Unit(s) SubCutaneous at bedtime  insulin glargine Injectable (LANTUS) 28 Unit(s) SubCutaneous <User Schedule>  insulin glargine Injectable (LANTUS) 24 Unit(s) SubCutaneous <User Schedule>  dextrose 5% + sodium chloride 0.45%. 1000 milliLiter(s) (30 mL/Hr) IV Continuous <Continuous>    MEDICATIONS  (PRN):  naphazoline/pheniramine Solution 1 Drop(s) Both EYES three times a day PRN eye discomfort  dextrose Gel 1 Dose(s) Oral once PRN Blood Glucose LESS THAN 70 milliGRAM(s)/deciLiter  glucagon  Injectable 1 milliGRAM(s) IntraMuscular once PRN Glucose <70 milliGRAM(s)/deciLiter  heparin  Injectable 7000 Unit(s) IV Push every 6 hours PRN For aPTT less than 40  heparin  Injectable 3500 Unit(s) IV Push every 6 hours PRN For aPTT between 40 - 57  acetaminophen   Tablet. 650 milliGRAM(s) Oral every 6 hours PRN Moderate Pain (4 - 6)      T(C): 36.9 (10-02-17 @ 06:25), Max: 36.9 (10-02-17 @ 05:48)  HR: 86 (10-02-17 @ 06:25) (81 - 91)  BP: 180/91 (10-02-17 @ 06:25) (140/72 - 180/91)  RR: 18 (10-02-17 @ 06:25) (17 - 18)  SpO2: 98% (10-02-17 @ 05:48) (98% - 99%)  CAPILLARY BLOOD GLUCOSE  234 (02 Oct 2017 05:48)  148 (02 Oct 2017 02:16)  191 (01 Oct 2017 22:19)  276 (01 Oct 2017 18:15)  336 (01 Oct 2017 16:00)  366 (01 Oct 2017 15:00)  379 (01 Oct 2017 12:53)  340 (01 Oct 2017 08:35)        I&O's Summary    01 Oct 2017 07:01  -  02 Oct 2017 07:00  --------------------------------------------------------  IN: 176 mL / OUT: 0 mL / NET: 176 mL      PHYSICAL EXAM  GENERAL: NAD, well-developed  HEAD:  Atraumatic, Normocephalic  EYES: conjunctiva and sclera clear  CHEST/LUNG: Clear to auscultation bilaterally; No wheeze, rales or rhonchi  HEART: +S1 +S2, Regular rate and rhythm, no murmurs  ABDOMEN: Soft, Nontender, Nondistended; Bowel sounds present  NEURO: No focal neurologic deficits, sensation and motor function grossly intact.   EXTREMITIES:  Pink and warm, Peripheral Pulses intact. +B/L LE edema. +RIJ HD Cath, C/D/I.       LABS:                        9.7    10.74 )-----------( 163      ( 01 Oct 2017 09:00 )             31.4     10-01    136  |  97<L>  |  62<H>  ----------------------------<  358<H>  4.4   |  15<L>  |  3.23<H>    Ca    8.7      01 Oct 2017 09:00  Phos  4.5     10-01  Mg     1.7     10-01      PT/INR - ( 01 Oct 2017 07:00 )   PT: 10.5 SEC;   INR: 0.94          PTT - ( 01 Oct 2017 23:14 )  PTT:109.0 SEC          RADIOLOGY & ADDITIONAL TESTS:    Imaging Personally Reviewed:  Consultant(s) Notes Reviewed:    Care Discussed with Consultants/Other Providers:      Pooja Steen D.O.  Medicine Intern  pager [phone]

## 2017-10-02 NOTE — PRE-OP CHECKLIST - 4.
Patient heparin drip d/c at 2pm as per floor MICHAEL [place] Patient heparin drip d/c at 2pm as per floor MICHAEL [place]

## 2017-10-02 NOTE — PROGRESS NOTE ADULT - PROBLEM SELECTOR PLAN 2
-c/w HD per renal MWF  - Pt to get hemodialysis today as per renal for fluid overload/LE edema.  -permacath in place - AVF with vascular Dr. Staples, planned for today

## 2017-10-02 NOTE — PROGRESS NOTE ADULT - ASSESSMENT
52M with h/o CKD s/p renal transplant (1998) on cyclosporine and CellCept, HTN, DM, legally blind BIBEMS for uremic encephalopathy. Pt became bradycardic and had PEA arrest in the ED, intubated for airway protection. ROSC achieved s/p 1 round of epi and chest compressions and pt was transferred to MICU. Immunosuppressants for renal transplant were held. Pt had emergent HD for hyperkalemia. Covered empirically with broad spectrum antibiotics (vanc and zosyn), Hgb 5 s/p 1u pRBC given and epogen was started. Pressors were weaned off and pt extubated 9/4. Cyclosporine restarted and prednisone started. Pt remains stable and transferred to floor for further management 9/5, now found to have new-onset AFib. Also s/p permacath w/ plan for AVF. AVF canceled 9/20 due to hyperglycemia. AVF will be placed on Monday 10/2.

## 2017-10-02 NOTE — PROGRESS NOTE ADULT - SUBJECTIVE AND OBJECTIVE BOX
Surgery Preop Note    Patient is a 52y old  Male who presents with a chief complaint of Altered mental status (10 Sep 2017 23:23)    Diagnosis: ESRD  Procedure: L AVF creation  Surgeon: Dr. Staples/Marino                          9.0    10.57 )-----------( 154      ( 02 Oct 2017 06:30 )             28.6     10-01    136  |  97<L>  |  62<H>  ----------------------------<  358<H>  4.4   |  15<L>  |  3.23<H>    Ca    8.7      01 Oct 2017 09:00  Phos  4.5     10-01  Mg     1.7     10-01      PT/INR - ( 02 Oct 2017 06:30 )   PT: 10.5 SEC;   INR: 0.94          PTT - ( 02 Oct 2017 06:30 )  PTT:102.3 SEC  ABO Interpretation: O (10-02 @ 06:00)  ABO Interpretation: O (10-01 @ 00:00)      Chest X RAY   FINDINGS:  The heart is enlarged.   A right-sided Cordis sheath catheter is present with the tip extending   to the level of the SVC.   Small right-sided pleural effusion.  There is no pneumothorax.  Diffuse generalized osteopenia.    IMPRESSION:  Small right-sided pleural effusion.      EKG  < from: 12 Lead ECG (09.03.17 @ 13:19) >  iagnosis Line Marked sinus bradycardia  Non-specific intra-ventricular conduction delay  ST elevation, consider early repolarization  Abnormal ECG       MEDICATIONS  (STANDING):  predniSONE   Tablet 5 milliGRAM(s) Oral daily  tamsulosin 0.4 milliGRAM(s) Oral at bedtime  cycloSPORINE  (SandIMMUNE) 100 milliGRAM(s) Oral daily  atorvastatin 80 milliGRAM(s) Oral at bedtime  senna 2 Tablet(s) Oral at bedtime  docusate sodium 100 milliGRAM(s) Oral three times a day  epoetin valente Injectable 7000 Unit(s) IV Push <User Schedule>  diltiazem    milliGRAM(s) Oral daily  metoprolol 50 milliGRAM(s) Oral two times a day  dextrose 5%. 1000 milliLiter(s) (50 mL/Hr) IV Continuous <Continuous>  dextrose 50% Injectable 12.5 Gram(s) IV Push once  dextrose 50% Injectable 25 Gram(s) IV Push once  dextrose 50% Injectable 25 Gram(s) IV Push once  hydrALAZINE 50 milliGRAM(s) Oral three times a day  heparin  Infusion. 2000 Unit(s)/Hr (20 mL/Hr) IV Continuous <Continuous>  insulin lispro (HumaLOG) corrective regimen sliding scale   SubCutaneous every 4 hours  insulin lispro Injectable (HumaLOG) 10 Unit(s) SubCutaneous before breakfast  insulin lispro Injectable (HumaLOG) 10 Unit(s) SubCutaneous before lunch  insulin lispro Injectable (HumaLOG) 11 Unit(s) SubCutaneous before dinner  insulin lispro Injectable (HumaLOG) 5 Unit(s) SubCutaneous at bedtime  insulin glargine Injectable (LANTUS) 28 Unit(s) SubCutaneous <User Schedule>  insulin glargine Injectable (LANTUS) 24 Unit(s) SubCutaneous <User Schedule>    MEDICATIONS  (PRN):  naphazoline/pheniramine Solution 1 Drop(s) Both EYES three times a day PRN eye discomfort  dextrose Gel 1 Dose(s) Oral once PRN Blood Glucose LESS THAN 70 milliGRAM(s)/deciLiter  glucagon  Injectable 1 milliGRAM(s) IntraMuscular once PRN Glucose <70 milliGRAM(s)/deciLiter  heparin  Injectable 7000 Unit(s) IV Push every 6 hours PRN For aPTT less than 40  heparin  Injectable 3500 Unit(s) IV Push every 6 hours PRN For aPTT between 40 - 57  acetaminophen   Tablet. 650 milliGRAM(s) Oral every 6 hours PRN Moderate Pain (4 - 6)      Assessment & Plan:  52y Male with ESRD on HD via permacath going to OR tomorrow for AVF creation  NPO after midnight  T and S uptodate  F/u am cbc and bmp and coags  DVT prophylaxis  Will discuss with c team when to stop heparin gtt    Samantha Nelson PAC [id number]

## 2017-10-02 NOTE — CHART NOTE - NSCHARTNOTEFT_GEN_A_CORE
Patient tolerated procedure well.  May restart heparin drip at midnight per primary team.  Restart insulin regimen per Endocrine.  May also restart anti-rejection meds at midnight.    Multiple attempts made to contact patient's primary team.    -LISSETT Mendoza PGY2

## 2017-10-02 NOTE — PROGRESS NOTE ADULT - PROBLEM SELECTOR PLAN 1
-self-converted to NSR 9/21  -currently on PO Cardizem, metoprolol 50 bid w/ hold parameters.  -c/w hep gtt, holding coumadin as pt. is planned for AVF on Monday, 10/2.  -appreciate EP recs - Repeat TTE 9/25 demonstrated small pericardial effusion and small right pleural effusion. TTE w/ EF 65%  -continue lopressor and cardizem when NPO and before HD sessions given he has tendency to go into afib w/ RVR if he misses doses  - [facility]

## 2017-10-02 NOTE — PROGRESS NOTE ADULT - ATTENDING COMMENTS
Patient seen and examined. Agree with above note by resident.    ESRD on HD - s/p HD today. Plan for AVF creation today. NPO except meds today  DM2 with hyperglycemia - elevated FS yesterday however no acidosis present. Endo input appreciated. Will monitor FS closely today sirena while patient is NPO. I explained all signs and symptoms of hypoglycemia.   Afib paroxsymal - heparin to coumadin bridge after AVF. c/w heparin gtt. Stop prior to procedure.     Plan discussed with patient

## 2017-10-02 NOTE — PROGRESS NOTE ADULT - SUBJECTIVE AND OBJECTIVE BOX
[facility] DIVISION OF KIDNEY DISEASES AND HYPERTENSION --   --------------------------------------------------------------------------------  Chief Complaint: ESRD/Ongoing hemodialysis requirement    HPI: 53 y/o M with h/o CKD s/p kidney transplant in 1998, HTN, DM, legally blind admitted for uremic encephalopathy initiated on long-term HD. Pt. now ESRD. Pt. seen during HD. No complaints offered during HD. No complaints of CP, SOB, HA or dizziness.     PAST HISTORY  --------------------------------------------------------------------------------  No significant changes to PMH, PSH, FHx, SHx, unless otherwise noted    ALLERGIES & MEDICATIONS  --------------------------------------------------------------------------------  Allergies    No Known Allergies    Intolerances      Standing Inpatient Medications  predniSONE   Tablet 5 milliGRAM(s) Oral daily  tamsulosin 0.4 milliGRAM(s) Oral at bedtime  cycloSPORINE  (SandIMMUNE) 100 milliGRAM(s) Oral daily  atorvastatin 80 milliGRAM(s) Oral at bedtime  senna 2 Tablet(s) Oral at bedtime  docusate sodium 100 milliGRAM(s) Oral three times a day  epoetin valente Injectable 7000 Unit(s) IV Push <User Schedule>  diltiazem    milliGRAM(s) Oral daily  metoprolol 50 milliGRAM(s) Oral two times a day  dextrose 5%. 1000 milliLiter(s) IV Continuous <Continuous>  dextrose 50% Injectable 12.5 Gram(s) IV Push once  dextrose 50% Injectable 25 Gram(s) IV Push once  dextrose 50% Injectable 25 Gram(s) IV Push once  hydrALAZINE 50 milliGRAM(s) Oral three times a day  heparin  Infusion. 2000 Unit(s)/Hr IV Continuous <Continuous>  insulin lispro (HumaLOG) corrective regimen sliding scale   SubCutaneous every 4 hours  insulin lispro Injectable (HumaLOG) 10 Unit(s) SubCutaneous before breakfast  insulin lispro Injectable (HumaLOG) 10 Unit(s) SubCutaneous before lunch  insulin lispro Injectable (HumaLOG) 11 Unit(s) SubCutaneous before dinner  insulin lispro Injectable (HumaLOG) 5 Unit(s) SubCutaneous at bedtime  insulin glargine Injectable (LANTUS) 28 Unit(s) SubCutaneous <User Schedule>  insulin glargine Injectable (LANTUS) 24 Unit(s) SubCutaneous <User Schedule>  dextrose 5% + sodium chloride 0.45%. 1000 milliLiter(s) IV Continuous <Continuous>    PRN Inpatient Medications  naphazoline/pheniramine Solution 1 Drop(s) Both EYES three times a day PRN  dextrose Gel 1 Dose(s) Oral once PRN  glucagon  Injectable 1 milliGRAM(s) IntraMuscular once PRN  heparin  Injectable 7000 Unit(s) IV Push every 6 hours PRN  heparin  Injectable 3500 Unit(s) IV Push every 6 hours PRN  acetaminophen   Tablet. 650 milliGRAM(s) Oral every 6 hours PRN      REVIEW OF SYSTEMS  --------------------------------------------------------------------------------  Gen: No fevers/chills  Respiratory: No dyspnea  CV: No chest pain  GI: No abdominal pain  MSK: Decreased bilateral LE swelling  Neuro: No dizziness/lightheadedness      VITALS/PHYSICAL EXAM  --------------------------------------------------------------------------------  T(C): 36.9 (10-02-17 @ 06:25), Max: 36.9 (10-02-17 @ 05:48)  HR: 86 (10-02-17 @ 06:25) (81 - 91)  BP: 180/91 (10-02-17 @ 06:25) (140/72 - 180/91)  RR: 18 (10-02-17 @ 06:25) (17 - 18)  SpO2: 98% (10-02-17 @ 05:48) (98% - 99%)  Wt(kg): --        10-01-17 @ 07:01  -  10-02-17 @ 07:00  --------------------------------------------------------  IN: 176 mL / OUT: 0 mL / NET: 176 mL      Physical Exam:  	Gen: resting, NAD	  	Pulm: CTA B/L  	CV: RRR, S1S2; no rub  	Abd: +BS, soft  	LE: bilateral LE pedal edema present  	Vascular access: Right IJ HD catheter site: no bleeding    LABS/STUDIES  --------------------------------------------------------------------------------              9.7    10.74 >-----------<  163      [10-01-17 @ 09:00]              31.4     136  |  97  |  62  ----------------------------<  358      [10-01-17 @ 09:00]  4.4   |  15  |  3.23        Ca     8.7     [10-01-17 @ 09:00]      Mg     1.7     [10-01-17 @ 09:00]      Phos  4.5     [10-01-17 @ 09:00]      PT/INR: PT 10.5 , INR 0.94       [10-01-17 @ 07:00]  PTT: 109.0      [10-01-17 @ 23:14]      Iron 21, TIBC 187, %sat --      [09-09-17 @ 06:55]  Ferritin 587.5      [09-09-17 @ 06:55]  .9 (Ca --)      [09-10-17 @ 05:10]   --  HbA1c 8.0      [08-16-17 @ 06:30]  TSH 2.88      [09-03-17 @ 13:45]  Lipid: chol 137, TG 97, HDL 52, LDL 68      [08-11-17 @ 07:20]    HBsAb <3.0      [09-03-17 @ 18:00]  HBsAg NEGATIVE      [09-27-17 @ 06:50]  HCV 14.48, Reactive      [09-03-17 @ 18:00]

## 2017-10-03 ENCOUNTER — TRANSCRIPTION ENCOUNTER (OUTPATIENT)
Age: 52
End: 2017-10-03

## 2017-10-03 LAB
APTT BLD: 166.1 SEC — CRITICAL HIGH (ref 27.5–37.4)
APTT BLD: 64.8 SEC — HIGH (ref 27.5–37.4)
APTT BLD: 74.9 SEC — HIGH (ref 27.5–37.4)
BUN SERPL-MCNC: 31 MG/DL — HIGH (ref 7–23)
CALCIUM SERPL-MCNC: 8.5 MG/DL — SIGNIFICANT CHANGE UP (ref 8.4–10.5)
CHLORIDE SERPL-SCNC: 97 MMOL/L — LOW (ref 98–107)
CO2 SERPL-SCNC: 24 MMOL/L — SIGNIFICANT CHANGE UP (ref 22–31)
CREAT SERPL-MCNC: 2.74 MG/DL — HIGH (ref 0.5–1.3)
GLUCOSE SERPL-MCNC: 157 MG/DL — HIGH (ref 70–99)
HCT VFR BLD CALC: 31.9 % — LOW (ref 39–50)
HGB BLD-MCNC: 10 G/DL — LOW (ref 13–17)
MAGNESIUM SERPL-MCNC: 1.8 MG/DL — SIGNIFICANT CHANGE UP (ref 1.6–2.6)
MCHC RBC-ENTMCNC: 27.9 PG — SIGNIFICANT CHANGE UP (ref 27–34)
MCHC RBC-ENTMCNC: 31.3 % — LOW (ref 32–36)
MCV RBC AUTO: 88.9 FL — SIGNIFICANT CHANGE UP (ref 80–100)
NRBC # FLD: 0 — SIGNIFICANT CHANGE UP
PHOSPHATE SERPL-MCNC: 4.4 MG/DL — SIGNIFICANT CHANGE UP (ref 2.5–4.5)
PLATELET # BLD AUTO: 133 K/UL — LOW (ref 150–400)
PMV BLD: 9.7 FL — SIGNIFICANT CHANGE UP (ref 7–13)
POTASSIUM SERPL-MCNC: 3.6 MMOL/L — SIGNIFICANT CHANGE UP (ref 3.5–5.3)
POTASSIUM SERPL-SCNC: 3.6 MMOL/L — SIGNIFICANT CHANGE UP (ref 3.5–5.3)
RBC # BLD: 3.59 M/UL — LOW (ref 4.2–5.8)
RBC # FLD: 15.2 % — HIGH (ref 10.3–14.5)
SODIUM SERPL-SCNC: 137 MMOL/L — SIGNIFICANT CHANGE UP (ref 135–145)
WBC # BLD: 9.32 K/UL — SIGNIFICANT CHANGE UP (ref 3.8–10.5)
WBC # FLD AUTO: 9.32 K/UL — SIGNIFICANT CHANGE UP (ref 3.8–10.5)

## 2017-10-03 PROCEDURE — 99232 SBSQ HOSP IP/OBS MODERATE 35: CPT

## 2017-10-03 PROCEDURE — 99233 SBSQ HOSP IP/OBS HIGH 50: CPT | Mod: GC

## 2017-10-03 RX ORDER — WARFARIN SODIUM 2.5 MG/1
5 TABLET ORAL ONCE
Qty: 0 | Refills: 0 | Status: COMPLETED | OUTPATIENT
Start: 2017-10-03 | End: 2017-10-03

## 2017-10-03 RX ORDER — INSULIN LISPRO 100/ML
11 VIAL (ML) SUBCUTANEOUS
Qty: 0 | Refills: 0 | Status: DISCONTINUED | OUTPATIENT
Start: 2017-10-03 | End: 2017-10-06

## 2017-10-03 RX ORDER — INSULIN LISPRO 100/ML
5 VIAL (ML) SUBCUTANEOUS AT BEDTIME
Qty: 0 | Refills: 0 | Status: DISCONTINUED | OUTPATIENT
Start: 2017-10-03 | End: 2017-10-08

## 2017-10-03 RX ORDER — INSULIN GLARGINE 100 [IU]/ML
24 INJECTION, SOLUTION SUBCUTANEOUS
Qty: 0 | Refills: 0 | Status: DISCONTINUED | OUTPATIENT
Start: 2017-10-03 | End: 2017-10-10

## 2017-10-03 RX ORDER — CYCLOSPORINE 100 MG/1
100 CAPSULE ORAL DAILY
Qty: 0 | Refills: 0 | Status: DISCONTINUED | OUTPATIENT
Start: 2017-10-03 | End: 2017-10-10

## 2017-10-03 RX ORDER — INSULIN LISPRO 100/ML
VIAL (ML) SUBCUTANEOUS
Qty: 0 | Refills: 0 | Status: DISCONTINUED | OUTPATIENT
Start: 2017-10-03 | End: 2017-10-10

## 2017-10-03 RX ORDER — INSULIN GLARGINE 100 [IU]/ML
28 INJECTION, SOLUTION SUBCUTANEOUS
Qty: 0 | Refills: 0 | Status: DISCONTINUED | OUTPATIENT
Start: 2017-10-03 | End: 2017-10-10

## 2017-10-03 RX ORDER — INSULIN LISPRO 100/ML
10 VIAL (ML) SUBCUTANEOUS
Qty: 0 | Refills: 0 | Status: DISCONTINUED | OUTPATIENT
Start: 2017-10-03 | End: 2017-10-06

## 2017-10-03 RX ORDER — INSULIN LISPRO 100/ML
10 VIAL (ML) SUBCUTANEOUS
Qty: 0 | Refills: 0 | Status: DISCONTINUED | OUTPATIENT
Start: 2017-10-03 | End: 2017-10-10

## 2017-10-03 RX ORDER — INSULIN LISPRO 100/ML
VIAL (ML) SUBCUTANEOUS EVERY 4 HOURS
Qty: 0 | Refills: 0 | Status: DISCONTINUED | OUTPATIENT
Start: 2017-10-03 | End: 2017-10-03

## 2017-10-03 RX ADMIN — Medication 50 MILLIGRAM(S): at 05:51

## 2017-10-03 RX ADMIN — CYCLOSPORINE 100 MILLIGRAM(S): 100 CAPSULE ORAL at 11:24

## 2017-10-03 RX ADMIN — Medication 11 UNIT(S): at 17:50

## 2017-10-03 RX ADMIN — CALCITRIOL 0.5 MICROGRAM(S): 0.5 CAPSULE ORAL at 11:24

## 2017-10-03 RX ADMIN — Medication 50 MILLIGRAM(S): at 17:50

## 2017-10-03 RX ADMIN — Medication 50 MILLIGRAM(S): at 14:57

## 2017-10-03 RX ADMIN — Medication 650 MILLIGRAM(S): at 05:52

## 2017-10-03 RX ADMIN — Medication 50 MILLIGRAM(S): at 21:45

## 2017-10-03 RX ADMIN — Medication 650 MILLIGRAM(S): at 06:20

## 2017-10-03 RX ADMIN — Medication 5 MILLIGRAM(S): at 11:23

## 2017-10-03 RX ADMIN — HEPARIN SODIUM 2000 UNIT(S)/HR: 5000 INJECTION INTRAVENOUS; SUBCUTANEOUS at 09:19

## 2017-10-03 RX ADMIN — Medication 10 UNIT(S): at 09:23

## 2017-10-03 RX ADMIN — HEPARIN SODIUM 2000 UNIT(S)/HR: 5000 INJECTION INTRAVENOUS; SUBCUTANEOUS at 00:18

## 2017-10-03 RX ADMIN — TAMSULOSIN HYDROCHLORIDE 0.4 MILLIGRAM(S): 0.4 CAPSULE ORAL at 21:45

## 2017-10-03 RX ADMIN — Medication 2: at 12:56

## 2017-10-03 RX ADMIN — Medication 5 UNIT(S): at 22:51

## 2017-10-03 RX ADMIN — HEPARIN SODIUM 2000 UNIT(S)/HR: 5000 INJECTION INTRAVENOUS; SUBCUTANEOUS at 18:55

## 2017-10-03 RX ADMIN — Medication 1: at 09:01

## 2017-10-03 RX ADMIN — INSULIN GLARGINE 28 UNIT(S): 100 INJECTION, SOLUTION SUBCUTANEOUS at 09:52

## 2017-10-03 RX ADMIN — WARFARIN SODIUM 5 MILLIGRAM(S): 2.5 TABLET ORAL at 21:45

## 2017-10-03 RX ADMIN — Medication 10 UNIT(S): at 12:56

## 2017-10-03 NOTE — PROGRESS NOTE ADULT - ATTENDING COMMENTS
Patient seen and examined. Agree with above note by resident.    ESRD on HD - s/p HD yesterday. s/p AVF creation. C/w HD per renal  DM2 with hyperglycemia - elevated FS yesterday however no acidosis present. Endo input appreciated. Will monitor FS closely today. C/w current regimen. FS acceptable today.   Afib paroxsymal - heparin to coumadin bridge after AVF. c/w heparin gtt. Will dose coumadin today.    Plan discussed with patient

## 2017-10-03 NOTE — PROGRESS NOTE ADULT - PROBLEM SELECTOR PLAN 2
-c/w HD per renal MWF  - Pt to get hemodialysis today as per renal for fluid overload/LE edema.  - permacath in place   - POD#1 s/p LUE AVF with vascular Dr. Staples

## 2017-10-03 NOTE — PROGRESS NOTE ADULT - PROBLEM SELECTOR PLAN 1
target bg 100-180mg/dl- above target with AG 24.  check BHB  No HD today as per patient  Received 26 units of Lantus this AM for non HD day.    increase Lantus to 28 units non HD days, and 24 units for HD days.  change correction scale to moderate q 4 hours.  increase Humalog for breakfast and lunch to 10 units AC, increase humalog pre dinner to 11 units AC.  increase pre HS snack humalog to 5 units SQ daily.  Once BHB is resulted, will determine further management, patient may need MICU consult for insulin gtt.  Would consult with nephrology regarding IVF.       Discussed with attending Dr. Patrick.  Recommends MICU consult for IV insulin infusion.  D/c with basal/bolus dose TBD  Outpatient endocrine follow up: appointment pending with Dr Baker  target bg 100-180mg/dl- above target with AG 16.  FS improved.  c/w Lantus to 28 units non HD days, and 24 units for HD days.  change correction scale to moderate q 4 hours.  c/w Humalog for breakfast and lunch to 10 units AC, c/w humalog pre dinner to 11 units AC.  c/w  pre HS snack humalog to 5 units SQ dailly  D/c with basal/bolus dose TBD  Outpatient endocrine follow up: appointment pending with Dr Baker

## 2017-10-03 NOTE — PROGRESS NOTE ADULT - PROBLEM SELECTOR PLAN 1
-self-converted to NSR 9/21  -currently on PO Cardizem, metoprolol 50 bid w/ hold parameters.  -c/w hep gtt, will check INR and begin dose coumadin tonight.   -appreciate EP recs - Repeat TTE 9/25 demonstrated small pericardial effusion and small right pleural effusion. TTE w/ EF 65%  -continue lopressor and cardizem when NPO and before HD sessions given he has tendency to go into afib w/ RVR if he misses doses  - [facility]

## 2017-10-03 NOTE — PROGRESS NOTE ADULT - SUBJECTIVE AND OBJECTIVE BOX
Vascular Surgery    Patient seen and examined  Pain well controlled  No arm pain, paresthesia, weakness    Vital Signs Last 24 Hrs  T(C): 36.8 (03 Oct 2017 14:14), Max: 36.8 (03 Oct 2017 05:49)  T(F): 98.2 (03 Oct 2017 14:14), Max: 98.2 (03 Oct 2017 05:49)  HR: 84 (03 Oct 2017 14:14) (77 - 93)  BP: 140/82 (03 Oct 2017 14:14) (115/68 - 143/73)  BP(mean): --  RR: 18 (03 Oct 2017 14:14) (16 - 18)  SpO2: 100% (03 Oct 2017 14:14) (99% - 100%)    NAD  LUE warm, fistula has strong palpable thrill, sensory/motor intact and symmetric with Right, incision clean/dry/intact  2+ radial/ulnar pulses    CBC (10-03 @ 07:15)                          10.0<L>                   9.32    )--------------(  133<L>     --    % Neuts, --    % Lymphs, ANC: --                              31.9<L>    BMP (10-03 @ 07:15)       137     |  97<L>   |  31<H> 			Ca++ --      Ca 8.5          ---------------------------------( 157<H>		Mg 1.8          3.6     |  24      |  2.74<H>			Ph 4.4         Coags (10-03 @ 18:00)  aPTT 64.8<H> / INR -- / PT --  Coags (10-03 @ 15:30)  aPTT 166.1<HH> / INR -- / PT --      51 yo male s/p LUE AVF creation.  Please follow up with Dr. Staples in office in 6 weeks.  Please page [phone] with any questions/concerns.    [place], PGY3  D/w attending

## 2017-10-03 NOTE — PROGRESS NOTE ADULT - SUBJECTIVE AND OBJECTIVE BOX
Chief Complaint: DM 1    History:  AG 16  Denies n/v.  s/p av fistula yesterday.  tolerates po.  no hypoglycemia s/s    MEDICATIONS  (STANDING):  predniSONE   Tablet 5 milliGRAM(s) Oral daily  tamsulosin 0.4 milliGRAM(s) Oral at bedtime  cycloSPORINE  (SandIMMUNE) 100 milliGRAM(s) Oral daily  atorvastatin 80 milliGRAM(s) Oral at bedtime  senna 2 Tablet(s) Oral at bedtime  docusate sodium 100 milliGRAM(s) Oral three times a day  epoetin valente Injectable 7000 Unit(s) IV Push <User Schedule>  diltiazem    milliGRAM(s) Oral daily  metoprolol 50 milliGRAM(s) Oral two times a day  insulin lispro (HumaLOG) corrective regimen sliding scale   SubCutaneous three times a day before meals  insulin lispro (HumaLOG) corrective regimen sliding scale   SubCutaneous at bedtime  dextrose 5%. 1000 milliLiter(s) (50 mL/Hr) IV Continuous <Continuous>  dextrose 50% Injectable 12.5 Gram(s) IV Push once  dextrose 50% Injectable 25 Gram(s) IV Push once  dextrose 50% Injectable 25 Gram(s) IV Push once  insulin lispro Injectable (HumaLOG) 10 Unit(s) SubCutaneous before breakfast  insulin lispro Injectable (HumaLOG) 10 Unit(s) SubCutaneous before lunch  insulin lispro Injectable (HumaLOG) 11 Unit(s) SubCutaneous before dinner  hydrALAZINE 50 milliGRAM(s) Oral three times a day  heparin  Infusion. 2000 Unit(s)/Hr (20 mL/Hr) IV Continuous <Continuous>  insulin glargine Injectable (LANTUS) 24 Unit(s) SubCutaneous <User Schedule>  insulin lispro Injectable (HumaLOG) 5 Unit(s) SubCutaneous at bedtime  insulin glargine Injectable (LANTUS) 26 Unit(s) SubCutaneous <User Schedule>      MEDICATIONS  (PRN):  naphazoline/pheniramine Solution 1 Drop(s) Both EYES three times a day PRN eye discomfort  dextrose Gel 1 Dose(s) Oral once PRN Blood Glucose LESS THAN 70 milliGRAM(s)/deciLiter  glucagon  Injectable 1 milliGRAM(s) IntraMuscular once PRN Glucose <70 milliGRAM(s)/deciLiter  heparin  Injectable 7000 Unit(s) IV Push every 6 hours PRN For aPTT less than 40  heparin  Injectable 3500 Unit(s) IV Push every 6 hours PRN For aPTT between 40 - 57  acetaminophen   Tablet. 650 milliGRAM(s) Oral every 6 hours PRN Moderate Pain (4 - 6)        No Known Allergies      Review of Systems:  Constitutional: No fever  Cardiovascular: No chest pain, palpitations  Respiratory: No SOB, no cough  GI: No nausea, vomiting, abdominal pain        PHYSICAL EXAM:    GENERAL: NAD, well-groomed  HEENT:  Atraumatic, Normocephalic, moist mucous membranes  RESPIRATORY: non labored  GI: Soft, nontender, non distended  SKIN: Dry, warm  PSYCH: Alert and oriented x 3, normal affect, normal mood    CAPILLARY BLOOD GLUCOSE  379 (01 Oct 2017 12:53)  340 (01 Oct 2017 08:35)  164 (30 Sep 2017 21:53)  163 (30 Sep 2017 16:54)  289 (09-30 @ 13:02)  400 (09-30 @ 08:56)  159 (09-29 @ 22:36)  216 (09-29 @ 16:57)  388 (09-29 @ 12:11)  347 (09-29 @ 10:42)  176 (09-29 @ 08:56)  206 (09-29 @ 05:20)  177 (09-28 @ 21:48)  214 (09-28 @ 17:47)  237 (09-28 @ 16:36)  327 (09-28 @ 12:46)  344 (09-28 @ 08:48)  283 (09-28 @ 04:06)  185 (09-27 @ 21:54)  214 (09-27 @ 17:14)    10-01    136  |  97<L>  |  62<H>  ----------------------------<  358<H>  4.4   |  15<L>  |  3.23<H>    Ca    8.7      01 Oct 2017 09:00  Phos  4.5     10-01  Mg     1.7     10-01    BHB pending....      Thyroid Function Tests:  09-03 @ 13:45 TSH 2.88 FreeT4 0.70 T3 71.7 Anti TPO -- Anti Thyroglobulin Ab -- TSI --      Hemoglobin A1C, Whole Blood: 8.0 % <H> [4.0 - 5.6] (08-16-17 @ 06:30)

## 2017-10-03 NOTE — PROGRESS NOTE ADULT - SUBJECTIVE AND OBJECTIVE BOX
Patient is a 52y old  Male who presents with a chief complaint of Altered mental status (10 Sep 2017 23:23)      OVERNIGHT EVENTS: No acute events overnight.  SUBJECTIVE: Patient seen and examined at bedside. No complaints. S/P AVF yesterday, pain minimal and well-controlled.       MEDICATIONS  (STANDING):  predniSONE   Tablet 5 milliGRAM(s) Oral daily  tamsulosin 0.4 milliGRAM(s) Oral at bedtime  cycloSPORINE  (SandIMMUNE) 100 milliGRAM(s) Oral daily  atorvastatin 80 milliGRAM(s) Oral at bedtime  senna 2 Tablet(s) Oral at bedtime  docusate sodium 100 milliGRAM(s) Oral three times a day  epoetin valente Injectable 7000 Unit(s) IV Push <User Schedule>  diltiazem    milliGRAM(s) Oral daily  metoprolol 50 milliGRAM(s) Oral two times a day  dextrose 5%. 1000 milliLiter(s) (50 mL/Hr) IV Continuous <Continuous>  dextrose 50% Injectable 12.5 Gram(s) IV Push once  dextrose 50% Injectable 25 Gram(s) IV Push once  dextrose 50% Injectable 25 Gram(s) IV Push once  hydrALAZINE 50 milliGRAM(s) Oral three times a day  heparin  Infusion. 2000 Unit(s)/Hr (20 mL/Hr) IV Continuous <Continuous>  insulin lispro (HumaLOG) corrective regimen sliding scale   SubCutaneous every 4 hours  insulin lispro Injectable (HumaLOG) 10 Unit(s) SubCutaneous before breakfast  insulin lispro Injectable (HumaLOG) 10 Unit(s) SubCutaneous before lunch  insulin lispro Injectable (HumaLOG) 11 Unit(s) SubCutaneous before dinner  insulin lispro Injectable (HumaLOG) 5 Unit(s) SubCutaneous at bedtime  insulin glargine Injectable (LANTUS) 28 Unit(s) SubCutaneous <User Schedule>  insulin glargine Injectable (LANTUS) 24 Unit(s) SubCutaneous <User Schedule>  dextrose 5% + sodium chloride 0.45%. 1000 milliLiter(s) (30 mL/Hr) IV Continuous <Continuous>    MEDICATIONS  (PRN):  naphazoline/pheniramine Solution 1 Drop(s) Both EYES three times a day PRN eye discomfort  dextrose Gel 1 Dose(s) Oral once PRN Blood Glucose LESS THAN 70 milliGRAM(s)/deciLiter  glucagon  Injectable 1 milliGRAM(s) IntraMuscular once PRN Glucose <70 milliGRAM(s)/deciLiter  heparin  Injectable 7000 Unit(s) IV Push every 6 hours PRN For aPTT less than 40  heparin  Injectable 3500 Unit(s) IV Push every 6 hours PRN For aPTT between 40 - 57  acetaminophen   Tablet. 650 milliGRAM(s) Oral every 6 hours PRN Moderate Pain (4 - 6)      VITALS: 98.4   93   143/73   99%RA 16    PHYSICAL EXAM  GENERAL: NAD, well-developed  HEAD:  Atraumatic, Normocephalic  EYES: conjunctiva and sclera clear  CHEST/LUNG: Clear to auscultation bilaterally; No wheeze, rales or rhonchi  HEART: +S1 +S2, Regular rate and rhythm, no murmurs  ABDOMEN: Soft, Nontender, Nondistended; Bowel sounds present  NEURO: No focal neurologic deficits, sensation and motor function grossly intact.   EXTREMITIES:  Pink and warm, Peripheral Pulses intact. +B/L LE edema. +RIJ HD Cath, C/D/I. Patient is a 52y old  Male who presents with a chief complaint of Altered mental status (10 Sep 2017 23:23)      OVERNIGHT EVENTS: No acute events overnight.  SUBJECTIVE: Patient seen and examined at bedside. No complaints. S/P AVF yesterday, pain minimal and well-controlled.       MEDICATIONS  (STANDING):  predniSONE   Tablet 5 milliGRAM(s) Oral daily  tamsulosin 0.4 milliGRAM(s) Oral at bedtime  cycloSPORINE  (SandIMMUNE) 100 milliGRAM(s) Oral daily  atorvastatin 80 milliGRAM(s) Oral at bedtime  senna 2 Tablet(s) Oral at bedtime  docusate sodium 100 milliGRAM(s) Oral three times a day  epoetin valente Injectable 7000 Unit(s) IV Push <User Schedule>  diltiazem    milliGRAM(s) Oral daily  metoprolol 50 milliGRAM(s) Oral two times a day  dextrose 5%. 1000 milliLiter(s) (50 mL/Hr) IV Continuous <Continuous>  dextrose 50% Injectable 12.5 Gram(s) IV Push once  dextrose 50% Injectable 25 Gram(s) IV Push once  dextrose 50% Injectable 25 Gram(s) IV Push once  hydrALAZINE 50 milliGRAM(s) Oral three times a day  heparin  Infusion. 2000 Unit(s)/Hr (20 mL/Hr) IV Continuous <Continuous>  insulin lispro (HumaLOG) corrective regimen sliding scale   SubCutaneous every 4 hours  insulin lispro Injectable (HumaLOG) 10 Unit(s) SubCutaneous before breakfast  insulin lispro Injectable (HumaLOG) 10 Unit(s) SubCutaneous before lunch  insulin lispro Injectable (HumaLOG) 11 Unit(s) SubCutaneous before dinner  insulin lispro Injectable (HumaLOG) 5 Unit(s) SubCutaneous at bedtime  insulin glargine Injectable (LANTUS) 28 Unit(s) SubCutaneous <User Schedule>  insulin glargine Injectable (LANTUS) 24 Unit(s) SubCutaneous <User Schedule>  dextrose 5% + sodium chloride 0.45%. 1000 milliLiter(s) (30 mL/Hr) IV Continuous <Continuous>    MEDICATIONS  (PRN):  naphazoline/pheniramine Solution 1 Drop(s) Both EYES three times a day PRN eye discomfort  dextrose Gel 1 Dose(s) Oral once PRN Blood Glucose LESS THAN 70 milliGRAM(s)/deciLiter  glucagon  Injectable 1 milliGRAM(s) IntraMuscular once PRN Glucose <70 milliGRAM(s)/deciLiter  heparin  Injectable 7000 Unit(s) IV Push every 6 hours PRN For aPTT less than 40  heparin  Injectable 3500 Unit(s) IV Push every 6 hours PRN For aPTT between 40 - 57  acetaminophen   Tablet. 650 milliGRAM(s) Oral every 6 hours PRN Moderate Pain (4 - 6)      VITALS: 98.4   93   143/73   99%RA 16    PHYSICAL EXAM  GENERAL: NAD, well-developed  HEAD:  Atraumatic, Normocephalic  EYES: conjunctiva and sclera clear  CHEST/LUNG: Clear to auscultation bilaterally; No wheeze, rales or rhonchi  HEART: +S1 +S2, Regular rate and rhythm, no murmurs  ABDOMEN: Soft, Nontender, Nondistended; Bowel sounds present  NEURO: No focal neurologic deficits, sensation and motor function grossly intact.   EXTREMITIES:  Pink and warm, Peripheral Pulses intact. +B/L LE edema. +RIJ HD Cath, C/D/I. +LUE AVF with dressing C/D/I.

## 2017-10-04 LAB
APTT BLD: 107.7 SEC — HIGH (ref 27.5–37.4)
APTT BLD: 164.9 SEC — CRITICAL HIGH (ref 27.5–37.4)
APTT BLD: 178.9 SEC — CRITICAL HIGH (ref 27.5–37.4)
APTT BLD: 66.4 SEC — HIGH (ref 27.5–37.4)
BUN SERPL-MCNC: 49 MG/DL — HIGH (ref 7–23)
CALCIUM SERPL-MCNC: 8.3 MG/DL — LOW (ref 8.4–10.5)
CHLORIDE SERPL-SCNC: 96 MMOL/L — LOW (ref 98–107)
CO2 SERPL-SCNC: 23 MMOL/L — SIGNIFICANT CHANGE UP (ref 22–31)
CREAT SERPL-MCNC: 3.21 MG/DL — HIGH (ref 0.5–1.3)
GLUCOSE SERPL-MCNC: 311 MG/DL — HIGH (ref 70–99)
HCT VFR BLD CALC: 30.7 % — LOW (ref 39–50)
HGB BLD-MCNC: 9.6 G/DL — LOW (ref 13–17)
INR BLD: 0.94 — SIGNIFICANT CHANGE UP (ref 0.88–1.17)
MAGNESIUM SERPL-MCNC: 1.7 MG/DL — SIGNIFICANT CHANGE UP (ref 1.6–2.6)
MCHC RBC-ENTMCNC: 28.2 PG — SIGNIFICANT CHANGE UP (ref 27–34)
MCHC RBC-ENTMCNC: 31.3 % — LOW (ref 32–36)
MCV RBC AUTO: 90 FL — SIGNIFICANT CHANGE UP (ref 80–100)
NRBC # FLD: 0 — SIGNIFICANT CHANGE UP
PHOSPHATE SERPL-MCNC: 5 MG/DL — HIGH (ref 2.5–4.5)
PLATELET # BLD AUTO: 123 K/UL — LOW (ref 150–400)
PMV BLD: 10.5 FL — SIGNIFICANT CHANGE UP (ref 7–13)
POTASSIUM SERPL-MCNC: 4 MMOL/L — SIGNIFICANT CHANGE UP (ref 3.5–5.3)
POTASSIUM SERPL-SCNC: 4 MMOL/L — SIGNIFICANT CHANGE UP (ref 3.5–5.3)
PROTHROM AB SERPL-ACNC: 10.5 SEC — SIGNIFICANT CHANGE UP (ref 9.8–13.1)
RBC # BLD: 3.41 M/UL — LOW (ref 4.2–5.8)
RBC # FLD: 15.3 % — HIGH (ref 10.3–14.5)
SODIUM SERPL-SCNC: 136 MMOL/L — SIGNIFICANT CHANGE UP (ref 135–145)
WBC # BLD: 8.97 K/UL — SIGNIFICANT CHANGE UP (ref 3.8–10.5)
WBC # FLD AUTO: 8.97 K/UL — SIGNIFICANT CHANGE UP (ref 3.8–10.5)

## 2017-10-04 PROCEDURE — 99233 SBSQ HOSP IP/OBS HIGH 50: CPT | Mod: GC

## 2017-10-04 PROCEDURE — 90935 HEMODIALYSIS ONE EVALUATION: CPT

## 2017-10-04 RX ORDER — HEPARIN SODIUM 5000 [USP'U]/ML
INJECTION INTRAVENOUS; SUBCUTANEOUS
Qty: 25000 | Refills: 0 | Status: DISCONTINUED | OUTPATIENT
Start: 2017-10-04 | End: 2017-10-10

## 2017-10-04 RX ORDER — WARFARIN SODIUM 2.5 MG/1
5 TABLET ORAL ONCE
Qty: 0 | Refills: 0 | Status: COMPLETED | OUTPATIENT
Start: 2017-10-04 | End: 2017-10-04

## 2017-10-04 RX ADMIN — Medication 10 UNIT(S): at 08:50

## 2017-10-04 RX ADMIN — Medication 50 MILLIGRAM(S): at 09:10

## 2017-10-04 RX ADMIN — Medication 50 MILLIGRAM(S): at 13:44

## 2017-10-04 RX ADMIN — Medication 10 UNIT(S): at 13:45

## 2017-10-04 RX ADMIN — Medication 50 MILLIGRAM(S): at 21:07

## 2017-10-04 RX ADMIN — CALCITRIOL 0.5 MICROGRAM(S): 0.5 CAPSULE ORAL at 13:44

## 2017-10-04 RX ADMIN — Medication 2: at 08:50

## 2017-10-04 RX ADMIN — HEPARIN SODIUM 1600 UNIT(S)/HR: 5000 INJECTION INTRAVENOUS; SUBCUTANEOUS at 19:30

## 2017-10-04 RX ADMIN — WARFARIN SODIUM 5 MILLIGRAM(S): 2.5 TABLET ORAL at 21:07

## 2017-10-04 RX ADMIN — Medication 5 UNIT(S): at 22:20

## 2017-10-04 RX ADMIN — Medication 11 UNIT(S): at 18:23

## 2017-10-04 RX ADMIN — Medication 50 MILLIGRAM(S): at 17:23

## 2017-10-04 RX ADMIN — ERYTHROPOIETIN 6000 UNIT(S): 10000 INJECTION, SOLUTION INTRAVENOUS; SUBCUTANEOUS at 08:49

## 2017-10-04 RX ADMIN — HEPARIN SODIUM 0 UNIT(S)/HR: 5000 INJECTION INTRAVENOUS; SUBCUTANEOUS at 10:25

## 2017-10-04 RX ADMIN — Medication 5 MILLIGRAM(S): at 13:46

## 2017-10-04 RX ADMIN — Medication 2: at 13:45

## 2017-10-04 RX ADMIN — CYCLOSPORINE 100 MILLIGRAM(S): 100 CAPSULE ORAL at 13:44

## 2017-10-04 RX ADMIN — HEPARIN SODIUM 1800 UNIT(S)/HR: 5000 INJECTION INTRAVENOUS; SUBCUTANEOUS at 03:17

## 2017-10-04 RX ADMIN — INSULIN GLARGINE 24 UNIT(S): 100 INJECTION, SOLUTION SUBCUTANEOUS at 08:51

## 2017-10-04 RX ADMIN — TAMSULOSIN HYDROCHLORIDE 0.4 MILLIGRAM(S): 0.4 CAPSULE ORAL at 21:07

## 2017-10-04 NOTE — PROGRESS NOTE ADULT - SUBJECTIVE AND OBJECTIVE BOX
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 Pt. now ESRD. Pt. seen during HD. Pt. had LUE AVF creation surgery on 10/2/17. No complaints offered during HD. No complaints of CP, SOB, HA or dizziness.     PAST HISTORY  --------------------------------------------------------------------------------  No significant changes to PMH, PSH, FHx, SHx, unless otherwise noted    ALLERGIES & MEDICATIONS  --------------------------------------------------------------------------------  Allergies    No Known Allergies    Intolerances    Standing Inpatient Medications  calcitriol   Capsule 0.5 MICROGram(s) Oral daily  cycloSPORINE  (SandIMMUNE) 100 milliGRAM(s) Oral daily  docusate sodium 100 milliGRAM(s) Oral three times a day  epoetin valente Injectable 6000 Unit(s) IV Push <User Schedule>  heparin  Infusion. 2000 Unit(s)/Hr IV Continuous <Continuous>  hydrALAZINE 50 milliGRAM(s) Oral three times a day  insulin glargine Injectable (LANTUS) 24 Unit(s) SubCutaneous <User Schedule>  insulin glargine Injectable (LANTUS) 28 Unit(s) SubCutaneous <User Schedule>  insulin lispro (HumaLOG) corrective regimen sliding scale   SubCutaneous three times a day before meals  insulin lispro Injectable (HumaLOG) 10 Unit(s) SubCutaneous before breakfast  insulin lispro Injectable (HumaLOG) 10 Unit(s) SubCutaneous before lunch  insulin lispro Injectable (HumaLOG) 11 Unit(s) SubCutaneous before dinner  insulin lispro Injectable (HumaLOG) 5 Unit(s) SubCutaneous at bedtime  metoprolol 50 milliGRAM(s) Oral two times a day  predniSONE   Tablet 5 milliGRAM(s) Oral daily  senna 2 Tablet(s) Oral at bedtime  tamsulosin 0.4 milliGRAM(s) Oral at bedtime    PRN Inpatient Medications  acetaminophen   Tablet. 650 milliGRAM(s) Oral every 6 hours PRN      REVIEW OF SYSTEMS  --------------------------------------------------------------------------------  Gen: No fevers/chills  Respiratory: No dyspnea  CV: No chest pain  GI: No abdominal pain  MSK: Decreased bilateral LE swelling  Neuro: No dizziness/lightheadedness    VITALS/PHYSICAL EXAM  --------------------------------------------------------------------------------  T(C): 36.7 (10-04-17 @ 06:21), Max: 36.8 (10-03-17 @ 14:14)  HR: 90 (10-04-17 @ 06:21) (82 - 90)  BP: 184/94 (10-04-17 @ 06:21) (140/82 - 184/94)  RR: 18 (10-04-17 @ 06:21) (18 - 18)  SpO2: 99% (10-04-17 @ 06:21) (99% - 100%)  Wt(kg): --  Height (cm): 182.88 (10-02-17 @ 11:28)  Weight (kg): 88 (10-02-17 @ 11:28)  BMI (kg/m2): 26.3 (10-02-17 @ 11:28)  BSA (m2): 2.1 (10-02-17 @ 11:28)      Physical Exam:  	Gern: resting, NAD  	Pulm: CTA B/L  	CV: RRR, S1S2; no rub  	Abd: +BS, soft  	LE: decreased LE pedal edema  	Vascular access: Right IJ HD catheter site: no bleeding, LUE AVF site: bruit heard    LABS/STUDIES  --------------------------------------------------------------------------------              10.0   9.32  >-----------<  133      [10-03-17 @ 07:15]              31.9     137  |  97  |  31  ----------------------------<  157      [10-03-17 @ 07:15]  3.6   |  24  |  2.74        Ca     8.5     [10-03-17 @ 07:15]      Mg     1.8     [10-03-17 @ 07:15]      Phos  4.4     [10-03-17 @ 07:15]    PTT: 107.7      [10-04-17 @ 01:10]    Iron 21, TIBC 187, %sat --      [09-09-17 @ 06:55]  Ferritin 587.5      [09-09-17 @ 06:55]  .9 (Ca --)      [09-10-17 @ 05:10]   --  HbA1c 8.0      [08-16-17 @ 06:30]  TSH 2.88      [09-03-17 @ 13:45]  Lipid: chol 137, TG 97, HDL 52, LDL 68      [08-11-17 @ 07:20]    HBsAg NEGATIVE      [09-27-17 @ 06:50]

## 2017-10-04 NOTE — PROGRESS NOTE ADULT - PROBLEM SELECTOR PLAN 1
Pt. tolerating HD well. BP elevated during HD today. Continue with current UF goal  with HD as tolerated. BP meds may need to be adjusted, if BP remains elevated. RIJ HD catheter functioning well. Pt. underwent LUE AVF creation surgery on 10/2. Monitor BP and labs

## 2017-10-04 NOTE — PROGRESS NOTE ADULT - ASSESSMENT
53 y/o M with h/o CKD s/p kidney transplant in 1998, HTN, DM, legally blind admitted for uremic encephalopathy initiated on dialysis. Pt. seen during HD. /84 at time of dialysis rounds.

## 2017-10-04 NOTE — PROGRESS NOTE ADULT - PROBLEM SELECTOR PLAN 3
Appreciate Endocrionology evaluation and continued recommendations.  Lantus 24u qAM on HD days and 28u qAM on non-HD Days  Humalog pre-meal 10/10/11/5  No basal/bolus dose TBD.  Outpatient endocrine follow up appointment with Dr. Baker.

## 2017-10-04 NOTE — PROGRESS NOTE ADULT - PROBLEM SELECTOR PLAN 1
-self-converted to NSR 9/21  -currently on PO Cardizem, metoprolol 50 bid w/ hold parameters.  -c/w hep gtt and bridge to coumadin, INR goal 2-3  -appreciate EP recs - Repeat TTE 9/25 demonstrated small pericardial effusion and small right pleural effusion. TTE w/ EF 65%  -continue lopressor and cardizem when NPO and before HD sessions given he has tendency to go into afib w/ RVR if he misses doses

## 2017-10-04 NOTE — PROGRESS NOTE ADULT - ATTENDING COMMENTS
Patient seen and examined. Agree with above note by resident.    ESRD on HD - s/p HD today. s/p AVF creation. C/w HD per renal  DM2 with hyperglycemia - elevated FS yesterday however no acidosis present. Endo input appreciated. Will monitor FS closely today. C/w current regimen. FS acceptable today.   Afib paroxsymal - heparin to coumadin bridge after AVF. c/w heparin gtt. Will dose coumadin today. Monitor INR    Plan discussed with patient .

## 2017-10-04 NOTE — PROGRESS NOTE ADULT - SUBJECTIVE AND OBJECTIVE BOX
Patient is a 52y old  Male who presents with a chief complaint of Altered mental status (10 Sep 2017 23:23)      OVERNIGHT EVENTS: No acute events overnight.  SUBJECTIVE: Patient seen and examined at bedside. Minimal pain, controlled with tylenol. Denies CP or SOB.       MEDICATIONS  (STANDING):  calcitriol   Capsule 0.5 MICROGram(s) Oral daily  cycloSPORINE  (SandIMMUNE) 100 milliGRAM(s) Oral daily  docusate sodium 100 milliGRAM(s) Oral three times a day  epoetin valente Injectable 6000 Unit(s) IV Push <User Schedule>  heparin  Infusion. 2000 Unit(s)/Hr (20 mL/Hr) IV Continuous <Continuous>  hydrALAZINE 50 milliGRAM(s) Oral three times a day  insulin glargine Injectable (LANTUS) 24 Unit(s) SubCutaneous <User Schedule>  insulin glargine Injectable (LANTUS) 28 Unit(s) SubCutaneous <User Schedule>  insulin lispro (HumaLOG) corrective regimen sliding scale   SubCutaneous three times a day before meals  insulin lispro Injectable (HumaLOG) 10 Unit(s) SubCutaneous before breakfast  insulin lispro Injectable (HumaLOG) 10 Unit(s) SubCutaneous before lunch  insulin lispro Injectable (HumaLOG) 11 Unit(s) SubCutaneous before dinner  insulin lispro Injectable (HumaLOG) 5 Unit(s) SubCutaneous at bedtime  metoprolol 50 milliGRAM(s) Oral two times a day  predniSONE   Tablet 5 milliGRAM(s) Oral daily  senna 2 Tablet(s) Oral at bedtime  tamsulosin 0.4 milliGRAM(s) Oral at bedtime    MEDICATIONS  (PRN):  acetaminophen   Tablet. 650 milliGRAM(s) Oral every 6 hours PRN mild to moderate pain      T(C): 36.7 (10-04-17 @ 06:21), Max: 36.8 (10-03-17 @ 14:14)  HR: 90 (10-04-17 @ 06:21) (82 - 90)  BP: 184/94 (10-04-17 @ 06:21) (140/82 - 184/94)  RR: 18 (10-04-17 @ 06:21) (18 - 18)  SpO2: 99% (10-04-17 @ 06:21) (99% - 100%)  CAPILLARY BLOOD GLUCOSE  281 (04 Oct 2017 06:21)  172 (03 Oct 2017 22:36)  96 (03 Oct 2017 17:09)  162 (03 Oct 2017 12:26)  162 (03 Oct 2017 08:43)        PHYSICAL EXAM  GENERAL: NAD, well-developed  HEAD:  Atraumatic, Normocephalic  EYES: conjunctiva and sclera clear  CHEST/LUNG: Clear to auscultation bilaterally; No wheeze, rales or rhonchi  HEART: +S1 +S2, Regular rate and rhythm, no murmurs  ABDOMEN: Soft, Nontender, Nondistended; Bowel sounds present  NEURO: No focal neurologic deficits, sensation and motor function grossly intact.   EXTREMITIES:  Pink and warm, Peripheral Pulses intact. +B/L LE edema. +RIJ HD Cath, C/D/I. +LUE AVF with dressing C/D/I.      LABS:                        10.0   9.32  )-----------( 133      ( 03 Oct 2017 07:15 )             31.9     10-03    137  |  97<L>  |  31<H>  ----------------------------<  157<H>  3.6   |  24  |  2.74<H>    Ca    8.5      03 Oct 2017 07:15  Phos  4.4     10-03  Mg     1.8     10-03      PTT - ( 04 Oct 2017 01:10 )  PTT:107.7 SEC          RADIOLOGY & ADDITIONAL TESTS:    Imaging Personally Reviewed:  Consultant(s) Notes Reviewed:    Care Discussed with Consultants/Other Providers:      Pooja Steen D.O.  Medicine Intern  pager [phone]

## 2017-10-05 LAB
APTT BLD: 104.6 SEC — HIGH (ref 27.5–37.4)
APTT BLD: 127 SEC — CRITICAL HIGH (ref 27.5–37.4)
APTT BLD: 44.8 SEC — HIGH (ref 27.5–37.4)
APTT BLD: 48.7 SEC — HIGH (ref 27.5–37.4)
APTT BLD: 85.6 SEC — HIGH (ref 27.5–37.4)
BUN SERPL-MCNC: 47 MG/DL — HIGH (ref 7–23)
CALCIUM SERPL-MCNC: 8.5 MG/DL — SIGNIFICANT CHANGE UP (ref 8.4–10.5)
CHLORIDE SERPL-SCNC: 92 MMOL/L — LOW (ref 98–107)
CO2 SERPL-SCNC: 24 MMOL/L — SIGNIFICANT CHANGE UP (ref 22–31)
CREAT SERPL-MCNC: 3.16 MG/DL — HIGH (ref 0.5–1.3)
GLUCOSE SERPL-MCNC: 411 MG/DL — HIGH (ref 70–99)
HCT VFR BLD CALC: 30.9 % — LOW (ref 39–50)
HCT VFR BLD CALC: 31.4 % — LOW (ref 39–50)
HGB BLD-MCNC: 10 G/DL — LOW (ref 13–17)
HGB BLD-MCNC: 9.6 G/DL — LOW (ref 13–17)
INR BLD: 1.01 — SIGNIFICANT CHANGE UP (ref 0.88–1.17)
MAGNESIUM SERPL-MCNC: 1.8 MG/DL — SIGNIFICANT CHANGE UP (ref 1.6–2.6)
MCHC RBC-ENTMCNC: 27.4 PG — SIGNIFICANT CHANGE UP (ref 27–34)
MCHC RBC-ENTMCNC: 28.3 PG — SIGNIFICANT CHANGE UP (ref 27–34)
MCHC RBC-ENTMCNC: 31.1 % — LOW (ref 32–36)
MCHC RBC-ENTMCNC: 31.8 % — LOW (ref 32–36)
MCV RBC AUTO: 88.3 FL — SIGNIFICANT CHANGE UP (ref 80–100)
MCV RBC AUTO: 89 FL — SIGNIFICANT CHANGE UP (ref 80–100)
NRBC # FLD: 0 — SIGNIFICANT CHANGE UP
NRBC # FLD: 0 — SIGNIFICANT CHANGE UP
PHOSPHATE SERPL-MCNC: 3.8 MG/DL — SIGNIFICANT CHANGE UP (ref 2.5–4.5)
PLATELET # BLD AUTO: 120 K/UL — LOW (ref 150–400)
PLATELET # BLD AUTO: 124 K/UL — LOW (ref 150–400)
PMV BLD: 10 FL — SIGNIFICANT CHANGE UP (ref 7–13)
PMV BLD: 10.7 FL — SIGNIFICANT CHANGE UP (ref 7–13)
POTASSIUM SERPL-MCNC: 4.5 MMOL/L — SIGNIFICANT CHANGE UP (ref 3.5–5.3)
POTASSIUM SERPL-SCNC: 4.5 MMOL/L — SIGNIFICANT CHANGE UP (ref 3.5–5.3)
PROTHROM AB SERPL-ACNC: 11.3 SEC — SIGNIFICANT CHANGE UP (ref 9.8–13.1)
RBC # BLD: 3.5 M/UL — LOW (ref 4.2–5.8)
RBC # BLD: 3.53 M/UL — LOW (ref 4.2–5.8)
RBC # FLD: 15.4 % — HIGH (ref 10.3–14.5)
RBC # FLD: 15.6 % — HIGH (ref 10.3–14.5)
SODIUM SERPL-SCNC: 133 MMOL/L — LOW (ref 135–145)
WBC # BLD: 10.27 K/UL — SIGNIFICANT CHANGE UP (ref 3.8–10.5)
WBC # BLD: 10.55 K/UL — HIGH (ref 3.8–10.5)
WBC # FLD AUTO: 10.27 K/UL — SIGNIFICANT CHANGE UP (ref 3.8–10.5)
WBC # FLD AUTO: 10.55 K/UL — HIGH (ref 3.8–10.5)

## 2017-10-05 PROCEDURE — 99232 SBSQ HOSP IP/OBS MODERATE 35: CPT

## 2017-10-05 PROCEDURE — 99233 SBSQ HOSP IP/OBS HIGH 50: CPT | Mod: GC

## 2017-10-05 RX ORDER — DEXTROSE 50 % IN WATER 50 %
1 SYRINGE (ML) INTRAVENOUS ONCE
Qty: 0 | Refills: 0 | Status: DISCONTINUED | OUTPATIENT
Start: 2017-10-05 | End: 2017-10-10

## 2017-10-05 RX ORDER — WARFARIN SODIUM 2.5 MG/1
5 TABLET ORAL ONCE
Qty: 0 | Refills: 0 | Status: COMPLETED | OUTPATIENT
Start: 2017-10-05 | End: 2017-10-05

## 2017-10-05 RX ORDER — DEXTROSE 50 % IN WATER 50 %
25 SYRINGE (ML) INTRAVENOUS ONCE
Qty: 0 | Refills: 0 | Status: DISCONTINUED | OUTPATIENT
Start: 2017-10-05 | End: 2017-10-10

## 2017-10-05 RX ORDER — INSULIN LISPRO 100/ML
VIAL (ML) SUBCUTANEOUS AT BEDTIME
Qty: 0 | Refills: 0 | Status: DISCONTINUED | OUTPATIENT
Start: 2017-10-05 | End: 2017-10-10

## 2017-10-05 RX ORDER — DEXTROSE 50 % IN WATER 50 %
12.5 SYRINGE (ML) INTRAVENOUS ONCE
Qty: 0 | Refills: 0 | Status: DISCONTINUED | OUTPATIENT
Start: 2017-10-05 | End: 2017-10-10

## 2017-10-05 RX ORDER — DEXTROSE 50 % IN WATER 50 %
1 SYRINGE (ML) INTRAVENOUS ONCE
Qty: 0 | Refills: 0 | Status: COMPLETED | OUTPATIENT
Start: 2017-10-05 | End: 2017-10-05

## 2017-10-05 RX ORDER — GLUCAGON INJECTION, SOLUTION 0.5 MG/.1ML
1 INJECTION, SOLUTION SUBCUTANEOUS ONCE
Qty: 0 | Refills: 0 | Status: DISCONTINUED | OUTPATIENT
Start: 2017-10-05 | End: 2017-10-10

## 2017-10-05 RX ADMIN — Medication 50 MILLIGRAM(S): at 05:17

## 2017-10-05 RX ADMIN — Medication 10 UNIT(S): at 13:01

## 2017-10-05 RX ADMIN — Medication 100 MILLIGRAM(S): at 16:36

## 2017-10-05 RX ADMIN — CALCITRIOL 0.5 MICROGRAM(S): 0.5 CAPSULE ORAL at 13:03

## 2017-10-05 RX ADMIN — Medication 1 DOSE(S): at 22:27

## 2017-10-05 RX ADMIN — HEPARIN SODIUM 1800 UNIT(S)/HR: 5000 INJECTION INTRAVENOUS; SUBCUTANEOUS at 16:38

## 2017-10-05 RX ADMIN — CYCLOSPORINE 100 MILLIGRAM(S): 100 CAPSULE ORAL at 13:03

## 2017-10-05 RX ADMIN — Medication: at 13:01

## 2017-10-05 RX ADMIN — WARFARIN SODIUM 5 MILLIGRAM(S): 2.5 TABLET ORAL at 19:05

## 2017-10-05 RX ADMIN — Medication 10 UNIT(S): at 09:26

## 2017-10-05 RX ADMIN — Medication 50 MILLIGRAM(S): at 23:20

## 2017-10-05 RX ADMIN — Medication 6: at 09:26

## 2017-10-05 RX ADMIN — Medication 50 MILLIGRAM(S): at 19:05

## 2017-10-05 RX ADMIN — HEPARIN SODIUM 2000 UNIT(S)/HR: 5000 INJECTION INTRAVENOUS; SUBCUTANEOUS at 09:32

## 2017-10-05 RX ADMIN — TAMSULOSIN HYDROCHLORIDE 0.4 MILLIGRAM(S): 0.4 CAPSULE ORAL at 23:20

## 2017-10-05 RX ADMIN — Medication 50 MILLIGRAM(S): at 14:00

## 2017-10-05 RX ADMIN — HEPARIN SODIUM 1800 UNIT(S)/HR: 5000 INJECTION INTRAVENOUS; SUBCUTANEOUS at 02:27

## 2017-10-05 RX ADMIN — INSULIN GLARGINE 28 UNIT(S): 100 INJECTION, SOLUTION SUBCUTANEOUS at 10:00

## 2017-10-05 RX ADMIN — Medication 11 UNIT(S): at 19:05

## 2017-10-05 RX ADMIN — Medication 5 MILLIGRAM(S): at 05:17

## 2017-10-05 NOTE — PROGRESS NOTE ADULT - PROBLEM SELECTOR PLAN 1
target bg 100-180mg/dl  FS improved with one elevated reading this am of hyperglycemia   -C/W Lantus to 28 units non HD days, and 24 units for HD days at this time  would consider increasing Lantus dose for non HD days to 26 units if there is another episode of hyperglycemia on this dose (this dose is due to be given again tomorrow, and then can evaluate FS data on Saturday AM)  -Would continue with pre meal Humalog as ordered at this time  -Would continue with moderate correction coverage for meals  -Added QHS correctional scale  -Would continue with QHS snack Humalog as ordered at 5 units    Discussed plan in detail with patient who is in agreement with above.       Outpatient endocrine follow up: Patient and family would like to follow with Dr Baker . They plan to call for an appointment target bg 100-180mg/dl  FS improved with one elevated reading this am of hyperglycemia   -C/W Lantus to 28 units non HD days, and 24 units for HD days at this time  would consider increasing Lantus dose for non HD days to 26 units if there is another episode of hyperglycemia on this dose (this dose is due to be given again tomorrow, and then can evaluate FS data on Saturday AM)  -Would continue with pre meal Humalog as ordered at this time  -Would continue with moderate correction coverage for meals  -Added QHS correctional scale  -Would continue with QHS snack Humalog as ordered at 5 units    Discussed plan in detail with patient who is in agreement with above.       Outpatient endocrine follow up: Patient and family would like to follow with Dr Baker . They plan to call for an appointment    Addendum: 12:39  Lantus was not yet administered as of 1030 AM (dose was due at 0800) Spoke with staff RN who was awaiting dose from pharmacy and was instructed to give it immediately upon receipt. Please follow FS target bg 100-180mg/dl  FS improved with one elevated reading this am of hyperglycemia   -C/W Lantus to 28 units non HD days, and 24 units for HD days at this time  would consider increasing Lantus dose for non HD days to 26 units if there is another episode of hyperglycemia on this dose (this dose is due to be given again tomorrow, and then can evaluate FS data on Saturday AM)  -Would continue with pre meal Humalog as ordered at this time  -Would continue with moderate correction coverage for meals  -Added QHS correctional scale  -Would continue with QHS snack Humalog as ordered at 5 units    Discussed plan in detail with patient who is in agreement with above.       Outpatient endocrine follow up: Patient and family would like to follow with Dr Baker . They plan to call for an appointment    Addendum: 12:56  Lantus was not yet administered as of 1030 AM (dose was due at 0800) Spoke with staff RN at that time who was awaiting dose from pharmacy. Instructed to give it immediately upon receipt. Lunch FS now is elevated with hyperglycemia, which could possibly be due to late Lantus administration. Please give Pre Meal Humalog and correctional Humalog as ordered and recheck FS in 2 hours to determine if hyperglycemia is improving. Consider correctional scale every 4 hours until FS are back to controlled. Consider BMP, BHB if repeat FS in 2 hours is still elevated. Discussed with resident, Dr Gutierrez. Endocrine service can be reached at

## 2017-10-05 NOTE — PROGRESS NOTE ADULT - SUBJECTIVE AND OBJECTIVE BOX
Patient is a 52y old  Male who presents with a chief complaint of Altered mental status (10 Sep 2017 23:23)      OVERNIGHT EVENTS: No acute events overnight.  SUBJECTIVE: Patient seen and examined at bedside. Denies pain at AVF, paresthesias. Denies CP or SOB.       MEDICATIONS  (STANDING):  calcitriol   Capsule 0.5 MICROGram(s) Oral daily  cycloSPORINE  (SandIMMUNE) 100 milliGRAM(s) Oral daily  docusate sodium 100 milliGRAM(s) Oral three times a day  epoetin valente Injectable 6000 Unit(s) IV Push <User Schedule>  heparin  Infusion.  Unit(s)/Hr (16 mL/Hr) IV Continuous <Continuous>  hydrALAZINE 50 milliGRAM(s) Oral three times a day  insulin glargine Injectable (LANTUS) 24 Unit(s) SubCutaneous <User Schedule>  insulin glargine Injectable (LANTUS) 28 Unit(s) SubCutaneous <User Schedule>  insulin lispro (HumaLOG) corrective regimen sliding scale   SubCutaneous three times a day before meals  insulin lispro Injectable (HumaLOG) 10 Unit(s) SubCutaneous before breakfast  insulin lispro Injectable (HumaLOG) 10 Unit(s) SubCutaneous before lunch  insulin lispro Injectable (HumaLOG) 11 Unit(s) SubCutaneous before dinner  insulin lispro Injectable (HumaLOG) 5 Unit(s) SubCutaneous at bedtime  metoprolol 50 milliGRAM(s) Oral two times a day  predniSONE   Tablet 5 milliGRAM(s) Oral daily  senna 2 Tablet(s) Oral at bedtime  tamsulosin 0.4 milliGRAM(s) Oral at bedtime    MEDICATIONS  (PRN):  acetaminophen   Tablet. 650 milliGRAM(s) Oral every 6 hours PRN mild to moderate pain      T(C): 36.6 (10-05-17 @ 05:10), Max: 36.9 (10-05-17 @ 01:03)  HR: 95 (10-05-17 @ 05:10) (88 - 108)  BP: 181/97 (10-05-17 @ 05:10) (132/81 - 181/97)  RR: 17 (10-05-17 @ 05:10) (16 - 17)  SpO2: 99% (10-05-17 @ 05:10) (97% - 99%)  CAPILLARY BLOOD GLUCOSE  89 (04 Oct 2017 22:05)  101 (04 Oct 2017 17:46)  169 (04 Oct 2017 12:31)  180 (04 Oct 2017 08:45)        I&O's Summary    04 Oct 2017 07:01  -  05 Oct 2017 07:00  --------------------------------------------------------  IN: 500 mL / OUT: 3000 mL / NET: -2500 mL        PHYSICAL EXAM  GENERAL: NAD, well-developed  HEAD:  Atraumatic, Normocephalic  EYES: conjunctiva and sclera clear  CHEST/LUNG: Clear to auscultation bilaterally; No wheeze, rales or rhonchi  HEART: +S1 +S2, Regular rate and rhythm, no murmurs  ABDOMEN: Soft, Nontender, Nondistended; Bowel sounds present  NEURO: No focal neurologic deficits, sensation and motor function grossly intact.   EXTREMITIES:  Pink and warm, Peripheral Pulses intact. +B/L LE edema. +RIJ HD Cath, C/D/I. +LUE AVF with dressing C/D/I.      LABS:                        9.6    10.27 )-----------( 120      ( 05 Oct 2017 01:40 )             30.9     10-04    136  |  96<L>  |  49<H>  ----------------------------<  311<H>  4.0   |  23  |  3.21<H>    Ca    8.3<L>      04 Oct 2017 07:00  Phos  5.0     10-04  Mg     1.7     10-04      PT/INR - ( 04 Oct 2017 07:10 )   PT: 10.5 SEC;   INR: 0.94          PTT - ( 05 Oct 2017 01:40 )  PTT:48.7 SEC          RADIOLOGY & ADDITIONAL TESTS:    Imaging Personally Reviewed:  Consultant(s) Notes Reviewed:    Care Discussed with Consultants/Other Providers:      Pooja Steen D.O.  Medicine Intern  pager [phone]

## 2017-10-05 NOTE — PROGRESS NOTE ADULT - ASSESSMENT
52 M with Type 1 DM HbA1C 8 admitted for AMS s/p cardiac arrest now on HD presents with hyperglycemia and most recently had an episode of DKA last week.

## 2017-10-05 NOTE — PROGRESS NOTE ADULT - SUBJECTIVE AND OBJECTIVE BOX
Chief Complaint: DM 1    History: 53 y/o male with DM 1 and on HD. Episode of DKA last week- BG have been trending better controlled since. FS elevated this AM after receiving the lower Lantus dose (24 units) for HD days (yesterday), but otherwise have been better controlled and at inpatient target. He denies concerns at the present time. Tolerating food well and denies hypoglycemia symptoms. Today is a non HD day, so he will receive the higher Lantus dose (28 units).     MEDICATIONS  (STANDING):  calcitriol   Capsule 0.5 MICROGram(s) Oral daily  cycloSPORINE  (SandIMMUNE) 100 milliGRAM(s) Oral daily  docusate sodium 100 milliGRAM(s) Oral three times a day  epoetin valente Injectable 6000 Unit(s) IV Push <User Schedule>  heparin  Infusion.  Unit(s)/Hr (16 mL/Hr) IV Continuous <Continuous>  hydrALAZINE 50 milliGRAM(s) Oral three times a day  insulin glargine Injectable (LANTUS) 24 Unit(s) SubCutaneous <User Schedule>  insulin glargine Injectable (LANTUS) 28 Unit(s) SubCutaneous <User Schedule>  insulin lispro (HumaLOG) corrective regimen sliding scale   SubCutaneous three times a day before meals  insulin lispro Injectable (HumaLOG) 10 Unit(s) SubCutaneous before breakfast  insulin lispro Injectable (HumaLOG) 10 Unit(s) SubCutaneous before lunch  insulin lispro Injectable (HumaLOG) 11 Unit(s) SubCutaneous before dinner  insulin lispro Injectable (HumaLOG) 5 Unit(s) SubCutaneous at bedtime  metoprolol 50 milliGRAM(s) Oral two times a day  predniSONE   Tablet 5 milliGRAM(s) Oral daily  senna 2 Tablet(s) Oral at bedtime  tamsulosin 0.4 milliGRAM(s) Oral at bedtime    MEDICATIONS  (PRN):  acetaminophen   Tablet. 650 milliGRAM(s) Oral every 6 hours PRN mild to moderate pain    No Known Allergies    Review of Systems:  Constitutional: No fever  Cardiovascular: No chest pain, palpitations  Respiratory: No SOB, no cough  GI: No nausea, vomiting, abdominal pain      PHYSICAL EXAM:  VITALS: T(C): 36.6 (10-05-17 @ 05:10)  T(F): 97.9 (10-05-17 @ 05:10), Max: 98.4 (10-05-17 @ 01:03)  HR: 95 (10-05-17 @ 05:10) (88 - 108)  BP: 181/97 (10-05-17 @ 05:10) (132/81 - 181/97)  RR:  (16 - 17)  SpO2:  (97% - 99%)  Wt(kg): --  GENERAL: NAD  RESPIRATORY: Non labored  GI: Soft, nontender, non distended  SKIN: War,/dry  PSYCH: Alert and oriented x 3, normal affect, normal mood      CAPILLARY BLOOD GLUCOSE  279 (10-05 @ 08:55)  89 (10-04 @ 22:05)  101 (10-04 @ 17:46)  169 (10-04 @ 12:31)  180 (10-04 @ 08:45)  281 (10-04 @ 06:21)  172 (10-03 @ 22:36)  96 (10-03 @ 17:09)  162 (10-03 @ 12:26)  162 (10-03 @ 08:43)  151 (10-03 @ 05:49)  234 (10-02 @ 21:20)  142 (10-02 @ 14:33)  164 (10-02 @ 13:30)  142 (10-02 @ 11:28)      10-04    136  |  96<L>  |  49<H>  ----------------------------<  311<H>  4.0   |  23  |  3.21<H>    EGFR if : 24  EGFR if non : 21    Ca    8.3<L>      10-04  Mg     1.7     10-04  Phos  5.0     10-04            Thyroid Function Tests:      Hemoglobin A1C, Whole Blood: 8.0 % <H> [4.0 - 5.6] (08-16-17 @ 06:30)

## 2017-10-05 NOTE — PROGRESS NOTE ADULT - PROBLEM SELECTOR PLAN 2
-c/w HD per renal MWF  - Pt to get hemodialysis today as per renal for fluid overload/LE edema.  - permacath in place   - s/p LUE AVF with vascular Dr. Staples on 10/2. To F/U in office in 6 weeks.

## 2017-10-05 NOTE — PROGRESS NOTE ADULT - PROBLEM SELECTOR PLAN 3
Appreciate Endocrinology evaluation and continued recommendations.  Lantus 24u qAM on HD days and 28u qAM on non-HD Days  Humalog pre-meal 10/10/11/5  No basal/bolus dose TBD.  Outpatient endocrine follow up appointment with Dr. Baker.

## 2017-10-05 NOTE — PROGRESS NOTE ADULT - ATTENDING COMMENTS
Patient seen and examined. Agree with above note by resident.    ESRD on HD - s/p HD yesterday. s/p AVF creation. C/w HD per renal  DM2 with hyperglycemia - elevated FS yesterday however no acidosis present. Endo input appreciated. Will monitor FS closely today. C/w current regimen.   Afib paroxsymal - heparin to coumadin bridge after AVF. c/w heparin gtt. Will dose coumadin today. Monitor INR    Plan discussed with patient .

## 2017-10-06 LAB
APTT BLD: 149.6 SEC — CRITICAL HIGH (ref 27.5–37.4)
APTT BLD: 42.3 SEC — HIGH (ref 27.5–37.4)
BUN SERPL-MCNC: 56 MG/DL — HIGH (ref 7–23)
CALCIUM SERPL-MCNC: 8.8 MG/DL — SIGNIFICANT CHANGE UP (ref 8.4–10.5)
CHLORIDE SERPL-SCNC: 94 MMOL/L — LOW (ref 98–107)
CO2 SERPL-SCNC: 23 MMOL/L — SIGNIFICANT CHANGE UP (ref 22–31)
CREAT SERPL-MCNC: 3.12 MG/DL — HIGH (ref 0.5–1.3)
GLUCOSE SERPL-MCNC: 294 MG/DL — HIGH (ref 70–99)
HCT VFR BLD CALC: 30.5 % — LOW (ref 39–50)
HGB BLD-MCNC: 9.5 G/DL — LOW (ref 13–17)
INR BLD: 1.1 — SIGNIFICANT CHANGE UP (ref 0.88–1.17)
MAGNESIUM SERPL-MCNC: 1.7 MG/DL — SIGNIFICANT CHANGE UP (ref 1.6–2.6)
MCHC RBC-ENTMCNC: 27.9 PG — SIGNIFICANT CHANGE UP (ref 27–34)
MCHC RBC-ENTMCNC: 31.1 % — LOW (ref 32–36)
MCV RBC AUTO: 89.7 FL — SIGNIFICANT CHANGE UP (ref 80–100)
NRBC # FLD: 0 — SIGNIFICANT CHANGE UP
PHOSPHATE SERPL-MCNC: 4.8 MG/DL — HIGH (ref 2.5–4.5)
PLATELET # BLD AUTO: 143 K/UL — LOW (ref 150–400)
PMV BLD: 10.9 FL — SIGNIFICANT CHANGE UP (ref 7–13)
POTASSIUM SERPL-MCNC: 4.3 MMOL/L — SIGNIFICANT CHANGE UP (ref 3.5–5.3)
POTASSIUM SERPL-SCNC: 4.3 MMOL/L — SIGNIFICANT CHANGE UP (ref 3.5–5.3)
PROTHROM AB SERPL-ACNC: 12.4 SEC — SIGNIFICANT CHANGE UP (ref 9.8–13.1)
RBC # BLD: 3.4 M/UL — LOW (ref 4.2–5.8)
RBC # FLD: 15.5 % — HIGH (ref 10.3–14.5)
SODIUM SERPL-SCNC: 134 MMOL/L — LOW (ref 135–145)
WBC # BLD: 9.98 K/UL — SIGNIFICANT CHANGE UP (ref 3.8–10.5)
WBC # FLD AUTO: 9.98 K/UL — SIGNIFICANT CHANGE UP (ref 3.8–10.5)

## 2017-10-06 PROCEDURE — 99232 SBSQ HOSP IP/OBS MODERATE 35: CPT

## 2017-10-06 PROCEDURE — 99233 SBSQ HOSP IP/OBS HIGH 50: CPT | Mod: GC

## 2017-10-06 PROCEDURE — 90935 HEMODIALYSIS ONE EVALUATION: CPT

## 2017-10-06 RX ORDER — GLYCERIN 1 %
1 DROPS OPHTHALMIC (EYE)
Qty: 0 | Refills: 0 | Status: DISCONTINUED | OUTPATIENT
Start: 2017-10-06 | End: 2017-10-10

## 2017-10-06 RX ORDER — WARFARIN SODIUM 2.5 MG/1
7.5 TABLET ORAL ONCE
Qty: 0 | Refills: 0 | Status: COMPLETED | OUTPATIENT
Start: 2017-10-06 | End: 2017-10-06

## 2017-10-06 RX ORDER — INSULIN LISPRO 100/ML
10 VIAL (ML) SUBCUTANEOUS
Qty: 0 | Refills: 0 | Status: DISCONTINUED | OUTPATIENT
Start: 2017-10-06 | End: 2017-10-07

## 2017-10-06 RX ORDER — INSULIN LISPRO 100/ML
11 VIAL (ML) SUBCUTANEOUS
Qty: 0 | Refills: 0 | Status: DISCONTINUED | OUTPATIENT
Start: 2017-10-07 | End: 2017-10-10

## 2017-10-06 RX ORDER — INSULIN LISPRO 100/ML
10 VIAL (ML) SUBCUTANEOUS
Qty: 0 | Refills: 0 | Status: DISCONTINUED | OUTPATIENT
Start: 2017-10-06 | End: 2017-10-06

## 2017-10-06 RX ORDER — WARFARIN SODIUM 2.5 MG/1
5 TABLET ORAL ONCE
Qty: 0 | Refills: 0 | Status: DISCONTINUED | OUTPATIENT
Start: 2017-10-06 | End: 2017-10-06

## 2017-10-06 RX ADMIN — WARFARIN SODIUM 7.5 MILLIGRAM(S): 2.5 TABLET ORAL at 17:45

## 2017-10-06 RX ADMIN — ERYTHROPOIETIN 6000 UNIT(S): 10000 INJECTION, SOLUTION INTRAVENOUS; SUBCUTANEOUS at 08:22

## 2017-10-06 RX ADMIN — Medication 4: at 18:43

## 2017-10-06 RX ADMIN — Medication 10 UNIT(S): at 08:51

## 2017-10-06 RX ADMIN — HEPARIN SODIUM 1700 UNIT(S)/HR: 5000 INJECTION INTRAVENOUS; SUBCUTANEOUS at 18:45

## 2017-10-06 RX ADMIN — Medication 50 MILLIGRAM(S): at 15:22

## 2017-10-06 RX ADMIN — HEPARIN SODIUM 1500 UNIT(S)/HR: 5000 INJECTION INTRAVENOUS; SUBCUTANEOUS at 11:23

## 2017-10-06 RX ADMIN — HEPARIN SODIUM 0 UNIT(S)/HR: 5000 INJECTION INTRAVENOUS; SUBCUTANEOUS at 10:19

## 2017-10-06 RX ADMIN — Medication 8: at 13:15

## 2017-10-06 RX ADMIN — Medication 50 MILLIGRAM(S): at 08:47

## 2017-10-06 RX ADMIN — Medication 5 MILLIGRAM(S): at 05:39

## 2017-10-06 RX ADMIN — HEPARIN SODIUM 1800 UNIT(S)/HR: 5000 INJECTION INTRAVENOUS; SUBCUTANEOUS at 00:19

## 2017-10-06 RX ADMIN — CYCLOSPORINE 100 MILLIGRAM(S): 100 CAPSULE ORAL at 11:24

## 2017-10-06 RX ADMIN — Medication 10 UNIT(S): at 18:43

## 2017-10-06 RX ADMIN — Medication 10 UNIT(S): at 13:15

## 2017-10-06 RX ADMIN — CALCITRIOL 0.5 MICROGRAM(S): 0.5 CAPSULE ORAL at 11:24

## 2017-10-06 RX ADMIN — Medication 4: at 08:52

## 2017-10-06 RX ADMIN — Medication 50 MILLIGRAM(S): at 17:45

## 2017-10-06 RX ADMIN — INSULIN GLARGINE 24 UNIT(S): 100 INJECTION, SOLUTION SUBCUTANEOUS at 08:47

## 2017-10-06 RX ADMIN — TAMSULOSIN HYDROCHLORIDE 0.4 MILLIGRAM(S): 0.4 CAPSULE ORAL at 21:33

## 2017-10-06 RX ADMIN — Medication 50 MILLIGRAM(S): at 21:33

## 2017-10-06 NOTE — PROVIDER CONTACT NOTE (OTHER) - RECOMMENDATIONS
administer second dose of dextrose gel as per hypoglycemia protocol. pt is refusing dextrose gel. pt requesting carbohydrate at this time.

## 2017-10-06 NOTE — PROVIDER CONTACT NOTE (OTHER) - ACTION/TREATMENT ORDERED:
reschedule metoprolol and hydralazine until after pt returns from HD. send pt to hemodialysis as ordered.

## 2017-10-06 NOTE — PROGRESS NOTE ADULT - ATTENDING COMMENTS
Patient seen and examined. Agree with above note by resident.    ESRD on HD - s/p HD today. s/p AVF creation. C/w HD per renal. Outpt HD setup for Tuesday next week.   DM2 with hyperglycemia - elevated FS yesterday however no acidosis present. Endo input appreciated. Will monitor FS closely today. C/w current regimen.   Afib paroxsymal - heparin to coumadin bridge after AVF. c/w heparin gtt. Will dose coumadin today. Monitor INR    Plan discussed with patient .

## 2017-10-06 NOTE — PROVIDER CONTACT NOTE (OTHER) - BACKGROUND
hx of DM II. admitted for AMS.
male, esrd, glucose checks done insulin provided prn education done type 1
53yo male h/o CKD sp renal transplant admitted for altered mental status.
History of hypertension
Medical history of ESRD, encepalopathy, DM.
Patient admitted for altered mental status
Patient arrived with SBP in 190's, asymptomatic. During dialysis BP increased to 200's.
Patient's BP has been high during dialysis treatment. Received Nifedipine earlier with no results.
Pt admitted for AMS, +new onset afib, on heparin gtt
Pt is due 300mg Cardizem and 50mg lopressor in AM. Order states to hold medications if HR is less than 60.
Pt on heparin gtt @ 22ml/hr
Wide QRS complex noticed on cardiac monitor.
hx of DM. Admitted for AMS.
hx of DMII. admitted for AMS
male, esrd, glucose checks done insulin provided prn education done type 1
pt admitted for altered mental status
pt admitted for altered mental status, pt NPO for AVF today
pt admitted with hx of DM type 1
pt has end stage renal disease. pt due for dialysis today at 0600.
pt has end stage renal disease. pt is scheduled to go to hemodialysis at 0600. pt has been hypertensive during shift. pt is due for BP medications a 0600.
pt has hx of DM-type 1, ordered for pre-meal glucose checks
pt has type I diabetes. endocrine is following pt. pt was found to be hypoglycemic blood sugar of 60 and asymptomatic. dextrose gel administered.
pt has type I diabetes. pt BG is very sensitive. endocrine team is following
pt has type I diabetes. pt ate dinner and received 7 units insulin premeal

## 2017-10-06 NOTE — PROVIDER CONTACT NOTE (OTHER) - ASSESSMENT
VSS. pt is asymptomatic.
alert, fs elevated upper 200's, see flowsheet. scheduled for 11 units.
Alert and orientedX3. Asymptomatic. /92 HR82 RR 18
No changes at this time.
Patient asymptomatic with no complains.
Patient asymptomatic, alert, ox4.
Patient resting.  VSS
Pt AOx4, VSS on room air, no acute distress noted
Pt AOx4, VSS on room air, no acute distress.
Pt has increased respiration sating at 94% on 2L NC.  Sat patient up in bed. Lungs are clear bilaterally.
Pt is AOx4. Latest HR 56 and /78 manual. Pt is asymptomatic and denies any chest pain or discomfort.
VSS
VSS. asmyptomatic.
Wide QRS complex noticed on cardiac monitor. Pt asymptomatic and denies any chest pain or discomfort.
alert. pt concerned glucose will rebound and go higher with just 11 units and no sliding scale supplement
pt  pt c/o thirst, pt denies N/V/ abdominal pain
pt A&OX4. HR 98. /79. RR 18. 97% oxygen saturation on room air. pt is asymptomatic of hypertension. pt denies headache, blurry vision, weakness, nausea/vomiting.
pt A&OX4. Temp 36.7 HR 96. /82. RR 18. 100% oxygen saturation on room air. pt is asymptomatic of hypertension
pt A&OX4. blood sugar is 61. pt is asymptomatic. will administer carbohydrate at this time and reassess in 15 minutes
pt A&OX4. blood sugar is 72. pt remains asymptomatic.
pt A&Ox4. pt BG 60. pt is asymptomatic. denies lethargy, dizziness, fatigue, weakness.
pt FS:453
pt stable, a/o4, no signs or complaints of distress
pt stable, a/oX4, no signs of distress

## 2017-10-06 NOTE — PROGRESS NOTE ADULT - SUBJECTIVE AND OBJECTIVE BOX
Patient is a 52y old  Male who presents with a chief complaint of Altered mental status (10 Sep 2017 23:23)      OVERNIGHT EVENTS: Hypoglycemic to 60 overnight. s/p dextrose gel and carbs with FSG-152.  SUBJECTIVE: Patient seen and examined at dialysis. Stable, denies current complaints and is compliant with coumadin. Denies CP, SOB, abdominal pain. Minimal pain at AVF site with no paresthesias.       MEDICATIONS  (STANDING):  calcitriol   Capsule 0.5 MICROGram(s) Oral daily  cycloSPORINE  (SandIMMUNE) 100 milliGRAM(s) Oral daily  dextrose 50% Injectable 12.5 Gram(s) IV Push once  dextrose 50% Injectable 25 Gram(s) IV Push once  dextrose 50% Injectable 25 Gram(s) IV Push once  docusate sodium 100 milliGRAM(s) Oral three times a day  epoetin valente Injectable 6000 Unit(s) IV Push <User Schedule>  heparin  Infusion.  Unit(s)/Hr (16 mL/Hr) IV Continuous <Continuous>  hydrALAZINE 50 milliGRAM(s) Oral three times a day  insulin glargine Injectable (LANTUS) 24 Unit(s) SubCutaneous <User Schedule>  insulin glargine Injectable (LANTUS) 28 Unit(s) SubCutaneous <User Schedule>  insulin lispro (HumaLOG) corrective regimen sliding scale   SubCutaneous three times a day before meals  insulin lispro (HumaLOG) corrective regimen sliding scale   SubCutaneous at bedtime  insulin lispro Injectable (HumaLOG) 10 Unit(s) SubCutaneous before breakfast  insulin lispro Injectable (HumaLOG) 10 Unit(s) SubCutaneous before lunch  insulin lispro Injectable (HumaLOG) 11 Unit(s) SubCutaneous before dinner  insulin lispro Injectable (HumaLOG) 5 Unit(s) SubCutaneous at bedtime  metoprolol 50 milliGRAM(s) Oral two times a day  predniSONE   Tablet 5 milliGRAM(s) Oral daily  senna 2 Tablet(s) Oral at bedtime  tamsulosin 0.4 milliGRAM(s) Oral at bedtime    MEDICATIONS  (PRN):  acetaminophen   Tablet. 650 milliGRAM(s) Oral every 6 hours PRN mild to moderate pain  dextrose Gel 1 Dose(s) Oral once PRN Blood Glucose LESS THAN 70 milliGRAM(s)/deciLiter  dextrose Gel 1 Dose(s) Oral once PRN Blood Glucose LESS THAN 70 milliGRAM(s)/deciLiter  glucagon  Injectable 1 milliGRAM(s) IntraMuscular once PRN Glucose <70 milliGRAM(s)/deciLiter      T(C): 36.8 (10-06-17 @ 06:50), Max: 36.9 (10-05-17 @ 14:04)  HR: 99 (10-06-17 @ 06:50) (88 - 103)  BP: 173/85 (10-06-17 @ 06:50) (136/77 - 174/79)  RR: 18 (10-06-17 @ 06:50) (18 - 18)  SpO2: 97% (10-06-17 @ 05:32) (97% - 100%)  CAPILLARY BLOOD GLUCOSE  218 (06 Oct 2017 05:32)  144 (06 Oct 2017 03:19)  152 (05 Oct 2017 23:46)  72 (05 Oct 2017 22:53)  60 (05 Oct 2017 22:25)  149 (05 Oct 2017 16:45)  261 (05 Oct 2017 15:29)  356 (05 Oct 2017 12:37)  279 (05 Oct 2017 08:55)      PHYSICAL EXAM  GENERAL: NAD, well-developed  HEAD:  Atraumatic, Normocephalic  EYES: conjunctiva and sclera clear  CHEST/LUNG: Clear to auscultation bilaterally; No wheeze, rales or rhonchi  HEART: +S1 +S2, Regular rate and rhythm, no murmurs  ABDOMEN: Soft, Nontender, Nondistended; Bowel sounds present  NEURO: No focal neurologic deficits, sensation and motor function grossly intact.   EXTREMITIES:  Pink and warm, Peripheral Pulses intact. +B/L LE edema. +RIJ HD Cath, C/D/I. +LUE AVF with dressing C/D/I.    LABS:                        10.0   10.55 )-----------( 124      ( 05 Oct 2017 12:15 )             31.4     10-05    133<L>  |  92<L>  |  47<H>  ----------------------------<  411<H>  4.5   |  24  |  3.16<H>    Ca    8.5      05 Oct 2017 12:15  Phos  3.8     10-05  Mg     1.8     10-05      PT/INR - ( 05 Oct 2017 15:40 )   PT: 11.3 SEC;   INR: 1.01          PTT - ( 05 Oct 2017 22:50 )  PTT:85.6 SEC          RADIOLOGY & ADDITIONAL TESTS:    Imaging Personally Reviewed:  Consultant(s) Notes Reviewed:    Care Discussed with Consultants/Other Providers:      Pooja Steen D.O.  Medicine Intern  pager [phone]

## 2017-10-06 NOTE — PROGRESS NOTE ADULT - SUBJECTIVE AND OBJECTIVE BOX
Chief Complaint: DM 1    History: S/P both hyperglycemia and hypoglycemia yesterday. Lantus dose (due at 0800) was given 3 hours late yesterday as per MAR. Lunch FS was then elevated over 300. Patient reports eating all meals and not withholding carbohydrates for hyperglycemia (reports "sometimes" eating low carb or no carb when glucose is elevated). Humalog Pre meal and correctional scales administered as ordered for all meals yesterday. Then, at bedtime patient was hypoglycemic to 60, which was treated with dextrose gel and then juice and pudding. No bedtime snack last night, so no bedtime PRN snack humalog was administered. Today, early AM FS data is 144 and 218. Patient is currently receiving HD and denies complaints at this time. He reports eating breakfast and he received Lantus/pre meal humalog and correctional insulin as ordered this AM.     MEDICATIONS  (STANDING):  calcitriol   Capsule 0.5 MICROGram(s) Oral daily  cycloSPORINE  (SandIMMUNE) 100 milliGRAM(s) Oral daily  dextrose 50% Injectable 12.5 Gram(s) IV Push once  dextrose 50% Injectable 25 Gram(s) IV Push once  dextrose 50% Injectable 25 Gram(s) IV Push once  docusate sodium 100 milliGRAM(s) Oral three times a day  epoetin valente Injectable 6000 Unit(s) IV Push <User Schedule>  heparin  Infusion.  Unit(s)/Hr (16 mL/Hr) IV Continuous <Continuous>  hydrALAZINE 50 milliGRAM(s) Oral three times a day  insulin glargine Injectable (LANTUS) 24 Unit(s) SubCutaneous <User Schedule>  insulin glargine Injectable (LANTUS) 28 Unit(s) SubCutaneous <User Schedule>  insulin lispro (HumaLOG) corrective regimen sliding scale   SubCutaneous three times a day before meals  insulin lispro (HumaLOG) corrective regimen sliding scale   SubCutaneous at bedtime  insulin lispro Injectable (HumaLOG) 10 Unit(s) SubCutaneous before breakfast  insulin lispro Injectable (HumaLOG) 10 Unit(s) SubCutaneous before lunch  insulin lispro Injectable (HumaLOG) 11 Unit(s) SubCutaneous before dinner  insulin lispro Injectable (HumaLOG) 5 Unit(s) SubCutaneous at bedtime  metoprolol 50 milliGRAM(s) Oral two times a day  predniSONE   Tablet 5 milliGRAM(s) Oral daily  senna 2 Tablet(s) Oral at bedtime  tamsulosin 0.4 milliGRAM(s) Oral at bedtime    MEDICATIONS  (PRN):  acetaminophen   Tablet. 650 milliGRAM(s) Oral every 6 hours PRN mild to moderate pain  dextrose Gel 1 Dose(s) Oral once PRN Blood Glucose LESS THAN 70 milliGRAM(s)/deciLiter  dextrose Gel 1 Dose(s) Oral once PRN Blood Glucose LESS THAN 70 milliGRAM(s)/deciLiter  glucagon  Injectable 1 milliGRAM(s) IntraMuscular once PRN Glucose <70 milliGRAM(s)/deciLiter      No Known Allergies    Review of Systems:  Constitutional: No fever  Neuro: No tremors  HEENT: No pain  Cardiovascular: No chest pain, palpitations  Respiratory: No SOB, no cough  GI: No nausea, vomiting, abdominal pain      PHYSICAL EXAM:  VITALS: T(C): 36.8 (10-06-17 @ 06:50)  T(F): 98.2 (10-06-17 @ 06:50), Max: 98.4 (10-05-17 @ 14:04)  HR: 99 (10-06-17 @ 06:50) (88 - 103)  BP: 173/85 (10-06-17 @ 06:50) (136/77 - 174/79)  RR:  (18 - 18)  SpO2:  (97% - 100%)  Wt(kg): --  GENERAL: NAD, HD is progress  RESPIRATORY: non labored  GI: Soft, nontender, non distended  SKIN: Dry, warm  PSYCH: Alert and oriented x 3, normal affect, normal mood    CAPILLARY BLOOD GLUCOSE  222 (10-06 @ 08:43)  218 (10-06 @ 05:32)  144 (10-06 @ 03:19)  152 (10-05 @ 23:46)  72 (10-05 @ 22:53)  60 (10-05 @ 22:25)  149 (10-05 @ 16:45)  261 (10-05 @ 15:29)  356 (10-05 @ 12:37)  279 (10-05 @ 08:55)  89 (10-04 @ 22:05)  101 (10-04 @ 17:46)  169 (10-04 @ 12:31)  180 (10-04 @ 08:45)  281 (10-04 @ 06:21)  172 (10-03 @ 22:36)  96 (10-03 @ 17:09)  162 (10-03 @ 12:26)      10-06    134<L>  |  94<L>  |  56<H>  ----------------------------<  294<H>  4.3   |  23  |  3.12<H>    EGFR if : 25  EGFR if non : 22    Ca    8.8      10-06  Mg     1.7     10-06  Phos  4.8     10-06            Thyroid Function Tests:      Hemoglobin A1C, Whole Blood: 8.0 % <H> [4.0 - 5.6] (08-16-17 @ 06:30)

## 2017-10-06 NOTE — PROGRESS NOTE ADULT - ASSESSMENT
53 y/o M with h/o CKD s/p kidney transplant in 1998, HTN, DM, legally blind admitted for uremic encephalopathy initiated on dialysis. Pt. seen during HD. /91 at time of dialysis rounds.

## 2017-10-06 NOTE — PROGRESS NOTE ADULT - PROBLEM SELECTOR PLAN 1
target bg 100-180mg/dl  -variable glucose pattern yesterday. Hyperglycemia mid day likely due to late administration of AM dosed Lantus and hypoglycemia at bedtime either possibly due to overcorrection for hyperglycemia throughout the day or pre meal Humalog dose at dinner potentially was too high for patient (11 units verses 10 for prior meals)  -At this time, would c/w Lantus of 28 units on non HD days, and 24 units for HD days   Since Lantus dose timing was late yesterday, hesitant to make changes yet. But   would consider increasing Lantus doses pending FS data tomorrow AM. Today is a HD day, so patient received 24 units this AM (on time). See fasting FS tomorrow AM, if hyperglycemia, please page endocrine service at  for dose recommendations.   -Decreased pre dinner Humalog to 10 units since the patient had hypoglycemia at bedtime yesterday and did consume carbs with dinner. Pre meal Humalog doses will now be 10 units TID before meals. HOLD if patient does not eat.    -Would continue with moderate correction coverage for meals  -Would continue QHS correctional scale  -Would continue with QHS PRN snack Humalog as ordered at 5 units  -Discussed concern regarding late Lantus dose administration yesterday with medical team 3 (Dr Gutierrez)    Discussed plan in detail with patient who is in agreement with above.       Outpatient endocrine follow up: Patient and family would like to follow with Dr Baker . They plan to call for an appointment target bg 100-180mg/dl  -variable glucose pattern yesterday. Hyperglycemia mid day likely due to late administration of AM dosed Lantus and hypoglycemia at bedtime either possibly due to overcorrection for hyperglycemia throughout the day or pre meal Humalog dose at dinner potentially was too high for patient (11 units verses 10 for prior meals)  -At this time, would c/w Lantus of 28 units on non HD days, and 24 units for HD days   Since Lantus dose timing was late yesterday, hesitant to make changes yet. But   would consider increasing Lantus doses pending FS data tomorrow AM. Today is a HD day, so patient received 24 units this AM (on time). See fasting FS tomorrow AM, if hyperglycemia, please page endocrine service at  for dose recommendations.   -Decreased pre dinner Humalog to 10 units since the patient had hypoglycemia at bedtime yesterday and did consume carbs with dinner. Pre meal Humalog doses will now be 10 units TID before meals. HOLD if patient does not eat.    -Would continue with moderate correction coverage for meals  -Would continue QHS correctional scale  -Would continue with QHS PRN snack Humalog as ordered at 5 units  -Discussed concern regarding late Lantus dose administration yesterday with medical team 3 (Dr Gutierrez)    Discussed plan in detail with patient who is in agreement with above.       Outpatient endocrine follow up: Patient and family would like to follow with Dr Baker . They plan to call for an appointment    Addendum: 13:02  Hyperglycemia noted at lunch today, increased pre breakfast Humalog to 11 units, next dose would be tomorrow AM. Please trend FS and administer correction coverage as indicated. If Dinner FS is > 250, please contact endocrine service at  for recommendations. target bg 100-180mg/dl  -variable glucose pattern yesterday. Hyperglycemia mid day likely due to late administration of AM dosed Lantus and hypoglycemia at bedtime either possibly due to overcorrection for hyperglycemia throughout the day or pre meal Humalog dose at dinner potentially was too high for patient (11 units verses 10 for prior meals)  -At this time, would c/w Lantus of 28 units on non HD days, and 24 units for HD days   Since Lantus dose timing was late yesterday, hesitant to make changes yet. But   would consider increasing Lantus doses pending FS data tomorrow AM. Today is a HD day, so patient received 24 units this AM (on time). See fasting FS tomorrow AM, if hyperglycemia, please page endocrine service at  for dose recommendations.   -Decreased pre dinner Humalog to 10 units since the patient had hypoglycemia at bedtime yesterday and did consume carbs with dinner. Pre meal Humalog doses will now be 10 units TID before meals. HOLD if patient does not eat.    -Would continue with moderate correction coverage for meals  -Would continue QHS correctional scale  -Would continue with QHS PRN snack Humalog as ordered at 5 units  -Discussed concern regarding late Lantus dose administration yesterday with medical team 3 (Dr Gutierrez)    Discussed plan in detail with patient who is in agreement with above.       Outpatient endocrine follow up: Patient and family would like to follow with Dr Baker . They plan to call for an appointment    Addendum: 13:02  Hyperglycemia noted at lunch today, increased pre breakfast Humalog to 11 units, next dose would be tomorrow AM. Please trend FS and administer correction coverage as indicated. If Dinner FS is > 250, please contact endocrine service at  for recommendations. D/W Dr Steen, team 3

## 2017-10-06 NOTE — PROGRESS NOTE ADULT - SUBJECTIVE AND OBJECTIVE BOX
[facility] DIVISION OF KIDNEY DISEASES AND HYPERTENSION --   --------------------------------------------------------------------------------  Chief Complaint: ESRD/Ongoing hemodialysis requirement    HPI: 53 y/o M with h/o CKD s/p kidney transplant in 1998, HTN, DM, legally blind admitted for uremic encephalopathy initiated on long-term HD. Pt. now ESRD. Pt. seen during HD. Pt. had LUE AVF creation surgery on 10/2/17. No complaints offered during HD. No complaints of CP, SOB, HA or dizziness.     PAST HISTORY  --------------------------------------------------------------------------------  No significant changes to PMH, PSH, FHx, SHx, unless otherwise noted    ALLERGIES & MEDICATIONS  --------------------------------------------------------------------------------  Allergies    No Known Allergies    Intolerances      Standing Inpatient Medications  calcitriol   Capsule 0.5 MICROGram(s) Oral daily  cycloSPORINE  (SandIMMUNE) 100 milliGRAM(s) Oral daily  dextrose 50% Injectable 12.5 Gram(s) IV Push once  dextrose 50% Injectable 25 Gram(s) IV Push once  dextrose 50% Injectable 25 Gram(s) IV Push once  docusate sodium 100 milliGRAM(s) Oral three times a day  epoetin valente Injectable 6000 Unit(s) IV Push <User Schedule>  heparin  Infusion.  Unit(s)/Hr IV Continuous <Continuous>  hydrALAZINE 50 milliGRAM(s) Oral three times a day  insulin glargine Injectable (LANTUS) 24 Unit(s) SubCutaneous <User Schedule>  insulin glargine Injectable (LANTUS) 28 Unit(s) SubCutaneous <User Schedule>  insulin lispro (HumaLOG) corrective regimen sliding scale   SubCutaneous three times a day before meals  insulin lispro (HumaLOG) corrective regimen sliding scale   SubCutaneous at bedtime  insulin lispro Injectable (HumaLOG) 10 Unit(s) SubCutaneous before breakfast  insulin lispro Injectable (HumaLOG) 10 Unit(s) SubCutaneous before lunch  insulin lispro Injectable (HumaLOG) 11 Unit(s) SubCutaneous before dinner  insulin lispro Injectable (HumaLOG) 5 Unit(s) SubCutaneous at bedtime  metoprolol 50 milliGRAM(s) Oral two times a day  predniSONE   Tablet 5 milliGRAM(s) Oral daily  senna 2 Tablet(s) Oral at bedtime  tamsulosin 0.4 milliGRAM(s) Oral at bedtime    PRN Inpatient Medications  acetaminophen   Tablet. 650 milliGRAM(s) Oral every 6 hours PRN  dextrose Gel 1 Dose(s) Oral once PRN  dextrose Gel 1 Dose(s) Oral once PRN  glucagon  Injectable 1 milliGRAM(s) IntraMuscular once PRN      REVIEW OF SYSTEMS  --------------------------------------------------------------------------------  Gen: No fevers/chills  Respiratory: No dyspnea  CV: No chest pain  GI: No abdominal pain  MSK: Decreased bilateral LE swelling  Neuro: No dizziness/lightheadedness    VITALS/PHYSICAL EXAM  --------------------------------------------------------------------------------  T(C): 36.8 (10-06-17 @ 06:50), Max: 36.9 (10-05-17 @ 14:04)  HR: 99 (10-06-17 @ 06:50) (88 - 103)  BP: 173/85 (10-06-17 @ 06:50) (136/77 - 174/79)  RR: 18 (10-06-17 @ 06:50) (18 - 18)  SpO2: 97% (10-06-17 @ 05:32) (97% - 100%)  Wt(kg): --      Physical Exam:  	Gern: resting, NAD  	Pulm: CTA B/L  	CV: RRR, S1S2; no rub  	Abd: +BS, soft  	LE: decreased LE pedal edema  	Vascular access: Right IJ HD catheter site: no bleeding, LUE AVF site: bruit heard    LABS/STUDIES  --------------------------------------------------------------------------------              10.0   10.55 >-----------<  124      [10-05-17 @ 12:15]              31.4     133  |  92  |  47  ----------------------------<  411      [10-05-17 @ 12:15]  4.5   |  24  |  3.16        Ca     8.5     [10-05-17 @ 12:15]      Mg     1.8     [10-05-17 @ 12:15]      Phos  3.8     [10-05-17 @ 12:15]      PT/INR: PT 11.3 , INR 1.01       [10-05-17 @ 15:40]  PTT: 85.6       [10-05-17 @ 22:50]    Iron 21, TIBC 187, %sat --      [09-09-17 @ 06:55]  Ferritin 587.5      [09-09-17 @ 06:55]  .9 (Ca --)      [09-10-17 @ 05:10]   --  HbA1c 8.0      [08-16-17 @ 06:30]  TSH 2.88      [09-03-17 @ 13:45]  Lipid: chol 137, TG 97, HDL 52, LDL 68      [08-11-17 @ 07:20]    HBsAg NEGATIVE      [09-27-17 @ 06:50]

## 2017-10-06 NOTE — PROGRESS NOTE ADULT - ASSESSMENT
52 M with Type 1 DM HbA1C 8 admitted for AMS s/p cardiac arrest now on HD presents with hyperglycemia and most recently had an episode of DKA last week. Yesterday, patient had both hyperglycemia and hypoglycemia.

## 2017-10-06 NOTE — PROVIDER CONTACT NOTE (OTHER) - DATE AND TIME:
01-Oct-2017 18:46
01-Oct-2017 20:07
05-Oct-2017 17:44
05-Oct-2017 22:35
05-Oct-2017 23:14
05-Sep-2017 12:00
05-Sep-2017 18:00
05-Sep-2017 19:30
06-Oct-2017 03:19
06-Oct-2017 05:32
06-Oct-2017 22:21
08-Sep-2017 02:35
15-Sep-2017 06:31
17-Sep-2017 05:52
17-Sep-2017 07:28
17-Sep-2017 08:57
17-Sep-2017 12:47
18-Sep-2017 08:10
21-Sep-2017 06:50
23-Sep-2017 08:09
25-Sep-2017 05:50
26-Sep-2017 06:00
30-Sep-2017 10:10
17-Sep-2017 06:32

## 2017-10-06 NOTE — PROVIDER CONTACT NOTE (OTHER) - NAME OF MD/NP/PA/DO NOTIFIED:
Bart John
Bart John
Dr Khoury 3
Dr Steen
Dr. Ayala (HS3)
Dr. Bart John
Dr. Koch
Dr. Smart (HS3)
Kasi Alston MD
Kasi Alston MD (night float Hs3)
MD Manny Gasca Team 3
MD Schneider team 3
Manny Carroll team 3
Manny Gasca
RUTHIE RAPHAEL
RUTHIE ZUNIGA
hs3, Maria Luz Bryant MD
jose
team 3
team 3
"neville"

## 2017-10-06 NOTE — PROVIDER CONTACT NOTE (OTHER) - SITUATION
Repeat 30 min 
paged md x 2 & text page. pt eating dinner & needs sliding scale insulin. per endocrine sliding scale insulin was given when pt had an apple as a snack plus 2 extra units of insulin for a total of 10
FS rechecked; 460 BS
INR 1.01
Lab called with PTT result 131.6
Patient has increase respiration 40bpm sating at 94% on 2L NC.
Patient's BP increased to 207/102 with HR 84.
Patient's BP is 192/104 despite receiving Nifedipine about 1 hour ago
Pt complained of not feeling well, requested to have FS taken, 
Pt has Hx of afib, aflutter. Pt on NSR during most of night.
Pt on heparin gtt @ 18 ml/hr with subtherapeutic PTT of 38.6
Pt scheduled for HD this AM.   Latest BP is high and unable to administer BP medications due to parameters.
Pt's PTT 40.1
Pt's bp 176/91, pulse 80, rr 20, O2 sat 98% on room air
pt , AM FS taken as per Dr Gasca request,  at bedside
pt FS taken before going to dialysis, 
pt fingerstick elevated to 404
pt has fingerstick of 399
pt is hypertensive
pt is hypoglycemic
pt is hypoglycemic
pt pre meal insulin is 11 units, however there is no corresponding sliding scale. sliding scale was used per endocrine when pt had a snack. next fs at 10pm with sliding scale and snack coverage
pt remains hypertensive
pt remains hypoglycemic after dextrose gel administration

## 2017-10-06 NOTE — PROVIDER CONTACT NOTE (OTHER) - ACTION/TREATMENT ORDERED:
carbohydrate administered as per patient request. pt refuses dextrose gel again. will reassess blood sugar.

## 2017-10-07 LAB
APTT BLD: 69.7 SEC — HIGH (ref 27.5–37.4)
APTT BLD: 86.8 SEC — HIGH (ref 27.5–37.4)
BUN SERPL-MCNC: 49 MG/DL — HIGH (ref 7–23)
CALCIUM SERPL-MCNC: 8.5 MG/DL — SIGNIFICANT CHANGE UP (ref 8.4–10.5)
CHLORIDE SERPL-SCNC: 94 MMOL/L — LOW (ref 98–107)
CO2 SERPL-SCNC: 24 MMOL/L — SIGNIFICANT CHANGE UP (ref 22–31)
CREAT SERPL-MCNC: 3.02 MG/DL — HIGH (ref 0.5–1.3)
GLUCOSE SERPL-MCNC: 325 MG/DL — HIGH (ref 70–99)
HBV SURFACE AG SER-ACNC: NEGATIVE — SIGNIFICANT CHANGE UP
HCT VFR BLD CALC: 29.5 % — LOW (ref 39–50)
HGB BLD-MCNC: 9.2 G/DL — LOW (ref 13–17)
INR BLD: 1.34 — HIGH (ref 0.88–1.17)
MAGNESIUM SERPL-MCNC: 1.8 MG/DL — SIGNIFICANT CHANGE UP (ref 1.6–2.6)
MCHC RBC-ENTMCNC: 27.8 PG — SIGNIFICANT CHANGE UP (ref 27–34)
MCHC RBC-ENTMCNC: 31.2 % — LOW (ref 32–36)
MCV RBC AUTO: 89.1 FL — SIGNIFICANT CHANGE UP (ref 80–100)
NRBC # FLD: 0 — SIGNIFICANT CHANGE UP
PHOSPHATE SERPL-MCNC: 3.6 MG/DL — SIGNIFICANT CHANGE UP (ref 2.5–4.5)
PLATELET # BLD AUTO: 129 K/UL — LOW (ref 150–400)
PMV BLD: 9.8 FL — SIGNIFICANT CHANGE UP (ref 7–13)
POTASSIUM SERPL-MCNC: 5 MMOL/L — SIGNIFICANT CHANGE UP (ref 3.5–5.3)
POTASSIUM SERPL-SCNC: 5 MMOL/L — SIGNIFICANT CHANGE UP (ref 3.5–5.3)
PROTHROM AB SERPL-ACNC: 15.1 SEC — HIGH (ref 9.8–13.1)
RBC # BLD: 3.31 M/UL — LOW (ref 4.2–5.8)
RBC # FLD: 15.5 % — HIGH (ref 10.3–14.5)
SODIUM SERPL-SCNC: 133 MMOL/L — LOW (ref 135–145)
WBC # BLD: 8.8 K/UL — SIGNIFICANT CHANGE UP (ref 3.8–10.5)
WBC # FLD AUTO: 8.8 K/UL — SIGNIFICANT CHANGE UP (ref 3.8–10.5)

## 2017-10-07 PROCEDURE — 90935 HEMODIALYSIS ONE EVALUATION: CPT | Mod: GC

## 2017-10-07 PROCEDURE — 99233 SBSQ HOSP IP/OBS HIGH 50: CPT

## 2017-10-07 RX ORDER — INSULIN LISPRO 100/ML
8 VIAL (ML) SUBCUTANEOUS
Qty: 0 | Refills: 0 | Status: DISCONTINUED | OUTPATIENT
Start: 2017-10-07 | End: 2017-10-10

## 2017-10-07 RX ORDER — WARFARIN SODIUM 2.5 MG/1
7.5 TABLET ORAL ONCE
Qty: 0 | Refills: 0 | Status: COMPLETED | OUTPATIENT
Start: 2017-10-07 | End: 2017-10-07

## 2017-10-07 RX ADMIN — Medication 10 UNIT(S): at 14:11

## 2017-10-07 RX ADMIN — Medication 50 MILLIGRAM(S): at 06:12

## 2017-10-07 RX ADMIN — HEPARIN SODIUM 1700 UNIT(S)/HR: 5000 INJECTION INTRAVENOUS; SUBCUTANEOUS at 01:58

## 2017-10-07 RX ADMIN — WARFARIN SODIUM 7.5 MILLIGRAM(S): 2.5 TABLET ORAL at 17:56

## 2017-10-07 RX ADMIN — Medication 1 DROP(S): at 06:11

## 2017-10-07 RX ADMIN — Medication 50 MILLIGRAM(S): at 21:05

## 2017-10-07 RX ADMIN — Medication 4: at 09:32

## 2017-10-07 RX ADMIN — Medication 50 MILLIGRAM(S): at 17:56

## 2017-10-07 RX ADMIN — Medication 5 MILLIGRAM(S): at 06:12

## 2017-10-07 RX ADMIN — CYCLOSPORINE 100 MILLIGRAM(S): 100 CAPSULE ORAL at 16:02

## 2017-10-07 RX ADMIN — Medication 8 UNIT(S): at 18:30

## 2017-10-07 RX ADMIN — HEPARIN SODIUM 1700 UNIT(S)/HR: 5000 INJECTION INTRAVENOUS; SUBCUTANEOUS at 09:34

## 2017-10-07 RX ADMIN — TAMSULOSIN HYDROCHLORIDE 0.4 MILLIGRAM(S): 0.4 CAPSULE ORAL at 21:05

## 2017-10-07 RX ADMIN — Medication 5 UNIT(S): at 22:34

## 2017-10-07 RX ADMIN — Medication 11 UNIT(S): at 09:33

## 2017-10-07 RX ADMIN — INSULIN GLARGINE 28 UNIT(S): 100 INJECTION, SOLUTION SUBCUTANEOUS at 09:33

## 2017-10-07 NOTE — PROGRESS NOTE ADULT - SUBJECTIVE AND OBJECTIVE BOX
Chief Complaint:  Diabetes management    History:  Pt hypoglycemic post-prandial at dinner last evening, FS down to 61. Pt eating at bedside, denies any complaints. Pt thinks he is getting too much insulin at dinner time.    MEDICATIONS  (STANDING):  calcitriol   Capsule 0.5 MICROGram(s) Oral daily  cycloSPORINE  (SandIMMUNE) 100 milliGRAM(s) Oral daily  dextrose 50% Injectable 12.5 Gram(s) IV Push once  dextrose 50% Injectable 25 Gram(s) IV Push once  dextrose 50% Injectable 25 Gram(s) IV Push once  docusate sodium 100 milliGRAM(s) Oral three times a day  epoetin valente Injectable 6000 Unit(s) IV Push <User Schedule>  heparin  Infusion.  Unit(s)/Hr (16 mL/Hr) IV Continuous <Continuous>  hydrALAZINE 50 milliGRAM(s) Oral three times a day  insulin glargine Injectable (LANTUS) 24 Unit(s) SubCutaneous <User Schedule>  insulin glargine Injectable (LANTUS) 28 Unit(s) SubCutaneous <User Schedule>  insulin lispro (HumaLOG) corrective regimen sliding scale   SubCutaneous three times a day before meals  insulin lispro (HumaLOG) corrective regimen sliding scale   SubCutaneous at bedtime  insulin lispro Injectable (HumaLOG) 11 Unit(s) SubCutaneous before breakfast  insulin lispro Injectable (HumaLOG) 8 Unit(s) SubCutaneous before dinner  insulin lispro Injectable (HumaLOG) 10 Unit(s) SubCutaneous before lunch  insulin lispro Injectable (HumaLOG) 5 Unit(s) SubCutaneous at bedtime  metoprolol 50 milliGRAM(s) Oral two times a day  predniSONE   Tablet 5 milliGRAM(s) Oral daily  senna 2 Tablet(s) Oral at bedtime  tamsulosin 0.4 milliGRAM(s) Oral at bedtime    MEDICATIONS  (PRN):  acetaminophen   Tablet. 650 milliGRAM(s) Oral every 6 hours PRN mild to moderate pain  dextrose Gel 1 Dose(s) Oral once PRN Blood Glucose LESS THAN 70 milliGRAM(s)/deciLiter  dextrose Gel 1 Dose(s) Oral once PRN Blood Glucose LESS THAN 70 milliGRAM(s)/deciLiter  glucagon  Injectable 1 milliGRAM(s) IntraMuscular once PRN Glucose <70 milliGRAM(s)/deciLiter  naphazoline/pheniramine Solution 1 Drop(s) Both EYES four times a day PRN Itchy Eyes      Allergies    No Known Allergies    Intolerances      Review of Systems:  Constitutional: No fever  Cardiovascular: No chest pain, palpitations  Respiratory: No SOB, no cough  GI: No nausea, vomiting, abdominal pain    ALL OTHER SYSTEMS REVIEWED AND NEGATIVE    PHYSICAL EXAM:  VITALS: T(C): 36.7 (10-07-17 @ 06:10)  T(F): 98.1 (10-07-17 @ 06:10), Max: 98.2 (10-06-17 @ 21:31)  HR: 97 (10-07-17 @ 06:10) (85 - 97)  BP: 151/73 (10-07-17 @ 06:10) (128/68 - 151/73)  RR:  (18 - 18)  SpO2:  (100% - 100%)  Wt(kg): --  GENERAL: NAD  RESPIRATORY: Clear to auscultation bilaterally; No rales, rhonchi, wheezing, or rubs  CARDIOVASCULAR: Regular rate and rhythm; No murmurs; no peripheral edema  GI: Soft, nontender, non distended, normal bowel sounds    CAPILLARY BLOOD GLUCOSE  226 (10-07 @ 09:06)  138 (10-07 @ 01:10)  103 (10-06 @ 22:50)  61 (10-06 @ 22:21)  244 (10-06 @ 17:16)  323 (10-06 @ 12:36)  222 (10-06 @ 08:43)  218 (10-06 @ 05:32)  144 (10-06 @ 03:19)  152 (10-05 @ 23:46)  72 (10-05 @ 22:53)  60 (10-05 @ 22:25)  149 (10-05 @ 16:45)  261 (10-05 @ 15:29)  356 (10-05 @ 12:37)  279 (10-05 @ 08:55)  89 (10-04 @ 22:05)  101 (10-04 @ 17:46)  169 (10-04 @ 12:31)      10-06    134<L>  |  94<L>  |  56<H>  ----------------------------<  294<H>  4.3   |  23  |  3.12<H>    EGFR if : 25  EGFR if non : 22    Ca    8.8      10-06  Mg     1.7     10-06  Phos  4.8     10-06      Hemoglobin A1C, Whole Blood: 8.0 % <H> [4.0 - 5.6] (08-16-17 @ 06:30)

## 2017-10-07 NOTE — PROGRESS NOTE ADULT - SUBJECTIVE AND OBJECTIVE BOX
VASCULAR SURGERY PROGRESS NOTE:   Pager: [phone]    Postoperative Day: 5    Interim Events: Vascular surgery re-called for LUE nodule just distal to AVF site. Patient reports some tenderness over the nodule, improved throughout the day. He is unsure when the nodule first presented but first noticed it yesterday.         Physical Exam  Vital Signs Last 24 Hrs  T(C): 36.7 (07 Oct 2017 20:10), Max: 36.9 (07 Oct 2017 12:30)  T(F): 98 (07 Oct 2017 20:10), Max: 98.4 (07 Oct 2017 12:30)  HR: 84 (07 Oct 2017 20:10) (84 - 99)  BP: 136/80 (07 Oct 2017 20:10) (118/70 - 151/78)  BP(mean): --  RR: 16 (07 Oct 2017 20:10) (16 - 18)  SpO2: 100% (07 Oct 2017 20:10) (99% - 100%)    Gen: NAD, AOx3  HEENT: Blind, constricted pupils  Ext: LUE AVF with thrill. Palpable radial pulse. Soft, mildly tender swelling distal to AVF with bruit.     I&O's Detail    06 Oct 2017 07:01  -  07 Oct 2017 07:00  --------------------------------------------------------  IN:    Other: 400 mL  Total IN: 400 mL    OUT:    Other: 2900 mL  Total OUT: 2900 mL    Total NET: -2500 mL      07 Oct 2017 07:01  -  07 Oct 2017 21:27  --------------------------------------------------------  IN:    Other: 600 mL  Total IN: 600 mL    OUT:    Other: 2600 mL  Total OUT: 2600 mL    Total NET: -2000 mL          Labs:                        9.2    8.80  )-----------( 129      ( 07 Oct 2017 13:05 )             29.5     10-07    133<L>  |  94<L>  |  49<H>  ----------------------------<  325<H>  5.0   |  24  |  3.02<H>    Ca    8.5      07 Oct 2017 13:05  Phos  3.6     10-07  Mg     1.8     10-07      PT/INR - ( 07 Oct 2017 13:05 )   PT: 15.1 SEC;   INR: 1.34          PTT - ( 07 Oct 2017 07:55 )  PTT:86.8 SEC VASCULAR SURGERY PROGRESS NOTE:   Pager: [phone]    Postoperative Day: 5    Interim Events: Vascular surgery re-called for LUE nodule just distal to AVF site. Patient reports some tenderness over the nodule, improved throughout the day. He is unsure when the nodule started since he was initially avoiding palpating his arm, but first noticed the swelling yesterday.         Physical Exam  Vital Signs Last 24 Hrs  T(C): 36.7 (07 Oct 2017 20:10), Max: 36.9 (07 Oct 2017 12:30)  T(F): 98 (07 Oct 2017 20:10), Max: 98.4 (07 Oct 2017 12:30)  HR: 84 (07 Oct 2017 20:10) (84 - 99)  BP: 136/80 (07 Oct 2017 20:10) (118/70 - 151/78)  BP(mean): --  RR: 16 (07 Oct 2017 20:10) (16 - 18)  SpO2: 100% (07 Oct 2017 20:10) (99% - 100%)    Gen: NAD, AOx3  HEENT: Blind, constricted pupils  Ext: LUE AVF with thrill. Palpable radial pulse. Soft, mildly tender swelling distal to AVF with bruit.     I&O's Detail    06 Oct 2017 07:01  -  07 Oct 2017 07:00  --------------------------------------------------------  IN:    Other: 400 mL  Total IN: 400 mL    OUT:    Other: 2900 mL  Total OUT: 2900 mL    Total NET: -2500 mL      07 Oct 2017 07:01  -  07 Oct 2017 21:27  --------------------------------------------------------  IN:    Other: 600 mL  Total IN: 600 mL    OUT:    Other: 2600 mL  Total OUT: 2600 mL    Total NET: -2000 mL          Labs:                        9.2    8.80  )-----------( 129      ( 07 Oct 2017 13:05 )             29.5     10-07    133<L>  |  94<L>  |  49<H>  ----------------------------<  325<H>  5.0   |  24  |  3.02<H>    Ca    8.5      07 Oct 2017 13:05  Phos  3.6     10-07  Mg     1.8     10-07      PT/INR - ( 07 Oct 2017 13:05 )   PT: 15.1 SEC;   INR: 1.34          PTT - ( 07 Oct 2017 07:55 )  PTT:86.8 SEC

## 2017-10-07 NOTE — PROGRESS NOTE ADULT - PROBLEM SELECTOR PLAN 3
Hyperphosphatemia in setting of renal failure. Serum phosphorus in acceptable range. Monitor serum phosphorus

## 2017-10-07 NOTE — PROGRESS NOTE ADULT - PROBLEM SELECTOR PLAN 1
-self-converted to NSR 9/21  -currently on PO Cardizem, metoprolol 50 bid w/ hold parameters.  -c/w hep gtt and bridge to coumadin, INR goal 2-3  -appreciate EP recs - Repeat TTE 9/25 demonstrated small pericardial effusion and small right pleural effusion. TTE w/ EF 65%  -continue lopressor and cardizem when NPO and before HD sessions given he has tendency to go into afib w/ RVR if he misses doses -self-converted to NSR 9/21  -currently on PO Cardizem, metoprolol 50 bid w/ hold parameters (continue lopressor and cardizem when NPO and before HD sessions given he has tendency to go into afib w/ RVR if he misses doses)  -c/w hep gtt and bridge to coumadin, INR goal 2-3  -appreciate EP recs - Repeat TTE 9/25 demonstrated small pericardial effusion and small right pleural effusion. TTE w/ EF 65%

## 2017-10-07 NOTE — PROGRESS NOTE ADULT - PROBLEM SELECTOR PLAN 2
-c/w HD per renal MWF  - Pt to get hemodialysis today as per renal for fluid overload/LE edema.  - permacath in place   - s/p LUE AVF with vascular Dr. Staples on 10/2. To F/U in office in 6 weeks. -c/w HD per renal MWF  - permacath in place   - s/p LUE AVF with vascular Dr. Staples on 10/2. To F/U in office in 6 weeks.  -monitor electrolytes

## 2017-10-07 NOTE — PROGRESS NOTE ADULT - PROBLEM SELECTOR PLAN 1
Pt with ESRD on HD. Plan for HD today to place on TTS schedule for outpt HD setup.  Pt. underwent LUE AVF creation surgery on 10/2. Monitor BP and labs

## 2017-10-07 NOTE — PROGRESS NOTE ADULT - SUBJECTIVE AND OBJECTIVE BOX
Patient is a 52y old  Male who presents with a chief complaint of Altered mental status (10 Sep 2017 23:23)      OVERNIGHT EVENTS: Hypoglycemic overnight to 60, responded appropriately to carb load.   SUBJECTIVE: Patient seen and examined at bedside. Stable, denies current complaints.       MEDICATIONS  (STANDING):  calcitriol   Capsule 0.5 MICROGram(s) Oral daily  cycloSPORINE  (SandIMMUNE) 100 milliGRAM(s) Oral daily  dextrose 50% Injectable 12.5 Gram(s) IV Push once  dextrose 50% Injectable 25 Gram(s) IV Push once  dextrose 50% Injectable 25 Gram(s) IV Push once  docusate sodium 100 milliGRAM(s) Oral three times a day  epoetin valente Injectable 6000 Unit(s) IV Push <User Schedule>  heparin  Infusion.  Unit(s)/Hr (16 mL/Hr) IV Continuous <Continuous>  hydrALAZINE 50 milliGRAM(s) Oral three times a day  insulin glargine Injectable (LANTUS) 24 Unit(s) SubCutaneous <User Schedule>  insulin glargine Injectable (LANTUS) 28 Unit(s) SubCutaneous <User Schedule>  insulin lispro (HumaLOG) corrective regimen sliding scale   SubCutaneous three times a day before meals  insulin lispro (HumaLOG) corrective regimen sliding scale   SubCutaneous at bedtime  insulin lispro Injectable (HumaLOG) 11 Unit(s) SubCutaneous before breakfast  insulin lispro Injectable (HumaLOG) 10 Unit(s) SubCutaneous before dinner  insulin lispro Injectable (HumaLOG) 10 Unit(s) SubCutaneous before lunch  insulin lispro Injectable (HumaLOG) 5 Unit(s) SubCutaneous at bedtime  metoprolol 50 milliGRAM(s) Oral two times a day  predniSONE   Tablet 5 milliGRAM(s) Oral daily  senna 2 Tablet(s) Oral at bedtime  tamsulosin 0.4 milliGRAM(s) Oral at bedtime    MEDICATIONS  (PRN):  acetaminophen   Tablet. 650 milliGRAM(s) Oral every 6 hours PRN mild to moderate pain  dextrose Gel 1 Dose(s) Oral once PRN Blood Glucose LESS THAN 70 milliGRAM(s)/deciLiter  dextrose Gel 1 Dose(s) Oral once PRN Blood Glucose LESS THAN 70 milliGRAM(s)/deciLiter  glucagon  Injectable 1 milliGRAM(s) IntraMuscular once PRN Glucose <70 milliGRAM(s)/deciLiter  naphazoline/pheniramine Solution 1 Drop(s) Both EYES four times a day PRN Itchy Eyes      T(C): 36.7 (10-07-17 @ 06:10), Max: 36.8 (10-06-17 @ 10:30)  HR: 97 (10-07-17 @ 06:10) (85 - 107)  BP: 151/73 (10-07-17 @ 06:10) (116/62 - 154/87)  RR: 18 (10-07-17 @ 06:10) (18 - 18)  SpO2: 100% (10-07-17 @ 06:10) (99% - 100%)  CAPILLARY BLOOD GLUCOSE  138 (07 Oct 2017 01:10)  103 (06 Oct 2017 22:50)  61 (06 Oct 2017 22:21)  244 (06 Oct 2017 17:16)  323 (06 Oct 2017 12:36)  222 (06 Oct 2017 08:43)        I&O's Summary    06 Oct 2017 07:01  -  07 Oct 2017 07:00  --------------------------------------------------------  IN: 400 mL / OUT: 2900 mL / NET: -2500 mL      PHYSICAL EXAM  GENERAL: NAD, well-developed  HEAD:  Atraumatic, Normocephalic  EYES: conjunctiva and sclera clear  CHEST/LUNG: Clear to auscultation bilaterally; No wheeze, rales or rhonchi  HEART: +S1 +S2, Regular rate and rhythm, no murmurs  ABDOMEN: Soft, Nontender, Nondistended; Bowel sounds present  NEURO: No focal neurologic deficits, sensation and motor function grossly intact.   EXTREMITIES:  Pink and warm, Peripheral Pulses intact. +B/L LE edema. +RIJ HD Cath, C/D/I. +LUE AVF with dressing C/D/I.      LABS:                        9.5    9.98  )-----------( 143      ( 06 Oct 2017 07:30 )             30.5     10-06    134<L>  |  94<L>  |  56<H>  ----------------------------<  294<H>  4.3   |  23  |  3.12<H>    Ca    8.8      06 Oct 2017 07:30  Phos  4.8     10-06  Mg     1.7     10-06      PT/INR - ( 06 Oct 2017 06:50 )   PT: 12.4 SEC;   INR: 1.10          PTT - ( 07 Oct 2017 01:08 )  PTT:69.7 SEC          RADIOLOGY & ADDITIONAL TESTS:    Imaging Personally Reviewed:  Consultant(s) Notes Reviewed:    Care Discussed with Consultants/Other Providers:      Pooja Steen D.O.  Medicine Intern  pager [phone] Patient is a 52y old  Male who presents with a chief complaint of Altered mental status (10 Sep 2017 23:23)      OVERNIGHT EVENTS: Hypoglycemic overnight to 61, responded appropriately to carb load.   SUBJECTIVE: Patient seen and examined at bedside. Stable, denies nausea, vomiting, diarrhea. Has mild pain at AVF site but denies bleeding , erythema , or drainage.        MEDICATIONS  (STANDING):  calcitriol   Capsule 0.5 MICROGram(s) Oral daily  cycloSPORINE  (SandIMMUNE) 100 milliGRAM(s) Oral daily  dextrose 50% Injectable 12.5 Gram(s) IV Push once  dextrose 50% Injectable 25 Gram(s) IV Push once  dextrose 50% Injectable 25 Gram(s) IV Push once  docusate sodium 100 milliGRAM(s) Oral three times a day  epoetin valente Injectable 6000 Unit(s) IV Push <User Schedule>  heparin  Infusion.  Unit(s)/Hr (16 mL/Hr) IV Continuous <Continuous>  hydrALAZINE 50 milliGRAM(s) Oral three times a day  insulin glargine Injectable (LANTUS) 24 Unit(s) SubCutaneous <User Schedule>  insulin glargine Injectable (LANTUS) 28 Unit(s) SubCutaneous <User Schedule>  insulin lispro (HumaLOG) corrective regimen sliding scale   SubCutaneous three times a day before meals  insulin lispro (HumaLOG) corrective regimen sliding scale   SubCutaneous at bedtime  insulin lispro Injectable (HumaLOG) 11 Unit(s) SubCutaneous before breakfast  insulin lispro Injectable (HumaLOG) 10 Unit(s) SubCutaneous before dinner  insulin lispro Injectable (HumaLOG) 10 Unit(s) SubCutaneous before lunch  insulin lispro Injectable (HumaLOG) 5 Unit(s) SubCutaneous at bedtime  metoprolol 50 milliGRAM(s) Oral two times a day  predniSONE   Tablet 5 milliGRAM(s) Oral daily  senna 2 Tablet(s) Oral at bedtime  tamsulosin 0.4 milliGRAM(s) Oral at bedtime    MEDICATIONS  (PRN):  acetaminophen   Tablet. 650 milliGRAM(s) Oral every 6 hours PRN mild to moderate pain  dextrose Gel 1 Dose(s) Oral once PRN Blood Glucose LESS THAN 70 milliGRAM(s)/deciLiter  dextrose Gel 1 Dose(s) Oral once PRN Blood Glucose LESS THAN 70 milliGRAM(s)/deciLiter  glucagon  Injectable 1 milliGRAM(s) IntraMuscular once PRN Glucose <70 milliGRAM(s)/deciLiter  naphazoline/pheniramine Solution 1 Drop(s) Both EYES four times a day PRN Itchy Eyes      T(C): 36.7 (10-07-17 @ 06:10), Max: 36.8 (10-06-17 @ 10:30)  HR: 97 (10-07-17 @ 06:10) (85 - 107)  BP: 151/73 (10-07-17 @ 06:10) (116/62 - 154/87)  RR: 18 (10-07-17 @ 06:10) (18 - 18)  SpO2: 100% (10-07-17 @ 06:10) (99% - 100%)  CAPILLARY BLOOD GLUCOSE  138 (07 Oct 2017 01:10)  103 (06 Oct 2017 22:50)  61 (06 Oct 2017 22:21)  244 (06 Oct 2017 17:16)  323 (06 Oct 2017 12:36)  222 (06 Oct 2017 08:43)        I&O's Summary    06 Oct 2017 07:01  -  07 Oct 2017 07:00  --------------------------------------------------------  IN: 400 mL / OUT: 2900 mL / NET: -2500 mL      PHYSICAL EXAM  GENERAL: NAD, well-developed  HEAD:  Atraumatic, Normocephalic  EYES: conjunctiva and sclera clear  CHEST/LUNG: Clear to auscultation bilaterally; No wheeze, rales or rhonchi  HEART: +S1 +S2, Regular rate and rhythm, no murmurs  ABDOMEN: Soft, Nontender, Nondistended; Bowel sounds present  NEURO: No focal neurologic deficits, sensation and motor function grossly intact.   EXTREMITIES:  Pink and warm, Peripheral Pulses intact. +B/L LE edema. +RIJ HD Cath , C/D/I. +LUE AVF with dressing C/D/I.      LABS:                        9.5    9.98  )-----------( 143      ( 06 Oct 2017 07:30 )             30.5     10-06    134<L>  |  94<L>  |  56<H>  ----------------------------<  294<H>  4.3   |  23  |  3.12<H>    Ca    8.8      06 Oct 2017 07:30  Phos  4.8     10-06  Mg     1.7     10-06      PT/INR - ( 06 Oct 2017 06:50 )   PT: 12.4 SEC;   INR: 1.10          PTT - ( 07 Oct 2017 01:08 )  PTT:69.7 SEC          RADIOLOGY & ADDITIONAL TESTS:    Imaging Personally Reviewed:  Consultant(s) Notes Reviewed:    Care Discussed with Consultants/Other Providers:      Pooja Steen D.O.  Medicine Intern  pager [phone]

## 2017-10-07 NOTE — PROGRESS NOTE ADULT - PROBLEM SELECTOR PLAN 3
Appreciate Endocrinology evaluation and continued recommendations.  Lantus 24u qAM on HD days and 28u qAM on non-HD Days  Humalog pre-meal 10/10/11/5  No basal/bolus dose TBD.  Outpatient endocrine follow up appointment with Dr. Baker. Appreciate Endocrinology evaluation and continued recommendations.  Lantus 24u qAM on HD days and 28u qAM on non-HD Days  Humalog pre-meal 10/10/11 and 5U qhs PRN snack  No basal/bolus dose TBD.  Outpatient endocrine follow up appointment with Dr. Baker.

## 2017-10-07 NOTE — PROGRESS NOTE ADULT - ATTENDING COMMENTS
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  Deemed ESRD as a result with maintenance dialysis initiated.  For dialysis today.  Will eventually need consideration of transplant medication reduction.  Continue to monitor hypertension, hemoglobin (on epogen) and phosphorus with patient on Calcitriol.

## 2017-10-07 NOTE — PROGRESS NOTE ADULT - PROBLEM SELECTOR PLAN 4
PTH elevated 186. Corrected Ca at goal 8.4 - 9.5. PTH at goal <600 PTH elevated 186. Corrected Ca at goal 8.4 - 9.5. PTH at goal <600  -continue to monitor, c/w calcitriol

## 2017-10-07 NOTE — PROGRESS NOTE ADULT - ASSESSMENT
53 y/o M with h/o CKD s/p kidney transplant in 1998, HTN, DM, legally blind admitted for uremic encephalopathy initiated on dialysis.

## 2017-10-07 NOTE — PROGRESS NOTE ADULT - PROBLEM SELECTOR PLAN 1
- patient with post-prandial hypoglycemia after dinner  - decrease humalog to 8u pre-dinner  - fasting AM in 200s secondary to rebound hypoglycemia versus overcorrection. will lower pre-dinner dose and check 2am FS. If tomorrow fasting FS still elevated will increase Lantus dose  - for now continue with same Lantus doses  - will monitor FS at lunch today to see if pt needs more pre-meal breakfast Humalog

## 2017-10-07 NOTE — PROGRESS NOTE ADULT - ASSESSMENT
53 yo male s/p LUE AVF creation, POD 5 with soft nodule - possible lymphocele v. seroma v. PSA    - Duplex of L AVF to eval for PSA.   - Please follow up with Dr. Staples in office in 6 weeks.  - Please page [phone] with any questions/concerns.    LOWELL Ibarra MD PGY2

## 2017-10-07 NOTE — PROGRESS NOTE ADULT - ATTENDING COMMENTS
Patient seen and examined. Agree with above note by resident.    ESRD on HD - S/p AVF creation on 10/2. C/w HD per renal. Outpt HD setup for Tuesday next week.   DM2 with hyperglycemia - C/w current regimen. Monitor FS closely. F/u endo recs  Afib paroxsymal - heparin to coumadin bridge after. C/w heparin gtt. Will f/u todays INR and dose coumadin today.   HTN- Goal BP <140/90. Pt has occasional BPs above goal. Continue w/ current regimen for now. If BPs constantly above goal will need to adjust regimen.

## 2017-10-07 NOTE — PROGRESS NOTE ADULT - SUBJECTIVE AND OBJECTIVE BOX
[facility] DIVISION OF KIDNEY DISEASES AND HYPERTENSION -- [phone]   FOLLOW UP NOTE  --------------------------------------------------------------------------------  HPI:  53 y/o M with h/o CKD s/p kidney transplant in 1998, HTN, DM, legally blind admitted for uremic encephalopathy initiated on long-term HD. Pt. now ESRD. Pt. had LUE AVF creation surgery on 10/2/17.. No complaints of CP, SOB, HA or dizziness.    PAST HISTORY  --------------------------------------------------------------------------------  No significant changes to PMH, PSH, FHx, SHx, unless otherwise noted    ALLERGIES & MEDICATIONS  --------------------------------------------------------------------------------  Allergies    No Known Allergies    Intolerances      Standing Inpatient Medications  calcitriol   Capsule 0.5 MICROGram(s) Oral daily  cycloSPORINE  (SandIMMUNE) 100 milliGRAM(s) Oral daily  dextrose 50% Injectable 12.5 Gram(s) IV Push once  dextrose 50% Injectable 25 Gram(s) IV Push once  dextrose 50% Injectable 25 Gram(s) IV Push once  docusate sodium 100 milliGRAM(s) Oral three times a day  epoetin valente Injectable 6000 Unit(s) IV Push <User Schedule>  heparin  Infusion.  Unit(s)/Hr IV Continuous <Continuous>  hydrALAZINE 50 milliGRAM(s) Oral three times a day  insulin glargine Injectable (LANTUS) 24 Unit(s) SubCutaneous <User Schedule>  insulin glargine Injectable (LANTUS) 28 Unit(s) SubCutaneous <User Schedule>  insulin lispro (HumaLOG) corrective regimen sliding scale   SubCutaneous three times a day before meals  insulin lispro (HumaLOG) corrective regimen sliding scale   SubCutaneous at bedtime  insulin lispro Injectable (HumaLOG) 11 Unit(s) SubCutaneous before breakfast  insulin lispro Injectable (HumaLOG) 8 Unit(s) SubCutaneous before dinner  insulin lispro Injectable (HumaLOG) 10 Unit(s) SubCutaneous before lunch  insulin lispro Injectable (HumaLOG) 5 Unit(s) SubCutaneous at bedtime  metoprolol 50 milliGRAM(s) Oral two times a day  predniSONE   Tablet 5 milliGRAM(s) Oral daily  senna 2 Tablet(s) Oral at bedtime  tamsulosin 0.4 milliGRAM(s) Oral at bedtime    PRN Inpatient Medications  acetaminophen   Tablet. 650 milliGRAM(s) Oral every 6 hours PRN  dextrose Gel 1 Dose(s) Oral once PRN  dextrose Gel 1 Dose(s) Oral once PRN  glucagon  Injectable 1 milliGRAM(s) IntraMuscular once PRN  naphazoline/pheniramine Solution 1 Drop(s) Both EYES four times a day PRN      REVIEW OF SYSTEMS  --------------------------------------------------------------------------------  General: no fever  CVS: no chest pain  RESP: no sob, no cough  ABD: no abdominal pain  : no dysuria,  MSK: no edema     VITALS/PHYSICAL EXAM  --------------------------------------------------------------------------------  T(C): 36.7 (10-07-17 @ 06:10), Max: 36.8 (10-06-17 @ 21:31)  HR: 97 (10-07-17 @ 06:10) (85 - 97)  BP: 151/73 (10-07-17 @ 06:10) (128/68 - 151/73)  RR: 18 (10-07-17 @ 06:10) (18 - 18)  SpO2: 100% (10-07-17 @ 06:10) (100% - 100%)  Wt(kg): --        10-06-17 @ 07:01  -  10-07-17 @ 07:00  --------------------------------------------------------  IN: 400 mL / OUT: 2900 mL / NET: -2500 mL      Physical Exam:  	Gen: resting, NAD  	Pulm: CTA B/L  	CV: RRR, S1S2; no rub  	Abd: +BS, soft  	LE: no LE pedal edema  	Vascular access: Right IJ HD catheter site: no bleeding, LUE AVF site: bruit heard      LABS/STUDIES  --------------------------------------------------------------------------------              9.5    9.98  >-----------<  143      [10-06-17 @ 07:30]              30.5     134  |  94  |  56  ----------------------------<  294      [10-06-17 @ 07:30]  4.3   |  23  |  3.12        Ca     8.8     [10-06-17 @ 07:30]      Mg     1.7     [10-06-17 @ 07:30]      Phos  4.8     [10-06-17 @ 07:30]      PT/INR: PT 12.4 , INR 1.10       [10-06-17 @ 06:50]  PTT: 86.8       [10-07-17 @ 07:55]      Creatinine Trend:  SCr 3.12 [10-06 @ 07:30]  SCr 3.16 [10-05 @ 12:15]  SCr 3.21 [10-04 @ 07:00]  SCr 2.74 [10-03 @ 07:15]  SCr 3.23 [10-01 @ 09:00]        Iron 21, TIBC 187, %sat --      [09-09-17 @ 06:55]  Ferritin 587.5      [09-09-17 @ 06:55]  .9 (Ca --)      [09-10-17 @ 05:10]   --  HbA1c 8.0      [08-16-17 @ 06:30]  TSH 2.88      [09-03-17 @ 13:45]  Lipid: chol 137, TG 97, HDL 52, LDL 68      [08-11-17 @ 07:20]    HBsAg NEGATIVE      [10-07-17 @ 07:55]

## 2017-10-07 NOTE — PROGRESS NOTE ADULT - PROBLEM SELECTOR PLAN 7
Hgb stable, c/w Epogen w/ HD and transfuse if Hgb <7.0 Likely in setting of chronic disease from kidney failure. Hgb stable. No clinical signs of active bleeding   -c/w Epogen w/ HD and transfuse if Hgb <7.0

## 2017-10-07 NOTE — PROGRESS NOTE ADULT - ASSESSMENT
52M with h/o CKD s/p renal transplant (1998) on cyclosporine and CellCept, HTN, DM, legally blind BIBEMS for uremic encephalopathy. Pt became bradycardic and had PEA arrest in the ED, intubated for airway protection. ROSC achieved s/p 1 round of epi and chest compressions and pt was transferred to MICU. Immunosuppressants for renal transplant were held. Pt had emergent HD for hyperkalemia. Covered empirically with broad spectrum antibiotics (vanc and zosyn), Hgb 5 s/p 1u pRBC given and epogen was started. Pressors were weaned off and pt extubated 9/4. Cyclosporine restarted and prednisone started. Pt remains stable and transferred to floor for further management 9/5, now found to have new-onset AFib. Also s/p permacath w/ plan for AVF. AVF canceled 9/20 due to hyperglycemia. AVF will be placed on Monday 10/2. 52M with h/o CKD s/p renal transplant (1998) on cyclosporine and CellCept, HTN, DM, legally blind BIBEMS for uremic encephalopathy. Pt became bradycardic and had PEA arrest in the ED, intubated for airway protection. ROSC achieved s/p 1 round of epi and chest compressions and pt was transferred to MICU. Immunosuppressants for renal transplant were initially held. Pt had emergent HD for hyperkalemia. Covered empirically with broad spectrum antibiotics (vanc and zosyn), Hgb 5 s/p a total of 3u pRBC and epogen was started. Pressors were weaned off and pt extubated 9/4. Cyclosporine restarted and prednisone started. Now transferred to the medicine floor for further management 9/5, w/ course c/b new-onset AFib on hep gtt to coumadin bridge. Now s/p AVF placement on 10/2.

## 2017-10-08 LAB
APTT BLD: 110.2 SEC — HIGH (ref 27.5–37.4)
APTT BLD: 114.8 SEC — HIGH (ref 27.5–37.4)
BUN SERPL-MCNC: 46 MG/DL — HIGH (ref 7–23)
CALCIUM SERPL-MCNC: 8.8 MG/DL — SIGNIFICANT CHANGE UP (ref 8.4–10.5)
CHLORIDE SERPL-SCNC: 95 MMOL/L — LOW (ref 98–107)
CO2 SERPL-SCNC: 22 MMOL/L — SIGNIFICANT CHANGE UP (ref 22–31)
CREAT SERPL-MCNC: 3.29 MG/DL — HIGH (ref 0.5–1.3)
GLUCOSE SERPL-MCNC: 107 MG/DL — HIGH (ref 70–99)
HCT VFR BLD CALC: 32.7 % — LOW (ref 39–50)
HGB BLD-MCNC: 10.2 G/DL — LOW (ref 13–17)
INR BLD: 1.47 — HIGH (ref 0.88–1.17)
MAGNESIUM SERPL-MCNC: 2 MG/DL — SIGNIFICANT CHANGE UP (ref 1.6–2.6)
MCHC RBC-ENTMCNC: 27.6 PG — SIGNIFICANT CHANGE UP (ref 27–34)
MCHC RBC-ENTMCNC: 31.2 % — LOW (ref 32–36)
MCV RBC AUTO: 88.6 FL — SIGNIFICANT CHANGE UP (ref 80–100)
NRBC # FLD: 0 — SIGNIFICANT CHANGE UP
PHOSPHATE SERPL-MCNC: 5 MG/DL — HIGH (ref 2.5–4.5)
PLATELET # BLD AUTO: 157 K/UL — SIGNIFICANT CHANGE UP (ref 150–400)
PMV BLD: 10.9 FL — SIGNIFICANT CHANGE UP (ref 7–13)
POTASSIUM SERPL-MCNC: 4.4 MMOL/L — SIGNIFICANT CHANGE UP (ref 3.5–5.3)
POTASSIUM SERPL-SCNC: 4.4 MMOL/L — SIGNIFICANT CHANGE UP (ref 3.5–5.3)
PROTHROM AB SERPL-ACNC: 16.6 SEC — HIGH (ref 9.8–13.1)
RBC # BLD: 3.69 M/UL — LOW (ref 4.2–5.8)
RBC # FLD: 15.5 % — HIGH (ref 10.3–14.5)
SODIUM SERPL-SCNC: 135 MMOL/L — SIGNIFICANT CHANGE UP (ref 135–145)
WBC # BLD: 7.63 K/UL — SIGNIFICANT CHANGE UP (ref 3.8–10.5)
WBC # FLD AUTO: 7.63 K/UL — SIGNIFICANT CHANGE UP (ref 3.8–10.5)

## 2017-10-08 PROCEDURE — 99233 SBSQ HOSP IP/OBS HIGH 50: CPT

## 2017-10-08 RX ORDER — INSULIN LISPRO 100/ML
3 VIAL (ML) SUBCUTANEOUS AT BEDTIME
Qty: 0 | Refills: 0 | Status: DISCONTINUED | OUTPATIENT
Start: 2017-10-08 | End: 2017-10-10

## 2017-10-08 RX ORDER — WARFARIN SODIUM 2.5 MG/1
7.5 TABLET ORAL ONCE
Qty: 0 | Refills: 0 | Status: COMPLETED | OUTPATIENT
Start: 2017-10-08 | End: 2017-10-08

## 2017-10-08 RX ADMIN — Medication 50 MILLIGRAM(S): at 05:51

## 2017-10-08 RX ADMIN — WARFARIN SODIUM 7.5 MILLIGRAM(S): 2.5 TABLET ORAL at 17:50

## 2017-10-08 RX ADMIN — Medication 50 MILLIGRAM(S): at 21:05

## 2017-10-08 RX ADMIN — Medication 8 UNIT(S): at 18:06

## 2017-10-08 RX ADMIN — Medication 10 UNIT(S): at 13:21

## 2017-10-08 RX ADMIN — CYCLOSPORINE 100 MILLIGRAM(S): 100 CAPSULE ORAL at 12:33

## 2017-10-08 RX ADMIN — INSULIN GLARGINE 28 UNIT(S): 100 INJECTION, SOLUTION SUBCUTANEOUS at 09:36

## 2017-10-08 RX ADMIN — CALCITRIOL 0.5 MICROGRAM(S): 0.5 CAPSULE ORAL at 12:33

## 2017-10-08 RX ADMIN — Medication 2: at 09:30

## 2017-10-08 RX ADMIN — TAMSULOSIN HYDROCHLORIDE 0.4 MILLIGRAM(S): 0.4 CAPSULE ORAL at 21:05

## 2017-10-08 RX ADMIN — HEPARIN SODIUM 1300 UNIT(S)/HR: 5000 INJECTION INTRAVENOUS; SUBCUTANEOUS at 17:49

## 2017-10-08 RX ADMIN — Medication 5 MILLIGRAM(S): at 05:51

## 2017-10-08 RX ADMIN — HEPARIN SODIUM 1500 UNIT(S)/HR: 5000 INJECTION INTRAVENOUS; SUBCUTANEOUS at 09:37

## 2017-10-08 RX ADMIN — Medication 2: at 13:21

## 2017-10-08 RX ADMIN — Medication 11 UNIT(S): at 09:29

## 2017-10-08 RX ADMIN — Medication 50 MILLIGRAM(S): at 17:50

## 2017-10-08 RX ADMIN — Medication 50 MILLIGRAM(S): at 13:23

## 2017-10-08 RX ADMIN — Medication 1 DROP(S): at 12:33

## 2017-10-08 NOTE — CHART NOTE - NSCHARTNOTESELECT_GEN_ALL_CORE
Event Note
Event Note
Transfer Note
Transfer Note/MAR accept note
US Note
Discussion/Event Note
Event Note
Event Note/pre-op note
Follow-UP/Nutrition Services
Follow-up/Nutrition Services
Medicine Chart update/Event Note
Nutrition Services/Follow-up
Post-Op Note/Event Note

## 2017-10-08 NOTE — PROGRESS NOTE ADULT - ATTENDING COMMENTS
Patient seen and examined. Agree with above note by resident.    ESRD on HD - S/p AVF creation on 10/2. Pending [facility] duplex to evaluate for PSA given swelling at AVF site. C/w HD per renal. Outpt HD setup for Tuesday next week.   DM2 with hyperglycemia - C/w current insulin regimen. Monitor FS closely. Endo recs appreciated.   Afib paroxsymal - C/w heparin to coumadin bridge. Will f/u today's INR and dose coumadin today.   HTN- Goal BP <140/90. Pt has occasional BPs above goal. Continue w/ current regimen for now. If BPs constantly above goal will need to adjust regimen.

## 2017-10-08 NOTE — PROGRESS NOTE ADULT - PROBLEM SELECTOR PLAN 1
-self-converted to NSR 9/21  -currently on PO Cardizem, metoprolol 50 bid w/ hold parameters (continue lopressor and cardizem when NPO and before HD sessions given he has tendency to go into afib w/ RVR if he misses doses)  -c/w hep gtt and bridge to coumadin, INR goal 2-3  -appreciate EP recs - Repeat TTE 9/25 demonstrated small pericardial effusion and small right pleural effusion. TTE w/ EF 65% -self-converted to NSR 9/21, tele reviewed and pt has been in NSR  -currently on metoprolol 50 bid w/ hold parameters (continue lopressor when NPO and before HD sessions given he has tendency to go into afib w/ RVR if he misses doses)  -c/w hep gtt and bridge to coumadin, INR goal 2-3  -appreciate EP recs - Repeat TTE 9/25 demonstrated small pericardial effusion and small right pleural effusion. TTE w/ EF 65%

## 2017-10-08 NOTE — PROGRESS NOTE ADULT - PROBLEM SELECTOR PLAN 1
- continue humalog 8u pre-dinner  - continue checking 2am FS. will lower pre-bedtime snack to 3u as 2am FS was low at 81.  - for now continue with same Lantus doses  - will monitor FS at lunch tomorrow, if still elevated consider increasing pre-breakfast humalog by 1 unit

## 2017-10-08 NOTE — PROGRESS NOTE ADULT - SUBJECTIVE AND OBJECTIVE BOX
Patient is a 52y old  Male who presents with a chief complaint of Altered mental status (10 Sep 2017 23:23)      OVERNIGHT EVENTS: No acute events overnight.  SUBJECTIVE: Patient seen and examined at bedside. Denies current complaints.      MEDICATIONS  (STANDING):  calcitriol   Capsule 0.5 MICROGram(s) Oral daily  cycloSPORINE  (SandIMMUNE) 100 milliGRAM(s) Oral daily  dextrose 50% Injectable 12.5 Gram(s) IV Push once  dextrose 50% Injectable 25 Gram(s) IV Push once  dextrose 50% Injectable 25 Gram(s) IV Push once  docusate sodium 100 milliGRAM(s) Oral three times a day  epoetin valente Injectable 6000 Unit(s) IV Push <User Schedule>  heparin  Infusion.  Unit(s)/Hr (16 mL/Hr) IV Continuous <Continuous>  hydrALAZINE 50 milliGRAM(s) Oral three times a day  insulin glargine Injectable (LANTUS) 24 Unit(s) SubCutaneous <User Schedule>  insulin glargine Injectable (LANTUS) 28 Unit(s) SubCutaneous <User Schedule>  insulin lispro (HumaLOG) corrective regimen sliding scale   SubCutaneous three times a day before meals  insulin lispro (HumaLOG) corrective regimen sliding scale   SubCutaneous at bedtime  insulin lispro Injectable (HumaLOG) 11 Unit(s) SubCutaneous before breakfast  insulin lispro Injectable (HumaLOG) 8 Unit(s) SubCutaneous before dinner  insulin lispro Injectable (HumaLOG) 10 Unit(s) SubCutaneous before lunch  insulin lispro Injectable (HumaLOG) 5 Unit(s) SubCutaneous at bedtime  metoprolol 50 milliGRAM(s) Oral two times a day  predniSONE   Tablet 5 milliGRAM(s) Oral daily  senna 2 Tablet(s) Oral at bedtime  tamsulosin 0.4 milliGRAM(s) Oral at bedtime    MEDICATIONS  (PRN):  acetaminophen   Tablet. 650 milliGRAM(s) Oral every 6 hours PRN mild to moderate pain  dextrose Gel 1 Dose(s) Oral once PRN Blood Glucose LESS THAN 70 milliGRAM(s)/deciLiter  dextrose Gel 1 Dose(s) Oral once PRN Blood Glucose LESS THAN 70 milliGRAM(s)/deciLiter  glucagon  Injectable 1 milliGRAM(s) IntraMuscular once PRN Glucose <70 milliGRAM(s)/deciLiter  naphazoline/pheniramine Solution 1 Drop(s) Both EYES four times a day PRN Itchy Eyes      T(C): 36.7 (10-08-17 @ 05:45), Max: 36.9 (10-07-17 @ 12:30)  HR: 91 (10-08-17 @ 05:45) (84 - 99)  BP: 177/72 (10-08-17 @ 05:45) (118/70 - 177/72)  RR: 16 (10-08-17 @ 05:45) (16 - 18)  SpO2: 100% (10-08-17 @ 05:45) (99% - 100%)  CAPILLARY BLOOD GLUCOSE  81 (08 Oct 2017 02:11)  120 (07 Oct 2017 21:44)  114 (07 Oct 2017 16:54)  144 (07 Oct 2017 14:14)  226 (07 Oct 2017 09:06)        I&O's Summary    07 Oct 2017 07:01  -  08 Oct 2017 07:00  --------------------------------------------------------  IN: 600 mL / OUT: 2600 mL / NET: -2000 mL        PHYSICAL EXAM  GENERAL: NAD, well-developed  HEAD:  Atraumatic, Normocephalic  EYES: conjunctiva and sclera clear  CHEST/LUNG: Clear to auscultation bilaterally; No wheeze, rales or rhonchi  HEART: +S1 +S2, Regular rate and rhythm, no murmurs  ABDOMEN: Soft, Nontender, Nondistended; Bowel sounds present  NEURO: No focal neurologic deficits, sensation and motor function grossly intact.   EXTREMITIES:  Pink and warm, Peripheral Pulses intact. +B/L LE edema. +RIJ HD Cath , C/D/I. +LUE AVF with dressing C/D/I.      LABS:                        9.2    8.80  )-----------( 129      ( 07 Oct 2017 13:05 )             29.5     10-07    133<L>  |  94<L>  |  49<H>  ----------------------------<  325<H>  5.0   |  24  |  3.02<H>    Ca    8.5      07 Oct 2017 13:05  Phos  3.6     10-07  Mg     1.8     10-07      PT/INR - ( 07 Oct 2017 13:05 )   PT: 15.1 SEC;   INR: 1.34          PTT - ( 07 Oct 2017 07:55 )  PTT:86.8 SEC          RADIOLOGY & ADDITIONAL TESTS:    Imaging Personally Reviewed:  Consultant(s) Notes Reviewed:    Care Discussed with Consultants/Other Providers:      Pooja Steen D.O.  Medicine Intern  pager [phone] Patient is a 52y old  Male who presents with a chief complaint of Altered mental status (10 Sep 2017 23:23)      OVERNIGHT EVENTS: No acute events overnight.  SUBJECTIVE: Patient seen and examined at bedside. Denies CP, SOB, n/v. Feels that L AVF site swelling has decreased. Denies pain, erythema, or discharge from the site.       MEDICATIONS  (STANDING):  calcitriol   Capsule 0.5 MICROGram(s) Oral daily  cycloSPORINE  (SandIMMUNE) 100 milliGRAM(s) Oral daily  dextrose 50% Injectable 12.5 Gram(s) IV Push once  dextrose 50% Injectable 25 Gram(s) IV Push once  dextrose 50% Injectable 25 Gram(s) IV Push once  docusate sodium 100 milliGRAM(s) Oral three times a day  epoetin valente Injectable 6000 Unit(s) IV Push <User Schedule>  heparin  Infusion.  Unit(s)/Hr (16 mL/Hr) IV Continuous <Continuous>  hydrALAZINE 50 milliGRAM(s) Oral three times a day  insulin glargine Injectable (LANTUS) 24 Unit(s) SubCutaneous <User Schedule>  insulin glargine Injectable (LANTUS) 28 Unit(s) SubCutaneous <User Schedule>  insulin lispro (HumaLOG) corrective regimen sliding scale   SubCutaneous three times a day before meals  insulin lispro (HumaLOG) corrective regimen sliding scale   SubCutaneous at bedtime  insulin lispro Injectable (HumaLOG) 11 Unit(s) SubCutaneous before breakfast  insulin lispro Injectable (HumaLOG) 8 Unit(s) SubCutaneous before dinner  insulin lispro Injectable (HumaLOG) 10 Unit(s) SubCutaneous before lunch  insulin lispro Injectable (HumaLOG) 5 Unit(s) SubCutaneous at bedtime  metoprolol 50 milliGRAM(s) Oral two times a day  predniSONE   Tablet 5 milliGRAM(s) Oral daily  senna 2 Tablet(s) Oral at bedtime  tamsulosin 0.4 milliGRAM(s) Oral at bedtime    MEDICATIONS  (PRN):  acetaminophen   Tablet. 650 milliGRAM(s) Oral every 6 hours PRN mild to moderate pain  dextrose Gel 1 Dose(s) Oral once PRN Blood Glucose LESS THAN 70 milliGRAM(s)/deciLiter  dextrose Gel 1 Dose(s) Oral once PRN Blood Glucose LESS THAN 70 milliGRAM(s)/deciLiter  glucagon  Injectable 1 milliGRAM(s) IntraMuscular once PRN Glucose <70 milliGRAM(s)/deciLiter  naphazoline/pheniramine Solution 1 Drop(s) Both EYES four times a day PRN Itchy Eyes      T(C): 36.7 (10-08-17 @ 05:45), Max: 36.9 (10-07-17 @ 12:30)  HR: 91 (10-08-17 @ 05:45) (84 - 99)  BP: 177/72 (10-08-17 @ 05:45) (118/70 - 177/72)  RR: 16 (10-08-17 @ 05:45) (16 - 18)  SpO2: 100% (10-08-17 @ 05:45) (99% - 100%)  CAPILLARY BLOOD GLUCOSE  81 (08 Oct 2017 02:11)  120 (07 Oct 2017 21:44)  114 (07 Oct 2017 16:54)  144 (07 Oct 2017 14:14)  226 (07 Oct 2017 09:06)        I&O's Summary    07 Oct 2017 07:01  -  08 Oct 2017 07:00  --------------------------------------------------------  IN: 600 mL / OUT: 2600 mL / NET: -2000 mL        PHYSICAL EXAM  GENERAL: NAD, well-developed  HEAD:  Atraumatic, Normocephalic  EYES: conjunctiva and sclera clear  CHEST/LUNG: Clear to auscultation bilaterally; No wheeze, rales or rhonchi  HEART: +S1 +S2, Regular rate and rhythm, no murmurs  ABDOMEN: Soft, Nontender, Nondistended; Bowel sounds present  NEURO: No focal neurologic deficits, sensation and motor function grossly intact.   EXTREMITIES:  Pink and warm, Peripheral Pulses intact. +B/L LE edema. +RIJ HD Cath , C/D/I. +LUE AVF w/ mild swelling at site with dressing C/D/I.      LABS:                        9.2    8.80  )-----------( 129      ( 07 Oct 2017 13:05 )             29.5     10-07    133<L>  |  94<L>  |  49<H>  ----------------------------<  325<H>  5.0   |  24  |  3.02<H>    Ca    8.5      07 Oct 2017 13:05  Phos  3.6     10-07  Mg     1.8     10-07      PT/INR - ( 07 Oct 2017 13:05 )   PT: 15.1 SEC;   INR: 1.34          PTT - ( 07 Oct 2017 07:55 )  PTT:86.8 SEC          RADIOLOGY & ADDITIONAL TESTS:    Imaging Personally Reviewed:  Consultant(s) Notes Reviewed:    Care Discussed with Consultants/Other Providers:      Pooja Steen D.O.  Medicine Intern  pager [phone]

## 2017-10-08 NOTE — PROGRESS NOTE ADULT - ASSESSMENT
52M with h/o CKD s/p renal transplant (1998) on cyclosporine and CellCept, HTN, DM, legally blind BIBEMS for uremic encephalopathy. Pt became bradycardic and had PEA arrest in the ED, intubated for airway protection. ROSC achieved s/p 1 round of epi and chest compressions and pt was transferred to MICU. Immunosuppressants for renal transplant were initially held. Pt had emergent HD for hyperkalemia. Covered empirically with broad spectrum antibiotics (vanc and zosyn), Hgb 5 s/p a total of 3u pRBC and epogen was started. Pressors were weaned off and pt extubated 9/4. Cyclosporine restarted and prednisone started. Now transferred to the medicine floor for further management 9/5, w/ course c/b new-onset AFib on hep gtt to coumadin bridge. Now s/p AVF placement on 10/2. 52M with h/o CKD s/p renal transplant (1998) on cyclosporine and CellCept, HTN, DM, legally blind BIBEMS for uremic encephalopathy. Pt became bradycardic and had PEA arrest in the ED, intubated for airway protection. ROSC achieved s/p 1 round of epi and chest compressions and pt was transferred to MICU. Immunosuppressants for renal transplant were initially held. Pt had emergent HD for hyperkalemia. Covered empirically with broad spectrum antibiotics (vanc and zosyn). Also found to have Hgb 5 s/p a total of 3u pRBC and epogen was started. Pressors were weaned off and pt extubated 9/4. Cyclosporine restarted and prednisone started. Now transferred to the medicine floor for further management 9/5, w/ course c/b new-onset AFib on hep gtt to coumadin bridge. Now s/p AVF placement on 10/2.

## 2017-10-08 NOTE — CHART NOTE - NSCHARTNOTEFT_GEN_A_CORE
Called by RN For patient requesting to sign out AMA. ON arrival patient calm, upright in bed. States that he is frustrated with duration of time required to become therapeutic on coumadin, thinks his dosage should be increased more quickly. Is concerned that if he stays in hospital he will lose dialysis slot. After lengthy discussion about the difficulty of dosing warfarin and predicting INR response patient was convinced to wait until morning to discuss his concerns with primary team. Patient states that he will sign out AMA tomorrow regardless of what INR result is.

## 2017-10-08 NOTE — PROGRESS NOTE ADULT - SUBJECTIVE AND OBJECTIVE BOX
Chief Complaint:  Diabetes    History:  Pt FS at 2am was low at 81. Otherwise FS in 100s. No complaints.    MEDICATIONS  (STANDING):  calcitriol   Capsule 0.5 MICROGram(s) Oral daily  cycloSPORINE  (SandIMMUNE) 100 milliGRAM(s) Oral daily  dextrose 50% Injectable 12.5 Gram(s) IV Push once  dextrose 50% Injectable 25 Gram(s) IV Push once  dextrose 50% Injectable 25 Gram(s) IV Push once  docusate sodium 100 milliGRAM(s) Oral three times a day  epoetin valente Injectable 6000 Unit(s) IV Push <User Schedule>  heparin  Infusion.  Unit(s)/Hr (16 mL/Hr) IV Continuous <Continuous>  hydrALAZINE 50 milliGRAM(s) Oral three times a day  insulin glargine Injectable (LANTUS) 24 Unit(s) SubCutaneous <User Schedule>  insulin glargine Injectable (LANTUS) 28 Unit(s) SubCutaneous <User Schedule>  insulin lispro (HumaLOG) corrective regimen sliding scale   SubCutaneous three times a day before meals  insulin lispro (HumaLOG) corrective regimen sliding scale   SubCutaneous at bedtime  insulin lispro Injectable (HumaLOG) 11 Unit(s) SubCutaneous before breakfast  insulin lispro Injectable (HumaLOG) 3 Unit(s) SubCutaneous at bedtime  insulin lispro Injectable (HumaLOG) 8 Unit(s) SubCutaneous before dinner  insulin lispro Injectable (HumaLOG) 10 Unit(s) SubCutaneous before lunch  metoprolol 50 milliGRAM(s) Oral two times a day  predniSONE   Tablet 5 milliGRAM(s) Oral daily  senna 2 Tablet(s) Oral at bedtime  tamsulosin 0.4 milliGRAM(s) Oral at bedtime  warfarin 7.5 milliGRAM(s) Oral once    MEDICATIONS  (PRN):  acetaminophen   Tablet. 650 milliGRAM(s) Oral every 6 hours PRN mild to moderate pain  dextrose Gel 1 Dose(s) Oral once PRN Blood Glucose LESS THAN 70 milliGRAM(s)/deciLiter  dextrose Gel 1 Dose(s) Oral once PRN Blood Glucose LESS THAN 70 milliGRAM(s)/deciLiter  glucagon  Injectable 1 milliGRAM(s) IntraMuscular once PRN Glucose <70 milliGRAM(s)/deciLiter  naphazoline/pheniramine Solution 1 Drop(s) Both EYES four times a day PRN Itchy Eyes      Allergies    No Known Allergies    Intolerances      Review of Systems:  Constitutional: No fever  Eyes: No blurry vision  Neuro: No tremors  HEENT: No pain  Cardiovascular: No chest pain, palpitations  Respiratory: No SOB, no cough  GI: No nausea, vomiting, abdominal pain    ALL OTHER SYSTEMS REVIEWED AND NEGATIVE    PHYSICAL EXAM:  VITALS: T(C): 36.3 (10-08-17 @ 12:37)  T(F): 97.4 (10-08-17 @ 12:37), Max: 98.1 (10-08-17 @ 05:45)  HR: 72 (10-08-17 @ 12:37) (72 - 99)  BP: 154/85 (10-08-17 @ 12:37) (118/70 - 177/72)  RR:  (16 - 18)  SpO2:  (97% - 100%)  Wt(kg): --  GENERAL: NAD  EYES: legally blind  HEENT:  Atraumatic, Normocephalic, moist mucous membranes  THYROID: Normal size, no palpable nodules  RESPIRATORY: Clear to auscultation bilaterally; No rales, rhonchi, wheezing, or rubs  CARDIOVASCULAR: Regular rate and rhythm; No murmurs; no peripheral edema  GI: Soft, nontender, non distended, normal bowel sounds    CAPILLARY BLOOD GLUCOSE  265 (10-08 @ 12:31)  175 (10-08 @ 09:26)  81 (10-08 @ 02:11)  120 (10-07 @ 21:44)  114 (10-07 @ 16:54)  144 (10-07 @ 14:14)  226 (10-07 @ 09:06)  138 (10-07 @ 01:10)  103 (10-06 @ 22:50)  61 (10-06 @ 22:21)  244 (10-06 @ 17:16)  323 (10-06 @ 12:36)  222 (10-06 @ 08:43)  218 (10-06 @ 05:32)  144 (10-06 @ 03:19)  152 (10-05 @ 23:46)  72 (10-05 @ 22:53)  60 (10-05 @ 22:25)  149 (10-05 @ 16:45)  261 (10-05 @ 15:29)      10-08    135  |  95<L>  |  46<H>  ----------------------------<  107<H>  4.4   |  22  |  3.29<H>    EGFR if : 24  EGFR if non : 20    Ca    8.8      10-08  Mg     2.0     10-08  Phos  5.0     10-08            Thyroid Function Tests:      Hemoglobin A1C, Whole Blood: 8.0 % <H> [4.0 - 5.6] (08-16-17 @ 06:30)

## 2017-10-08 NOTE — PROGRESS NOTE ADULT - PROBLEM SELECTOR PLAN 6
-home regimen clonidine 0.1mg po q8h, nifedipine XR 90 mg qD, Furosemide 10 mg daily - holding for now  -c/w Cardizem and metoprolol and hydralazine -c/w metoprolol and hydralazine  -monitor vitals     (home regimen was clonidine 0.1mg po q8h, nifedipine XR 90 mg qD, Furosemide 10 mg daily - holding for now)

## 2017-10-08 NOTE — PROGRESS NOTE ADULT - PROBLEM SELECTOR PLAN 7
Likely in setting of chronic disease from kidney failure. Hgb stable. No clinical signs of active bleeding   -c/w Epogen w/ HD and transfuse if Hgb <7.0

## 2017-10-08 NOTE — PROGRESS NOTE ADULT - PROBLEM SELECTOR PLAN 4
PTH elevated 186. Corrected Ca at goal 8.4 - 9.5. PTH at goal <600  -continue to monitor, c/w calcitriol

## 2017-10-08 NOTE — PROGRESS NOTE ADULT - PROBLEM SELECTOR PLAN 2
-c/w HD per renal MWF  - permacath in place   - s/p LUE AVF with vascular Dr. Staples on 10/2. Checking Duplex to r/o pseudoaneurysm. To F/U in office in 6 weeks.  -monitor electrolytes

## 2017-10-08 NOTE — PROGRESS NOTE ADULT - PROBLEM SELECTOR PLAN 3
Appreciate Endocrinology evaluation and continued recommendations.  Lantus 24u qAM on HD days and 28u qAM on non-HD Days  Humalog pre-meal 10/10/11 and 5U qhs PRN snack  No basal/bolus dose TBD.  Outpatient endocrine follow up appointment with Dr. Baker. Appreciate Endocrinology evaluation and continued recommendations.  Lantus 24u qAM on HD days and 28u qAM on non-HD Days  Humalog pre-meal 11/10/8 and 5U qhs PRN snack  No basal/bolus dose TBD.  Outpatient endocrine follow up appointment with Dr. Baker.

## 2017-10-09 LAB
APTT BLD: 104 SEC — HIGH (ref 27.5–37.4)
APTT BLD: 50.6 SEC — HIGH (ref 27.5–37.4)
APTT BLD: 52.7 SEC — HIGH (ref 27.5–37.4)
APTT BLD: 58 SEC — HIGH (ref 27.5–37.4)
BUN SERPL-MCNC: 68 MG/DL — HIGH (ref 7–23)
CALCIUM SERPL-MCNC: 9 MG/DL — SIGNIFICANT CHANGE UP (ref 8.4–10.5)
CHLORIDE SERPL-SCNC: 95 MMOL/L — LOW (ref 98–107)
CO2 SERPL-SCNC: 20 MMOL/L — LOW (ref 22–31)
CREAT SERPL-MCNC: 3.86 MG/DL — HIGH (ref 0.5–1.3)
GLUCOSE SERPL-MCNC: 293 MG/DL — HIGH (ref 70–99)
HCT VFR BLD CALC: 31.9 % — LOW (ref 39–50)
HGB BLD-MCNC: 9.9 G/DL — LOW (ref 13–17)
INR BLD: 1.6 — HIGH (ref 0.88–1.17)
MAGNESIUM SERPL-MCNC: 1.9 MG/DL — SIGNIFICANT CHANGE UP (ref 1.6–2.6)
MCHC RBC-ENTMCNC: 27.7 PG — SIGNIFICANT CHANGE UP (ref 27–34)
MCHC RBC-ENTMCNC: 31 % — LOW (ref 32–36)
MCV RBC AUTO: 89.1 FL — SIGNIFICANT CHANGE UP (ref 80–100)
NRBC # FLD: 0 — SIGNIFICANT CHANGE UP
PHOSPHATE SERPL-MCNC: 5.6 MG/DL — HIGH (ref 2.5–4.5)
PLATELET # BLD AUTO: 168 K/UL — SIGNIFICANT CHANGE UP (ref 150–400)
PMV BLD: 10.2 FL — SIGNIFICANT CHANGE UP (ref 7–13)
POTASSIUM SERPL-MCNC: 4.6 MMOL/L — SIGNIFICANT CHANGE UP (ref 3.5–5.3)
POTASSIUM SERPL-SCNC: 4.6 MMOL/L — SIGNIFICANT CHANGE UP (ref 3.5–5.3)
PROTHROM AB SERPL-ACNC: 18.1 SEC — HIGH (ref 9.8–13.1)
RBC # BLD: 3.58 M/UL — LOW (ref 4.2–5.8)
RBC # FLD: 15.7 % — HIGH (ref 10.3–14.5)
SODIUM SERPL-SCNC: 133 MMOL/L — LOW (ref 135–145)
WBC # BLD: 6.58 K/UL — SIGNIFICANT CHANGE UP (ref 3.8–10.5)
WBC # FLD AUTO: 6.58 K/UL — SIGNIFICANT CHANGE UP (ref 3.8–10.5)

## 2017-10-09 PROCEDURE — 99233 SBSQ HOSP IP/OBS HIGH 50: CPT | Mod: GC

## 2017-10-09 PROCEDURE — 99232 SBSQ HOSP IP/OBS MODERATE 35: CPT

## 2017-10-09 PROCEDURE — 99232 SBSQ HOSP IP/OBS MODERATE 35: CPT | Mod: GC

## 2017-10-09 PROCEDURE — 93990 DOPPLER FLOW TESTING: CPT | Mod: 26

## 2017-10-09 RX ORDER — MYCOPHENOLATE MOFETIL 250 MG/1
1250 CAPSULE ORAL
Qty: 0 | Refills: 0 | COMMUNITY

## 2017-10-09 RX ORDER — CALCITRIOL 0.5 UG/1
1 CAPSULE ORAL
Qty: 30 | Refills: 0 | OUTPATIENT
Start: 2017-10-09 | End: 2017-11-08

## 2017-10-09 RX ORDER — WARFARIN SODIUM 2.5 MG/1
10 TABLET ORAL ONCE
Qty: 0 | Refills: 0 | Status: DISCONTINUED | OUTPATIENT
Start: 2017-10-09 | End: 2017-10-09

## 2017-10-09 RX ORDER — CYCLOSPORINE 100 MG/1
1 CAPSULE ORAL
Qty: 0 | Refills: 0 | COMMUNITY

## 2017-10-09 RX ORDER — HYDRALAZINE HCL 50 MG
1 TABLET ORAL
Qty: 90 | Refills: 0 | OUTPATIENT
Start: 2017-10-09 | End: 2017-11-08

## 2017-10-09 RX ORDER — CYCLOSPORINE 100 MG/1
1 CAPSULE ORAL
Qty: 30 | Refills: 0 | OUTPATIENT
Start: 2017-10-09 | End: 2017-11-08

## 2017-10-09 RX ORDER — METOPROLOL TARTRATE 50 MG
1 TABLET ORAL
Qty: 0 | Refills: 0 | COMMUNITY
Start: 2017-10-09 | End: 2017-11-08

## 2017-10-09 RX ORDER — METOPROLOL TARTRATE 50 MG
1 TABLET ORAL
Qty: 60 | Refills: 0 | OUTPATIENT
Start: 2017-10-09 | End: 2017-11-08

## 2017-10-09 RX ORDER — WARFARIN SODIUM 2.5 MG/1
8.5 TABLET ORAL ONCE
Qty: 0 | Refills: 0 | Status: COMPLETED | OUTPATIENT
Start: 2017-10-09 | End: 2017-10-09

## 2017-10-09 RX ADMIN — Medication 6: at 10:37

## 2017-10-09 RX ADMIN — Medication 4: at 13:32

## 2017-10-09 RX ADMIN — WARFARIN SODIUM 8.5 MILLIGRAM(S): 2.5 TABLET ORAL at 17:57

## 2017-10-09 RX ADMIN — INSULIN GLARGINE 24 UNIT(S): 100 INJECTION, SOLUTION SUBCUTANEOUS at 10:32

## 2017-10-09 RX ADMIN — HEPARIN SODIUM 1500 UNIT(S)/HR: 5000 INJECTION INTRAVENOUS; SUBCUTANEOUS at 22:47

## 2017-10-09 RX ADMIN — Medication 50 MILLIGRAM(S): at 17:57

## 2017-10-09 RX ADMIN — Medication 10 UNIT(S): at 13:32

## 2017-10-09 RX ADMIN — Medication 2: at 22:37

## 2017-10-09 RX ADMIN — Medication 11 UNIT(S): at 10:36

## 2017-10-09 RX ADMIN — HEPARIN SODIUM 1300 UNIT(S)/HR: 5000 INJECTION INTRAVENOUS; SUBCUTANEOUS at 00:49

## 2017-10-09 RX ADMIN — Medication 5 MILLIGRAM(S): at 05:37

## 2017-10-09 RX ADMIN — ERYTHROPOIETIN 6000 UNIT(S): 10000 INJECTION, SOLUTION INTRAVENOUS; SUBCUTANEOUS at 12:14

## 2017-10-09 RX ADMIN — Medication 1 DROP(S): at 05:38

## 2017-10-09 RX ADMIN — CYCLOSPORINE 100 MILLIGRAM(S): 100 CAPSULE ORAL at 12:48

## 2017-10-09 RX ADMIN — Medication 3 UNIT(S): at 22:37

## 2017-10-09 RX ADMIN — Medication 1 DROP(S): at 21:17

## 2017-10-09 RX ADMIN — TAMSULOSIN HYDROCHLORIDE 0.4 MILLIGRAM(S): 0.4 CAPSULE ORAL at 21:17

## 2017-10-09 RX ADMIN — HEPARIN SODIUM 1700 UNIT(S)/HR: 5000 INJECTION INTRAVENOUS; SUBCUTANEOUS at 16:01

## 2017-10-09 RX ADMIN — Medication 50 MILLIGRAM(S): at 13:33

## 2017-10-09 RX ADMIN — Medication 50 MILLIGRAM(S): at 21:17

## 2017-10-09 RX ADMIN — Medication 50 MILLIGRAM(S): at 05:38

## 2017-10-09 RX ADMIN — Medication 50 MILLIGRAM(S): at 05:37

## 2017-10-09 RX ADMIN — HEPARIN SODIUM 1500 UNIT(S)/HR: 5000 INJECTION INTRAVENOUS; SUBCUTANEOUS at 08:36

## 2017-10-09 RX ADMIN — CALCITRIOL 0.5 MICROGRAM(S): 0.5 CAPSULE ORAL at 12:48

## 2017-10-09 NOTE — PROGRESS NOTE ADULT - PROBLEM SELECTOR PLAN 1
BG overall improved.  Continue Lantus 24 units qam on HD days (M/W/F) and 28u qam on other days of the week.  Continue Humalog 11/10/8/3  Continue Humalog moderate correction scales  DC on basal bolus insulin.  Follow up with outside endocrinologist.

## 2017-10-09 NOTE — PROGRESS NOTE ADULT - ATTENDING COMMENTS
Patient was seen and examined personally by me. I have discussed the plan and reviewed the resident's note and agree with the above physical exam findings including assessment and plan except as indicated below.    Afib self converted to NSR  Continue heparin to coumadin bridge. Dose coumadin 8.5mg today, INR on uptrend, goal INR: 2-3  Vascular followup after [facility] Duplex study

## 2017-10-09 NOTE — PROGRESS NOTE ADULT - PROBLEM SELECTOR PLAN 1
Pt with ESRD on HD. Pt. underwent LUE AVF creation surgery on 10/2. Last HD was on 10/7. Pt. tolerated HD well without complications. RIJ HD catheter functioning well. Monitor BP and labs Pt with ESRD on HD. Pt. underwent LUE AVF creation surgery on 10/2. Last HD was on 10/7. Pt. tolerated HD well without complications. RIJ HD catheter functioning well. Plan for HD today. Monitor BP and labs

## 2017-10-09 NOTE — PROGRESS NOTE ADULT - PROBLEM SELECTOR PLAN 1
-self-converted to NSR 9/21, tele reviewed and pt has been in NSR  -currently on metoprolol 50 bid w/ hold parameters (continue lopressor when NPO and before HD sessions given he has tendency to go into afib w/ RVR if he misses doses)  -c/w hep gtt and bridge to coumadin, INR goal 2-3  -appreciate EP recs - Repeat TTE 9/25 demonstrated small pericardial effusion and small right pleural effusion. TTE w/ EF 65%

## 2017-10-09 NOTE — PROGRESS NOTE ADULT - PROBLEM SELECTOR PLAN 3
Appreciate Endocrinology evaluation and continued recommendations.  Lantus 24u qAM on HD days and 28u qAM on non-HD Days  Humalog pre-meal 11/10/8 and 5U qhs PRN snack  No basal/bolus dose TBD.  Outpatient endocrine follow up appointment with Dr. Baker.

## 2017-10-09 NOTE — PROGRESS NOTE ADULT - SUBJECTIVE AND OBJECTIVE BOX
Patient is a 52y old  Male who presents with a chief complaint of Altered mental status (10 Sep 2017 23:23)      OVERNIGHT EVENTS: No acute events overnight.  SUBJECTIVE: Patient seen and examined at bedside. denies current complaints. states he wants to go home tomorrow because he has appointments.       MEDICATIONS  (STANDING):  calcitriol   Capsule 0.5 MICROGram(s) Oral daily  cycloSPORINE  (SandIMMUNE) 100 milliGRAM(s) Oral daily  dextrose 50% Injectable 12.5 Gram(s) IV Push once  dextrose 50% Injectable 25 Gram(s) IV Push once  dextrose 50% Injectable 25 Gram(s) IV Push once  docusate sodium 100 milliGRAM(s) Oral three times a day  epoetin valente Injectable 6000 Unit(s) IV Push <User Schedule>  heparin  Infusion.  Unit(s)/Hr (16 mL/Hr) IV Continuous <Continuous>  hydrALAZINE 50 milliGRAM(s) Oral three times a day  insulin glargine Injectable (LANTUS) 24 Unit(s) SubCutaneous <User Schedule>  insulin glargine Injectable (LANTUS) 28 Unit(s) SubCutaneous <User Schedule>  insulin lispro (HumaLOG) corrective regimen sliding scale   SubCutaneous three times a day before meals  insulin lispro (HumaLOG) corrective regimen sliding scale   SubCutaneous at bedtime  insulin lispro Injectable (HumaLOG) 11 Unit(s) SubCutaneous before breakfast  insulin lispro Injectable (HumaLOG) 3 Unit(s) SubCutaneous at bedtime  insulin lispro Injectable (HumaLOG) 8 Unit(s) SubCutaneous before dinner  insulin lispro Injectable (HumaLOG) 10 Unit(s) SubCutaneous before lunch  metoprolol 50 milliGRAM(s) Oral two times a day  predniSONE   Tablet 5 milliGRAM(s) Oral daily  senna 2 Tablet(s) Oral at bedtime  tamsulosin 0.4 milliGRAM(s) Oral at bedtime    MEDICATIONS  (PRN):  acetaminophen   Tablet. 650 milliGRAM(s) Oral every 6 hours PRN mild to moderate pain  dextrose Gel 1 Dose(s) Oral once PRN Blood Glucose LESS THAN 70 milliGRAM(s)/deciLiter  dextrose Gel 1 Dose(s) Oral once PRN Blood Glucose LESS THAN 70 milliGRAM(s)/deciLiter  glucagon  Injectable 1 milliGRAM(s) IntraMuscular once PRN Glucose <70 milliGRAM(s)/deciLiter  naphazoline/pheniramine Solution 1 Drop(s) Both EYES four times a day PRN Itchy Eyes      T(C): 36.2 (10-09-17 @ 05:28), Max: 36.6 (10-08-17 @ 21:02)  HR: 87 (10-09-17 @ 05:28) (72 - 96)  BP: 149/82 (10-09-17 @ 05:28) (133/90 - 154/85)  RR: 16 (10-09-17 @ 05:28) (16 - 17)  SpO2: 99% (10-09-17 @ 05:28) (97% - 100%)  CAPILLARY BLOOD GLUCOSE  172 (09 Oct 2017 02:00)  78 (08 Oct 2017 22:13)  105 (08 Oct 2017 17:08)  265 (08 Oct 2017 12:31)  175 (08 Oct 2017 09:26)      PHYSICAL EXAM  GENERAL: NAD, well-developed  HEAD:  Atraumatic, Normocephalic  EYES: conjunctiva and sclera clear  CHEST/LUNG: Clear to auscultation bilaterally; No wheeze, rales or rhonchi  HEART: +S1 +S2, Regular rate and rhythm, no murmurs  ABDOMEN: Soft, Nontender, Nondistended; Bowel sounds present  NEURO: No focal neurologic deficits, sensation and motor function grossly intact.   EXTREMITIES:  Pink and warm, Peripheral Pulses intact. +B/L LE edema. +RIJ HD Cath , C/D/I. +LUE AVF w/ mild swelling at site with dressing C/D/I.    LABS:                        10.2   7.63  )-----------( 157      ( 08 Oct 2017 08:17 )             32.7     10-08    135  |  95<L>  |  46<H>  ----------------------------<  107<H>  4.4   |  22  |  3.29<H>    Ca    8.8      08 Oct 2017 06:17  Phos  5.0     10-08  Mg     2.0     10-08      PT/INR - ( 08 Oct 2017 06:17 )   PT: 16.6 SEC;   INR: 1.47          PTT - ( 08 Oct 2017 23:50 )  PTT:58.0 SEC          RADIOLOGY & ADDITIONAL TESTS:    Imaging Personally Reviewed:  Consultant(s) Notes Reviewed:    Care Discussed with Consultants/Other Providers:      Pooja Steen D.O.  Medicine Intern  pager [phone] Patient is a 52y old  Male who presents with a chief complaint of Altered mental status (10 Sep 2017 23:23)      OVERNIGHT EVENTS: No acute events overnight.  SUBJECTIVE: Patient seen and examined at bedside. denies current complaints. states he wants to go home tomorrow because he has appointments.       MEDICATIONS  (STANDING):  calcitriol   Capsule 0.5 MICROGram(s) Oral daily  cycloSPORINE  (SandIMMUNE) 100 milliGRAM(s) Oral daily  dextrose 50% Injectable 12.5 Gram(s) IV Push once  dextrose 50% Injectable 25 Gram(s) IV Push once  dextrose 50% Injectable 25 Gram(s) IV Push once  docusate sodium 100 milliGRAM(s) Oral three times a day  epoetin valente Injectable 6000 Unit(s) IV Push <User Schedule>  heparin  Infusion.  Unit(s)/Hr (16 mL/Hr) IV Continuous <Continuous>  hydrALAZINE 50 milliGRAM(s) Oral three times a day  insulin glargine Injectable (LANTUS) 24 Unit(s) SubCutaneous <User Schedule>  insulin glargine Injectable (LANTUS) 28 Unit(s) SubCutaneous <User Schedule>  insulin lispro (HumaLOG) corrective regimen sliding scale   SubCutaneous three times a day before meals  insulin lispro (HumaLOG) corrective regimen sliding scale   SubCutaneous at bedtime  insulin lispro Injectable (HumaLOG) 11 Unit(s) SubCutaneous before breakfast  insulin lispro Injectable (HumaLOG) 3 Unit(s) SubCutaneous at bedtime  insulin lispro Injectable (HumaLOG) 8 Unit(s) SubCutaneous before dinner  insulin lispro Injectable (HumaLOG) 10 Unit(s) SubCutaneous before lunch  metoprolol 50 milliGRAM(s) Oral two times a day  predniSONE   Tablet 5 milliGRAM(s) Oral daily  senna 2 Tablet(s) Oral at bedtime  tamsulosin 0.4 milliGRAM(s) Oral at bedtime    MEDICATIONS  (PRN):  acetaminophen   Tablet. 650 milliGRAM(s) Oral every 6 hours PRN mild to moderate pain  dextrose Gel 1 Dose(s) Oral once PRN Blood Glucose LESS THAN 70 milliGRAM(s)/deciLiter  dextrose Gel 1 Dose(s) Oral once PRN Blood Glucose LESS THAN 70 milliGRAM(s)/deciLiter  glucagon  Injectable 1 milliGRAM(s) IntraMuscular once PRN Glucose <70 milliGRAM(s)/deciLiter  naphazoline/pheniramine Solution 1 Drop(s) Both EYES four times a day PRN Itchy Eyes      T(C): 36.2 (10-09-17 @ 05:28), Max: 36.6 (10-08-17 @ 21:02)  HR: 87 (10-09-17 @ 05:28) (72 - 96)  BP: 149/82 (10-09-17 @ 05:28) (133/90 - 154/85)  RR: 16 (10-09-17 @ 05:28) (16 - 17)  SpO2: 99% (10-09-17 @ 05:28) (97% - 100%)  CAPILLARY BLOOD GLUCOSE  172 (09 Oct 2017 02:00)  78 (08 Oct 2017 22:13)  105 (08 Oct 2017 17:08)  265 (08 Oct 2017 12:31)  175 (08 Oct 2017 09:26)      PHYSICAL EXAM  GENERAL: NAD, well-developed  HEAD:  Atraumatic, Normocephalic  EYES: conjunctiva and sclera clear  CHEST/LUNG: Clear to auscultation bilaterally; No wheeze, rales or rhonchi  HEART: +S1 +S2, Regular rate and rhythm, no murmurs  ABDOMEN: Soft, Nontender, Nondistended; Bowel sounds present  NEURO: No focal neurologic deficits, sensation and motor function grossly intact.   EXTREMITIES:  Pink and warm, Peripheral Pulses intact. +B/L LE edema. +RIJ HD Cath , C/D/I. +LUE AVF w/ mild swelling at site with dressing C/D/I nontender + bruit but decreased thrill.    LABS:                        10.2   7.63  )-----------( 157      ( 08 Oct 2017 08:17 )             32.7     10-08    135  |  95<L>  |  46<H>  ----------------------------<  107<H>  4.4   |  22  |  3.29<H>    Ca    8.8      08 Oct 2017 06:17  Phos  5.0     10-08  Mg     2.0     10-08      PT/INR - ( 08 Oct 2017 06:17 )   PT: 16.6 SEC;   INR: 1.47          PTT - ( 08 Oct 2017 23:50 )  PTT:58.0 SEC          RADIOLOGY & ADDITIONAL TESTS:    Imaging Personally Reviewed:  Consultant(s) Notes Reviewed:    Care Discussed with Consultants/Other Providers:      Pooja Steen D.O.  Medicine Intern  pager [phone]

## 2017-10-09 NOTE — PROGRESS NOTE ADULT - SUBJECTIVE AND OBJECTIVE BOX
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 Pt. now ESRD. Pt. had LUE AVF creation surgery on 10/2/17. Last HD was on 10/6, tolerated well. No complaints of CP, SOB, HA or dizziness.        PAST HISTORY  --------------------------------------------------------------------------------  No significant changes to PMH, PSH, FHx, SHx, unless otherwise noted    ALLERGIES & MEDICATIONS  --------------------------------------------------------------------------------  Allergies    No Known Allergies    Intolerances      Standing Inpatient Medications  calcitriol   Capsule 0.5 MICROGram(s) Oral daily  cycloSPORINE  (SandIMMUNE) 100 milliGRAM(s) Oral daily  dextrose 50% Injectable 12.5 Gram(s) IV Push once  dextrose 50% Injectable 25 Gram(s) IV Push once  dextrose 50% Injectable 25 Gram(s) IV Push once  docusate sodium 100 milliGRAM(s) Oral three times a day  epoetin valente Injectable 6000 Unit(s) IV Push <User Schedule>  heparin  Infusion.  Unit(s)/Hr IV Continuous <Continuous>  hydrALAZINE 50 milliGRAM(s) Oral three times a day  insulin glargine Injectable (LANTUS) 24 Unit(s) SubCutaneous <User Schedule>  insulin glargine Injectable (LANTUS) 28 Unit(s) SubCutaneous <User Schedule>  insulin lispro (HumaLOG) corrective regimen sliding scale   SubCutaneous three times a day before meals  insulin lispro (HumaLOG) corrective regimen sliding scale   SubCutaneous at bedtime  insulin lispro Injectable (HumaLOG) 11 Unit(s) SubCutaneous before breakfast  insulin lispro Injectable (HumaLOG) 3 Unit(s) SubCutaneous at bedtime  insulin lispro Injectable (HumaLOG) 8 Unit(s) SubCutaneous before dinner  insulin lispro Injectable (HumaLOG) 10 Unit(s) SubCutaneous before lunch  metoprolol 50 milliGRAM(s) Oral two times a day  predniSONE   Tablet 5 milliGRAM(s) Oral daily  senna 2 Tablet(s) Oral at bedtime  tamsulosin 0.4 milliGRAM(s) Oral at bedtime    PRN Inpatient Medications  acetaminophen   Tablet. 650 milliGRAM(s) Oral every 6 hours PRN  dextrose Gel 1 Dose(s) Oral once PRN  dextrose Gel 1 Dose(s) Oral once PRN  glucagon  Injectable 1 milliGRAM(s) IntraMuscular once PRN  naphazoline/pheniramine Solution 1 Drop(s) Both EYES four times a day PRN      REVIEW OF SYSTEMS  --------------------------------------------------------------------------------  General: no fever  CVS: no chest pain  RESP: no sob, no cough  ABD: no abdominal pain  : no dysuria,  MSK: no edema     VITALS/PHYSICAL EXAM  --------------------------------------------------------------------------------  T(C): 36.2 (10-09-17 @ 05:28), Max: 36.6 (10-08-17 @ 21:02)  HR: 85 (10-09-17 @ 08:35) (72 - 96)  BP: 146/85 (10-09-17 @ 08:35) (133/90 - 154/85)  RR: 16 (10-09-17 @ 08:35) (16 - 17)  SpO2: 97% (10-09-17 @ 08:35) (97% - 100%)  Wt(kg): --        Physical Exam:  	Gen: resting, NAD  	Pulm: CTA B/L  	CV: RRR, S1S2; no rub  	Abd: +BS, soft  	LE: no LE pedal edema  	Vascular access: Right IJ HD catheter site: no bleeding, LUE AVF site: bruit heard    LABS/STUDIES  --------------------------------------------------------------------------------              9.9    6.58  >-----------<  168      [10-09-17 @ 06:55]              31.9     133  |  95  |  68  ----------------------------<  293      [10-09-17 @ 06:55]  4.6   |  20  |  3.86        Ca     9.0     [10-09-17 @ 06:55]      Mg     2.0     [10-08-17 @ 06:17]      Phos  5.6     [10-09-17 @ 06:55]      PT/INR: PT 16.6 , INR 1.47       [10-08-17 @ 06:17]  PTT: 50.6       [10-09-17 @ 06:55]      Creatinine Trend:  SCr 3.86 [10-09 @ 06:55]  SCr 3.29 [10-08 @ 06:17]  SCr 3.02 [10-07 @ 13:05]  SCr 3.12 [10-06 @ 07:30]  SCr 3.16 [10-05 @ 12:15]        Iron 21, TIBC 187, %sat --      [09-09-17 @ 06:55]  Ferritin 587.5      [09-09-17 @ 06:55]  .9 (Ca --)      [09-10-17 @ 05:10]   --  HbA1c 8.0      [08-16-17 @ 06:30]  TSH 2.88      [09-03-17 @ 13:45]  Lipid: chol 137, TG 97, HDL 52, LDL 68      [08-11-17 @ 07:20]    HBsAg NEGATIVE      [10-07-17 @ 07:55] 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 Pt. now ESRD. Pt. had LUE AVF creation surgery on 10/2/17. Last HD was on 10/6, tolerated well. No complaints of CP, SOB, HA or dizziness.    PAST HISTORY  --------------------------------------------------------------------------------  No significant changes to PMH, PSH, FHx, SHx, unless otherwise noted    ALLERGIES & MEDICATIONS  --------------------------------------------------------------------------------  Allergies    No Known Allergies    Intolerances    Standing Inpatient Medications  calcitriol   Capsule 0.5 MICROGram(s) Oral daily  cycloSPORINE  (SandIMMUNE) 100 milliGRAM(s) Oral daily  dextrose 50% Injectable 12.5 Gram(s) IV Push once  dextrose 50% Injectable 25 Gram(s) IV Push once  dextrose 50% Injectable 25 Gram(s) IV Push once  docusate sodium 100 milliGRAM(s) Oral three times a day  epoetin valente Injectable 6000 Unit(s) IV Push <User Schedule>  heparin  Infusion.  Unit(s)/Hr IV Continuous <Continuous>  hydrALAZINE 50 milliGRAM(s) Oral three times a day  insulin glargine Injectable (LANTUS) 24 Unit(s) SubCutaneous <User Schedule>  insulin glargine Injectable (LANTUS) 28 Unit(s) SubCutaneous <User Schedule>  insulin lispro (HumaLOG) corrective regimen sliding scale   SubCutaneous three times a day before meals  insulin lispro (HumaLOG) corrective regimen sliding scale   SubCutaneous at bedtime  insulin lispro Injectable (HumaLOG) 11 Unit(s) SubCutaneous before breakfast  insulin lispro Injectable (HumaLOG) 3 Unit(s) SubCutaneous at bedtime  insulin lispro Injectable (HumaLOG) 8 Unit(s) SubCutaneous before dinner  insulin lispro Injectable (HumaLOG) 10 Unit(s) SubCutaneous before lunch  metoprolol 50 milliGRAM(s) Oral two times a day  predniSONE   Tablet 5 milliGRAM(s) Oral daily  senna 2 Tablet(s) Oral at bedtime  tamsulosin 0.4 milliGRAM(s) Oral at bedtime    PRN Inpatient Medications  acetaminophen   Tablet. 650 milliGRAM(s) Oral every 6 hours PRN  dextrose Gel 1 Dose(s) Oral once PRN  dextrose Gel 1 Dose(s) Oral once PRN  glucagon  Injectable 1 milliGRAM(s) IntraMuscular once PRN  naphazoline/pheniramine Solution 1 Drop(s) Both EYES four times a day PRN      REVIEW OF SYSTEMS  --------------------------------------------------------------------------------  General: no fever  CVS: no chest pain  RESP: no sob, no cough  ABD: no abdominal pain  : no dysuria,  MSK: no edema     VITALS/PHYSICAL EXAM  --------------------------------------------------------------------------------  T(C): 36.2 (10-09-17 @ 05:28), Max: 36.6 (10-08-17 @ 21:02)  HR: 85 (10-09-17 @ 08:35) (72 - 96)  BP: 146/85 (10-09-17 @ 08:35) (133/90 - 154/85)  RR: 16 (10-09-17 @ 08:35) (16 - 17)  SpO2: 97% (10-09-17 @ 08:35) (97% - 100%)  Wt(kg): --    Physical Exam:  	Gen: resting, NAD  	Pulm: CTA B/L  	CV: RRR, S1S2; no rub  	Abd: +BS, soft  	LE: no LE pedal edema  	Vascular access: Right IJ HD catheter site: no bleeding, LUE AVF site: bruit heard    LABS/STUDIES  --------------------------------------------------------------------------------              9.9    6.58  >-----------<  168      [10-09-17 @ 06:55]              31.9     133  |  95  |  68  ----------------------------<  293      [10-09-17 @ 06:55]  4.6   |  20  |  3.86        Ca     9.0     [10-09-17 @ 06:55]      Mg     2.0     [10-08-17 @ 06:17]      Phos  5.6     [10-09-17 @ 06:55]      PT/INR: PT 16.6 , INR 1.47       [10-08-17 @ 06:17]  PTT: 50.6       [10-09-17 @ 06:55]      Creatinine Trend:  SCr 3.86 [10-09 @ 06:55]  SCr 3.29 [10-08 @ 06:17]  SCr 3.02 [10-07 @ 13:05]  SCr 3.12 [10-06 @ 07:30]  SCr 3.16 [10-05 @ 12:15]        Iron 21, TIBC 187, %sat --      [09-09-17 @ 06:55]  Ferritin 587.5      [09-09-17 @ 06:55]  .9 (Ca --)      [09-10-17 @ 05:10]   --  HbA1c 8.0      [08-16-17 @ 06:30]  TSH 2.88      [09-03-17 @ 13:45]  Lipid: chol 137, TG 97, HDL 52, LDL 68      [08-11-17 @ 07:20]    HBsAg NEGATIVE      [10-07-17 @ 07:55]

## 2017-10-09 NOTE — PROGRESS NOTE ADULT - SUBJECTIVE AND OBJECTIVE BOX
Chief Complaint: DM1    History: Patient reports receiving breakfast and insulin later this morning at around 10:30 due to having been off the floor for ultrasound followed by HD.  He is anxious to be discharged home but denies other complaints.    MEDICATIONS  (STANDING):  calcitriol   Capsule 0.5 MICROGram(s) Oral daily  cycloSPORINE  (SandIMMUNE) 100 milliGRAM(s) Oral daily  dextrose 50% Injectable 12.5 Gram(s) IV Push once  dextrose 50% Injectable 25 Gram(s) IV Push once  dextrose 50% Injectable 25 Gram(s) IV Push once  docusate sodium 100 milliGRAM(s) Oral three times a day  epoetin valente Injectable 6000 Unit(s) IV Push <User Schedule>  heparin  Infusion.  Unit(s)/Hr (16 mL/Hr) IV Continuous <Continuous>  hydrALAZINE 50 milliGRAM(s) Oral three times a day  insulin glargine Injectable (LANTUS) 24 Unit(s) SubCutaneous <User Schedule>  insulin glargine Injectable (LANTUS) 28 Unit(s) SubCutaneous <User Schedule>  insulin lispro (HumaLOG) corrective regimen sliding scale   SubCutaneous three times a day before meals  insulin lispro (HumaLOG) corrective regimen sliding scale   SubCutaneous at bedtime  insulin lispro Injectable (HumaLOG) 11 Unit(s) SubCutaneous before breakfast  insulin lispro Injectable (HumaLOG) 3 Unit(s) SubCutaneous at bedtime  insulin lispro Injectable (HumaLOG) 8 Unit(s) SubCutaneous before dinner  insulin lispro Injectable (HumaLOG) 10 Unit(s) SubCutaneous before lunch  metoprolol 50 milliGRAM(s) Oral two times a day  predniSONE   Tablet 5 milliGRAM(s) Oral daily  senna 2 Tablet(s) Oral at bedtime  tamsulosin 0.4 milliGRAM(s) Oral at bedtime  warfarin 8.5 milliGRAM(s) Oral once    MEDICATIONS  (PRN):  acetaminophen   Tablet. 650 milliGRAM(s) Oral every 6 hours PRN mild to moderate pain  dextrose Gel 1 Dose(s) Oral once PRN Blood Glucose LESS THAN 70 milliGRAM(s)/deciLiter  dextrose Gel 1 Dose(s) Oral once PRN Blood Glucose LESS THAN 70 milliGRAM(s)/deciLiter  glucagon  Injectable 1 milliGRAM(s) IntraMuscular once PRN Glucose <70 milliGRAM(s)/deciLiter  naphazoline/pheniramine Solution 1 Drop(s) Both EYES four times a day PRN Itchy Eyes      Allergies    No Known Allergies    Intolerances      Review of Systems:    ALL OTHER SYSTEMS REVIEWED AND NEGATIVE      PHYSICAL EXAM:  VITALS: T(C): 36.6 (10-09-17 @ 13:30)  T(F): 97.9 (10-09-17 @ 13:30), Max: 98.4 (10-09-17 @ 10:54)  HR: 100 (10-09-17 @ 13:30) (84 - 100)  BP: 119/77 (10-09-17 @ 13:30) (117/74 - 158/86)  RR:  (16 - 18)  SpO2:  (97% - 100%)  Wt(kg): --  GENERAL: NAD, well-groomed, well-developed  EYES: blind  HEENT:  Atraumatic, Normocephalic, moist mucous membranes  GI: Soft, nontender, non distended  SKIN: Dry, intact, No rashes or lesions  NEURO: no tremor  PSYCH: Alert and oriented x 3, normal affect, normal mood    CAPILLARY BLOOD GLUCOSE  211 (10-09 @ 13:30)  288 (10-09 @ 10:54)  283 (10-09 @ 09:02)  172 (10-09 @ 02:00)  78 (10-08 @ 22:13)  105 (10-08 @ 17:08)  265 (10-08 @ 12:31)  175 (10-08 @ 09:26)  81 (10-08 @ 02:11)  120 (10-07 @ 21:44)  114 (10-07 @ 16:54)  144 (10-07 @ 14:14)  226 (10-07 @ 09:06)  138 (10-07 @ 01:10)  103 (10-06 @ 22:50)  61 (10-06 @ 22:21)  244 (10-06 @ 17:16)      10-09    133<L>  |  95<L>  |  68<H>  ----------------------------<  293<H>  4.6   |  20<L>  |  3.86<H>    EGFR if : 20  EGFR if non : 17    Ca    9.0      10-09  Mg     1.9     10-09  Phos  5.6     10-09            Thyroid Function Tests:      Hemoglobin A1C, Whole Blood: 8.0 % <H> [4.0 - 5.6] (08-16-17 @ 06:30)

## 2017-10-09 NOTE — PROGRESS NOTE ADULT - ASSESSMENT
52M with h/o CKD s/p renal transplant (1998) on cyclosporine and CellCept, HTN, DM, legally blind BIBEMS for uremic encephalopathy. Pt became bradycardic and had PEA arrest in the ED, intubated for airway protection. ROSC achieved s/p 1 round of epi and chest compressions and pt was transferred to MICU. Immunosuppressants for renal transplant were initially held. Pt had emergent HD for hyperkalemia. Covered empirically with broad spectrum antibiotics (vanc and zosyn). Also found to have Hgb 5 s/p a total of 3u pRBC and epogen was started. Pressors were weaned off and pt extubated 9/4. Cyclosporine restarted and prednisone started. Now transferred to the medicine floor for further management 9/5, w/ course c/b new-onset AFib on hep gtt to coumadin bridge. Now s/p AVF placement on 10/2.

## 2017-10-09 NOTE — PROGRESS NOTE ADULT - PROBLEM SELECTOR PLAN 6
-c/w metoprolol and hydralazine  -monitor vitals     (home regimen was clonidine 0.1mg po q8h, nifedipine XR 90 mg qD, Furosemide 10 mg daily - holding for now)

## 2017-10-10 VITALS
RESPIRATION RATE: 17 BRPM | HEART RATE: 87 BPM | OXYGEN SATURATION: 100 % | DIASTOLIC BLOOD PRESSURE: 87 MMHG | SYSTOLIC BLOOD PRESSURE: 147 MMHG | TEMPERATURE: 98 F

## 2017-10-10 LAB
APTT BLD: 124.4 SEC — CRITICAL HIGH (ref 27.5–37.4)
BUN SERPL-MCNC: 62 MG/DL — HIGH (ref 7–23)
CALCIUM SERPL-MCNC: 8.8 MG/DL — SIGNIFICANT CHANGE UP (ref 8.4–10.5)
CHLORIDE SERPL-SCNC: 96 MMOL/L — LOW (ref 98–107)
CO2 SERPL-SCNC: 20 MMOL/L — LOW (ref 22–31)
CREAT SERPL-MCNC: 3.87 MG/DL — HIGH (ref 0.5–1.3)
GLUCOSE SERPL-MCNC: 164 MG/DL — HIGH (ref 70–99)
HCT VFR BLD CALC: 33.9 % — LOW (ref 39–50)
HGB BLD-MCNC: 10.7 G/DL — LOW (ref 13–17)
INR BLD: 1.64 — HIGH (ref 0.88–1.17)
MAGNESIUM SERPL-MCNC: 1.9 MG/DL — SIGNIFICANT CHANGE UP (ref 1.6–2.6)
MCHC RBC-ENTMCNC: 27.9 PG — SIGNIFICANT CHANGE UP (ref 27–34)
MCHC RBC-ENTMCNC: 31.6 % — LOW (ref 32–36)
MCV RBC AUTO: 88.3 FL — SIGNIFICANT CHANGE UP (ref 80–100)
NRBC # FLD: 0 — SIGNIFICANT CHANGE UP
PHOSPHATE SERPL-MCNC: 5.8 MG/DL — HIGH (ref 2.5–4.5)
PLATELET # BLD AUTO: 167 K/UL — SIGNIFICANT CHANGE UP (ref 150–400)
PMV BLD: 10.4 FL — SIGNIFICANT CHANGE UP (ref 7–13)
POTASSIUM SERPL-MCNC: 4.2 MMOL/L — SIGNIFICANT CHANGE UP (ref 3.5–5.3)
POTASSIUM SERPL-SCNC: 4.2 MMOL/L — SIGNIFICANT CHANGE UP (ref 3.5–5.3)
PROTHROM AB SERPL-ACNC: 18.6 SEC — HIGH (ref 9.8–13.1)
RBC # BLD: 3.84 M/UL — LOW (ref 4.2–5.8)
RBC # FLD: 16 % — HIGH (ref 10.3–14.5)
SODIUM SERPL-SCNC: 135 MMOL/L — SIGNIFICANT CHANGE UP (ref 135–145)
WBC # BLD: 7.1 K/UL — SIGNIFICANT CHANGE UP (ref 3.8–10.5)
WBC # FLD AUTO: 7.1 K/UL — SIGNIFICANT CHANGE UP (ref 3.8–10.5)

## 2017-10-10 PROCEDURE — 99232 SBSQ HOSP IP/OBS MODERATE 35: CPT

## 2017-10-10 PROCEDURE — 99239 HOSP IP/OBS DSCHRG MGMT >30: CPT

## 2017-10-10 RX ORDER — INSULIN ASPART 100 [IU]/ML
10 INJECTION, SOLUTION SUBCUTANEOUS
Qty: 3 | Refills: 0 | OUTPATIENT
Start: 2017-10-10 | End: 2017-11-09

## 2017-10-10 RX ORDER — SODIUM BICARBONATE 1 MEQ/ML
2 SYRINGE (ML) INTRAVENOUS
Qty: 0 | Refills: 0 | COMMUNITY

## 2017-10-10 RX ORDER — INSULIN LISPRO 100/ML
9 VIAL (ML) SUBCUTANEOUS
Qty: 0 | Refills: 0 | Status: DISCONTINUED | OUTPATIENT
Start: 2017-10-10 | End: 2017-10-10

## 2017-10-10 RX ORDER — ENOXAPARIN SODIUM 100 MG/ML
28 INJECTION SUBCUTANEOUS
Qty: 5 | Refills: 0 | OUTPATIENT
Start: 2017-10-10 | End: 2017-11-09

## 2017-10-10 RX ORDER — CYCLOSPORINE 100 MG/1
1 CAPSULE ORAL
Qty: 14 | Refills: 0 | OUTPATIENT
Start: 2017-10-10 | End: 2017-10-24

## 2017-10-10 RX ORDER — ENOXAPARIN SODIUM 100 MG/ML
25 INJECTION SUBCUTANEOUS
Qty: 0 | Refills: 0 | COMMUNITY
Start: 2017-10-10 | End: 2017-11-09

## 2017-10-10 RX ORDER — INSULIN LISPRO 100/ML
8 VIAL (ML) SUBCUTANEOUS
Qty: 0 | Refills: 0 | Status: DISCONTINUED | OUTPATIENT
Start: 2017-10-10 | End: 2017-10-10

## 2017-10-10 RX ORDER — WARFARIN SODIUM 2.5 MG/1
1 TABLET ORAL
Qty: 5 | Refills: 0 | OUTPATIENT
Start: 2017-10-10 | End: 2017-10-15

## 2017-10-10 RX ORDER — INSULIN LISPRO 100/ML
13 VIAL (ML) SUBCUTANEOUS
Qty: 0 | Refills: 0 | Status: DISCONTINUED | OUTPATIENT
Start: 2017-10-10 | End: 2017-10-10

## 2017-10-10 RX ADMIN — HEPARIN SODIUM 1300 UNIT(S)/HR: 5000 INJECTION INTRAVENOUS; SUBCUTANEOUS at 09:01

## 2017-10-10 RX ADMIN — Medication 9 UNIT(S): at 13:23

## 2017-10-10 RX ADMIN — CALCITRIOL 0.5 MICROGRAM(S): 0.5 CAPSULE ORAL at 11:03

## 2017-10-10 RX ADMIN — INSULIN GLARGINE 28 UNIT(S): 100 INJECTION, SOLUTION SUBCUTANEOUS at 09:18

## 2017-10-10 RX ADMIN — Medication 11 UNIT(S): at 09:36

## 2017-10-10 RX ADMIN — CYCLOSPORINE 100 MILLIGRAM(S): 100 CAPSULE ORAL at 11:03

## 2017-10-10 RX ADMIN — Medication 50 MILLIGRAM(S): at 13:24

## 2017-10-10 RX ADMIN — Medication 50 MILLIGRAM(S): at 05:26

## 2017-10-10 RX ADMIN — Medication 6: at 13:23

## 2017-10-10 RX ADMIN — Medication 5 MILLIGRAM(S): at 05:26

## 2017-10-10 RX ADMIN — Medication: at 09:36

## 2017-10-10 NOTE — PROVIDER CONTACT NOTE (CRITICAL VALUE NOTIFICATION) - ACTION/TREATMENT ORDERED:
Follow nomogram, continue to monitor pt.
Heparin infusion running now at 23ml/hr. Heparin Bolus 7,000 units given. Next aPTT draw is at 07:14am.
Patient eating dinner. will repeat FS after dinner. Report given to next shift. MD Milton aware.
Continue to follow heparin nomogram.
One unit of PRBC administered in Dialysis.
Transfusion
STOP infusion for 60 minutes, then DECREASE dose rate by: 300 Unit(s)/Hr (3 mL/Hr).
advised to continue to monitor, as critical result was earlier time than latest fingerstick
as per MD alvares to follow heparin nomogram as per orders. will continue to monitor.
follow heparin nomogram
follow heparin nomogram as ordered and continue to monitor patient.
held for 60mins  run 16ml/hr  monitor for s/s of bleeding
hold heparin drip 60 minutes and restart at 18ml/hr per heparin nomogram
lower dose to 18ml/hr per nomogram.
use heparin nomogram

## 2017-10-10 NOTE — PROGRESS NOTE ADULT - SUBJECTIVE AND OBJECTIVE BOX
VASCULAR SURGERY PROGRESS NOTE:   Pager: [phone]    Interim Events: Patient eagerly awaiting discharge today. Patient without complaints; seen with wife at bedside.       Physical Exam  Vital Signs Last 24 Hrs  T(C): 36.8 (10 Oct 2017 13:27), Max: 36.9 (09 Oct 2017 21:13)  T(F): 98.2 (10 Oct 2017 13:27), Max: 98.5 (09 Oct 2017 21:13)  HR: 87 (10 Oct 2017 13:27) (82 - 99)  BP: 147/87 (10 Oct 2017 13:27) (125/80 - 148/70)  BP(mean): --  RR: 17 (10 Oct 2017 13:27) (17 - 18)  SpO2: 100% (10 Oct 2017 13:27) (100% - 100%)    Gen: NAD, AOx3  Ext: Left AVF with thrill. Moderately tender soft nodule noted distal to the anastomosis without erythema or purulence.     I&O's Detail    09 Oct 2017 07:01  -  10 Oct 2017 07:00  --------------------------------------------------------  IN:    Other: 400 mL  Total IN: 400 mL    OUT:    Other: 2700 mL  Total OUT: 2700 mL    Total NET: -2300 mL          Labs:                        10.7   7.10  )-----------( 167      ( 10 Oct 2017 06:00 )             33.9     10-10    135  |  96<L>  |  62<H>  ----------------------------<  164<H>  4.2   |  20<L>  |  3.87<H>    Ca    8.8      10 Oct 2017 06:00  Phos  5.8     10-10  Mg     1.9     10-10      PT/INR - ( 10 Oct 2017 06:00 )   PT: 18.6 SEC;   INR: 1.64          PTT - ( 10 Oct 2017 06:00 )  PTT:124.4 SEC        < from: [facility] Duplex Hemodialysis Access, Left. (10.09.17 @ 09:26) >    Patient name: EDIL CIFUENTES  Date of test: 10/9/2017  MR#: [id number]  [facility] #: [id number]    Location: [facility] Physician(s): , Maame Rodrgíuez MD  Interpreted by: George Felix MD, [facility], VIAN[facility]  Tech: Marcelino Sheffield SVETA  Type of Test: UpperExtremity Venous  ------------------------------------------------------------------------  Procedure: Real-time grayscale and color Duplex  ultrasonography was used to interrogate hemodialysis  access site on the left upper extremity.  Indications: Breakdown (mechanical) of surgically created  arteriovenous fistula, initial encounter (T82.510A)  ------------------------------------------------------------------------  RESULT:  ------------------------------------------------------------------------  LEFT:  Deep venous thrombosis: (Not Examined)  Superficial venous thrombosis: No  ------------------------------------------------------------------------  Left Findings: Left upper extremity radiocephalic  arteriovenous fistula noted.  Elevated velocities ( cm/sec,  cm/sec) and  color Doppler turbulence were noted at the anastomotic  site.  Hyperechoic material and narrowing is noted,  suggestive of stenosis/fibrosis and associated partial  thrombosis.  The outflow vein appearspatent without evidence of  thrombosis.  Complex, avascular collection measuring 4.0 x 2.0 cm noted  next to the anastamotic site, most consistent with either  hematoma or seroma.  ------------------------------------------------------------------------  Summary/Impressions:  Hyperechoic material and narrowing noted at the  anastomotic site of the left upper extremity radiocephalic  arteriovenous fistula, which may suggest stenosis/fibrosis  and associated partial thrombosis.   The inflow and  outflow vessels are otherwise patent.  Complex, avascularcollection noted near anastomotic sute,  most consistent with hematoma or seroma.  ------------------------------------------------------------------------  Confirmed on  10/9/2017 - 10:31 AM by George Felix MD,  FAC, FASE, RPVI  By signing this report, the attending physician certifies  that he or she has personally supervised and interpreted  the vascular study and has reviewed and or edited and  agrees with the written comments contained within the  report.    < end of copied text >

## 2017-10-10 NOTE — PROVIDER CONTACT NOTE (CRITICAL VALUE NOTIFICATION) - PERSON GIVING RESULT:
carlos
Cody Mcdaniel, Hematology
Hematology/ Radha Reza
Hematology/Cody, Alvaroales
Jodie Mcnamara
Jodie Scott
Juan Miguel
MICHAEL richmond
Merari, C
[facility]
Patricia Walton, lab
Radha LINN/ 4S (No one called from Lab) I just checked it from the results.
lab Alan
toni singh lab
Gogo Anguiano

## 2017-10-10 NOTE — PROGRESS NOTE ADULT - PROBLEM SELECTOR PLAN 2
-c/w HD per renal MWF  - permacath in place   - s/p LUE AVF with vascular Dr. Staples on 10/2 duplex showing possible stenosis/fibrosis and partial thrombosis. Awaiting vascular input. To F/U in office in 6 weeks.  -monitor electrolytes

## 2017-10-10 NOTE — PROGRESS NOTE ADULT - SUBJECTIVE AND OBJECTIVE BOX
Patient is a 52y old  Male who presents with a chief complaint of Altered mental status (10 Sep 2017 23:23)      INTERVAL HPI/OVERNIGHT EVENTS:  No acute overnight events. Pt feeling well and asking when he will be discharged. Pt reports no pain, fever, chills, chest pain, cough, shortness of breath.       REVIEW OF SYSTEMS:  Constitutional:     [ ] negative [ ] fevers [ ] chills [ ] weight loss [ ] weight gain  HEENT:                  [ ] negative [ ] dry eyes [ ] eye irritation [ ] postnasal drip [ ] nasal congestion  CV:                         [ ] negative  [ ] chest pain [ ] orthopnea [ ] palpitations [ ] murmur  Resp:                     [ ] negative [ ] cough [ ] shortness of breath [ ] dyspnea [ ] wheezing [ ] sputum [ ] hemoptysis  GI:                          [ ] negative [ ] nausea [ ] vomiting [ ] diarrhea [ ] constipation [ ] abd pain [ ] dysphagia   :                        [ ] negative [ ] dysuria [ ] nocturia [ ] hematuria [ ] increased urinary frequency  Musculoskeletal: [ ] negative [ ] back pain [ ] myalgias [ ] arthralgias [ ] fracture  Skin:                       [ ] negative [ ] rash [ ] itch  Neurological:        [ ] negative [ ] headache [ ] dizziness [ ] syncope [ ] weakness [ ] numbness  Psychiatric:           [ ] negative [ ] anxiety [ ] depression  Endocrine:            [ ] negative [ ] diabetes [ ] thyroid problem  Heme/Lymph:      [ ] negative [ ] anemia [ ] bleeding problem  Allergic/Immune: [ ] negative [ ] itchy eyes [ ] nasal discharge [ ] hives [ ] angioedema    [x] All other systems negative  [ ] Unable to assess ROS because pt intubated and sedated.    Vital Signs Last 24 Hrs  T(C): 36.8 (10 Oct 2017 05:25), Max: 36.9 (09 Oct 2017 10:54)  T(F): 98.3 (10 Oct 2017 05:25), Max: 98.5 (09 Oct 2017 21:13)  HR: 93 (10 Oct 2017 05:25) (82 - 100)  BP: 148/70 (10 Oct 2017 05:25) (117/74 - 158/86)  BP(mean): --  RR: 18 (10 Oct 2017 05:25) (18 - 18)  SpO2: 100% (10 Oct 2017 05:25) (99% - 100%)  I&O's Summary    09 Oct 2017 07:01  -  10 Oct 2017 07:00  --------------------------------------------------------  IN: 400 mL / OUT: 2700 mL / NET: -2300 mL        PHYSICAL EXAM:  General: WN/WD NAD  HEENT: moist mucus mebranes  Neurology: A&Ox3, nonfocal, GARCIA x 4  Respiratory: CTA B/L, normal respiratory effort, no wheezes, crackles, rales  CV: RRR, S1S2, no murmurs, rubs or gallops  Abdominal: Soft, NT, ND +BS, Last BM  Extremities: No edema, + peripheral pulses, LUE fistula with palpable thrill and audible bruit, 3 cm swelling is soft, non-tender    LABS:                        10.7   7.10  )-----------( 167      ( 10 Oct 2017 06:00 )             33.9     Hb Trend: 10.7<--, 9.9<--, 10.2<--, 9.2<--, 9.5<--  WBC Trend: 7.10<--, 6.58<--, 7.63<--  Plt Trend: 167<--, 168<--, 157<--, 129<--, 143<--          10-10    135  |  96<L>  |  62<H>  ----------------------------<  164<H>  4.2   |  20<L>  |  3.87<H>    Ca    8.8      10 Oct 2017 06:00  Phos  5.8     10-10  Mg     1.9     10-10        PT/INR - ( 10 Oct 2017 06:00 )   PT: 18.6 SEC;   INR: 1.64          PTT - ( 10 Oct 2017 06:00 )  PTT:124.4 SEC    CAPILLARY BLOOD GLUCOSE  182 (10 Oct 2017 08:37)  187 (10 Oct 2017 02:00)  260 (09 Oct 2017 22:25)  70 (09 Oct 2017 17:21)  211 (09 Oct 2017 13:30)  288 (09 Oct 2017 10:54)    RADIOLOGY & ADDITIONAL TESTS:  < from: [facility] Duplex Hemodialysis Access, Left. (10.09.17 @ 09:26) >  Summary/Impressions:  Hyperechoic material and narrowing noted at the  anastomotic site of the left upper extremity radiocephalic  arteriovenous fistula, which may suggest stenosis/fibrosis  and associated partial thrombosis.   The inflow and  outflow vessels are otherwise patent.  Complex, avascularcollection noted near anastomotic sute,  most consistent with hematoma or seroma.  < end of copied text >      Imaging Personally Reviewed:  [x] YES  [ ] NO [ ] No New Imaging Last 24hrs    Consultant(s) Notes Reviewed:  [x] YES  [ ] NO    Care Discussed with Consultants/Other Providers [x] YES  [ ] NO  - Vascular Surgery: Will follow up regarding duplex findings Patient is a 52y old  Male who presents with a chief complaint of Altered mental status (10 Sep 2017 23:23)      INTERVAL HPI/OVERNIGHT EVENTS:  No acute overnight events. Pt feeling well and asking when he will be discharged. Pt reports no pain, fever, chills, chest pain, cough, shortness of breath.       REVIEW OF SYSTEMS:  Constitutional:     [ ] negative [ ] fevers [ ] chills [ ] weight loss [ ] weight gain  HEENT:                  [ ] negative [ ] dry eyes [ ] eye irritation [ ] postnasal drip [ ] nasal congestion  CV:                         [ ] negative  [ ] chest pain [ ] orthopnea [ ] palpitations [ ] murmur  Resp:                     [ ] negative [ ] cough [ ] shortness of breath [ ] dyspnea [ ] wheezing [ ] sputum [ ] hemoptysis  GI:                          [ ] negative [ ] nausea [ ] vomiting [ ] diarrhea [ ] constipation [ ] abd pain [ ] dysphagia   :                        [ ] negative [ ] dysuria [ ] nocturia [ ] hematuria [ ] increased urinary frequency  Musculoskeletal: [ ] negative [ ] back pain [ ] myalgias [ ] arthralgias [ ] fracture  Skin:                       [ ] negative [ ] rash [ ] itch  Neurological:        [ ] negative [ ] headache [ ] dizziness [ ] syncope [ ] weakness [ ] numbness  Psychiatric:           [ ] negative [ ] anxiety [ ] depression  Endocrine:            [ ] negative [ ] diabetes [ ] thyroid problem  Heme/Lymph:      [ ] negative [ ] anemia [ ] bleeding problem  Allergic/Immune: [ ] negative [ ] itchy eyes [ ] nasal discharge [ ] hives [ ] angioedema    [x] All other systems negative  [ ] Unable to assess ROS because pt intubated and sedated.    Vital Signs Last 24 Hrs  T(C): 36.8 (10 Oct 2017 05:25), Max: 36.9 (09 Oct 2017 10:54)  T(F): 98.3 (10 Oct 2017 05:25), Max: 98.5 (09 Oct 2017 21:13)  HR: 93 (10 Oct 2017 05:25) (82 - 100)  BP: 148/70 (10 Oct 2017 05:25) (117/74 - 158/86)  BP(mean): --  RR: 18 (10 Oct 2017 05:25) (18 - 18)  SpO2: 100% (10 Oct 2017 05:25) (99% - 100%)  I&O's Summary    09 Oct 2017 07:01  -  10 Oct 2017 07:00  --------------------------------------------------------  IN: 400 mL / OUT: 2700 mL / NET: -2300 mL        PHYSICAL EXAM:  General: WN/WD NAD  HEENT: moist mucus mebranes  Neurology: A&Ox3, nonfocal, GARCIA x 4  Respiratory: CTA B/L, normal respiratory effort, no wheezes, crackles, rales  CV: RRR, S1S2, no murmurs, rubs or gallops  Abdominal: Soft, NT, ND +BS, Last BM  Extremities: No edema, + peripheral pulses, LUE fistula with palpable but diminished thrill and audible bruit, 3 cm swelling is soft, non-tender    LABS:                        10.7   7.10  )-----------( 167      ( 10 Oct 2017 06:00 )             33.9     Hb Trend: 10.7<--, 9.9<--, 10.2<--, 9.2<--, 9.5<--  WBC Trend: 7.10<--, 6.58<--, 7.63<--  Plt Trend: 167<--, 168<--, 157<--, 129<--, 143<--          10-10    135  |  96<L>  |  62<H>  ----------------------------<  164<H>  4.2   |  20<L>  |  3.87<H>    Ca    8.8      10 Oct 2017 06:00  Phos  5.8     10-10  Mg     1.9     10-10        PT/INR - ( 10 Oct 2017 06:00 )   PT: 18.6 SEC;   INR: 1.64          PTT - ( 10 Oct 2017 06:00 )  PTT:124.4 SEC    CAPILLARY BLOOD GLUCOSE  182 (10 Oct 2017 08:37)  187 (10 Oct 2017 02:00)  260 (09 Oct 2017 22:25)  70 (09 Oct 2017 17:21)  211 (09 Oct 2017 13:30)  288 (09 Oct 2017 10:54)    RADIOLOGY & ADDITIONAL TESTS:  < from: [facility] Duplex Hemodialysis Access, Left. (10.09.17 @ 09:26) >  Summary/Impressions:  Hyperechoic material and narrowing noted at the  anastomotic site of the left upper extremity radiocephalic  arteriovenous fistula, which may suggest stenosis/fibrosis  and associated partial thrombosis.   The inflow and  outflow vessels are otherwise patent.  Complex, avascularcollection noted near anastomotic sute,  most consistent with hematoma or seroma.  < end of copied text >      Imaging Personally Reviewed:  [x] YES  [ ] NO [ ] No New Imaging Last 24hrs    Consultant(s) Notes Reviewed:  [x] YES  [ ] NO    Care Discussed with Consultants/Other Providers [x] YES  [ ] NO  - Vascular Surgery: Will follow up regarding duplex findings

## 2017-10-10 NOTE — PROGRESS NOTE ADULT - SUBJECTIVE AND OBJECTIVE BOX
Chief Complaint:     History:    MEDICATIONS  (STANDING):  calcitriol   Capsule 0.5 MICROGram(s) Oral daily  cycloSPORINE  (SandIMMUNE) 100 milliGRAM(s) Oral daily  dextrose 50% Injectable 12.5 Gram(s) IV Push once  dextrose 50% Injectable 25 Gram(s) IV Push once  dextrose 50% Injectable 25 Gram(s) IV Push once  docusate sodium 100 milliGRAM(s) Oral three times a day  epoetin valente Injectable 6000 Unit(s) IV Push <User Schedule>  heparin  Infusion.  Unit(s)/Hr (16 mL/Hr) IV Continuous <Continuous>  hydrALAZINE 50 milliGRAM(s) Oral three times a day  insulin glargine Injectable (LANTUS) 24 Unit(s) SubCutaneous <User Schedule>  insulin glargine Injectable (LANTUS) 28 Unit(s) SubCutaneous <User Schedule>  insulin lispro (HumaLOG) corrective regimen sliding scale   SubCutaneous three times a day before meals  insulin lispro (HumaLOG) corrective regimen sliding scale   SubCutaneous at bedtime  insulin lispro Injectable (HumaLOG) 3 Unit(s) SubCutaneous at bedtime  insulin lispro Injectable (HumaLOG) 13 Unit(s) SubCutaneous before breakfast  insulin lispro Injectable (HumaLOG) 8 Unit(s) SubCutaneous before dinner  insulin lispro Injectable (HumaLOG) 9 Unit(s) SubCutaneous before lunch  metoprolol 50 milliGRAM(s) Oral two times a day  predniSONE   Tablet 5 milliGRAM(s) Oral daily  senna 2 Tablet(s) Oral at bedtime  tamsulosin 0.4 milliGRAM(s) Oral at bedtime    MEDICATIONS  (PRN):  acetaminophen   Tablet. 650 milliGRAM(s) Oral every 6 hours PRN mild to moderate pain  dextrose Gel 1 Dose(s) Oral once PRN Blood Glucose LESS THAN 70 milliGRAM(s)/deciLiter  dextrose Gel 1 Dose(s) Oral once PRN Blood Glucose LESS THAN 70 milliGRAM(s)/deciLiter  glucagon  Injectable 1 milliGRAM(s) IntraMuscular once PRN Glucose <70 milliGRAM(s)/deciLiter  naphazoline/pheniramine Solution 1 Drop(s) Both EYES four times a day PRN Itchy Eyes      Allergies    No Known Allergies    Intolerances      Review of Systems:  Constitutional: No fever  Eyes: No blurry vision  Neuro: No tremors  HEENT: No pain  Cardiovascular: No chest pain, palpitations  Respiratory: No SOB, no cough  GI: No nausea, vomiting, abdominal pain  : No dysuria  Skin: no rash  Psych: no depression  Endocrine: no polyuria, polydipsia  Hem/lymph: no swelling  Osteoporosis: no fractures    ALL OTHER SYSTEMS REVIEWED AND NEGATIVE    UNABLE TO OBTAIN    PHYSICAL EXAM:  VITALS: T(C): 36.8 (10-10-17 @ 05:25)  T(F): 98.3 (10-10-17 @ 05:25), Max: 98.5 (10-09-17 @ 21:13)  HR: 93 (10-10-17 @ 05:25) (82 - 100)  BP: 148/70 (10-10-17 @ 05:25) (119/77 - 148/70)  RR:  (18 - 18)  SpO2:  (100% - 100%)  Wt(kg): --  GENERAL: NAD, well-groomed, well-developed  EYES: No proptosis, no lid lag, anicteric  HEENT:  Atraumatic, Normocephalic, moist mucous membranes  THYROID: Normal size, no palpable nodules  RESPIRATORY: Clear to auscultation bilaterally; No rales, rhonchi, wheezing, or rubs  CARDIOVASCULAR: Regular rate and rhythm; No murmurs; no peripheral edema  GI: Soft, nontender, non distended, normal bowel sounds  SKIN: Dry, intact, No rashes or lesions  MUSCULOSKELETAL: Full range of motion, normal strength  NEURO: sensation intact, extraocular movements intact, no tremor, normal reflexes  PSYCH: Alert and oriented x 3, normal affect, normal mood  CUSHING'S SIGNS: no striae    CAPILLARY BLOOD GLUCOSE  283 (10-10 @ 12:23)  182 (10-10 @ 08:37)  187 (10-10 @ 02:00)  260 (10-09 @ 22:25)  70 (10-09 @ 17:21)  211 (10-09 @ 13:30)  288 (10-09 @ 10:54)  283 (10-09 @ 09:02)  172 (10-09 @ 02:00)  78 (10-08 @ 22:13)  105 (10-08 @ 17:08)  265 (10-08 @ 12:31)  175 (10-08 @ 09:26)  81 (10-08 @ 02:11)  120 (10-07 @ 21:44)  114 (10-07 @ 16:54)  144 (10-07 @ 14:14)      10-10    135  |  96<L>  |  62<H>  ----------------------------<  164<H>  4.2   |  20<L>  |  3.87<H>    EGFR if : 19  EGFR if non : 17    Ca    8.8      10-10  Mg     1.9     10-10  Phos  5.8     10-10            Thyroid Function Tests:      Hemoglobin A1C, Whole Blood: 8.0 % <H> [4.0 - 5.6] (08-16-17 @ 06:30)      Case discussed with: Chief Complaint: t1dm    History: no n/v, tolerates PO    MEDICATIONS  (STANDING):  calcitriol   Capsule 0.5 MICROGram(s) Oral daily  cycloSPORINE  (SandIMMUNE) 100 milliGRAM(s) Oral daily  dextrose 50% Injectable 12.5 Gram(s) IV Push once  dextrose 50% Injectable 25 Gram(s) IV Push once  dextrose 50% Injectable 25 Gram(s) IV Push once  docusate sodium 100 milliGRAM(s) Oral three times a day  epoetin valente Injectable 6000 Unit(s) IV Push <User Schedule>  heparin  Infusion.  Unit(s)/Hr (16 mL/Hr) IV Continuous <Continuous>  hydrALAZINE 50 milliGRAM(s) Oral three times a day  insulin glargine Injectable (LANTUS) 24 Unit(s) SubCutaneous <User Schedule>  insulin glargine Injectable (LANTUS) 28 Unit(s) SubCutaneous <User Schedule>  insulin lispro (HumaLOG) corrective regimen sliding scale   SubCutaneous three times a day before meals  insulin lispro (HumaLOG) corrective regimen sliding scale   SubCutaneous at bedtime  insulin lispro Injectable (HumaLOG) 3 Unit(s) SubCutaneous at bedtime  insulin lispro Injectable (HumaLOG) 13 Unit(s) SubCutaneous before breakfast  insulin lispro Injectable (HumaLOG) 8 Unit(s) SubCutaneous before dinner  insulin lispro Injectable (HumaLOG) 9 Unit(s) SubCutaneous before lunch  metoprolol 50 milliGRAM(s) Oral two times a day  predniSONE   Tablet 5 milliGRAM(s) Oral daily  senna 2 Tablet(s) Oral at bedtime  tamsulosin 0.4 milliGRAM(s) Oral at bedtime    MEDICATIONS  (PRN):  acetaminophen   Tablet. 650 milliGRAM(s) Oral every 6 hours PRN mild to moderate pain  dextrose Gel 1 Dose(s) Oral once PRN Blood Glucose LESS THAN 70 milliGRAM(s)/deciLiter  dextrose Gel 1 Dose(s) Oral once PRN Blood Glucose LESS THAN 70 milliGRAM(s)/deciLiter  glucagon  Injectable 1 milliGRAM(s) IntraMuscular once PRN Glucose <70 milliGRAM(s)/deciLiter  naphazoline/pheniramine Solution 1 Drop(s) Both EYES four times a day PRN Itchy Eyes      Allergies    No Known Allergies    Intolerances      Review of Systems:  ALL OTHER SYSTEMS REVIEWED AND NEGATIVE      PHYSICAL EXAM:  VITALS: T(C): 36.8 (10-10-17 @ 05:25)  T(F): 98.3 (10-10-17 @ 05:25), Max: 98.5 (10-09-17 @ 21:13)  HR: 93 (10-10-17 @ 05:25) (82 - 100)  BP: 148/70 (10-10-17 @ 05:25) (119/77 - 148/70)  RR:  (18 - 18)  SpO2:  (100% - 100%)  Wt(kg): --  GENERAL: NAD, well-groomed, well-developed  GI: Soft, nontender, non distended, normal bowel sounds  SKIN: Dry, intact, No rashes or lesions  PSYCH: Alert and oriented x 3, normal affect, normal mood      CAPILLARY BLOOD GLUCOSE  283 (10-10 @ 12:23)  182 (10-10 @ 08:37)  187 (10-10 @ 02:00)  260 (10-09 @ 22:25)  70 (10-09 @ 17:21)  211 (10-09 @ 13:30)  288 (10-09 @ 10:54)  283 (10-09 @ 09:02)  172 (10-09 @ 02:00)  78 (10-08 @ 22:13)  105 (10-08 @ 17:08)  265 (10-08 @ 12:31)  175 (10-08 @ 09:26)  81 (10-08 @ 02:11)  120 (10-07 @ 21:44)  114 (10-07 @ 16:54)  144 (10-07 @ 14:14)      10-10    135  |  96<L>  |  62<H>  ----------------------------<  164<H>  4.2   |  20<L>  |  3.87<H>    EGFR if : 19  EGFR if non : 17    Ca    8.8      10-10  Mg     1.9     10-10  Phos  5.8     10-10        Hemoglobin A1C, Whole Blood: 8.0 % <H> [4.0 - 5.6] (08-16-17 @ 06:30)

## 2017-10-10 NOTE — PROGRESS NOTE ADULT - I WAS PHYSICALLY PRESENT FOR THE KEY PORTIONS OF THE EVALUATION AND MANAGEMENT (E/M) SERVICE PROVIDED.  I AGREE WITH THE ABOVE HISTORY, PHYSICAL, AND PLAN WHICH I HAVE REVIEWED AND EDITED WHERE APPROPRIATE

## 2017-10-10 NOTE — PROGRESS NOTE ADULT - ATTENDING COMMENTS
Patient was seen and examined personally by me. I have discussed the plan and reviewed the resident's note and agree with the above physical exam findings including assessment and plan except as indicated below.    Patient anxious to leave. Was being bridged to coumadin with heparin drip. Has been on heparin drip for >7 days. Risk of stroke very minimal without bridging especially in this patient who has converted back to NSR, no clear indication to continue heparin drip at this time even though INR not therapeutic. Will discharge with PO coumadin 8.5mg and patient to have INR checked on Fri 10/13 at 11:30am.    Vascular surgery to followup regarding [facility] duplex findings prior to DC.    DC time: 35 min Patient was seen and examined personally by me. I have discussed the plan and reviewed the resident's note and agree with the above physical exam findings including assessment and plan except as indicated below.    Patient anxious to leave. Was being bridged to coumadin with heparin drip. Has been on heparin drip for >7 days. Risk of stroke very minimal without bridging especially in this patient who has converted back to NSR, no clear indication to continue heparin drip at this time even though INR not therapeutic. Will discharge with PO coumadin 8.5mg and patient to have INR checked on Fri 10/13 at 11:30am with PCP.    Vascular surgery to followup regarding VA duplex findings prior to DC.    DC time: 35 min

## 2017-10-10 NOTE — PROGRESS NOTE ADULT - ASSESSMENT
51 yo male s/p LUE AVF creation (10/2) with soft nodule, U/S yesterday indicating hematoma v. seroma.     - Fistula ultrasound reviewed; no pseudoaneurysm. Complex avascular collection hematoma v. seroma. Elevated velocities noted at the anastomotic site expected in an immature AVF, not suggestive of a stenosis. AVF still with thrill.   - Discussed with patient and primary medical team; Please follow up with Dr. Staples in 6 weeks       Dr. Gustabo Staples:        [address] [facility]       [address]	       [place] [address]       Tel: [phone]    - Discussed with Fellow, Dr. Oro  - Please call with additional questions    LOWELL Ibarra MD PGY2  Vascular surgery: [id number]

## 2017-10-10 NOTE — PROVIDER CONTACT NOTE (CRITICAL VALUE NOTIFICATION) - ASSESSMENT
Patient sitting up in the chair waiting for dinner. Denies feeling weakness. Patient remains alert and oriented x4. vs stable.
Pt AOx4, VSS on room air, no distress noted, no s/s bleeding noted
Pt astmptomatic
Pt stable and comfortable in bed.
.1
Pt AOx4, VSS on room air, no acute distress, no s/s bleeding.
Pt VSS. With no acute signs or symptoms of distress.
Sw0v1-5. NSR, dyspnea on exertion, sob at times.
aPTT:126.8
no s/s of bleeding noted, pt denies any chest pain or generalized pain, VSS. axox4 at this time
no signs of bleeding
pt A&OX4. VS stable. no signs or symptoms of bleeding noted.
pt on heparin gtt, ptt is 154.9
pt stable, a/oX4, no signs of distress
pt stable, no signs of bleeding

## 2017-10-10 NOTE — PROVIDER CONTACT NOTE (CRITICAL VALUE NOTIFICATION) - TEST AND RESULT REPORTED:
PTT >200
pH 7.17 VBG
.1
FS = 55
H/H 6.8/21.2
Hgb 6.6 Hgb 20.2
PTT > 200
PTT greater than 200
aPTT 38.8
aPTT >200.00
aPTT:126.8
glucose 473
pTT 152.1
ptt 154.9
ptt result 124.4

## 2017-10-10 NOTE — PROGRESS NOTE ADULT - ASSESSMENT
52 M with Type 1 DM HbA1C 8 admitted for AMS s/p cardiac arrest now on HD presents with labile FS. 52 M with Type 1 DM HbA1C 8 admitted for AMS s/p cardiac arrest now on HD presents with labile FS.    Advise Patient on Discharge note:  - Hypoglycemia Management : Please check your fingersticks every morning or if you are not feeling well.  If your fingerstick is >300 mg/dl x 3 or more readings, please contact (provider) Dr. Dee__. If your fingerstick low, <70 mg/dl and/or you have symptoms of very low blood sugar, FIRST drink 1/2 cup of apple juice, (or take 4 glucose tabs/tube of glucose gel) and recheck fingerstick in 15 minutes.  Repeat these steps until blood sugar is above 100 mg/dl, IF NECESSARY.  Then call provider listed above to discuss low blood sugar at (phone number)__[phone]    -What to expect at follow appointment:  Please bring a log of your fingersticks and/or your glucometer to your appointment.  Your blod Clixtr tracking will help your diabetes team determine the best plan.    Diabetes Supplies for discharge:  Insulin regimen:  Lantus SOLOSTAR PEN 28 units SQ QAM   Novolog FLEXPEN 10 units SQ TID AC        Additional Discharge recs:        - Alcohol pads  - Test strips  4x/day  - Lancets      _4x/day  - BD jun needles_4x daily    Discharge Appointments:  F/u with Dr. Dee at [address] [facility] [address].  Appt Tuesday October 17th at 130pm. [phone]

## 2017-10-10 NOTE — PROGRESS NOTE ADULT - PROBLEM/PLAN-1
DISPLAY PLAN FREE TEXT

## 2017-10-10 NOTE — PROVIDER CONTACT NOTE (CRITICAL VALUE NOTIFICATION) - SITUATION
Patients FS before dinner was 51 and repeat was 55
Pt's PTT > 200 on heparin gtt
.1  held for 60mins   down 3ml/hr to 16ml/hr.
Hgb 6.6 Hgb 20.2
PTT drawn for heparin gtt
PTT greater than 200
Patiented admitted with AMS, new dialysis.
Pt currently on heparin gtt currently infusing at 23ml/hr.
Pt on Heparin running at 19ml/hr, latest aPTT result came 38.8. As per nomogram, it should increased to 4ml/hr. And to give 7000 units bolus PRN if below 40.
Pt's PTT > 200
aPTT:126.8
elevated glucose to 473
new onset afib dx
pt ptt: 154.9
pt's pTT lab value is 152.1

## 2017-10-10 NOTE — PROGRESS NOTE ADULT - PROBLEM SELECTOR PLAN 1
-self-converted to NSR 9/21, tele reviewed and pt has been in NSR  -currently on metoprolol 50 bid w/ hold parameters (continue lopressor when NPO and before HD sessions given he has tendency to go into afib w/ RVR if he misses doses)  -appreciate EP recs - Repeat TTE 9/25 demonstrated small pericardial effusion and small right pleural effusion. TTE w/ EF 65%  - would consider d/c heparin gtt today and d/c home with coumadin to f/u as outpatient -self-converted to NSR 9/21, tele reviewed and pt has been in NSR  -currently on metoprolol 50 bid w/ hold parameters (continue lopressor when NPO and before HD sessions given he has tendency to go into afib w/ RVR if he misses doses)  -appreciate EP recs - Repeat TTE 9/25 demonstrated small pericardial effusion and small right pleural effusion. TTE w/ EF 65%  - would consider d/c heparin gtt today and d/c home with coumadin to f/u as outpatient. Has PCP appt on Fri 10/13 at 11:30am for INR check

## 2017-10-10 NOTE — PROGRESS NOTE ADULT - PROBLEM/PLAN-5
DISPLAY PLAN FREE TEXT

## 2017-10-10 NOTE — PROVIDER CONTACT NOTE (CRITICAL VALUE NOTIFICATION) - NAME OF MD/NP/PA/DO NOTIFIED:
MD Armijo team 3
MD Gramajo
APURVA MATA MD
Bart HAYDEN, team, 3
Davidson Ayala MD
Dr. DENIS bloom
Dr. Montenegro
Evelyn Ayala team 3
Gemini Arriaga MD
HS3 MD
Leonardo Maya MD
MD Fay Team 1 @ [id number], covering team 3
MD Sanabria
NASEEM CHEUNG
Team 3- aBrt HAYDEN
Team 3. Dr. Steen

## 2017-10-10 NOTE — PROVIDER CONTACT NOTE (CRITICAL VALUE NOTIFICATION) - BACKGROUND
Patient with hx of DM and renal transplant.
Pt admitted for AMS, + new onset afib, on heparin gtt
PMH of CKD, DM, Renal Transplant, HTN
Patient s/p dialysis today
Pt on heparin gtt @ 24 ml/hr.
Pt with history of afib. Currently on heparin gtt.
aPTT:126.8
heparin drip for new onset of afib
pt admitted for altered mental status
pt has hx of DM type 1
pt in a-fib, heparin running @ 22mL/hr
pt is in aflutter, HR 90s. pt was on heparin gtt at 20ml/hr.
pt on heparin gtt at 21 ml/hr

## 2017-10-10 NOTE — PROGRESS NOTE ADULT - PROBLEM SELECTOR PLAN 1
target bg 100-180m mg/dl  increase humalog pre breakfast to 13 units daily  Decrease humalog pre lunch to 9 units sq daily,   c/w humalog 8 units pre dinner daily  Patient is DM1, Lantus and Huamlog premeal should not be held.  If BG's are low or patietn has hypoglycemia, recommend decreasing dose by 20%.  Will f/u

## 2017-10-10 NOTE — PROGRESS NOTE ADULT - NSHPATTENDINGPLANDISCUSS_GEN_ALL_CORE
Team
patient and family (brother, sister) at bedside
NP
patient and HD RN
Dr. Smith
Dr. Coronado
patient
Dr. Marquez
Dr. Marquez
Team
Team
allyson
patient and medical resident
patient, brother at bedside, Team 3
Pt
patient, Team 3
Pt and sister
patient and family at bedside
patient and medical resident
patient and medical resident
Dr. Steen
Pt and sister, RN, nursing supervisor
Pt, sister, Nephrology, vascular, SW, RN
Team
Pt
Pt and sister
Pt and sister.
Dr. Steen
Pt
Pt
Pt and sister
Pt
Pt
Team
patient and family at bedside
patient and medical resident

## 2017-10-10 NOTE — PROGRESS NOTE ADULT - PROVIDER SPECIALTY LIST ADULT
Anesthesia
Electrophysiology
Endocrinology
Hospitalist
Internal Medicine
MICU
MICU
Nephrology
Surgery
Vascular Surgery
Electrophysiology
Endocrinology
Internal Medicine
Endocrinology
Endocrinology
Internal Medicine
Internal Medicine
Nephrology
Internal Medicine
Nephrology
Internal Medicine

## 2017-10-11 RX ORDER — ATORVASTATIN CALCIUM 80 MG/1
1 TABLET, FILM COATED ORAL
Qty: 30 | Refills: 3 | OUTPATIENT
Start: 2017-10-11 | End: 2018-02-07

## 2017-10-19 ENCOUNTER — APPOINTMENT (OUTPATIENT)
Dept: NEPHROLOGY | Facility: CLINIC | Age: 52
End: 2017-10-19

## 2017-10-22 ENCOUNTER — EMERGENCY (EMERGENCY)
Facility: HOSPITAL | Age: 52
LOS: 1 days | Discharge: HOME | End: 2017-10-22
Attending: EMERGENCY MEDICINE | Admitting: EMERGENCY MEDICINE
Payer: MEDICARE

## 2017-10-22 VITALS
HEART RATE: 103 BPM | SYSTOLIC BLOOD PRESSURE: 210 MMHG | DIASTOLIC BLOOD PRESSURE: 90 MMHG | OXYGEN SATURATION: 100 % | RESPIRATION RATE: 17 BRPM

## 2017-10-22 VITALS
TEMPERATURE: 98 F | RESPIRATION RATE: 15 BRPM | SYSTOLIC BLOOD PRESSURE: 194 MMHG | HEART RATE: 93 BPM | OXYGEN SATURATION: 99 % | DIASTOLIC BLOOD PRESSURE: 91 MMHG

## 2017-10-22 DIAGNOSIS — Z94.0 KIDNEY TRANSPLANT STATUS: Chronic | ICD-10-CM

## 2017-10-22 DIAGNOSIS — Z90.89 ACQUIRED ABSENCE OF OTHER ORGANS: Chronic | ICD-10-CM

## 2017-10-22 LAB
ALBUMIN SERPL ELPH-MCNC: 3.6 G/DL — SIGNIFICANT CHANGE UP (ref 3.3–5)
ALP SERPL-CCNC: 135 U/L — HIGH (ref 40–120)
ALT FLD-CCNC: 146 U/L — HIGH (ref 4–41)
AST SERPL-CCNC: 120 U/L — HIGH (ref 4–40)
BASE EXCESS BLDV CALC-SCNC: 4.7 MMOL/L — SIGNIFICANT CHANGE UP
BASOPHILS # BLD AUTO: 0.04 K/UL — SIGNIFICANT CHANGE UP (ref 0–0.2)
BASOPHILS NFR BLD AUTO: 0.6 % — SIGNIFICANT CHANGE UP (ref 0–2)
BILIRUB SERPL-MCNC: 0.3 MG/DL — SIGNIFICANT CHANGE UP (ref 0.2–1.2)
BLOOD GAS VENOUS - CREATININE: 2.97 MG/DL — HIGH (ref 0.5–1.3)
BUN SERPL-MCNC: 51 MG/DL — HIGH (ref 7–23)
CALCIUM SERPL-MCNC: 8.8 MG/DL — SIGNIFICANT CHANGE UP (ref 8.4–10.5)
CHLORIDE BLDV-SCNC: 102 MMOL/L — SIGNIFICANT CHANGE UP (ref 96–108)
CHLORIDE SERPL-SCNC: 100 MMOL/L — SIGNIFICANT CHANGE UP (ref 98–107)
CO2 SERPL-SCNC: 26 MMOL/L — SIGNIFICANT CHANGE UP (ref 22–31)
CREAT SERPL-MCNC: 3.03 MG/DL — HIGH (ref 0.5–1.3)
EOSINOPHIL # BLD AUTO: 0.13 K/UL — SIGNIFICANT CHANGE UP (ref 0–0.5)
EOSINOPHIL NFR BLD AUTO: 2 % — SIGNIFICANT CHANGE UP (ref 0–6)
GAS PNL BLDV: 138 MMOL/L — SIGNIFICANT CHANGE UP (ref 136–146)
GLUCOSE BLDV-MCNC: 102 — HIGH (ref 70–99)
GLUCOSE SERPL-MCNC: 101 MG/DL — HIGH (ref 70–99)
HCO3 BLDV-SCNC: 27 MMOL/L — SIGNIFICANT CHANGE UP (ref 20–27)
HCT VFR BLD CALC: 36.1 % — LOW (ref 39–50)
HCT VFR BLDV CALC: 35.8 % — LOW (ref 39–51)
HGB BLD-MCNC: 11 G/DL — LOW (ref 13–17)
HGB BLDV-MCNC: 11.6 G/DL — LOW (ref 13–17)
IMM GRANULOCYTES # BLD AUTO: 0.02 # — SIGNIFICANT CHANGE UP
IMM GRANULOCYTES NFR BLD AUTO: 0.3 % — SIGNIFICANT CHANGE UP (ref 0–1.5)
LACTATE BLDV-MCNC: 1.1 MMOL/L — SIGNIFICANT CHANGE UP (ref 0.5–2)
LYMPHOCYTES # BLD AUTO: 0.77 K/UL — LOW (ref 1–3.3)
LYMPHOCYTES # BLD AUTO: 11.6 % — LOW (ref 13–44)
MAGNESIUM SERPL-MCNC: 1.8 MG/DL — SIGNIFICANT CHANGE UP (ref 1.6–2.6)
MCHC RBC-ENTMCNC: 26.8 PG — LOW (ref 27–34)
MCHC RBC-ENTMCNC: 30.5 % — LOW (ref 32–36)
MCV RBC AUTO: 88 FL — SIGNIFICANT CHANGE UP (ref 80–100)
MONOCYTES # BLD AUTO: 0.62 K/UL — SIGNIFICANT CHANGE UP (ref 0–0.9)
MONOCYTES NFR BLD AUTO: 9.3 % — SIGNIFICANT CHANGE UP (ref 2–14)
NEUTROPHILS # BLD AUTO: 5.08 K/UL — SIGNIFICANT CHANGE UP (ref 1.8–7.4)
NEUTROPHILS NFR BLD AUTO: 76.2 % — SIGNIFICANT CHANGE UP (ref 43–77)
NRBC # FLD: 0 — SIGNIFICANT CHANGE UP
PCO2 BLDV: 53 MMHG — HIGH (ref 41–51)
PH BLDV: 7.37 PH — SIGNIFICANT CHANGE UP (ref 7.32–7.43)
PHOSPHATE SERPL-MCNC: 4.9 MG/DL — HIGH (ref 2.5–4.5)
PLATELET # BLD AUTO: 315 K/UL — SIGNIFICANT CHANGE UP (ref 150–400)
PMV BLD: 8.6 FL — SIGNIFICANT CHANGE UP (ref 7–13)
PO2 BLDV: 26 MMHG — LOW (ref 35–40)
POTASSIUM BLDV-SCNC: 3.1 MMOL/L — LOW (ref 3.4–4.5)
POTASSIUM SERPL-MCNC: 3.5 MMOL/L — SIGNIFICANT CHANGE UP (ref 3.5–5.3)
POTASSIUM SERPL-SCNC: 3.5 MMOL/L — SIGNIFICANT CHANGE UP (ref 3.5–5.3)
PROT SERPL-MCNC: 6.7 G/DL — SIGNIFICANT CHANGE UP (ref 6–8.3)
RBC # BLD: 4.1 M/UL — LOW (ref 4.2–5.8)
RBC # FLD: 15.8 % — HIGH (ref 10.3–14.5)
SAO2 % BLDV: 35.1 % — LOW (ref 60–85)
SODIUM SERPL-SCNC: 142 MMOL/L — SIGNIFICANT CHANGE UP (ref 135–145)
WBC # BLD: 6.66 K/UL — SIGNIFICANT CHANGE UP (ref 3.8–10.5)
WBC # FLD AUTO: 6.66 K/UL — SIGNIFICANT CHANGE UP (ref 3.8–10.5)

## 2017-10-22 PROCEDURE — 93010 ELECTROCARDIOGRAM REPORT: CPT

## 2017-10-22 PROCEDURE — 99285 EMERGENCY DEPT VISIT HI MDM: CPT | Mod: 25,GC

## 2017-10-22 PROCEDURE — 70450 CT HEAD/BRAIN W/O DYE: CPT | Mod: 26

## 2017-10-22 RX ORDER — METOPROLOL TARTRATE 50 MG
50 TABLET ORAL ONCE
Qty: 0 | Refills: 0 | Status: COMPLETED | OUTPATIENT
Start: 2017-10-22 | End: 2017-10-22

## 2017-10-22 RX ORDER — HYDRALAZINE HCL 50 MG
50 TABLET ORAL ONCE
Qty: 0 | Refills: 0 | Status: COMPLETED | OUTPATIENT
Start: 2017-10-22 | End: 2017-10-22

## 2017-10-22 RX ORDER — WARFARIN SODIUM 2.5 MG/1
8.5 TABLET ORAL ONCE
Qty: 0 | Refills: 0 | Status: COMPLETED | OUTPATIENT
Start: 2017-10-22 | End: 2017-10-22

## 2017-10-22 RX ORDER — CALCIUM ACETATE 667 MG
667 TABLET ORAL ONCE
Qty: 0 | Refills: 0 | Status: COMPLETED | OUTPATIENT
Start: 2017-10-22 | End: 2017-10-22

## 2017-10-22 RX ADMIN — WARFARIN SODIUM 8.5 MILLIGRAM(S): 2.5 TABLET ORAL at 23:26

## 2017-10-22 RX ADMIN — Medication 50 MILLIGRAM(S): at 23:26

## 2017-10-22 RX ADMIN — Medication 667 MILLIGRAM(S): at 23:26

## 2017-10-22 NOTE — ED ADULT NURSE NOTE - CHIEF COMPLAINT QUOTE
Pt found seizing by family, found to have FS of 21 by EMS.  Pt given 25g of D50 by EMS.  Pt A&Ox3 at present time.    FS at triage= 104

## 2017-10-22 NOTE — ED PROVIDER NOTE - ATTENDING CONTRIBUTION TO CARE
52M with ESRD on dialysis (TTS) without missed dialysis visits presents s/p episode of seizure-like activity, found to be hypoglycemic by EMS with FS of 21. pt initially lethargic, difficult to arouse, given D50, with rapid improvement in mental status. pt states he has had episodes of hypoglycemia in past but no seizure-like activity. denies recent f/c, n/v/d. denies headache, dizziness, numbness, weakness. is legally blind, no change in vision.    PE: NAD, NCAT, MMM, Trachea midline, Normal conjunctiva, lungs CTAB, S1/S2 RRR, LUE fistula with palpable thrill no erythema or warmth, Abdomen Soft, NTND, No rebound/guarding, No LE edema, No deformity of extremities, No rashes,  No focal motor or sensory deficits. CN II-XII intact. Visual fields intact. EOMI, PERRLA. Facial sensation equal bilat. Smile and eye closure equal bilat. Hearing intact bilat. Palate elevation equal, tongue protrusion midline. Lateral head rotation equal bilat. 5/5 strength UE and LE bilat. No pronator drift. Normal finger to nose.    52M with ESRD on TTS dialysis, insulin dependent DM, presents with seizure-like episode in setting of hypoglycemia. most c/w hypoglycemia-induced seizure. no neuro deficits on exam, AAO x 3, no recent infectious symptoms. will check labs eval for anemia, electrolyte abnormality, uremia, leukocytosis. very low suspicion infectious etiology. check ct head eval for bleed/infarct though much lower suspicion. with episodes of hypoglycemia/diaphoresis overnight over last few nights, concern for recurrent hypoglycemia, plan to admit. - Seven Gandhi MD

## 2017-10-22 NOTE — ED ADULT TRIAGE NOTE - CHIEF COMPLAINT QUOTE
Pt found seizing by family, found to have FS of 21 by EMS.  Pt given 25g of D50 by EMS.    FS at triage= 104 Pt found seizing by family, found to have FS of 21 by EMS.  Pt given 25g of D50 by EMS.  Pt A&Ox3 at present time.    FS at triage= 104

## 2017-10-22 NOTE — ED PROVIDER NOTE - OBJECTIVE STATEMENT
52yoM hx of ESRD on dialysis (T/T/S), DM on insulin, legally blind pw hypoglycemic seizure. per patient he had lunch, felt okay, then later felt sick and went to lay down. woke up to EMS. Per family, she heard a banging and found the patient shaking all over, foaming at the mouth and unresponsive. EMS found finger stick to be 21. game 1amp of D50. pt came too. currently pt has no complaints. says over last few days he wakes up at night and his finger stick is in the 60s. never had this problem before and has been slowly decreasing his Lantus.

## 2017-10-22 NOTE — ED PROVIDER NOTE - PROGRESS NOTE DETAILS
Long discussion conducted with patient regarding necessity to have patient admitted for glucose monitoring, as crystal states he has been waking up diaphoretic, with low glucose at night. Long-acting insulin most likely improperly dosed. Patient absolutely refuses to stay. The patient is deciding to leave against my medical advice.  I have informed the patient about the risks, benefits, and alternatives to the evaluation and treatment of their condition, including monitoring overnight and d/c in morning.  The patient reports understanding of these issues and demonstrates the capacity and insight to make important medical decisions. He states that "I accept full responsibility for my actions and wish to go home. I feel like I can better manage this at home." The necessity to follow up with PMD/Clinic tomorrow was explained, the patient will contact his PMD in morning to set up appointment for tomorrow. The patient understands that this decision to leave against medical advice can be changed at any time and the patient can return for re-evaluation and further treatment with any changes in condition or concerns.  The patient understands all the reasons to return to the Emergency Department, including any concerns at all. - Seven Gandhi MD Long discussion conducted with patient regarding necessity to have patient admitted for glucose monitoring, as patient states he has been waking up diaphoretic, with low glucose at night. Long-acting insulin most likely improperly dosed. Patient absolutely refuses to stay. The patient is deciding to leave against my medical advice.  I have informed the patient about the risks, benefits, and alternatives to the evaluation and treatment of their condition, including monitoring overnight with FS and d/c in morning. The patient reports understanding of these concerns and demonstrates the capacity and insight to make important medical decisions. He states that "I accept full responsibility for my actions and wish to go home. I feel like I can better manage this at home." The necessity to follow up with PMD/Clinic tomorrow was explained, the patient will contact his PMD in morning to set up appointment for tomorrow. The patient understands that this decision to leave against medical advice can be changed at any time and the patient can return for re-evaluation and further treatment with any changes in condition or concerns.  The patient understands all the reasons to return to the Emergency Department, including any concerns at all. - Seven Gandhi MD

## 2017-10-22 NOTE — ED PROVIDER NOTE - MEDICAL DECISION MAKING DETAILS
52yoM hx of ESRD pw hypoglycemia seizure likely 2/2 too much insulin. will give food, check labs, likely admit as patient high risk and needs insulin adjusted.

## 2017-10-23 LAB — CYCLOSPORINE SER-MCNC: 81 NG/ML — LOW (ref 150–400)

## 2017-10-24 ENCOUNTER — APPOINTMENT (OUTPATIENT)
Dept: VASCULAR SURGERY | Facility: CLINIC | Age: 52
End: 2017-10-24
Payer: MEDICARE

## 2017-10-24 VITALS — SYSTOLIC BLOOD PRESSURE: 171 MMHG | DIASTOLIC BLOOD PRESSURE: 80 MMHG | HEART RATE: 93 BPM

## 2017-10-24 VITALS — RESPIRATION RATE: 17 BRPM | WEIGHT: 196 LBS | BODY MASS INDEX: 26.55 KG/M2 | TEMPERATURE: 98.6 F | HEIGHT: 72 IN

## 2017-10-24 PROCEDURE — 93990 DOPPLER FLOW TESTING: CPT

## 2017-10-24 PROCEDURE — 99024 POSTOP FOLLOW-UP VISIT: CPT

## 2017-10-27 ENCOUNTER — RX RENEWAL (OUTPATIENT)
Age: 52
End: 2017-10-27

## 2017-11-15 ENCOUNTER — RX RENEWAL (OUTPATIENT)
Age: 52
End: 2017-11-15

## 2017-11-15 DIAGNOSIS — I48.91 UNSPECIFIED ATRIAL FIBRILLATION: ICD-10-CM

## 2017-11-16 ENCOUNTER — OUTPATIENT (OUTPATIENT)
Dept: OUTPATIENT SERVICES | Facility: HOSPITAL | Age: 52
LOS: 1 days | Discharge: HOME | End: 2017-11-16

## 2017-11-16 DIAGNOSIS — Z94.0 KIDNEY TRANSPLANT STATUS: Chronic | ICD-10-CM

## 2017-11-16 DIAGNOSIS — Z90.89 ACQUIRED ABSENCE OF OTHER ORGANS: Chronic | ICD-10-CM

## 2017-11-20 LAB
INR BLD: 3.11 — HIGH (ref 0.88–1.17)
PROTHROM AB SERPL-ACNC: 35.6 SEC — HIGH (ref 9.8–13.1)

## 2017-11-20 NOTE — PROGRESS NOTE ADULT - PROBLEM SELECTOR PLAN 5
Problem: Cardiac: ACS (Acute Coronary Syndrome) (Adult)  Goal: Signs and Symptoms of Listed Potential Problems Will be Absent, Minimized or Managed (Cardiac: ACS)  Signs and symptoms of listed potential problems will be absent, minimized or managed by discharge/transition of care (reference Cardiac: ACS (Acute Coronary Syndrome) (Adult) CPG).   Outcome: No Change  [id number]: Pt with failed renal transplant. Advise slow taper off of immunosuppressants, continue current doses for now.

## 2017-11-21 ENCOUNTER — APPOINTMENT (OUTPATIENT)
Dept: VASCULAR SURGERY | Facility: CLINIC | Age: 52
End: 2017-11-21
Payer: MEDICARE

## 2017-11-21 PROCEDURE — 93990 DOPPLER FLOW TESTING: CPT

## 2017-11-21 PROCEDURE — 99024 POSTOP FOLLOW-UP VISIT: CPT

## 2017-11-22 ENCOUNTER — RX RENEWAL (OUTPATIENT)
Age: 52
End: 2017-11-22

## 2017-11-28 DIAGNOSIS — N17.9 ACUTE KIDNEY FAILURE, UNSPECIFIED: ICD-10-CM

## 2017-11-28 LAB
INR BLD: 2.23 — HIGH (ref 0.88–1.17)
PROTHROM AB SERPL-ACNC: 25.4 SEC — HIGH (ref 9.8–13.1)

## 2017-12-01 ENCOUNTER — OUTPATIENT (OUTPATIENT)
Dept: OUTPATIENT SERVICES | Facility: HOSPITAL | Age: 52
LOS: 1 days | Discharge: HOME | End: 2017-12-01

## 2017-12-01 DIAGNOSIS — N17.9 ACUTE KIDNEY FAILURE, UNSPECIFIED: ICD-10-CM

## 2017-12-01 DIAGNOSIS — Z90.89 ACQUIRED ABSENCE OF OTHER ORGANS: Chronic | ICD-10-CM

## 2017-12-01 DIAGNOSIS — Z94.0 KIDNEY TRANSPLANT STATUS: Chronic | ICD-10-CM

## 2017-12-05 LAB
INR BLD: 2.09 — HIGH (ref 0.88–1.17)
PROTHROM AB SERPL-ACNC: 23.8 SEC — HIGH (ref 9.8–13.1)

## 2017-12-07 ENCOUNTER — FORM ENCOUNTER (OUTPATIENT)
Age: 52
End: 2017-12-07

## 2017-12-08 ENCOUNTER — APPOINTMENT (OUTPATIENT)
Age: 52
End: 2017-12-08
Payer: MEDICARE

## 2017-12-08 PROCEDURE — 36902Z: CUSTOM | Mod: 58

## 2017-12-08 PROCEDURE — 36215Z: CUSTOM | Mod: 59

## 2017-12-12 LAB
APPEARANCE UR: CLEAR — SIGNIFICANT CHANGE UP
BILIRUB UR-MCNC: NEGATIVE — SIGNIFICANT CHANGE UP
BLOOD UR QL VISUAL: NEGATIVE — SIGNIFICANT CHANGE UP
COLOR SPEC: YELLOW — SIGNIFICANT CHANGE UP
GLUCOSE UR-MCNC: 250 — HIGH
HYALINE CASTS # UR AUTO: SIGNIFICANT CHANGE UP (ref 0–?)
INR BLD: 1.09 — SIGNIFICANT CHANGE UP (ref 0.88–1.17)
KETONES UR-MCNC: NEGATIVE — SIGNIFICANT CHANGE UP
LEUKOCYTE ESTERASE UR-ACNC: NEGATIVE — SIGNIFICANT CHANGE UP
MUCOUS THREADS # UR AUTO: SIGNIFICANT CHANGE UP
NITRITE UR-MCNC: NEGATIVE — SIGNIFICANT CHANGE UP
PH UR: 6 — SIGNIFICANT CHANGE UP (ref 4.6–8)
PROT UR-MCNC: 150 MG/DL — HIGH
PROTHROM AB SERPL-ACNC: 12.5 SEC — SIGNIFICANT CHANGE UP (ref 9.8–13.1)
RBC CASTS # UR COMP ASSIST: SIGNIFICANT CHANGE UP (ref 0–?)
SP GR SPEC: 1.01 — SIGNIFICANT CHANGE UP (ref 1–1.04)
UROBILINOGEN FLD QL: NORMAL MG/DL — SIGNIFICANT CHANGE UP
WBC UR QL: SIGNIFICANT CHANGE UP (ref 0–?)

## 2017-12-14 LAB
BACTERIA UR CULT: SIGNIFICANT CHANGE UP
SPECIMEN SOURCE: SIGNIFICANT CHANGE UP

## 2017-12-29 ENCOUNTER — APPOINTMENT (OUTPATIENT)
Age: 52
End: 2017-12-29
Payer: MEDICARE

## 2017-12-29 PROCEDURE — 36589 REMOVAL TUNNELED CV CATH: CPT

## 2018-01-04 ENCOUNTER — OUTPATIENT (OUTPATIENT)
Dept: OUTPATIENT SERVICES | Facility: HOSPITAL | Age: 53
LOS: 1 days | Discharge: HOME | End: 2018-01-04

## 2018-01-04 DIAGNOSIS — Z94.0 KIDNEY TRANSPLANT STATUS: Chronic | ICD-10-CM

## 2018-01-04 DIAGNOSIS — Z90.89 ACQUIRED ABSENCE OF OTHER ORGANS: Chronic | ICD-10-CM

## 2018-01-05 LAB
INR BLD: 0.89 — SIGNIFICANT CHANGE UP (ref 0.88–1.17)
PROTHROM AB SERPL-ACNC: 10.2 SEC — SIGNIFICANT CHANGE UP (ref 9.8–13.1)

## 2018-01-08 ENCOUNTER — RX RENEWAL (OUTPATIENT)
Age: 53
End: 2018-01-08

## 2018-01-09 LAB
INR BLD: 0.97 — SIGNIFICANT CHANGE UP (ref 0.88–1.17)
PROTHROM AB SERPL-ACNC: 11.1 SEC — SIGNIFICANT CHANGE UP (ref 9.8–13.1)

## 2018-01-16 LAB
INR BLD: 1.53 — HIGH (ref 0.88–1.17)
PROTHROM AB SERPL-ACNC: 17.8 SEC — HIGH (ref 9.8–13.1)

## 2018-01-25 LAB
INR BLD: 2.21 — HIGH (ref 0.88–1.17)
PROTHROM AB SERPL-ACNC: 25.8 SEC — HIGH (ref 9.8–13.1)

## 2018-01-30 LAB
INR BLD: 1.97 — HIGH (ref 0.88–1.17)
PROTHROM AB SERPL-ACNC: 22.2 SEC — HIGH (ref 9.8–13.1)

## 2018-02-01 ENCOUNTER — RX RENEWAL (OUTPATIENT)
Age: 53
End: 2018-02-01

## 2018-02-06 LAB
INR BLD: 1.69 — HIGH (ref 0.88–1.17)
PROTHROM AB SERPL-ACNC: 19.7 SEC — HIGH (ref 9.8–13.1)

## 2018-02-13 LAB
INR BLD: 1.96 — HIGH (ref 0.88–1.17)
PROTHROM AB SERPL-ACNC: 22.9 SEC — HIGH (ref 9.8–13.1)

## 2018-02-20 LAB
INR BLD: 1.33 — HIGH (ref 0.88–1.17)
PROTHROM AB SERPL-ACNC: 14.8 SEC — HIGH (ref 9.8–13.1)

## 2018-02-22 ENCOUNTER — APPOINTMENT (OUTPATIENT)
Dept: VASCULAR SURGERY | Facility: CLINIC | Age: 53
End: 2018-02-22
Payer: MEDICARE

## 2018-02-22 PROCEDURE — 93990 DOPPLER FLOW TESTING: CPT

## 2018-02-22 PROCEDURE — 99213 OFFICE O/P EST LOW 20 MIN: CPT

## 2018-02-27 LAB
INR BLD: 1.89 — HIGH (ref 0.88–1.17)
PROTHROM AB SERPL-ACNC: 22 SEC — HIGH (ref 9.8–13.1)

## 2018-02-28 ENCOUNTER — RX RENEWAL (OUTPATIENT)
Age: 53
End: 2018-02-28

## 2018-03-06 LAB
INR BLD: 2.91 — HIGH (ref 0.88–1.17)
PROTHROM AB SERPL-ACNC: 33 SEC — HIGH (ref 9.8–13.1)

## 2018-03-17 LAB
INR BLD: 3.12 — HIGH (ref 0.88–1.17)
PROTHROM AB SERPL-ACNC: 36.7 SEC — HIGH (ref 9.8–13.1)

## 2018-03-20 LAB
INR BLD: 3.71 — HIGH (ref 0.88–1.17)
PROTHROM AB SERPL-ACNC: 42.3 SEC — HIGH (ref 9.8–13.1)

## 2018-03-27 LAB
INR BLD: 2.81 — HIGH (ref 0.88–1.17)
PROTHROM AB SERPL-ACNC: 33 SEC — HIGH (ref 9.8–13.1)

## 2018-03-29 ENCOUNTER — RX RENEWAL (OUTPATIENT)
Age: 53
End: 2018-03-29

## 2018-04-03 LAB
INR BLD: 1.8 — HIGH (ref 0.88–1.17)
PROTHROM AB SERPL-ACNC: 20.9 SEC — HIGH (ref 9.8–13.1)

## 2018-04-06 ENCOUNTER — RX RENEWAL (OUTPATIENT)
Age: 53
End: 2018-04-06

## 2018-04-17 LAB
INR BLD: 2.01 — HIGH (ref 0.88–1.17)
PROTHROM AB SERPL-ACNC: 22.6 SEC — HIGH (ref 9.8–13.1)

## 2018-04-24 LAB
INR BLD: 3.67 — HIGH (ref 0.88–1.17)
PROTHROM AB SERPL-ACNC: 43.3 SEC — HIGH (ref 9.8–13.1)

## 2018-05-01 LAB
INR BLD: 1.23 — HIGH (ref 0.88–1.17)
PROTHROM AB SERPL-ACNC: 14.2 SEC — HIGH (ref 9.8–13.1)

## 2018-05-08 LAB
INR BLD: 2.36 — HIGH (ref 0.88–1.17)
PROTHROM AB SERPL-ACNC: 27.6 SEC — HIGH (ref 9.8–13.1)

## 2018-05-15 LAB
INR BLD: 2.27 — HIGH (ref 0.88–1.17)
PROTHROM AB SERPL-ACNC: 26.6 SEC — HIGH (ref 9.8–13.1)

## 2018-05-17 ENCOUNTER — RESULT REVIEW (OUTPATIENT)
Age: 53
End: 2018-05-17

## 2018-05-22 LAB
INR BLD: 2.68 — HIGH (ref 0.88–1.17)
PROTHROM AB SERPL-ACNC: 31.5 SEC — HIGH (ref 9.8–13.1)

## 2018-05-29 LAB
INR BLD: 3.05 — HIGH (ref 0.88–1.17)
PROTHROM AB SERPL-ACNC: 34.6 SEC — HIGH (ref 9.8–13.1)

## 2018-05-31 ENCOUNTER — RX RENEWAL (OUTPATIENT)
Age: 53
End: 2018-05-31

## 2018-06-05 LAB
INR BLD: 1.34 — HIGH (ref 0.88–1.17)
PROTHROM AB SERPL-ACNC: 15 SEC — HIGH (ref 9.8–13.1)

## 2018-06-12 LAB
INR BLD: 2.13 — HIGH (ref 0.88–1.17)
PROTHROM AB SERPL-ACNC: 24.9 SEC — HIGH (ref 9.8–13.1)

## 2018-06-14 ENCOUNTER — APPOINTMENT (OUTPATIENT)
Dept: VASCULAR SURGERY | Facility: CLINIC | Age: 53
End: 2018-06-14
Payer: MEDICARE

## 2018-06-14 ENCOUNTER — RX RENEWAL (OUTPATIENT)
Age: 53
End: 2018-06-14

## 2018-06-14 PROCEDURE — 99213 OFFICE O/P EST LOW 20 MIN: CPT

## 2018-06-14 PROCEDURE — 93990 DOPPLER FLOW TESTING: CPT

## 2018-06-19 LAB
INR BLD: 1.88 — HIGH (ref 0.88–1.17)
PROTHROM AB SERPL-ACNC: 21.1 SEC — HIGH (ref 9.8–13.1)

## 2018-06-20 ENCOUNTER — RX RENEWAL (OUTPATIENT)
Age: 53
End: 2018-06-20

## 2018-06-26 LAB
INR BLD: 1.93 — HIGH (ref 0.88–1.17)
PROTHROM AB SERPL-ACNC: 21.7 SEC — HIGH (ref 9.8–13.1)

## 2018-07-03 ENCOUNTER — RX RENEWAL (OUTPATIENT)
Age: 53
End: 2018-07-03

## 2018-07-03 LAB
INR BLD: 1.75 — HIGH (ref 0.88–1.17)
PROTHROM AB SERPL-ACNC: 20.3 SEC — HIGH (ref 9.8–13.1)

## 2018-07-10 LAB
INR BLD: 2.48 — HIGH (ref 0.88–1.17)
PROTHROM AB SERPL-ACNC: 28 SEC — HIGH (ref 9.8–13.1)

## 2018-07-17 LAB
INR BLD: 1.75 — HIGH (ref 0.88–1.17)
PROTHROM AB SERPL-ACNC: 19.6 SEC — HIGH (ref 9.8–13.1)

## 2018-07-24 LAB
INR BLD: 2.23 — HIGH (ref 0.88–1.17)
PROTHROM AB SERPL-ACNC: 25.2 SEC — HIGH (ref 9.8–13.1)

## 2018-07-31 LAB
INR BLD: 2.22 — HIGH (ref 0.88–1.17)
PROTHROM AB SERPL-ACNC: 25.9 SEC — HIGH (ref 9.8–13.1)

## 2018-08-06 ENCOUNTER — RX RENEWAL (OUTPATIENT)
Age: 53
End: 2018-08-06

## 2018-08-07 LAB
INR BLD: 2.62 — HIGH (ref 0.88–1.17)
PROTHROM AB SERPL-ACNC: 29.7 SEC — HIGH (ref 9.8–13.1)

## 2018-08-14 LAB
INR BLD: 2.25 — HIGH (ref 0.88–1.17)
PROTHROM AB SERPL-ACNC: 26.3 SEC — HIGH (ref 9.8–13.1)

## 2018-08-21 LAB
INR BLD: 1.96 — HIGH (ref 0.88–1.17)
PROTHROM AB SERPL-ACNC: 22.9 SEC — HIGH (ref 9.8–13.1)

## 2018-08-22 ENCOUNTER — RX RENEWAL (OUTPATIENT)
Age: 53
End: 2018-08-22

## 2018-08-28 LAB
INR BLD: 3.23 — HIGH (ref 0.88–1.17)
PROTHROM AB SERPL-ACNC: 38.1 SEC — HIGH (ref 9.8–13.1)

## 2018-08-29 ENCOUNTER — RX RENEWAL (OUTPATIENT)
Age: 53
End: 2018-08-29

## 2018-09-04 LAB
INR BLD: 2.5 — HIGH (ref 0.88–1.17)
PROTHROM AB SERPL-ACNC: 28.3 SEC — HIGH (ref 9.8–13.1)

## 2018-09-11 LAB
INR BLD: 2.83 — HIGH (ref 0.88–1.17)
PROTHROM AB SERPL-ACNC: 33.3 SEC — HIGH (ref 9.8–13.1)

## 2018-09-18 LAB
INR BLD: 2.13 — HIGH (ref 0.88–1.17)
PROTHROM AB SERPL-ACNC: 24 SEC — HIGH (ref 9.8–13.1)

## 2018-09-25 LAB
INR BLD: 1.57 — HIGH (ref 0.88–1.17)
PROTHROM AB SERPL-ACNC: 17.6 SEC — HIGH (ref 9.8–13.1)

## 2018-09-27 NOTE — PROGRESS NOTE ADULT - PROBLEM SELECTOR PROBLEM 9
Chief Complaints and History of Present Illnesses   Patient presents with     Corneal spot RE tereza     Mom has noticed spot for about 3 weeks, may have been there prior. No rubbing, no tearing, no mattering, no trauma. VA seems good d/n    Review of systems for the eyes was negative other than the pertinent positives and negatives noted in the HPI.  History is obtained from the patient and Mom                  Primary care: Gina Rose Shirley   [facility] is home  Assessment & Plan   Abhishek Bhatti is a 11 month old male who presents with:     Foreign body of right cornea, initial encounter  - trimethoprim-polymyxin b (POLYTRIM) ophthalmic solution; Place 1 drop into the right eye 4 times daily for 4 days  - Foreign Body Removal, Cornea w/ Slit Lamp       Return for any new concerns.    There are no Patient Instructions on file for this visit.    Visit Diagnoses & Orders    ICD-10-CM    1. Foreign body of right cornea, initial encounter T15.01XA trimethoprim-polymyxin b (POLYTRIM) ophthalmic solution     Foreign Body Removal, Cornea w/ Slit Lamp      Attending Physician Attestation:  Complete documentation of historical and exam elements from today's encounter can be found in the full encounter summary report (not reduplicated in this progress note).  I personally obtained the chief complaint(s) and history of present illness.  I confirmed and edited as necessary the review of systems, past medical/surgical history, family history, social history, and examination findings as documented by others; and I examined the patient myself.  I personally reviewed the relevant tests, images, and reports as documented above.  I formulated and edited as necessary the assessment and plan and discussed the findings and management plan with the patient and family. - Moody Fay Jr., MD    Preventive measure

## 2018-10-02 LAB
APTT BLD: 59.5 SEC — HIGH (ref 27.5–37.4)
INR BLD: 4.35 — HIGH (ref 0.88–1.17)
PROTHROM AB SERPL-ACNC: 51.6 SEC — HIGH (ref 9.8–13.1)

## 2018-10-05 ENCOUNTER — EMERGENCY (EMERGENCY)
Facility: HOSPITAL | Age: 53
LOS: 1 days | Discharge: HOME | End: 2018-10-05
Attending: EMERGENCY MEDICINE | Admitting: EMERGENCY MEDICINE
Payer: MEDICARE

## 2018-10-05 VITALS
DIASTOLIC BLOOD PRESSURE: 98 MMHG | OXYGEN SATURATION: 100 % | TEMPERATURE: 98 F | RESPIRATION RATE: 18 BRPM | SYSTOLIC BLOOD PRESSURE: 204 MMHG | HEART RATE: 69 BPM

## 2018-10-05 DIAGNOSIS — Z94.0 KIDNEY TRANSPLANT STATUS: Chronic | ICD-10-CM

## 2018-10-05 DIAGNOSIS — Z90.89 ACQUIRED ABSENCE OF OTHER ORGANS: Chronic | ICD-10-CM

## 2018-10-05 PROCEDURE — 99284 EMERGENCY DEPT VISIT MOD MDM: CPT | Mod: 25,GC

## 2018-10-05 PROCEDURE — 93010 ELECTROCARDIOGRAM REPORT: CPT

## 2018-10-05 NOTE — ED ADULT TRIAGE NOTE - CHIEF COMPLAINT QUOTE
Pt arrives from home via EMS. Pts family called 911 after finding pt with altered mental status. Per EMS upon arrival pt was near unresponsive and FS on arrival was 22. Pt was given 1mg of glucagon IM followed bty oral glucose. Pt regained A&O status approx 5 min after glucose was administered and then pt ate several crackers followed by a glass of orange juice. Pt legally blind. DB=777 in J triage. Pt dialysis pt (Tue/Thur Sat); dialysis was completed in full yesterday. Pt presently A&Ox3; denies complaints.

## 2018-10-05 NOTE — ED ADULT TRIAGE NOTE - PRO INTERPRETER NEED 2
[facility] 
 
 
 [address] [facility] [place], [place] [id number] [phone] Patient: Vaughn Ramirez MRN: [id number] Visit Information Date & Time Provider Department Dept. Phone Encounter #  
 3/2/2018 11:00 AM Chris Toro MD [facility] [phone] [id number] Upcoming Health Maintenance Date Due  
 PAP AKA CERVICAL CYTOLOGY 3/5/2004 Influenza Age 5 to Adult 8/1/2017 DTaP/Tdap/Td series (2 - Td) 6/24/2025 Allergies as of 3/2/2018  Review Complete On: 3/2/2018 By: Bethany Sanders LPN No Known Allergies Current Immunizations  Never Reviewed Name Date Influenza Vaccine (Quad) PF  Incomplete Tdap 6/24/2015 Not reviewed this visit You Were Diagnosed With   
  
 Codes Comments Gastroenteritis    -  Primary ICD-10-CM: K52.9 ICD-9-CM: 558.9 Routine general medical examination at a health care facility     ICD-10-CM: Z00.00 ICD-9-CM: V70.0 Encounter for immunization     ICD-10-CM: F33 ICD-9-CM: V03.89 Vitals BP Pulse Temp Resp Height(growth percentile) Weight(growth percentile) 102/69 (!) 102 97.8 °F (36.6 °C) (Oral) 14 5' 4\" (1.626 m) 157 lb (71.2 kg) SpO2 BMI OB Status Smoking Status 96% 26.95 kg/m2 Having regular periods Never Smoker BMI and BSA Data Body Mass Index Body Surface Area  
 26.95 kg/m 2 1.79 m 2 Preferred Pharmacy Pharmacy Name Phone [facility] [address], [address] [phone] Your Updated Medication List  
  
   
This list is accurate as of 3/2/18 11:52 AM.  Always use your most recent med list.  
  
  
  
  
 loperamide 2 mg tablet Commonly known as:  IMODIUM A-D  
2 every 6 hours as needed for diarrhea  
  
 promethazine 25 mg tablet Commonly known as:  PHENERGAN  
 Take 1 Tab by mouth every six (6) hours as needed for Nausea. Prescriptions Printed Refills  
 loperamide (IMODIUM A-D) 2 mg tablet 1 Si every 6 hours as needed for diarrhea Class: Print Prescriptions Sent to Pharmacy Refills  
 promethazine (PHENERGAN) 25 mg tablet 1 Sig: Take 1 Tab by mouth every six (6) hours as needed for Nausea. Class: Normal  
 Pharmacy: [email or web address] [address] [place] [address] #: [phone] Route: Oral  
  
We Performed the Following CBC WITH AUTOMATED DIFF [12755 CPT(R)] INFLUENZA VIRUS VAC QUAD,SPLIT,PRESV FREE SYRINGE IM [id number] CPT(R)] LIPID PANEL [08584 CPT(R)] METABOLIC PANEL, COMPREHENSIVE [02718 CPT(R)] Introducing [facility]! [employer] introduces Compliance 360 patient portal. Now you can access parts of your medical record, email your doctor's office, and request medication refills online. 1. In your internet browser, go to [email or web address] 2. Click on the First Time User? Click Here link in the Sign In box. You will see the New Member Sign Up page. 3. Enter your Compliance 360 Access Code exactly as it appears below. You will not need to use this code after youve completed the sign-up process. If you do not sign up before the expiration date, you must request a new code. · Compliance 360 Access Code: [email or web address] Expires: 2018 11:52 AM 
 
4. Enter the last four digits of your Social Security Number (xxxx) and Date of Birth (mm/dd/yyyy) as indicated and click Submit. You will be taken to the next sign-up page. 5. Create a Compliance 360 ID. This will be your Compliance 360 login ID and cannot be changed, so think of one that is secure and easy to remember. 6. Create a Compliance 360 password. You can change your password at any time. 7. Enter your Password Reset Question and Answer.  This can be used at a later time if you forget your password. 8. Enter your e-mail address. You will receive e-mail notification when new information is available in [address] 9. Click Sign Up. You can now view and download portions of your medical record. 10. Click the Download Summary menu link to download a portable copy of your medical information. If you have questions, please visit the Frequently Asked Questions section of the ApplyMap website. Remember, ApplyMap is NOT to be used for urgent needs. For medical emergencies, dial 911. Now available from your iPhone and Android! Please provide this summary of care documentation to your next provider. Your primary care clinician is listed as [place]. If you have any questions after today's visit, please call [phone]. English

## 2018-10-06 VITALS
HEART RATE: 80 BPM | RESPIRATION RATE: 18 BRPM | OXYGEN SATURATION: 100 % | DIASTOLIC BLOOD PRESSURE: 84 MMHG | SYSTOLIC BLOOD PRESSURE: 228 MMHG

## 2018-10-06 LAB
ALBUMIN SERPL ELPH-MCNC: 4.1 G/DL — SIGNIFICANT CHANGE UP (ref 3.3–5)
ALP SERPL-CCNC: 123 U/L — HIGH (ref 40–120)
ALT FLD-CCNC: 90 U/L — HIGH (ref 4–41)
APPEARANCE UR: CLEAR — SIGNIFICANT CHANGE UP
AST SERPL-CCNC: 79 U/L — HIGH (ref 4–40)
BASOPHILS # BLD AUTO: 0.05 K/UL — SIGNIFICANT CHANGE UP (ref 0–0.2)
BASOPHILS NFR BLD AUTO: 0.4 % — SIGNIFICANT CHANGE UP (ref 0–2)
BILIRUB SERPL-MCNC: 0.6 MG/DL — SIGNIFICANT CHANGE UP (ref 0.2–1.2)
BILIRUB UR-MCNC: NEGATIVE — SIGNIFICANT CHANGE UP
BLOOD UR QL VISUAL: NEGATIVE — SIGNIFICANT CHANGE UP
BUN SERPL-MCNC: 48 MG/DL — HIGH (ref 7–23)
CALCIUM SERPL-MCNC: 8.7 MG/DL — SIGNIFICANT CHANGE UP (ref 8.4–10.5)
CHLORIDE SERPL-SCNC: 92 MMOL/L — LOW (ref 98–107)
CO2 SERPL-SCNC: 23 MMOL/L — SIGNIFICANT CHANGE UP (ref 22–31)
COLOR SPEC: SIGNIFICANT CHANGE UP
CREAT SERPL-MCNC: 3.92 MG/DL — HIGH (ref 0.5–1.3)
EOSINOPHIL # BLD AUTO: 0.05 K/UL — SIGNIFICANT CHANGE UP (ref 0–0.5)
EOSINOPHIL NFR BLD AUTO: 0.4 % — SIGNIFICANT CHANGE UP (ref 0–6)
GLUCOSE SERPL-MCNC: 376 MG/DL — HIGH (ref 70–99)
GLUCOSE UR-MCNC: SIGNIFICANT CHANGE UP
HBA1C BLD-MCNC: 7.1 % — HIGH (ref 4–5.6)
HCT VFR BLD CALC: 37.6 % — LOW (ref 39–50)
HGB BLD-MCNC: 12.5 G/DL — LOW (ref 13–17)
HYALINE CASTS # UR AUTO: SIGNIFICANT CHANGE UP
IMM GRANULOCYTES # BLD AUTO: 0.04 # — SIGNIFICANT CHANGE UP
IMM GRANULOCYTES NFR BLD AUTO: 0.3 % — SIGNIFICANT CHANGE UP (ref 0–1.5)
KETONES UR-MCNC: NEGATIVE — SIGNIFICANT CHANGE UP
LEUKOCYTE ESTERASE UR-ACNC: NEGATIVE — SIGNIFICANT CHANGE UP
LYMPHOCYTES # BLD AUTO: 0.86 K/UL — LOW (ref 1–3.3)
LYMPHOCYTES # BLD AUTO: 7.5 % — LOW (ref 13–44)
MCHC RBC-ENTMCNC: 30.1 PG — SIGNIFICANT CHANGE UP (ref 27–34)
MCHC RBC-ENTMCNC: 33.2 % — SIGNIFICANT CHANGE UP (ref 32–36)
MCV RBC AUTO: 90.6 FL — SIGNIFICANT CHANGE UP (ref 80–100)
MONOCYTES # BLD AUTO: 0.96 K/UL — HIGH (ref 0–0.9)
MONOCYTES NFR BLD AUTO: 8.3 % — SIGNIFICANT CHANGE UP (ref 2–14)
MUCOUS THREADS # UR AUTO: SIGNIFICANT CHANGE UP
NEUTROPHILS # BLD AUTO: 9.55 K/UL — HIGH (ref 1.8–7.4)
NEUTROPHILS NFR BLD AUTO: 83.1 % — HIGH (ref 43–77)
NITRITE UR-MCNC: NEGATIVE — SIGNIFICANT CHANGE UP
NRBC # FLD: 0 — SIGNIFICANT CHANGE UP
PH UR: 8 — SIGNIFICANT CHANGE UP (ref 5–8)
PLATELET # BLD AUTO: 194 K/UL — SIGNIFICANT CHANGE UP (ref 150–400)
PMV BLD: 9.9 FL — SIGNIFICANT CHANGE UP (ref 7–13)
POTASSIUM SERPL-MCNC: 4 MMOL/L — SIGNIFICANT CHANGE UP (ref 3.5–5.3)
POTASSIUM SERPL-SCNC: 4 MMOL/L — SIGNIFICANT CHANGE UP (ref 3.5–5.3)
PROT SERPL-MCNC: 7.4 G/DL — SIGNIFICANT CHANGE UP (ref 6–8.3)
PROT UR-MCNC: SIGNIFICANT CHANGE UP
RBC # BLD: 4.15 M/UL — LOW (ref 4.2–5.8)
RBC # FLD: 13.4 % — SIGNIFICANT CHANGE UP (ref 10.3–14.5)
RBC CASTS # UR COMP ASSIST: SIGNIFICANT CHANGE UP (ref 0–?)
SODIUM SERPL-SCNC: 134 MMOL/L — LOW (ref 135–145)
SP GR SPEC: 1.01 — SIGNIFICANT CHANGE UP (ref 1–1.04)
UROBILINOGEN FLD QL: NORMAL — SIGNIFICANT CHANGE UP
WBC # BLD: 11.51 K/UL — HIGH (ref 3.8–10.5)
WBC # FLD AUTO: 11.51 K/UL — HIGH (ref 3.8–10.5)
WBC UR QL: SIGNIFICANT CHANGE UP (ref 0–?)

## 2018-10-06 RX ORDER — METOPROLOL TARTRATE 50 MG
75 TABLET ORAL ONCE
Qty: 0 | Refills: 0 | Status: COMPLETED | OUTPATIENT
Start: 2018-10-06 | End: 2018-10-06

## 2018-10-06 RX ORDER — HYDRALAZINE HCL 50 MG
50 TABLET ORAL ONCE
Qty: 0 | Refills: 0 | Status: COMPLETED | OUTPATIENT
Start: 2018-10-06 | End: 2018-10-06

## 2018-10-06 RX ADMIN — Medication 50 MILLIGRAM(S): at 02:44

## 2018-10-06 RX ADMIN — Medication 75 MILLIGRAM(S): at 02:45

## 2018-10-06 NOTE — ED PROVIDER NOTE - MEDICAL DECISION MAKING DETAILS
EM PGY1 Anastasiya Rajan MD: 52yo male with PMH of DM on Lantus and Novolog, HTN on atorvastatin, metoprolol, hydralazine, CKD s/p renal transplant on dialysis (Tues/Thurs/Sat) who presents s/p hypoglycemia BIBEMS. Pt is hypertensive in the ED. No evidence of stroke, MI, GI symptoms on hx. Will evaluate with EKG, CBC, CMP.

## 2018-10-06 NOTE — ED PROVIDER NOTE - NS ED ROS FT
EM PGY1 Anastasiya Rajan MD:   General: denies fever, chills, +diaphoresis  HENT: denies nasal congestion, sore throat, rhinorrhea  CV: denies chest pain  Resp: denies difficulty breathing, cough  Abdominal: denies nausea, vomiting, diarrhea, abdominal pain, blood in stool, dark stool  : denies pain with urination  MSK: denies recent trauma  Neuro: denies headaches, numbness, tingling, dizziness, lightheadedness.  Skin: denies new rashes

## 2018-10-06 NOTE — ED PROVIDER NOTE - OBJECTIVE STATEMENT
EM PGY1 Anastasiya Rajan MD: 54yo male with PMH of DM on Lantus and Novolog, HTN on atorvastatin, metoprolol, hydralazine, CKD s/p renal transplant on dialysis (Tues/Thurs/Sat) who presents s/p hypoglycemia BIBEMS. Pt was found at home by family. FS 22. EMS gave 1mg glucagon and oral glucose. Repeat FS was 199. Pt was diaphoretic and altered, does not remember what happened. Last took Lantus yesterday at 08:00. Pt is able to produce urine still, but has not been able to urinate since the incident. Denies HA, lightheadedness, CP, SOB, N/V/D, abdominal pain, weakness. EM PGY1 Anastasiya Rajan MD: 52yo male with PMH of DM on Lantus and Novolog, HTN on atorvastatin, metoprolol, hydralazine, CKD s/p renal transplant on dialysis (Tues/Thurs/Sat) who presents s/p hypoglycemia BIBEMS. Pt was found at home by family. FS 22. EMS gave 1mg glucagon and oral glucose. Repeat FS was 199. Pt was diaphoretic and altered, does not remember what happened. Last took Lantus yesterday at 08:00. Pt is able to produce urine still, but has not been able to urinate since the incident. Denies HA, lightheadedness, CP, SOB, N/V/D, abdominal pain, weakness.    Attending: EM PGY1 Anastasiya Rajan MD: 52yo male with PMH of DM on Lantus and Novolog, HTN on atorvastatin, metoprolol, hydralazine, CKD s/p renal transplant on dialysis (Tues/Thurs/Sat) who presents s/p hypoglycemia BIBEMS. Pt was found at home by family. FS 22. EMS gave 1mg glucagon and oral glucose. Repeat FS was 199. Pt was diaphoretic and altered, does not remember what happened. Last took Lantus yesterday at 08:00. Pt is able to produce urine still, but has not been able to urinate since the incident. Denies HA, lightheadedness, CP, SOB, N/V/D, abdominal pain, weakness.    Attendinyo male presents with low blood sugar.  pt does not recall what happened but states he feels fine at this time.  no pain.  no vomiting.

## 2018-10-06 NOTE — ED ADULT NURSE NOTE - OBJECTIVE STATEMENT
Shira RN: patient is a 52 y/o male a&ox4 BIB EMS after found unresponsive in home.  As per triage noted patient had a blood glucose of 122 in field, was given 1mg of glucagon.  Patient regained a&ox4 status approx 5 minutes after glucagon administration.  Patient has a hx of ESRD, left arm AV fistula, do not use extremity band in place, does HD 3x weekly (Tue/Thur/Sat) last dialysis Thursday.  Patient report a hx of IDDM, last took Lantus at 0800 yesterday, last meal at 1300 hours yesterday.  Patient denies taking any oral medication for diabetes.  Patient noted to be hypertensive, Dr. Nina advised.  Patient in nad, 18 gauge PIV placed in right hand.

## 2018-10-06 NOTE — ED PROVIDER NOTE - PHYSICAL EXAMINATION
EM PGY1 Anastasiya Rajan MD:   CONSTITUTIONAL: Nontoxic, well nourished, well developed, middle-aged male, resting comfortably in no acute distress, sluggish speech  HEAD: Normocephalic; atraumatic  EYES: pt blind   ENMT: External appears normal; normal oropharynx  NECK: Supple; non-tender; no cervical lymphadenopathy  CARD: RRR; no audible rubs, or gallops, systolic murmur in left parasternal region, left brachial AV fistula   RESP: No respiratory distress, lungs ctab/l  ABD: Soft, non-distended; non-tender; no rebound or guarding, old well healed surgical scars   EXT: No LE pitting edema or calf tenderness; distal pulses intact with good capillary refill  SKIN: Warm, dry, intact  NEURO: aaox3, moving all ext spontaneously

## 2018-10-06 NOTE — ED ADULT NURSE NOTE - NSFALLRSKASSESSTYPE_ED_ALL_ED
ASSESSMENT/PLAN     (I10) Benign hypertension  (primary encounter diagnosis)  Comment: The acceleration in blood pressure was probably aggravated by the pseudoephedrine component of Claritin-D. Pseudoephedrine should be avoided. Blood pressure is mildly elevated and patient has some mild ankle edema.  Plan: spironolactone (ALDACTONE) 25 MG tablet        We'll start out with low dose spironolactone. May continue to use the furosemide as needed when ankle swelling is detectable. Monitor your blood pressure at least 3 days a week and check it at least an hour after you have taken her medication. Keep a chart of the blood pressures and bring the chart with you to next visit. Come back in 6 weeks with your chart and have the blood test done a couple of days before year return visit.  
Initial (On Arrival)

## 2018-10-06 NOTE — ED ADULT NURSE NOTE - NSIMPLEMENTINTERV_GEN_ALL_ED
Implemented All Fall with Harm Risk Interventions:  [place] to call system. Call bell, personal items and telephone within reach. Instruct patient to call for assistance. Room bathroom lighting operational. Non-slip footwear when patient is off stretcher. Physically safe environment: no spills, clutter or unnecessary equipment. Stretcher in lowest position, wheels locked, appropriate side rails in place. Provide visual cue, wrist band, yellow gown, etc. Monitor gait and stability. Monitor for mental status changes and reorient to person, place, and time. Review medications for side effects contributing to fall risk. Reinforce activity limits and safety measures with patient and family. Provide visual clues: red socks.

## 2018-10-06 NOTE — ED ADULT NURSE NOTE - CHIEF COMPLAINT QUOTE
Pt arrives from home via EMS. Pts family called 911 after finding pt with altered mental status. Per EMS upon arrival pt was near unresponsive and FS on arrival was 22. Pt was given 1mg of glucagon IM followed bty oral glucose. Pt regained A&O status approx 5 min after glucose was administered and then pt ate several crackers followed by a glass of orange juice. Pt legally blind. LE=308 in J triage. Pt dialysis pt (Tue/Thur Sat); dialysis was completed in full yesterday. Pt presently A&Ox3; denies complaints.

## 2018-10-09 LAB
INR BLD: 1.68 — HIGH (ref 0.88–1.17)
PROTHROM AB SERPL-ACNC: 18.8 SEC — HIGH (ref 9.8–13.1)

## 2018-10-16 ENCOUNTER — APPOINTMENT (OUTPATIENT)
Dept: VASCULAR SURGERY | Facility: CLINIC | Age: 53
End: 2018-10-16
Payer: MEDICARE

## 2018-10-16 LAB
INR BLD: 3.88 — HIGH (ref 0.88–1.17)
PROTHROM AB SERPL-ACNC: 45.9 SEC — HIGH (ref 9.8–13.1)

## 2018-10-16 PROCEDURE — 99213 OFFICE O/P EST LOW 20 MIN: CPT

## 2018-10-16 PROCEDURE — 93990 DOPPLER FLOW TESTING: CPT

## 2018-10-24 LAB
INR BLD: 4.23 — HIGH (ref 0.88–1.17)
PROTHROM AB SERPL-ACNC: 50.1 SEC — HIGH (ref 9.8–13.1)

## 2018-10-30 LAB
INR BLD: 3.33 — HIGH (ref 0.88–1.17)
PROTHROM AB SERPL-ACNC: 39.3 SEC — HIGH (ref 9.8–13.1)

## 2018-11-01 ENCOUNTER — RX RENEWAL (OUTPATIENT)
Age: 53
End: 2018-11-01

## 2018-11-06 LAB
INR BLD: 4.69 — HIGH (ref 0.88–1.17)
PROTHROM AB SERPL-ACNC: 54.6 SEC — HIGH (ref 9.8–13.1)

## 2018-11-07 ENCOUNTER — RX CHANGE (OUTPATIENT)
Age: 53
End: 2018-11-07

## 2018-11-08 ENCOUNTER — FORM ENCOUNTER (OUTPATIENT)
Age: 53
End: 2018-11-08

## 2018-11-09 ENCOUNTER — APPOINTMENT (OUTPATIENT)
Dept: ENDOVASCULAR SURGERY | Facility: CLINIC | Age: 53
End: 2018-11-09
Payer: MEDICARE

## 2018-11-09 PROCEDURE — 36902Z: CUSTOM

## 2018-11-12 ENCOUNTER — RX RENEWAL (OUTPATIENT)
Age: 53
End: 2018-11-12

## 2018-11-13 LAB
INR BLD: 2.19 — HIGH (ref 0.88–1.17)
PROTHROM AB SERPL-ACNC: 25.6 SEC — HIGH (ref 9.8–13.1)

## 2018-11-19 LAB
INR BLD: 2.28 — HIGH (ref 0.88–1.17)
PROTHROM AB SERPL-ACNC: 26.7 SEC — HIGH (ref 9.8–13.1)

## 2018-11-27 LAB
INR BLD: 1.62 — HIGH (ref 0.88–1.17)
PROTHROM AB SERPL-ACNC: 18.8 SEC — HIGH (ref 9.8–13.1)

## 2018-12-04 LAB
INR BLD: 2.35 — HIGH (ref 0.88–1.17)
PROTHROM AB SERPL-ACNC: 26.8 SEC — HIGH (ref 9.8–13.1)

## 2018-12-11 LAB
INR BLD: 2.77 — HIGH (ref 0.88–1.17)
PROTHROM AB SERPL-ACNC: 31.7 SEC — HIGH (ref 9.8–13.1)

## 2018-12-18 LAB
INR BLD: 2.35 — HIGH (ref 0.88–1.17)
PROTHROM AB SERPL-ACNC: 26.8 SEC — HIGH (ref 9.8–13.1)

## 2018-12-18 NOTE — PATIENT PROFILE ADULT. - PRO SERVICES AT DISCH
Detail Level: Detailed Number Of Freeze-Thaw Cycles: 2 freeze-thaw cycles Consent: The patient's consent was obtained including but not limited to risks of crusting, scabbing, blistering, scarring, darker or lighter pigmentary change, recurrence, incomplete removal and infection. Render Post-Care Instructions In Note?: no Duration Of Freeze Thaw-Cycle (Seconds): 3 Post-Care Instructions: I reviewed with the patient in detail post-care instructions. Patient is to wear sunprotection, and avoid picking at any of the treated lesions. Pt may apply Vaseline to crusted or scabbing areas. unsure

## 2018-12-19 ENCOUNTER — RX RENEWAL (OUTPATIENT)
Age: 53
End: 2018-12-19

## 2018-12-23 LAB
INR BLD: 4.26 — HIGH (ref 0.88–1.17)
PROTHROM AB SERPL-ACNC: 50.8 SEC — HIGH (ref 9.8–13.1)

## 2018-12-24 LAB
INR BLD: 3.58 — HIGH (ref 0.88–1.17)
PROTHROM AB SERPL-ACNC: 42.4 SEC — HIGH (ref 9.8–13.1)

## 2018-12-31 LAB
INR BLD: 1.31 — HIGH (ref 0.88–1.17)
PROTHROM AB SERPL-ACNC: 15 SEC — HIGH (ref 9.8–13.1)

## 2019-01-04 ENCOUNTER — RX RENEWAL (OUTPATIENT)
Age: 54
End: 2019-01-04

## 2019-01-08 LAB
INR BLD: 2.16 — HIGH (ref 0.88–1.17)
PROTHROM AB SERPL-ACNC: 24.5 SEC — HIGH (ref 9.8–13.1)

## 2019-01-15 LAB
INR BLD: 2.03 — HIGH (ref 0.88–1.17)
PROTHROM AB SERPL-ACNC: 23.6 SEC — HIGH (ref 9.8–13.1)

## 2019-01-16 ENCOUNTER — RX RENEWAL (OUTPATIENT)
Age: 54
End: 2019-01-16

## 2019-01-22 ENCOUNTER — RX RENEWAL (OUTPATIENT)
Age: 54
End: 2019-01-22

## 2019-01-22 RX ORDER — FUROSEMIDE 80 MG/1
80 TABLET ORAL
Qty: 30 | Refills: 3 | Status: ACTIVE | COMMUNITY
Start: 2017-10-27 | End: 1900-01-01

## 2019-01-24 LAB
INR BLD: 2.76 — HIGH (ref 0.88–1.17)
PROTHROM AB SERPL-ACNC: 31.6 SEC — HIGH (ref 9.8–13.1)

## 2019-01-29 LAB
INR BLD: 3.35 — HIGH (ref 0.88–1.17)
PROTHROM AB SERPL-ACNC: 39.6 SEC — HIGH (ref 9.8–13.1)

## 2019-02-05 LAB
INR BLD: 1.61 — HIGH (ref 0.88–1.17)
PROTHROM AB SERPL-ACNC: 18.6 SEC — HIGH (ref 9.8–13.1)

## 2019-02-06 ENCOUNTER — RX RENEWAL (OUTPATIENT)
Age: 54
End: 2019-02-06

## 2019-02-06 RX ORDER — WARFARIN 1 MG/1
1 TABLET ORAL DAILY
Qty: 30 | Refills: 3 | Status: ACTIVE | COMMUNITY
Start: 2018-02-01 | End: 1900-01-01

## 2019-02-06 RX ORDER — HYDRALAZINE HYDROCHLORIDE 25 MG/1
25 TABLET ORAL
Qty: 90 | Refills: 5 | Status: ACTIVE | COMMUNITY
Start: 2019-01-04 | End: 1900-01-01

## 2019-02-07 ENCOUNTER — RX RENEWAL (OUTPATIENT)
Age: 54
End: 2019-02-07

## 2019-02-14 ENCOUNTER — FORM ENCOUNTER (OUTPATIENT)
Age: 54
End: 2019-02-14

## 2019-02-14 LAB
INR BLD: 2.31 — HIGH (ref 0.88–1.17)
PROTHROM AB SERPL-ACNC: 26.3 SEC — HIGH (ref 9.8–13.1)

## 2019-02-14 NOTE — DISCUSSION/SUMMARY
[Post Fistulogram/Intervention] : Post Fistulogram/Intervention: Site check for bleeding/hematoma, thrill/bruit. Vitals signs: On admission, then q15 mins x 2 [Resume diet] : resume diet

## 2019-02-15 ENCOUNTER — APPOINTMENT (OUTPATIENT)
Dept: ENDOVASCULAR SURGERY | Facility: CLINIC | Age: 54
End: 2019-02-15
Payer: MEDICARE

## 2019-02-15 ENCOUNTER — RX RENEWAL (OUTPATIENT)
Age: 54
End: 2019-02-15

## 2019-02-15 VITALS — BODY MASS INDEX: 20.59 KG/M2 | WEIGHT: 152 LBS | TEMPERATURE: 98.2 F | HEIGHT: 72 IN

## 2019-02-15 VITALS
SYSTOLIC BLOOD PRESSURE: 171 MMHG | RESPIRATION RATE: 20 BRPM | HEART RATE: 68 BPM | OXYGEN SATURATION: 98 % | DIASTOLIC BLOOD PRESSURE: 83 MMHG

## 2019-02-15 PROCEDURE — 36902Z: CUSTOM

## 2019-02-15 RX ORDER — PRAVASTATIN SODIUM 40 MG/1
TABLET ORAL
Refills: 0 | Status: DISCONTINUED | COMMUNITY
End: 2019-02-15

## 2019-02-15 RX ORDER — METOPROLOL TARTRATE 50 MG/1
50 TABLET ORAL 3 TIMES DAILY
Qty: 90 | Refills: 0 | Status: ACTIVE | COMMUNITY
Start: 2019-02-15

## 2019-02-15 RX ORDER — CLONIDINE HYDROCHLORIDE 0.1 MG/1
0.1 TABLET ORAL
Refills: 0 | Status: DISCONTINUED | COMMUNITY
End: 2019-02-15

## 2019-02-15 RX ORDER — ATORVASTATIN CALCIUM 10 MG/1
10 TABLET, FILM COATED ORAL
Refills: 0 | Status: ACTIVE | COMMUNITY

## 2019-02-15 RX ORDER — MYCOPHENOLATE MOFETIL 500 MG/1
500 TABLET, FILM COATED ORAL
Refills: 0 | Status: DISCONTINUED | COMMUNITY
End: 2019-02-15

## 2019-02-15 RX ORDER — NIFEDIPINE 90 MG/1
90 TABLET, EXTENDED RELEASE ORAL
Refills: 0 | Status: DISCONTINUED | COMMUNITY
End: 2019-02-15

## 2019-02-15 RX ORDER — TAMSULOSIN HYDROCHLORIDE 0.4 MG/1
0.4 CAPSULE ORAL DAILY
Qty: 30 | Refills: 3 | Status: DISCONTINUED | COMMUNITY
End: 2019-02-15

## 2019-02-15 RX ORDER — TRIAMCINOLONE ACETONIDE 55 UL/1
55 SPRAY, METERED NASAL
Refills: 0 | Status: DISCONTINUED | COMMUNITY
End: 2019-02-15

## 2019-02-15 RX ORDER — PREDNISONE 5 MG
5 TABLET, DOSE PACK ORAL
Refills: 0 | Status: DISCONTINUED | COMMUNITY
End: 2019-02-15

## 2019-02-15 RX ORDER — CYCLOSPORINE 25 MG/1
25 CAPSULE, GELATIN COATED ORAL
Refills: 0 | Status: DISCONTINUED | COMMUNITY
End: 2019-02-15

## 2019-02-15 RX ORDER — TORSEMIDE 10 MG/1
10 TABLET ORAL
Refills: 0 | Status: DISCONTINUED | COMMUNITY
End: 2019-02-15

## 2019-02-15 RX ORDER — SODIUM BICARBONATE 650 MG/1
650 TABLET ORAL
Qty: 540 | Refills: 3 | Status: DISCONTINUED | COMMUNITY
Start: 2017-09-01 | End: 2019-02-15

## 2019-02-15 NOTE — REASON FOR VISIT
[Other ___] : a [unfilled] visit for [Family Member] : family member [FreeTextEntry2] : pt called with c/o high venous pressures/prolonged bleeding

## 2019-02-15 NOTE — PAST MEDICAL HISTORY
[Increasing age ( >40 years old)] : Increasing age ( >40 years old) [No therapy indicated for cases scheduled for less than one hour] : No therapy indicated for cases scheduled for less than one hour. [FreeTextEntry1] : \par \par Malignant Hyperthermis (MH) Screening Tool and Risk of Bleeding Assessement\par Mr. EDIL CIFUENTES  denies family history of unexpected death following Anesthesia or Exercise.\par Denies Family history of Malignant Hyperthermia, Muscle or Neuromuscular disorder and High Temperature following exercise.\par \par Mr. EDIL CIFUENTES denies history of Muscle Spasm, Dark or Chocolate - Colored urine and Unanticipated fever immediately following anesthesia or serious exercise. \par Mr. CIFUENTES  also denies bleeding tendencies/ Risks of Bleeding\par

## 2019-02-15 NOTE — HISTORY OF PRESENT ILLNESS
[] : left brachiocephalic fistula [FreeTextEntry1] : Creation Dr Staples 10-2-17 [FreeTextEntry2] : h/o tunneled catheter right [FreeTextEntry4] : 2-14-19 [FreeTextEntry5] : 10:00pm  2-14-19 [FreeTextEntry6] : Dr Mchugh

## 2019-02-19 LAB
INR BLD: 2.37 — HIGH (ref 0.88–1.17)
PROTHROM AB SERPL-ACNC: 27 SEC — HIGH (ref 9.8–13.1)

## 2019-02-26 LAB
INR BLD: 2.47 — HIGH (ref 0.88–1.17)
PROTHROM AB SERPL-ACNC: 29 SEC — HIGH (ref 9.8–13.1)

## 2019-03-01 ENCOUNTER — OUTPATIENT (OUTPATIENT)
Dept: OUTPATIENT SERVICES | Facility: HOSPITAL | Age: 54
LOS: 1 days | End: 2019-03-01
Payer: MEDICARE

## 2019-03-01 DIAGNOSIS — Z90.89 ACQUIRED ABSENCE OF OTHER ORGANS: Chronic | ICD-10-CM

## 2019-03-01 DIAGNOSIS — Z94.0 KIDNEY TRANSPLANT STATUS: Chronic | ICD-10-CM

## 2019-03-01 PROCEDURE — G9001: CPT

## 2019-03-05 LAB
INR BLD: 2.01 — HIGH (ref 0.88–1.17)
PROTHROM AB SERPL-ACNC: 22.8 SEC — HIGH (ref 9.8–13.1)

## 2019-03-07 ENCOUNTER — RX RENEWAL (OUTPATIENT)
Age: 54
End: 2019-03-07

## 2019-03-07 RX ORDER — WARFARIN 4 MG/1
4 TABLET ORAL DAILY
Qty: 30 | Refills: 3 | Status: ACTIVE | COMMUNITY
Start: 2018-03-29 | End: 1900-01-01

## 2019-03-11 ENCOUNTER — INPATIENT (INPATIENT)
Facility: HOSPITAL | Age: 54
LOS: 3 days | Discharge: HOME | End: 2019-03-15
Attending: HOSPITALIST | Admitting: HOSPITALIST
Payer: MEDICARE

## 2019-03-11 VITALS
SYSTOLIC BLOOD PRESSURE: 206 MMHG | TEMPERATURE: 97 F | RESPIRATION RATE: 16 BRPM | OXYGEN SATURATION: 100 % | DIASTOLIC BLOOD PRESSURE: 67 MMHG | HEART RATE: 71 BPM

## 2019-03-11 DIAGNOSIS — Z94.0 KIDNEY TRANSPLANT STATUS: Chronic | ICD-10-CM

## 2019-03-11 DIAGNOSIS — E16.2 HYPOGLYCEMIA, UNSPECIFIED: ICD-10-CM

## 2019-03-11 DIAGNOSIS — Z90.89 ACQUIRED ABSENCE OF OTHER ORGANS: Chronic | ICD-10-CM

## 2019-03-11 LAB
ALBUMIN SERPL ELPH-MCNC: 4 G/DL — SIGNIFICANT CHANGE UP (ref 3.3–5)
ALP SERPL-CCNC: 133 U/L — HIGH (ref 40–120)
ALT FLD-CCNC: 23 U/L — SIGNIFICANT CHANGE UP (ref 4–41)
ANION GAP SERPL CALC-SCNC: 16 MMO/L — HIGH (ref 7–14)
APTT BLD: 55.8 SEC — HIGH (ref 27.5–36.3)
AST SERPL-CCNC: 29 U/L — SIGNIFICANT CHANGE UP (ref 4–40)
BASE EXCESS BLDV CALC-SCNC: 1 MMOL/L — SIGNIFICANT CHANGE UP
BASOPHILS # BLD AUTO: 0.05 K/UL — SIGNIFICANT CHANGE UP (ref 0–0.2)
BASOPHILS NFR BLD AUTO: 0.7 % — SIGNIFICANT CHANGE UP (ref 0–2)
BILIRUB SERPL-MCNC: 0.5 MG/DL — SIGNIFICANT CHANGE UP (ref 0.2–1.2)
BLOOD GAS VENOUS - CREATININE: 5.37 MG/DL — HIGH (ref 0.5–1.3)
BUN SERPL-MCNC: 71 MG/DL — HIGH (ref 7–23)
CALCIUM SERPL-MCNC: 9.3 MG/DL — SIGNIFICANT CHANGE UP (ref 8.4–10.5)
CHLORIDE BLDV-SCNC: 101 MMOL/L — SIGNIFICANT CHANGE UP (ref 96–108)
CHLORIDE SERPL-SCNC: 95 MMOL/L — LOW (ref 98–107)
CK MB BLD-MCNC: 2.66 NG/ML — SIGNIFICANT CHANGE UP (ref 1–6.6)
CK MB BLD-MCNC: SIGNIFICANT CHANGE UP (ref 0–2.5)
CK SERPL-CCNC: 59 U/L — SIGNIFICANT CHANGE UP (ref 30–200)
CO2 SERPL-SCNC: 24 MMOL/L — SIGNIFICANT CHANGE UP (ref 22–31)
CREAT SERPL-MCNC: 5.4 MG/DL — HIGH (ref 0.5–1.3)
EOSINOPHIL # BLD AUTO: 0.04 K/UL — SIGNIFICANT CHANGE UP (ref 0–0.5)
EOSINOPHIL NFR BLD AUTO: 0.6 % — SIGNIFICANT CHANGE UP (ref 0–6)
GAS PNL BLDV: 130 MMOL/L — LOW (ref 136–146)
GLUCOSE BLDV-MCNC: 258 — HIGH (ref 70–99)
GLUCOSE SERPL-MCNC: 259 MG/DL — HIGH (ref 70–99)
HCO3 BLDV-SCNC: 25 MMOL/L — SIGNIFICANT CHANGE UP (ref 20–27)
HCT VFR BLD CALC: 35.4 % — LOW (ref 39–50)
HCT VFR BLDV CALC: 33.8 % — LOW (ref 39–51)
HGB BLD-MCNC: 11.3 G/DL — LOW (ref 13–17)
HGB BLDV-MCNC: 10.9 G/DL — LOW (ref 13–17)
IMM GRANULOCYTES NFR BLD AUTO: 0.4 % — SIGNIFICANT CHANGE UP (ref 0–1.5)
INR BLD: 2.4 — HIGH (ref 0.88–1.17)
LACTATE BLDV-MCNC: 1.3 MMOL/L — SIGNIFICANT CHANGE UP (ref 0.5–2)
LYMPHOCYTES # BLD AUTO: 0.72 K/UL — LOW (ref 1–3.3)
LYMPHOCYTES # BLD AUTO: 10.1 % — LOW (ref 13–44)
MCHC RBC-ENTMCNC: 30.1 PG — SIGNIFICANT CHANGE UP (ref 27–34)
MCHC RBC-ENTMCNC: 31.9 % — LOW (ref 32–36)
MCV RBC AUTO: 94.1 FL — SIGNIFICANT CHANGE UP (ref 80–100)
MONOCYTES # BLD AUTO: 0.67 K/UL — SIGNIFICANT CHANGE UP (ref 0–0.9)
MONOCYTES NFR BLD AUTO: 9.4 % — SIGNIFICANT CHANGE UP (ref 2–14)
NEUTROPHILS # BLD AUTO: 5.6 K/UL — SIGNIFICANT CHANGE UP (ref 1.8–7.4)
NEUTROPHILS NFR BLD AUTO: 78.8 % — HIGH (ref 43–77)
NRBC # FLD: 0 K/UL — LOW (ref 25–125)
PCO2 BLDV: 40 MMHG — LOW (ref 41–51)
PH BLDV: 7.41 PH — SIGNIFICANT CHANGE UP (ref 7.32–7.43)
PLATELET # BLD AUTO: 179 K/UL — SIGNIFICANT CHANGE UP (ref 150–400)
PMV BLD: 9.6 FL — SIGNIFICANT CHANGE UP (ref 7–13)
PO2 BLDV: 50 MMHG — HIGH (ref 35–40)
POTASSIUM BLDV-SCNC: 3.9 MMOL/L — SIGNIFICANT CHANGE UP (ref 3.4–4.5)
POTASSIUM SERPL-MCNC: 4.1 MMOL/L — SIGNIFICANT CHANGE UP (ref 3.5–5.3)
POTASSIUM SERPL-SCNC: 4.1 MMOL/L — SIGNIFICANT CHANGE UP (ref 3.5–5.3)
PROT SERPL-MCNC: 7.3 G/DL — SIGNIFICANT CHANGE UP (ref 6–8.3)
PROTHROM AB SERPL-ACNC: 28.1 SEC — HIGH (ref 9.8–13.1)
RBC # BLD: 3.76 M/UL — LOW (ref 4.2–5.8)
RBC # FLD: 13.8 % — SIGNIFICANT CHANGE UP (ref 10.3–14.5)
SAO2 % BLDV: 81.4 % — SIGNIFICANT CHANGE UP (ref 60–85)
SODIUM SERPL-SCNC: 135 MMOL/L — SIGNIFICANT CHANGE UP (ref 135–145)
WBC # BLD: 7.11 K/UL — SIGNIFICANT CHANGE UP (ref 3.8–10.5)
WBC # FLD AUTO: 7.11 K/UL — SIGNIFICANT CHANGE UP (ref 3.8–10.5)

## 2019-03-11 PROCEDURE — 70450 CT HEAD/BRAIN W/O DYE: CPT | Mod: 26

## 2019-03-11 PROCEDURE — 72125 CT NECK SPINE W/O DYE: CPT | Mod: 26

## 2019-03-11 RX ORDER — LABETALOL HCL 100 MG
10 TABLET ORAL ONCE
Qty: 0 | Refills: 0 | Status: COMPLETED | OUTPATIENT
Start: 2019-03-11 | End: 2019-03-11

## 2019-03-11 RX ADMIN — Medication 10 MILLIGRAM(S): at 19:34

## 2019-03-11 NOTE — ED PROVIDER NOTE - CRITICAL CARE PROVIDED
consultation with other physicians/consult w/ pt's family directly relating to pts condition/direct patient care (not related to procedure)/interpretation of diagnostic studies/additional history taking/documentation

## 2019-03-11 NOTE — H&P ADULT - PROBLEM SELECTOR PLAN 2
- CKD s/p kidney transplant (1998) in the setting of long standing HTN and DM1 now on HD (T, Th, S) w/ LUE AVF access  - Trend BMPs.   - Consult Renal in AM for HD tmw

## 2019-03-11 NOTE — H&P ADULT - PROBLEM SELECTOR PLAN 4
- Somnolent, unable to discuss home medications- please clarify in AM - Somnolent, unable to discuss home medications- please clarify in AM  - hydralazine and labetalol IV; change to PO meds when patient more alert

## 2019-03-11 NOTE — H&P ADULT - PROBLEM SELECTOR PLAN 1
- Acute metabolic encephalopathy 2/2 hypoglycemia ?2/2 higher insulin dose vs. ?underlying infection w/ improvement following Glucagon and PO intake now w/ intermittent confusion  - CTH negative for any ICH, mass effect or edema. Low suspicion for CVA or infectious etiology (meningitis/encephalitis)  - No reported seizure activity, foaming at the mouth, bladder/bowel incontinence making this less likely, especially given that pt is intermittently AAOx3  - Serum Tox negative. Utox pending  - Suspect this is in the setting of acute hypoglycemia 2/2 insulin exacerbated by his underlying CKD  - FSG's have been better controlled. Will cont to monitor closely o/n.  - If febrile- pan cx and start empiric abx coverage - Acute metabolic encephalopathy 2/2 hypoglycemia ?2/2 higher insulin dose vs. ?underlying infection w/ improvement following Glucagon and PO intake now w/ intermittent confusion  - CTH negative for any ICH, mass effect or edema. Low suspicion for CVA or infectious etiology (meningitis/encephalitis)  - No reported seizure activity, foaming at the mouth, bladder/bowel incontinence making this less likely, especially given that pt is intermittently AAOx3  - Serum Tox negative. Utox pending  - Suspect this is in the setting of acute hypoglycemia 2/2 insulin exacerbated by his underlying CKD  - FSG's initially controlled in ED but now very elevated upon arrival to floors. Management below.  Will cont to monitor closely o/n.  - If febrile- pan cx and start empiric abx coverage - Acute metabolic encephalopathy 2/2 hypoglycemia ?2/2 higher insulin dose vs. ?underlying infection w/ improvement following Glucagon and PO intake now w/ intermittent confusion  - CTH negative for any ICH, mass effect or edema. Low suspicion for CVA or infectious etiology (meningitis/encephalitis)  - No reported seizure activity, foaming at the mouth, bladder/bowel incontinence making this less likely, especially given that pt is intermittently AAOx3  - Serum Tox negative. Utox pending  - Suspect this is in the setting of acute hypoglycemia 2/2 extra insulin exacerbated by his underlying CKD  - FSG's initially controlled in ED but now very elevated upon arrival to floors. Management below.  Will cont to monitor closely o/n.  - If febrile- pan cx and start empiric abx coverage - Acute metabolic encephalopathy 2/2 hypoglycemia ?2/2 higher insulin dose vs. ?underlying infection w/ improvement following Glucagon and PO intake now w/ intermittent confusion  - CTH negative for any ICH, mass effect or edema. Low suspicion for CVA or infectious etiology (meningitis/encephalitis)  - No reported seizure activity, foaming at the mouth, bladder/bowel incontinence making this less likely, especially given that pt is intermittently AAOx3  - Serum Tox negative. Utox pending  - Suspect this is in the setting of acute hypoglycemia 2/2 extra insulin exacerbated by his underlying CKD  - FSG's initially controlled in ED but now very elevated upon arrival to floors. Management below.  Will cont to monitor closely o/n.  - If febrile- pan cx and start empiric abx coverage  - ensure optimal hydration  - f/u lab-work in the AM

## 2019-03-11 NOTE — ED PROVIDER NOTE - ATTENDING CONTRIBUTION TO CARE
Attending Attestation: Dr. Cottrell  I have personally performed a history and physical examination of the patient and discussed management with the resident as well as the patient.  I reviewed the resident's note and agree with the documented findings and plan of care.  I have authored and modified critical sections of the Provider Note, including but not limited to HPI, Physical Exam and MDM. Pt c complex PMHx presents after fall, hypoglycemia.  Unknown down time.  Ill appearing at present, but non focal and in NAD.  Eval for causes of hypoglycemia, eval for occult traumatic injury in setting of unwitnessed fall. Also, passive suicidal statements.  Plan: EKG, CT head and c-spine non-con, CXR, UA and urine culture, labs, serial FS glucose. Psych C&L consult once admitted.

## 2019-03-11 NOTE — ED ADULT NURSE NOTE - NSFALLRSKUNASSIST_ED_ALL_ED
ANNUAL EXAM    CC:  Complete physical     HPI:  Patient is here today for complete physical     has never been sexually active  Last Pap: never had   Menstrual problems: /feb irregular menses- since then normal  Genitourinary issues: none  Sleep: well  Exercise:  none  Caffeine: none  Nutrition: not following   Fasting now with ramadan  Last year when in [facility] was diagnosed with PCOS- confirmed with pelvic u/s.   Was started on metformin and noted improvement in weight and regular menses. Would like to restart    Skin rash over bilateral arms and hypopigmentation on face- s/p derm eval- was given creams with no improvement  Noting dry patches on arms- itchy- worse when around dust    ALLERGIES:  Not on File    Current Outpatient Prescriptions   Medication Sig Dispense Refill   • seleniuim (SELSUN) 2.5 % lotion Apply to affected area 118 mL 1     No current facility-administered medications for this visit.         Most Recent Immunizations   Administered Date(s) Administered   • HPV 9-Valent 2011   • Influenza, injectable, quadrivalent 2016   • Tdap 2007       No past medical history on file.    No past surgical history on file.     Family History   Problem Relation Age of Onset   • High blood pressure Father    • Diabetes Maternal Grandfather    • High blood pressure Paternal Grandfather        Social History     Social History   • Marital status: Single     Spouse name: N/A   • Number of children: N/A   • Years of education: N/A     Occupational History   • Not on file.     Social History Main Topics   • Smoking status: Never Smoker   • Smokeless tobacco: Never Used   • Alcohol use No   • Drug use: Unknown   • Sexual activity: Not Currently      Comment: never sexually active.      Other Topics Concern   • Not on file     Social History Narrative   • No narrative on file      ROS:    CONSTITUTIONAL:  No significant weight gain or loss.  No fever or chills.  Normal nutritional  habits.  ENT:  Ears:  No hearing loss, or recurrent infections.  Nose:  No epistaxis, rhinorrhea, nasal congestion.  Mouth/throat:  No sore throat  RESPIRATORY:   No history of asthma, copd.  Nonsmoker.  No dyspnea.  No chronic cough.  CARDIOVASCULAR: No chest pain or shortness of breath at rest or with exertion.  No pain in calves with ambulation.  No palpitations.  GASTROINTESTINAL:  No n/v, diarrhea or constipation.  No bloody/dark stools.  :  No dysuria/hematuria. menses without significant dysmenorrheal symptoms.   SKIN:  + dry skin patches and hypopigentation  HEMATOLOGIC / LYMPHATIC:  No history of anemia or significant blood dyscrasias.    ENDOCRINE: No reported hair loss, cold intolerance, significant weight changes, fatigue. No polydypsia or polyuria.      MUSCULOSKELETAL:  No joint pain or muscle weakness.   NEUROLOGIC:   No weakness, numbness or tingling in extremities. No headaches or dizziness.  PSYCHIATRIC;  No depression/ anxiety      PHYSICAL EXAM:    GENERAL:  Well-developed, well-hydrated pleasant female in no acute distress.    Blood pressure 120/72, pulse 72, resp. rate 20, height 5' 2\" (1.575 m), weight 93.4 kg, last menstrual period 2018.  HEENT/NECK:  HEAD:  Normocephalic.  EYES: PERRLA, EOMI, sclerae and conjuctivae clear, lids normal bilaterally.  EARS:  Normal pinnae, external auditory canals and tympanic membranes.  Hearing is grossly normal.  NOSE:  Normal turbinates and no significant deviation of septum.  OROPHARYNX/THROAT:  No erythema, exudates or post-nasal drip. Normal tonsils, palate elevates normally, uvula in midline.  NECK:  No significant anterior cervical or supraclavicular lymphadenopathy.  Normal sized thyroid to palpation with no identified masses.    HEART:  RRR.  Normal S1 and S2.  No murmur or extra heart sounds.   LUNGS:  Clear to auscultation bilaterally with good respiratory effort.  ABDOMEN:  Soft, nontender, positive bowel sounds, no masses or  organomegaly.  No hernias appreciated.  EXTREMITIES:  No clubbing, cyanosis, edema.  No nail abnormalities.    MUSCULOSKELETAL: Range of motion is normal.  All joints are stable and with normal muscle tone and strength.  Gait is normal.  NEUROLOGICAL:   gross sensory testing to light touch are normal and symmetric in the upper and lower extremities.    SKIN:  hypopigmentation on face  + dry scaly patches on arms  PSYCHIATRIC:  Alert and oriented.  Judgement is intact.  Mood and affect are appropriate for situation.    ASSESSMENT: (Z00.00) Annual physical exam  (primary encounter diagnosis)  Comment: Wellness exam and patient is in good health today.  Vital signs and medical history reviewed. Preventative medicine topics discussed today include importance of seat belts, sun screen, calcium, exercise approximately 150 minutes per week and incorporate more fruits and vegetables. Recommend annual flu vaccine, Tdap every 10 years, Zostavax for > age 50, pneumonia vaccine if >65 years of age or high risk for pneumonia. Colon cancer screening for age >50. Annual mammogram >age 40. Osteoporosis screen >65 or those at high risk. Patientrefusing sonya due to no hx of sexual activity.   Plan: CBC & AUTO DIFFERENTIAL, COMPREHENSIVE         METABOLIC PANEL, LIPID PANEL WITH REFLEX,         THYROID STIMULATING HORMONE, GLYCOHEMOGLOBIN            (R21) Skin rash  Comment: ? Allergic eczema  S/p derm brianna  Will have her see allergy  Plan: SERVICE TO ALLERGY & IMMUNOLOGY            (E28.2) PCOS (polycystic ovarian syndrome)  Comment: obtain labs  If ok will start pt on metformin  rtc in 4 months         no

## 2019-03-11 NOTE — ED PROVIDER NOTE - CARE PLAN
Principal Discharge DX:	Hypoglycemia  Secondary Diagnosis:	CKD (chronic kidney disease)  Secondary Diagnosis:	Diabetes mellitus type I  Secondary Diagnosis:	Hypertension

## 2019-03-11 NOTE — ED PROVIDER NOTE - CLINICAL SUMMARY MEDICAL DECISION MAKING FREE TEXT BOX
Eval for causes of hypoglycemia, eval for occult traumatic injury in setting of unwitnessed fall. Plan: EKG, CT head and c-spine non-con, CXR, UA and urine culture, labs, serial FS glucose Pt c complex PMHx presents after fall, hypoglycemia.  Unknown down time.  Ill appearing at present, but non focal and in NAD.  Eval for causes of hypoglycemia, eval for occult traumatic injury in setting of unwitnessed fall. Also, passive suicidal statements.  Plan: EKG, CT head and c-spine non-con, CXR, UA and urine culture, labs, serial FS glucose. Psych C&L consult once admitted.

## 2019-03-11 NOTE — ED ADULT TRIAGE NOTE - CHIEF COMPLAINT QUOTE
Pt c/o hypoglycemia after taking insulin and not eating lunch, states he felt lightheaded and checked sugar which was 48, pt states ate meal. . Pt w fistula to left arm, due for dialysis tomorrow. Pt states he still feels confused. Pt slow to respond to questions. Pt c/o hypoglycemia after taking insulin and not eating lunch, states he felt lightheaded and checked sugar which was 48, pt states ate meal. . Pt w fistula to left arm, due for dialysis tomorrow. Pt states he still feels confused. Pt slow to respond to questions.\    Pts family arrived, states pt did fall, unknown if hit head, on coumadin.

## 2019-03-11 NOTE — ED ADULT NURSE NOTE - CHIEF COMPLAINT QUOTE
Pt c/o hypoglycemia after taking insulin and not eating lunch, states he felt lightheaded and checked sugar which was 48, pt states ate meal. . Pt w fistula to left arm, due for dialysis tomorrow. Pt states he still feels confused. Pt slow to respond to questions.\    Pts family arrived, states pt did fall, unknown if hit head, on coumadin.

## 2019-03-11 NOTE — H&P ADULT - PROBLEM SELECTOR PLAN 5
- f/u home regimen. Monitor BP's o/n - significantly elevated on admission (206/67)  - f/u home regimen. Monitor BP's o/n  - hydralazine and labetalol IV; change to PO meds when patient more alert  - starting lisinopril 10 mg QD for now (patient becoming more alert this AM)

## 2019-03-11 NOTE — ED PROVIDER NOTE - OBJECTIVE STATEMENT
54 yo male with PMH IDDM, ESRD s/p transplant 25 years ago that failed, now HD T/Th/S, last dialysis completed Saturday, HTN, presents to the ED for hypoglycemia, AMS, fall. Pt accompanied by sister who found him on ground at home, found to be hypoglycemic at home at 2:30, ate sandwhich and FS went to 140 but persistently slowed and subtly altered per sister. Found on ground at home, unknown if traumatic fall. Takes Lantus 25 units at night, takes Novalog sliding scale with meals but states FS was too low to take today. No known infectious symptoms. 52 yo male with PMH IDDM, ESRD s/p transplant 25 years ago that failed, now HD T/Th/S, last dialysis completed Saturday, HTN, presents to the ED for hypoglycemia, AMS, fall. Pt accompanied by sister who found him on ground at home, found to be hypoglycemic at home at 2:30, ate sandwich and FS went to 140 but persistently slowed and subtly altered per sister. Found on ground at home, unknown if traumatic fall. Takes Lantus 25 units at night, takes Novolog sliding scale with meals but states FS was too low to take today. No known infectious symptoms.  Tired appearing.

## 2019-03-11 NOTE — ED PROVIDER NOTE - CONSTITUTIONAL, MLM
normal... Well appearing, well nourished, awake, alert, oriented to person, place, time/situation and in no apparent distress. Ill appearing, well nourished, awake, alert, oriented to person, place, time/situation and in no apparent distress.

## 2019-03-11 NOTE — H&P ADULT - ASSESSMENT
54 y/o Male w/ a pmh significant for HTN, DM1 (on Lantus and Novolog), HLD, CKD s/p renal transplant on HD (T,Th,S) BIBEMS for hypoglycemia w/ resolution now w/ persistent confusion

## 2019-03-11 NOTE — H&P ADULT - NSHPPHYSICALEXAM_GEN_ALL_CORE
PHYSICAL EXAM:    General: No acute distress.  HEENT: Normocephalic, atraumatic.  PERRL.  EOMI.  No scleral icterus  Heart: RRR.  Normal S1 and S2.  No murmurs, rubs, or gallops.   Lungs: CTAB. No wheezes, crackles, or rhonchi.    Abdomen: BS+, soft, NT/ND.  No organomegaly.  Skin: Warm and dry.  No rashes.  Extremities: No edema, clubbing, or cyanosis.  2+ peripheral pulses b/l.  Musculoskeletal: No deformities.  No spinal or paraspinal tenderness.  Neuro: A&Ox3.  CN II-XII intact.  5/5 strength in UE and LE b/l. PHYSICAL EXAM:    General: No acute distress.  HEENT: Normocephalic, atraumatic.  PERRL.  EOMI.  No scleral icterus  Heart: RRR.  Normal S1 and S2.  No murmurs, rubs, or gallops.   Lungs: CTAB. No wheezes, crackles, or rhonchi.    Abdomen: BS+, soft, NT/ND.  No organomegaly.  Skin: Warm and dry.  No rashes.  Extremities: LUE AVF +thrill  Neuro: Somnolent PHYSICAL EXAM:    General: No acute distress.  HEENT: Normocephalic, atraumatic. Blind  Neck: Supple w/o lymphadenopathy   Heart: RRR.  Normal S1 and S2.  No murmurs, rubs, or gallops.   Lungs: CTAB. No wheezes, crackles, or rhonchi.    Abdomen: BS+, soft, NT/ND.  No organomegaly.  Skin: Warm and dry.  No rashes.  Extremities: LUE AVF +thrill  Neuro: Somnolent, unable to participate in full neuro exam PHYSICAL EXAM:    General: No acute distress.  HEENT: No signs of trauma, some facial rubor. Blind  Neck: Supple w/o lymphadenopathy   Heart: RRR.  Normal S1 and S2.  No murmurs, rubs, or gallops.   Lungs: CTAB. No wheezes, crackles, or rhonchi.    Abdomen: BS+, soft, NT/ND.  No organomegaly.  Skin: Warm and dry.  No rashes.  Extremities: LUE AVF +thrill  Neuro: Somnolent, unable to participate in full neuro exam

## 2019-03-11 NOTE — H&P ADULT - PROBLEM SELECTOR PLAN 3
- Longstanding hx of DM1- reported regimen consists of Lantus 25u non HD days and Lantus 24u on HD days  - Family member at bedside unclear of his Humalog premeal dosage at this time  - Pt received 25u this AM- will monitor FSG's closely o/n w/ ISS and can add premeal in AM depending on most recent FSG  - Endocrinology consult in AM - Longstanding hx of DM1- reported regimen consists of Lantus 25u non HD days and Lantus 24u on HD days  - Family member at bedside unclear of DM regimen at this time  - Pt received 25u this AM- will monitor FSG's closely o/n.  - Persistently hyperglycemic with HAGMA 18. Will give Lantus 15u and Humalog 7u and reassess FSG.   - Endocrinology consult in AM - Longstanding hx of DM1- reported regimen consists of Lantus 25u non HD days and Lantus 24u on HD days  - Family member at bedside unclear of DM regimen at this time  - Pt received 25u this AM- will monitor FSG's closely o/n.  - Persistently hyperglycemic with HAGMA 18. Will give Lantus 15u and Humalog 7u and reassess FSG.   - Endocrinology consult in AM (please call)

## 2019-03-11 NOTE — ED ADULT NURSE REASSESSMENT NOTE - NS ED NURSE REASSESS COMMENT FT1
report received at shift change, pt remains AAOx3, VS as noted, pt in NAD, awaiting dispo, will continue to monitor pt.

## 2019-03-11 NOTE — ED ADULT NURSE NOTE - OBJECTIVE STATEMENT
pt is 52 y/o male , a&ox3, pt is 54 y/o male , a&ox3, presenting for hypoglycemic episode at home. pt is 54 y/o male , a&ox3, presenting for hypoglycemic episode at home, in which he fell. does not remember hitting any part of his body or if there was LOC. no bruising or lacerations to head or face. no spinal tenderness. pt has AV fistula in left arm. pt ate a meal at home and currently has FS is 200's. pt is very slow to answer questions, easily frustrated and irritable upon questioning. pt currently hypertensive, awaiting orders for medication. pt due for dialysis for tomorrow. (pt receives diaylsis t, th, sat) . pt denies any chest pain, sob, n/v/d. PMH of DM, HTN, ESRD, hypoglecemia, legally blind. 20 g IV placed in right forearm.

## 2019-03-11 NOTE — H&P ADULT - NSHPLABSRESULTS_GEN_ALL_CORE
11.3   7.11  )-----------( 179      ( 11 Mar 2019 18:35 )             35.4       03-11    135  |  95<L>  |  71<H>  ----------------------------<  259<H>  4.1   |  24  |  5.40<H>    Ca    9.3      11 Mar 2019 18:35    TPro  7.3  /  Alb  4.0  /  TBili  0.5  /  DBili  x   /  AST  29  /  ALT  23  /  AlkPhos  133<H>  03-11                  PT/INR - ( 11 Mar 2019 18:35 )   PT: 28.1 SEC;   INR: 2.40          PTT - ( 11 Mar 2019 18:35 )  PTT:55.8 SEC    Lactate Trend      CARDIAC MARKERS ( 11 Mar 2019 18:35 )  x     / x     / 59 u/L / 2.66 ng/mL / x            CAPILLARY BLOOD GLUCOSE      POCT Blood Glucose.: 230 mg/dL (11 Mar 2019 18:47)        < from: CT Cervical Spine No Cont (03.11.19 @ 21:42) >      IMPRESSION:     HEAD: No CT evidence of acute intracranial hemorrhage, brain edema, or   mass effect. Age-appropriate involutional changes.    CERVICAL SPINE: No evidence for acute displaced fracture or traumatic   malalignment. Cervical degenerative spondylosis, as described above.      < end of copied text >

## 2019-03-11 NOTE — H&P ADULT - NSHPREVIEWOFSYSTEMS_GEN_ALL_CORE
REVIEW OF SYSTEMS:    CONSTITUTIONAL: Denies any fatigue, weakness, fevers or chills  EYES/ENT: No visual changes;  No vertigo or throat pain   NECK: No pain or stiffness  RESPIRATORY: No cough, wheezing, hemoptysis; No shortness of breath  CARDIOVASCULAR: No chest pain or palpitations  GASTROINTESTINAL: No abdominal or epigastric pain. No nausea, vomiting, or hematemesis; No diarrhea or constipation. No melena or hematochezia.  GENITOURINARY: No dysuria, frequency or hematuria  NEUROLOGICAL: No numbness or weakness  SKIN: No itching, burning, rashes, or lesions   All other review of systems is negative unless indicated above. Unable to be obtained Unable to be obtained due to patient being lethargic and w/ AMS

## 2019-03-11 NOTE — H&P ADULT - HISTORY OF PRESENT ILLNESS
54 y/o Male w/ a pmh significant for HTN, DM1 (on Lantus and Novolog), HLD, CKD s/p renal transplant on HD (T,Th,S) BIBEMS after being found down at home by his family. His family called EMS after finding pt lying on the floor in their home. [facility] EMS arrival, FSG 22 for which pt given Glucagon w/ repeat .      In the ED, pt afebrile, -227/67-77, HR 69-75, RR 20 (97% RA). Labs notable for INR 2.4, BUN 71, Cr 5.4. CTH/Cervical spine negative. Pt given Labetalol 10mg IVP x1 and admitted to medicine for further management 52 y/o Male w/ a pmh significant for HTN, DM1 (on Lantus and Novolog), HLD, CKD s/p renal transplant on HD (T,Th,S) BIBEMS after being found down at home by his sister.[facility] EMS arrival, FSG 22 for which pt given Glucagon and advised to eat a sandwich w/ repeat . His mental status improve however later on pt became more lethargic en route to the ED. He reportedly takes Lantus 25u QHS and Novolog w/ meals w/ excellent adherence. No reported fevers, chills, sick contacts, HA, dizziness, CP, palpitations, SOB, abdominal pain, dysuria or diarrhea        In the ED, pt afebrile, -227/67-77, HR 69-75, RR 20 (97% RA). Labs notable for INR 2.4, BUN 71, Cr 5.4. CTH/Cervical spine negative. Pt given Labetalol 10mg IVP x1 and admitted to medicine for further management Hx and physical limited as pt somnolent throughout encounter in ED    54 y/o Male w/ a pmh significant for HTN, DM1 (on Lantus and Novolog), HLD, CKD s/p renal transplant on HD (T,Th,S) BIBEMS after being found down at home by his sister. [facility] EMS arrival, FSG 22 for which pt given Glucagon and advised to eat a sandwich w/ repeat . His mental status improved however later on pt became more lethargic en route to the ED. He reportedly takes Lantus 25u QHS and Novolog w/ meals w/ excellent adherence. No reported fevers, chills, sick contacts, HA, dizziness, CP, palpitations, SOB, abdominal pain, dysuria or diarrhea        In the ED, pt afebrile, -227/67-77, HR 69-75, RR 20 (97% RA). Labs notable for INR 2.4, BUN 71, Cr 5.4. CTH/Cervical spine negative. Pt given Labetalol 10mg IVP x1 and admitted to medicine for further management

## 2019-03-11 NOTE — ED ADULT NURSE NOTE - NSIMPLEMENTINTERV_GEN_ALL_ED
Implemented All Fall Risk Interventions:  [place] to call system. Call bell, personal items and telephone within reach. Instruct patient to call for assistance. Room bathroom lighting operational. Non-slip footwear when patient is off stretcher. Physically safe environment: no spills, clutter or unnecessary equipment. Stretcher in lowest position, wheels locked, appropriate side rails in place. Provide visual cue, wrist band, yellow gown, etc. Monitor gait and stability. Monitor for mental status changes and reorient to person, place, and time. Review medications for side effects contributing to fall risk. Reinforce activity limits and safety measures with patient and family.

## 2019-03-11 NOTE — ED PROVIDER NOTE - PROGRESS NOTE DETAILS
Patient endorsing passive SI. Sending tox screens, c-peptide, CK (found on ground), admitting to medicine. Not actively suicidal after speaking with patient. Recommended inpatient psych consult. Discussion had with ED and hospitalist attending, decision made to hold off on constant observation given no active SI and patient does not appear to be acute threat to self or others on exam or history.

## 2019-03-12 DIAGNOSIS — Z94.0 KIDNEY TRANSPLANT STATUS: ICD-10-CM

## 2019-03-12 DIAGNOSIS — I10 ESSENTIAL (PRIMARY) HYPERTENSION: ICD-10-CM

## 2019-03-12 DIAGNOSIS — N18.6 END STAGE RENAL DISEASE: ICD-10-CM

## 2019-03-12 DIAGNOSIS — Z79.01 LONG TERM (CURRENT) USE OF ANTICOAGULANTS: ICD-10-CM

## 2019-03-12 DIAGNOSIS — E10.9 TYPE 1 DIABETES MELLITUS WITHOUT COMPLICATIONS: ICD-10-CM

## 2019-03-12 DIAGNOSIS — Z29.9 ENCOUNTER FOR PROPHYLACTIC MEASURES, UNSPECIFIED: ICD-10-CM

## 2019-03-12 DIAGNOSIS — N18.9 CHRONIC KIDNEY DISEASE, UNSPECIFIED: ICD-10-CM

## 2019-03-12 DIAGNOSIS — Z91.89 OTHER SPECIFIED PERSONAL RISK FACTORS, NOT ELSEWHERE CLASSIFIED: ICD-10-CM

## 2019-03-12 DIAGNOSIS — G93.41 METABOLIC ENCEPHALOPATHY: ICD-10-CM

## 2019-03-12 DIAGNOSIS — Z79.899 OTHER LONG TERM (CURRENT) DRUG THERAPY: ICD-10-CM

## 2019-03-12 LAB
ANION GAP SERPL CALC-SCNC: 18 MMO/L — HIGH (ref 7–14)
ANION GAP SERPL CALC-SCNC: 18 MMO/L — HIGH (ref 7–14)
ANION GAP SERPL CALC-SCNC: 21 MMO/L — HIGH (ref 7–14)
APAP SERPL-MCNC: < 15 UG/ML — LOW (ref 15–25)
B-OH-BUTYR SERPL-SCNC: < 0 MMOL/L — LOW (ref 0–0.4)
B-OH-BUTYR SERPL-SCNC: < 0 MMOL/L — LOW (ref 0–0.4)
BASE EXCESS BLDV CALC-SCNC: 0.5 MMOL/L — SIGNIFICANT CHANGE UP
BLOOD GAS VENOUS - CREATININE: 6.31 MG/DL — HIGH (ref 0.5–1.3)
BUN SERPL-MCNC: 77 MG/DL — HIGH (ref 7–23)
BUN SERPL-MCNC: 86 MG/DL — HIGH (ref 7–23)
BUN SERPL-MCNC: 92 MG/DL — HIGH (ref 7–23)
C PEPTIDE SERPL-MCNC: SIGNIFICANT CHANGE UP NG/ML (ref 1.1–4.4)
CALCIUM SERPL-MCNC: 8.6 MG/DL — SIGNIFICANT CHANGE UP (ref 8.4–10.5)
CALCIUM SERPL-MCNC: 8.6 MG/DL — SIGNIFICANT CHANGE UP (ref 8.4–10.5)
CALCIUM SERPL-MCNC: 8.9 MG/DL — SIGNIFICANT CHANGE UP (ref 8.4–10.5)
CHLORIDE BLDV-SCNC: 100 MMOL/L — SIGNIFICANT CHANGE UP (ref 96–108)
CHLORIDE SERPL-SCNC: 93 MMOL/L — LOW (ref 98–107)
CHLORIDE SERPL-SCNC: 98 MMOL/L — SIGNIFICANT CHANGE UP (ref 98–107)
CHLORIDE SERPL-SCNC: 99 MMOL/L — SIGNIFICANT CHANGE UP (ref 98–107)
CK SERPL-CCNC: 45 U/L — SIGNIFICANT CHANGE UP (ref 30–200)
CO2 SERPL-SCNC: 19 MMOL/L — LOW (ref 22–31)
CO2 SERPL-SCNC: 21 MMOL/L — LOW (ref 22–31)
CO2 SERPL-SCNC: 23 MMOL/L — SIGNIFICANT CHANGE UP (ref 22–31)
CREAT SERPL-MCNC: 6.12 MG/DL — HIGH (ref 0.5–1.3)
CREAT SERPL-MCNC: 6.21 MG/DL — HIGH (ref 0.5–1.3)
CREAT SERPL-MCNC: 6.64 MG/DL — HIGH (ref 0.5–1.3)
ETHANOL BLD-MCNC: < 10 MG/DL — SIGNIFICANT CHANGE UP
GAS PNL BLDV: 131 MMOL/L — LOW (ref 136–146)
GLUCOSE BLDC GLUCOMTR-MCNC: 117 MG/DL — HIGH (ref 70–99)
GLUCOSE BLDC GLUCOMTR-MCNC: 139 MG/DL — HIGH (ref 70–99)
GLUCOSE BLDC GLUCOMTR-MCNC: 234 MG/DL — HIGH (ref 70–99)
GLUCOSE BLDC GLUCOMTR-MCNC: 351 MG/DL — HIGH (ref 70–99)
GLUCOSE BLDC GLUCOMTR-MCNC: 363 MG/DL — HIGH (ref 70–99)
GLUCOSE BLDC GLUCOMTR-MCNC: 66 MG/DL — LOW (ref 70–99)
GLUCOSE BLDC GLUCOMTR-MCNC: 89 MG/DL — SIGNIFICANT CHANGE UP (ref 70–99)
GLUCOSE BLDV-MCNC: 405 — HIGH (ref 70–99)
GLUCOSE SERPL-MCNC: 249 MG/DL — HIGH (ref 70–99)
GLUCOSE SERPL-MCNC: 287 MG/DL — HIGH (ref 70–99)
GLUCOSE SERPL-MCNC: 449 MG/DL — HIGH (ref 70–99)
HBA1C BLD-MCNC: 6.3 % — HIGH (ref 4–5.6)
HBV SURFACE AG SER-ACNC: NEGATIVE — SIGNIFICANT CHANGE UP
HCO3 BLDV-SCNC: 25 MMOL/L — SIGNIFICANT CHANGE UP (ref 20–27)
HCT VFR BLD CALC: 31.3 % — LOW (ref 39–50)
HCT VFR BLDV CALC: 32 % — LOW (ref 39–51)
HGB BLD-MCNC: 10.1 G/DL — LOW (ref 13–17)
HGB BLDV-MCNC: 10.4 G/DL — LOW (ref 13–17)
LACTATE BLDV-MCNC: 1.3 MMOL/L — SIGNIFICANT CHANGE UP (ref 0.5–2)
MAGNESIUM SERPL-MCNC: 2.3 MG/DL — SIGNIFICANT CHANGE UP (ref 1.6–2.6)
MCHC RBC-ENTMCNC: 30.5 PG — SIGNIFICANT CHANGE UP (ref 27–34)
MCHC RBC-ENTMCNC: 32.3 % — SIGNIFICANT CHANGE UP (ref 32–36)
MCV RBC AUTO: 94.6 FL — SIGNIFICANT CHANGE UP (ref 80–100)
NRBC # FLD: 0 K/UL — LOW (ref 25–125)
PCO2 BLDV: 40 MMHG — LOW (ref 41–51)
PH BLDV: 7.41 PH — SIGNIFICANT CHANGE UP (ref 7.32–7.43)
PHOSPHATE SERPL-MCNC: 5.2 MG/DL — HIGH (ref 2.5–4.5)
PLATELET # BLD AUTO: 158 K/UL — SIGNIFICANT CHANGE UP (ref 150–400)
PMV BLD: 9.7 FL — SIGNIFICANT CHANGE UP (ref 7–13)
PO2 BLDV: 66 MMHG — HIGH (ref 35–40)
POTASSIUM BLDV-SCNC: 4 MMOL/L — SIGNIFICANT CHANGE UP (ref 3.4–4.5)
POTASSIUM SERPL-MCNC: 4.2 MMOL/L — SIGNIFICANT CHANGE UP (ref 3.5–5.3)
POTASSIUM SERPL-MCNC: 4.3 MMOL/L — SIGNIFICANT CHANGE UP (ref 3.5–5.3)
POTASSIUM SERPL-MCNC: 4.4 MMOL/L — SIGNIFICANT CHANGE UP (ref 3.5–5.3)
POTASSIUM SERPL-SCNC: 4.2 MMOL/L — SIGNIFICANT CHANGE UP (ref 3.5–5.3)
POTASSIUM SERPL-SCNC: 4.3 MMOL/L — SIGNIFICANT CHANGE UP (ref 3.5–5.3)
POTASSIUM SERPL-SCNC: 4.4 MMOL/L — SIGNIFICANT CHANGE UP (ref 3.5–5.3)
RBC # BLD: 3.31 M/UL — LOW (ref 4.2–5.8)
RBC # FLD: 13.9 % — SIGNIFICANT CHANGE UP (ref 10.3–14.5)
SALICYLATES SERPL-MCNC: < 5 MG/DL — LOW (ref 15–30)
SAO2 % BLDV: 91.4 % — HIGH (ref 60–85)
SODIUM SERPL-SCNC: 132 MMOL/L — LOW (ref 135–145)
SODIUM SERPL-SCNC: 139 MMOL/L — SIGNIFICANT CHANGE UP (ref 135–145)
SODIUM SERPL-SCNC: 139 MMOL/L — SIGNIFICANT CHANGE UP (ref 135–145)
WBC # BLD: 4.56 K/UL — SIGNIFICANT CHANGE UP (ref 3.8–10.5)
WBC # FLD AUTO: 4.56 K/UL — SIGNIFICANT CHANGE UP (ref 3.8–10.5)

## 2019-03-12 PROCEDURE — 99223 1ST HOSP IP/OBS HIGH 75: CPT | Mod: GC

## 2019-03-12 PROCEDURE — 12345: CPT | Mod: NC,GC

## 2019-03-12 RX ORDER — METOPROLOL TARTRATE 50 MG
50 TABLET ORAL
Qty: 0 | Refills: 0 | Status: DISCONTINUED | OUTPATIENT
Start: 2019-03-12 | End: 2019-03-12

## 2019-03-12 RX ORDER — INSULIN LISPRO 100/ML
VIAL (ML) SUBCUTANEOUS AT BEDTIME
Qty: 0 | Refills: 0 | Status: DISCONTINUED | OUTPATIENT
Start: 2019-03-12 | End: 2019-03-15

## 2019-03-12 RX ORDER — FUROSEMIDE 40 MG
1 TABLET ORAL
Qty: 0 | Refills: 0 | COMMUNITY

## 2019-03-12 RX ORDER — DEXTROSE 50 % IN WATER 50 %
12.5 SYRINGE (ML) INTRAVENOUS ONCE
Qty: 0 | Refills: 0 | Status: DISCONTINUED | OUTPATIENT
Start: 2019-03-12 | End: 2019-03-15

## 2019-03-12 RX ORDER — DEXTROSE 50 % IN WATER 50 %
25 SYRINGE (ML) INTRAVENOUS ONCE
Qty: 0 | Refills: 0 | Status: DISCONTINUED | OUTPATIENT
Start: 2019-03-12 | End: 2019-03-15

## 2019-03-12 RX ORDER — HYDRALAZINE HCL 50 MG
10 TABLET ORAL ONCE
Qty: 0 | Refills: 0 | Status: COMPLETED | OUTPATIENT
Start: 2019-03-12 | End: 2019-03-12

## 2019-03-12 RX ORDER — INSULIN LISPRO 100/ML
VIAL (ML) SUBCUTANEOUS
Qty: 0 | Refills: 0 | Status: DISCONTINUED | OUTPATIENT
Start: 2019-03-12 | End: 2019-03-12

## 2019-03-12 RX ORDER — INSULIN LISPRO 100/ML
5 VIAL (ML) SUBCUTANEOUS
Qty: 0 | Refills: 0 | Status: DISCONTINUED | OUTPATIENT
Start: 2019-03-12 | End: 2019-03-13

## 2019-03-12 RX ORDER — HYDRALAZINE HCL 50 MG
1 TABLET ORAL
Qty: 0 | Refills: 0 | COMMUNITY

## 2019-03-12 RX ORDER — GLUCAGON INJECTION, SOLUTION 0.5 MG/.1ML
1 INJECTION, SOLUTION SUBCUTANEOUS ONCE
Qty: 0 | Refills: 0 | Status: DISCONTINUED | OUTPATIENT
Start: 2019-03-12 | End: 2019-03-15

## 2019-03-12 RX ORDER — WARFARIN SODIUM 2.5 MG/1
7 TABLET ORAL ONCE
Qty: 0 | Refills: 0 | Status: COMPLETED | OUTPATIENT
Start: 2019-03-12 | End: 2019-03-12

## 2019-03-12 RX ORDER — HUMAN INSULIN 100 [IU]/ML
6 INJECTION, SUSPENSION SUBCUTANEOUS ONCE
Qty: 0 | Refills: 0 | Status: COMPLETED | OUTPATIENT
Start: 2019-03-12 | End: 2019-03-12

## 2019-03-12 RX ORDER — HUMAN INSULIN 100 [IU]/ML
8 INJECTION, SUSPENSION SUBCUTANEOUS ONCE
Qty: 0 | Refills: 0 | Status: DISCONTINUED | OUTPATIENT
Start: 2019-03-12 | End: 2019-03-12

## 2019-03-12 RX ORDER — CALCIUM ACETATE 667 MG
2 TABLET ORAL
Qty: 0 | Refills: 0 | COMMUNITY

## 2019-03-12 RX ORDER — INSULIN LISPRO 100/ML
6 VIAL (ML) SUBCUTANEOUS
Qty: 0 | Refills: 0 | Status: DISCONTINUED | OUTPATIENT
Start: 2019-03-12 | End: 2019-03-12

## 2019-03-12 RX ORDER — DEXTROSE 50 % IN WATER 50 %
25 SYRINGE (ML) INTRAVENOUS ONCE
Qty: 0 | Refills: 0 | Status: DISCONTINUED | OUTPATIENT
Start: 2019-03-12 | End: 2019-03-12

## 2019-03-12 RX ORDER — DEXTROSE 50 % IN WATER 50 %
12.5 SYRINGE (ML) INTRAVENOUS ONCE
Qty: 0 | Refills: 0 | Status: DISCONTINUED | OUTPATIENT
Start: 2019-03-12 | End: 2019-03-12

## 2019-03-12 RX ORDER — INSULIN GLARGINE 100 [IU]/ML
15 INJECTION, SOLUTION SUBCUTANEOUS ONCE
Qty: 0 | Refills: 0 | Status: COMPLETED | OUTPATIENT
Start: 2019-03-12 | End: 2019-03-12

## 2019-03-12 RX ORDER — LISINOPRIL 2.5 MG/1
10 TABLET ORAL DAILY
Qty: 0 | Refills: 0 | Status: DISCONTINUED | OUTPATIENT
Start: 2019-03-12 | End: 2019-03-12

## 2019-03-12 RX ORDER — DEXTROSE 50 % IN WATER 50 %
15 SYRINGE (ML) INTRAVENOUS ONCE
Qty: 0 | Refills: 0 | Status: DISCONTINUED | OUTPATIENT
Start: 2019-03-12 | End: 2019-03-12

## 2019-03-12 RX ORDER — INSULIN GLARGINE 100 [IU]/ML
23 INJECTION, SOLUTION SUBCUTANEOUS EVERY MORNING
Qty: 0 | Refills: 0 | Status: DISCONTINUED | OUTPATIENT
Start: 2019-03-13 | End: 2019-03-15

## 2019-03-12 RX ORDER — TRIAMCINOLONE ACETONIDE 55 MCG
2 AEROSOL, SPRAY (GRAM) NASAL
Qty: 0 | Refills: 0 | COMMUNITY

## 2019-03-12 RX ORDER — METOPROLOL TARTRATE 50 MG
50 TABLET ORAL THREE TIMES A DAY
Qty: 0 | Refills: 0 | Status: DISCONTINUED | OUTPATIENT
Start: 2019-03-12 | End: 2019-03-15

## 2019-03-12 RX ORDER — GLUCAGON INJECTION, SOLUTION 0.5 MG/.1ML
1 INJECTION, SOLUTION SUBCUTANEOUS ONCE
Qty: 0 | Refills: 0 | Status: DISCONTINUED | OUTPATIENT
Start: 2019-03-12 | End: 2019-03-12

## 2019-03-12 RX ORDER — INSULIN LISPRO 100/ML
10 VIAL (ML) SUBCUTANEOUS ONCE
Qty: 0 | Refills: 0 | Status: COMPLETED | OUTPATIENT
Start: 2019-03-12 | End: 2019-03-12

## 2019-03-12 RX ORDER — INSULIN LISPRO 100/ML
VIAL (ML) SUBCUTANEOUS
Qty: 0 | Refills: 0 | Status: DISCONTINUED | OUTPATIENT
Start: 2019-03-12 | End: 2019-03-15

## 2019-03-12 RX ORDER — TRIAMCINOLONE ACETONIDE 55 MCG
0 AEROSOL, SPRAY (GRAM) NASAL
Qty: 0 | Refills: 0 | COMMUNITY

## 2019-03-12 RX ORDER — SODIUM CHLORIDE 9 MG/ML
1000 INJECTION, SOLUTION INTRAVENOUS
Qty: 0 | Refills: 0 | Status: DISCONTINUED | OUTPATIENT
Start: 2019-03-12 | End: 2019-03-12

## 2019-03-12 RX ORDER — ATORVASTATIN CALCIUM 80 MG/1
10 TABLET, FILM COATED ORAL AT BEDTIME
Qty: 0 | Refills: 0 | Status: DISCONTINUED | OUTPATIENT
Start: 2019-03-12 | End: 2019-03-15

## 2019-03-12 RX ORDER — INSULIN GLARGINE 100 [IU]/ML
15 INJECTION, SOLUTION SUBCUTANEOUS ONCE
Qty: 0 | Refills: 0 | Status: DISCONTINUED | OUTPATIENT
Start: 2019-03-12 | End: 2019-03-12

## 2019-03-12 RX ORDER — SODIUM CHLORIDE 9 MG/ML
1000 INJECTION, SOLUTION INTRAVENOUS
Qty: 0 | Refills: 0 | Status: DISCONTINUED | OUTPATIENT
Start: 2019-03-12 | End: 2019-03-15

## 2019-03-12 RX ORDER — INSULIN LISPRO 100/ML
3 VIAL (ML) SUBCUTANEOUS ONCE
Qty: 0 | Refills: 0 | Status: DISCONTINUED | OUTPATIENT
Start: 2019-03-12 | End: 2019-03-12

## 2019-03-12 RX ORDER — INSULIN LISPRO 100/ML
10 VIAL (ML) SUBCUTANEOUS ONCE
Qty: 0 | Refills: 0 | Status: DISCONTINUED | OUTPATIENT
Start: 2019-03-12 | End: 2019-03-12

## 2019-03-12 RX ORDER — FUROSEMIDE 40 MG
80 TABLET ORAL
Qty: 0 | Refills: 0 | Status: DISCONTINUED | OUTPATIENT
Start: 2019-03-13 | End: 2019-03-15

## 2019-03-12 RX ORDER — DEXTROSE 50 % IN WATER 50 %
15 SYRINGE (ML) INTRAVENOUS ONCE
Qty: 0 | Refills: 0 | Status: DISCONTINUED | OUTPATIENT
Start: 2019-03-12 | End: 2019-03-15

## 2019-03-12 RX ORDER — HYDRALAZINE HCL 50 MG
125 TABLET ORAL THREE TIMES A DAY
Qty: 0 | Refills: 0 | Status: DISCONTINUED | OUTPATIENT
Start: 2019-03-12 | End: 2019-03-15

## 2019-03-12 RX ORDER — WARFARIN SODIUM 2.5 MG/1
1 TABLET ORAL
Qty: 0 | Refills: 0 | COMMUNITY

## 2019-03-12 RX ORDER — HEPARIN SODIUM 5000 [USP'U]/ML
5000 INJECTION INTRAVENOUS; SUBCUTANEOUS EVERY 12 HOURS
Qty: 0 | Refills: 0 | Status: DISCONTINUED | OUTPATIENT
Start: 2019-03-12 | End: 2019-03-12

## 2019-03-12 RX ORDER — INSULIN LISPRO 100/ML
VIAL (ML) SUBCUTANEOUS AT BEDTIME
Qty: 0 | Refills: 0 | Status: DISCONTINUED | OUTPATIENT
Start: 2019-03-12 | End: 2019-03-12

## 2019-03-12 RX ORDER — INSULIN LISPRO 100/ML
7 VIAL (ML) SUBCUTANEOUS ONCE
Qty: 0 | Refills: 0 | Status: COMPLETED | OUTPATIENT
Start: 2019-03-12 | End: 2019-03-12

## 2019-03-12 RX ADMIN — Medication 125 MILLIGRAM(S): at 09:50

## 2019-03-12 RX ADMIN — Medication 2: at 08:41

## 2019-03-12 RX ADMIN — INSULIN GLARGINE 15 UNIT(S): 100 INJECTION, SOLUTION SUBCUTANEOUS at 08:44

## 2019-03-12 RX ADMIN — HEPARIN SODIUM 5000 UNIT(S): 5000 INJECTION INTRAVENOUS; SUBCUTANEOUS at 06:21

## 2019-03-12 RX ADMIN — HUMAN INSULIN 6 UNIT(S): 100 INJECTION, SUSPENSION SUBCUTANEOUS at 17:06

## 2019-03-12 RX ADMIN — Medication 5: at 17:06

## 2019-03-12 RX ADMIN — Medication 10 UNIT(S): at 15:03

## 2019-03-12 RX ADMIN — Medication 4: at 11:31

## 2019-03-12 RX ADMIN — Medication 50 MILLIGRAM(S): at 17:12

## 2019-03-12 RX ADMIN — Medication 125 MILLIGRAM(S): at 17:12

## 2019-03-12 RX ADMIN — Medication 7 UNIT(S): at 02:16

## 2019-03-12 RX ADMIN — Medication 10 MILLIGRAM(S): at 02:15

## 2019-03-12 RX ADMIN — Medication 5 UNIT(S): at 17:06

## 2019-03-12 RX ADMIN — Medication 50 MILLIGRAM(S): at 11:31

## 2019-03-12 NOTE — PROGRESS NOTE ADULT - SUBJECTIVE AND OBJECTIVE BOX
Patient is a 53y old  Male who presents with a chief complaint of Hypoglycemia (11 Mar 2019 23:50)      SUBJECTIVE / OVERNIGHT EVENTS:    MEDICATIONS  (STANDING):  dextrose 5%. 1000 milliLiter(s) (50 mL/Hr) IV Continuous <Continuous>  dextrose 50% Injectable 12.5 Gram(s) IV Push once  dextrose 50% Injectable 25 Gram(s) IV Push once  dextrose 50% Injectable 25 Gram(s) IV Push once  heparin  Injectable 5000 Unit(s) SubCutaneous every 12 hours  insulin glargine Injectable (LANTUS) 15 Unit(s) SubCutaneous once  insulin lispro (HumaLOG) corrective regimen sliding scale   SubCutaneous three times a day before meals  insulin lispro (HumaLOG) corrective regimen sliding scale   SubCutaneous at bedtime    MEDICATIONS  (PRN):  dextrose 40% Gel 15 Gram(s) Oral once PRN Blood Glucose LESS THAN 70 milliGRAM(s)/deciliter  glucagon  Injectable 1 milliGRAM(s) IntraMuscular once PRN Glucose LESS THAN 70 milligrams/deciliter      Vital Signs Last 24 Hrs  T(C): 36.7 (12 Mar 2019 05:40), Max: 36.9 (12 Mar 2019 01:15)  T(F): 98 (12 Mar 2019 05:40), Max: 98.4 (12 Mar 2019 01:15)  HR: 78 (12 Mar 2019 05:40) (69 - 78)  BP: 182/75 (12 Mar 2019 05:40) (176/62 - 227/77)  BP(mean): --  RR: 18 (12 Mar 2019 05:40) (16 - 20)  SpO2: 96% (12 Mar 2019 05:40) (96% - 100%)  CAPILLARY BLOOD GLUCOSE      POCT Blood Glucose.: 257 mg/dL (12 Mar 2019 06:14)  POCT Blood Glucose.: 378 mg/dL (12 Mar 2019 04:12)  POCT Blood Glucose.: 415 mg/dL (12 Mar 2019 02:49)  POCT Blood Glucose.: 407 mg/dL (12 Mar 2019 01:28)  POCT Blood Glucose.: 230 mg/dL (11 Mar 2019 18:47)  POCT Blood Glucose.: 254 mg/dL (11 Mar 2019 17:09)  POCT Blood Glucose.: 220 mg/dL (11 Mar 2019 16:13)    I&O's Summary      PHYSICAL EXAM:              LABS:                        10.1   4.56  )-----------( 158      ( 12 Mar 2019 04:00 )             31.3     03-12    132<L>  |  93<L>  |  77<H>  ----------------------------<  449<H>  4.3   |  21<L>  |  6.12<H>    Ca    8.6      12 Mar 2019 03:20  Phos  5.2     03-12  Mg     2.3     03-12    TPro  7.3  /  Alb  4.0  /  TBili  0.5  /  DBili  x   /  AST  29  /  ALT  23  /  AlkPhos  133<H>  03-11    PT/INR - ( 11 Mar 2019 18:35 )   PT: 28.1 SEC;   INR: 2.40          PTT - ( 11 Mar 2019 18:35 )  PTT:55.8 SEC  CARDIAC MARKERS ( 11 Mar 2019 23:10 )  x     / x     / 45 u/L / x     / x      CARDIAC MARKERS ( 11 Mar 2019 18:35 )  x     / x     / 59 u/L / 2.66 ng/mL / x Patient is a 53y old  Male who presents with a chief complaint of Hypoglycemia (11 Mar 2019 23:50)      SUBJECTIVE / OVERNIGHT EVENTS: Pt reports feeling better; does not endorse pain, weakness or dizziness.     MEDICATIONS  (STANDING):  dextrose 5%. 1000 milliLiter(s) (50 mL/Hr) IV Continuous <Continuous>  dextrose 50% Injectable 12.5 Gram(s) IV Push once  dextrose 50% Injectable 25 Gram(s) IV Push once  dextrose 50% Injectable 25 Gram(s) IV Push once  heparin  Injectable 5000 Unit(s) SubCutaneous every 12 hours  insulin glargine Injectable (LANTUS) 15 Unit(s) SubCutaneous once  insulin lispro (HumaLOG) corrective regimen sliding scale   SubCutaneous three times a day before meals  insulin lispro (HumaLOG) corrective regimen sliding scale   SubCutaneous at bedtime    MEDICATIONS  (PRN):  dextrose 40% Gel 15 Gram(s) Oral once PRN Blood Glucose LESS THAN 70 milliGRAM(s)/deciliter  glucagon  Injectable 1 milliGRAM(s) IntraMuscular once PRN Glucose LESS THAN 70 milligrams/deciliter      Vital Signs Last 24 Hrs  T(C): 36.7 (12 Mar 2019 05:40), Max: 36.9 (12 Mar 2019 01:15)  T(F): 98 (12 Mar 2019 05:40), Max: 98.4 (12 Mar 2019 01:15)  HR: 78 (12 Mar 2019 05:40) (69 - 78)  BP: 182/75 (12 Mar 2019 05:40) (176/62 - 227/77)  BP(mean): --  RR: 18 (12 Mar 2019 05:40) (16 - 20)  SpO2: 96% (12 Mar 2019 05:40) (96% - 100%)  CAPILLARY BLOOD GLUCOSE      POCT Blood Glucose.: 257 mg/dL (12 Mar 2019 06:14)  POCT Blood Glucose.: 378 mg/dL (12 Mar 2019 04:12)  POCT Blood Glucose.: 415 mg/dL (12 Mar 2019 02:49)  POCT Blood Glucose.: 407 mg/dL (12 Mar 2019 01:28)  POCT Blood Glucose.: 230 mg/dL (11 Mar 2019 18:47)  POCT Blood Glucose.: 254 mg/dL (11 Mar 2019 17:09)  POCT Blood Glucose.: 220 mg/dL (11 Mar 2019 16:13)    I&O's Summary    Exam  	General: No acute distress  	HEENT: No signs of trauma, blind. R pupil constricted and fixed, left pupil very mildly reactive  	Neck: Supple w/o lymphadenopathy   	Heart: RRR.  Normal S1 and S2.  No murmurs, rubs, or gallops.   	Lungs: CTAB. No wheezes, crackles, or rhonchi.    	Abdomen: BS+, soft, NT/ND.  No organomegaly.  	Skin: Warm and dry.  No rashes.  	Extremities: LUE AVF +thrill  Neuro: Awake and alert, AAO x 3 with no focal deficits        LABS:                        10.1   4.56  )-----------( 158      ( 12 Mar 2019 04:00 )             31.3     03-12    132<L>  |  93<L>  |  77<H>  ----------------------------<  449<H>  4.3   |  21<L>  |  6.12<H>    Ca    8.6      12 Mar 2019 03:20  Phos  5.2     03-12  Mg     2.3     03-12    TPro  7.3  /  Alb  4.0  /  TBili  0.5  /  DBili  x   /  AST  29  /  ALT  23  /  AlkPhos  133<H>  03-11    PT/INR - ( 11 Mar 2019 18:35 )   PT: 28.1 SEC;   INR: 2.40          PTT - ( 11 Mar 2019 18:35 )  PTT:55.8 SEC  CARDIAC MARKERS ( 11 Mar 2019 23:10 )  x     / x     / 45 u/L / x     / x      CARDIAC MARKERS ( 11 Mar 2019 18:35 )  x     / x     / 59 u/L / 2.66 ng/mL / x

## 2019-03-12 NOTE — CONSULT NOTE ADULT - ATTENDING COMMENTS
53M DM1 ESRD, blind, somewhat brittle glucose control. Admitted after hypoglycemic event and now with hyperglycemia.  Discussed at length regarding how he calculates his home insulin dosing and carb counting.  Agree with giving NPH 6 units now to prevent onset of DKA. Although elevated anion gap, BHB is zero.  Plan for Lantus 23 units qAM starting tomorrow. Humalog 5/5/5 plus low correctional scales. Will follow.  Has outside endocrinologist for follow up.

## 2019-03-12 NOTE — CONSULT NOTE ADULT - ASSESSMENT
52 y/o Male w/ a pmh significant for HTN, DM1 (A1C 7.7 Oct 2018), HLD, CKD s/p renal transplant on HD (T,Th,S), legally blind, BIBEMS after being found down at home by his sister. Consult for Type 1 DM with hypoglycemia, now with inpatient hyperglycemia.    1. Type 1 DM  -Last A1C 7.7 in Oct 2018  -Recheck A1C  -Received lantus only 15 units at AM today. On moderate correctional scale only, no meal time humalog ordered. Now with hyperglycemia, with AG increasing to 21, and bicarb 19. BHB 0. Receivied 10 units of humalog most recently. Patient needs more basal insulin at this time to prevent DKA.  -Would give 8 units of NPH now. Increase lantus to 25 units q Am for tomorrow. Also add humalog 6 TID with meals for now. Change to low dose humalog correctional scale. Will make further adjustments based on requirements.  -Will also need to repeat BMP and BHB in next 4 hrs to ensure AG improving and pt not in DKA.    2. HTN  -markedly elevated BP. on metoprolol and hydralazine. Consider uptitrating to BID metoprolol and adding amlodopine. BP control as per nephrology with pt on HD.    3. HLD  -resume statin 52 y/o Male w/ a pmh significant for HTN, DM1 (A1C 7.7 Oct 2018), HLD, CKD s/p renal transplant on HD (T,Th,S), legally blind, BIBEMS after being found down at home by his sister. Consult for Type 1 DM with hypoglycemia, now with inpatient hyperglycemia.    1. Type 1 DM  -Last A1C 7.7 in Oct 2018  -Recheck A1C  -Received lantus only 15 units at AM today. On moderate correctional scale only, no meal time humalog ordered. Now with hyperglycemia, with AG increasing to 21, and bicarb 19. BHB 0. Receivied 10 units of humalog most recently. Patient needs more basal insulin at this time to prevent DKA.  -Would give 6 units of NPH now STAT. Increase lantus to 23 units q Am for tomorrow. Also add humalog 5 TID with meals for now. Change to low dose humalog correctional scale. Will make further adjustments based on requirements.  -Will also need to repeat BMP and BHB in next 4 hrs to ensure AG improving and pt not in DKA.    2. HTN  -markedly elevated BP. on metoprolol and hydralazine. Consider uptitrating to BID metoprolol and adding amlodopine. BP control as per nephrology with pt on HD.    3. HLD  -resume statin

## 2019-03-12 NOTE — PATIENT PROFILE ADULT - NSPROHMSYMPCOND_GEN_A_NUR
January 30, 2019                   [facility]  Urgent Care  [address]  [place] [address]  Phone: [phone]   January 30, 2019     Patient: Stephanie Cook   YOB: 2006   Date of Visit: 1/30/2019       To Whom it May Concern:    Stephanie Cook was seen in my clinic on 1/30/2019. She may return to school on 01/30/2019.    If you have any questions or concerns, please don't hesitate to call.    Sincerely,         Mindy Valdez, RT      diabetes

## 2019-03-12 NOTE — CONSULT NOTE ADULT - NSICDXPROBLEM_GEN_ALL_CORE_FT
PROBLEM DIAGNOSES  Problem: ESRD on dialysis  Recommendation: ESRD on HD TTS. Patient to get dialysis today. Aim for dry weight 70.3 kg. Avoid hypotension.    Problem: Anemia due to chronic kidney disease, on chronic dialysis  Recommendation: Patient with acceptable Hgb. Will touch base with [facility] to get LINDSAY if needed.

## 2019-03-12 NOTE — CONSULT NOTE ADULT - ASSESSMENT
53 year old male with history of ESRD on HD (TTS, Center: [facility], Nephrologist: Dr. Baez), s/p failed renal transplant, Type 1 DM, HTN who presents to the ED with hypoglycemia. Nephrology consulted for ESRD on HD. Last dialyzed on 03/09/19.

## 2019-03-12 NOTE — CONSULT NOTE ADULT - SUBJECTIVE AND OBJECTIVE BOX
[facility] Division of Kidney Diseases & Hypertension  INITIAL CONSULT NOTE  [phone]--------------------------------------------------------------------------------    53 year old male with history of ESRD on HD ([facility], Center: [facility], Nephrologist: Dr. Baez), s/p failed renal transplant, Type 1 DM, HTN who presents to the ED with hypoglycemia. Patient states that he woke up this morning with high sugars and took his insulin and had a grapefruit and       Renal/Dialysis History:          PAST HISTORY  --------------------------------------------------------------------------------  PAST MEDICAL & SURGICAL HISTORY:  Hypertension  Legally blind  Diabetes mellitus type I  Renal transplant, status post  CKD (chronic kidney disease)  Renal transplant, status post  History of tonsillectomy    FAMILY HISTORY:  No pertinent family history in first degree relatives    PAST SOCIAL HISTORY:    ALLERGIES & MEDICATIONS  --------------------------------------------------------------------------------  Allergies    No Known Allergies    Intolerances      Standing Inpatient Medications  dextrose 5%. 1000 milliLiter(s) IV Continuous <Continuous>  dextrose 50% Injectable 12.5 Gram(s) IV Push once  dextrose 50% Injectable 25 Gram(s) IV Push once  dextrose 50% Injectable 25 Gram(s) IV Push once  hydrALAZINE 125 milliGRAM(s) Oral three times a day  insulin lispro (HumaLOG) corrective regimen sliding scale   SubCutaneous three times a day before meals  insulin lispro (HumaLOG) corrective regimen sliding scale   SubCutaneous at bedtime  insulin NPH human recombinant 8 Unit(s) SubCutaneous once  metoprolol tartrate 50 milliGRAM(s) Oral three times a day  warfarin 7 milliGRAM(s) Oral once    PRN Inpatient Medications  dextrose 40% Gel 15 Gram(s) Oral once PRN  glucagon  Injectable 1 milliGRAM(s) IntraMuscular once PRN      REVIEW OF SYSTEMS  --------------------------------------------------------------------------------  Gen: No  fevers/chills, weakness  Skin: No rashes  Head/Eyes/Ears/Mouth: No headache; Normal hearing; Normal vision w/o blurriness;   Respiratory: No dyspnea, cough, wheezing, hemoptysis  CV: No chest pain,   GI: No abdominal pain, diarrhea, constipation, nausea, vomiting  : No increased frequency, dysuria, hematuria, nocturia  MSK: No joint pain/swelling;   Neuro: No dizziness/lightheadedness, weakness, seizures    All other systems were reviewed and are negative, except as noted.    VITALS/PHYSICAL EXAM  --------------------------------------------------------------------------------  T(C): 36.6 (03-12-19 @ 15:10), Max: 37 (03-12-19 @ 11:09)  HR: 74 (03-12-19 @ 15:10) (69 - 78)  BP: 175/76 (03-12-19 @ 15:10) (175/76 - 227/77)  RR: 17 (03-12-19 @ 15:10) (16 - 20)  SpO2: 99% (03-12-19 @ 15:10) (95% - 99%)  Wt(kg): --  Height (cm): 182.88 (03-12-19 @ 01:15)  Weight (kg): 69.8 (03-12-19 @ 01:15)  BMI (kg/m2): 20.9 (03-12-19 @ 01:15)  BSA (m2): 1.91 (03-12-19 @ 01:15)      Physical Exam:  	Gen: NAD, well-appearing  	HEENT: PERRL, supple neck  	Pulm: CTA B/L  	CV:  S1S2  	Abd: +BS, soft   	Ext: No B/L Lower ext edema  	Neuro: No focal deficits  	Skin: Warm, without rashes  	Vascular access:     LABS/STUDIES  --------------------------------------------------------------------------------              10.1   4.56  >-----------<  158      [03-12-19 @ 04:00]              31.3     139  |  99  |  86  ----------------------------<  249      [03-12-19 @ 11:54]  4.4   |  19  |  6.21        Ca     8.6     [03-12-19 @ 11:54]      Mg     2.3     [03-12-19 @ 03:20]      Phos  5.2     [03-12-19 @ 03:20]    TPro  7.3  /  Alb  4.0  /  TBili  0.5  /  DBili  x   /  AST  29  /  ALT  23  /  AlkPhos  133  [03-11-19 @ 18:35]    PT/INR: PT 28.1 , INR 2.40       [03-11-19 @ 18:35]  PTT: 55.8       [03-11-19 @ 18:35]    CK 45      [03-11-19 @ 23:10]    Creatinine Trend:  SCr 6.21 [03-12 @ 11:54]  SCr 6.12 [03-12 @ 03:20]  SCr 5.40 [03-11 @ 18:35] [facility] Division of Kidney Diseases & Hypertension  INITIAL CONSULT NOTE  [phone]--------------------------------------------------------------------------------    53 year old male with history of ESRD on HD ([facility], Center: [facility], Nephrologist: Dr. Baez), s/p failed renal transplant, Type 1 DM, HTN who presents to the ED with hypoglycemia. Patient states that he woke up this morning with high sugars and took his insulin and had Mylanta and a grapefruit and thought he would be okay until. Then he got into an argument with [employer] tech support and got agitated. Then the next thing he remembers is his sister getting him up from the ground although he states he did not hit his head. He then checked his sugar and it was 47. He ate food and sugar came up to 147 however he continued to feel dizzy, cold, and "out of it" and proceeded to the ED for further management. Nephrology consulted as patient is ESRD on HD.    Patient's sister at bedside giving collateral history. Patient states he was last dialyzed Saturday, says his dry weight is approximately 70-71 kg, and usually gets 3.5 hrs of dialysis as he has been having low clearance. Otherwise patient states he usually gets short of breath after treatment between Saturday and Tuesday and is stable by the end of the week.        PAST HISTORY  --------------------------------------------------------------------------------  PAST MEDICAL & SURGICAL HISTORY:  Hypertension  Legally blind  Diabetes mellitus type I  Renal transplant, status post  CKD (chronic kidney disease)  Renal transplant, status post  History of tonsillectomy    FAMILY HISTORY:  No pertinent family history in first degree relatives    PAST SOCIAL HISTORY: Smokes, and drinks socially.    ALLERGIES & MEDICATIONS  --------------------------------------------------------------------------------  Allergies    No Known Allergies    Intolerances      Standing Inpatient Medications  dextrose 5%. 1000 milliLiter(s) IV Continuous <Continuous>  dextrose 50% Injectable 12.5 Gram(s) IV Push once  dextrose 50% Injectable 25 Gram(s) IV Push once  dextrose 50% Injectable 25 Gram(s) IV Push once  hydrALAZINE 125 milliGRAM(s) Oral three times a day  insulin lispro (HumaLOG) corrective regimen sliding scale   SubCutaneous three times a day before meals  insulin lispro (HumaLOG) corrective regimen sliding scale   SubCutaneous at bedtime  insulin NPH human recombinant 8 Unit(s) SubCutaneous once  metoprolol tartrate 50 milliGRAM(s) Oral three times a day  warfarin 7 milliGRAM(s) Oral once    PRN Inpatient Medications  dextrose 40% Gel 15 Gram(s) Oral once PRN  glucagon  Injectable 1 milliGRAM(s) IntraMuscular once PRN      REVIEW OF SYSTEMS  --------------------------------------------------------------------------------  Gen: Patient feels weak and lethargic  Head/Eyes/Ears/Mouth: No headache   Respiratory: No shortness of breath  CV: No chest pain  GI: No abdominal pain  : Continues to make urine  MSK: No joint pain/swelling;   Neuro: No dizziness/lightheadedness, weakness, seizures    VITALS/PHYSICAL EXAM  --------------------------------------------------------------------------------  T(C): 36.6 (03-12-19 @ 15:10), Max: 37 (03-12-19 @ 11:09)  HR: 74 (03-12-19 @ 15:10) (69 - 78)  BP: 175/76 (03-12-19 @ 15:10) (175/76 - 227/77)  RR: 17 (03-12-19 @ 15:10) (16 - 20)  SpO2: 99% (03-12-19 @ 15:10) (95% - 99%)  Height (cm): 182.88 (03-12-19 @ 01:15)  Weight (kg): 69.8 (03-12-19 @ 01:15)  BMI (kg/m2): 20.9 (03-12-19 @ 01:15)  BSA (m2): 1.91 (03-12-19 @ 01:15)      Physical Exam:  	Gen: NAD, well-appearing  	HEENT: Anicteric  	Pulm: Clear lungs  	CV:  S1S2  	Abd: +BS, soft, non-tender   	Ext: 1+ pitting edema  	Neuro: No focal deficits  	Skin: Warm  	Vascular access: LUE AVF, +bruit, +thrill     LABS/STUDIES  --------------------------------------------------------------------------------              10.1   4.56  >-----------<  158      [03-12-19 @ 04:00]              31.3     139  |  99  |  86  ----------------------------<  249      [03-12-19 @ 11:54]  4.4   |  19  |  6.21        Ca     8.6     [03-12-19 @ 11:54]      Mg     2.3     [03-12-19 @ 03:20]      Phos  5.2     [03-12-19 @ 03:20]    TPro  7.3  /  Alb  4.0  /  TBili  0.5  /  DBili  x   /  AST  29  /  ALT  23  /  AlkPhos  133  [03-11-19 @ 18:35]    PT/INR: PT 28.1 , INR 2.40       [03-11-19 @ 18:35]  PTT: 55.8       [03-11-19 @ 18:35]    CK 45      [03-11-19 @ 23:10]    Creatinine Trend:  SCr 6.21 [03-12 @ 11:54]  SCr 6.12 [03-12 @ 03:20]  SCr 5.40 [03-11 @ 18:35]

## 2019-03-12 NOTE — PROVIDER CONTACT NOTE (OTHER) - ASSESSMENT
patient blood pressure was 179/78 patient has metoprolol and hydralazine due at the time the vitals were taken

## 2019-03-12 NOTE — PROGRESS NOTE ADULT - NSICDXPROBLEM_GEN_ALL_CORE_FT
PROBLEM DIAGNOSES  Problem: Diabetes mellitus type I  Assessment and Plan: Long time diabetic with otherwise very good med compliancet  Found at home down on floor by sister with FSG 22 resolved with glucagon  On lantus + premeal and sliding scale now  AG increased, will give additional insulin and monitor  Appreciate endocrinology recs    Problem: At risk for hypoglycemia  Assessment and Plan: Found to FS to 22 by EMS  Pt reports only eating grapefruit that morning and took insulin as per usual  Does not endorse suicidal ideation  FS improved after glucagon + sandwich by EMS  On admission, hyperglycemic to 400s improved on insulin   Monitor FS carefully and ensure no gap betweel meal intake and insulin administration    Problem: Renal transplant, status post  Assessment and Plan: Failed renal transplant now on HD Sat, Tues, Thurs  Appreciate nephrology recs    Problem: CKD (chronic kidney disease)  Assessment and Plan: Secondary to Diabetes  On HD s/p failed renal transplant    Problem: Chronic anticoagulation  Assessment and Plan: Was prescribed coumadin 2 years ago, uncertain if it was for Afib noted during 2017 hospitalization when pt had PEA arrest or thrombus ?  C/w Coumadin with INR goal 2-3  Contacted PMD but did not get information reg. why pt is on Coumadin      Problem: Need for prophylactic measure  Assessment and Plan: DVT prophylaxis : INR therapeutic on Coumadin chronic A/C  Diet: Tolerating diabetic diet    Problem: Acute metabolic encephalopathy  Assessment and Plan: Resolving

## 2019-03-12 NOTE — PROGRESS NOTE ADULT - ASSESSMENT
54 y/o Male w/ a pmh significant for HTN, DM1 (on Lantus and Novolog), HLD, CKD s/p renal transplant on HD (T,Th,S) BIBEMS for hypoglycemia w/ resolution now w/ persistent confusion 52 y/o Male w/ a pmh significant for HTN, DM1 (on Lantus and Novolog), HLD, CKD s/p renal transplant on HD (T,Th,S) BIBEMS for hypoglycemia w/ resolution of metabolic encephalopathy

## 2019-03-13 DIAGNOSIS — F32.1 MAJOR DEPRESSIVE DISORDER, SINGLE EPISODE, MODERATE: ICD-10-CM

## 2019-03-13 DIAGNOSIS — Z71.89 OTHER SPECIFIED COUNSELING: ICD-10-CM

## 2019-03-13 DIAGNOSIS — Z29.9 ENCOUNTER FOR PROPHYLACTIC MEASURES, UNSPECIFIED: ICD-10-CM

## 2019-03-13 DIAGNOSIS — E10.65 TYPE 1 DIABETES MELLITUS WITH HYPERGLYCEMIA: ICD-10-CM

## 2019-03-13 LAB
24R-OH-CALCIDIOL SERPL-MCNC: 17.9 NG/ML — LOW (ref 30–80)
ANION GAP SERPL CALC-SCNC: 15 MMO/L — HIGH (ref 7–14)
BUN SERPL-MCNC: 26 MG/DL — HIGH (ref 7–23)
BUN SERPL-MCNC: 39 MG/DL — HIGH (ref 7–23)
BUN SERPL-MCNC: 50 MG/DL — HIGH (ref 7–23)
CALCIUM SERPL-MCNC: 8.7 MG/DL — SIGNIFICANT CHANGE UP (ref 8.4–10.5)
CALCIUM SERPL-MCNC: 8.8 MG/DL — SIGNIFICANT CHANGE UP (ref 8.4–10.5)
CALCIUM SERPL-MCNC: 8.9 MG/DL — SIGNIFICANT CHANGE UP (ref 8.4–10.5)
CHLORIDE SERPL-SCNC: 94 MMOL/L — LOW (ref 98–107)
CHLORIDE SERPL-SCNC: 94 MMOL/L — LOW (ref 98–107)
CHLORIDE SERPL-SCNC: 95 MMOL/L — LOW (ref 98–107)
CO2 SERPL-SCNC: 24 MMOL/L — SIGNIFICANT CHANGE UP (ref 22–31)
CO2 SERPL-SCNC: 25 MMOL/L — SIGNIFICANT CHANGE UP (ref 22–31)
CO2 SERPL-SCNC: 26 MMOL/L — SIGNIFICANT CHANGE UP (ref 22–31)
CREAT SERPL-MCNC: 2.27 MG/DL — HIGH (ref 0.5–1.3)
CREAT SERPL-MCNC: 3.69 MG/DL — HIGH (ref 0.5–1.3)
CREAT SERPL-MCNC: 4.36 MG/DL — HIGH (ref 0.5–1.3)
GLUCOSE BLDC GLUCOMTR-MCNC: 142 MG/DL — HIGH (ref 70–99)
GLUCOSE BLDC GLUCOMTR-MCNC: 144 MG/DL — HIGH (ref 70–99)
GLUCOSE BLDC GLUCOMTR-MCNC: 327 MG/DL — HIGH (ref 70–99)
GLUCOSE BLDC GLUCOMTR-MCNC: 327 MG/DL — HIGH (ref 70–99)
GLUCOSE BLDC GLUCOMTR-MCNC: 364 MG/DL — HIGH (ref 70–99)
GLUCOSE SERPL-MCNC: 161 MG/DL — HIGH (ref 70–99)
GLUCOSE SERPL-MCNC: 322 MG/DL — HIGH (ref 70–99)
GLUCOSE SERPL-MCNC: 344 MG/DL — HIGH (ref 70–99)
HBV CORE AB SER-ACNC: NONREACTIVE — SIGNIFICANT CHANGE UP
HBV SURFACE AB SER-ACNC: 350.1 MLU/ML — SIGNIFICANT CHANGE UP
HBV SURFACE AB SER-ACNC: REACTIVE — SIGNIFICANT CHANGE UP
HCT VFR BLD CALC: 32.4 % — LOW (ref 39–50)
HCV AB S/CO SERPL IA: 16.94 S/CO — HIGH (ref 0–0.79)
HCV AB SERPL-IMP: REACTIVE — SIGNIFICANT CHANGE UP
HGB BLD-MCNC: 10.5 G/DL — LOW (ref 13–17)
INR BLD: 1.83 — HIGH (ref 0.88–1.17)
MAGNESIUM SERPL-MCNC: 2.1 MG/DL — SIGNIFICANT CHANGE UP (ref 1.6–2.6)
MCHC RBC-ENTMCNC: 30.3 PG — SIGNIFICANT CHANGE UP (ref 27–34)
MCHC RBC-ENTMCNC: 32.4 % — SIGNIFICANT CHANGE UP (ref 32–36)
MCV RBC AUTO: 93.4 FL — SIGNIFICANT CHANGE UP (ref 80–100)
NRBC # FLD: 0 K/UL — LOW (ref 25–125)
PHOSPHATE SERPL-MCNC: 3.4 MG/DL — SIGNIFICANT CHANGE UP (ref 2.5–4.5)
PLATELET # BLD AUTO: 152 K/UL — SIGNIFICANT CHANGE UP (ref 150–400)
PMV BLD: 9.4 FL — SIGNIFICANT CHANGE UP (ref 7–13)
POTASSIUM SERPL-MCNC: 3.1 MMOL/L — LOW (ref 3.5–5.3)
POTASSIUM SERPL-MCNC: 4.3 MMOL/L — SIGNIFICANT CHANGE UP (ref 3.5–5.3)
POTASSIUM SERPL-MCNC: 4.5 MMOL/L — SIGNIFICANT CHANGE UP (ref 3.5–5.3)
POTASSIUM SERPL-SCNC: 3.1 MMOL/L — LOW (ref 3.5–5.3)
POTASSIUM SERPL-SCNC: 4.3 MMOL/L — SIGNIFICANT CHANGE UP (ref 3.5–5.3)
POTASSIUM SERPL-SCNC: 4.5 MMOL/L — SIGNIFICANT CHANGE UP (ref 3.5–5.3)
PROTHROM AB SERPL-ACNC: 21.3 SEC — HIGH (ref 9.8–13.1)
RBC # BLD: 3.47 M/UL — LOW (ref 4.2–5.8)
RBC # FLD: 14 % — SIGNIFICANT CHANGE UP (ref 10.3–14.5)
SODIUM SERPL-SCNC: 133 MMOL/L — LOW (ref 135–145)
SODIUM SERPL-SCNC: 134 MMOL/L — LOW (ref 135–145)
SODIUM SERPL-SCNC: 136 MMOL/L — SIGNIFICANT CHANGE UP (ref 135–145)
WBC # BLD: 4.61 K/UL — SIGNIFICANT CHANGE UP (ref 3.8–10.5)
WBC # FLD AUTO: 4.61 K/UL — SIGNIFICANT CHANGE UP (ref 3.8–10.5)

## 2019-03-13 PROCEDURE — 99233 SBSQ HOSP IP/OBS HIGH 50: CPT | Mod: GC

## 2019-03-13 PROCEDURE — 99223 1ST HOSP IP/OBS HIGH 75: CPT

## 2019-03-13 RX ORDER — WARFARIN SODIUM 2.5 MG/1
2 TABLET ORAL ONCE
Qty: 0 | Refills: 0 | Status: COMPLETED | OUTPATIENT
Start: 2019-03-13 | End: 2019-03-13

## 2019-03-13 RX ORDER — POTASSIUM CHLORIDE 20 MEQ
40 PACKET (EA) ORAL EVERY 4 HOURS
Qty: 0 | Refills: 0 | Status: DISCONTINUED | OUTPATIENT
Start: 2019-03-13 | End: 2019-03-13

## 2019-03-13 RX ORDER — WARFARIN SODIUM 2.5 MG/1
5 TABLET ORAL ONCE
Qty: 0 | Refills: 0 | Status: COMPLETED | OUTPATIENT
Start: 2019-03-13 | End: 2019-03-13

## 2019-03-13 RX ORDER — WARFARIN SODIUM 2.5 MG/1
7 TABLET ORAL ONCE
Qty: 0 | Refills: 0 | Status: DISCONTINUED | OUTPATIENT
Start: 2019-03-13 | End: 2019-03-13

## 2019-03-13 RX ORDER — INSULIN LISPRO 100/ML
8 VIAL (ML) SUBCUTANEOUS
Qty: 0 | Refills: 0 | Status: DISCONTINUED | OUTPATIENT
Start: 2019-03-13 | End: 2019-03-14

## 2019-03-13 RX ORDER — CHLORHEXIDINE GLUCONATE 213 G/1000ML
1 SOLUTION TOPICAL
Qty: 0 | Refills: 0 | Status: DISCONTINUED | OUTPATIENT
Start: 2019-03-13 | End: 2019-03-15

## 2019-03-13 RX ORDER — WARFARIN SODIUM 2.5 MG/1
7 TABLET ORAL ONCE
Qty: 0 | Refills: 0 | Status: COMPLETED | OUTPATIENT
Start: 2019-03-13 | End: 2019-03-13

## 2019-03-13 RX ORDER — INSULIN LISPRO 100/ML
3 VIAL (ML) SUBCUTANEOUS ONCE
Qty: 0 | Refills: 0 | Status: DISCONTINUED | OUTPATIENT
Start: 2019-03-13 | End: 2019-03-13

## 2019-03-13 RX ORDER — ESCITALOPRAM OXALATE 10 MG/1
5 TABLET, FILM COATED ORAL
Qty: 0 | Refills: 0 | Status: DISCONTINUED | OUTPATIENT
Start: 2019-03-13 | End: 2019-03-15

## 2019-03-13 RX ORDER — INSULIN LISPRO 100/ML
9 VIAL (ML) SUBCUTANEOUS
Qty: 0 | Refills: 0 | Status: DISCONTINUED | OUTPATIENT
Start: 2019-03-13 | End: 2019-03-13

## 2019-03-13 RX ORDER — POTASSIUM CHLORIDE 20 MEQ
40 PACKET (EA) ORAL ONCE
Qty: 0 | Refills: 0 | Status: COMPLETED | OUTPATIENT
Start: 2019-03-13 | End: 2019-03-13

## 2019-03-13 RX ADMIN — Medication 50 MILLIGRAM(S): at 13:13

## 2019-03-13 RX ADMIN — Medication 50 MILLIGRAM(S): at 23:26

## 2019-03-13 RX ADMIN — Medication 5 UNIT(S): at 09:08

## 2019-03-13 RX ADMIN — Medication 4: at 12:30

## 2019-03-13 RX ADMIN — Medication 5 UNIT(S): at 12:30

## 2019-03-13 RX ADMIN — Medication 5: at 09:08

## 2019-03-13 RX ADMIN — ATORVASTATIN CALCIUM 10 MILLIGRAM(S): 80 TABLET, FILM COATED ORAL at 23:26

## 2019-03-13 RX ADMIN — INSULIN GLARGINE 23 UNIT(S): 100 INJECTION, SOLUTION SUBCUTANEOUS at 09:13

## 2019-03-13 RX ADMIN — Medication 125 MILLIGRAM(S): at 01:20

## 2019-03-13 RX ADMIN — CHLORHEXIDINE GLUCONATE 1 APPLICATION(S): 213 SOLUTION TOPICAL at 12:29

## 2019-03-13 RX ADMIN — Medication 125 MILLIGRAM(S): at 09:13

## 2019-03-13 RX ADMIN — ATORVASTATIN CALCIUM 10 MILLIGRAM(S): 80 TABLET, FILM COATED ORAL at 01:20

## 2019-03-13 RX ADMIN — WARFARIN SODIUM 7 MILLIGRAM(S): 2.5 TABLET ORAL at 03:10

## 2019-03-13 RX ADMIN — Medication 40 MILLIEQUIVALENT(S): at 03:09

## 2019-03-13 RX ADMIN — Medication 125 MILLIGRAM(S): at 17:58

## 2019-03-13 RX ADMIN — Medication 9 UNIT(S): at 17:57

## 2019-03-13 RX ADMIN — Medication 80 MILLIGRAM(S): at 06:57

## 2019-03-13 RX ADMIN — Medication 50 MILLIGRAM(S): at 06:55

## 2019-03-13 NOTE — PROVIDER CONTACT NOTE (OTHER) - ASSESSMENT
Patient alert and oriented x4. NO complaint of pain or acute distress noted. no headache or diziness

## 2019-03-13 NOTE — PROGRESS NOTE ADULT - ASSESSMENT
54 y/o Male w/ a pmh significant for HTN, DM1 (A1C 7.7 Oct 2018), HLD, CKD s/p renal transplant on HD (T,Th,S), legally blind, BIBEMS after being found down at home by his sister. Consult for Type 1 DM with hypoglycemia, now with inpatient hyperglycemia.    1. Type 1 DM  -A1C 6.3.   -Currently on lantus 23 units this AM and humalog 5 units TID. Last night was hypoglycemic post-HD 2/2 overcorrection with humalog for hyperglycemia from not enough basal insulin.   -Would c/w lantus 23 units for now and assess effect of AM FS tomorrow. Increase humalog to 9 units TID with meals. C/w low dose humalog correctional scale.  -AG and bicarb improved.  -Patient has endocrine by house that wishes to follow with. 52 y/o Male w/ a pmh significant for HTN, DM1 (A1C 7.7 Oct 2018), HLD, CKD s/p renal transplant on HD (T,Th,S), legally blind, BIBEMS after being found down at home by his sister. Consult for Type 1 DM with hypoglycemia, now with inpatient hyperglycemia.    1. Type 1 DM  -A1C 6.3.   -Currently on lantus 23 units this AM and humalog 5 units TID. Last night was hypoglycemic post-HD 2/2 overcorrection with humalog for hyperglycemia from not enough basal insulin.   -Would c/w lantus 23 units for now and assess effect of AM FS tomorrow. Increase humalog to 8 units TID with meals. C/w low dose humalog correctional scale.  -AG and bicarb improved.  -Patient has endocrine by house that wishes to follow with.

## 2019-03-13 NOTE — PHYSICAL THERAPY INITIAL EVALUATION ADULT - CRITERIA FOR SKILLED THERAPEUTIC INTERVENTIONS
impairments found/predicted duration of therapy intervention/rehab potential/anticipated discharge recommendation/therapy frequency

## 2019-03-13 NOTE — BEHAVIORAL HEALTH ASSESSMENT NOTE - NSBHCHARTREVIEWVS_PSY_A_CORE FT
Vital Signs Last 24 Hrs  T(C): 37 (13 Mar 2019 12:37), Max: 37 (13 Mar 2019 12:37)  T(F): 98.6 (13 Mar 2019 12:37), Max: 98.6 (13 Mar 2019 12:37)  HR: 67 (13 Mar 2019 13:12) (67 - 75)  BP: 157/90 (13 Mar 2019 13:12) (157/90 - 181/84)  BP(mean): --  RR: 17 (13 Mar 2019 12:05) (16 - 20)  SpO2: 100% (13 Mar 2019 12:05) (97% - 100%)

## 2019-03-13 NOTE — PROGRESS NOTE ADULT - NSICDXPROBLEM_GEN_ALL_CORE_FT
PROBLEM DIAGNOSES  Problem: Diabetes mellitus type I  Assessment and Plan: Long time diabetic with otherwise very good med compliancet  Found at home down on floor by sister with FSG 22 resolved with glucagon  On lantus + premeal and sliding scale now  AG increased, will give additional insulin and monitor  Appreciate endocrinology recs    Problem: At risk for hypoglycemia  Assessment and Plan: Found to FS to 22 by EMS  Pt reports only eating grapefruit that morning and took insulin as per usual  Does not endorse suicidal ideation  FS improved after glucagon + sandwich by EMS  On admission, hyperglycemic to 400s improved on insulin   Monitor FS carefully and ensure no gap betweel meal intake and insulin administration    Problem: Renal transplant, status post  Assessment and Plan: Failed renal transplant now on HD Sat, Tues, Thurs  Appreciate nephrology recs    Problem: CKD (chronic kidney disease)  Assessment and Plan: Secondary to Diabetes  On HD s/p failed renal transplant    Problem: Chronic anticoagulation  Assessment and Plan: Was prescribed coumadin 2 years ago, uncertain if it was for Afib noted during 2017 hospitalization when pt had PEA arrest or thrombus ?  C/w Coumadin with INR goal 2-3  Contacted PMD but did not get information reg. why pt is on Coumadin      Problem: Need for prophylactic measure  Assessment and Plan: DVT prophylaxis : INR therapeutic on Coumadin chronic A/C  Diet: Tolerating diabetic diet    Problem: Acute metabolic encephalopathy  Assessment and Plan: Resolving PROBLEM DIAGNOSES  Problem: Diabetes mellitus type I  Assessment and Plan: Long time diabetic with otherwise very good med compliance  Found at home down on floor by sister with FSG 22 resolved with glucagon  On lantus + premeal and sliding scale now  AG improved  Appreciate endocrinology recs    Problem: At risk for hypoglycemia  Assessment and Plan: Found to FS to 22 by EMS  Pt reports only eating grapefruit that morning and took insulin as per usual  FS improved after glucagon + sandwich by EMS  On admission, hyperglycemic to 400s improved on insulin   Monitor FS carefully and ensure no gap betweel meal intake and insulin administration  Does not endorse suicidal ideation but joan consulted for anhedonia    Problem: Renal transplant, status post  Assessment and Plan: Failed renal transplant 2 years ago now on HD Sat, Tues, Thurs  Appreciate nephrology recs    Problem: CKD (chronic kidney disease)  Assessment and Plan: Secondary to Diabetes  On HD s/p failed renal transplant    Problem: Chronic anticoagulation  Assessment and Plan: Was prescribed coumadin 2 years ago for Afib noted during 2017 hospitalization when pt had PEA arrest   C/w Coumadin with INR goal 2-3      Problem: Need for prophylactic measure  Assessment and Plan: DVT prophylaxis : INR therapeutic on Coumadin chronic A/C  Diet: Tolerating diabetic diet    Problem: Acute metabolic encephalopathy  Assessment and Plan: Resolved    Problem: Prophylactic measure  Assessment and Plan: On Coumadin for chronic Afib  Dispo pending: Normalization of blood sugars, psych and PT recs, outpt HD setup

## 2019-03-13 NOTE — BEHAVIORAL HEALTH ASSESSMENT NOTE - NSBHCHARTREVIEWLAB_PSY_A_CORE FT
CBC Full  -  ( 13 Mar 2019 05:55 )  WBC Count : 4.61 K/uL  Hemoglobin : 10.5 g/dL  Hematocrit : 32.4 %  Platelet Count - Automated : 152 K/uL  Mean Cell Volume : 93.4 fL  Mean Cell Hemoglobin : 30.3 pg  Mean Cell Hemoglobin Concentration : 32.4 %  Auto Neutrophil # : x  Auto Lymphocyte # : x  Auto Monocyte # : x  Auto Eosinophil # : x  Auto Basophil # : x  Auto Neutrophil % : x  Auto Lymphocyte % : x  Auto Monocyte % : x  Auto Eosinophil % : x  Auto Basophil % : x    03-13    133<L>  |  94<L>  |  39<H>  ----------------------------<  344<H>  4.3   |  24  |  3.69<H>    Ca    8.7      13 Mar 2019 05:55  Phos  5.2     03-12  Mg     2.3     03-12    TPro  7.3  /  Alb  4.0  /  TBili  0.5  /  DBili  x   /  AST  29  /  ALT  23  /  AlkPhos  133<H>  03-11    LIVER FUNCTIONS - ( 11 Mar 2019 18:35 )  Alb: 4.0 g/dL / Pro: 7.3 g/dL / ALK PHOS: 133 u/L / ALT: 23 u/L / AST: 29 u/L / GGT: x

## 2019-03-13 NOTE — BEHAVIORAL HEALTH ASSESSMENT NOTE - SUMMARY
52 y/o Male w/ a pmh significant for EtOH abuse, HTN, DM1 (on Lantus and Novolog), HLD, CKD s/p renal transplant on HD (T,Th,S) BIBEMS after being found down at home by his sister. Upon EMS arrival, FSG 22. Patient currently presenting with likely longstanding depression, meets criteria for MDD (moderate episode) in the setting of worsening medical issues and social activities limited by his blindness. Although pt had endorsed SI in the past around the time of his sister's death last year, pt currently denies any SI, is future-oriented, looking to move out of his current living situation, and does not represent a current risk of harm to himself. Patient would benefit from an anti-depressant trial and outpatient psych follow-up.

## 2019-03-13 NOTE — PHYSICAL THERAPY INITIAL EVALUATION ADULT - PERTINENT HX OF CURRENT PROBLEM, REHAB EVAL
Pt. admitted for hypoglycemia. Per radiology reports, CT head revealed no evidence of acute intracranial hemorrhage, brain edema, or mass effect. Age-appropriate involutional changes. CT cervical spine revealed no evidence for acute displaced fracture or traumatic malalignment. PMH of HTN, DM1, HLD, CKD, failed renal transplant 2 years ago now on HD.

## 2019-03-13 NOTE — PROGRESS NOTE ADULT - SUBJECTIVE AND OBJECTIVE BOX
Chief Complaint: Type 1 DM    History: Patient reports eating well. Was hypoglycemic post HD last night. Today hyperglycemic.    MEDICATIONS  (STANDING):  atorvastatin 10 milliGRAM(s) Oral at bedtime  chlorhexidine 4% Liquid 1 Application(s) Topical <User Schedule>  dextrose 5%. 1000 milliLiter(s) (50 mL/Hr) IV Continuous <Continuous>  dextrose 50% Injectable 12.5 Gram(s) IV Push once  dextrose 50% Injectable 25 Gram(s) IV Push once  dextrose 50% Injectable 25 Gram(s) IV Push once  furosemide    Tablet 80 milliGRAM(s) Oral <User Schedule>  hydrALAZINE 125 milliGRAM(s) Oral three times a day  insulin glargine Injectable (LANTUS) 23 Unit(s) SubCutaneous every morning  insulin lispro (HumaLOG) corrective regimen sliding scale   SubCutaneous three times a day before meals  insulin lispro (HumaLOG) corrective regimen sliding scale   SubCutaneous at bedtime  insulin lispro Injectable (HumaLOG) 5 Unit(s) SubCutaneous three times a day before meals  metoprolol tartrate 50 milliGRAM(s) Oral three times a day  warfarin 7 milliGRAM(s) Oral once    MEDICATIONS  (PRN):  dextrose 40% Gel 15 Gram(s) Oral once PRN Blood Glucose LESS THAN 70 milliGRAM(s)/deciliter  glucagon  Injectable 1 milliGRAM(s) IntraMuscular once PRN Glucose LESS THAN 70 milligrams/deciliter      Allergies    No Known Allergies    Intolerances      Review of Systems:  Constitutional: No fever  Neuro: No tremors  HEENT: No pain  Cardiovascular: No chest pain, palpitations  Respiratory: No SOB, no cough  GI: No nausea, vomiting, abdominal pain  : No dysuria  Skin: no rash  Endocrine: no polyuria, polydipsia  Hem/lymph: no swelling  Osteoporosis: no fractures    ALL OTHER SYSTEMS REVIEWED AND NEGATIVE      PHYSICAL EXAM:  VITALS: T(C): 37 (03-13-19 @ 12:37)  T(F): 98.6 (03-13-19 @ 12:37), Max: 98.6 (03-13-19 @ 12:37)  HR: 67 (03-13-19 @ 13:12) (67 - 75)  BP: 157/90 (03-13-19 @ 13:12) (157/90 - 181/84)  RR:  (16 - 20)  SpO2:  (97% - 100%)  Wt(kg): --  GENERAL: NAD, well-groomed, well-developed  EYES: blind  HEENT:  Atraumatic, Normocephalic, moist mucous membranes  THYROID: Normal size, no palpable nodules  SKIN: Dry, intact, No rashes or lesions  MUSCULOSKELETAL: Full range of motion, normal strength  PSYCH: Alert and oriented x 3, normal affect, normal mood    POCT Blood Glucose.: 327 mg/dL (03-13-19 @ 12:25)  POCT Blood Glucose.: 364 mg/dL (03-13-19 @ 08:41)  POCT Blood Glucose.: 327 mg/dL (03-13-19 @ 06:02)  POCT Blood Glucose.: 139 mg/dL (03-12-19 @ 23:36)  POCT Blood Glucose.: 117 mg/dL (03-12-19 @ 22:35)  POCT Blood Glucose.: 89 mg/dL (03-12-19 @ 22:26)  POCT Blood Glucose.: 66 mg/dL (03-12-19 @ 22:12)  POCT Blood Glucose.: 363 mg/dL (03-12-19 @ 17:03)  POCT Blood Glucose.: 351 mg/dL (03-12-19 @ 14:48)  POCT Blood Glucose.: 234 mg/dL (03-12-19 @ 11:05)  POCT Blood Glucose.: 171 mg/dL (03-12-19 @ 08:32)  POCT Blood Glucose.: 257 mg/dL (03-12-19 @ 06:14)  POCT Blood Glucose.: 378 mg/dL (03-12-19 @ 04:12)  POCT Blood Glucose.: 415 mg/dL (03-12-19 @ 02:49)  POCT Blood Glucose.: 407 mg/dL (03-12-19 @ 01:28)  POCT Blood Glucose.: 230 mg/dL (03-11-19 @ 18:47)  POCT Blood Glucose.: 254 mg/dL (03-11-19 @ 17:09)  POCT Blood Glucose.: 220 mg/dL (03-11-19 @ 16:13)      03-13    133<L>  |  94<L>  |  39<H>  ----------------------------<  344<H>  4.3   |  24  |  3.69<H>    EGFR if : 20  EGFR if non : 18    Ca    8.7      03-13  Mg     2.3     03-12  Phos  5.2     03-12    TPro  7.3  /  Alb  4.0  /  TBili  0.5  /  DBili  x   /  AST  29  /  ALT  23  /  AlkPhos  133<H>  03-11            Hemoglobin A1C, Whole Blood: 6.3 % <H> [4.0 - 5.6] (03-12-19 @ 06:00)

## 2019-03-13 NOTE — PHYSICAL THERAPY INITIAL EVALUATION ADULT - ADDITIONAL COMMENTS
Pt. reports owning DME of white cane.     Pt. was left supine in bed post PT Evaluation, NAD, sister present. MICHAEL Franco made aware of pt. status and participation in PT.

## 2019-03-13 NOTE — BEHAVIORAL HEALTH ASSESSMENT NOTE - HPI (INCLUDE ILLNESS QUALITY, SEVERITY, DURATION, TIMING, CONTEXT, MODIFYING FACTORS, ASSOCIATED SIGNS AND SYMPTOMS)
Patient is a 54 y/o Male w/ a pmh significant for EtOH abuse, HTN, DM1 (on Lantus and Novolog), HLD, CKD s/p renal transplant on HD (,,S) BIBEMS after being found down at home by his sister. Uopn EMS arrival, FSG 22 for which pt given Glucagon and advised to eat a sandwich w/ repeat . His mental status improved however later on pt became more lethargic en route to the ED. He reportedly takes Lantus 25u QHS and Novolog w/ meals w/ excellent adherence. In the ED, pt afebrile, -227/67-77, HR 69-75, RR 20 (97% RA). Pt given Labetalol 10mg IVP x1 and admitted to medicine for further management.     Psych consult called for depression. Pt reports that around 20 years ago his eyesight began to decline from his DM, which was around the time he received a kidney transplant from his brother. Reports he was doing pretty well, until around 2 years ago when the kidney from his brother failed and he had to start on dialysis, and his eyesight has continued to worsen. He describes "probably being depressed" for "a while now" but has never sought treatment, no h/o psych admissions or psychotropic medications. Reports that he was living with his sister in their  mother's home, until this past December when his sister passed away from cancer. Reports around that time, or a little before she , feeling that if she  "I should probably just do something to kill myself at that point." Reports he had thoughts of overdosing on insulin, but never actually came close to doing it. But he reports that when she did actually pass away, he didn't have any active SI, says "I was all talk," and states that he would probably never harm himself due to his connection with family members and not wanting to hurt them emotionally. He strongly denies that current episode of hypoglycemia was a suicide attempt, states he just didn't eat enough breakfast that morning and miscalculated how much insulin he needed. Denies owning guns in the house. Pt reports that due to his blindness (describes that he can see things or figures in front of him but can't make out the details), that he feels limited from doing social activities with friends, and limited from going out in general. Describes spending most of the day in bed sleeping, only getting out of bed for his meals or for his medications. Does not get out of the house much, only for doctors appointments, and when his brother helps him with food shopping at the grocery store. Reports he used to be a heavy drinker, now drinks only socially on occasion (few times/month), used to enjoy going to the bars with friends. Now doesn't find much that he enjoys, although does enjoy seeing his brother and his brother's children. Reports he's realized he can't live alone in mother's old house, rent is too high to pay himself, and house is too big, brother and he are in the process of cleaning it out to sell it. Reports his sister in [place] has offered for him to come live with her, and he will likely do that.     Pt endorses depressed mood, occasional feelings of irritability, hopelessness, anhedonia, poor energy levels, poor concentration, and feelings of worthlessness. Although he describes occasional thoughts that it would be easier to be dead, he denies any active SI, no plan/intent to harm himself.

## 2019-03-13 NOTE — PROVIDER CONTACT NOTE (OTHER) - ASSESSMENT
Patient has been having tremors since last night's dialysis session. VS stable. No distress noted. Patient has been having tremors since last night's dialysis session. VS stable. Denies headache, chest pain, dizziness. Glucose 327. No distress noted.

## 2019-03-13 NOTE — PROVIDER CONTACT NOTE (OTHER) - SITUATION
Patient has been having tremors since last night's dialysis session. Patient has been having tremors since last night's dialysis session. VS stable.

## 2019-03-13 NOTE — BEHAVIORAL HEALTH ASSESSMENT NOTE - RISK ASSESSMENT
Low risk of harm to self given no prior self-harm gestures, being future-oriented, and protective factor of not wanting to upset his family.

## 2019-03-13 NOTE — BEHAVIORAL HEALTH ASSESSMENT NOTE - NSBHADMITCOUNSEL_PSY_A_CORE
importance of adherence to chosen treatment/risk factor reduction/risks and benefits of treatment options/client/family/caregiver education

## 2019-03-13 NOTE — PROGRESS NOTE ADULT - ASSESSMENT
52 y/o Male w/ a pmh significant for HTN, DM1 (on Lantus and Novolog), HLD, CKD s/p renal transplant on HD (T,Th,S) BIBEMS for hypoglycemia w/ resolution of metabolic encephalopathy

## 2019-03-13 NOTE — PROGRESS NOTE ADULT - SUBJECTIVE AND OBJECTIVE BOX
Patient is a 54y old  Male who presents with a chief complaint of Hypoglycemia (12 Mar 2019 16:35)      SUBJECTIVE / OVERNIGHT EVENTS:    MEDICATIONS  (STANDING):  atorvastatin 10 milliGRAM(s) Oral at bedtime  dextrose 5%. 1000 milliLiter(s) (50 mL/Hr) IV Continuous <Continuous>  dextrose 50% Injectable 12.5 Gram(s) IV Push once  dextrose 50% Injectable 25 Gram(s) IV Push once  dextrose 50% Injectable 25 Gram(s) IV Push once  furosemide    Tablet 80 milliGRAM(s) Oral <User Schedule>  hydrALAZINE 125 milliGRAM(s) Oral three times a day  insulin glargine Injectable (LANTUS) 23 Unit(s) SubCutaneous every morning  insulin lispro (HumaLOG) corrective regimen sliding scale   SubCutaneous three times a day before meals  insulin lispro (HumaLOG) corrective regimen sliding scale   SubCutaneous at bedtime  insulin lispro Injectable (HumaLOG) 5 Unit(s) SubCutaneous three times a day before meals  metoprolol tartrate 50 milliGRAM(s) Oral three times a day    MEDICATIONS  (PRN):  dextrose 40% Gel 15 Gram(s) Oral once PRN Blood Glucose LESS THAN 70 milliGRAM(s)/deciliter  glucagon  Injectable 1 milliGRAM(s) IntraMuscular once PRN Glucose LESS THAN 70 milligrams/deciliter      Vital Signs Last 24 Hrs  T(C): 36.9 (13 Mar 2019 05:49), Max: 37 (12 Mar 2019 11:09)  T(F): 98.4 (13 Mar 2019 05:49), Max: 98.6 (12 Mar 2019 11:09)  HR: 75 (13 Mar 2019 05:49) (68 - 75)  BP: 181/84 (13 Mar 2019 05:49) (168/80 - 190/85)  BP(mean): --  RR: 17 (13 Mar 2019 05:49) (17 - 20)  SpO2: 97% (13 Mar 2019 05:49) (95% - 100%)  CAPILLARY BLOOD GLUCOSE      POCT Blood Glucose.: 327 mg/dL (13 Mar 2019 06:02)  POCT Blood Glucose.: 139 mg/dL (12 Mar 2019 23:36)  POCT Blood Glucose.: 117 mg/dL (12 Mar 2019 22:35)  POCT Blood Glucose.: 89 mg/dL (12 Mar 2019 22:26)  POCT Blood Glucose.: 66 mg/dL (12 Mar 2019 22:12)  POCT Blood Glucose.: 363 mg/dL (12 Mar 2019 17:03)  POCT Blood Glucose.: 351 mg/dL (12 Mar 2019 14:48)  POCT Blood Glucose.: 234 mg/dL (12 Mar 2019 11:05)  POCT Blood Glucose.: 171 mg/dL (12 Mar 2019 08:32)    I&O's Summary    12 Mar 2019 07:01  -  13 Mar 2019 07:00  --------------------------------------------------------  IN: 400 mL / OUT: 2700 mL / NET: -2300 mL        PHYSICAL EXAM:              LABS:                        10.5   4.61  )-----------( 152      ( 13 Mar 2019 05:55 )             32.4     03-13    133<L>  |  94<L>  |  39<H>  ----------------------------<  344<H>  4.3   |  24  |  3.69<H>    Ca    8.7      13 Mar 2019 05:55  Phos  5.2     03-12  Mg     2.3     03-12    TPro  7.3  /  Alb  4.0  /  TBili  0.5  /  DBili  x   /  AST  29  /  ALT  23  /  AlkPhos  133<H>  03-11    PT/INR - ( 13 Mar 2019 05:55 )   PT: 21.3 SEC;   INR: 1.83          PTT - ( 11 Mar 2019 18:35 )  PTT:55.8 SEC  CARDIAC MARKERS ( 11 Mar 2019 23:10 )  x     / x     / 45 u/L / x     / x      CARDIAC MARKERS ( 11 Mar 2019 18:35 )  x     / x     / 59 u/L / 2.66 ng/mL / x Patient is a 54y old  Male who presents with a chief complaint of Hypoglycemia (12 Mar 2019 16:35)      SUBJECTIVE / OVERNIGHT EVENTS: Feels muscle 'spasms' that are painless; has been feeling them since dialysis. Does not endorse tremors, neurological deficits, pain, weakness, loss of function or anxiety.     MEDICATIONS  (STANDING):  atorvastatin 10 milliGRAM(s) Oral at bedtime  dextrose 5%. 1000 milliLiter(s) (50 mL/Hr) IV Continuous <Continuous>  dextrose 50% Injectable 12.5 Gram(s) IV Push once  dextrose 50% Injectable 25 Gram(s) IV Push once  dextrose 50% Injectable 25 Gram(s) IV Push once  furosemide    Tablet 80 milliGRAM(s) Oral <User Schedule>  hydrALAZINE 125 milliGRAM(s) Oral three times a day  insulin glargine Injectable (LANTUS) 23 Unit(s) SubCutaneous every morning  insulin lispro (HumaLOG) corrective regimen sliding scale   SubCutaneous three times a day before meals  insulin lispro (HumaLOG) corrective regimen sliding scale   SubCutaneous at bedtime  insulin lispro Injectable (HumaLOG) 5 Unit(s) SubCutaneous three times a day before meals  metoprolol tartrate 50 milliGRAM(s) Oral three times a day    MEDICATIONS  (PRN):  dextrose 40% Gel 15 Gram(s) Oral once PRN Blood Glucose LESS THAN 70 milliGRAM(s)/deciliter  glucagon  Injectable 1 milliGRAM(s) IntraMuscular once PRN Glucose LESS THAN 70 milligrams/deciliter      Vital Signs Last 24 Hrs  T(C): 36.9 (13 Mar 2019 05:49), Max: 37 (12 Mar 2019 11:09)  T(F): 98.4 (13 Mar 2019 05:49), Max: 98.6 (12 Mar 2019 11:09)  HR: 75 (13 Mar 2019 05:49) (68 - 75)  BP: 181/84 (13 Mar 2019 05:49) (168/80 - 190/85)  BP(mean): --  RR: 17 (13 Mar 2019 05:49) (17 - 20)  SpO2: 97% (13 Mar 2019 05:49) (95% - 100%)  CAPILLARY BLOOD GLUCOSE      POCT Blood Glucose.: 327 mg/dL (13 Mar 2019 06:02)  POCT Blood Glucose.: 139 mg/dL (12 Mar 2019 23:36)  POCT Blood Glucose.: 117 mg/dL (12 Mar 2019 22:35)  POCT Blood Glucose.: 89 mg/dL (12 Mar 2019 22:26)  POCT Blood Glucose.: 66 mg/dL (12 Mar 2019 22:12)  POCT Blood Glucose.: 363 mg/dL (12 Mar 2019 17:03)  POCT Blood Glucose.: 351 mg/dL (12 Mar 2019 14:48)  POCT Blood Glucose.: 234 mg/dL (12 Mar 2019 11:05)  POCT Blood Glucose.: 171 mg/dL (12 Mar 2019 08:32)    I&O's Summary    12 Mar 2019 07:01  -  13 Mar 2019 07:00  --------------------------------------------------------  IN: 400 mL / OUT: 2700 mL / NET: -2300 mL    Exam  	General: Not in acute distress  	HEENT: No signs of trauma, blind. R pupil constricted and fixed, left pupil very mildly reactive  	Neck: Supple w/o lymphadenopathy   	Heart: RRR.  Normal S1 and S2.  No murmurs, rubs, or gallops.   	Lungs: CTAB. No wheezes, crackles, or rhonchi.    	Abdomen: BS+, soft, NT/ND.  No organomegaly.  	Skin: Warm and dry.  No rashes.  	Extremities: LUE AVF +thrill  Neuro: Awake and alert, AAO x 3 with no focal deficits                LABS:                        10.5   4.61  )-----------( 152      ( 13 Mar 2019 05:55 )             32.4     03-13    133<L>  |  94<L>  |  39<H>  ----------------------------<  344<H>  4.3   |  24  |  3.69<H>    Ca    8.7      13 Mar 2019 05:55  Phos  5.2     03-12  Mg     2.3     03-12    TPro  7.3  /  Alb  4.0  /  TBili  0.5  /  DBili  x   /  AST  29  /  ALT  23  /  AlkPhos  133<H>  03-11    PT/INR - ( 13 Mar 2019 05:55 )   PT: 21.3 SEC;   INR: 1.83          PTT - ( 11 Mar 2019 18:35 )  PTT:55.8 SEC  CARDIAC MARKERS ( 11 Mar 2019 23:10 )  x     / x     / 45 u/L / x     / x      CARDIAC MARKERS ( 11 Mar 2019 18:35 )  x     / x     / 59 u/L / 2.66 ng/mL / x

## 2019-03-13 NOTE — BEHAVIORAL HEALTH ASSESSMENT NOTE - OTHER
blindness and ESRD limited by blindness +occasional upper body jerky movements in bed, unable to assess

## 2019-03-14 ENCOUNTER — TRANSCRIPTION ENCOUNTER (OUTPATIENT)
Age: 54
End: 2019-03-14

## 2019-03-14 DIAGNOSIS — R25.1 TREMOR, UNSPECIFIED: ICD-10-CM

## 2019-03-14 DIAGNOSIS — B19.20 UNSPECIFIED VIRAL HEPATITIS C WITHOUT HEPATIC COMA: ICD-10-CM

## 2019-03-14 LAB
ANION GAP SERPL CALC-SCNC: 16 MMO/L — HIGH (ref 7–14)
BUN SERPL-MCNC: 69 MG/DL — HIGH (ref 7–23)
CALCIUM SERPL-MCNC: 9 MG/DL — SIGNIFICANT CHANGE UP (ref 8.4–10.5)
CHLORIDE SERPL-SCNC: 98 MMOL/L — SIGNIFICANT CHANGE UP (ref 98–107)
CO2 SERPL-SCNC: 21 MMOL/L — LOW (ref 22–31)
CREAT SERPL-MCNC: 5.35 MG/DL — HIGH (ref 0.5–1.3)
GLUCOSE BLDC GLUCOMTR-MCNC: 114 MG/DL — HIGH (ref 70–99)
GLUCOSE BLDC GLUCOMTR-MCNC: 122 MG/DL — HIGH (ref 70–99)
GLUCOSE BLDC GLUCOMTR-MCNC: 140 MG/DL — HIGH (ref 70–99)
GLUCOSE BLDC GLUCOMTR-MCNC: 300 MG/DL — HIGH (ref 70–99)
GLUCOSE BLDC GLUCOMTR-MCNC: 44 MG/DL — CRITICAL LOW (ref 70–99)
GLUCOSE BLDC GLUCOMTR-MCNC: 93 MG/DL — SIGNIFICANT CHANGE UP (ref 70–99)
GLUCOSE SERPL-MCNC: 169 MG/DL — HIGH (ref 70–99)
HCV RNA FLD QL NAA+PROBE: SIGNIFICANT CHANGE UP
HCV RNA SPEC QL PROBE+SIG AMP: DETECTED — SIGNIFICANT CHANGE UP
INR BLD: 1.65 — HIGH (ref 0.88–1.17)
POTASSIUM SERPL-MCNC: 4.5 MMOL/L — SIGNIFICANT CHANGE UP (ref 3.5–5.3)
POTASSIUM SERPL-SCNC: 4.5 MMOL/L — SIGNIFICANT CHANGE UP (ref 3.5–5.3)
PROTHROM AB SERPL-ACNC: 18.6 SEC — HIGH (ref 9.8–13.1)
SODIUM SERPL-SCNC: 135 MMOL/L — SIGNIFICANT CHANGE UP (ref 135–145)

## 2019-03-14 PROCEDURE — 99232 SBSQ HOSP IP/OBS MODERATE 35: CPT

## 2019-03-14 PROCEDURE — 90935 HEMODIALYSIS ONE EVALUATION: CPT | Mod: GC

## 2019-03-14 PROCEDURE — 99233 SBSQ HOSP IP/OBS HIGH 50: CPT | Mod: GC

## 2019-03-14 RX ORDER — INSULIN LISPRO 100/ML
10 VIAL (ML) SUBCUTANEOUS
Qty: 0 | Refills: 0 | Status: DISCONTINUED | OUTPATIENT
Start: 2019-03-14 | End: 2019-03-15

## 2019-03-14 RX ORDER — INSULIN LISPRO 100/ML
8 VIAL (ML) SUBCUTANEOUS
Qty: 0 | Refills: 0 | Status: DISCONTINUED | OUTPATIENT
Start: 2019-03-14 | End: 2019-03-15

## 2019-03-14 RX ORDER — WARFARIN SODIUM 2.5 MG/1
7 TABLET ORAL ONCE
Qty: 0 | Refills: 0 | Status: COMPLETED | OUTPATIENT
Start: 2019-03-14 | End: 2019-03-14

## 2019-03-14 RX ORDER — WARFARIN SODIUM 2.5 MG/1
7 TABLET ORAL ONCE
Qty: 0 | Refills: 0 | Status: DISCONTINUED | OUTPATIENT
Start: 2019-03-14 | End: 2019-03-14

## 2019-03-14 RX ORDER — AMLODIPINE BESYLATE 2.5 MG/1
5 TABLET ORAL ONCE
Qty: 0 | Refills: 0 | Status: COMPLETED | OUTPATIENT
Start: 2019-03-14 | End: 2019-03-14

## 2019-03-14 RX ADMIN — WARFARIN SODIUM 7 MILLIGRAM(S): 2.5 TABLET ORAL at 23:06

## 2019-03-14 RX ADMIN — Medication 8 UNIT(S): at 17:52

## 2019-03-14 RX ADMIN — Medication 125 MILLIGRAM(S): at 00:09

## 2019-03-14 RX ADMIN — ESCITALOPRAM OXALATE 5 MILLIGRAM(S): 10 TABLET, FILM COATED ORAL at 12:29

## 2019-03-14 RX ADMIN — Medication 125 MILLIGRAM(S): at 12:29

## 2019-03-14 RX ADMIN — INSULIN GLARGINE 23 UNIT(S): 100 INJECTION, SOLUTION SUBCUTANEOUS at 08:59

## 2019-03-14 RX ADMIN — Medication 50 MILLIGRAM(S): at 23:05

## 2019-03-14 RX ADMIN — Medication 8 UNIT(S): at 12:29

## 2019-03-14 RX ADMIN — Medication 50 MILLIGRAM(S): at 12:28

## 2019-03-14 RX ADMIN — Medication 3: at 12:29

## 2019-03-14 RX ADMIN — Medication 125 MILLIGRAM(S): at 23:05

## 2019-03-14 RX ADMIN — AMLODIPINE BESYLATE 5 MILLIGRAM(S): 2.5 TABLET ORAL at 12:28

## 2019-03-14 RX ADMIN — CHLORHEXIDINE GLUCONATE 1 APPLICATION(S): 213 SOLUTION TOPICAL at 12:30

## 2019-03-14 RX ADMIN — WARFARIN SODIUM 5 MILLIGRAM(S): 2.5 TABLET ORAL at 00:09

## 2019-03-14 RX ADMIN — WARFARIN SODIUM 2 MILLIGRAM(S): 2.5 TABLET ORAL at 00:09

## 2019-03-14 RX ADMIN — Medication 8 UNIT(S): at 09:00

## 2019-03-14 RX ADMIN — ATORVASTATIN CALCIUM 10 MILLIGRAM(S): 80 TABLET, FILM COATED ORAL at 23:06

## 2019-03-14 NOTE — DISCHARGE NOTE PROVIDER - INSTRUCTIONS
Continue to follow a carbohydrate consistent diet with evening snack and 1200 ml fluid restriction. Also ensure to eat a low sodium diet with no concentrated potassium or phosphorus

## 2019-03-14 NOTE — DISCHARGE NOTE PROVIDER - NSDCCPCAREPLAN_GEN_ALL_CORE_FT
PRINCIPAL DISCHARGE DIAGNOSIS  Diagnosis: Hypoglycemia  Assessment and Plan of Treatment: Ensure to eat a proper meal when you take your insulin. Always check your blood sugars regularly and follow up with your primary doctor for continued insulin management.      SECONDARY DISCHARGE DIAGNOSES  Diagnosis: Hypertension  Assessment and Plan of Treatment: Take your medications as prescibed    Diagnosis: Diabetes mellitus type I  Assessment and Plan of Treatment: Ensure to eat a proper meal when you take your insulin.. Always check your blood sugars regularly and follow up with your primary doctor for continued insulin management.    Diagnosis: CKD (chronic kidney disease)  Assessment and Plan of Treatment: Continue to go to dialysis regularly

## 2019-03-14 NOTE — PROGRESS NOTE ADULT - SUBJECTIVE AND OBJECTIVE BOX
[facility] Division of Kidney Diseases & Hypertension  FOLLOW UP NOTE  [phone]--------------------------------------------------------------------------------  Chief Complaint:Hypoglycemia    53 year old male with history of ESRD on HD ([facility], Center: [facility], Nephrologist: Dr. Baez), s/p failed renal transplant, Type 1 DM, HTN who presents to the ED with hypoglycemia. Patient states that he woke up this morning with high sugars and took his insulin and had Mylanta and a grapefruit and thought he would be okay until. Then he got into an argument with [employer] tech support and got agitated. Then the next thing he remembers is his sister getting him up from the ground although he states he did not hit his head. He then checked his sugar and it was 47. He ate food and sugar came up to 147 however he continued to feel dizzy, cold, and "out of it" and proceeded to the ED for further management. Nephrology consulted as patient is ESRD on HD. Last HD 03/14/19.    Patient seen in dialysis. States his tremors are resolved. Otherwise having no issues.    PAST HISTORY  --------------------------------------------------------------------------------  No significant changes to PMH, PSH, FHx, SHx, unless otherwise noted    ALLERGIES & MEDICATIONS  --------------------------------------------------------------------------------  Allergies    No Known Allergies    Intolerances      Standing Inpatient Medications  atorvastatin 10 milliGRAM(s) Oral at bedtime  chlorhexidine 4% Liquid 1 Application(s) Topical <User Schedule>  escitalopram 5 milliGRAM(s) Oral <User Schedule>  furosemide    Tablet 80 milliGRAM(s) Oral <User Schedule>  hydrALAZINE 125 milliGRAM(s) Oral three times a day  insulin glargine Injectable (LANTUS) 23 Unit(s) SubCutaneous every morning  insulin lispro (HumaLOG) corrective regimen sliding scale   SubCutaneous three times a day before meals  insulin lispro (HumaLOG) corrective regimen sliding scale   SubCutaneous at bedtime  insulin lispro Injectable (HumaLOG) 8 Unit(s) SubCutaneous three times a day before meals  metoprolol tartrate 50 milliGRAM(s) Oral three times a day  warfarin 7 milliGRAM(s) Oral once    PRN Inpatient Medications  dextrose 40% Gel 15 Gram(s) Oral once PRN  glucagon  Injectable 1 milliGRAM(s) IntraMuscular once PRN      REVIEW OF SYSTEMS  --------------------------------------------------------------------------------  Gen: No  fevers/chills  Head/Eyes/Ears/Mouth: No headache  Respiratory: No dyspnea  CV: No chest pain  GI: No abdominal pain  : Continues to make urine  MSK: No joint pain/swelling  Neuro: Tremors resolving, none since 10 PM on 03/13/19      All other systems were reviewed and are negative, except as noted.    VITALS/PHYSICAL EXAM  --------------------------------------------------------------------------------  T(C): 36.4 (03-14-19 @ 12:10), Max: 36.9 (03-13-19 @ 20:49)  HR: 70 (03-14-19 @ 12:10) (63 - 78)  BP: 147/78 (03-14-19 @ 12:10) (117/57 - 188/75)  RR: 18 (03-14-19 @ 12:10) (16 - 18)  SpO2: 99% (03-14-19 @ 12:10) (97% - 99%)  Wt(kg): --        03-14-19 @ 07:01  -  03-14-19 @ 14:46  --------------------------------------------------------  IN: 500 mL / OUT: 2400 mL / NET: -1900 mL      Physical Exam:  	Gen: NAD, well-appearing  	HEENT: Anicteric, legally blind  	Pulm: Clear lungs  	CV:  S1S2  	Abd: +BS, soft, non-tender   	Ext: 1+ pitting edema  	Neuro: No focal deficits  	Skin: Warm  	Vascular access: LUE AVF, +bruit, +thrill    LABS/STUDIES  --------------------------------------------------------------------------------              10.5   4.61  >-----------<  152      [03-13-19 @ 05:55]              32.4     135  |  98  |  69  ----------------------------<  169      [03-14-19 @ 07:00]  4.5   |  21  |  5.35        Ca     9.0     [03-14-19 @ 07:00]      Mg     2.1     [03-13-19 @ 13:35]      Phos  3.4     [03-13-19 @ 13:35]      PT/INR: PT 18.6 , INR 1.65       [03-14-19 @ 07:00]      Creatinine Trend:  SCr 5.35 [03-14 @ 07:00]  SCr 4.36 [03-13 @ 13:35]  SCr 3.69 [03-13 @ 05:55]  SCr 2.27 [03-12 @ 23:59]  SCr 6.64 [03-12 @ 18:00]        Vitamin D (25OH) 17.9      [03-13-19 @ 05:55]  HbA1c 6.3      [03-12-19 @ 06:00]    HBsAb 350.1      [03-12-19 @ 18:00]  HBsAb Reactive      [03-12-19 @ 18:00]  HBsAg NEGATIVE      [03-12-19 @ 18:00]  HBcAb Nonreactive      [03-12-19 @ 18:00]  HCV 16.94, Reactive      [03-12-19 @ 18:00]

## 2019-03-14 NOTE — PROGRESS NOTE ADULT - ASSESSMENT
54 y/o Male w/ a pmh significant for HTN, DM1 (A1C 7.7 Oct 2018), HLD, CKD s/p renal transplant on HD (T,Th,S), legally blind, BIBEMS after being found down at home by his sister. Consult for Type 1 DM with hypoglycemia, now with inpatient hyperglycemia.    1. Type 1 DM  -A1C 6.3.   -Currently on lantus 23 units this AM and humalog 8 units TID.   -Would c/w lantus 23 units for now and increase Humalog at breakfast to 10 units and continue 8 units before lunch and before dinner.  C/w low dose humalog correctional scale.  -Patient has endocrine by house that wishes to follow with. He doesn't remember the name.

## 2019-03-14 NOTE — PROGRESS NOTE ADULT - NSICDXPROBLEM_GEN_ALL_CORE_FT
PROBLEM DIAGNOSES  Problem: Tremor  Assessment and Plan:     Problem: ESRD on dialysis  Assessment and Plan:     Problem: Anemia due to chronic kidney disease, on chronic dialysis  Assessment and Plan:

## 2019-03-14 NOTE — PROGRESS NOTE ADULT - ASSESSMENT
53 year old male with history of ESRD on HD (TTS, Center: [facility], Nephrologist: Dr. Baez), s/p failed renal transplant, Type 1 DM, HTN who presents to the ED with hypoglycemia. Nephrology consulted for ESRD on HD. Last dialyzed on 03/09/19.    PROBLEM DIAGNOSES  Problem: ESRD on dialysis  Recommendation: ESRD on HD TTS. Patient to get dialysis today. Aim for dry weight 70.3 kg. Avoid hypotension.    Problem: Anemia due to chronic kidney disease, on chronic dialysis  Recommendation: Patient with acceptable Hgb. Will touch base with [facility] to get LINDSAY if needed.    Tremor:  Patient states he was having tremors during dialysis which he has had in the past. However patient states today during dialysis he is not having tremors and they have seemed to resolve. Will continue to monitor while patient is inpatient.

## 2019-03-14 NOTE — DISCHARGE NOTE PROVIDER - CARE PROVIDER_API CALL
Mehul Baez (MD)  Internal Medicine; Nephrology  [address] [place] [address], [address] [place] [address]  Phone: [phone]  Fax: [phone]  Follow Up Time: Mehul Baez)  Internal Medicine; Nephrology  [address] [facility], [address]nd [place] [address]  Phone: [phone]  Fax: [phone]  Follow Up Time:     Benito Tejada)  Internal Medicine  [address] [place] [address]  Phone: [phone]  Fax: [phone]  Follow Up Time: 2 weeks

## 2019-03-14 NOTE — DISCHARGE NOTE PROVIDER - PROVIDER TOKENS
PROVIDER:[TOKEN:[[id number]]] PROVIDER:[TOKEN:[[id number]]],PROVIDER:[TOKEN:[[id number]],FOLLOWUP:[2 weeks]]

## 2019-03-14 NOTE — PROGRESS NOTE ADULT - SUBJECTIVE AND OBJECTIVE BOX
Patient is a 54y old  Male who presents with a chief complaint of Hypoglycemia (13 Mar 2019 16:10)      SUBJECTIVE / OVERNIGHT EVENTS:    MEDICATIONS  (STANDING):  atorvastatin 10 milliGRAM(s) Oral at bedtime  chlorhexidine 4% Liquid 1 Application(s) Topical <User Schedule>  dextrose 5%. 1000 milliLiter(s) (50 mL/Hr) IV Continuous <Continuous>  dextrose 50% Injectable 12.5 Gram(s) IV Push once  dextrose 50% Injectable 25 Gram(s) IV Push once  dextrose 50% Injectable 25 Gram(s) IV Push once  escitalopram 5 milliGRAM(s) Oral <User Schedule>  furosemide    Tablet 80 milliGRAM(s) Oral <User Schedule>  hydrALAZINE 125 milliGRAM(s) Oral three times a day  insulin glargine Injectable (LANTUS) 23 Unit(s) SubCutaneous every morning  insulin lispro (HumaLOG) corrective regimen sliding scale   SubCutaneous three times a day before meals  insulin lispro (HumaLOG) corrective regimen sliding scale   SubCutaneous at bedtime  insulin lispro Injectable (HumaLOG) 8 Unit(s) SubCutaneous three times a day before meals  metoprolol tartrate 50 milliGRAM(s) Oral three times a day    MEDICATIONS  (PRN):  dextrose 40% Gel 15 Gram(s) Oral once PRN Blood Glucose LESS THAN 70 milliGRAM(s)/deciliter  glucagon  Injectable 1 milliGRAM(s) IntraMuscular once PRN Glucose LESS THAN 70 milligrams/deciliter      Vital Signs Last 24 Hrs  T(C): 36.7 (14 Mar 2019 06:43), Max: 37 (13 Mar 2019 12:37)  T(F): 98.1 (14 Mar 2019 06:43), Max: 98.6 (13 Mar 2019 12:37)  HR: 70 (14 Mar 2019 06:43) (63 - 72)  BP: 188/75 (14 Mar 2019 06:43) (155/80 - 188/75)  BP(mean): --  RR: 17 (14 Mar 2019 06:43) (16 - 17)  SpO2: 97% (14 Mar 2019 06:43) (97% - 100%)  CAPILLARY BLOOD GLUCOSE      POCT Blood Glucose.: 144 mg/dL (13 Mar 2019 21:29)  POCT Blood Glucose.: 142 mg/dL (13 Mar 2019 17:41)  POCT Blood Glucose.: 327 mg/dL (13 Mar 2019 12:25)  POCT Blood Glucose.: 364 mg/dL (13 Mar 2019 08:41)    I&O's Summary      PHYSICAL EXAM:              LABS:                        10.5   4.61  )-----------( 152      ( 13 Mar 2019 05:55 )             32.4     03-13    134<L>  |  94<L>  |  50<H>  ----------------------------<  322<H>  4.5   |  25  |  4.36<H>    Ca    8.9      13 Mar 2019 13:35  Phos  3.4     03-13  Mg     2.1     03-13      PT/INR - ( 13 Mar 2019 05:55 )   PT: 21.3 SEC;   INR: 1.83 Patient is a 54y old  Male who presents with a chief complaint of Hypoglycemia (13 Mar 2019 16:10)      SUBJECTIVE / OVERNIGHT EVENTS: Patient seen at dialysis. Patient reports feeling like his usual self; the feeling of painless spasms that he felt a day before are now resolved.     MEDICATIONS  (STANDING):  atorvastatin 10 milliGRAM(s) Oral at bedtime  chlorhexidine 4% Liquid 1 Application(s) Topical <User Schedule>  dextrose 5%. 1000 milliLiter(s) (50 mL/Hr) IV Continuous <Continuous>  dextrose 50% Injectable 12.5 Gram(s) IV Push once  dextrose 50% Injectable 25 Gram(s) IV Push once  dextrose 50% Injectable 25 Gram(s) IV Push once  escitalopram 5 milliGRAM(s) Oral <User Schedule>  furosemide    Tablet 80 milliGRAM(s) Oral <User Schedule>  hydrALAZINE 125 milliGRAM(s) Oral three times a day  insulin glargine Injectable (LANTUS) 23 Unit(s) SubCutaneous every morning  insulin lispro (HumaLOG) corrective regimen sliding scale   SubCutaneous three times a day before meals  insulin lispro (HumaLOG) corrective regimen sliding scale   SubCutaneous at bedtime  insulin lispro Injectable (HumaLOG) 8 Unit(s) SubCutaneous three times a day before meals  metoprolol tartrate 50 milliGRAM(s) Oral three times a day    MEDICATIONS  (PRN):  dextrose 40% Gel 15 Gram(s) Oral once PRN Blood Glucose LESS THAN 70 milliGRAM(s)/deciliter  glucagon  Injectable 1 milliGRAM(s) IntraMuscular once PRN Glucose LESS THAN 70 milligrams/deciliter      Vital Signs Last 24 Hrs  T(C): 36.7 (14 Mar 2019 06:43), Max: 37 (13 Mar 2019 12:37)  T(F): 98.1 (14 Mar 2019 06:43), Max: 98.6 (13 Mar 2019 12:37)  HR: 70 (14 Mar 2019 06:43) (63 - 72)  BP: 188/75 (14 Mar 2019 06:43) (155/80 - 188/75)  BP(mean): --  RR: 17 (14 Mar 2019 06:43) (16 - 17)  SpO2: 97% (14 Mar 2019 06:43) (97% - 100%)  CAPILLARY BLOOD GLUCOSE      POCT Blood Glucose.: 144 mg/dL (13 Mar 2019 21:29)  POCT Blood Glucose.: 142 mg/dL (13 Mar 2019 17:41)  POCT Blood Glucose.: 327 mg/dL (13 Mar 2019 12:25)  POCT Blood Glucose.: 364 mg/dL (13 Mar 2019 08:41)    I&O's Summary        Exam  	General: Not in acute distress  	HEENT: No signs of trauma, blind. R pupil constricted and fixed, left pupil very mildly reactive  	Neck: Supple w/o lymphadenopathy   	Heart: RRR.  Normal S1 and S2.  No murmurs, rubs, or gallops.   	Lungs: CTAB. No wheezes, crackles, or rhonchi.    	Abdomen: BS+, soft, NT/ND.  No organomegaly.  	Skin: Warm and dry.  No rashes.  	Extremities: LUE AVF +thrill  Neuro: Awake and alert, AAO x 3 with no focal deficits          LABS:                        10.5   4.61  )-----------( 152      ( 13 Mar 2019 05:55 )             32.4     03-13    134<L>  |  94<L>  |  50<H>  ----------------------------<  322<H>  4.5   |  25  |  4.36<H>    Ca    8.9      13 Mar 2019 13:35  Phos  3.4     03-13  Mg     2.1     03-13      PT/INR - ( 13 Mar 2019 05:55 )   PT: 21.3 SEC;   INR: 1.83

## 2019-03-14 NOTE — PROGRESS NOTE ADULT - NSICDXPROBLEM_GEN_ALL_CORE_FT
PROBLEM DIAGNOSES  Problem: Diabetes mellitus type I  Assessment and Plan: Long time diabetic with otherwise very good med compliance  Found at home down on floor by sister with FSG 22 resolved with glucagon  On lantus + premeal and sliding scale now  AG improved  Appreciate endocrinology recs    Problem: At risk for hypoglycemia  Assessment and Plan: Found to FS to 22 by EMS  Pt reports only eating grapefruit that morning and took insulin as per usual  FS improved after glucagon + sandwich by EMS  On admission, hyperglycemic to 400s improved on insulin   Monitor FS carefully and ensure no gap betweel meal intake and insulin administration  Does not endorse suicidal ideation but joan consulted for anhedonia    Problem: Renal transplant, status post  Assessment and Plan: Failed renal transplant 2 years ago now on HD Sat, Tues, Thurs  Appreciate nephrology recs    Problem: CKD (chronic kidney disease)  Assessment and Plan: Secondary to Diabetes  On HD s/p failed renal transplant    Problem: Chronic anticoagulation  Assessment and Plan: Was prescribed coumadin 2 years ago for Afib noted during 2017 hospitalization when pt had PEA arrest   C/w Coumadin with INR goal 2-3      Problem: Need for prophylactic measure  Assessment and Plan: DVT prophylaxis : INR therapeutic on Coumadin chronic A/C  Diet: Tolerating diabetic diet    Problem: Acute metabolic encephalopathy  Assessment and Plan: Resolved    Problem: Prophylactic measure  Assessment and Plan: On Coumadin for chronic Afib  Dispo pending: Normalization of blood sugars, psych and PT recs, outpt HD setup PROBLEM DIAGNOSES  Problem: Diabetes mellitus type I  Assessment and Plan: Long time diabetic with otherwise very good med compliance  Found at home down on floor by sister with FSG 22 resolved with glucagon  On lantus + premeal and sliding scale now  Appreciate endocrinology recs    Problem: At risk for hypoglycemia  Assessment and Plan: Found to FS to 22 by EMS  Pt reports only eating grapefruit morning of admission and took insulin as per usual  FS improved after glucagon + sandwich by EMS  On admission, hyperglycemic to 400s improved on insulin   Monitor FS carefully and ensure no gap betweel meal intake and insulin administration  Does not endorse suicidal ideation but joan consulted for anhedonia    Problem: Hepatitis C infection  Assessment and Plan: Patient has been told prior to hospital visit that he is Hep C +  Will have pt follow up with liver clinic after d/c    Problem: Renal transplant, status post  Assessment and Plan: Failed renal transplant 2 years ago now on HD Sat, Tues, Thurs  Appreciate nephrology recs    Problem: Chronic anticoagulation  Assessment and Plan: Was prescribed coumadin 2 years ago for Afib noted during 2017 hospitalization when pt had PEA arrest   C/w Coumadin with INR goal 2-3      Problem: Acute metabolic encephalopathy  Assessment and Plan: Resolved    Problem: ESRD on dialysis  Assessment and Plan: On HD s/p failed renal transplant    Problem: Prophylactic measure  Assessment and Plan: On Coumadin for chronic Afib  Dispo pending: Normalization of blood sugars, psych and PT recs, outpt HD setup

## 2019-03-14 NOTE — DISCHARGE NOTE PROVIDER - NSDCFUADDAPPT_GEN_ALL_CORE_FT
Liver center  F/u with Dr. Baez nephrologist/PCP F/u with Dr. Baez nephrologist/PCP    Hepatology appointment for Hepatitis C Virus evaluation with Dr Benito Tejada on 4/1/2019, at 830 AM. Phone [phone]. [address] [place] [address], [address] [place] [id number] Follow up with Dr. Baez nephrologist/PCP    Follow up with your endocrinologist within 7-10 days of discharge for continued management of your insulin dosing. You can also follow with [facility] at [address] [place] [address] Phone [phone]    Hepatology appointment for Hepatitis C Virus evaluation with Dr Benito Tejada on 4/1/2019, at 830 AM. Phone [phone]. [address] [place] [address], [address] [place] [address]    For psychiatry: Can with PCP to prescribe Lexapro. Alternatively, can go to [facility] [phone]

## 2019-03-14 NOTE — DISCHARGE NOTE PROVIDER - CARE PROVIDERS DIRECT ADDRESSES
,[email or web address]

## 2019-03-14 NOTE — PROGRESS NOTE ADULT - SUBJECTIVE AND OBJECTIVE BOX
Chief Complaint: DM1    History: Ate large breakfast with cereal and Yi toast.  No hypoglycemia symptoms.    MEDICATIONS  (STANDING):  atorvastatin 10 milliGRAM(s) Oral at bedtime  chlorhexidine 4% Liquid 1 Application(s) Topical <User Schedule>  dextrose 5%. 1000 milliLiter(s) (50 mL/Hr) IV Continuous <Continuous>  dextrose 50% Injectable 12.5 Gram(s) IV Push once  dextrose 50% Injectable 25 Gram(s) IV Push once  dextrose 50% Injectable 25 Gram(s) IV Push once  escitalopram 5 milliGRAM(s) Oral <User Schedule>  furosemide    Tablet 80 milliGRAM(s) Oral <User Schedule>  hydrALAZINE 125 milliGRAM(s) Oral three times a day  insulin glargine Injectable (LANTUS) 23 Unit(s) SubCutaneous every morning  insulin lispro (HumaLOG) corrective regimen sliding scale   SubCutaneous three times a day before meals  insulin lispro (HumaLOG) corrective regimen sliding scale   SubCutaneous at bedtime  insulin lispro Injectable (HumaLOG) 8 Unit(s) SubCutaneous three times a day before meals  metoprolol tartrate 50 milliGRAM(s) Oral three times a day  warfarin 7 milliGRAM(s) Oral once    MEDICATIONS  (PRN):  dextrose 40% Gel 15 Gram(s) Oral once PRN Blood Glucose LESS THAN 70 milliGRAM(s)/deciliter  glucagon  Injectable 1 milliGRAM(s) IntraMuscular once PRN Glucose LESS THAN 70 milligrams/deciliter      Allergies    No Known Allergies    Intolerances      Review of Systems:    ALL OTHER SYSTEMS REVIEWED AND NEGATIVE        PHYSICAL EXAM:  VITALS: T(C): 36.4 (03-14-19 @ 12:10)  T(F): 97.5 (03-14-19 @ 12:10), Max: 98.5 (03-13-19 @ 20:49)  HR: 70 (03-14-19 @ 12:10) (63 - 78)  BP: 147/78 (03-14-19 @ 12:10) (117/57 - 188/75)  RR:  (16 - 18)  SpO2:  (97% - 99%)  Wt(kg): --  GENERAL: NAD, well-groomed, well-developed  EYES: No proptosis, blind  HEENT:  Atraumatic, Normocephalic, moist mucous membranes  NEURO: extraocular movements intact, no tremor  PSYCH: Alert and oriented x 3, normal affect, normal mood      CAPILLARY BLOOD GLUCOSE      POCT Blood Glucose.: 122 mg/dL (14 Mar 2019 17:45)  POCT Blood Glucose.: 300 mg/dL (14 Mar 2019 12:03)  POCT Blood Glucose.: 114 mg/dL (14 Mar 2019 08:47)  POCT Blood Glucose.: 144 mg/dL (13 Mar 2019 21:29)      03-14    135  |  98  |  69<H>  ----------------------------<  169<H>  4.5   |  21<L>  |  5.35<H>    EGFR if : 13  EGFR if non : 11    Ca    9.0      03-14  Mg     2.1     03-13  Phos  3.4     03-13            Thyroid Function Tests:      Hemoglobin A1C, Whole Blood: 6.3 % <H> [4.0 - 5.6] (03-12-19 @ 06:00)

## 2019-03-14 NOTE — DISCHARGE NOTE PROVIDER - HOSPITAL COURSE
54 y/o Male w/ a pmh significant for HTN, DM1 (on Lantus and Novolog), HLD, CKD s/p renal transplant on HD (T,Th,S) BIBEMS after being found down at home by his sister. [facility] arrival, FSG 22 for which pt given Glucagon and advised to eat a sandwich w/ repeat . His mental status improved however later on pt became more lethargic en route to the ED. He reportedly takes Lantus 25u QHS and Novolog w/ meals w/ excellent adherence. No reported fevers, chills, sick contacts, HA, dizziness, CP, palpitations, SOB, abdominal pain, dysuria or diarrhea              In the ED, pt afebrile, -227/67-77, HR 69-75, RR 20 (97% RA). Labs notable for INR 2.4, BUN 71, Cr 5.4. CTH/Cervical spine negative. Pt given Labetalol 10mg IVP x1 and admitted to medicine for further management            Hospital course: Patient's blood sugars had improved by the time he reached the ED. He was still altered at the time of admission but his encephalopathy resolved within the first day. He was noted to be hyperglycemic; initially additional basal and sliding scale insulin were given; blood sugars improved with updating basal bolus regimen. Patient continued to have dialysis inpatient; although noted to be hypertensive to 180s at times prior to dialysis, no additional blood pressure medications were added to patient's home regimen as his blood pressures decreased significantly during dialysis; albeit without any symptoms experienced by the patient. Patient was 54 y/o Male w/ a pmh significant for HTN, DM1 (on Lantus and Novolog), HLD, CKD s/p renal transplant on HD (T,Th,S) BIBEMS after being found down at home by his sister. [facility] arrival, FSG 22 for which pt given Glucagon and advised to eat a sandwich w/ repeat . His mental status improved however later on pt became more lethargic en route to the ED. He reportedly takes Lantus 25u QHS and Novolog w/ meals w/ excellent adherence. No reported fevers, chills, sick contacts, HA, dizziness, CP, palpitations, SOB, abdominal pain, dysuria or diarrhea              In the ED, pt afebrile, -227/67-77, HR 69-75, RR 20 (97% RA). Labs notable for INR 2.4, BUN 71, Cr 5.4. CTH/Cervical spine negative. Pt given Labetalol 10mg IVP x1 and admitted to medicine for further management            Hospital course: Patient's blood sugars had improved by the time he reached the ED. He was still altered at the time of admission but his encephalopathy resolved within the first day. He was noted to be hyperglycemic; initially additional basal and sliding scale insulin were given; blood sugars improved with updating basal bolus regimen. Patient continued to have dialysis inpatient; although noted to be hypertensive to 180s at times prior to dialysis, no additional blood pressure medications were added to patient's home regimen as his blood pressures decreased significantly during dialysis; albeit without any symptoms experienced by the patient. Patient was found to be hepatitis C positive during routine screening; patient said he was aware of this; was told by a doctor sometime ago and agreed to follow up with liver clinic after discharge.         Patient was continued on his home dose of Warfarin 7mg daily; his INR was checked.         He was discharged on ..... with stable vitals with instructions to follow up with Dr. Baez his PCP/ nephrologist and liver clinic. 52 y/o Male w/ a pmh significant for HTN, DM1 (on Lantus and Novolog), HLD, CKD s/p renal transplant on HD (T,Th,S) BIBEMS after being found down at home by his sister. [facility] arrival, FSG 22 for which pt given Glucagon and advised to eat a sandwich w/ repeat . His mental status improved however later on pt became more lethargic en route to the ED. He reportedly takes Lantus 25u QHS and Novolog w/ meals w/ excellent adherence. No reported fevers, chills, sick contacts, HA, dizziness, CP, palpitations, SOB, abdominal pain, dysuria or diarrhea          In the ED, pt afebrile, -227/67-77, HR 69-75, RR 20 (97% RA). Labs notable for INR 2.4, BUN 71, Cr 5.4. CTH/Cervical spine negative. Pt given Labetalol 10mg IVP x1 and admitted to medicine for further management        Hospital course: Patient's blood sugars had improved by the time he reached the ED. He was still altered at the time of admission but his encephalopathy resolved within the first day. He was noted to be hyperglycemic; initially additional basal and sliding scale insulin were given; blood sugars improved with updating basal bolus regimen. Patient continued to have dialysis inpatient; although noted to be hypertensive to 180s at times prior to dialysis, no additional blood pressure medications were added to patient's home regimen as his blood pressures decreased significantly during dialysis; albeit without any symptoms experienced by the patient. He received one dose of amlodipine 5mg and was discontinued in discussion with nephrology. He will continue on his home regimen for HTN on discharge. Patient was found to be hepatitis C positive during routine screening; patient said he was aware of this; was told by a doctor sometime ago and agreed to follow up with liver clinic after discharge.         Patient was continued on his home dose of Warfarin 7mg daily; his INR was checked and last at 1.8. Pt's coumadin to be increased to 8mg daily on discharge.         He was discharged on 3/15/19 with stable vitals with instructions to follow up with Dr. Baez his PCP/ nephrologist and liver clinic. 54 y/o Male w/ a pmh significant for HTN, DM1 (on Lantus and Novolog), HLD, CKD s/p renal transplant on HD (T,Th,S) BIBEMS after being found down at home by his sister. [facility] arrival, FSG 22 for which pt given Glucagon and advised to eat a sandwich w/ repeat . His mental status improved however later on pt became more lethargic en route to the ED. He reportedly takes Lantus 25u QHS and Novolog w/ meals w/ excellent adherence. No reported fevers, chills, sick contacts, HA, dizziness, CP, palpitations, SOB, abdominal pain, dysuria or diarrhea          In the ED, pt afebrile, -227/67-77, HR 69-75, RR 20 (97% RA). Labs notable for INR 2.4, BUN 71, Cr 5.4. CTH/Cervical spine negative. Pt given Labetalol 10mg IVP x1 and admitted to medicine for further management        Hospital course: Patient's blood sugars had improved by the time he reached the ED. He was still altered at the time of admission but his encephalopathy resolved within the first day. He was noted to be hyperglycemic; initially additional basal and sliding scale insulin were given; blood sugars improved with updating basal bolus regimen. Patient continued to have dialysis inpatient; although noted to be hypertensive to 180s at times prior to dialysis, no additional blood pressure medications were added to patient's home regimen as his blood pressures decreased significantly during dialysis; albeit without any symptoms experienced by the patient. He received one dose of amlodipine 5mg and was discontinued in discussion with nephrology. He will continue on his home regimen for HTN on discharge. Patient was found to be hepatitis C positive during routine screening; patient said he was aware of this; was told by a doctor sometime ago and agreed to follow up with liver clinic after discharge.         Patient expressed understanding        Patient was continued on his home dose of Warfarin 7mg daily; his INR was checked and last at 1.8. Pt's coumadin to be increased to 8mg daily on discharge.         He was discharged on 3/15/19 with stable vitals with instructions to follow up with Dr. Baez his PCP/ nephrologist and liver clinic. 52 y/o Male w/ a pmh significant for HTN, DM1 (on Lantus and Novolog), HLD, CKD s/p renal transplant on HD (T,Th,S) BIBEMS after being found down at home by his sister. [facility] arrival, FSG 22 for which pt given Glucagon and advised to eat a sandwich w/ repeat . His mental status improved however later on pt became more lethargic en route to the ED. He reportedly takes Lantus 25u QHS and Novolog w/ meals w/ excellent adherence. No reported fevers, chills, sick contacts, HA, dizziness, CP, palpitations, SOB, abdominal pain, dysuria or diarrhea          In the ED, pt afebrile, -227/67-77, HR 69-75, RR 20 (97% RA). Labs notable for INR 2.4, BUN 71, Cr 5.4. CTH/Cervical spine negative. Pt given Labetalol 10mg IVP x1 and admitted to medicine for further management        Hospital course: Patient's blood sugars had improved by the time he reached the ED. He was still altered at the time of admission but his encephalopathy resolved within the first day. He was noted to be hyperglycemic; initially additional basal and sliding scale insulin were given; blood sugars improved with updating basal bolus regimen. Patient continued to have dialysis inpatient; although noted to be hypertensive to 180s at times prior to dialysis, no additional blood pressure medications were added to patient's home regimen as his blood pressures decreased significantly during dialysis; albeit without any symptoms experienced by the patient. He received one dose of amlodipine 5mg and was discontinued in discussion with nephrology. He will continue on his home regimen for HTN on discharge. Patient was found to be hepatitis C positive during routine screening; patient said he was aware of this; was told by a doctor sometime ago and agreed to follow up with liver clinic after discharge.         Patient expressed understanding that he should be eating proper meals while taking his insulin. He was discharged on lantus and humalog; he agreed to continue taking his daily lantus and humalog as per his usual sliding scale calculations based on glucometer readings and carbohydrate content of his meals.         Patient was continued on his home dose of Warfarin 7mg daily; his INR was checked and last at 1.8. Pt's coumadin to be increased to 8mg daily on discharge.         He was discharged on 3/15/19 with stable vitals with instructions to follow up with Dr. Baez his PCP/ nephrologist and liver clinic. 54 y/o Male w/ a pmh significant for HTN, DM1 (on Lantus and Novolog), HLD, CKD s/p renal transplant on HD (T,Th,S) BIBEMS after being found down at home by his sister. [facility] arrival, FSG 22 for which pt given Glucagon and advised to eat a sandwich w/ repeat . His mental status improved however later on pt became more lethargic en route to the ED. He reportedly takes Lantus 25u QHS and Novolog w/ meals w/ excellent adherence. No reported fevers, chills, sick contacts, HA, dizziness, CP, palpitations, SOB, abdominal pain, dysuria or diarrhea          In the ED, pt afebrile, -227/67-77, HR 69-75, RR 20 (97% RA). Labs notable for INR 2.4, BUN 71, Cr 5.4. CTH/Cervical spine negative. Pt given Labetalol 10mg IVP x1 and admitted to medicine for further management        Hospital course: Patient's blood sugars had improved by the time he reached the ED. He was still altered at the time of admission but his encephalopathy resolved within the first day. He was noted to be hyperglycemic; initially additional basal and sliding scale insulin were given; blood sugars improved with updating basal bolus regimen. Patient continued to have dialysis inpatient; although noted to be hypertensive to 180s at times prior to dialysis, no additional blood pressure medications were added to patient's home regimen as his blood pressures decreased significantly during dialysis; albeit without any symptoms experienced by the patient. He received one dose of amlodipine 5mg and was discontinued in discussion with nephrology. He will continue on his home regimen for HTN on discharge. Patient reported anhedonia without suicidal ideation and psychiatry was consulted; was started on lexapro which he tolerated well and was given a prescription on discharge. Patient was found to be hepatitis C positive during routine screening; patient said he was aware of this; was told by a doctor sometime ago and agreed to follow up with liver clinic after discharge.         Patient expressed understanding that he should be eating proper meals while taking his insulin. He was discharged on lantus and humalog; he agreed to continue taking his daily lantus and humalog as per his usual sliding scale calculations based on glucometer readings and carbohydrate content of his meals.         Patient was continued on his home dose of Warfarin 7mg daily; his INR was checked and last at 1.8. Pt's coumadin to be increased to 8mg daily on discharge.         He was discharged on 3/15/19 with stable vitals with instructions to follow up with Dr. Baez his PCP/ nephrologist and liver clinic. 54 y/o Male w/ a pmh significant for HTN, DM1 (on Lantus and Novolog), HLD, CKD s/p renal transplant on HD (T,Th,S) BIBEMS after being found down at home by his sister. [facility] arrival, FSG 22 for which pt given Glucagon and advised to eat a sandwich w/ repeat . His mental status improved however later on pt became more lethargic en route to the ED. He reportedly takes Lantus 25u QHS and Novolog w/ meals w/ excellent adherence. No reported fevers, chills, sick contacts, HA, dizziness, CP, palpitations, SOB, abdominal pain, dysuria or diarrhea          In the ED, pt afebrile, -227/67-77, HR 69-75, RR 20 (97% RA). Labs notable for INR 2.4, BUN 71, Cr 5.4. CTH/Cervical spine negative. Pt given Labetalol 10mg IVP x1 and admitted to medicine for further management        Hospital course: Patient's blood sugars had improved by the time he reached the ED. He was still altered at the time of admission but his encephalopathy resolved within the first day. He was noted to be hyperglycemic; initially additional basal and sliding scale insulin were given; blood sugars improved with updating basal bolus regimen. Patient continued to have dialysis inpatient; although noted to be hypertensive to 180s at times prior to dialysis, no additional blood pressure medications were added to patient's home regimen as his blood pressures decreased significantly during dialysis; albeit without any symptoms experienced by the patient. He received one dose of amlodipine 5mg and was discontinued in discussion with nephrology. He will continue on his home regimen for HTN on discharge. Patient reported anhedonia without suicidal ideation and psychiatry was consulted; was started on lexapro which he tolerated well and was given a prescription on discharge. Patient was found to be hepatitis C positive during routine screening; patient said he was aware of this; was told by a doctor sometime ago and agreed to follow up with liver clinic after discharge.         Patient expressed understanding that he should be eating proper meals while taking his insulin. He was discharged on lantus and humalog; he agreed to continue taking his daily lantus and humalog as per his usual sliding scale calculations based on glucometer readings and carbohydrate content of his meals.         Patient was continued on his home dose of Warfarin 7mg daily; his INR was checked and last at 1.8. Pt's coumadin to be increased to 8mg daily on discharge.         He was discharged on 3/15/19 with stable vitals with instructions to follow up with Dr. Baez his PCP/ nephrologist and liver Clinic outpatient.

## 2019-03-15 ENCOUNTER — TRANSCRIPTION ENCOUNTER (OUTPATIENT)
Age: 54
End: 2019-03-15

## 2019-03-15 VITALS
TEMPERATURE: 98 F | OXYGEN SATURATION: 99 % | RESPIRATION RATE: 16 BRPM | DIASTOLIC BLOOD PRESSURE: 82 MMHG | HEART RATE: 65 BPM | SYSTOLIC BLOOD PRESSURE: 173 MMHG

## 2019-03-15 LAB
ANION GAP SERPL CALC-SCNC: 15 MMO/L — HIGH (ref 7–14)
BUN SERPL-MCNC: 60 MG/DL — HIGH (ref 7–23)
CALCIUM SERPL-MCNC: 9.1 MG/DL — SIGNIFICANT CHANGE UP (ref 8.4–10.5)
CHLORIDE SERPL-SCNC: 96 MMOL/L — LOW (ref 98–107)
CO2 SERPL-SCNC: 25 MMOL/L — SIGNIFICANT CHANGE UP (ref 22–31)
CREAT SERPL-MCNC: 4.69 MG/DL — HIGH (ref 0.5–1.3)
GLUCOSE BLDC GLUCOMTR-MCNC: 112 MG/DL — HIGH (ref 70–99)
GLUCOSE BLDC GLUCOMTR-MCNC: 123 MG/DL — HIGH (ref 70–99)
GLUCOSE BLDC GLUCOMTR-MCNC: 144 MG/DL — HIGH (ref 70–99)
GLUCOSE BLDC GLUCOMTR-MCNC: 178 MG/DL — HIGH (ref 70–99)
GLUCOSE BLDC GLUCOMTR-MCNC: 194 MG/DL — HIGH (ref 70–99)
GLUCOSE BLDC GLUCOMTR-MCNC: 238 MG/DL — HIGH (ref 70–99)
GLUCOSE BLDC GLUCOMTR-MCNC: 54 MG/DL — LOW (ref 70–99)
GLUCOSE BLDC GLUCOMTR-MCNC: 60 MG/DL — LOW (ref 70–99)
GLUCOSE BLDC GLUCOMTR-MCNC: 88 MG/DL — SIGNIFICANT CHANGE UP (ref 70–99)
GLUCOSE SERPL-MCNC: 175 MG/DL — HIGH (ref 70–99)
HCT VFR BLD CALC: 35.8 % — LOW (ref 39–50)
HGB BLD-MCNC: 11.3 G/DL — LOW (ref 13–17)
INR BLD: 1.73 — HIGH (ref 0.88–1.17)
MCHC RBC-ENTMCNC: 29.8 PG — SIGNIFICANT CHANGE UP (ref 27–34)
MCHC RBC-ENTMCNC: 31.6 % — LOW (ref 32–36)
MCV RBC AUTO: 94.5 FL — SIGNIFICANT CHANGE UP (ref 80–100)
NRBC # FLD: 0 K/UL — LOW (ref 25–125)
PLATELET # BLD AUTO: 180 K/UL — SIGNIFICANT CHANGE UP (ref 150–400)
PMV BLD: 9.7 FL — SIGNIFICANT CHANGE UP (ref 7–13)
POTASSIUM SERPL-MCNC: 5.1 MMOL/L — SIGNIFICANT CHANGE UP (ref 3.5–5.3)
POTASSIUM SERPL-SCNC: 5.1 MMOL/L — SIGNIFICANT CHANGE UP (ref 3.5–5.3)
PROTHROM AB SERPL-ACNC: 20 SEC — HIGH (ref 9.8–13.1)
RBC # BLD: 3.79 M/UL — LOW (ref 4.2–5.8)
RBC # FLD: 13.9 % — SIGNIFICANT CHANGE UP (ref 10.3–14.5)
SODIUM SERPL-SCNC: 136 MMOL/L — SIGNIFICANT CHANGE UP (ref 135–145)
WBC # BLD: 5.06 K/UL — SIGNIFICANT CHANGE UP (ref 3.8–10.5)
WBC # FLD AUTO: 5.06 K/UL — SIGNIFICANT CHANGE UP (ref 3.8–10.5)

## 2019-03-15 PROCEDURE — 99232 SBSQ HOSP IP/OBS MODERATE 35: CPT

## 2019-03-15 PROCEDURE — 99233 SBSQ HOSP IP/OBS HIGH 50: CPT

## 2019-03-15 PROCEDURE — 99239 HOSP IP/OBS DSCHRG MGMT >30: CPT

## 2019-03-15 RX ORDER — ESCITALOPRAM OXALATE 10 MG/1
1 TABLET, FILM COATED ORAL
Qty: 0 | Refills: 0 | COMMUNITY
Start: 2019-03-15

## 2019-03-15 RX ORDER — INSULIN LISPRO 100/ML
6 VIAL (ML) SUBCUTANEOUS
Qty: 0 | Refills: 0 | Status: DISCONTINUED | OUTPATIENT
Start: 2019-03-15 | End: 2019-03-15

## 2019-03-15 RX ORDER — INSULIN ASPART 100 [IU]/ML
1 INJECTION, SOLUTION SUBCUTANEOUS
Qty: 3 | Refills: 0 | OUTPATIENT
Start: 2019-03-15 | End: 2019-04-13

## 2019-03-15 RX ORDER — WARFARIN SODIUM 2.5 MG/1
1 TABLET ORAL
Qty: 0 | Refills: 0 | COMMUNITY

## 2019-03-15 RX ORDER — INSULIN LISPRO 100/ML
9 VIAL (ML) SUBCUTANEOUS
Qty: 0 | Refills: 0 | Status: DISCONTINUED | OUTPATIENT
Start: 2019-03-16 | End: 2019-03-15

## 2019-03-15 RX ORDER — ESCITALOPRAM OXALATE 10 MG/1
1 TABLET, FILM COATED ORAL
Qty: 30 | Refills: 0 | OUTPATIENT
Start: 2019-03-15 | End: 2019-04-13

## 2019-03-15 RX ORDER — WARFARIN SODIUM 2.5 MG/1
2 TABLET ORAL
Qty: 0 | Refills: 0 | COMMUNITY
Start: 2019-03-15

## 2019-03-15 RX ADMIN — Medication 10 UNIT(S): at 08:27

## 2019-03-15 RX ADMIN — Medication 50 MILLIGRAM(S): at 14:19

## 2019-03-15 RX ADMIN — INSULIN GLARGINE 23 UNIT(S): 100 INJECTION, SOLUTION SUBCUTANEOUS at 08:26

## 2019-03-15 RX ADMIN — Medication 125 MILLIGRAM(S): at 14:19

## 2019-03-15 RX ADMIN — Medication 125 MILLIGRAM(S): at 06:06

## 2019-03-15 RX ADMIN — Medication 80 MILLIGRAM(S): at 06:06

## 2019-03-15 RX ADMIN — CHLORHEXIDINE GLUCONATE 1 APPLICATION(S): 213 SOLUTION TOPICAL at 14:19

## 2019-03-15 RX ADMIN — ESCITALOPRAM OXALATE 5 MILLIGRAM(S): 10 TABLET, FILM COATED ORAL at 08:42

## 2019-03-15 RX ADMIN — Medication 50 MILLIGRAM(S): at 06:06

## 2019-03-15 NOTE — DISCHARGE NOTE NURSING/CASE MANAGEMENT/SOCIAL WORK - NSDCDPATPORTLINK_GEN_ALL_CORE
You can access the [facility], offered by [facility], by registering with the following website: [email or web address]

## 2019-03-15 NOTE — PROGRESS NOTE BEHAVIORAL HEALTH - NSBHFUPINTERVALHXFT_PSY_A_CORE
Patient seen in follow-up. No events or prns overnight. Has been tolerating lexapro well without side effects. Continues to endorse some depressed mood, but remains hopeful and future-oriented, looking forward to discharge back home, no SI/HI, and no psychotic sx. Has been sleeping and eating well.

## 2019-03-15 NOTE — PROGRESS NOTE BEHAVIORAL HEALTH - NSBHCHARTREVIEWVS_PSY_A_CORE FT
Vital Signs Last 24 Hrs  T(C): 36.6 (15 Mar 2019 14:12), Max: 36.7 (14 Mar 2019 21:11)  T(F): 97.9 (15 Mar 2019 14:12), Max: 98 (14 Mar 2019 21:11)  HR: 65 (15 Mar 2019 14:12) (65 - 69)  BP: 173/82 (15 Mar 2019 14:12) (153/74 - 173/82)  BP(mean): --  RR: 16 (15 Mar 2019 14:12) (16 - 17)  SpO2: 99% (15 Mar 2019 14:12) (99% - 100%)

## 2019-03-15 NOTE — PROGRESS NOTE ADULT - SUBJECTIVE AND OBJECTIVE BOX
Chief Complaint: DM1    History: Hypoglycemia noted last night at bedtime and also today prelunch.  Patient vomited after eating too quickly at lunch.  He now feels fine, denies N/V or hypoglycemia symptoms.    MEDICATIONS  (STANDING):  atorvastatin 10 milliGRAM(s) Oral at bedtime  chlorhexidine 4% Liquid 1 Application(s) Topical <User Schedule>  dextrose 5%. 1000 milliLiter(s) (50 mL/Hr) IV Continuous <Continuous>  dextrose 50% Injectable 12.5 Gram(s) IV Push once  dextrose 50% Injectable 25 Gram(s) IV Push once  dextrose 50% Injectable 25 Gram(s) IV Push once  escitalopram 5 milliGRAM(s) Oral <User Schedule>  furosemide    Tablet 80 milliGRAM(s) Oral <User Schedule>  hydrALAZINE 125 milliGRAM(s) Oral three times a day  insulin glargine Injectable (LANTUS) 23 Unit(s) SubCutaneous every morning  insulin lispro (HumaLOG) corrective regimen sliding scale   SubCutaneous three times a day before meals  insulin lispro (HumaLOG) corrective regimen sliding scale   SubCutaneous at bedtime  insulin lispro Injectable (HumaLOG) 10 Unit(s) SubCutaneous before breakfast  insulin lispro Injectable (HumaLOG) 8 Unit(s) SubCutaneous before lunch  insulin lispro Injectable (HumaLOG) 6 Unit(s) SubCutaneous before dinner  metoprolol tartrate 50 milliGRAM(s) Oral three times a day    MEDICATIONS  (PRN):  dextrose 40% Gel 15 Gram(s) Oral once PRN Blood Glucose LESS THAN 70 milliGRAM(s)/deciliter  glucagon  Injectable 1 milliGRAM(s) IntraMuscular once PRN Glucose LESS THAN 70 milligrams/deciliter      Allergies    No Known Allergies    Intolerances      Review of Systems:    ALL OTHER SYSTEMS REVIEWED AND NEGATIVE      PHYSICAL EXAM:  VITALS: T(C): 36.6 (03-15-19 @ 14:12)  T(F): 97.9 (03-15-19 @ 14:12), Max: 98 (03-14-19 @ 21:11)  HR: 65 (03-15-19 @ 14:12) (65 - 69)  BP: 173/82 (03-15-19 @ 14:12) (153/74 - 173/82)  RR:  (16 - 17)  SpO2:  (99% - 100%)  Wt(kg): --  GENERAL: NAD, well-groomed, well-developed  EYES: No proptosis, blind  HEENT:  Atraumatic, Normocephalic, moist mucous membranes  PSYCH: Alert and oriented x 3, normal affect, normal mood      CAPILLARY BLOOD GLUCOSE      POCT Blood Glucose.: 194 mg/dL (15 Mar 2019 15:27)  POCT Blood Glucose.: 238 mg/dL (15 Mar 2019 14:38)  POCT Blood Glucose.: 123 mg/dL (15 Mar 2019 13:38)  POCT Blood Glucose.: 88 mg/dL (15 Mar 2019 13:14)  POCT Blood Glucose.: 60 mg/dL (15 Mar 2019 12:57)  POCT Blood Glucose.: 54 mg/dL (15 Mar 2019 12:56)  POCT Blood Glucose.: 144 mg/dL (15 Mar 2019 08:20)  POCT Blood Glucose.: 178 mg/dL (15 Mar 2019 06:44)  POCT Blood Glucose.: 140 mg/dL (14 Mar 2019 23:21)  POCT Blood Glucose.: 93 mg/dL (14 Mar 2019 23:03)  POCT Blood Glucose.: 44 mg/dL (14 Mar 2019 22:43)  POCT Blood Glucose.: 122 mg/dL (14 Mar 2019 17:45)      03-15    136  |  96<L>  |  60<H>  ----------------------------<  175<H>  5.1   |  25  |  4.69<H>    EGFR if : 15  EGFR if non : 13    Ca    9.1      03-15  Mg     2.1     03-13  Phos  3.4     03-13            Thyroid Function Tests:      Hemoglobin A1C, Whole Blood: 6.3 % <H> [4.0 - 5.6] (03-12-19 @ 06:00)

## 2019-03-15 NOTE — PROGRESS NOTE BEHAVIORAL HEALTH - NSBHCHARTREVIEWLAB_PSY_A_CORE FT
CBC Full  -  ( 15 Mar 2019 06:30 )  WBC Count : 5.06 K/uL  Hemoglobin : 11.3 g/dL  Hematocrit : 35.8 %  Platelet Count - Automated : 180 K/uL  Mean Cell Volume : 94.5 fL  Mean Cell Hemoglobin : 29.8 pg  Mean Cell Hemoglobin Concentration : 31.6 %  Auto Neutrophil # : x  Auto Lymphocyte # : x  Auto Monocyte # : x  Auto Eosinophil # : x  Auto Basophil # : x  Auto Neutrophil % : x  Auto Lymphocyte % : x  Auto Monocyte % : x  Auto Eosinophil % : x  Auto Basophil % : x    03-15    136  |  96<L>  |  60<H>  ----------------------------<  175<H>  5.1   |  25  |  4.69<H>    Ca    9.1      15 Mar 2019 06:30

## 2019-03-15 NOTE — DISCHARGE NOTE NURSING/CASE MANAGEMENT/SOCIAL WORK - NSDCFUADDAPPT_GEN_ALL_CORE_FT
F/u with Dr. Baez nephrologist/PCP    Hepatology appointment for Hepatitis C Virus evaluation with Dr Benito Tejada on 4/1/2019, at 830 AM. Phone [phone]. [address] [place] [address], [address] [place] [id number]

## 2019-03-15 NOTE — CHART NOTE - NSCHARTNOTEFT_GEN_A_CORE
Informed that Pre-lunch fingerstick glucose was 54. Immediate repeat FS was 60. Patient was asymptomatic at that time. Right awat, he ate lunch too quickly as he was concerned about low FS. Shortly after lunch, patient vomited most of the contents of lunch. Fingerstick 88 around the time of vomiting. Patient does not report chest pain, diaphoresis, palpitations, lightheadedness or any other symptoms. Patient was given pudding + juice x 2; ate without nausea or vomiting.     - Q1 hr fingersticks ordered, nurse aware.  - Will monitor patient and fingerstick sugars    [facility]  PGY-1  Pager # [phone] Informed by nurse that Pre-lunch fingerstick glucose was 54. Immediate repeat FS was 60. Patient was asymptomatic at that time. Right away, he ate lunch too quickly as he was concerned about low FS. Shortly after lunch, patient vomited most of the contents of lunch. Fingerstick 88 around the time of vomiting. Patient does not report chest pain, diaphoresis, palpitations, lightheadedness or any other symptoms. Patient was given pudding + juice x 2; ate without nausea or vomiting.     - Q1 hr fingersticks ordered, nurse aware.  - Will monitor patient and fingerstick sugars    [facility]  PGY-1  Pager # [phone]

## 2019-03-15 NOTE — PROGRESS NOTE BEHAVIORAL HEALTH - NSBHADMITCOUNSEL_PSY_A_CORE
importance of adherence to chosen treatment/risk factor reduction/instructions for management, treatment and follow up/client/family/caregiver education

## 2019-03-15 NOTE — PROGRESS NOTE ADULT - ATTENDING COMMENTS
patient seen and eval during hd.
Agree with insulin plan as outlined. Will follow.
Patient seen and examined.  Case discussed with house staff.  Agree with above as edited.  Patient is a 53yoM with DM type I, HTN, ESRD s/p renal transplant which has now failed, on HD, h/o paroxysmal afib by prior reports on AC a/w AMS secondary to hypoglycemia.    Hypoglycemia, DM type I - Endo f/u appreciated. Case d/w Endocrine att. c/w Lantus, Humalog, ISS here. Patient able to manage at home based on his sliding scale. Resume home regiment on discharge   Major Depressive Disorder - Appreciate psychiatry eval. Lexapro started.  HTN - continue hydralazine and metoprolol. d/w renal  ESRD - HD per nephrology.   HCV - d/w patient. Outpatient follow up set up.  paroxysmal afib - INR remains mildly subtherapeutic. Coumadin 8mg.  Rate control with metoprolol  Plan discussed with patient.  Case d/w Case management, social work and nursing on IDRs  d/c home today.  D/C time: 40 minutes
Patient seen and examined.  Case discussed with house staff.  Agree with above as edited.  Patient is a 53yoM with DM type I, HTN, ESRD s/p renal transplant which has now failed, on HD, h/o paroxysmal afib by prior reports on AC a/w AMS secondary to hypoglycemia.    Hypoglycemia, AMS, DM type I - AMS resolved.  Endo c/s appreciated. c/w Lantus, Humalog, ISS per endo recs. .   Depression - Patient endorsed depression symptoms and anhedonia along with prior SI but not current SI.  Psychiatry eval pending.  HTN - continue hydralazine and metoprolol  ESRD - HD per nephrology. Patient had some involuntary "jerking movements" post HD. Says this has occurred once in the past. Now resolved. Monitor. d/w nephrology  HCV - will set up outpatient follow up  paroxysmal afib - On coumadin, mildy subtherapeutic but has missed a dose. Coumadin 7mg tonight and monitor INR. Rate control with metoprolol  Plan discussed with patient.  Case d/w Case management, social work and nursing on IDRs
Patient seen and examined.  Case discussed with house staff.  Agree with above as edited.  Patient is a 53yoM with DM type I, HTN, ESRD s/p renal transplant which has now failed, on HD, h/o paroxysmal afib by prior reports on AC a/w AMS secondary to hypoglycemia.    Hypoglycemia, AMS, DM type I - AMS resolved.  Endo f/u appreciated. c/w Lantus, Humalog, ISS per endo recs.   Major Depressive Disorder - Appreciate psychiatry eval. Lexapro started.  HTN - continue hydralazine and metoprolol. d/w renal  ESRD - HD per nephrology.   HCV - d/w patient. Outpatient follow up set up.  paroxysmal afib - On coumadin, mildy subtherapeutic but has missed a dose. Coumadin 7mg tonight and monitor INR. Rate control with metoprolol  Plan discussed with patient.  Case d/w Case management, social work and nursing on IDRs  d/c planning 1-2 days
Patient seen and examined.  Case discussed with house staff.  Agree with above as edited.  Patient is a 53yoM with DM type I, HTN, ESRD s/p renal transplant which has now failed, on HD, h/o paroxysmal afib by prior reports on AC a/w AMS secondary to hypoglycemia.  Hypoglycemia, AMS, DM type I - AMS has resolved.  Endo c/s appreciated. Patient was on dose reduced lantus given presentation with hypoglycemia. Will increase and resume premeals.   Depression - Patient endorsing depression symptoms and anhedonia. Admits to prior SI but not current SI. Denies that this hypoglycemic episode was the result of too much insulin. Will have psychiatry evaluate (d/w Patient).  HTN - resume home regimen of hydralazine and metoprolol  ESRD - Renal eval for HD  paroxysmal afib - On coumadin, therapeutic. Rate control with metoprolol  Plan discussed with patient and family.  Case d/w Case management, social work and nursing on IDRs

## 2019-03-15 NOTE — PROVIDER CONTACT NOTE (OTHER) - RECOMMENDATIONS
Give morning blood pressure medication as per MD orders. Dr. Sheridan notified and aware.
Continue to monitor and reassess blood glucose before breakfast.
Continue to monitor patient.  will order one time order of fingerstick
Follow hypoglycemic protocol as per MD orders. Dr. Sheridan notified and aware.
Give evening BP medication. Continue to monitor. MD Aware
MD notified. Give morning BP medication and reassess.
MD notified. Night time hydralazine given. Continue to monitor.
Notify MD, start hypoglycemic protocol,
Reassess when patient returns from dialysis. Give missed dose of BP medication if needed.
notify MD
notify MD, give blood pressure meds
notify MD, give blood pressure meds
notify provider and administer metoprolol
notify provider and administer metoprolol and hydralazine due at this time
notify provider for further interventions
notify provider for further interventions and administered hydralazine
notify MD
Come assess patient at bedside

## 2019-03-15 NOTE — PROGRESS NOTE BEHAVIORAL HEALTH - SUMMARY
52 y/o Male w/ a pmh significant for EtOH abuse, HTN, DM1 (on Lantus and Novolog), HLD, CKD s/p renal transplant on HD (T,Th,S) BIBEMS after being found down at home by his sister. Upon EMS arrival, FSG 22. Patient currently presenting with likely longstanding depression, meets criteria for MDD (moderate episode) in the setting of worsening medical issues and social activities limited by his blindness. Although pt had endorsed SI in the past around the time of his sister's death last year, pt currently denies any SI, is future-oriented, looking to move out of his current living situation, and does not represent a current risk of harm to himself. Patient would benefit from an anti-depressant trial and outpatient psych follow-up. Pt started on Lexapro 3/13, has been tolerating it well without side effects.

## 2019-03-15 NOTE — PROVIDER CONTACT NOTE (OTHER) - BACKGROUND
Patient admitted for hypoglycemia
53 year old male admitted for hypotension with PMH of CKD and hypertension
Patient admitted for hypoglycemia
Patient admitted for hypoglycemia.
Patient admitted for hypoglycemic episode
Patient admitted with hypoglycemia
Patient admitted with hypoglycemia
Patient is 54 year male admit diagnosis hypoglycemia
patient admitted hypoglycemia,
patient admitted with hypoglycemia
patient admitted with hypoglycemic episode
patient is 54 year old male admit diagnosis hypoglycemia
patient is 54 year old male admit diagnosis hypoglycemia
53 year old male admitted for hypotension with PMH of CKD and hypertension
Patient admitted for hypoglycemia with a past medical history of hypertension and Type 1 Diabetes

## 2019-03-15 NOTE — PROGRESS NOTE ADULT - SUBJECTIVE AND OBJECTIVE BOX
Patient is a 54y old  Male who presents with a chief complaint of Hypoglycemia (14 Mar 2019 18:38)      SUBJECTIVE / OVERNIGHT EVENTS:    MEDICATIONS  (STANDING):  atorvastatin 10 milliGRAM(s) Oral at bedtime  chlorhexidine 4% Liquid 1 Application(s) Topical <User Schedule>  dextrose 5%. 1000 milliLiter(s) (50 mL/Hr) IV Continuous <Continuous>  dextrose 50% Injectable 12.5 Gram(s) IV Push once  dextrose 50% Injectable 25 Gram(s) IV Push once  dextrose 50% Injectable 25 Gram(s) IV Push once  escitalopram 5 milliGRAM(s) Oral <User Schedule>  furosemide    Tablet 80 milliGRAM(s) Oral <User Schedule>  hydrALAZINE 125 milliGRAM(s) Oral three times a day  insulin glargine Injectable (LANTUS) 23 Unit(s) SubCutaneous every morning  insulin lispro (HumaLOG) corrective regimen sliding scale   SubCutaneous three times a day before meals  insulin lispro (HumaLOG) corrective regimen sliding scale   SubCutaneous at bedtime  insulin lispro Injectable (HumaLOG) 10 Unit(s) SubCutaneous before breakfast  insulin lispro Injectable (HumaLOG) 8 Unit(s) SubCutaneous before lunch  insulin lispro Injectable (HumaLOG) 8 Unit(s) SubCutaneous before dinner  metoprolol tartrate 50 milliGRAM(s) Oral three times a day    MEDICATIONS  (PRN):  dextrose 40% Gel 15 Gram(s) Oral once PRN Blood Glucose LESS THAN 70 milliGRAM(s)/deciliter  glucagon  Injectable 1 milliGRAM(s) IntraMuscular once PRN Glucose LESS THAN 70 milligrams/deciliter      Vital Signs Last 24 Hrs  T(C): 36.6 (15 Mar 2019 05:19), Max: 36.9 (14 Mar 2019 10:50)  T(F): 97.9 (15 Mar 2019 05:19), Max: 98.4 (14 Mar 2019 10:50)  HR: 67 (15 Mar 2019 05:19) (67 - 78)  BP: 167/82 (15 Mar 2019 05:19) (117/57 - 188/75)  BP(mean): --  RR: 16 (15 Mar 2019 05:19) (16 - 18)  SpO2: 100% (15 Mar 2019 05:19) (97% - 100%)  CAPILLARY BLOOD GLUCOSE      POCT Blood Glucose.: 140 mg/dL (14 Mar 2019 23:21)  POCT Blood Glucose.: 93 mg/dL (14 Mar 2019 23:03)  POCT Blood Glucose.: 44 mg/dL (14 Mar 2019 22:43)  POCT Blood Glucose.: 122 mg/dL (14 Mar 2019 17:45)  POCT Blood Glucose.: 300 mg/dL (14 Mar 2019 12:03)  POCT Blood Glucose.: 114 mg/dL (14 Mar 2019 08:47)    I&O's Summary    14 Mar 2019 07:01  -  15 Mar 2019 06:41  --------------------------------------------------------  IN: 500 mL / OUT: 2400 mL / NET: -1900 mL                        LABS:    03-14    135  |  98  |  69<H>  ----------------------------<  169<H>  4.5   |  21<L>  |  5.35<H>    Ca    9.0      14 Mar 2019 07:00  Phos  3.4     03-13  Mg     2.1     03-13      PT/INR - ( 14 Mar 2019 07:00 )   PT: 18.6 SEC;   INR: 1.65 Patient is a 54y old  Male who presents with a chief complaint of Hypoglycemia (14 Mar 2019 18:38)      SUBJECTIVE / OVERNIGHT EVENTS:  Patient had further hypoglyemic episodes resolved with oral intake.  Feels well.    MEDICATIONS  (STANDING):  atorvastatin 10 milliGRAM(s) Oral at bedtime  chlorhexidine 4% Liquid 1 Application(s) Topical <User Schedule>  dextrose 5%. 1000 milliLiter(s) (50 mL/Hr) IV Continuous <Continuous>  dextrose 50% Injectable 12.5 Gram(s) IV Push once  dextrose 50% Injectable 25 Gram(s) IV Push once  dextrose 50% Injectable 25 Gram(s) IV Push once  escitalopram 5 milliGRAM(s) Oral <User Schedule>  furosemide    Tablet 80 milliGRAM(s) Oral <User Schedule>  hydrALAZINE 125 milliGRAM(s) Oral three times a day  insulin glargine Injectable (LANTUS) 23 Unit(s) SubCutaneous every morning  insulin lispro (HumaLOG) corrective regimen sliding scale   SubCutaneous three times a day before meals  insulin lispro (HumaLOG) corrective regimen sliding scale   SubCutaneous at bedtime  insulin lispro Injectable (HumaLOG) 10 Unit(s) SubCutaneous before breakfast  insulin lispro Injectable (HumaLOG) 8 Unit(s) SubCutaneous before lunch  insulin lispro Injectable (HumaLOG) 8 Unit(s) SubCutaneous before dinner  metoprolol tartrate 50 milliGRAM(s) Oral three times a day    MEDICATIONS  (PRN):  dextrose 40% Gel 15 Gram(s) Oral once PRN Blood Glucose LESS THAN 70 milliGRAM(s)/deciliter  glucagon  Injectable 1 milliGRAM(s) IntraMuscular once PRN Glucose LESS THAN 70 milligrams/deciliter      Vital Signs Last 24 Hrs  T(C): 36.6 (15 Mar 2019 05:19), Max: 36.9 (14 Mar 2019 10:50)  T(F): 97.9 (15 Mar 2019 05:19), Max: 98.4 (14 Mar 2019 10:50)  HR: 67 (15 Mar 2019 05:19) (67 - 78)  BP: 167/82 (15 Mar 2019 05:19) (117/57 - 188/75)  BP(mean): --  RR: 16 (15 Mar 2019 05:19) (16 - 18)  SpO2: 100% (15 Mar 2019 05:19) (97% - 100%)  CAPILLARY BLOOD GLUCOSE      POCT Blood Glucose.: 140 mg/dL (14 Mar 2019 23:21)  POCT Blood Glucose.: 93 mg/dL (14 Mar 2019 23:03)  POCT Blood Glucose.: 44 mg/dL (14 Mar 2019 22:43)  POCT Blood Glucose.: 122 mg/dL (14 Mar 2019 17:45)  POCT Blood Glucose.: 300 mg/dL (14 Mar 2019 12:03)  POCT Blood Glucose.: 114 mg/dL (14 Mar 2019 08:47)    I&O's Summary    14 Mar 2019 07:01  -  15 Mar 2019 06:41  --------------------------------------------------------  IN: 500 mL / OUT: 2400 mL / NET: -1900 mL                        LABS:    03-14    135  |  98  |  69<H>  ----------------------------<  169<H>  4.5   |  21<L>  |  5.35<H>    Ca    9.0      14 Mar 2019 07:00  Phos  3.4     03-13  Mg     2.1     03-13      PT/INR - ( 14 Mar 2019 07:00 )   PT: 18.6 SEC;   INR: 1.65

## 2019-03-15 NOTE — PROVIDER CONTACT NOTE (OTHER) - REASON
PT /84
Patient /75
Patient /79
Patient /84
Patient blood pressure: 167/82
Patient blood sugar low, patient vomited after he ate, when I checked 3rd finger stick
Patient has been having tremors since last night, VS stable.
Patient stated:" I am dizzy, can you take my sugar level." " I'm  usually at this state when I am hypoglycemic."
Patient was hypoglycemic from 44, 93,140
Pt 907 A Glucose greater than 300
blood glucose 378
blood glucose 407
blood glucose 415
blood pressure 182/75
blood pressure 183/76
elevated blood pressure
patient blood pressure high
patient blood pressure high
patient hypertensive
patient hypertensive
blood glucose 257

## 2019-03-15 NOTE — PROVIDER CONTACT NOTE (OTHER) - ACTION/TREATMENT ORDERED:
MD notified, states to hold insulin and reschedule lantus for 8 AM
Blood pressure medications given as per MD orders. Dr. Sheridan notified and aware. Will continue to monitor patient.
Give evening BP medication. Continue to monitor. MD Aware
MD notified, follow hypoglycemic protocol, FS will be checked every hour
MD notified, states to administer IVP hydralazine, will do as prescribed and continue to monitor.
MD notified, states to administer IVP hydralazine, will do as prescribed and continue to monitor.
MD notified, states to administer Lantus and insulin. will continue to monitor.
MD notified, states to administer insulin as ordered. Will continue to monitor.
MD notified, states to continue to monitor.
MD notified, states to continue to monitor.
MD notified, states to recheck blood sugar and continue to monitor
MD notified. Agreed that due to patient having pre meal insulin and sliding scale regiment, it would be best to continue to monitor pt.
MD notified. Give morning BP medication and reassess.
MD notified. Night time hydralazine given. Continue to monitor.
MD notified. Reassess when patient returns from dialysis. Give missed dose of BP medication if needed. Continue to monitor.
Milk given as per patient request. Patient currently fingerstick at 140. Dr. Sheridan notified and aware. Will continue to monitor patient.
One time blood fingerstick order done as per MD orders. Dr. Sheridan notified and aware. Will continue to monitor patient.
md notified, md stated give blood pressure meds
md notified, md stated give blood pressure meds
provider was notified no additional interventions at this time will continue to monitor.
provider was notified, administer hydralazine due at this time and continue to monitor.
provider was notified, administer metoprolol and hydralazine as scheduled and continue to monitor.
provider was notified, administer metoprolol at this time and continue to monitor.
MD Hart made aware. Will be coming to assess patient at bedside; will continue to monitor patient.

## 2019-03-15 NOTE — PROVIDER CONTACT NOTE (OTHER) - SITUATION
patient blood sugar low, FS 54, rechecked FS 60, rechecked FS 88 , rechecked again . Patient also vomited after lunch,

## 2019-03-15 NOTE — PROVIDER CONTACT NOTE (OTHER) - NAME OF MD/NP/PA/DO NOTIFIED:
Dr. HAM
Dr. HAM
Dr. HUERTA
Dr. Hart
Dr. Lund
Dr. Sheridan
MD Annel Hart
MD Annel Hart
MD Hart [facility]
MD Salinas
MD Sheridan
marline

## 2019-03-15 NOTE — PROVIDER CONTACT NOTE (OTHER) - SITUATION
Patient stated:" I am dizzy, can you take my sugar level." " I'm  usually at this state when I am hypoglycemic."

## 2019-03-15 NOTE — PROGRESS NOTE BEHAVIORAL HEALTH - NSBHCONSULTFOLLOWAFTERCARE_PSY_A_CORE FT
Pt can f/u with PCP to prescribe Lexapro.   Alternatively, pt can go to [facility] [phone]

## 2019-03-15 NOTE — PROGRESS NOTE ADULT - NSICDXPROBLEM_GEN_ALL_CORE_FT
PROBLEM DIAGNOSES  Problem: Diabetes mellitus type I  Assessment and Plan: Long time diabetic with otherwise very good med compliance  Found at home down on floor by sister with FSG 22 resolved with glucagon  On lantus + premeal and sliding scale now  Appreciate endocrinology recs    Problem: At risk for hypoglycemia  Assessment and Plan: Found to FS to 22 by EMS  Pt reports only eating grapefruit morning of admission and took insulin as per usual  FS improved after glucagon + sandwich by EMS  On admission, hyperglycemic to 400s improved on insulin   Monitor FS carefully and ensure no gap betweel meal intake and insulin administration  Does not endorse suicidal ideation but joan consulted for anhedonia    Problem: Hepatitis C infection  Assessment and Plan: Patient has been told prior to hospital visit that he is Hep C +  Will have pt follow up with liver clinic after d/c    Problem: Renal transplant, status post  Assessment and Plan: Failed renal transplant 2 years ago now on HD Sat, Tues, Thurs  Appreciate nephrology recs    Problem: Chronic anticoagulation  Assessment and Plan: Was prescribed coumadin 2 years ago for Afib noted during 2017 hospitalization when pt had PEA arrest   C/w Coumadin with INR goal 2-3      Problem: Acute metabolic encephalopathy  Assessment and Plan: Resolved    Problem: ESRD on dialysis  Assessment and Plan: On HD s/p failed renal transplant    Problem: Prophylactic measure  Assessment and Plan: On Coumadin for chronic Afib  Dispo pending: Normalization of blood sugars, psych and PT recs, outpt HD setup

## 2019-03-18 ENCOUNTER — INBOUND DOCUMENT (OUTPATIENT)
Age: 54
End: 2019-03-18

## 2019-03-19 LAB
INR BLD: 3.68 — HIGH (ref 0.88–1.17)
PROTHROM AB SERPL-ACNC: 43.7 SEC — HIGH (ref 9.8–13.1)

## 2019-03-26 ENCOUNTER — RX RENEWAL (OUTPATIENT)
Age: 54
End: 2019-03-26

## 2019-03-26 LAB
INR BLD: 2.36 — HIGH (ref 0.88–1.17)
PROTHROM AB SERPL-ACNC: 27.6 SEC — HIGH (ref 9.8–13.1)

## 2019-03-26 RX ORDER — AMLODIPINE BESYLATE 10 MG/1
10 TABLET ORAL DAILY
Qty: 30 | Refills: 3 | Status: ACTIVE | COMMUNITY
Start: 2019-03-26 | End: 1900-01-01

## 2019-03-28 ENCOUNTER — RX RENEWAL (OUTPATIENT)
Age: 54
End: 2019-03-28

## 2019-03-28 RX ORDER — WARFARIN 5 MG/1
5 TABLET ORAL DAILY
Qty: 30 | Refills: 1 | Status: ACTIVE | COMMUNITY
Start: 2017-11-15 | End: 1900-01-01

## 2019-03-28 RX ORDER — WARFARIN 2 MG/1
2 TABLET ORAL DAILY
Qty: 30 | Refills: 3 | Status: ACTIVE | COMMUNITY
Start: 2018-11-07 | End: 1900-01-01

## 2019-04-01 ENCOUNTER — APPOINTMENT (OUTPATIENT)
Dept: HEPATOLOGY | Facility: CLINIC | Age: 54
End: 2019-04-01

## 2019-04-02 LAB
INR BLD: 2.85 — HIGH (ref 0.88–1.17)
PROTHROM AB SERPL-ACNC: 32.7 SEC — HIGH (ref 9.8–13.1)

## 2019-04-09 LAB
INR BLD: 4.25 — HIGH (ref 0.88–1.17)
PROTHROM AB SERPL-ACNC: 49.3 SEC — HIGH (ref 9.8–13.1)

## 2019-04-15 NOTE — DISCHARGE NOTE ADULT - PROVIDER TOKENS
3 FREE:[LAST:[Dr. Baker],PHONE:[phone]],FAX:[(   )    -],ADDRESS:[Endocrinology]],TOKEN:'[id number]'

## 2019-04-16 LAB
INR BLD: 2.42 — HIGH (ref 0.88–1.17)
PROTHROM AB SERPL-ACNC: 27.6 SEC — HIGH (ref 9.8–13.1)

## 2019-04-22 ENCOUNTER — RX RENEWAL (OUTPATIENT)
Age: 54
End: 2019-04-22

## 2019-04-23 LAB
INR BLD: 2.15 — HIGH (ref 0.88–1.17)
PROTHROM AB SERPL-ACNC: 24.4 SEC — HIGH (ref 9.8–13.1)

## 2019-04-30 LAB
INR BLD: 2.31 — HIGH (ref 0.88–1.17)
PROTHROM AB SERPL-ACNC: 26.3 SEC — HIGH (ref 9.8–13.1)

## 2019-05-09 LAB
INR BLD: 1.46 — HIGH (ref 0.88–1.17)
PROTHROM AB SERPL-ACNC: 16.4 SEC — HIGH (ref 9.8–13.1)

## 2019-05-09 NOTE — PROGRESS NOTE ADULT - PROBLEM SELECTOR PLAN 7
ANTICOAGULATION FOLLOW-UP CLINIC VISIT    Patient Name:  Ignacio Cooper  Date:  5/9/2019  Contact Type:  Telephone/ Spoke to pt    SUBJECTIVE:  Patient Findings     Comments:   No changes in medications, activity, or diet noted. No bleeding or increased bruising noted. Took warfarin as prescribed.  Pt continues to take amoxicillin. She did not have strep. Pt will continue and recheck INR next Friday. Pt prefers [facility] clinic and to have a finger poke this time.  Pt advised to come in sooner if she is not feeling well, if her Sx are not improving, or if she has any concerns.  Pt will have a salad today with mixed greens to help lower INR. Pt's dose was already decreased on Tuesday.  Patient educated on the increased risk for bleeding, precautions to take, and when to seek medical attention.  Patient verbalizes understanding and agrees to plan. No further questions or concerns.        Clinical Outcomes     Negatives:   Major bleeding event, Thromboembolic event, Anticoagulation-related hospital admission, Anticoagulation-related ED visit, Anticoagulation-related fatality    Comments:   No changes in medications, activity, or diet noted. No bleeding or increased bruising noted. Took warfarin as prescribed.  Pt continues to take amoxicillin. She did not have strep. Pt will continue and recheck INR next Friday. Pt prefers [facility] clinic and to have a finger poke this time.  Pt advised to come in sooner if she is not feeling well, if her Sx are not improving, or if she has any concerns.  Pt will have a salad today with mixed greens to help lower INR. Pt's dose was already decreased on Tuesday.  Patient educated on the increased risk for bleeding, precautions to take, and when to seek medical attention.  Patient verbalizes understanding and agrees to plan. No further questions or concerns.           OBJECTIVE    INR   Date Value Ref Range Status   05/09/2019 3.33 (H) 0.86 - 1.14 Final       ASSESSMENT / PLAN  INR assessment SUPRA     Recheck INR In: 1 WEEK    INR Location Outside lab      Anticoagulation Summary  As of 5/9/2019    INR goal:   2.0-3.0   TTR:   51.3 % (5.9 mo)   INR used for dosing:   3.33! (5/9/2019)   Warfarin maintenance plan:   5 mg (5 mg x 1), then 5 mg (5 mg x 1), then 7.5 mg (5 mg x 1.5) repeating every 3 days   Full warfarin instructions:   5 mg, then 5 mg, then 7.5 mg repeating every 3 days   Average weekly warfarin total:   40.833 mg   No change documented:   Martha Schilling RN   Plan last modified:   Sadie Olivas RN (1/24/2019)   Next INR check:   5/17/2019   Priority:   INR   Target end date:   Indefinite    Indications    Lupus anticoagulant disorder (H) [D68.62]  Factor V Leiden (H) [D68.51]  Recurrent deep vein thrombosis (DVT) (H) [I82.409]             Anticoagulation Episode Summary     INR check location:       Preferred lab:       Send INR reminders to:   [facility]    Comments:   *Patient switched to [facility] recently but has been taking warfarin for 19 years per patient. Recently moved to area.      Anticoagulation Care Providers     Provider Role Specialty Phone number    Fidelia Forbes MD [facility] [phone]            See the Encounter Report to view Anticoagulation Flowsheet and Dosing Calendar (Go to Encounters tab in chart review, and find the Anticoagulation Therapy Visit)        Martha Schilling RN                  -Hgb stable today   -c/w Epogen w/ HD   -transfuse if Hgb <7.0

## 2019-05-10 ENCOUNTER — RX RENEWAL (OUTPATIENT)
Age: 54
End: 2019-05-10

## 2019-05-10 DIAGNOSIS — F32.9 MAJOR DEPRESSIVE DISORDER, SINGLE EPISODE, UNSPECIFIED: ICD-10-CM

## 2019-05-10 RX ORDER — ESCITALOPRAM OXALATE 5 MG/1
5 TABLET ORAL
Qty: 30 | Refills: 0 | Status: ACTIVE | COMMUNITY
Start: 2019-05-10 | End: 1900-01-01

## 2019-05-14 ENCOUNTER — APPOINTMENT (OUTPATIENT)
Dept: VASCULAR SURGERY | Facility: CLINIC | Age: 54
End: 2019-05-14

## 2019-05-14 LAB
INR BLD: 1.37 — HIGH (ref 0.88–1.17)
PROTHROM AB SERPL-ACNC: 15.3 SEC — HIGH (ref 9.8–13.1)

## 2019-05-21 LAB
INR BLD: 1.32 — HIGH (ref 0.88–1.17)
PROTHROM AB SERPL-ACNC: 15.2 SEC — HIGH (ref 9.8–13.1)

## 2019-05-28 LAB
INR BLD: 2.25 — HIGH (ref 0.88–1.17)
PROTHROM AB SERPL-ACNC: 26.3 SEC — HIGH (ref 9.8–13.1)

## 2019-06-04 ENCOUNTER — APPOINTMENT (OUTPATIENT)
Dept: VASCULAR SURGERY | Facility: CLINIC | Age: 54
End: 2019-06-04
Payer: MEDICARE

## 2019-06-04 LAB
INR BLD: 3.63 — HIGH (ref 0.88–1.17)
PROTHROM AB SERPL-ACNC: 41.9 SEC — HIGH (ref 9.8–13.1)

## 2019-06-04 PROCEDURE — 99213 OFFICE O/P EST LOW 20 MIN: CPT

## 2019-06-04 PROCEDURE — 93990 DOPPLER FLOW TESTING: CPT

## 2019-06-04 NOTE — PHYSICAL EXAM
[Thrill] : thrill [Ulcer] : no ulcer [Bleeding] : no bleeding [Hand well perfused] : hand well perfused [Gangrene] : no gangrene [2+] : left 2+ [Normal] : coordination grossly intact, no focal deficits [de-identified] : intact

## 2019-06-04 NOTE — ASSESSMENT
[FreeTextEntry1] : 54 yo male with history of afib, dm, esrd on hd presents for follow up of left upper extremity avf.  pt without any complaints.  duplex shows >75%  stenosis of the proximal upper extremity with vfr of 1800.\par no need for intervention

## 2019-06-04 NOTE — HISTORY OF PRESENT ILLNESS
[FreeTextEntry1] : 55 yo male with a history of esrd on hd presents for follow up of left upper extremity brachial cephalic avf.  pt without any complaints.  pt denies any difficulty with avf during hd.   [] : left brachiocephalic fistula

## 2019-06-11 LAB
INR BLD: 2.61 — HIGH (ref 0.88–1.17)
PROTHROM AB SERPL-ACNC: 29.8 SEC — HIGH (ref 9.8–13.1)

## 2019-06-18 LAB
INR BLD: 3.51 — HIGH (ref 0.88–1.17)
PROTHROM AB SERPL-ACNC: 41.6 SEC — HIGH (ref 9.8–13.1)

## 2019-06-20 ENCOUNTER — RX RENEWAL (OUTPATIENT)
Age: 54
End: 2019-06-20

## 2019-06-20 RX ORDER — WARFARIN 5 MG/1
5 TABLET ORAL DAILY
Qty: 30 | Refills: 3 | Status: ACTIVE | COMMUNITY
Start: 2018-11-07 | End: 1900-01-01

## 2019-06-24 ENCOUNTER — RX RENEWAL (OUTPATIENT)
Age: 54
End: 2019-06-24

## 2019-06-24 RX ORDER — HYDRALAZINE HYDROCHLORIDE 100 MG/1
100 TABLET ORAL
Qty: 90 | Refills: 0 | Status: ACTIVE | COMMUNITY
Start: 2018-07-03 | End: 1900-01-01

## 2019-06-25 LAB
INR BLD: 1.88 — HIGH (ref 0.88–1.17)
PROTHROM AB SERPL-ACNC: 21.3 SEC — HIGH (ref 9.8–13.1)

## 2020-11-28 NOTE — PROGRESS NOTE ADULT - PROBLEM SELECTOR PLAN 10
[facility]  11/28/2020    Length of Stay  6 days    Subjective    Patient had worsening respiratory distress overnight and wasn't protecting airway, required intubation and transfer to ICU      Objective  Blood pressure 92/55, pulse (!) 55, temperature 95.2 Â°F (35.1 Â°C), temperature source Axillary, resp. rate 16, height 6' (1.829 m), weight 99.8 kg (220 lb 0.3 oz), SpO2 99 %. Body mass index is 29.84 kg/mÂ². Intake/Output Summary (Last 24 hours) at 11/28/2020 1326  Last data filed at 11/28/2020 1259  Gross per 24 hour   Intake 132.91 ml   Output 1500 ml   Net -1367.09 ml       Gen: no apparent distress  CV: NSR, normal s1/s2  Resp: no respiratory distress, CTAB  Ab: non tender, non distended  Skin: warm ,dry, no edema  Neuro: somnolent, no focal deficits, doesn't follow commands - now intubated and sedated    Labs   Recent Labs   Lab 11/28/20  0545   WBC 8.8   RBC 3.60*   HGB 10.7*   HCT 31.8*   MCV 88.3   PLT 92*   ANEUT 7.0   ALYMS 0.6*   YRN 1.2*   AEOS 0.0*   ABASO 0.0     Recent Labs   Lab 11/28/20  0545   GLUCOSE 212*   SODIUM 139   POTASSIUM 4.4   CHLORIDE 105   CO2 25   BUN 13   CREATININE 1.11   CALCIUM 10.1   MG 1.6*   TOTPROTEIN 6.9   ALBUMIN 2.7*   AST 17   ALKPT 107   GPT 21   BILIRUBIN 0.5       Imaging  TRANSTHORACIC ECHO (TTE) COMPLETE W/ W/O IMAGING AGENT  --------------------------------------------------------------------------  STUDY CONCLUSIONS  SUMMARY:    1. Left ventricle: The cavity size is mildly dilated. Wall thickness is     moderately increased. There is concentric hypertrophy. The ejection     fraction was measured by 3D assessment. Wall motion is normal; there     are no regional wall motion abnormalities. Doppler parameters are     consistent with abnormal left ventricular relaxation (grade 1 diastolic     dysfunction). The ejection fraction is 57%. 2. Left atrium: The atrium is mildly dilated. 3. Tricuspid valve: Moderate regurgitation.         Current Facility-Administered Medications   Medication   â¢ hydrALAZINE (APRESOLINE) injection 10 mg   â¢ ETOMIDATE 2 MG/ML IV SOLN Pyxis Override   â¢ SUCCINYLCHOLINE CHLORIDE 20 MG/ML IJ SOLN (WRAPPED) Pyxis Override   â¢ propofol (DIPRIVAN) infusion   â¢ fentaNYL (SUBLIMAZE) 2,500 mcg/250 mL in sodium chloride 0.9 % infusion   â¢ pantoprazole (PROTONIX INJECT) injection 40 mg   â¢ NORepinephrine (LEVOPHED) 8 mg/250 mL in sodium chloride 0.9 % infusion   â¢ piperacillin-tazobactam (ZOSYN) 3.375 g in sodium chloride 0.9 % 100 mL IVPB   â¢ piperacillin-tazobactam (ZOSYN) 3.375 g in sodium chloride 0.9 % 100 mL IVPB   â¢ haloperidol (HALDOL) 5 MG/ML injection 1 mg   â¢ QUEtiapine (SEROquel) tablet 25 mg   â¢ dextrose 5 % / sodium chloride 0.45% infusion   â¢ cloNIDine (CATAPRES-TTS 2) 0.2 MG/24HR 1 patch   â¢ LORazepam (ATIVAN) injection 1 mg   â¢ haloperidol (HALDOL) 5 MG/ML injection 2 mg   â¢ aspirin (ECOTRIN) enteric coated tablet 81 mg   â¢ atorvastatin (LIPITOR) tablet 20 mg   â¢ sodium chloride 0.9 % flush bag 25 mL   â¢ sodium chloride (PF) 0.9 % injection 2 mL   â¢ heparin (porcine) injection 5,000 Units   â¢ haloperidol (HALDOL) 5 MG/ML injection 3 mg   â¢ acetaminophen (TYLENOL) tablet 650 mg   â¢ docusate sodium (COLACE) capsule 100 mg   â¢ simethicone (MYLICON) tablet 923 mg   â¢ polyethylene glycol (MIRALAX) packet 17 g   â¢ ondansetron (ZOFRAN) injection 4 mg   â¢ melatonin tablet 3 mg   â¢ dextrose 50 % injection 25 g   â¢ dextrose 50 % injection 12.5 g   â¢ dextrose 5 % infusion   â¢ glucagon (GLUCAGEN) injection 1 mg   â¢ dextrose (GLUTOSE) 40 % gel 15 g   â¢ dextrose (GLUTOSE) 40 % gel 30 g   â¢ insulin lispro (HumaLOG) scheduled dose AND correction dose     Assessment and Plan  Principal Problem:    Acute encephalopathy  Active Problems:    Arteriosclerotic heart disease (ASHD)    Bradycardia    Other hyperlipidemia    Hypertensive heart disease with acute on chronic diastolic congestive heart failure (CMS/HCC)    Stage 3 chronic kidney disease (CMS/[facility])    Type 2 diabetes mellitus with stage 3 chronic kidney disease, with long-term current use of insulin (CMS/[facility])    Pneumonia    Weakness    Dementia with behavioral disturbance (CMS/[facility])    Thrombocytopenia (CMS/[facility])      - obtain MRI brain - was unable to tolerate, repeat CT brain ordered and shows no acute findings; ordered IV sedation for MRI brain if able to re-try  - echo shows LVH  - PT/OT consult - also speech consult for swallow eval  - on clonidine patch since unable to tolerate PO    - CXR shows likely pneumonia, now on IV zosyn; discussed with ID    - required intubation, due to not protecting airway and AMS        Code status:    Code Status: Full Resuscitation    DVT Prophylaxis:  Current Active Medications for DVT Prophylaxis (From admission, onward)         Stop     heparin (porcine) injection 5,000 Units  5,000 Units,   Subcutaneous,   3 times per day      --              PCP:  MD Ulises Warren MD  11/28/2020 DVT ppx: heparin gtt for new onset AF  Diet: consistent carb no snack/ renal diet    Diabetic retinopathy: Legally blind, lives with sister  -keep room bright, can see shapes if bright    BPH: cont home tamsulosin DVT ppx: heparin gtt for new onset AF  Diet: consistent carb no snack/renal diet    Diabetic retinopathy: Legally blind, lives with sister  -keep room bright, can see shapes if bright    BPH: cont home tamsulosin

## 2020-12-16 NOTE — PATIENT PROFILE ADULT. - HEALTH/HEALTHCARE ANXIETIES, PROFILE
ABDOMEN:  Obese, soft, nondistended, nontender. No masses. PELVIC:  External genitalia within normal limits. Vagina, no lesions. Cervix, no lesions. Uterus anteverted, 10 weeks size, mobile,  nontender. Adnexa nontender. No masses. RECTOVAGINAL:  Deferred. EXTREMITIES:  No clubbing, cyanosis, edema. NEURO:  CN II through XII are grossly intact. She is alert and oriented  x4. ASSESSMENT:  The patient with plateauing beta-hCG, suspect ectopic  pregnancy. The patient declining methotrexate therapy. PLAN:  Laparoscopy, possible laparotomy, possible D and C, possible tubal  surgery, other procedures as indicated by operative findings. Risks of  the procedure have been reviewed, including but not limited to  infection, bleeding, injury to bowel, bladder, ureter, major vessels,  rectovaginal or vesicovaginal fistula, coital problems, need for  reoperation, need for methotrexate therapy, need for followup beta-hCG,  and incorrect diagnosis. She has been notified in the  event of excessive hemorrhage, she may require blood transfusion, and  the risks of blood transfusion have been reviewed, including but not  limited to fever, transfusion reaction, anemia, hepatitis, AIDS, or  other infections. All her questions have been answered and she wishes  to proceed with surgery.         Laura Mcguire MD    D: 12/16/2020 8:47:52       T: 12/16/2020 8:54:28     [email or web address]  Job#: [id number]     Doc#: [id number]    CC:
none

## 2020-12-29 NOTE — PROGRESS NOTE ADULT - PROBLEM SELECTOR PROBLEM 3
Anticoagulation Clinic Progress Note    Anticoagulation Summary  As of 12/28/2020    INR goal:  2.5-3.5   TTR:  56.9 % (2.1 y)   INR used for dosing:  3.60 (12/28/2020)   Warfarin maintenance plan:  2.5 mg every Fri; 5 mg all other days   Weekly warfarin total:  32.5 mg   No change documented:  Michael Horner RPH   Plan last modified:  Sary Tejeda RPH (7/6/2020)   Next INR check:  1/4/2021   Priority:  High   Target end date:  Indefinite    Indications    Paroxysmal atrial fibrillation (CMS/[facility]) [I48.0]  Hx of mitral valve replacement with mechanical valve [Z95.2] [Z95.2]             Anticoagulation Episode Summary     INR check location:      Preferred lab:      [facility] IN[facility] reminders [facility]:   [facility]    Comments:  MDINAVILA home juliet weekly *only call when out of range*      Anticoagulation Care Providers     Provider Role Specialty Phone number    Navi Fay MD Referring Cardiology [phone]          Clinic Interview:  Patient Findings     Positives:  Change in health, Change in medications, Other complaints    Negatives:  Signs/symptoms of thrombosis, Signs/symptoms of bleeding,   Laboratory test error suspected, Change in alcohol use, Change in   activity, Upcoming invasive procedure, Emergency department visit,   Upcoming dental procedure, Missed doses, Extra doses, Change in   diet/appetite, Hospital admission, Bruising    Comments:  Reports increased stress. Reports diarrhea in recent weeks,   but greatly improved since being prescribed cholestyramine and taking   probiotics.       Clinical Outcomes     Negatives:  Major bleeding event, Thromboembolic event,   Anticoagulation-related hospital admission, Anticoagulation-related ED   visit, Anticoagulation-related fatality    Comments:  Reports increased stress. Reports diarrhea in recent weeks,   but greatly improved since being prescribed cholestyramine and taking   probiotics.         INR History:  Anticoagulation Monitoring  12/15/2020 12/22/2020 12/28/2020   INR 3.10 3.80 3.60   INR Date 12/14/2020 12/21/2020 12/28/2020   INR Goal 2.5-3.5 2.5-3.5 2.5-3.5   Trend Same Same Same   Last Week Total 32.5 mg 32.5 mg 32.5 mg   Next Week Total 32.5 mg 32.5 mg 32.5 mg   Sun 5 mg 5 mg 5 mg   Mon - - 5 mg   Tue 5 mg 5 mg 5 mg   Wed 5 mg 5 mg 5 mg   Thu 5 mg 5 mg 5 mg   Fri 2.5 mg 2.5 mg 2.5 mg   Sat 5 mg 5 mg 5 mg   Visit Report - - -   Some recent data might be hidden       Plan:  1. INR is Supratherapeutic today- see above in Anticoagulation Summary.   Will instruct Elliot Sebastian to Continue their warfarin regimen- see above in Anticoagulation Summary.  2. Follow up in 1 week  3. Reviewed recommendations for  administration of cholestyramine from other medications (take other PO meds at least 1 hr before or 4-6 hours after cholestyramine). They have been instructed to call if any changes in medications, doses, concerns, etc. Patient expresses understanding and has no further questions at this time.    Michael Horner [facility]   Secondary hyperparathyroidism of renal origin

## 2021-01-01 NOTE — PROGRESS NOTE ADULT - ASSESSMENT
51yo M with hx of CKD s/p renal transplant presented to the ER with confusion worsening, bradycardia in the ER to 30s, coded and was intubated - concern for septic shock, tx with abx. HD needed given high Cr/K.  Currently doing well of extubation, still with hyperkalemia.    #neuro: off sedation  #CV: dced levophed  #Resp: will extubate today  #GI: tolerating PO  #Endo: lantus 22U bedtime and ISS  #Heme: s/p 1 unit prbcs, will monitor CBC  #Renal: Bhanu placed - HD given high Cr, high K.  Patient undergoing dialysis today again at some point.  Epogen ordered per renal  Aguirre dced, condom cath ordered  #ID: hypotensive episode, LOC, hypothermia - covering with broad spectrum abx with vanc and zosyn, lowered vanc from 1g to 500mg 9/4/17 given trough  #DVTppx: will put on HSQ DR. Vazquez

## 2021-01-07 NOTE — PROGRESS NOTE ADULT - PROBLEM SELECTOR PLAN 5
no Home regimen clonidine 0.1mg po q8h, nifedipine XR 90 mg daily  -nifedipine XR 90 mg daily, hydralazine 50 TID  -lasix 60mg IV BID Home regimen clonidine 0.1mg po q8h, nifedipine XR 90 mg qD, torsemide 10mg qD. Makes urine still  -on cardizem drip, bc normotensive  -stopped nifedipine, hydralazine, lasix

## 2021-04-11 NOTE — PROGRESS NOTE ADULT - SUBJECTIVE AND OBJECTIVE BOX
[facility] DIVISION OF KIDNEY DISEASES AND HYPERTENSION -- [phone]   FOLLOW UP NOTE  --------------------------------------------------------------------------------  Chief Complaint: Altered mental status    HPI  53 y/o M with h/o CKD s/p renal transplant in 1998, HTN, DM, legally blind admitted for  uremic encephalopathy initiated on dialysis.  Pt seen and examined at bedside. Pt states he feels better, denies sob, dizziness, headache.       PAST HISTORY  --------------------------------------------------------------------------------  No significant changes to PMH, PSH, FHx, SHx, unless otherwise noted    ALLERGIES & MEDICATIONS  --------------------------------------------------------------------------------  Allergies    No Known Allergies    Intolerances      Standing Inpatient Medications  heparin  Injectable 5000 Unit(s) SubCutaneous every 8 hours  piperacillin/tazobactam IVPB. 3.375 Gram(s) IV Intermittent every 12 hours  chlorhexidine 4% Liquid 1 Application(s) Topical daily  calcium acetate 667 milliGRAM(s) Oral three times a day with meals  insulin lispro (HumaLOG) corrective regimen sliding scale   SubCutaneous three times a day before meals  insulin lispro (HumaLOG) corrective regimen sliding scale   SubCutaneous at bedtime  epoetin valente Injectable 4000 Unit(s) IV Push <User Schedule>  insulin glargine Injectable (LANTUS) 10 Unit(s) SubCutaneous at bedtime  ALBUTerol/ipratropium for Nebulization 3 milliLiter(s) Nebulizer every 6 hours    PRN Inpatient Medications      REVIEW OF SYSTEMS  --------------------------------------------------------------------------------  General: no fever  CVS: no chest pain  RESP: no sob, no cough  ABD: no abdominal pain, no diarrhea  NEURO: no numbness    VITALS/PHYSICAL EXAM  --------------------------------------------------------------------------------  T(C): 36.1 (09-05-17 @ 04:00), Max: 36.6 (09-04-17 @ 12:50)  HR: 69 (09-05-17 @ 06:00) (67 - 77)  BP: 179/94 (09-05-17 @ 06:00) (116/57 - 179/94)  RR: 18 (09-05-17 @ 06:00) (13 - 30)  SpO2: 97% (09-05-17 @ 06:00) (91% - 98%)  Wt(kg): --  Height (cm): 182.88 (09-03-17 @ 16:30)  Weight (kg): 88 (09-03-17 @ 16:30)  BMI (kg/m2): 26.3 (09-03-17 @ 16:30)  BSA (m2): 2.1 (09-03-17 @ 16:30)      09-04-17 @ 07:01  -  09-05-17 @ 07:00  --------------------------------------------------------  IN: 521.2 mL / OUT: 945 mL / NET: -423.8 mL      Physical Exam:  	Gen: NAD on room air   	HEENT: MMM  	Pulm: Coarse breath sounds B/L  	CV: S1S2  	Abd: Soft, +BS  	Ext:  LE edema B/L present                       Neuro: Awake   	Skin: Warm and Dry   	Vascular access: Right IJ HD catheter     LABS/STUDIES  --------------------------------------------------------------------------------              7.8    6.33  >-----------<  210      [09-05-17 @ 02:30]              22.5     137  |  93  |  81  ----------------------------<  33      [09-05-17 @ 02:30]  3.8   |  24  |  6.57        Ca     8.3     [09-05-17 @ 02:30]      Mg     2.2     [09-05-17 @ 02:30]      Phos  6.5     [09-05-17 @ 02:30]    TPro  5.3  /  Alb  2.6  /  TBili  0.6  /  DBili  x   /  AST  18  /  ALT  15  /  AlkPhos  72  [09-05-17 @ 02:30]          Creatinine Trend:  SCr 6.57 [09-05 @ 02:30]  SCr 8.26 [09-04 @ 04:22]  SCr 8.03 [09-04 @ 00:51]  SCr 10.90 [09-03 @ 13:45]  SCr 4.17 [08-16 @ 06:30]    Urinalysis - [08-10-17 @ 04:50]      Color PLYEL / Appearance CLEAR / SG 1.009 / pH 6.0      Gluc 50 / Ketone NEGATIVE  / Bili NEGATIVE / Urobili NORMAL       Blood TRACE / Protein 300 / Leuk Est NEGATIVE / Nitrite NEGATIVE      RBC 0-2 / WBC 0-2 / Hyaline  / Gran  / Sq Epi OCC / Non Sq Epi  / Bacteria FEW      HbA1c 8.0      [08-16-17 @ 06:30]  TSH 2.88      [09-03-17 @ 13:45]  Lipid: chol 137, TG 97, HDL 52, LDL 68      [08-11-17 @ 07:20]    HBsAb <3.0      [09-03-17 @ 18:00]  HBsAg NEGATIVE      [09-03-17 @ 18:00]  HCV 14.48, Reactive      [09-03-17 @ 18:00] COVID Pneumonia.

## 2021-10-14 NOTE — ED PROVIDER NOTE - CRITICAL CARE PROVIDED
Consent: The risks of atrophy were reviewed with the patient.
Detail Level: Simple
Total Volume Injected (Ccs- Only Use Numbers And Decimals): 0.4
Include Z78.9 (Other Specified Conditions Influencing Health Status) As An Associated Diagnosis?: No
Medical Necessity Clause: This procedure was medically necessary because the lesions that were treated were:
Kenalog Preparation: Kenalog
X Size Of Lesion In Cm (Optional): 0
Concentration Of Solution Injected (Mg/Ml): 40.0
documentation/consult w/ pt's family directly relating to pts condition/conducted a detailed discussion of DNR status/additional history taking/direct patient care (not related to procedure)/interpretation of diagnostic studies/consultation with other physicians

## 2023-02-15 NOTE — REVIEW OF SYSTEMS
PCP: Outside Provider  Medication: escitalopram (LEXAPRO) 20 MG tablet  Last Visit: 10/5/2021  Next Visit: N/A     Patient not longer seeing Dr Gibbs. Refill request denied.   
[Negative] : Integumentary

## 2023-11-07 NOTE — CONSULT NOTE ADULT - SUBJECTIVE AND OBJECTIVE BOX
HPI:    52 y/o Male w/ a pmh significant for HTN, DM1 (A1C 7.7 Oct 2018), HLD, CKD s/p renal transplant on HD (T,Th,S), legally blind, BIBEMS after being found down at home by his sister. Uopn EMS arrival, FSG 22 for which pt given Glucagon and advised to eat a sandwich w/ repeat . His mental status improved however later on pt became more lethargic en route to the ED. He reportedly takes Lantus 25u QHS and Novolog w/ meals w/ excellent adherence. No reported fevers, chills, sick contacts, HA, dizziness, CP, palpitations, SOB, abdominal pain, dysuria or diarrhea        In the ED, pt afebrile, -227/67-77, HR 69-75, RR 20 (97% RA). Labs notable for INR 2.4, BUN 71, Cr 5.4. CTH/Cervical spine negative. Pt given Labetalol 10mg IVP x1 and admitted to medicine for further management (11 Mar 2019 23:50)    Endocrine History:  Patient with Type 1 DM for past 45 years. Used to follow with endocrinologist Dr. Dee. Patient currently on lantus 23 units on HD day T-Thr-Sat and 25 units on non-HD days O-Veg-Tqp-Sun. Reports also takes novolog based on carb counting with ICR 1:20, with BGT on 130 and ISF 30. Was last seen by endo team inpatient in Oct 2018 when was hospitalized for cardiac arrest. At that time was on lantus 24 units on HD days, 28 units on non-HD days and humalog 13-9-8, as well as 3 units at bedtime. Patient reports on day of admission, patient took his AM lantus 25 units, as was non-HD day. Had a grapefruit for breakfast, but did not take novolog for the meal. Reports was busy around the house and did not realize that was hypoglycemic. When checked was 42. Ate peanut buter and jelly sandwich, apple juice and milk. FS improved to 140s, but still did not feel well and reports sister called EMS. Does not recall any loss of consciousness. Checks his FS 4X/day. Reports on non-HD days often is hypoglycemic to 50s,60s in AM. On HD days in -120s. Pre-lunch120s-130s, pre-dinner 150s. Reports on HD days in the evening eats cake, apple pie to make sure he does not become hypoglycemic overnight. Has retinopathy and is legally blind. Also with neuropathy. Follows with Dr. Dee but reports has been difficult to travel to his office, so is supposed to have an appt with [facility] (near his home in [facility], does not remember name).        PAST MEDICAL & SURGICAL HISTORY:  Hypertension  Legally blind  Diabetes mellitus type I  Renal transplant, status post  CKD (chronic kidney disease)  Renal transplant, status post  History of tonsillectomy      FAMILY HISTORY:  No family hx of DM      Social History: No smoking, or alcohol use    Outpatient Medications:  · 	atorvastatin 10 mg oral tablet: 1 tab(s) orally once a day (at bedtime)   · 	cycloSPORINE 100 mg oral capsule: 1 cap(s) orally once a day  · 	Lancets: Test blood sugar #4 times daily  	Disp  #2 boxes  	  · 	glucose test strips: Test blood sugar #4 times daily  	Disp 3  boxes  	  · 	Alcohol Prep Pads: Use as directed  	Disp #2 boxes  	  	  · 	BD Jaclyn Pens: BD Jaclyn pen needles, 4mm  	Use as directed #4 times daily  	Disp #2 boxes  	  · 	NovoLOG FlexPen 100 units/mL subcutaneous solution: 10 unit(s) subcutaneous 3 times a day (before meals)   · 	Lantus Solostar Pen 100 units/mL subcutaneous solution: 28 unit(s) subcutaneous once a day . Please take at 9 am.   · 	Coumadin 1 mg oral tablet: 1 tab(s) orally once a day   · 	Coumadin 7.5 mg oral tablet: 1 tab(s) orally once a day   · 	hydrALAZINE 50 mg oral tablet: 1 tab(s) orally 3 times a day  · 	metoprolol tartrate 50 mg oral tablet: 1 tab(s) orally 2 times a day  · 	calcitriol 0.5 mcg oral capsule: 1 cap(s) orally once a day  · 	tamsulosin 0.4 mg oral capsule: 1 cap(s) orally once a day (at bedtime)  · 	predniSONE 5 mg oral tablet: 1 tab(s) orally once a day  · 	Nasacort 55 mcg/inh nasal aerosol:  nasal prn  · 	calcium acetate 667 mg oral capsule: 2 cap(s) orally 3 times a day (with meals)      MEDICATIONS  (STANDING):  dextrose 5%. 1000 milliLiter(s) (50 mL/Hr) IV Continuous <Continuous>  dextrose 50% Injectable 12.5 Gram(s) IV Push once  dextrose 50% Injectable 25 Gram(s) IV Push once  dextrose 50% Injectable 25 Gram(s) IV Push once  hydrALAZINE 125 milliGRAM(s) Oral three times a day  insulin lispro (HumaLOG) corrective regimen sliding scale   SubCutaneous three times a day before meals  insulin lispro (HumaLOG) corrective regimen sliding scale   SubCutaneous at bedtime  metoprolol tartrate 50 milliGRAM(s) Oral three times a day  warfarin 7 milliGRAM(s) Oral once    MEDICATIONS  (PRN):  dextrose 40% Gel 15 Gram(s) Oral once PRN Blood Glucose LESS THAN 70 milliGRAM(s)/deciliter  glucagon  Injectable 1 milliGRAM(s) IntraMuscular once PRN Glucose LESS THAN 70 milligrams/deciliter      Allergies    No Known Allergies    Intolerances      Review of Systems:  Constitutional: No fever  Eyes: No blurry vision  Neuro: No tremors  HEENT: No pain  Cardiovascular: No chest pain, palpitations  Respiratory: No SOB, no cough  GI: No nausea, vomiting, abdominal pain  : No dysuria  Skin: no rash  Endocrine: no polyuria, polydipsia  Hem/lymph: no swelling  Osteoporosis: no fractures    ALL OTHER SYSTEMS REVIEWED AND NEGATIVE    PHYSICAL EXAM:  VITALS: T(C): 36.6 (03-12-19 @ 15:10)  T(F): 97.8 (03-12-19 @ 15:10), Max: 98.6 (03-12-19 @ 11:09)  HR: 74 (03-12-19 @ 15:10) (69 - 78)  BP: 175/76 (03-12-19 @ 15:10) (175/76 - 227/77)  RR:  (16 - 20)  SpO2:  (95% - 100%)  Wt(kg): --  GENERAL: NAD, well-groomed, well-developed  EYES: blind  HEENT:  Atraumatic, Normocephalic, moist mucous membranes  THYROID: Normal size, no palpable nodules  RESPIRATORY: Clear to auscultation bilaterally; No rales, rhonchi, wheezing  CARDIOVASCULAR: Regular rate and rhythm; No murmurs; no peripheral edema  GI: Soft, nontender, non distended, normal bowel sounds  SKIN: Dry, intact, No rashes or lesions  MUSCULOSKELETAL: Full range of motion, normal strength  NEURO: sensation intact, extraocular movements intact, no tremor  PSYCH: Alert and oriented x 3, normal affect, normal mood  CUSHING'S SIGNS: no striae    POCT Blood Glucose.: 351 mg/dL (03-12-19 @ 14:48)  POCT Blood Glucose.: 234 mg/dL (03-12-19 @ 11:05)  POCT Blood Glucose.: 171 mg/dL (03-12-19 @ 08:32)  POCT Blood Glucose.: 257 mg/dL (03-12-19 @ 06:14)  POCT Blood Glucose.: 378 mg/dL (03-12-19 @ 04:12)  POCT Blood Glucose.: 415 mg/dL (03-12-19 @ 02:49)  POCT Blood Glucose.: 407 mg/dL (03-12-19 @ 01:28)  POCT Blood Glucose.: 230 mg/dL (03-11-19 @ 18:47)  POCT Blood Glucose.: 254 mg/dL (03-11-19 @ 17:09)  POCT Blood Glucose.: 220 mg/dL (03-11-19 @ 16:13)                            10.1   4.56  )-----------( 158      ( 12 Mar 2019 04:00 )             31.3       03-12    139  |  99  |  86<H>  ----------------------------<  249<H>  4.4   |  19<L>  |  6.21<H>    EGFR if : 11  EGFR if non : 9     Ca    8.6      03-12  Mg     2.3     03-12  Phos  5.2     03-12    TPro  7.3  /  Alb  4.0  /  TBili  0.5  /  DBili  x   /  AST  29  /  ALT  23  /  AlkPhos  133<H>  03-11 07-Nov-2023 19:08

## 2024-02-06 NOTE — ED ADULT TRIAGE NOTE - HEART RATE (BEATS/MIN)
Faxed H&P form to Dr Marquez's office at [phone]. Requesting surgical clearance prior to 2/15. Confirmation received.  
93

## 2024-02-20 NOTE — PROGRESS NOTE ADULT - PROBLEM SELECTOR PLAN 3
PRIMARY CARE CARE COORDINATION   [facility]  [address] [place] [address]    February 28, 2024    Jessica Loaiza  [address] [place]  [facility] [address]      Dear Jessica,        I am a clinic care coordinator who works with Babatunde Mueller MD with the Primary Care clinic at the [facility] I recently tried to call and was unable to reach you. Below is a description of clinic care coordination and how I can further assist you.       The clinic care coordinator nurse is a nurse who understands the health care system. The goal of clinic care coordination is to help you manage your health and improve access to the health care system. Our team works alongside your provider to assist you in determining your health and social needs. We can help you obtain health care and community resources, providing you with necessary information and education. We can work with you through any barriers and develop a care plan that helps coordinate and strengthen the communication between you and your care team. Our services are voluntary and are offered without charge to you personally.    Please feel free to contact me with any questions or concerns regarding care coordination and what we can offer.      We are focused on providing you with the highest-quality healthcare experience possible.    Sincerely,     ASAEL Machuca Care Manager  Primary Care Clinic   Phone: [phone]  Fax: [phone]                     Hyperphosphatemia in setting of renal failure. Serum phosphorus in acceptable range. Monitor serum phosphorus.

## 2024-05-02 NOTE — PROGRESS NOTE ADULT - PROBLEM SELECTOR PLAN 5
Attempted to call parent to see how patient is feeling and advise urgent pulmonary referral was placed, lvm to return call.    -home regimen clonidine 0.1mg po q8h, nifedipine XR 90 mg qD, Turosemide 10 mg daily - holding for now  -c/w Cardizem and metoprolol

## 2024-11-18 NOTE — PROGRESS NOTE ADULT - PROBLEM SELECTOR PROBLEM 2
Anemia due to chronic kidney disease You can access the FollowMyHealth Patient Portal offered by [facility] by registering at the following website: [email or web address]. By joining [facility]’s FollowMyHealth portal, you will also be able to view your health information using other applications (apps) compatible with our system.

## 2025-04-02 NOTE — ED ADULT TRIAGE NOTE - PAIN RATING/NUMBER SCALE (0-10): REST
0
Bed/Stretcher in lowest position, wheels locked, appropriate side rails in place/Call bell, personal items and telephone in reach/Instruct patient to call for assistance before getting out of bed/chair/stretcher/Non-slip footwear applied when patient is off stretcher/[place] to call system/Physically safe environment - no spills, clutter or unnecessary equipment/Purposeful proactive rounding/Room/bathroom lighting operational, light cord in reach